# Patient Record
Sex: FEMALE | Race: ASIAN | NOT HISPANIC OR LATINO | Employment: FULL TIME | URBAN - METROPOLITAN AREA
[De-identification: names, ages, dates, MRNs, and addresses within clinical notes are randomized per-mention and may not be internally consistent; named-entity substitution may affect disease eponyms.]

---

## 2017-06-21 ENCOUNTER — TELEPHONE (OUTPATIENT)
Dept: FAMILY MEDICINE | Facility: CLINIC | Age: 52
End: 2017-06-21

## 2017-06-21 NOTE — TELEPHONE ENCOUNTER
----- Message from Bridger Roque sent at 6/20/2017  4:52 PM CDT -----  Contact: pt  Pt is requesting orders for blood work for appt 7-11-17   Call Back#[phone]  Thanks

## 2017-07-07 ENCOUNTER — TELEPHONE (OUTPATIENT)
Dept: FAMILY MEDICINE | Facility: CLINIC | Age: 52
End: 2017-07-07

## 2017-07-07 NOTE — TELEPHONE ENCOUNTER
----- Message from Joselyn Tobar sent at 7/7/2017 11:57 AM CDT -----  Contact: pt  Pt states that she was told to call 1 week before her appointment to setup labs.Would like orders and scheduled then give her a call back at ....[phone]

## 2017-07-11 ENCOUNTER — OFFICE VISIT (OUTPATIENT)
Dept: FAMILY MEDICINE | Facility: CLINIC | Age: 52
End: 2017-07-11
Payer: COMMERCIAL

## 2017-07-11 VITALS
BODY MASS INDEX: 39.59 KG/M2 | HEART RATE: 75 BPM | WEIGHT: 237.63 LBS | OXYGEN SATURATION: 95 % | SYSTOLIC BLOOD PRESSURE: 110 MMHG | HEIGHT: 65 IN | DIASTOLIC BLOOD PRESSURE: 82 MMHG

## 2017-07-11 DIAGNOSIS — E11.59 HYPERTENSION ASSOCIATED WITH DIABETES: ICD-10-CM

## 2017-07-11 DIAGNOSIS — F32.A ANXIETY AND DEPRESSION: ICD-10-CM

## 2017-07-11 DIAGNOSIS — E11.9 CONTROLLED TYPE 2 DIABETES MELLITUS WITHOUT COMPLICATION, UNSPECIFIED LONG TERM INSULIN USE STATUS: ICD-10-CM

## 2017-07-11 DIAGNOSIS — Z76.89 ESTABLISHING CARE WITH NEW DOCTOR, ENCOUNTER FOR: Primary | ICD-10-CM

## 2017-07-11 DIAGNOSIS — F41.9 ANXIETY AND DEPRESSION: ICD-10-CM

## 2017-07-11 DIAGNOSIS — I15.2 HYPERTENSION ASSOCIATED WITH DIABETES: ICD-10-CM

## 2017-07-11 DIAGNOSIS — Z12.31 ENCOUNTER FOR SCREENING MAMMOGRAM FOR MALIGNANT NEOPLASM OF BREAST: ICD-10-CM

## 2017-07-11 PROCEDURE — 99214 OFFICE O/P EST MOD 30 MIN: CPT | Mod: S$GLB,,, | Performed by: FAMILY MEDICINE

## 2017-07-11 PROCEDURE — 99999 PR PBB SHADOW E&M-EST. PATIENT-LVL III: CPT | Mod: PBBFAC,,, | Performed by: FAMILY MEDICINE

## 2017-07-11 PROCEDURE — 4010F ACE/ARB THERAPY RXD/TAKEN: CPT | Mod: S$GLB,,, | Performed by: FAMILY MEDICINE

## 2017-07-11 RX ORDER — ALPRAZOLAM 0.25 MG/1
0.25 TABLET ORAL 3 TIMES DAILY
Qty: 30 TABLET | Refills: 0 | Status: SHIPPED | OUTPATIENT
Start: 2017-07-11 | End: 2017-09-14 | Stop reason: SDUPTHER

## 2017-07-11 RX ORDER — SERTRALINE HYDROCHLORIDE 100 MG/1
100 TABLET, FILM COATED ORAL DAILY
Qty: 90 TABLET | Refills: 3 | Status: SHIPPED | OUTPATIENT
Start: 2017-07-11 | End: 2018-07-11 | Stop reason: SDUPTHER

## 2017-07-11 RX ORDER — OMEPRAZOLE 20 MG/1
20 CAPSULE, DELAYED RELEASE ORAL DAILY
Qty: 90 CAPSULE | Refills: 3 | Status: SHIPPED | OUTPATIENT
Start: 2017-07-11 | End: 2018-08-08 | Stop reason: ALTCHOICE

## 2017-07-11 RX ORDER — NITROFURANTOIN (MACROCRYSTALS) 100 MG/1
100 CAPSULE ORAL EVERY 12 HOURS
Qty: 14 CAPSULE | Refills: 0 | Status: SHIPPED | OUTPATIENT
Start: 2017-07-11 | End: 2017-07-18

## 2017-07-11 RX ORDER — LAMOTRIGINE 100 MG/1
100 TABLET ORAL DAILY
COMMUNITY
End: 2017-07-11 | Stop reason: SDUPTHER

## 2017-07-11 RX ORDER — ATORVASTATIN CALCIUM 20 MG/1
20 TABLET, FILM COATED ORAL DAILY
Qty: 90 TABLET | Refills: 3 | Status: SHIPPED | OUTPATIENT
Start: 2017-07-11 | End: 2018-08-08 | Stop reason: SDUPTHER

## 2017-07-11 RX ORDER — VALACYCLOVIR HYDROCHLORIDE 500 MG/1
500 TABLET, FILM COATED ORAL 2 TIMES DAILY
Qty: 180 TABLET | Refills: 3 | Status: SHIPPED | OUTPATIENT
Start: 2017-07-11 | End: 2019-05-07 | Stop reason: SDUPTHER

## 2017-07-11 RX ORDER — LISINOPRIL AND HYDROCHLOROTHIAZIDE 20; 25 MG/1; MG/1
TABLET ORAL
COMMUNITY
Start: 2017-05-24 | End: 2017-07-11 | Stop reason: SDUPTHER

## 2017-07-11 RX ORDER — VALACYCLOVIR HYDROCHLORIDE 500 MG/1
500 TABLET, FILM COATED ORAL 2 TIMES DAILY
COMMUNITY
End: 2017-07-11 | Stop reason: SDUPTHER

## 2017-07-11 RX ORDER — OMEPRAZOLE 20 MG/1
20 CAPSULE, DELAYED RELEASE ORAL DAILY
COMMUNITY
End: 2017-07-11 | Stop reason: SDUPTHER

## 2017-07-11 RX ORDER — GLYBURIDE 2.5 MG/1
TABLET ORAL
COMMUNITY
Start: 2017-05-24 | End: 2017-07-11 | Stop reason: SDUPTHER

## 2017-07-11 RX ORDER — LISINOPRIL AND HYDROCHLOROTHIAZIDE 20; 25 MG/1; MG/1
1 TABLET ORAL DAILY
Qty: 90 TABLET | Refills: 3 | Status: SHIPPED | OUTPATIENT
Start: 2017-07-11 | End: 2018-08-08 | Stop reason: SDUPTHER

## 2017-07-11 RX ORDER — LAMOTRIGINE 100 MG/1
100 TABLET ORAL DAILY
Qty: 90 TABLET | Refills: 3 | Status: SHIPPED | OUTPATIENT
Start: 2017-07-11 | End: 2018-07-08 | Stop reason: SDUPTHER

## 2017-07-11 RX ORDER — SERTRALINE HYDROCHLORIDE 100 MG/1
TABLET, FILM COATED ORAL
COMMUNITY
Start: 2017-04-24 | End: 2017-07-11 | Stop reason: SDUPTHER

## 2017-07-11 RX ORDER — ESTRADIOL 0.05 MG/D
1 FILM, EXTENDED RELEASE TRANSDERMAL WEEKLY
Qty: 8 PATCH | Refills: 5 | Status: SHIPPED | OUTPATIENT
Start: 2017-07-11 | End: 2017-08-31 | Stop reason: SDUPTHER

## 2017-07-11 RX ORDER — GLYBURIDE 2.5 MG/1
2.5 TABLET ORAL
Qty: 180 TABLET | Refills: 3 | Status: SHIPPED | OUTPATIENT
Start: 2017-07-11 | End: 2017-07-20 | Stop reason: SDUPTHER

## 2017-07-11 RX ORDER — ALPRAZOLAM 0.25 MG/1
0.25 TABLET ORAL 3 TIMES DAILY
COMMUNITY
End: 2017-07-11 | Stop reason: SDUPTHER

## 2017-07-11 RX ORDER — ESTRADIOL 0.05 MG/D
1 FILM, EXTENDED RELEASE TRANSDERMAL WEEKLY
COMMUNITY
End: 2017-07-11 | Stop reason: SDUPTHER

## 2017-07-11 NOTE — PATIENT INSTRUCTIONS
Weight Management: Getting Started  Healthy bodies come in all shapes and sizes. Not all bodies are made to be thin. For some people, a healthy weight is higher than the average weight listed on weight charts. Your healthcare provider can help you decide on a healthy weight for you.    Reasons to lose weight  Losing weight can help with some health problems, such as high blood pressure, heart disease, diabetes, sleep apnea, and arthritis. You may also feel more energy.  Set your long-term goal  Your goal doesn't even have to be a specific weight. You may decide on a fitness goal (such as being able to walk 10 miles a week), or a health goal (such as lowering your blood pressure). Choose a goal that is measurable and reasonable, so you know when you've reached it. A goal of reaching a BMI of less than 25 is not always reasonable (or possible).   Make an action plan  Habits dont change overnight. Setting your goals too high can leave you feeling discouraged if you cant reach them. Be realistic. Choose one or two small changes you can make now. Set an action plan for how you are going to make these changes. When you can stick to this plan, keep making a few more small changes. Taking small steps will help you stay on the path to success.  Track your progress  Write down your goals. Then, keep a daily record of your progress. Write down what you eat and how active you are. This record lets you look back on how much youve done. It may also help when youre feeling frustrated. Reward yourself for success. Even if you dont reach every goal, give yourself credit for what you do get done.  Get support  Encouragement from others can help make losing weight easier. Ask your family members and friends for support. They may even want to join you. Also look to your healthcare provider, registered dietitian, and  for help. Your local hospital can give you more information about nutrition, exercise, and  weight loss.  Date Last Reviewed: 1/31/2016 © 2000-2016 [employer]. [address] [place] [address], [place] [address]. All rights reserved. This information is not intended as a substitute for professional medical care. Always follow your healthcare professional's instructions.        Diabetes (General Information)  Diabetes is a long-term health problem. It means your body does not make enough insulin. Or it may mean that your body cannot use the insulin it makes. Insulin is a hormone in your body. It lets blood sugar (glucose) reach the cells in your body. All of your cells need glucose for fuel.  When you have diabetes, the glucose in your blood builds up because it cannot get into the cells. This buildup is called high blood sugar (hyperglycemia).  Your blood sugar level depends on several things. It depends on what kind of food you eat and how much of it you eat. It also depends on how much exercise you get, and how much insulin you have in your body. Eating too much of the wrong kinds of food or not taking diabetes medicine on time can cause high blood sugar. Infections can cause high blood sugar even if you are taking medicines correctly.  These things can also cause low blood sugar:  · Missing meals  · Not eating enough food  · Taking too much diabetes medicine  Diabetes can cause serious problems over time if you do not get treated. These problems include heart disease, stroke, kidney failure, and blindness. They also include nerve pain or loss of feeling in your legs and feet, and gangrene of the feet. By keeping your blood sugar under control you can prevent or delay these problems.  Normal blood sugar levels are 80 to 100 before a meal and less than 180 in the 1 to 2 hours after a meal.  Home care  Follow these guidelines when caring for yourself at home:  · Follow the diet your healthcare provider gives you. Take insulin or other diabetes medicine exactly as told to.  · Watch your blood sugar as  you are told to. Keep a log of your results. This will help your provider change your medicines to keep your blood sugar under control.  · Try to reach your ideal weight. You may be able to cut back on or not have to take diabetes medicine if you eat the right foods and get exercise.  · Do not smoke. Smoking worsens the effects of diabetes on your circulation. You are much more likely to have a heart attack if you have diabetes and you smoke.  · Take good care of your feet. If you have lost feeling in your feet, you may not see an injury or infection. Check your feet and between your toes at least once a week.  · Wear a medical alert bracelet or necklace, or carry a card in your wallet that says you have diabetes. This will help healthcare providers give you the right care if you get very ill and cannot tell them that you have diabetes.  Sick day plan  If you get a cold, the flu, or a bacterial or viral infection, take these steps:  · Look at your diabetes sick plan and call your healthcare provider as you were told to. You may need to call your provider right away if:  ¨ Your blood sugar is above 240 while taking your diabetes medicine  ¨ Your urine ketone levels are above normal or high  ¨ You have been vomiting more than 6 hours  ¨ You have trouble breathing or your breath ha s a fruity smell  ¨ You have a high fever  ¨ You have a fever for several days and you are not getting better  ¨ You get light-headed and are sleepier than usual  · Keep taking your diabetes pills (oral medicine) even if you have been vomiting and are feeling sick. Call your provider right away because you may need insulin to lower your blood sugar until you recover from your illness.  · Keep taking your insulin even if you have been vomiting and are feeling sick. Call your provider right away to ask if you need to change your insulin dose. This will depend on your blood sugar results.  · Check your blood sugar every 2 to 4 hours, or at  least 4 times a day.  · Check your ketones often. If you are vomiting and having diarrhea, watch them more often.  · Do not skip meals. Try to eat small meals on a regular schedule. Do this even if you do not feel like eating.  · Drink water or other liquids that do not have caffeine or calories. This will keep you from getting dehydrated. If you are nauseated or vomiting, takes small sips every 5 minutes. To prevent dehydration try to drink a cup (8 ounces) of fluids every hour while you are awake.  General care  Always bring a source of fast-acting sugar with you in case you have symptoms of low blood sugar (below 70). At the first sign of low blood sugar, eat or drink 15 to 20 grams of fast-acting sugar to raise your blood sugar. Examples are:  · 3 to 4 glucose tablets. You can buy these at most [employer].  · 4 ounces (1/2 cup) of regular (not diet) soft drinks  · 4 ounces (1/2 cup) of any fruit juice  · 8 ounces (1 cup) of milk  · 5 to 6 pieces of hard candy  · 1 tablespoon of honey  Check your blood sugar 15 minutes after treating yourself. If it is still below 70, take 15 to 20 more grams of fast-acting sugar. Test again in 15 minutes. If it returns to normal (70 or above), eat a snack or meal to keep your blood sugar in a safe range. If it stays low, call your doctor or go to an emergency room.  Follow-up care  Follow-up with your healthcare provider, or as advised. For more information about diabetes, visit the American Diabetes Association website at www.diabetes.org or call 401-633-0766.  When to seek medical advice  Call your healthcare provider right away if you have any of these symptoms of high blood sugar:  · Frequent urination  · Dizziness  · Drowsiness  · Thirst  · Headache  · Nausea or vomiting  · Abdominal pain  · Eyesight changes  · Fast breathing  · Confusion or loss of consciousness  Also call your provider right away if you have any of these signs of low blood  sugar:  · Fatigue  · Headache  · Shakes  · Excess sweating  · Hunger  · Feeling anxious or restless  · Eyesight changes  · Drowsiness  · Weakness  · Confusion or loss of consciousness  Call 911  Call for emergency help right away if any of these occur:  · Chest pain or shortness of breath  · Dizziness or fainting  · Weakness of an arm or leg or one side of the face  · Trouble speaking or seeing   Date Last Reviewed: 6/1/2016  © 2252-7754 [employer]. [address] [place] [address]. All rights reserved. This information is not intended as a substitute for professional medical care. Always follow your healthcare professional's instructions.

## 2017-07-11 NOTE — PROGRESS NOTES
Subjective:       Patient ID: Nik Lowery is a 51 y.o. female.    Chief Complaint: Establish Care    HPI     Establish Care  Pt reports to the clinic to establish care.   The pt has a medical history which includes HTN and DM2.  As far as smoking is concerned, the pt denies.  The pt attempts to maintain a healthy diet and engages in regular exercise.   Consistent seatbelt usage reported.   Pt has no symptoms of depression.   Health maintenance addressed and updated in chart.    Review of Systems   Constitutional: Negative for chills and fever.   HENT: Negative for sneezing and sore throat.    Eyes: Negative for visual disturbance.   Respiratory: Negative for cough and shortness of breath.    Cardiovascular: Negative for chest pain and leg swelling.   Gastrointestinal: Negative for abdominal pain, blood in stool, constipation, diarrhea and vomiting.   Genitourinary: Negative for difficulty urinating, dysuria and hematuria.   Musculoskeletal: Negative for arthralgias.   Neurological: Negative for dizziness and weakness.       Objective:      Physical Exam   Constitutional: She appears well-developed and well-nourished. No distress.   Obese female in NAD.   HENT:   Head: Normocephalic and atraumatic.   Mouth/Throat: Oropharynx is clear and moist. No oropharyngeal exudate.   Eyes: EOM are normal. Pupils are equal, round, and reactive to light.   Neck: Normal range of motion. Neck supple. No thyromegaly present.   Cardiovascular: Normal rate, regular rhythm, normal heart sounds and intact distal pulses.    Pulmonary/Chest: Effort normal and breath sounds normal. No respiratory distress. She has no wheezes.   Abdominal: Soft. Bowel sounds are normal. She exhibits no distension and no mass. There is no tenderness.   Musculoskeletal: She exhibits no edema.   Lymphadenopathy:     She has no cervical adenopathy.   Neurological: She is alert.   Skin: Skin is warm. No rash noted. No erythema.   Psychiatric: She has a normal  mood and affect. Her behavior is normal.   Vitals reviewed.      Assessment:       1. Establishing care with new doctor, encounter for    2. Controlled type 2 diabetes mellitus without complication, unspecified long term insulin use status    3. Hypertension associated with diabetes    4. Anxiety and depression    5. Encounter for screening mammogram for malignant neoplasm of breast    6. BMI 39.0-39.9,adult        Plan:       1. Establishing care with new doctor, encounter for    2. Controlled type 2 diabetes mellitus without complication, unspecified long term insulin use status  - Basic metabolic panel; Future  - Hemoglobin A1c; Future  - Microalbumin/creatinine urine ratio; Future    3. Hypertension associated with diabetes  Condition currently stable. No changes to medication regimen on today.   - Basic metabolic panel; Future  - Microalbumin/creatinine urine ratio; Future    4. Anxiety and depression  - Ambulatory referral to Psychiatry    5. Encounter for screening mammogram for malignant neoplasm of breast  - Mammo Digital Screening Bilateral With CAD; Future    6. BMI 39.0-39.9,adult  The ASCVD Risk score (Milton ORLANDO Jr., et al., 2013) failed to calculate for the following reasons:    Cannot find a previous HDL lab    Cannot find a previous total cholesterol lab  - Lipid panel; Future  Start atorvastin.    Patient readiness: acceptance and barriers:none    During the course of the visit the patient was educated and counseled about the following:     Diabetes:  Educational material distributed.  Addressed ADA diet.  Suggested low cholesterol diet.  Encouraged aerobic exercise.  Discussed foot care.  Reminded to get yearly retinal exam.  Hypertension:   Dietary sodium restriction.  Regular aerobic exercise.  Obesity:   Informal exercise measures discussed, e.g. taking stairs instead of elevator.  Regular aerobic exercise program discussed.    Goals: Diabetes: Maintain Hemoglobin A1C below 7, Hypertension: Reduce  Blood Pressure and Obesity: Reduce calorie intake and BMI    Did patient meet goals/outcomes: No    The following self management tools provided: blood pressure log  blood glucose log  excercise log    Patient Instructions (the written plan) was given to the patient/family.     Time spent with patient: 30 minutes    Portions of this note were created using Dragon voice recognition software. There may be voice recognition errors found in the text, and attempts were made to correct these errors prior to signature    Marin Hager MD    Family Medicine  7/11/2017

## 2017-07-13 DIAGNOSIS — Z11.59 NEED FOR HEPATITIS C SCREENING TEST: ICD-10-CM

## 2017-07-20 ENCOUNTER — TELEPHONE (OUTPATIENT)
Dept: FAMILY MEDICINE | Facility: CLINIC | Age: 52
End: 2017-07-20

## 2017-07-20 RX ORDER — GLYBURIDE 2.5 MG/1
2.5 TABLET ORAL 2 TIMES DAILY WITH MEALS
Qty: 180 TABLET | Refills: 3 | Status: SHIPPED | OUTPATIENT
Start: 2017-07-20 | End: 2018-07-25 | Stop reason: SDUPTHER

## 2017-07-20 NOTE — TELEPHONE ENCOUNTER
----- Message from Chrissy Curry sent at 7/20/2017  1:07 PM CDT -----  Contact: [facility] [phone] called regarding [place] for above patient. They are requesting one of the following: Rx clarification of directions and quantity. Thanks!

## 2017-08-12 ENCOUNTER — HOSPITAL ENCOUNTER (OUTPATIENT)
Dept: RADIOLOGY | Facility: CLINIC | Age: 52
Discharge: HOME | End: 2017-08-12
Attending: FAMILY MEDICINE
Payer: COMMERCIAL

## 2017-08-12 DIAGNOSIS — Z12.31 ENCOUNTER FOR SCREENING MAMMOGRAM FOR MALIGNANT NEOPLASM OF BREAST: ICD-10-CM

## 2017-08-12 PROCEDURE — 77063 BREAST TOMOSYNTHESIS BI: CPT | Mod: 26,,, | Performed by: RADIOLOGY

## 2017-08-12 PROCEDURE — 77067 SCR MAMMO BI INCL CAD: CPT | Mod: 26,,, | Performed by: RADIOLOGY

## 2017-08-12 PROCEDURE — 77067 SCR MAMMO BI INCL CAD: CPT | Mod: TC

## 2017-08-18 ENCOUNTER — TELEPHONE (OUTPATIENT)
Dept: FAMILY MEDICINE | Facility: CLINIC | Age: 52
End: 2017-08-18

## 2017-08-18 RX ORDER — METFORMIN HYDROCHLORIDE 500 MG/1
1000 TABLET, EXTENDED RELEASE ORAL 2 TIMES DAILY WITH MEALS
Qty: 360 TABLET | Refills: 3 | Status: SHIPPED | OUTPATIENT
Start: 2017-08-18 | End: 2018-08-08 | Stop reason: SDUPTHER

## 2017-08-18 NOTE — TELEPHONE ENCOUNTER
I spoke to patient regarding her recent lab results.  Patient has been informed that her diabetes is not controlled and her A1c is significantly elevated.  For this.  Patient has been started on metformin XR 1000 g by mouth twice a day.  She has been advised to improve her diet as well as an order to assist with lowering her glucose levels.

## 2017-09-01 RX ORDER — ESTRADIOL 0.05 MG/D
FILM, EXTENDED RELEASE TRANSDERMAL
Qty: 24 PATCH | Refills: 1 | Status: SHIPPED | OUTPATIENT
Start: 2017-09-01 | End: 2018-06-04 | Stop reason: SDUPTHER

## 2017-09-14 RX ORDER — ALPRAZOLAM 0.25 MG/1
TABLET ORAL
Qty: 30 TABLET | Refills: 0 | Status: SHIPPED | OUTPATIENT
Start: 2017-09-14 | End: 2018-08-08 | Stop reason: SDUPTHER

## 2017-10-05 ENCOUNTER — TELEPHONE (OUTPATIENT)
Dept: FAMILY MEDICINE | Facility: CLINIC | Age: 52
End: 2017-10-05

## 2017-10-05 NOTE — TELEPHONE ENCOUNTER
Pharmacy called regarding pt. Medication. Pharmacy filled Metformin 500mg regular, instead of Metformin 500mg XL . Pharmacy reports that pt. Has no side effects from the regular tabs. Would you want pt. To continue taking regular or switch to XL like prescribed originally. Please Advise

## 2017-11-16 DIAGNOSIS — Z12.11 COLON CANCER SCREENING: ICD-10-CM

## 2017-12-14 DIAGNOSIS — Z13.5 DIABETIC RETINOPATHY SCREENING: ICD-10-CM

## 2018-01-10 DIAGNOSIS — E11.9 TYPE 2 DIABETES, HBA1C GOAL < 7%: Primary | ICD-10-CM

## 2018-01-18 DIAGNOSIS — E11.9 TYPE 2 DIABETES MELLITUS WITHOUT COMPLICATION: ICD-10-CM

## 2018-03-22 ENCOUNTER — OFFICE VISIT (OUTPATIENT)
Dept: OPTOMETRY | Facility: CLINIC | Age: 53
End: 2018-03-22
Payer: COMMERCIAL

## 2018-03-22 DIAGNOSIS — H52.7 REFRACTIVE ERROR: ICD-10-CM

## 2018-03-22 DIAGNOSIS — E11.9 DIABETES MELLITUS WITHOUT OPHTHALMIC MANIFESTATIONS: Primary | ICD-10-CM

## 2018-03-22 PROCEDURE — 92004 COMPRE OPH EXAM NEW PT 1/>: CPT | Mod: S$GLB,,, | Performed by: OPTOMETRIST

## 2018-03-22 PROCEDURE — 99999 PR PBB SHADOW E&M-EST. PATIENT-LVL II: CPT | Mod: PBBFAC,,, | Performed by: OPTOMETRIST

## 2018-03-22 NOTE — PROGRESS NOTES
HPI     Presenting Complaint: Pt here today for yearly diabetic eye exam and   refraction. Pt states blood sugar level are not controlled. DLE: 2015    Hemoglobin A1C       Date                     Value               Ref Range             Status                08/12/2017               10.5 (H)            4.0 - 5.6 %           Final               Pt states vision has been blurry  When she wears glasses but not all the   time    Ophthalmic medication / drops: None    (+) 2001 Lasik both eyes    (-) headaches  (-) diplopia   (-) flashes / (-) floaters      Last edited by Rm Mendez, OD on 3/22/2018  4:04 PM. (History)            Assessment /Plan     For exam results, see Encounter Report.    Diabetes mellitus without ophthalmic manifestations    Refractive error      DM type 2 w/o ocular retinopathy OU. Discussed possible ocular affects of uncontrolled blood sugar with patient. Recommended continued strong blood sugar control and continued care with PCP. Monitor yearly.     Discussed refractive error, pt overdue for A1C and f/u with PCP. Set up appt today. Pt defers refraction at this time, will schedule refraction when BS stable.       RTC when ready for refraction, or sooner prn.

## 2018-04-26 RX ORDER — ALPRAZOLAM 0.25 MG/1
TABLET ORAL
Qty: 30 TABLET | OUTPATIENT
Start: 2018-04-26

## 2018-06-04 RX ORDER — ESTRADIOL 0.05 MG/D
FILM, EXTENDED RELEASE TRANSDERMAL
Qty: 24 PATCH | Refills: 1 | Status: SHIPPED | OUTPATIENT
Start: 2018-06-04 | End: 2019-01-10 | Stop reason: SDUPTHER

## 2018-07-09 RX ORDER — LAMOTRIGINE 100 MG/1
TABLET ORAL
Qty: 90 TABLET | Refills: 1 | Status: SHIPPED | OUTPATIENT
Start: 2018-07-09 | End: 2019-01-05 | Stop reason: SDUPTHER

## 2018-07-11 RX ORDER — SERTRALINE HYDROCHLORIDE 100 MG/1
TABLET, FILM COATED ORAL
Qty: 90 TABLET | Refills: 0 | Status: SHIPPED | OUTPATIENT
Start: 2018-07-11 | End: 2018-08-08 | Stop reason: SDUPTHER

## 2018-07-25 RX ORDER — LISINOPRIL AND HYDROCHLOROTHIAZIDE 20; 25 MG/1; MG/1
TABLET ORAL
Qty: 90 TABLET | Refills: 3 | Status: CANCELLED | OUTPATIENT
Start: 2018-07-25

## 2018-07-25 RX ORDER — GLYBURIDE 2.5 MG/1
TABLET ORAL
Qty: 180 TABLET | Refills: 0 | Status: SHIPPED | OUTPATIENT
Start: 2018-07-25 | End: 2018-10-24 | Stop reason: SDUPTHER

## 2018-08-06 DIAGNOSIS — Z11.59 NEED FOR HEPATITIS C SCREENING TEST: ICD-10-CM

## 2018-08-06 DIAGNOSIS — Z12.11 COLON CANCER SCREENING: ICD-10-CM

## 2018-08-08 ENCOUNTER — TELEPHONE (OUTPATIENT)
Dept: PSYCHIATRY | Facility: CLINIC | Age: 53
End: 2018-08-08

## 2018-08-08 ENCOUNTER — OFFICE VISIT (OUTPATIENT)
Dept: FAMILY MEDICINE | Facility: CLINIC | Age: 53
End: 2018-08-08
Payer: COMMERCIAL

## 2018-08-08 VITALS
OXYGEN SATURATION: 98 % | RESPIRATION RATE: 18 BRPM | BODY MASS INDEX: 39.49 KG/M2 | HEIGHT: 65 IN | SYSTOLIC BLOOD PRESSURE: 122 MMHG | WEIGHT: 237 LBS | TEMPERATURE: 98 F | DIASTOLIC BLOOD PRESSURE: 74 MMHG | HEART RATE: 81 BPM

## 2018-08-08 DIAGNOSIS — E11.65 UNCONTROLLED TYPE 2 DIABETES MELLITUS WITH HYPERGLYCEMIA, WITHOUT LONG-TERM CURRENT USE OF INSULIN: Primary | ICD-10-CM

## 2018-08-08 DIAGNOSIS — Z11.59 NEED FOR HEPATITIS C SCREENING TEST: ICD-10-CM

## 2018-08-08 DIAGNOSIS — E11.59 HYPERTENSION ASSOCIATED WITH DIABETES: ICD-10-CM

## 2018-08-08 DIAGNOSIS — F41.9 ANXIETY AND DEPRESSION: ICD-10-CM

## 2018-08-08 DIAGNOSIS — F41.1 GAD (GENERALIZED ANXIETY DISORDER): Primary | ICD-10-CM

## 2018-08-08 DIAGNOSIS — K21.9 GASTROESOPHAGEAL REFLUX DISEASE WITHOUT ESOPHAGITIS: ICD-10-CM

## 2018-08-08 DIAGNOSIS — I15.2 HYPERTENSION ASSOCIATED WITH DIABETES: ICD-10-CM

## 2018-08-08 DIAGNOSIS — Z12.11 COLON CANCER SCREENING: ICD-10-CM

## 2018-08-08 DIAGNOSIS — Z28.39 IMMUNIZATION DEFICIENCY: ICD-10-CM

## 2018-08-08 DIAGNOSIS — F32.A ANXIETY AND DEPRESSION: ICD-10-CM

## 2018-08-08 DIAGNOSIS — E66.01 SEVERE OBESITY (BMI 35.0-39.9) WITH COMORBIDITY: ICD-10-CM

## 2018-08-08 PROCEDURE — 3008F BODY MASS INDEX DOCD: CPT | Mod: CPTII,S$GLB,, | Performed by: PHYSICIAN ASSISTANT

## 2018-08-08 PROCEDURE — 3078F DIAST BP <80 MM HG: CPT | Mod: CPTII,S$GLB,, | Performed by: PHYSICIAN ASSISTANT

## 2018-08-08 PROCEDURE — 90714 TD VACC NO PRESV 7 YRS+ IM: CPT | Mod: S$GLB,,, | Performed by: PHYSICIAN ASSISTANT

## 2018-08-08 PROCEDURE — 90471 IMMUNIZATION ADMIN: CPT | Mod: 59,S$GLB,, | Performed by: PHYSICIAN ASSISTANT

## 2018-08-08 PROCEDURE — 99999 PR PBB SHADOW E&M-EST. PATIENT-LVL V: CPT | Mod: PBBFAC,,, | Performed by: PHYSICIAN ASSISTANT

## 2018-08-08 PROCEDURE — 3046F HEMOGLOBIN A1C LEVEL >9.0%: CPT | Mod: CPTII,S$GLB,, | Performed by: PHYSICIAN ASSISTANT

## 2018-08-08 PROCEDURE — 99214 OFFICE O/P EST MOD 30 MIN: CPT | Mod: 25,S$GLB,, | Performed by: PHYSICIAN ASSISTANT

## 2018-08-08 PROCEDURE — 90472 IMMUNIZATION ADMIN EACH ADD: CPT | Mod: S$GLB,,, | Performed by: PHYSICIAN ASSISTANT

## 2018-08-08 PROCEDURE — 90732 PPSV23 VACC 2 YRS+ SUBQ/IM: CPT | Mod: S$GLB,,, | Performed by: PHYSICIAN ASSISTANT

## 2018-08-08 PROCEDURE — 3074F SYST BP LT 130 MM HG: CPT | Mod: CPTII,S$GLB,, | Performed by: PHYSICIAN ASSISTANT

## 2018-08-08 RX ORDER — SERTRALINE HYDROCHLORIDE 100 MG/1
100 TABLET, FILM COATED ORAL DAILY
Qty: 90 TABLET | Refills: 1 | Status: SHIPPED | OUTPATIENT
Start: 2018-08-08 | End: 2019-03-14 | Stop reason: SDUPTHER

## 2018-08-08 RX ORDER — INSULIN PUMP SYRINGE, 3 ML
EACH MISCELLANEOUS
Qty: 1 EACH | Refills: 0 | Status: SHIPPED | OUTPATIENT
Start: 2018-08-08 | End: 2021-07-16

## 2018-08-08 RX ORDER — ALPRAZOLAM 0.25 MG/1
0.25 TABLET ORAL DAILY PRN
Qty: 30 TABLET | Refills: 2 | Status: CANCELLED | OUTPATIENT
Start: 2018-08-08

## 2018-08-08 RX ORDER — LANCETS
EACH MISCELLANEOUS
Qty: 200 EACH | Refills: 1 | Status: SHIPPED | OUTPATIENT
Start: 2018-08-08

## 2018-08-08 RX ORDER — METFORMIN HYDROCHLORIDE 500 MG/1
1000 TABLET, EXTENDED RELEASE ORAL 2 TIMES DAILY WITH MEALS
Qty: 360 TABLET | Refills: 3 | Status: SHIPPED | OUTPATIENT
Start: 2018-08-08 | End: 2018-08-08 | Stop reason: SDUPTHER

## 2018-08-08 RX ORDER — LISINOPRIL AND HYDROCHLOROTHIAZIDE 20; 25 MG/1; MG/1
1 TABLET ORAL DAILY
Qty: 90 TABLET | Refills: 3 | Status: SHIPPED | OUTPATIENT
Start: 2018-08-08 | End: 2019-08-04 | Stop reason: SDUPTHER

## 2018-08-08 RX ORDER — PANTOPRAZOLE SODIUM 20 MG/1
20 TABLET, DELAYED RELEASE ORAL DAILY
Qty: 90 TABLET | Refills: 1 | Status: SHIPPED | OUTPATIENT
Start: 2018-08-08 | End: 2019-08-13 | Stop reason: SDUPTHER

## 2018-08-08 RX ORDER — METFORMIN HYDROCHLORIDE 500 MG/1
1000 TABLET, EXTENDED RELEASE ORAL 2 TIMES DAILY WITH MEALS
Qty: 360 TABLET | Refills: 3 | Status: SHIPPED | OUTPATIENT
Start: 2018-08-08 | End: 2019-08-13 | Stop reason: SDUPTHER

## 2018-08-08 RX ORDER — ATORVASTATIN CALCIUM 20 MG/1
20 TABLET, FILM COATED ORAL DAILY
Qty: 90 TABLET | Refills: 3 | Status: SHIPPED | OUTPATIENT
Start: 2018-08-08 | End: 2019-08-13 | Stop reason: SDUPTHER

## 2018-08-08 NOTE — PROGRESS NOTES
Two Patient identifiers used, Name and . VIS information given to patient and procedure was explained. Patient verbalized understanding. Pneumovax 23 was administered IM in the right deltoid and Td was administered IM in the left deltoid both using sterile technique.  No residual bleeding noted. Patient tolerated procedure well.

## 2018-08-08 NOTE — PROGRESS NOTES
Subjective:       Patient ID: Nik Lowery is a 52 y.o. female.    Chief Complaint: Follow-up (diabetic/ RX refills)    Ms. Lowery comes to clinic today for follow up. The patient has not been seen in over a year. The patient has type 2 diabetes and HTN. The patient's last HgA1c was 10.5 . The patient was prescribed metformin. She stopped the metformin due to GI side effects. The patient also stopped taking her lipitor. The patient has been compliant with her blood pressure medication. The patient does have anxiety and depression for which she takes zoloft and lamictal. The patient is also prescribed xanax but she takes this only once weekly. The patient reports she was seeing a psychiatrist in [place] but she has not reestablished with a mental health professional or psychiatrist here. She is due to update lab at this time. She also request medication refills.       Review of Systems   Constitutional: Negative for activity change, appetite change and fever.   HENT: Negative for postnasal drip, rhinorrhea and sinus pressure.    Eyes: Negative for visual disturbance.   Respiratory: Negative for cough and shortness of breath.    Cardiovascular: Negative for chest pain.   Gastrointestinal: Negative for abdominal distention and abdominal pain.   Genitourinary: Negative for difficulty urinating and dysuria.   Musculoskeletal: Negative for arthralgias and myalgias.   Neurological: Negative for headaches.   Hematological: Negative for adenopathy.   Psychiatric/Behavioral: The patient is nervous/anxious.        Objective:      Physical Exam   Constitutional: She is oriented to person, place, and time.   HENT:   Mouth/Throat: Oropharynx is clear and moist. No oropharyngeal exudate.   Eyes: Conjunctivae are normal. Pupils are equal, round, and reactive to light.   Cardiovascular: Normal rate and regular rhythm.    Pulses:       Dorsalis pedis pulses are 2+ on the right side, and 2+ on the left side.        Posterior tibial  pulses are 2+ on the right side, and 2+ on the left side.   Pulmonary/Chest: Effort normal and breath sounds normal. She has no wheezes.   Abdominal: Soft. Bowel sounds are normal. There is no tenderness.   Musculoskeletal: She exhibits no edema.   Feet:   Right Foot:   Protective Sensation: 10 sites tested. 10 sites sensed.   Skin Integrity: Negative for ulcer, blister, callus or dry skin.   Left Foot:   Protective Sensation: 10 sites tested. 10 sites sensed.   Skin Integrity: Negative for ulcer, blister, callus or dry skin.   Lymphadenopathy:     She has no cervical adenopathy.   Neurological: She is alert and oriented to person, place, and time.   Skin: No erythema.   Psychiatric: Her behavior is normal.       Assessment:       1. Uncontrolled type 2 diabetes mellitus with hyperglycemia, without long-term current use of insulin    2. Hypertension associated with diabetes    3. Anxiety and depression    4. Colon cancer screening    5. Need for hepatitis C screening test    6. Immunization deficiency    7. Gastroesophageal reflux disease without esophagitis    8. Severe obesity (BMI 35.0-39.9) with comorbidity        Plan:   Nik was seen today for follow-up.    Diagnoses and all orders for this visit:    Uncontrolled type 2 diabetes mellitus with hyperglycemia, without long-term current use of insulin  -     Hemoglobin A1c; Future  -     Comprehensive metabolic panel; Future  -     CBC auto differential; Future  -     Lipid panel; Future  -     atorvastatin (LIPITOR) 20 MG tablet; Take 1 tablet (20 mg total) by mouth once daily.  -     Discontinue: metFORMIN (GLUCOPHAGE-XR) 500 MG 24 hr tablet; Take 2 tablets (1,000 mg total) by mouth 2 (two) times daily with meals.  -     metFORMIN (GLUCOPHAGE-XR) 500 MG 24 hr tablet; Take 2 tablets (1,000 mg total) by mouth 2 (two) times daily with meals.  -     blood-glucose meter kit; To check BG 3 times daily, to use with insurance preferred meter  -     lancets Misc; To  check BG 3 times daily, to use with insurance preferred meter  -     blood sugar diagnostic Strp; To check BG 3 times daily, to use with insurance preferred meter  -     Ambulatory Referral to Diabetes Education  Uncontrolled on last labs and patient has not been compliant with medication.  Hypertension associated with diabetes  -     Comprehensive metabolic panel; Future  -     CBC auto differential; Future  -     Lipid panel; Future  -     lisinopril-hydrochlorothiazide (PRINZIDE,ZESTORETIC) 20-25 mg Tab; Take 1 tablet by mouth once daily.  Controlled  Low salt diet  Continue current medicaiton  Anxiety and depression  -     sertraline (ZOLOFT) 100 MG tablet; Take 1 tablet (100 mg total) by mouth once daily.  -     Ambulatory referral to Psychiatry  Stable at this time  Continue zoloft and lamictal  Patient advised that the ultimate goal would be for her to be able to discontinue the alprazolam and have anxiety controlled with daily maintenance medication. Patient verbalized understanding  Colon cancer screening  -     Fecal Immunochemical Test (iFOBT); Future    Need for hepatitis C screening test  -     Hepatitis C antibody; Future    Immunization deficiency  -     (In Office Administered) Pneumococcal Polysaccharide Vaccine (23 Valent) (SQ/IM)  -     (In Office Administered) Td Vaccine - Preservative Free    Gastroesophageal reflux disease without esophagitis  -     pantoprazole (PROTONIX) 20 MG tablet; Take 1 tablet (20 mg total) by mouth once daily.  Avoid trigger foods  Stop eating 2-3 hours before bed  Severe obesity (BMI 35.0-39.9) with comorbidity  Low carbohydrate, high fiber diet  Increase exercise as able      Patient readiness: acceptance and barriers:none    During the course of the visit the patient was educated and counseled about the following:     Diabetes:  Discussed general issues about diabetes pathophysiology and management.  Educational material distributed.  Addressed ADA diet.  Suggested  low cholesterol diet.  Encouraged aerobic exercise.  Discussed foot care.  Reminded to get yearly retinal exam.  Hypertension:   Medication: continue current medications  Dietary sodium restriction.  Regular aerobic exercise.  Obesity:   General weight loss/lifestyle modification strategies discussed (elicit support from others; identify saboteurs; non-food rewards, etc).    Goals: Diabetes: Maintain Hemoglobin A1C below 7, Hypertension: Reduce Blood Pressure and Obesity: Reduce calorie intake and BMI    Did patient meet goals/outcomes: No    The following self management tools provided: declined    Patient Instructions (the written plan) was given to the patient/family.     Time spent with patient: 30 minutes    Barriers to medications present (no )    Adverse reactions to current medications (no)    Over the counter medications reviewed (Yes)

## 2018-08-08 NOTE — TELEPHONE ENCOUNTER
Please refill aprazolam for this patient. Patient on daily maintenance medication for anxiety. Patient also referred to Ms. Emerson.

## 2018-08-08 NOTE — TELEPHONE ENCOUNTER
Johana Santillan PA-C routed conversation to You 9 minutes ago (4:48 PM)      Johana Santillan PA-C 10 minutes ago (4:47 PM)         Patient referred for counseling/ therapy with Ms. Emerson. Please schedule. Thanks!         Documentation       Johana Santillan PA-C routed conversation to Cj Rocha MD 36 minutes ago (4:21 PM)

## 2018-08-11 ENCOUNTER — LAB VISIT (OUTPATIENT)
Dept: LAB | Facility: HOSPITAL | Age: 53
End: 2018-08-11
Attending: PHYSICIAN ASSISTANT
Payer: COMMERCIAL

## 2018-08-11 DIAGNOSIS — E11.65 UNCONTROLLED TYPE 2 DIABETES MELLITUS WITH HYPERGLYCEMIA, WITHOUT LONG-TERM CURRENT USE OF INSULIN: ICD-10-CM

## 2018-08-11 DIAGNOSIS — Z11.59 NEED FOR HEPATITIS C SCREENING TEST: ICD-10-CM

## 2018-08-11 DIAGNOSIS — E11.59 HYPERTENSION ASSOCIATED WITH DIABETES: ICD-10-CM

## 2018-08-11 DIAGNOSIS — I15.2 HYPERTENSION ASSOCIATED WITH DIABETES: ICD-10-CM

## 2018-08-11 LAB
ALBUMIN SERPL BCP-MCNC: 3.7 G/DL
ALP SERPL-CCNC: 73 U/L
ALT SERPL W/O P-5'-P-CCNC: 75 U/L
ANION GAP SERPL CALC-SCNC: 12 MMOL/L
AST SERPL-CCNC: 60 U/L
BASOPHILS # BLD AUTO: 0.05 K/UL
BASOPHILS NFR BLD: 0.7 %
BILIRUB SERPL-MCNC: 0.7 MG/DL
BUN SERPL-MCNC: 13 MG/DL
CALCIUM SERPL-MCNC: 9.8 MG/DL
CHLORIDE SERPL-SCNC: 103 MMOL/L
CHOLEST SERPL-MCNC: 176 MG/DL
CHOLEST/HDLC SERPL: 3.2 {RATIO}
CO2 SERPL-SCNC: 24 MMOL/L
CREAT SERPL-MCNC: 0.8 MG/DL
DIFFERENTIAL METHOD: NORMAL
EOSINOPHIL # BLD AUTO: 0.2 K/UL
EOSINOPHIL NFR BLD: 2.1 %
ERYTHROCYTE [DISTWIDTH] IN BLOOD BY AUTOMATED COUNT: 12 %
EST. GFR  (AFRICAN AMERICAN): >60 ML/MIN/1.73 M^2
EST. GFR  (NON AFRICAN AMERICAN): >60 ML/MIN/1.73 M^2
ESTIMATED AVG GLUCOSE: 200 MG/DL
ESTIMATED AVG GLUCOSE: 200 MG/DL
GLUCOSE SERPL-MCNC: 166 MG/DL
HBA1C MFR BLD HPLC: 8.6 %
HBA1C MFR BLD HPLC: 8.6 %
HCT VFR BLD AUTO: 42.5 %
HDLC SERPL-MCNC: 55 MG/DL
HDLC SERPL: 31.3 %
HGB BLD-MCNC: 13.7 G/DL
IMM GRANULOCYTES # BLD AUTO: 0.01 K/UL
IMM GRANULOCYTES NFR BLD AUTO: 0.1 %
LDLC SERPL CALC-MCNC: 105 MG/DL
LYMPHOCYTES # BLD AUTO: 2.1 K/UL
LYMPHOCYTES NFR BLD: 27.7 %
MCH RBC QN AUTO: 28.7 PG
MCHC RBC AUTO-ENTMCNC: 32.2 G/DL
MCV RBC AUTO: 89 FL
MONOCYTES # BLD AUTO: 0.4 K/UL
MONOCYTES NFR BLD: 5.1 %
NEUTROPHILS # BLD AUTO: 4.8 K/UL
NEUTROPHILS NFR BLD: 64.3 %
NONHDLC SERPL-MCNC: 121 MG/DL
NRBC BLD-RTO: 0 /100 WBC
PLATELET # BLD AUTO: 276 K/UL
PMV BLD AUTO: 10.1 FL
POTASSIUM SERPL-SCNC: 4.1 MMOL/L
PROT SERPL-MCNC: 7.9 G/DL
RBC # BLD AUTO: 4.78 M/UL
SODIUM SERPL-SCNC: 139 MMOL/L
TRIGL SERPL-MCNC: 80 MG/DL
WBC # BLD AUTO: 7.48 K/UL

## 2018-08-11 PROCEDURE — 80061 LIPID PANEL: CPT

## 2018-08-11 PROCEDURE — 36415 COLL VENOUS BLD VENIPUNCTURE: CPT | Mod: PO

## 2018-08-11 PROCEDURE — 83036 HEMOGLOBIN GLYCOSYLATED A1C: CPT

## 2018-08-11 PROCEDURE — 86803 HEPATITIS C AB TEST: CPT

## 2018-08-11 PROCEDURE — 85025 COMPLETE CBC W/AUTO DIFF WBC: CPT

## 2018-08-11 PROCEDURE — 80053 COMPREHEN METABOLIC PANEL: CPT

## 2018-08-11 RX ORDER — ALPRAZOLAM 0.25 MG/1
0.25 TABLET ORAL DAILY PRN
Qty: 30 TABLET | Refills: 2 | Status: SHIPPED | OUTPATIENT
Start: 2018-08-11 | End: 2020-02-13 | Stop reason: SDUPTHER

## 2018-08-13 LAB
HCV AB SERPL QL IA: NEGATIVE
HCV AB SERPL QL IA: NEGATIVE

## 2018-08-18 ENCOUNTER — HOSPITAL ENCOUNTER (EMERGENCY)
Facility: HOSPITAL | Age: 53
Discharge: HOME | End: 2018-08-18
Attending: EMERGENCY MEDICINE
Payer: COMMERCIAL

## 2018-08-18 VITALS
WEIGHT: 230 LBS | BODY MASS INDEX: 38.32 KG/M2 | TEMPERATURE: 98 F | SYSTOLIC BLOOD PRESSURE: 119 MMHG | DIASTOLIC BLOOD PRESSURE: 70 MMHG | HEIGHT: 65 IN | HEART RATE: 85 BPM | RESPIRATION RATE: 16 BRPM | OXYGEN SATURATION: 98 %

## 2018-08-18 DIAGNOSIS — S39.012A STRAIN OF LUMBAR REGION, INITIAL ENCOUNTER: Primary | ICD-10-CM

## 2018-08-18 PROCEDURE — 96372 THER/PROPH/DIAG INJ SC/IM: CPT

## 2018-08-18 PROCEDURE — 63600175 PHARM REV CODE 636 W HCPCS: Performed by: EMERGENCY MEDICINE

## 2018-08-18 PROCEDURE — 99283 EMERGENCY DEPT VISIT LOW MDM: CPT | Mod: 25

## 2018-08-18 RX ORDER — KETOROLAC TROMETHAMINE 30 MG/ML
15 INJECTION, SOLUTION INTRAMUSCULAR; INTRAVENOUS
Status: COMPLETED | OUTPATIENT
Start: 2018-08-18 | End: 2018-08-18

## 2018-08-18 RX ORDER — DICLOFENAC SODIUM 50 MG/1
50 TABLET, DELAYED RELEASE ORAL 3 TIMES DAILY
Qty: 15 TABLET | Refills: 0 | Status: SHIPPED | OUTPATIENT
Start: 2018-08-18 | End: 2018-08-29

## 2018-08-18 RX ADMIN — KETOROLAC TROMETHAMINE 15 MG: 30 INJECTION, SOLUTION INTRAMUSCULAR at 04:08

## 2018-08-18 NOTE — ED PROVIDER NOTES
"Encounter Date: 8/18/2018    SCRIBE #1 NOTE: I, Dang Shin , am scribing for, and in the presence of,  Dr. Wagner. I have scribed the entire note.       History     Chief Complaint   Patient presents with    Back Pain     twisted back this am      08/18/2018  4:37 PM       The patient is a 52 y.o. Female with a PMHx of DM type II and HTN who is presenting with the acute onset of bilateral lower back pain that began earlier this morning after bending over to feed her dogs. She notes that the pain feels like muscle cramping and feels "extremely tight". She notes that the pain is great on the R versus the L and is exacerbated with bending forward or twisting. No other comments or complaints. Pt denies numbness, weakness, abdominal pain, urinary sxs. No pertinent past surgical or social hx.           The history is provided by the patient and medical records.     Review of patient's allergies indicates:  No Known Allergies  Past Medical History:   Diagnosis Date    Diabetes mellitus     Diabetes mellitus, type 2     Hypertension      History reviewed. No pertinent surgical history.  History reviewed. No pertinent family history.  Social History     Tobacco Use    Smoking status: Never Smoker   Substance Use Topics    Alcohol use: Not on file    Drug use: Not on file     Review of Systems   Constitutional: Negative for activity change, appetite change, chills, fatigue and fever.   Eyes: Negative for visual disturbance.   Respiratory: Negative for apnea and shortness of breath.    Cardiovascular: Negative for chest pain and palpitations.   Gastrointestinal: Negative for abdominal distention and abdominal pain.   Genitourinary: Negative for difficulty urinating.   Musculoskeletal: Positive for back pain. Negative for neck pain.   Skin: Negative for pallor and rash.   Neurological: Negative for headaches.   Hematological: Does not bruise/bleed easily.   Psychiatric/Behavioral: Negative for agitation. "       Physical Exam     Initial Vitals [08/18/18 1616]   BP Pulse Resp Temp SpO2   119/70 85 16 98.2 °F (36.8 °C) 98 %      MAP       --         Physical Exam    Nursing note and vitals reviewed.  Constitutional: She appears well-developed and well-nourished.   HENT:   Head: Normocephalic and atraumatic.   Eyes: Conjunctivae are normal.   Neck: Normal range of motion. Neck supple.   Cardiovascular: Normal rate, regular rhythm and normal heart sounds. Exam reveals no gallop and no friction rub.    No murmur heard.  Pulmonary/Chest: Effort normal and breath sounds normal. No respiratory distress. She has no wheezes. She has no rhonchi. She has no rales.   Abdominal: Soft. She exhibits no distension. There is no tenderness.   Musculoskeletal: Normal range of motion. She exhibits no tenderness.   No midline T, C, L spine TTP.    Neurological: She is alert and oriented to person, place, and time. She has normal strength. No sensory deficit.   5/5 strength and sensation to BLE.    Skin: Skin is warm and dry. No erythema.   Psychiatric: She has a normal mood and affect.         ED Course   Procedures  Labs Reviewed - No data to display       Imaging Results    None          Medical Decision Making:   History:   Old Medical Records: I decided to obtain old medical records.  ED Management:  52-year-old female presents with positional back pain with no focal neurologic deficits fever night sweats or chills.  The symptoms are consistent with a myofascial strain.  There is no evidence of infectious process.  She has no evidence of cord involvement; therefore, imaging is not performed at this time.  She is treated with steroids and NSAIDs.       APC / Resident Notes:   I, Dr. Walter Wagner III, personally performed the services described in this documentation. All medical record entries made by the scribe were at my direction and in my presence.  I have reviewed the chart and agree that the record reflects my personal  performance and is accurate and complete. Walter Wagner III, MD.         Scribe Attestation:   Scribe #1: I performed the above scribed service and the documentation accurately describes the services I performed. I attest to the accuracy of the note.               Clinical Impression:   The encounter diagnosis was Strain of lumbar region, initial encounter.                             Walter Wagner III, MD  08/19/18 0746

## 2018-08-20 ENCOUNTER — OFFICE VISIT (OUTPATIENT)
Dept: PRIMARY CARE CLINIC | Facility: CLINIC | Age: 53
End: 2018-08-20
Payer: COMMERCIAL

## 2018-08-20 VITALS
WEIGHT: 243.06 LBS | OXYGEN SATURATION: 98 % | RESPIRATION RATE: 18 BRPM | SYSTOLIC BLOOD PRESSURE: 137 MMHG | DIASTOLIC BLOOD PRESSURE: 81 MMHG | TEMPERATURE: 99 F | HEIGHT: 65 IN | HEART RATE: 67 BPM | BODY MASS INDEX: 40.5 KG/M2

## 2018-08-20 DIAGNOSIS — E66.01 SEVERE OBESITY (BMI 35.0-39.9) WITH COMORBIDITY: ICD-10-CM

## 2018-08-20 DIAGNOSIS — I15.2 HYPERTENSION ASSOCIATED WITH DIABETES: ICD-10-CM

## 2018-08-20 DIAGNOSIS — M54.41 ACUTE RIGHT-SIDED LOW BACK PAIN WITH RIGHT-SIDED SCIATICA: Primary | ICD-10-CM

## 2018-08-20 DIAGNOSIS — E11.59 HYPERTENSION ASSOCIATED WITH DIABETES: ICD-10-CM

## 2018-08-20 DIAGNOSIS — M62.830 MUSCLE SPASM OF BACK: ICD-10-CM

## 2018-08-20 LAB
BILIRUB SERPL-MCNC: NORMAL MG/DL
BLOOD, POC UA: NORMAL
GLUCOSE UR QL STRIP: 100
KETONES UR QL STRIP: NORMAL
LEUKOCYTE ESTERASE URINE, POC: NORMAL
NITRITE, POC UA: NORMAL
PH, POC UA: 5
PROTEIN, POC: NORMAL
SPECIFIC GRAVITY, POC UA: 1.02
UROBILINOGEN, POC UA: NORMAL

## 2018-08-20 PROCEDURE — 3075F SYST BP GE 130 - 139MM HG: CPT | Mod: CPTII,S$GLB,, | Performed by: NURSE PRACTITIONER

## 2018-08-20 PROCEDURE — 96372 THER/PROPH/DIAG INJ SC/IM: CPT | Mod: S$GLB,,, | Performed by: NURSE PRACTITIONER

## 2018-08-20 PROCEDURE — 3045F PR MOST RECENT HEMOGLOBIN A1C LEVEL 7.0-9.0%: CPT | Mod: CPTII,S$GLB,, | Performed by: NURSE PRACTITIONER

## 2018-08-20 PROCEDURE — 3008F BODY MASS INDEX DOCD: CPT | Mod: CPTII,S$GLB,, | Performed by: NURSE PRACTITIONER

## 2018-08-20 PROCEDURE — 81000 URINALYSIS NONAUTO W/SCOPE: CPT | Mod: S$GLB,,, | Performed by: NURSE PRACTITIONER

## 2018-08-20 PROCEDURE — 99214 OFFICE O/P EST MOD 30 MIN: CPT | Mod: 25,S$GLB,, | Performed by: NURSE PRACTITIONER

## 2018-08-20 PROCEDURE — 3079F DIAST BP 80-89 MM HG: CPT | Mod: CPTII,S$GLB,, | Performed by: NURSE PRACTITIONER

## 2018-08-20 PROCEDURE — 99999 PR PBB SHADOW E&M-EST. PATIENT-LVL IV: CPT | Mod: PBBFAC,,, | Performed by: NURSE PRACTITIONER

## 2018-08-20 RX ORDER — METHOCARBAMOL 500 MG/1
500 TABLET, FILM COATED ORAL
Qty: 30 TABLET | Refills: 0 | Status: SHIPPED | OUTPATIENT
Start: 2018-08-20 | End: 2018-08-29 | Stop reason: SINTOL

## 2018-08-20 RX ORDER — KETOROLAC TROMETHAMINE 30 MG/ML
15 INJECTION, SOLUTION INTRAMUSCULAR; INTRAVENOUS ONCE
Status: COMPLETED | OUTPATIENT
Start: 2018-08-20 | End: 2018-08-20

## 2018-08-20 RX ADMIN — KETOROLAC TROMETHAMINE 15 MG: 30 INJECTION, SOLUTION INTRAMUSCULAR; INTRAVENOUS at 01:08

## 2018-08-20 NOTE — PATIENT INSTRUCTIONS
DO NOT TAKE YOUR VOLTAREN FOR AT LEAST 8 HOURS FROM YOUR INJECTION TODAY.  YOU MUST TAKE YOUR VOLTAREN WITH FOOD.            Sciatica    Sciatica is a condition that causes pain in the lower back that spreads down into the buttock, hip, and leg. Sometimes the leg pain can happen without any back pain. Sciatica happens when a spinal nerve is irritated or has pressure put on it as comes out of the spinal canal in the lower back. This most often happens when a bulge or rupture of a nearby spinal disk presses on the nerve. Sciatica can also be caused by a narrowing of the spinal canal (spinal stenosis) or spasm of the muscle in the buttocks that the sciatic nerve passes through (pyriform muscle). Sciatica is also called lumbar radiculopathy.  Sciatica may begin after a sudden twisting or bending force, such as in a car accident. Or it can happen after a simple awkward movement. In either case, muscle spasm often also happens. Muscle spasm makes the pain worse.  A healthcare provider makes a diagnosis of sciatica from your symptoms and a physical exam. Unless you had an injury from a car accident or fall, you usually wont have X-rays taken at this time. This is because the nerves and disks in your back cant be seen on an X-ray. If the provider sees signs of a compressed nerve, you will need to schedule an MRI scan as an outpatient. Signs of a compressed nerve include loss of strength in a leg.  Most sciatica gets better with medicine, exercise, and physical therapy. If your symptoms continue after at least 3 months of medical treatment, you may need surgery or injections to your lower back.  Home care  Follow these tips when caring for yourself at home:  · You may need to stay in bed the first few days. But as soon as possible, begin sitting up or walking. This will help you avoid problems that come from staying in bed for long periods.  · When in bed, try to find a position that is comfortable. A firm mattress is  best. Try lying flat on your back with pillows under your knees. You can also try lying on your side with your knees bent up toward your chest and a pillow between your knees.  · Avoid sitting for long periods. This puts more stress on your lower back than standing or walking.  · Use heat from a hot shower, hot bath, or heating pad to help ease pain. Massage can also help. You can also try using an ice pack. You can make your own ice pack by putting ice cubes in a plastic bag. Wrap the bag in a thin towel. Try both heat and cold to see which works best. Use the method that feels best for 20 minutes several times a day.  · You may use acetaminophen or ibuprofen to ease pain, unless another pain medicine was prescribed. Note: If you have chronic liver or kidney disease, talk with your healthcare provider before taking these medicines. Also talk with your provider if youve had a stomach ulcer or gastrointestinal bleeding.  · Use safe lifting methods. Dont lift anything heavier than 15 pounds until all of the pain is gone.  Follow-up care  Follow up with your healthcare provider, or as advised. You may need physical therapy or additional tests.  If X-rays were taken, a radiologist will look at them. You will be told of any new findings that may affect your care.  When to seek medical advice  Call your healthcare provider right away if any of these occur:  · Pain gets worse even after taking prescribed medicine  · Weakness or numbness in 1 or both legs or hips  · Numbness in your groin or genital area  · You cant control your bowel or bladder  · Fever  · Redness or swelling over your back or spine   Date Last Reviewed: 8/1/2016 © 2000-2017 [employer]. [address] [place] [address], [place] [address]. All rights reserved. This information is not intended as a substitute for professional medical care. Always follow your healthcare professional's instructions.

## 2018-08-20 NOTE — PROGRESS NOTES
Subjective:       Patient ID: Nik Lowery is a 52 y.o. female.    Chief Complaint: No chief complaint on file.    The patient is a 52 y.o female who presented with complaints of  acute onset of bilateral lower back pain that began on August 18th, 2018 after reportedly bending over to feed her dogs. She was seen at the emergency department on the 18th and noted that the pain felt like muscle cramping and tightness. At that time she reported the pain was exacerbated with bending forward or twisting and was worse on the right low back than the left. She denied any limb weakness, change in her bowel or bladder function and no paraesthesias.  She was discharged home with RX for Diclofenac.  ER record was reviewed during this visit and notes from ED provider read.   Today, patient reports pain has now started to radiate down into her right buttocks.  She denies any saddle anesthesia or limb weakness.  NO numbness or tingling.  No other acute complaints.  She reports she has not taken her Diclofenac since 5 a.m. This morning.        Review of Systems   Constitutional: Negative.    Respiratory: Negative.    Cardiovascular: Negative.    Gastrointestinal: Positive for constipation (intermittent nausea and constipation.  Reports last BM was today.  ) and nausea. Negative for abdominal distention, abdominal pain, anal bleeding, blood in stool, diarrhea and vomiting.   Genitourinary: Negative.  Negative for decreased urine volume, difficulty urinating and dysuria.   Musculoskeletal: Positive for back pain and myalgias. Negative for arthralgias and gait problem.   Skin: Negative.    Allergic/Immunologic: Negative.    Neurological: Negative.    Psychiatric/Behavioral: Negative.    All other systems reviewed and are negative.      Objective:      Physical Exam   Constitutional: She is oriented to person, place, and time. She appears well-developed and well-nourished. No distress.   obese   HENT:   Head: Normocephalic and  atraumatic.   Nose: Nose normal.   Mouth/Throat: Oropharynx is clear and moist.   Eyes: Conjunctivae and EOM are normal. Pupils are equal, round, and reactive to light. Right eye exhibits no discharge. Left eye exhibits no discharge. No scleral icterus.   Neck: Normal range of motion. Neck supple. No thyromegaly present.   Cardiovascular: Normal rate, regular rhythm, normal heart sounds and intact distal pulses. Exam reveals no gallop and no friction rub.   No murmur heard.  Pulmonary/Chest: Effort normal and breath sounds normal. No respiratory distress.   Abdominal: Soft. Bowel sounds are normal. She exhibits no distension.   Musculoskeletal: Normal range of motion. She exhibits tenderness (c/o pain with palpation of lower right sided paraspinal muscles.  No mid spine tenderness, swelling, or warmth.  There is muscle tightness and spasm with forward flexion of lumbar spine. ). She exhibits no edema or deformity.   Neurological: She is alert and oriented to person, place, and time. She displays normal reflexes. No cranial nerve deficit. Coordination normal.   Skin: Skin is warm and dry. Capillary refill takes less than 2 seconds. No rash noted. She is not diaphoretic. No erythema.   Psychiatric: She has a normal mood and affect. Her behavior is normal. Thought content normal.   Nursing note and vitals reviewed.        UA reveals glucose and protein.  No evidence of UTI.   Assessment:       1. Acute right-sided low back pain with right-sided sciatica    2. Hypertension associated with diabetes    3. Severe obesity (BMI 35.0-39.9) with comorbidity    4. Muscle spasm of back        Plan:       Acute right-sided low back pain with right-sided sciatica  -     Ambulatory Referral to Physical/Occupational Therapy  -     ketorolac injection 15 mg; Inject 0.5 mLs (15 mg total) into the muscle once.  Pt. Instructed not to take her Voltaren for a minimum of 8 hours today.  She is to take both her NSAID and muscle relaxer with  a meal.  Referral to PT was placed. Conservative management at this time with f/u in one week.   Hypertension associated with diabetes  Stable.  Continue to follow up with PCP as scheduled.  Pt. Was instructed on the need to avoid steroid therapy at this time due to her diagnosis of DM and a recent hemoglobin A1c of 8.6. Instructed patient blood glucose may increase during acute illness.  Strict adherence to prescribed diet, activity and medication regimen are necessary.  Monitor blood glucose closely.    Severe obesity (BMI 35.0-39.9) with comorbidity  Encouraged to consider diabetic Keto diet to assist with weight loss efforts.    Muscle spasm of back  -     methocarbamol (ROBAXIN) 500 MG Tab; Take 1 tablet (500 mg total) by mouth after meals as needed (muscle spasms and tightness).  Dispense: 30 tablet; Refill: 0  -     Ambulatory Referral to Physical/Occupational Therapy  Plan as noted.     Medication List with Changes/Refills   New Medications    METHOCARBAMOL (ROBAXIN) 500 MG TAB    Take 1 tablet (500 mg total) by mouth after meals as needed (muscle spasms and tightness).   Current Medications    ALPRAZOLAM (XANAX) 0.25 MG TABLET    Take 1 tablet (0.25 mg total) by mouth daily as needed for Anxiety.    ATORVASTATIN (LIPITOR) 20 MG TABLET    Take 1 tablet (20 mg total) by mouth once daily.    BLOOD SUGAR DIAGNOSTIC STRP    To check BG 3 times daily, to use with insurance preferred meter    BLOOD-GLUCOSE METER KIT    To check BG 3 times daily, to use with insurance preferred meter    DICLOFENAC (VOLTAREN) 50 MG EC TABLET    Take 1 tablet (50 mg total) by mouth 3 (three) times daily.    ESTRADIOL 0.05 MG/24 HR TD PTSW (VIVELLE-DOT) 0.05 MG/24 HR    APPLY 1 PATCH TO SKIN 2 TIMES A WEEK ON THURSDAY AND SUNDAY.    GLYBURIDE (DIABETA) 2.5 MG TABLET    TAKE ONE TABLET TWICE A DAY    LAMOTRIGINE (LAMICTAL) 100 MG TABLET    TAKE 1 TABLET DAILY    LANCETS MISC    To check BG 3 times daily, to use with insurance preferred  meter    LISINOPRIL-HYDROCHLOROTHIAZIDE (PRINZIDE,ZESTORETIC) 20-25 MG TAB    Take 1 tablet by mouth once daily.    METFORMIN (GLUCOPHAGE-XR) 500 MG 24 HR TABLET    Take 2 tablets (1,000 mg total) by mouth 2 (two) times daily with meals.    PANTOPRAZOLE (PROTONIX) 20 MG TABLET    Take 1 tablet (20 mg total) by mouth once daily.    SERTRALINE (ZOLOFT) 100 MG TABLET    Take 1 tablet (100 mg total) by mouth once daily.    VALACYCLOVIR (VALTREX) 500 MG TABLET    Take 1 tablet (500 mg total) by mouth 2 (two) times daily.         I have reviewed the patient's past medical/surgical and social histories and updated as appropriate. Medications were reviewed and discussed as appropriate including side effects and risks versus benefit. Plan of care was reviewed and agreed upon with the patient.  An opportunity to ask questions was provided and explanation given. Patient verbalized understanding on all information reviewed and discussed.  The patient will follow up 1 week or sooner if needed. If symptoms worsen patient may call for ASAP appointment or report to the emergency department for further evaluation.

## 2018-08-20 NOTE — PROGRESS NOTES
Allergies and two pt identifiers verified.  Toradol 15 mg administered IM per order and MAR.  Tolerated injection well.  No distress noted.

## 2018-08-23 ENCOUNTER — TELEPHONE (OUTPATIENT)
Dept: FAMILY MEDICINE | Facility: CLINIC | Age: 53
End: 2018-08-23

## 2018-08-23 NOTE — TELEPHONE ENCOUNTER
----- Message from Annabelleisaac Carpio sent at 8/23/2018 11:59 AM CDT -----  Patient states that she need to see the doctor/NP today for back pain.  Please call patient at [phone].

## 2018-08-27 ENCOUNTER — DOCUMENTATION ONLY (OUTPATIENT)
Dept: FAMILY MEDICINE | Facility: CLINIC | Age: 53
End: 2018-08-27

## 2018-08-27 NOTE — PROGRESS NOTES
Pre-Visit Chart Review  For Appointment Scheduled on 8-29-18    Health Maintenance Due   Topic Date Due    Colonoscopy  12/30/2015    Influenza Vaccine  08/01/2018

## 2018-08-29 ENCOUNTER — OFFICE VISIT (OUTPATIENT)
Dept: FAMILY MEDICINE | Facility: CLINIC | Age: 53
End: 2018-08-29
Attending: FAMILY MEDICINE
Payer: COMMERCIAL

## 2018-08-29 VITALS
HEIGHT: 65 IN | BODY MASS INDEX: 40.26 KG/M2 | WEIGHT: 241.63 LBS | SYSTOLIC BLOOD PRESSURE: 128 MMHG | OXYGEN SATURATION: 96 % | DIASTOLIC BLOOD PRESSURE: 76 MMHG | HEART RATE: 78 BPM | RESPIRATION RATE: 20 BRPM | TEMPERATURE: 98 F

## 2018-08-29 DIAGNOSIS — S33.130D: Primary | ICD-10-CM

## 2018-08-29 DIAGNOSIS — M54.31 SCIATICA, RIGHT SIDE: ICD-10-CM

## 2018-08-29 DIAGNOSIS — E11.65 UNCONTROLLED TYPE 2 DIABETES MELLITUS WITH HYPERGLYCEMIA, WITHOUT LONG-TERM CURRENT USE OF INSULIN: ICD-10-CM

## 2018-08-29 PROCEDURE — 3078F DIAST BP <80 MM HG: CPT | Mod: CPTII,S$GLB,, | Performed by: FAMILY MEDICINE

## 2018-08-29 PROCEDURE — 99999 PR PBB SHADOW E&M-EST. PATIENT-LVL III: CPT | Mod: PBBFAC,,, | Performed by: FAMILY MEDICINE

## 2018-08-29 PROCEDURE — 99214 OFFICE O/P EST MOD 30 MIN: CPT | Mod: S$GLB,,, | Performed by: FAMILY MEDICINE

## 2018-08-29 PROCEDURE — 3074F SYST BP LT 130 MM HG: CPT | Mod: CPTII,S$GLB,, | Performed by: FAMILY MEDICINE

## 2018-08-29 PROCEDURE — 3008F BODY MASS INDEX DOCD: CPT | Mod: CPTII,S$GLB,, | Performed by: FAMILY MEDICINE

## 2018-08-29 PROCEDURE — 3045F PR MOST RECENT HEMOGLOBIN A1C LEVEL 7.0-9.0%: CPT | Mod: CPTII,S$GLB,, | Performed by: FAMILY MEDICINE

## 2018-08-29 RX ORDER — METHYLPREDNISOLONE 4 MG/1
TABLET ORAL
Qty: 1 PACKAGE | Refills: 0 | Status: SHIPPED | OUTPATIENT
Start: 2018-08-29 | End: 2018-09-19

## 2018-08-29 RX ORDER — TRAMADOL HYDROCHLORIDE 50 MG/1
50 TABLET ORAL EVERY 6 HOURS PRN
Qty: 28 TABLET | Refills: 0 | Status: SHIPPED | OUTPATIENT
Start: 2018-08-29 | End: 2018-09-08

## 2018-08-29 RX ORDER — MULTIVITAMIN
1 TABLET ORAL DAILY
COMMUNITY

## 2018-08-29 RX ORDER — IBUPROFEN 200 MG
400 TABLET ORAL 2 TIMES DAILY
COMMUNITY
End: 2018-09-24 | Stop reason: DRUGHIGH

## 2018-08-29 NOTE — PATIENT INSTRUCTIONS
Walking for Fitness  Fitness walking has something for everyone, even people who are already fit. Walking is one of the safest ways to condition your body aerobically. It can boost energy, help you lose weight, and reduce stress.    Physical benefits  · Walking strengthens your heart and lungs, and tones your muscles.  · When walking, your feet land with less impact than in other sports. This reduces chances of muscle, bone, and joint injury.  · Regular walking improves your cholesterol levels and lowers your risk of heart disease. And it helps you control your blood sugar if you have diabetes.  · Walking is a weight-bearing activity, which helps maintain bone density. This can help prevent osteoporosis.  Personal rewards  · Taking walks can help you relax and manage stress. And fitness walking may make you feel better about yourself.  · Walking can help you sleep better at night and make you less likely to be depressed.  · Regular walking may help maintain your memory as you get older.  · Walking is a great way to spend extra time with friends and family members. Be sure to invite your dog along!  Q&A about fitness walking  Q: Will walking keep me fit?  A: Yes. Regular walking at the right pace gives you all the benefits of other aerobic activities, such as jogging and swimming.  Q: Will walking help me lose weight and keep it off?  A: Yes. Per mile, walking can burn as many calories as jogging. Your health care provider can help work walking into your weight-loss plan.  Q: Is walking safe for my health?  A: Yes. Walking is safe if you have high blood pressure, diabetes, heart disease, or other conditions. Talk to your healthcare provider before you start.  Date Last Reviewed: 4/1/2017 © 2000-2017 [employer]. [address] [place] [address], [place] [address]. All rights reserved. This information is not intended as a substitute for professional medical care. Always follow your healthcare professional's  instructions.        Weight Management: Getting Started  Healthy bodies come in all shapes and sizes. Not all bodies are made to be thin. For some people, a healthy weight is higher than the average weight listed on weight charts. Your healthcare provider can help you decide on a healthy weight for you.    Reasons to lose weight  Losing weight can help with some health problems, such as high blood pressure, heart disease, diabetes, sleep apnea, and arthritis. You may also feel more energy.  Set your long-term goal  Your goal doesn't even have to be a specific weight. You may decide on a fitness goal (such as being able to walk 10 miles a week), or a health goal (such as lowering your blood pressure). Choose a goal that is measurable and reasonable, so you know when you've reached it. A goal of reaching a BMI of less than 25 is not always reasonable (or possible).   Make an action plan  Habits dont change overnight. Setting your goals too high can leave you feeling discouraged if you cant reach them. Be realistic. Choose one or two small changes you can make now. Set an action plan for how you are going to make these changes. When you can stick to this plan, keep making a few more small changes. Taking small steps will help you stay on the path to success.  Track your progress  Write down your goals. Then, keep a daily record of your progress. Write down what you eat and how active you are. This record lets you look back on how much youve done. It may also help when youre feeling frustrated. Reward yourself for success. Even if you dont reach every goal, give yourself credit for what you do get done.  Get support  Encouragement from others can help make losing weight easier. Ask your family members and friends for support. They may even want to join you. Also look to your healthcare provider, registered dietitian, and  for help. Your local hospital can give you more information about  nutrition, exercise, and weight loss.  Date Last Reviewed: 1/31/2016 © 2000-2017 [employer]. [address] [place] [address], [place] [address]. All rights reserved. This information is not intended as a substitute for professional medical care. Always follow your healthcare professional's instructions.        Diabetes (General Information)  Diabetes is a long-term health problem. It means your body does not make enough insulin. Or it may mean that your body cannot use the insulin it makes. Insulin is a hormone in your body. It lets blood sugar (glucose) reach the cells in your body. All of your cells need glucose for fuel.  When you have diabetes, the glucose in your blood builds up because it cannot get into the cells. This buildup is called high blood sugar (hyperglycemia).  Your blood sugar level depends on several things. It depends on what kind of food you eat and how much of it you eat. It also depends on how much exercise you get, and how much insulin you have in your body. Eating too much of the wrong kinds of food or not taking diabetes medicine on time can cause high blood sugar. Infections can cause high blood sugar even if you are taking medicines correctly.  These things can also cause low blood sugar:  · Missing meals  · Not eating enough food  · Taking too much diabetes medicine  Diabetes can cause serious problems over time if you do not get treated. These problems include heart disease, stroke, kidney failure, and blindness. They also include nerve pain or loss of feeling in your legs and feet, and gangrene of the feet. By keeping your blood sugar under control you can prevent or delay these problems.  Normal blood sugar levels are 80 to 100 before a meal and less than 180 in the 1 to 2 hours after a meal.  Home care  Follow these guidelines when caring for yourself at home:  · Follow the diet your healthcare provider gives you. Take insulin or other diabetes medicine exactly as told  to.  · Watch your blood sugar as you are told to. Keep a log of your results. This will help your provider change your medicines to keep your blood sugar under control.  · Try to reach your ideal weight. You may be able to cut back on or not have to take diabetes medicine if you eat the right foods and get exercise.  · Do not smoke. Smoking worsens the effects of diabetes on your circulation. You are much more likely to have a heart attack if you have diabetes and you smoke.  · Take good care of your feet. If you have lost feeling in your feet, you may not see an injury or infection. Check your feet and between your toes at least once a week.  · Wear a medical alert bracelet or necklace, or carry a card in your wallet that says you have diabetes. This will help healthcare providers give you the right care if you get very ill and cannot tell them that you have diabetes.  Sick day plan  If you get a cold, the flu, or a bacterial or viral infection, take these steps:  · Look at your diabetes sick plan and call your healthcare provider as you were told to. You may need to call your provider right away if:  ¨ Your blood sugar is above 240 while taking your diabetes medicine  ¨ Your urine ketone levels are above normal or high  ¨ You have been vomiting more than 6 hours  ¨ You have trouble breathing or your breath ha s a fruity smell  ¨ You have a high fever  ¨ You have a fever for several days and you are not getting better  ¨ You get light-headed and are sleepier than usual  · Keep taking your diabetes pills (oral medicine) even if you have been vomiting and are feeling sick. Call your provider right away because you may need insulin to lower your blood sugar until you recover from your illness.  · Keep taking your insulin even if you have been vomiting and are feeling sick. Call your provider right away to ask if you need to change your insulin dose. This will depend on your blood sugar results.  · Check your blood  sugar every 2 to 4 hours, or at least 4 times a day.  · Check your ketones often. If you are vomiting and having diarrhea, watch them more often.  · Do not skip meals. Try to eat small meals on a regular schedule. Do this even if you do not feel like eating.  · Drink water or other liquids that do not have caffeine or calories. This will keep you from getting dehydrated. If you are nauseated or vomiting, takes small sips every 5 minutes. To prevent dehydration try to drink a cup (8 ounces) of fluids every hour while you are awake.  General care  Always bring a source of fast-acting sugar with you in case you have symptoms of low blood sugar (below 70). At the first sign of low blood sugar, eat or drink 15 to 20 grams of fast-acting sugar to raise your blood sugar. Examples are:  · 3 to 4 glucose tablets. You can buy these at most [employer].  · 4 ounces (1/2 cup) of regular (not diet) soft drinks  · 4 ounces (1/2 cup) of any fruit juice  · 8 ounces (1 cup) of milk  · 5 to 6 pieces of hard candy  · 1 tablespoon of honey  Check your blood sugar 15 minutes after treating yourself. If it is still below 70, take 15 to 20 more grams of fast-acting sugar. Test again in 15 minutes. If it returns to normal (70 or above), eat a snack or meal to keep your blood sugar in a safe range. If it stays low, call your doctor or go to an emergency room.  Follow-up care  Follow-up with your healthcare provider, or as advised. For more information about diabetes, visit the American Diabetes Association website at www.diabetes.org or call 740-179-9508.  When to seek medical advice  Call your healthcare provider right away if you have any of these symptoms of high blood sugar:  · Frequent urination  · Dizziness  · Drowsiness  · Thirst  · Headache  · Nausea or vomiting  · Abdominal pain  · Eyesight changes  · Fast breathing  · Confusion or loss of consciousness  Also call your provider right away if you have any of these signs of low blood  sugar:  · Fatigue  · Headache  · Shakes  · Excess sweating  · Hunger  · Feeling anxious or restless  · Eyesight changes  · Drowsiness  · Weakness  · Confusion or loss of consciousness  Call 911  Call for emergency help right away if any of these occur:  · Chest pain or shortness of breath  · Dizziness or fainting  · Weakness of an arm or leg or one side of the face  · Trouble speaking or seeing   Date Last Reviewed: 6/1/2016  © 7926-1500 [employer]. [address] [place] [address]. All rights reserved. This information is not intended as a substitute for professional medical care. Always follow your healthcare professional's instructions.

## 2018-08-29 NOTE — PROGRESS NOTES
Subjective:       Patient ID: Nik Lowery is a 52 y.o. female.    Chief Complaint: Back Pain    52-year-old female, former patient of Dr. Hager, was taking care of a friend's dogs when she bent down to on latch cage and felt a pop and her lumbar area.  She had pain in the local area a medially and went to the emergency room on August 18th where she was given an injection and some fall tear in.  Pain began radiating to the right leg and she went to the [facility] clinic on August 20th where she was given some Robaxin and another injection, she follow this up by going to a chiropractor who is been doing some attempts at manipulation without much success.  She has also had at least 1 visit with a massage therapist.  Pain in the back is limiting her ability to walk and she is having frequent episodes of tingling going down the right leg.  She has not had any steroids probably because she is an uncontrolled diabetic with an A1c over 8.  She reports she has been trying to be very good with her blood sugar in is following her diet and taking her medication and her morning blood sugars are now in the 160s instead of the 200s.      Past Medical History:  No date: Diabetes mellitus  No date: Diabetes mellitus, type 2  No date: Hypertension    Past Surgical History:  2002: EYE SURGERY      Comment:  lasix Bilateral  2012: HYSTERECTOMY  No date: WISDOM TOOTH EXTRACTION          Review of Systems   Musculoskeletal: Positive for back pain.   Neurological: Positive for numbness.       Objective:      Physical Exam   Constitutional: She appears well-developed. No distress.   Good blood pressure control  Morbidly obese with a BMI of 40.2.  The patient is barely able to walk with a stooped posture.  She is unable to get up onto the exam table and examination was extremely limited.   Skin: She is not diaphoretic.   Nursing note and vitals reviewed.      Assessment:       1. Subluxation of L3-L4 lumbar vertebra, subsequent encounter     2. Sciatica, right side    3. Uncontrolled type 2 diabetes mellitus with hyperglycemia, without long-term current use of insulin        Plan:       1. Subluxation of L3-L4 lumbar vertebra, subsequent encounter  The patient has x-rays from her chiropractor showing a posterior dislocation of L3 on L4 of several mm with exostosis of L3 projecting into the lateral recess and probably impinging on the L3-4 nerve root.  We discussed options and she is going to take a Medrol pack with a limited supply of tramadol.  She is instructed to drink fluids liberally to flush out the expected blood sugar increase and to be very good on her diet for the next 10-14 days.  She can continue following up with chiropractic and massage the  - methylPREDNISolone (MEDROL DOSEPACK) 4 mg tablet; use as directed  Dispense: 1 Package; Refill: 0  - traMADol (ULTRAM) 50 mg tablet; Take 1 tablet (50 mg total) by mouth every 6 (six) hours as needed for Pain.  Dispense: 28 tablet; Refill: 0    2. Sciatica, right side  - methylPREDNISolone (MEDROL DOSEPACK) 4 mg tablet; use as directed  Dispense: 1 Package; Refill: 0  - traMADol (ULTRAM) 50 mg tablet; Take 1 tablet (50 mg total) by mouth every 6 (six) hours as needed for Pain.  Dispense: 28 tablet; Refill: 0    3. Uncontrolled type 2 diabetes mellitus with hyperglycemia, without long-term current use of insulin

## 2018-09-06 ENCOUNTER — CLINICAL SUPPORT (OUTPATIENT)
Dept: REHABILITATION | Facility: HOSPITAL | Age: 53
End: 2018-09-06
Payer: COMMERCIAL

## 2018-09-06 DIAGNOSIS — M53.86 DECREASED ROM OF LUMBAR SPINE: ICD-10-CM

## 2018-09-06 PROCEDURE — 97161 PT EVAL LOW COMPLEX 20 MIN: CPT | Mod: PN

## 2018-09-06 NOTE — PLAN OF CARE
TIME RECORD    09/06/2018    Start Time:  1610   Stop Time:  1700    PROCEDURES:    TIMED  Procedure Time Min.    Start:  Stop:     Start:  Stop:     Start:  Stop:     Start:  Stop:          UNTIMED  Procedure Time Min.   Evaluation Start:  Stop:     Start:  Stop:      Total Timed Minutes:  0  Total Timed Units:  0  Total Untimed Units:  1  Charges Billed/# of units:  1    OUTPATIENT PHYSICAL THERAPY   PATIENT EVALUATION  Onset Date: 2 weeks ago  Problem List Items Addressed This Visit     Decreased ROM of lumbar spine          Medical Diagnosis:   Acute right-sided low back pain with right-sided sciatica  - Primary     M54.41 724.2  724.3   Muscle spasm of back           Treatment Diagnosis: Decreased mobility with low back pain    Past Medical History:   Diagnosis Date    Diabetes mellitus     Diabetes mellitus, type 2     Hypertension        Past Surgical History:   Procedure Laterality Date    EYE SURGERY  2002    lasix Bilateral    HYSTERECTOMY  2012    WISDOM TOOTH EXTRACTION         has a current medication list which includes the following prescription(s): acetaminophen, alprazolam, atorvastatin, blood sugar diagnostic, blood-glucose meter, estradiol 0.05 mg/24 hr td ptsw, glyburide, ibuprofen, lamotrigine, lancets, lisinopril-hydrochlorothiazide, metformin, methylprednisolone, multivitamin, pantoprazole, sertraline, tramadol, and valacyclovir.    Precautions: HTN, DM,   Surgical history: see chart  Prior Therapy: yes  History of Present Illness: Acute onset low back pain 2 weeks ago while bending down to open a dog's cage. Initially pain more located in back, but over the next days the pain shifted more towards R buttock and started extending down the leg. Is currently getting treatment with both chiropractor and massage therapist (has had both treatments already today). Imaging done in chiropractor's office is noted in Dr. Moncada's note from 08/29/18.    Prior Level of Function: Independent  Social  "History: works at [employer] in [place]. 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 Also walks daily with coworksers about a mile (which she is not able to do at this time). Has requested ergonomic modifications for her workstation.     Current level of function: Independent    Functional Deficits Leading to Referral/Nature of Injury: impaired endurance, impaired functional mobility, decreased lower extremity function, pain and decreased ROM  Patient Therapy Goals: "Get back to normal."      Subjective     Nik Lowery states she would like to get her physical therapy with [facility] located in her work building, but has not heard from the  yet. [facility]'s central scheduling department is not able to schedule her with the clinic at this time .    Pain:  Location:  Right LB/buttock, R lateral thigh and lower leg, R foot (whole foot)  Description: sharp, seizing  Activities Which Increase Pain: standing, walking, bending  Activities Which Decrease Pain: massage, heat/ice, getting up or getting out of chair, tramadol  Pain Scale: 5/10 at best 7/10 now  8/10 at worst      Objective     Posture/Appearance: Fwd head position, rounded shoulders, right shoulder elevated. Pt unable to stand with upright posture for more than ~ 1 min during standing testing.  Palpation:TTP lumbar paraspinals, R glutes/piriformis  Sensation: Reports intermittent numbness/tinlging in R sided low back, buttock, lateral RLE. Reports constant numbness/paraesthesias in R foot. No saddle anaesthesia.  DTRs:  2+ bilateral patellars, 1+ bilateral achilles  Range of Motion/Strength:    Lumbar AROM: Pain/Dysfunction with Movement:   Flexion 22* Increased pain   Extension 10* Increased pain   Right side bending 75% Increased pain   Left side bending WFL      *Increased pain extension>flexion  -  Lower Extremity Range of Motion:  Right Lower Extremity: WFL  Left Lower Extremity: " WFL    Lower Extremity Strength:  Right Lower Extremity: 4/5 hip flex, 4+/5 knee ext/flex, ankle DF  Left Lower Extremity: 4/5 hip flex, 4+/5 knee ext/flex, ankle DF    Flexibility: ROM as per above,   Gait:  antalgic  Bed Mobility: Supine<>sit independent  Transfers: Sit<>stand independent  Special Tests:   SLR (-)  Shiv's (-)  Functional Outcome Measure: The Revised Oswestry Low Back Pain Questionnaire: 26/50=52% impairment  Treatment: Educated on role/goal PT, POC. Issued workstation ergonomics handout. Pt verbalizing understanding and agreement.     Assessment       Initial Assessment (Pertinent finding, problem list and factors affecting outcome): Patient presents to PT with c/o low back and RLE pain. Notable impairments include impaired endurance, impaired functional mobility, decreased lower extremity function, pain, decreased ROM and decreased tolerance to activity, and impaired functional mobility. Patient would benefit from skilled PT to address notable impairments and improve functional mobility to PLOF.      Rehab Potiential: good      Short Term Goals (3 Weeks): 1) Pt will initiate HEP 2) Patient will report decreased pain/paraesthesias in RLE   Long Term Goals (6 Weeks): 1) Pt will be I with HEP 2) Pt will return to I/ADL's with pain <4/10 3) Pt will improve Oswestry to <30% impairment    Plan     Certification Period: 09/06/2018 to 10/26/18  Recommended Treatment Plan: 2 times per week for 6 weeks: Cervical/Lumbar Traction, Electrical Stimulation PRn, Gait Training, Manual Therapy, Moist Heat/ Ice, Neuromuscular Re-ed, Patient Education, Therapeutic Activites, Therapeutic Exercise, Ultrasound and DRY NEEDLING PRN      Thank you for referral.    Therapist: Leeanne Portillo, PT    I CERTIFY THE NEED FOR THESE SERVICES FURNISHED UNDER THIS PLAN OF TREATMENT AND WHILE UNDER MY CARE    Physician's comments:  ________________________________________________________________________________________________________________________________________________      Physician's Name: ___________________________________

## 2018-09-09 PROBLEM — M53.86 DECREASED ROM OF LUMBAR SPINE: Status: ACTIVE | Noted: 2018-09-09

## 2018-09-14 ENCOUNTER — CLINICAL SUPPORT (OUTPATIENT)
Dept: REHABILITATION | Facility: HOSPITAL | Age: 53
End: 2018-09-14
Attending: NURSE PRACTITIONER
Payer: COMMERCIAL

## 2018-09-14 DIAGNOSIS — M53.86 DECREASED ROM OF LUMBAR SPINE: ICD-10-CM

## 2018-09-14 PROCEDURE — 97110 THERAPEUTIC EXERCISES: CPT | Mod: PN

## 2018-09-14 NOTE — PROGRESS NOTES
Name: Nik Lowery  Clinic Number: [id number]  Date of Treatment: 09/14/2018   Diagnosis:   Encounter Diagnosis   Name Primary?    Decreased ROM of lumbar spine        Time in: 1513  Time Out: 1600  Total Treatment Time: 47      Subjective:    Nik reports increased pain with ambulation.  Patient reports their pain to be 7/10 on a 0-10 scale with 0 being no pain and 10 being the worst pain imaginable.  Pt arrived ambulating with trunk flexed and shifted to R.  Objective    Patient received individual therapy to increase strength, flexibility, posture and core stabilization with activities as follows:     Nik received therapeutic exercises to develop strength, flexibility, posture and core stabilization for 47 minutes including:     Nustep level 2 x 10 minutes  Hamstring stretch 3 x 30 sec B  Piriformis stretch 3 x 30 sec B  Trunk flexion over ball x 10  Seated TrA x 30  LTR x 20 B  PPT x 20  Clamshell RTB x 30  SLR 2 x 10 B  Supine hip abd 2 x 10 B  Bridges x 20  SIJ mobilization push/pull 2 x 3 sets    Written Home Exercises Provided: cont HEP  Pt demo good understanding of the education provided. Nik demonstrated good return demonstration of activities.     Assessment:     Pt reported decreased pain 4/10 upon completion of therapy.  Good tolerance for activity.  Trunk with full extension and in neutral when amb out of therapy today.    Pt will continue to benefit from skilled PT intervention. Medical Necessity is demonstrated by:  Fall Risk, Pain limits function of effected part for some activities, Unable to participate fully in daily activities, Requires skilled supervision to complete and progress HEP and Weakness.    Patient is making good progress towards established goals.      Plan:  Continue with established Plan of Care towards PT goals.

## 2018-09-18 ENCOUNTER — TELEPHONE (OUTPATIENT)
Dept: FAMILY MEDICINE | Facility: CLINIC | Age: 53
End: 2018-09-18

## 2018-09-18 DIAGNOSIS — S33.130D: ICD-10-CM

## 2018-09-18 DIAGNOSIS — M54.31 SCIATICA, RIGHT SIDE: ICD-10-CM

## 2018-09-18 NOTE — TELEPHONE ENCOUNTER
Cannot refill the tramadol due to use of xanax and I don't see any muscle relaxers in her history.

## 2018-09-18 NOTE — TELEPHONE ENCOUNTER
Patient has seen Dr Moncada 8-29-18 was former patient of Dr Hager. Will be seeing Dr Jaeger in Nov.   She is currently going to physical therapy.   Needs refill of tramadol and muscle relaxer sent to [facility].

## 2018-09-19 ENCOUNTER — CLINICAL SUPPORT (OUTPATIENT)
Dept: REHABILITATION | Facility: HOSPITAL | Age: 53
End: 2018-09-19
Attending: NURSE PRACTITIONER
Payer: COMMERCIAL

## 2018-09-19 DIAGNOSIS — M53.86 DECREASED ROM OF LUMBAR SPINE: ICD-10-CM

## 2018-09-19 PROCEDURE — 97110 THERAPEUTIC EXERCISES: CPT | Mod: PN

## 2018-09-19 NOTE — PROGRESS NOTES
Name: Nik Lowery  Clinic Number: [id number]  Date of Treatment: 09/19/2018   Diagnosis:   Encounter Diagnosis   Name Primary?    Decreased ROM of lumbar spine        Time in: 1505  Time Out: 1558  Total Treatment Time: 53      Subjective:    Nik reports she is scheduled for and SUPA Friday.  Patient reports their pain to be 7/10 on a 0-10 scale with 0 being no pain and 10 being the worst pain imaginable.    Objective    Patient received individual therapy to increase strength, flexibility, posture and core stabilization with activities as follows:     Nik received therapeutic exercises to develop strength, flexibility, posture and core stabilization for 53 minutes including:     Nustep level 3 x 10 minutes  Hamstring stretch 3 x 30 sec B  Piriformis stretch 3 x 30 sec B  Trunk flexion over ball x 10  Seated TrA x 30  LTR x 30 B  PPT x 30  Clamshell RTB x 30  SLR 2 x 10 B  Supine hip abd 2 x 10 B  Bridges x 30  Ball squeeze x 30  Dying bug x 20 B  SIJ mobilization push/pull     Written Home Exercises Provided: HEP given and reviewed.  Pt verbalized understanding.  Pt demo good understanding of the education provided. Nik demonstrated good return demonstration of activities.     Assessment:     Flexed hips during ambulation.  Decreased lateral shift today-more in midline- compared to last visit.  Good tolerance for additional activity.  Log roll technique reviewed.  Pt returned demonstrated correctly.  Pt will continue to benefit from skilled PT intervention. Medical Necessity is demonstrated by:  Fall Risk, Pain limits function of effected part for some activities, Unable to participate fully in daily activities, Requires skilled supervision to complete and progress HEP and Weakness.    Patient is making good progress towards established goals.      Plan:  Continue with established Plan of Care towards PT goals.

## 2018-09-24 ENCOUNTER — CLINICAL SUPPORT (OUTPATIENT)
Dept: REHABILITATION | Facility: HOSPITAL | Age: 53
End: 2018-09-24
Payer: COMMERCIAL

## 2018-09-24 ENCOUNTER — OFFICE VISIT (OUTPATIENT)
Dept: FAMILY MEDICINE | Facility: CLINIC | Age: 53
End: 2018-09-24
Payer: COMMERCIAL

## 2018-09-24 VITALS
HEIGHT: 65 IN | BODY MASS INDEX: 39.65 KG/M2 | SYSTOLIC BLOOD PRESSURE: 139 MMHG | TEMPERATURE: 98 F | WEIGHT: 238 LBS | DIASTOLIC BLOOD PRESSURE: 82 MMHG | HEART RATE: 72 BPM

## 2018-09-24 DIAGNOSIS — M53.86 DECREASED ROM OF LUMBAR SPINE: ICD-10-CM

## 2018-09-24 DIAGNOSIS — E66.01 SEVERE OBESITY (BMI 35.0-39.9) WITH COMORBIDITY: ICD-10-CM

## 2018-09-24 DIAGNOSIS — E11.59 HYPERTENSION ASSOCIATED WITH DIABETES: ICD-10-CM

## 2018-09-24 DIAGNOSIS — E11.65 UNCONTROLLED TYPE 2 DIABETES MELLITUS WITH HYPERGLYCEMIA, WITHOUT LONG-TERM CURRENT USE OF INSULIN: ICD-10-CM

## 2018-09-24 DIAGNOSIS — E78.5 HYPERLIPIDEMIA ASSOCIATED WITH TYPE 2 DIABETES MELLITUS: ICD-10-CM

## 2018-09-24 DIAGNOSIS — E11.69 HYPERLIPIDEMIA ASSOCIATED WITH TYPE 2 DIABETES MELLITUS: ICD-10-CM

## 2018-09-24 DIAGNOSIS — M54.17 LUMBOSACRAL RADICULOPATHY AT L4: ICD-10-CM

## 2018-09-24 DIAGNOSIS — M51.26 HERNIATION OF RIGHT SIDE OF L4-L5 INTERVERTEBRAL DISC: Primary | ICD-10-CM

## 2018-09-24 DIAGNOSIS — I15.2 HYPERTENSION ASSOCIATED WITH DIABETES: ICD-10-CM

## 2018-09-24 PROCEDURE — 3045F PR MOST RECENT HEMOGLOBIN A1C LEVEL 7.0-9.0%: CPT | Mod: CPTII,S$GLB,, | Performed by: NURSE PRACTITIONER

## 2018-09-24 PROCEDURE — 3079F DIAST BP 80-89 MM HG: CPT | Mod: CPTII,S$GLB,, | Performed by: NURSE PRACTITIONER

## 2018-09-24 PROCEDURE — 97110 THERAPEUTIC EXERCISES: CPT | Mod: PN | Performed by: PHYSICAL THERAPIST

## 2018-09-24 PROCEDURE — 3075F SYST BP GE 130 - 139MM HG: CPT | Mod: CPTII,S$GLB,, | Performed by: NURSE PRACTITIONER

## 2018-09-24 PROCEDURE — 99999 PR PBB SHADOW E&M-EST. PATIENT-LVL V: CPT | Mod: PBBFAC,,, | Performed by: NURSE PRACTITIONER

## 2018-09-24 PROCEDURE — 3008F BODY MASS INDEX DOCD: CPT | Mod: CPTII,S$GLB,, | Performed by: NURSE PRACTITIONER

## 2018-09-24 PROCEDURE — 99214 OFFICE O/P EST MOD 30 MIN: CPT | Mod: S$GLB,,, | Performed by: NURSE PRACTITIONER

## 2018-09-24 RX ORDER — IBUPROFEN 600 MG/1
600 TABLET ORAL 3 TIMES DAILY
Qty: 60 TABLET | Refills: 0 | Status: SHIPPED | OUTPATIENT
Start: 2018-09-24 | End: 2018-10-10 | Stop reason: SDUPTHER

## 2018-09-24 RX ORDER — TRAMADOL HYDROCHLORIDE 50 MG/1
50 TABLET ORAL EVERY 6 HOURS PRN
COMMUNITY
Start: 2018-09-19 | End: 2019-05-07

## 2018-09-24 RX ORDER — ORPHENADRINE CITRATE 100 MG/1
100 TABLET, EXTENDED RELEASE ORAL 2 TIMES DAILY
Qty: 30 TABLET | Refills: 0 | Status: SHIPPED | OUTPATIENT
Start: 2018-09-24 | End: 2018-10-31

## 2018-09-24 NOTE — PROGRESS NOTES
Name: Nik Lowery  Clinic Number: [id number]  Date of Treatment: 09/24/2018   Diagnosis:   Encounter Diagnosis   Name Primary?    Decreased ROM of lumbar spine        Time in: 1516  Time Out: 1600  Total Treatment Time: 44  Group Time: 0      Subjective:    Nik reports improvement of symptoms.  Patient reports their pain to be 2/10 on a 0-10 scale with 0 being no pain and 10 being the worst pain imaginable.    Objective    Patient received individual therapy to increase strength, endurance, ROM and flexibility with 0 patients with activities as follows:     Nik received therapeutic exercises to develop strength, endurance, ROM, flexibility and core stabilization for 44 minutes including:     Nustep level 3 x 10 minutes  Hamstring stretch 3 x 30 sec B  Piriformis stretch 3 x 30 sec B  Trunk flexion over ball x 10  PB crunch 3 x 10  LTR x 30 B  PPT x 30  Hook lying abduction 3 x 10 B  SLR 2 x 10 B  Supine hip abd 3 x 10   Bridges x 30  Ball squeeze x 30    Assessment:       Pt will continue to benefit from skilled PT intervention. Medical Necessity is demonstrated by:  Requires skilled supervision to complete and progress HEP and Weakness.    Patient is making fair progress towards established goals.    New/Revised goals: na      Plan:  Continue with established Plan of Care towards PT goals.

## 2018-09-24 NOTE — PROGRESS NOTES
Subjective:       Patient ID: Nik Lowery is a 52 y.o. female.    Chief Complaint: Back Pain    Chief Complaint  Chief Complaint   Patient presents with    Back Pain       HPI  Nik Lowery is a 52 y.o. female with medical diagnoses as listed in the medical history and problem list that presents for follow up of back pain and for medication refill.  Patient c/o low back pain for one month. Patient has been to chiropractor, had an MRI, and is currently going to physical therapy. She was also seen by Dr. Wells, pain management and had an SUPA on 9/21/18. Patient has been taking tramadol and ibuprofen and a muscle relaxant that she is unable to recall the name of that have helped some. Last refill of tramadol was provided by Dr. Wells on 9/19/18.  Patient is also treated for hypertension and type 2 diabetes. Blood pressure today is 139/82.  Patient's diabetes is poorly controlled with A1c of 8.6% on 8/11/18 and metformin was restarted at that time. Patient is also taking glyburide.  States blood sugar has been running around 150 at home.  Plans to start optivia diet on Thursday. BMI today is 39.61 and patient has lost 3 pounds since last visit.  Established patient with last clinic appointment 8/29/2018.        PAST MEDICAL HISTORY:  Past Medical History:   Diagnosis Date    Diabetes mellitus     Diabetes mellitus, type 2     Herpes     Hypertension        PAST SURGICAL HISTORY:  Past Surgical History:   Procedure Laterality Date    EYE SURGERY  2002    lasix Bilateral    HYSTERECTOMY  2012    WISDOM TOOTH EXTRACTION         SOCIAL HISTORY:  Social History     Socioeconomic History    Marital status: Single     Spouse name: Not on file    Number of children: Not on file    Years of education: Not on file    Highest education level: Not on file   Social Needs    Financial resource strain: Not on file    Food insecurity - worry: Not on file    Food insecurity - inability: Not on file    Transportation  needs - medical: Not on file    Transportation needs - non-medical: Not on file   Occupational History    Not on file   Tobacco Use    Smoking status: Never Smoker    Smokeless tobacco: Never Used   Substance and Sexual Activity    Alcohol use: No     Frequency: Never    Drug use: No    Sexual activity: Not Currently   Other Topics Concern    Not on file   Social History Narrative    Not on file       FAMILY HISTORY:  Family History   Problem Relation Age of Onset    Heart disease Mother     Pancreatic cancer Father        ALLERGIES AND MEDICATIONS: updated and reviewed.  Review of patient's allergies indicates:   Allergen Reactions    Robaxin [methocarbamol] Nausea Only     Current Outpatient Medications   Medication Sig Dispense Refill    acetaminophen (TYLENOL EXTRA STRENGTH ORAL) Take 650 mg by mouth 2 (two) times daily.      ALPRAZolam (XANAX) 0.25 MG tablet Take 1 tablet (0.25 mg total) by mouth daily as needed for Anxiety. 30 tablet 2    atorvastatin (LIPITOR) 20 MG tablet Take 1 tablet (20 mg total) by mouth once daily. 90 tablet 3    blood sugar diagnostic Strp To check BG 3 times daily, to use with insurance preferred meter 200 each 0    blood-glucose meter kit To check BG 3 times daily, to use with insurance preferred meter 1 each 0    estradiol 0.05 mg/24 hr td ptsw (VIVELLE-DOT) 0.05 mg/24 hr APPLY 1 PATCH TO SKIN 2 TIMES A WEEK ON THURSDAY AND SUNDAY. 24 patch 1    glyBURIDE (DIABETA) 2.5 MG tablet TAKE ONE TABLET TWICE A  tablet 0    lamoTRIgine (LAMICTAL) 100 MG tablet TAKE 1 TABLET DAILY 90 tablet 1    lancets Misc To check BG 3 times daily, to use with insurance preferred meter 200 each 1    lisinopril-hydrochlorothiazide (PRINZIDE,ZESTORETIC) 20-25 mg Tab Take 1 tablet by mouth once daily. 90 tablet 3    metFORMIN (GLUCOPHAGE-XR) 500 MG 24 hr tablet Take 2 tablets (1,000 mg total) by mouth 2 (two) times daily with meals. 360 tablet 3    multivitamin (ONE DAILY  MULTIVITAMIN) per tablet Take 1 tablet by mouth once daily.      pantoprazole (PROTONIX) 20 MG tablet Take 1 tablet (20 mg total) by mouth once daily. 90 tablet 1    sertraline (ZOLOFT) 100 MG tablet Take 1 tablet (100 mg total) by mouth once daily. 90 tablet 1    traMADol (ULTRAM) 50 mg tablet Take 50 mg by mouth every 6 (six) hours as needed.      valacyclovir (VALTREX) 500 MG tablet Take 1 tablet (500 mg total) by mouth 2 (two) times daily. 180 tablet 3    ibuprofen (ADVIL,MOTRIN) 600 MG tablet Take 1 tablet (600 mg total) by mouth 3 (three) times daily. Anti-inflammatory, with food 60 tablet 0    orphenadrine (NORFLEX) 100 mg tablet Take 1 tablet (100 mg total) by mouth 2 (two) times daily. Muscle relaxant 30 tablet 0     No current facility-administered medications for this visit.        I have reviewed the patient's medical history in detail and updated the computerized patient record.    Review of Systems   Constitutional: Negative for activity change, appetite change, chills and fever.        Decreased activity due to back pain   Respiratory: Negative for cough and shortness of breath.    Cardiovascular: Negative for chest pain, palpitations and leg swelling.   Gastrointestinal: Negative for abdominal pain, constipation, diarrhea, nausea and vomiting.   Genitourinary: Negative for difficulty urinating, dyspareunia and urgency.   Musculoskeletal: Positive for back pain.        Low back pain that radiates down right leg to foot   Skin: Negative for rash and wound.   Neurological: Positive for numbness. Negative for dizziness and headaches.        Numbness to the right leg and foot   Hematological: Does not bruise/bleed easily.   Psychiatric/Behavioral: Negative for dysphoric mood and sleep disturbance. The patient is not nervous/anxious.         Pain does not keep her awake as long as she takes the tramadol.          Objective:      Vitals:    09/24/18 0827   BP: 139/82   BP Location: Right arm   Patient  "Position: Sitting   BP Method: Large (Automatic)   Pulse: 72   Temp: 98.1 °F (36.7 °C)   TempSrc: Oral   Weight: 108 kg (238 lb)   Height: 5' 5" (1.651 m)     Physical Exam   Constitutional: She is oriented to person, place, and time. Vital signs are normal. She appears well-developed. No distress.   Blood pressure 139/82   HENT:   Head: Normocephalic and atraumatic.   Eyes: Conjunctivae and lids are normal. Pupils are equal, round, and reactive to light. Right eye exhibits no discharge. Left eye exhibits no discharge. Right conjunctiva is not injected. Left conjunctiva is not injected.   Neck: Normal range of motion. Neck supple. Normal carotid pulses present. Carotid bruit is not present.   Cardiovascular: Normal rate, regular rhythm, S1 normal, S2 normal, normal heart sounds, intact distal pulses and normal pulses.   No murmur heard.  Pulses:       Carotid pulses are 2+ on the right side, and 2+ on the left side.       Radial pulses are 2+ on the right side, and 2+ on the left side.        Posterior tibial pulses are 2+ on the right side, and 2+ on the left side.   No edema   Pulmonary/Chest: Effort normal and breath sounds normal. No respiratory distress. She has no decreased breath sounds. She has no wheezes. She has no rhonchi. She has no rales.   Musculoskeletal:        Lumbar back: She exhibits decreased range of motion, tenderness, pain and spasm. She exhibits no bony tenderness.   Active ROM of the lumbar spine with full flexion to 90 degrees without increased pain, extension to 20 degrees increases pain. Mild increase in pain with rotation to 30 degrees bilaterally. Patient with c/o pain with palpation to bilateral lumbar paravertebral areas with muscle spasm palpated bilateral. No pain with palpation over mid lumbar spine.    Lymphadenopathy:     She has no cervical adenopathy.   Neurological: She is alert and oriented to person, place, and time. She has normal strength.   Skin: Skin is warm, dry and " intact. She is not diaphoretic. No pallor.   Psychiatric: She has a normal mood and affect. Her speech is normal and behavior is normal. Judgment and thought content normal. Cognition and memory are normal.   Nursing note and vitals reviewed.        Assessment:       1. Herniation of right side of L4-L5 intervertebral disc    2. Lumbosacral radiculopathy at L4    3. Hypertension associated with diabetes    4. Uncontrolled type 2 diabetes mellitus with hyperglycemia, without long-term current use of insulin    5. Hyperlipidemia associated with type 2 diabetes mellitus    6. Severe obesity (BMI 35.0-39.9) with comorbidity          Plan:       Nik was seen today for back pain.    Diagnoses and all orders for this visit:    Herniation of right side of L4-L5 intervertebral disc  -     ibuprofen (ADVIL,MOTRIN) 600 MG tablet; Take 1 tablet (600 mg total) by mouth 3 (three) times daily. Anti-inflammatory, with food  -     orphenadrine (NORFLEX) 100 mg tablet; Take 1 tablet (100 mg total) by mouth 2 (two) times daily. Muscle relaxant        - Continue physical therapy, home exercise, ice and heat.    Follow up with Dr. Wells as instructed, chiropractor as instructed.   Take ibuprofen 600 mg 3 times a day with food, norflex, muscle relaxant, 2 times daily.   - MRI was done at [facility] and showed extruded soft disc herniation within the rightward aspect of the spinal canal L4-5 with free fragment contacting right L-5 nerve root    Lumbosacral radiculopathy at L4  -     ibuprofen (ADVIL,MOTRIN) 600 MG tablet; Take 1 tablet (600 mg total) by mouth 3 (three) times daily. Anti-inflammatory, with food  -     orphenadrine (NORFLEX) 100 mg tablet; Take 1 tablet (100 mg total) by mouth 2 (two) times daily. Muscle relaxant        - Continue physical therapy, home exercise, ice and heat.    Follow up with Dr. Wells as instructed, chiropractor as instructed.   Take ibuprofen 600 mg 3 times a day with food, norflex, muscle relaxant, 2 times  daily.       Hypertension associated with diabetes   Blood pressure controlled 139/82, continue current medication without change    Uncontrolled type 2 diabetes mellitus with hyperglycemia, without long-term current use of insulin     Lab Results   Component Value Date    HGBA1C 8.6 (H) 08/11/2018    HGBA1C 8.6 (H) 08/11/2018    Patient reports home blood sugar readings are in the 150's   Planning to start optivia diet on Thursday   To continue current medications and diabetes management plan until appointment with Dr. Newsome scheduled on 11/7/18 which is about the same time next A1c measurement will be due    Hyperlipidemia associated with type 2 diabetes mellitus     Lab Results   Component Value Date    CHOL 176 08/11/2018    CHOL 204 (H) 08/12/2017     Lab Results   Component Value Date    HDL 55 08/11/2018    HDL 50 08/12/2017     Lab Results   Component Value Date    LDLCALC 105.0 08/11/2018    LDLCALC 128.6 08/12/2017     Lab Results   Component Value Date    TRIG 80 08/11/2018    TRIG 127 08/12/2017     Lab Results   Component Value Date    CHOLHDL 31.3 08/11/2018    CHOLHDL 24.5 08/12/2017      Continue lipitor 20 mg daily    Severe obesity (BMI 35.0-39.9) with comorbidity   BMI today is 39.61 with a 3 pound weight loss since visit 8/29/18   Weight loss recommended with well balanced diet and portion controlled meals and increased activity.    Patient will begin Optivia diet on Thursday    Follow-up if symptoms worsen or fail to improve, for Keep appt Dr. Heart on 11/7/18 at 3 PM.      Patient readiness: acceptance and barriers:none    During the course of the visit the patient was educated and counseled about the following:     Diabetes:  Discussed general issues about diabetes pathophysiology and management.  Addressed ADA diet.  Reminded to bring in blood sugar diary at next visit.  Follow up in 2 months or as needed.  Hypertension:   Medication: no change.  Dietary sodium restriction.  Regular  aerobic exercise.  Follow up: 2 months and as needed.  Obesity:   General weight loss/lifestyle modification strategies discussed (elicit support from others; identify saboteurs; non-food rewards, etc).  Informal exercise measures discussed, e.g. taking stairs instead of elevator.  Regular aerobic exercise program discussed.    Goals: Diabetes: Maintain Hemoglobin A1C below 7, Hypertension: Reduce Blood Pressure and Obesity: Reduce calorie intake and BMI    Did patient meet goals/outcomes: Yes for hypertension and obesity, no for diabetes    The following self management tools provided: declined    Patient Instructions (the written plan) was given to the patient/family.     Time spent with patient: 30 minutes    Barriers to medications present (no )    Adverse reactions to current medications (no)    Over the counter medications reviewed (Yes)

## 2018-09-24 NOTE — PATIENT INSTRUCTIONS
Continue physical therapy, home exercise, ice and heat.   Follow up with Dr. Wells as instructed, chiropractor as instructed.  Take ibuprofen 600 mg 3 times a day with food, norflex, muscle relaxant, 2 times daily.

## 2018-09-26 ENCOUNTER — CLINICAL SUPPORT (OUTPATIENT)
Dept: REHABILITATION | Facility: HOSPITAL | Age: 53
End: 2018-09-26
Payer: COMMERCIAL

## 2018-09-26 DIAGNOSIS — M53.86 DECREASED ROM OF LUMBAR SPINE: ICD-10-CM

## 2018-09-26 PROCEDURE — 97110 THERAPEUTIC EXERCISES: CPT | Mod: PN

## 2018-09-26 NOTE — PROGRESS NOTES
"Name: Nik Lowery  Clinic Number: [id number]  Date of Treatment: 09/26/2018   Diagnosis:   Encounter Diagnosis   Name Primary?    Decreased ROM of lumbar spine        Time in: 1520  Time Out: 1600  Total Treatment Time: 40      Subjective:    Nik reports "I'm just going to go back to the way I walk when I leave here".  "I think I'm on the edge of my pain medication.  I took it at 8 this morning."  Patient reports their pain to be 7/10 on a 0-10 scale with 0 being no pain and 10 being the worst pain imaginable.  Pt reported she did not perform HEP for the last two days due to increased pain.  Objective    Patient received individual therapy to increase strength, flexibility, posture and core stabilization with activities as follows:     Nik received therapeutic exercises to develop strength, flexibility, posture and core stabilization for 40 minutes including:     Gait training with VC to increase stance phase on R, swing arms with ambulation  Nustep level 3 x 10 minutes  Seated TrA x 30  Pulleys: rows, extension 20# x 30 each  SLR 2 x 10 B  PPT x 30  LTR x 30  Clamshell supine RTB x 30  Clamshell R SL, L SL x 20 e ach  Bridge x 30  SKC with towel 3 x 30 sec R      Written Home Exercises Provided: cont HEP, reviewed importance of HEP and stretches daily  Pt demo fair understanding of the education provided. Nik demonstrated fair return demonstration of activities.     Assessment:     Guarding with ambulation and movements.  Pt reported 6/10 pain upon completion of therapy.  Pt unable to perform hamstring stretch due to R buttock pain.     Pt will continue to benefit from skilled PT intervention. Medical Necessity is demonstrated by:  Fall Risk, Pain limits function of effected part for some activities, Unable to participate fully in daily activities, Requires skilled supervision to complete and progress HEP and Weakness.    Patient is making fair progress towards established goals.      Plan:  Continue with " established Plan of Care towards PT goals.

## 2018-10-03 ENCOUNTER — CLINICAL SUPPORT (OUTPATIENT)
Dept: REHABILITATION | Facility: HOSPITAL | Age: 53
End: 2018-10-03
Payer: COMMERCIAL

## 2018-10-03 DIAGNOSIS — M53.86 DECREASED ROM OF LUMBAR SPINE: ICD-10-CM

## 2018-10-03 PROCEDURE — 97110 THERAPEUTIC EXERCISES: CPT | Mod: PN

## 2018-10-03 NOTE — PROGRESS NOTES
"Name: Nik Lowery  Clinic Number: [id number]  Date of Treatment: 10/03/2018   Diagnosis:   Encounter Diagnosis   Name Primary?    Decreased ROM of lumbar spine        Time in: 1515  Time Out: 1602  Total Treatment Time: 47      Subjective:    Nik reports "I'm not getting any better".  Pt reports she has MD appt Tuesday.  Patient reports their pain to be 7/10 on a 0-10 scale with 0 being no pain and 10 being the worst pain imaginable.  Pt arrived late for appt.    Objective    Patient received individual therapy to increase strength, flexibility, posture and core stabilization with activities as follows:     Nik received therapeutic exercises to develop strength, flexibility, posture and core stabilization for 47 minutes including:     Nustep level 3 x 10 minutes  Prone press ups x 10  Prone alt UE/LE 2 x 10 B  Seated TrA x 30  Seated trunk flexion with ball forward x 10, R, L x 5 each  SLR 3 x 10 B  SL hip abd 3 x 10 B  PPT x 30  LTR x 30  Clamshell supine RTB x 30  Clamshell R SL, L SL x 20 e ach  Bridge x 30  Neural glide, hip @ 90 deg, knee ext, df/pf x 30    Written Home Exercises Provided: cont HEP  Pt demo good understanding of the education provided. Nik demonstrated fair return demonstration of activities.     Assessment:     Pain 6/10 upon completion of therapy.  Improved walking pattern today, decreased guarding, increased arm swing minimally.  Pt will continue to benefit from skilled PT intervention. Medical Necessity is demonstrated by:  Fall Risk, Pain limits function of effected part for some activities, Unable to participate fully in daily activities, Requires skilled supervision to complete and progress HEP and Weakness.    Patient is making fair progress towards established goals.      Plan:  Continue with established Plan of Care towards PT goals.   "

## 2018-10-08 ENCOUNTER — CLINICAL SUPPORT (OUTPATIENT)
Dept: REHABILITATION | Facility: HOSPITAL | Age: 53
End: 2018-10-08
Attending: NURSE PRACTITIONER
Payer: COMMERCIAL

## 2018-10-08 DIAGNOSIS — M53.86 DECREASED ROM OF LUMBAR SPINE: ICD-10-CM

## 2018-10-08 PROCEDURE — 97110 THERAPEUTIC EXERCISES: CPT | Mod: PN

## 2018-10-08 PROCEDURE — 97140 MANUAL THERAPY 1/> REGIONS: CPT | Mod: PN

## 2018-10-08 NOTE — PROGRESS NOTES
"Name: Nik Lowery  Clinic Number: [id number]  Date of Treatment: 10/08/2018   Diagnosis:   Encounter Diagnosis   Name Primary?    Decreased ROM of lumbar spine        Time in: 1501  Time Out: 1556  Total Treatment Time: 55      Subjective:    Nik reports "It's going on the other side.  It's getting worse".  Patient reports their pain to be 8/10 on a 0-10 scale with 0 being no pain and 10 being the worst pain imaginable.  Pt reports MD wants to schedule another SUPA.    Objective    Patient received individual therapy to increase strength, flexibility and core stabilization with activities as follows:     Nik received therapeutic exercises to develop strength, flexibility and core stabilization for 45 minutes including:        Nustep level 4 x 10 minutes  Prone press ups x 10  Prone alt UE/LE x 10 B  Seated TrA x 30  Seated trunk flexion with ball forward x 10  SLR 3 x 10 B  Supine hip abd x 20 B  PPT x 30  LTR x 30  Clamshell supine GTB x 30  Clamshell GTB R SL, L SL x 20 each  Bridge x 30    MFR with therapy bar to R ITB and glute regions x 10 minutes.    Written Home Exercises Provided: cont HEP  Pt demo good understanding of the education provided. Nik demonstrated good return demonstration of activities.     Assessment:     Pt reported decreased pain upon completion of MT.  Pain remains in LB region.  Pt will continue to benefit from skilled PT intervention. Medical Necessity is demonstrated by:  Fall Risk, Pain limits function of effected part for some activities, Unable to participate fully in daily activities, Requires skilled supervision to complete and progress HEP and Weakness.    Patient is making good progress towards established goals.      Plan:  Continue with established Plan of Care towards PT goals.   "

## 2018-10-10 ENCOUNTER — CLINICAL SUPPORT (OUTPATIENT)
Dept: REHABILITATION | Facility: HOSPITAL | Age: 53
End: 2018-10-10
Attending: NURSE PRACTITIONER
Payer: COMMERCIAL

## 2018-10-10 DIAGNOSIS — M54.17 LUMBOSACRAL RADICULOPATHY AT L4: ICD-10-CM

## 2018-10-10 DIAGNOSIS — M51.26 HERNIATION OF RIGHT SIDE OF L4-L5 INTERVERTEBRAL DISC: ICD-10-CM

## 2018-10-10 DIAGNOSIS — M53.86 DECREASED ROM OF LUMBAR SPINE: ICD-10-CM

## 2018-10-10 PROCEDURE — 97110 THERAPEUTIC EXERCISES: CPT | Mod: PN

## 2018-10-10 RX ORDER — IBUPROFEN 600 MG/1
TABLET ORAL
Qty: 60 TABLET | Refills: 0 | Status: SHIPPED | OUTPATIENT
Start: 2018-10-10 | End: 2020-02-13

## 2018-10-10 NOTE — PROGRESS NOTES
"Name: Nik Lowery  [facility] Number: [id number]  Date of Treatment: 10/10/2018   Diagnosis:   Encounter Diagnosis   Name Primary?    Decreased ROM of lumbar spine        Time in: 1502  Time Out: 1556  Total Treatment Time: 54      Subjective:    Nik reports "It's getting worse.  It feels like it's pinching".  Patient reports their pain to be 9/10 on a 0-10 scale with 0 being no pain and 10 being the worst pain imaginable.  Pt reports she has and SUPA on Friday.    Objective    Patient received individual therapy to increase strength, flexibility and core stabilization with activities as follows:     Nik received therapeutic exercises to develop strength, flexibility and core stabilization for 54 minutes including:     Nustep level 4 x 10 minutes  Prone press ups x 10  Prone alt UE/LE x 10 B  Seated TrA x 30  Seated trunk flexion with ball forward x 10  SLR 3 x 10 B  Supine hip abd x 20 B  PPT x 30  LTR x 30  Clamshell supine GTB x 30  Clamshell GTB R SL, L SL x 20 each  Bridge x 30    Written Home Exercises Provided: cont HEP  Pt demo good understanding of the education provided. Nik demonstrated fair return demonstration of activities.     Assessment:     Decreased pain after prone exercises.    Pt will continue to benefit from skilled PT intervention. Medical Necessity is demonstrated by:  Fall Risk, Pain limits function of effected part for some activities, Unable to participate fully in daily activities, Requires skilled supervision to complete and progress HEP and Weakness.    Patient is making fair progress towards established goals.      Plan:  Continue with established Plan of Care towards PT goals.   "

## 2018-10-14 DIAGNOSIS — E11.65 UNCONTROLLED TYPE 2 DIABETES MELLITUS WITH HYPERGLYCEMIA, WITHOUT LONG-TERM CURRENT USE OF INSULIN: ICD-10-CM

## 2018-10-16 ENCOUNTER — CLINICAL SUPPORT (OUTPATIENT)
Dept: REHABILITATION | Facility: HOSPITAL | Age: 53
End: 2018-10-16
Payer: COMMERCIAL

## 2018-10-16 DIAGNOSIS — M53.86 DECREASED ROM OF LUMBAR SPINE: ICD-10-CM

## 2018-10-16 PROCEDURE — 97110 THERAPEUTIC EXERCISES: CPT | Mod: PN | Performed by: PHYSICAL THERAPIST

## 2018-10-16 NOTE — PROGRESS NOTES
Name: Nik Lowery  Clinic Number: [id number]  Date of Treatment: 10/16/2018   Diagnosis:   Encounter Diagnosis   Name Primary?    Decreased ROM of lumbar spine        Time in: 1500  Time Out: 1600  Total Treatment Time: 60  Group Time: 0      Subjective:    Nik reports decreased pain.  Patient reports their pain to be 7/10 on a 0-10 scale with 0 being no pain and 10 being the worst pain imaginable.    Objective    Patient received individual therapy to increase endurance, ROM, flexibility and core stabilization with 0 patients with activities as follows:     Nik received therapeutic exercises to develop endurance, flexibility and core stabilization for 60 minutes including:     Nustep level 3 x 10 minutes  Hamstring stretch 3 x 30 sec B  Piriformis stretch 3 x 30 sec B  Trunk flexion over ball x 10  PB crunch 3 x 10  LTR x 30 B  PPT x 30  Hook lying abduction 3 x 10 B  SLR 2 x 10 B  Supine hip abd 3 x 10   Bridges x 30  Ball squeeze x 30    Mulligan MWM Distracted SLR x 10 to the right LE    Assessment:       Pt will continue to benefit from skilled PT intervention. Medical Necessity is demonstrated by:  Unable to participate fully in daily activities and Requires skilled supervision to complete and progress HEP.    Patient is making fair progress towards established goals.    New/Revised goals: na      Plan:  Continue with established Plan of Care towards PT goals.

## 2018-10-20 DIAGNOSIS — M54.17 LUMBOSACRAL RADICULOPATHY AT L4: ICD-10-CM

## 2018-10-20 DIAGNOSIS — M51.26 HERNIATION OF RIGHT SIDE OF L4-L5 INTERVERTEBRAL DISC: ICD-10-CM

## 2018-10-21 NOTE — TELEPHONE ENCOUNTER
This medication was prescribed for short term use for acute pain. Is this a pharmacy request or a patient request?  Thanks.  Tegan

## 2018-10-22 RX ORDER — ORPHENADRINE CITRATE 100 MG/1
TABLET, EXTENDED RELEASE ORAL
Qty: 30 TABLET | Refills: 0 | OUTPATIENT
Start: 2018-10-22

## 2018-10-24 ENCOUNTER — PATIENT OUTREACH (OUTPATIENT)
Dept: ADMINISTRATIVE | Facility: HOSPITAL | Age: 53
End: 2018-10-24

## 2018-10-24 ENCOUNTER — CLINICAL SUPPORT (OUTPATIENT)
Dept: REHABILITATION | Facility: HOSPITAL | Age: 53
End: 2018-10-24
Attending: NURSE PRACTITIONER
Payer: COMMERCIAL

## 2018-10-24 DIAGNOSIS — M54.5 LOW BACK PAIN, UNSPECIFIED BACK PAIN LATERALITY, UNSPECIFIED CHRONICITY, WITH SCIATICA PRESENCE UNSPECIFIED: Primary | ICD-10-CM

## 2018-10-24 DIAGNOSIS — E11.9 TYPE 2 DIABETES MELLITUS WITHOUT COMPLICATION, WITHOUT LONG-TERM CURRENT USE OF INSULIN: Primary | ICD-10-CM

## 2018-10-24 PROBLEM — M54.50 LOW BACK PAIN: Status: ACTIVE | Noted: 2018-10-24

## 2018-10-24 PROCEDURE — 97110 THERAPEUTIC EXERCISES: CPT | Mod: PN

## 2018-10-24 RX ORDER — GLYBURIDE 2.5 MG/1
TABLET ORAL
Qty: 180 TABLET | Refills: 0 | Status: SHIPPED | OUTPATIENT
Start: 2018-10-24 | End: 2019-01-23 | Stop reason: SDUPTHER

## 2018-10-24 NOTE — PROGRESS NOTES
10/24/18 1500   Ankle Exercises   Double Leg Calf Raises Reps/Sets/Weight 30   Standing Dorsiflexion Stretch(Gastroc) Reps/Sets/Hold Time 3X30   Knee Exercises   Frontal Squats Reps/Sets/Weight 12   Lumbar Exercises   Posterior Pelvic Tilts  Reps/Sets/Hold Time 30   Single Knee To Chest Stretch  Reps/Sets/Hold Time 3X30   Double Knee To Chest Stretch Reps/Sets/Hold Time 30   Hamsting Stretch Reps/Sets/Hold Time 3X30   Piriformis Stretch Reps/Sets/Hold Time 3X30   Bridging Reps/Sets/Hold Time 3X10   Standing Back Bends Reps/Sets 5   Crunches  Reps/Sets 3X10   Lower Trunk Rotation Stretch Reps/Sets/Hold Time 50/50   Additional Exercises   Additional Exercise NUSTEP 10' L4

## 2018-10-24 NOTE — PROGRESS NOTES
TIME RECORD    Date:  10/24/2018    Start Time:  1501  Stop Time:  1547    PROCEDURES:    TIMED  Procedure Time Min.   TE Start:1501  Stop:1547 46    Start:  Stop:     Start:  Stop:     Start:  Stop:          UNTIMED  Procedure Time Min.    Start:  Stop:     Start:  Stop:      Total Timed Minutes:  46  Total Timed Units:  3  Total Untimed Units:    Charges Billed/# of units:  3      Progress/Current Status    Subjective:     Patient ID: Nik Lowery is a 52 y.o. female.  Diagnosis:   1. Low back pain, unspecified back pain laterality, unspecified chronicity, with sciatica presence unspecified       Pain: 6-7 /10      Objective:     TE for ROM,strength,stabilization.    Assessment:     Performed well.    Patient Education/Response:     Posture ed.    Plans and Goals:     Cont per POC.

## 2018-10-24 NOTE — LETTER
"November 1, 2018    Nik EL Sebastián  [address] [facility] [place] [address]             [facility]  [address]  [facility] [address]  Phone: [phone] DearRimy M Guerra [facility] is committed to your overall health.  To help you get the most out of each of your visits, we will review your information to make sure you are up to date on all of your recommended tests and/or procedures.       As a new patient to Dr. ANTONY CALLAWAY, we may not have your complete medical records.  He has found that your chart shows you may be due for a:     COLORECTAL SCREENING     If you have already had your screening, or you have made an appointment for your screening, congratulations!  You're on the road to good health. If you haven't signed up for a colorectal screening please accept this invitation to be screened.       According to the American Cancer Society, colon cancer is the third most common cancer for people in the [place].  A simple screening test "Fit Kit" - done in your own home - can help find colon cancer at an e lolis stage when it can be treated, even before any signs or symptoms develop.     Testing for blood in your stool (feces or bowel movement) is the first step. If you have an upcoming visit with your Primary Care Physician you can  a Fit Kit during your visit or you can  a Fit Kit at your Primary Care Clinic prior to your visit.     The Fit Kit includes:     Instructions on how to perform the test   (1) Sheet of tissue paper   (1) Small Absorption pad   (1) Bottle to hold the sample and a small probe to help you take the sample   (1) Small plastic bio-hazard bag   (1) Postage-paid return envelope     Please do your test (the instructions will tell you how) and then return your sample in the postage-paid return envelope within 24 hours of collection.     If your test results are negative, you won't need testing again for another year.  If results show you need more " "testing, we will call you with next steps.     Regular colorectal cancer screening is one of the most powerful weapons for preventing colon cancer.  The website https://www.cancer.org/cancer/colon-rectal-cancer.html can answer many of your questions about this cancer and its prevention.  Just search for "colorectal cancer".     If you have any questions please call [facility] TOUCH [phone] for assistance.       If you have had any of the above done at another facility, please bring the records or information with you so that your record at [facility] will be complete.       If you are currently taking medication, please bring it with you to your appointment for review.     Also, if you have any type of Advanced Directives, please bring them with you to your office visit so we may scan them into your chart.         Trish Rudolph LPN Clinical Care Coordinator   [facility] [address] [place] [address]   Phone [phone]   Fax [phone]        "

## 2018-10-25 ENCOUNTER — TELEPHONE (OUTPATIENT)
Dept: FAMILY MEDICINE | Facility: CLINIC | Age: 53
End: 2018-10-25

## 2018-10-25 NOTE — TELEPHONE ENCOUNTER
----- Message from Gurvinder Hardwick sent at 10/25/2018 11:30 AM CDT -----  Contact: same  Patient called in and stated she missed a call back from office about what test she needs done before her appt on 10/31 for her pre op?    Patient call back number is [phone]

## 2018-10-26 ENCOUNTER — DOCUMENTATION ONLY (OUTPATIENT)
Dept: FAMILY MEDICINE | Facility: CLINIC | Age: 53
End: 2018-10-26

## 2018-10-26 ENCOUNTER — CLINICAL SUPPORT (OUTPATIENT)
Dept: REHABILITATION | Facility: HOSPITAL | Age: 53
End: 2018-10-26
Attending: NURSE PRACTITIONER
Payer: COMMERCIAL

## 2018-10-26 DIAGNOSIS — M53.86 DECREASED ROM OF LUMBAR SPINE: ICD-10-CM

## 2018-10-26 PROCEDURE — 97110 THERAPEUTIC EXERCISES: CPT | Mod: PN

## 2018-10-26 NOTE — PROGRESS NOTES
Pre-Visit Chart Review  For Appointment Scheduled on 10-31-18    Health Maintenance Due   Topic Date Due    Colonoscopy  12/30/2015    Influenza Vaccine  08/01/2018

## 2018-10-26 NOTE — PROGRESS NOTES
Name: Nik Lowery  Clinic Number: [id number]  Date of Treatment: 10/26/2018   Diagnosis:   Encounter Diagnosis   Name Primary?    Decreased ROM of lumbar spine        Time in: 1504  Time Out: 1600  Total Treatment Time: 56      Subjective:    Nik reports pain in LB remains.  Patient reports their pain to be 7/10 on a 0-10 scale with 0 being no pain and 10 being the worst pain imaginable.    Objective    Patient received individual therapy to increase strength, posture and core stabilization with activities as follows:     Nik received therapeutic exercises to develop strength, flexibility, posture and core stabilization for 56 minutes including:     Nustep level 4 x 10 minutes  Prone press ups x 15  Prone alt UE/LE x 30 B  Prone on elbows x 2 minutes  Seated TrA x 30  SLR 3 x 10 B  Supine hip abd x 30 B  PPT x 30  LTR x 30 with ball  Clamshell supine GTB x 30  Clamshell GTB R SL, L SL x 20 each  Bridge x 30    Written Home Exercises Provided: cont HEP  Pt demo good understanding of the education provided. Nik demonstrated good return demonstration of activities.     Assessment:     Pain 4/10 upon completion of therapy.  No increase of s/s reported.  Pt will continue to benefit from skilled PT intervention. Medical Necessity is demonstrated by:  Fall Risk, Pain limits function of effected part for some activities, Unable to participate fully in daily activities, Requires skilled supervision to complete and progress HEP and Weakness.    Patient is making fair progress towards established goals.      Plan:  Continue with established Plan of Care towards PT goals.

## 2018-10-31 ENCOUNTER — HOSPITAL ENCOUNTER (OUTPATIENT)
Dept: RADIOLOGY | Facility: CLINIC | Age: 53
Discharge: HOME | End: 2018-10-31
Attending: FAMILY MEDICINE
Payer: COMMERCIAL

## 2018-10-31 ENCOUNTER — OFFICE VISIT (OUTPATIENT)
Dept: FAMILY MEDICINE | Facility: CLINIC | Age: 53
End: 2018-10-31
Attending: FAMILY MEDICINE
Payer: COMMERCIAL

## 2018-10-31 ENCOUNTER — LAB VISIT (OUTPATIENT)
Dept: LAB | Facility: HOSPITAL | Age: 53
End: 2018-10-31
Attending: FAMILY MEDICINE
Payer: COMMERCIAL

## 2018-10-31 VITALS
WEIGHT: 233.25 LBS | BODY MASS INDEX: 38.86 KG/M2 | RESPIRATION RATE: 20 BRPM | TEMPERATURE: 98 F | HEART RATE: 73 BPM | SYSTOLIC BLOOD PRESSURE: 120 MMHG | DIASTOLIC BLOOD PRESSURE: 78 MMHG | HEIGHT: 65 IN | OXYGEN SATURATION: 96 %

## 2018-10-31 DIAGNOSIS — Z01.818 PREOP EXAMINATION: ICD-10-CM

## 2018-10-31 DIAGNOSIS — K21.9 GASTROESOPHAGEAL REFLUX DISEASE WITHOUT ESOPHAGITIS: ICD-10-CM

## 2018-10-31 DIAGNOSIS — Z01.818 PREOPERATIVE CLEARANCE: Primary | ICD-10-CM

## 2018-10-31 DIAGNOSIS — E11.59 HYPERTENSION ASSOCIATED WITH DIABETES: ICD-10-CM

## 2018-10-31 DIAGNOSIS — E11.65 UNCONTROLLED TYPE 2 DIABETES MELLITUS WITH HYPERGLYCEMIA, WITHOUT LONG-TERM CURRENT USE OF INSULIN: ICD-10-CM

## 2018-10-31 DIAGNOSIS — E78.5 HYPERLIPIDEMIA ASSOCIATED WITH TYPE 2 DIABETES MELLITUS: ICD-10-CM

## 2018-10-31 DIAGNOSIS — E11.69 HYPERLIPIDEMIA ASSOCIATED WITH TYPE 2 DIABETES MELLITUS: ICD-10-CM

## 2018-10-31 DIAGNOSIS — Z01.818 PREOPERATIVE CLEARANCE: ICD-10-CM

## 2018-10-31 DIAGNOSIS — I15.2 HYPERTENSION ASSOCIATED WITH DIABETES: ICD-10-CM

## 2018-10-31 DIAGNOSIS — M51.26 HERNIATED INTERVERTEBRAL DISC OF LUMBAR SPINE: Primary | ICD-10-CM

## 2018-10-31 DIAGNOSIS — E66.01 SEVERE OBESITY (BMI 35.0-39.9) WITH COMORBIDITY: ICD-10-CM

## 2018-10-31 LAB
ANION GAP SERPL CALC-SCNC: 10 MMOL/L
BASOPHILS # BLD AUTO: 0.04 K/UL
BASOPHILS NFR BLD: 0.5 %
BUN SERPL-MCNC: 14 MG/DL
CALCIUM SERPL-MCNC: 9.6 MG/DL
CHLORIDE SERPL-SCNC: 101 MMOL/L
CO2 SERPL-SCNC: 27 MMOL/L
CREAT SERPL-MCNC: 0.8 MG/DL
DIFFERENTIAL METHOD: NORMAL
EOSINOPHIL # BLD AUTO: 0.2 K/UL
EOSINOPHIL NFR BLD: 2.2 %
ERYTHROCYTE [DISTWIDTH] IN BLOOD BY AUTOMATED COUNT: 13.2 %
EST. GFR  (AFRICAN AMERICAN): >60 ML/MIN/1.73 M^2
EST. GFR  (NON AFRICAN AMERICAN): >60 ML/MIN/1.73 M^2
ESTIMATED AVG GLUCOSE: 148 MG/DL
GLUCOSE SERPL-MCNC: 69 MG/DL
HBA1C MFR BLD HPLC: 6.8 %
HCT VFR BLD AUTO: 37.2 %
HGB BLD-MCNC: 12.4 G/DL
IMM GRANULOCYTES # BLD AUTO: 0.02 K/UL
IMM GRANULOCYTES NFR BLD AUTO: 0.2 %
LYMPHOCYTES # BLD AUTO: 2.5 K/UL
LYMPHOCYTES NFR BLD: 28.7 %
MCH RBC QN AUTO: 29.5 PG
MCHC RBC AUTO-ENTMCNC: 33.3 G/DL
MCV RBC AUTO: 88 FL
MONOCYTES # BLD AUTO: 0.6 K/UL
MONOCYTES NFR BLD: 6.3 %
NEUTROPHILS # BLD AUTO: 5.5 K/UL
NEUTROPHILS NFR BLD: 62.1 %
NRBC BLD-RTO: 0 /100 WBC
PLATELET # BLD AUTO: 330 K/UL
PMV BLD AUTO: 9.8 FL
POTASSIUM SERPL-SCNC: 4 MMOL/L
RBC # BLD AUTO: 4.21 M/UL
SODIUM SERPL-SCNC: 138 MMOL/L
WBC # BLD AUTO: 8.78 K/UL

## 2018-10-31 PROCEDURE — 83036 HEMOGLOBIN GLYCOSYLATED A1C: CPT

## 2018-10-31 PROCEDURE — 36415 COLL VENOUS BLD VENIPUNCTURE: CPT | Mod: PO

## 2018-10-31 PROCEDURE — 85730 THROMBOPLASTIN TIME PARTIAL: CPT

## 2018-10-31 PROCEDURE — 85610 PROTHROMBIN TIME: CPT

## 2018-10-31 PROCEDURE — 71046 X-RAY EXAM CHEST 2 VIEWS: CPT | Mod: TC,FY,PO

## 2018-10-31 PROCEDURE — 93005 ELECTROCARDIOGRAM TRACING: CPT | Mod: S$GLB,,, | Performed by: FAMILY MEDICINE

## 2018-10-31 PROCEDURE — 3078F DIAST BP <80 MM HG: CPT | Mod: CPTII,S$GLB,, | Performed by: FAMILY MEDICINE

## 2018-10-31 PROCEDURE — 71046 X-RAY EXAM CHEST 2 VIEWS: CPT | Mod: 26,,, | Performed by: RADIOLOGY

## 2018-10-31 PROCEDURE — 3074F SYST BP LT 130 MM HG: CPT | Mod: CPTII,S$GLB,, | Performed by: FAMILY MEDICINE

## 2018-10-31 PROCEDURE — 85025 COMPLETE CBC W/AUTO DIFF WBC: CPT

## 2018-10-31 PROCEDURE — 99999 PR PBB SHADOW E&M-EST. PATIENT-LVL III: CPT | Mod: PBBFAC,,, | Performed by: FAMILY MEDICINE

## 2018-10-31 PROCEDURE — 99215 OFFICE O/P EST HI 40 MIN: CPT | Mod: S$GLB,,, | Performed by: FAMILY MEDICINE

## 2018-10-31 PROCEDURE — 93010 ELECTROCARDIOGRAM REPORT: CPT | Mod: S$GLB,,, | Performed by: INTERNAL MEDICINE

## 2018-10-31 PROCEDURE — 80048 BASIC METABOLIC PNL TOTAL CA: CPT

## 2018-10-31 PROCEDURE — 3008F BODY MASS INDEX DOCD: CPT | Mod: CPTII,S$GLB,, | Performed by: FAMILY MEDICINE

## 2018-10-31 PROCEDURE — 3045F PR MOST RECENT HEMOGLOBIN A1C LEVEL 7.0-9.0%: CPT | Mod: CPTII,S$GLB,, | Performed by: FAMILY MEDICINE

## 2018-10-31 RX ORDER — LAMOTRIGINE 100 MG/1
TABLET ORAL
COMMUNITY
End: 2018-10-31 | Stop reason: SDUPTHER

## 2018-10-31 RX ORDER — PREGABALIN 100 MG/1
CAPSULE ORAL
COMMUNITY
End: 2019-05-07

## 2018-10-31 NOTE — PROGRESS NOTES
Subjective:       Patient ID: Nik Lowery is a 52 y.o. female.    Chief Complaint: Pre-op Exam (back )    52-year-old female with a history of a disc fragment protruding from the L4-5 disc and compressing the right L5 nerve root is to undergo magda laminectomy with [place]. John[place].  Date yet to be determined but expected to be November 8, 2018.  The patient is here for preoperative clearance.  She has a history of hypertension, hyperlipidemia, and poorly controlled diabetes.  Her main complaint involves low back pain radiating to the right side.  She has been undergoing physical therapy with no lasting relief.  She has no complaints of chest pain, shortness of breath with exertion, orthopnea, or peripheral neurologic dysfunction that is unrelated to the herniated disc.  She has no history of recent stroke or ischemic heart disease. Her most recent creatinine was 0.8 on August 11, 2018.  She is not currently on insulin.    Past Medical History:  No date: Diabetes mellitus  No date: Diabetes mellitus, type 2  No date: Herpes  No date: Hypertension    Past Surgical History:  2002: EYE SURGERY      Comment:  lasix Bilateral  2012: HYSTERECTOMY  No date: WISDOM TOOTH EXTRACTION    Review of patient's family history indicates:  Problem: Heart disease      Relation: Mother          Age of Onset: (Not Specified)  Problem: Hypertension      Relation: Mother          Age of Onset: (Not Specified)  Problem: Pancreatic cancer      Relation: Father          Age of Onset: (Not Specified)  Problem: Cancer      Relation: Father          Age of Onset: (Not Specified)  Problem: Arthritis      Relation: Sister          Age of Onset: (Not Specified)  Problem: Diabetes      Relation: Brother          Age of Onset: (Not Specified)  Problem: No Known Problems      Relation: Paternal Aunt          Age of Onset: (Not Specified)  Problem: Heart disease      Relation: Paternal Uncle          Age of Onset: (Not  Specified)  Problem: Heart disease      Relation: Maternal Grandfather          Age of Onset: (Not Specified)  Problem: Alzheimer's disease      Relation: Paternal Grandmother          Age of Onset: (Not Specified)    Social History    Tobacco Use      Smoking status: Never Smoker      Smokeless tobacco: Never Used    Alcohol use: No      Frequency: Never    Drug use: No    Current Outpatient Medications on File Prior to Visit:  acetaminophen (TYLENOL EXTRA STRENGTH ORAL), Take 650 mg by mouth 2 (two) times daily., Disp: , Rfl:   ALPRAZolam (XANAX) 0.25 MG tablet, Take 1 tablet (0.25 mg total) by mouth daily as needed for Anxiety., Disp: 30 tablet, Rfl: 2  atorvastatin (LIPITOR) 20 MG tablet, Take 1 tablet (20 mg total) by mouth once daily., Disp: 90 tablet, Rfl: 3  blood-glucose meter kit, To check BG 3 times daily, to use with insurance preferred meter, Disp: 1 each, Rfl: 0  estradiol 0.05 mg/24 hr td ptsw (VIVELLE-DOT) 0.05 mg/24 hr, APPLY 1 PATCH TO SKIN 2 TIMES A WEEK ON THURSDAY AND SUNDAY., Disp: 24 patch, Rfl: 1  glyBURIDE (DIABETA) 2.5 MG tablet, TAKE 1 TABLET TWICE A DAY (NEEDS LABS AND APPOINTMENT, NO FURTHER REFILLS), Disp: 180 tablet, Rfl: 0   mg tablet, TAKE 1 TABLET BY MOUTH 3 TIMES A DAY, Disp: 60 tablet, Rfl: 0  lamoTRIgine (LAMICTAL) 100 MG tablet, TAKE 1 TABLET DAILY, Disp: 90 tablet, Rfl: 1  lancets Misc, To check BG 3 times daily, to use with insurance preferred meter, Disp: 200 each, Rfl: 1  lisinopril-hydrochlorothiazide (PRINZIDE,ZESTORETIC) 20-25 mg Tab, Take 1 tablet by mouth once daily., Disp: 90 tablet, Rfl: 3  metFORMIN (GLUCOPHAGE-XR) 500 MG 24 hr tablet, Take 2 tablets (1,000 mg total) by mouth 2 (two) times daily with meals., Disp: 360 tablet, Rfl: 3  multivitamin (ONE DAILY MULTIVITAMIN) per tablet, Take 1 tablet by mouth once daily., Disp: , Rfl:   ONETOUCH ULTRA BLUE TEST STRIP Strp, USE TO CHECK BLOOD SUGAR 3 TIMES A DAY, Disp: 200 strip, Rfl: 0  pantoprazole (PROTONIX) 20  MG tablet, Take 1 tablet (20 mg total) by mouth once daily., Disp: 90 tablet, Rfl: 1  pregabalin (LYRICA) 100 MG capsule, Lyrica 200 mg capsule   Take 1 capsule every 8 hours by oral route for 30 days., Disp: , Rfl:   sertraline (ZOLOFT) 100 MG tablet, Take 1 tablet (100 mg total) by mouth once daily., Disp: 90 tablet, Rfl: 1  traMADol (ULTRAM) 50 mg tablet, Take 50 mg by mouth every 6 (six) hours as needed., Disp: , Rfl:   valacyclovir (VALTREX) 500 MG tablet, Take 1 tablet (500 mg total) by mouth 2 (two) times daily. (Patient taking differently: Take 500 mg by mouth 2 (two) times daily as needed. ), Disp: 180 tablet, Rfl: 3  (DISCONTINUED) lamoTRIgine (LAMICTAL) 100 MG tablet, Lamictal, Disp: , Rfl:   (DISCONTINUED) orphenadrine (NORFLEX) 100 mg tablet, Take 1 tablet (100 mg total) by mouth 2 (two) times daily. Muscle relaxant, Disp: 30 tablet, Rfl: 0    No current facility-administered medications on file prior to visit.     Colonoscopy due on 12/30/2015  Influenza Vaccine due on 08/01/2018        Review of Systems   Constitutional: Negative for chills, diaphoresis, fatigue, fever and unexpected weight change.   HENT: Negative for congestion, ear pain, hearing loss, postnasal drip and sinus pressure.    Eyes: Negative for itching and visual disturbance.   Respiratory: Negative for cough, chest tightness, shortness of breath and wheezing.         No orthopnea   Cardiovascular: Negative for chest pain, palpitations and leg swelling.   Gastrointestinal: Negative for abdominal pain, blood in stool, constipation, diarrhea, nausea and vomiting.   Genitourinary: Negative for dysuria, frequency, hematuria, menstrual problem, pelvic pain, vaginal bleeding and vaginal discharge.   Musculoskeletal: Positive for back pain (Radiating to right side) and myalgias. Negative for arthralgias and joint swelling.   Neurological: Negative for dizziness and headaches.   Hematological: Negative for adenopathy.   Psychiatric/Behavioral:  Negative for sleep disturbance. The patient is not nervous/anxious.        Objective:      Physical Exam   Constitutional: She is oriented to person, place, and time. She appears well-developed. No distress.   ---------------------------               10/31/18                      1403         ---------------------------   BP:          120/78         Pulse:         73           Resp:          20           Temp:   98.3 °F (36.8 °C)  ---------------------------      Good blood pressure control  Severe obesity with a BMI of 38.8 she is down 8.5 lb from her last visit with me August 29, 2018   HENT:   Head: Normocephalic and atraumatic.   Right Ear: External ear normal.   Left Ear: External ear normal.   Nose: Nose normal.   Mouth/Throat: Oropharynx is clear and moist. No oropharyngeal exudate.   Minimal nasal turbinate swelling without erythema   Eyes: Conjunctivae and EOM are normal. Pupils are equal, round, and reactive to light. Right eye exhibits no discharge. Left eye exhibits no discharge. No scleral icterus.   Neck: Normal range of motion. Neck supple. No JVD present. No tracheal deviation present. No thyromegaly present.   Cardiovascular: Normal rate, regular rhythm and normal heart sounds. Exam reveals no gallop and no friction rub.   No murmur heard.  Carotid pulses 2+ no bruit   Pulmonary/Chest: Effort normal and breath sounds normal. No stridor. No respiratory distress. She has no wheezes. She has no rales. She exhibits no tenderness.   Abdominal: Soft. Bowel sounds are normal. She exhibits no distension and no mass. There is no tenderness. There is no rebound and no guarding.   Musculoskeletal: Normal range of motion. She exhibits no edema or tenderness.   Lymphadenopathy:     She has no cervical adenopathy.   Neurological: She is alert and oriented to person, place, and time. She has normal reflexes. She displays normal reflexes. No cranial nerve deficit or sensory deficit. She  exhibits normal muscle tone.   Skin: Skin is warm and dry. No rash noted. She is not diaphoretic. No erythema.   Psychiatric: She has a normal mood and affect. Her behavior is normal. Judgment and thought content normal.   Nursing note and vitals reviewed.      Assessment:       1. Herniated intervertebral disc of lumbar spine    2. Preop examination    3. Uncontrolled type 2 diabetes mellitus with hyperglycemia, without long-term current use of insulin    4. Hypertension associated with diabetes    5. Hyperlipidemia associated with type 2 diabetes mellitus    6. Gastroesophageal reflux disease without esophagitis    7. Severe obesity (BMI 35.0-39.9) with comorbidity        Plan:       1. Herniated intervertebral disc of lumbar spine  With displaced disc fragment compressing right L5 nerve root    2. Preop examination  Patient clinically appears to be clear for surgery at low risk pending results of EKG, chest x-ray, and labs  - IN OFFICE EKG 12-LEAD (to Muse)  - Basic metabolic panel; Future  - CBC auto differential; Future  - Protime-INR; Future  - APTT; Future    3. Uncontrolled type 2 diabetes mellitus with hyperglycemia, without long-term current use of insulin  Await lab results previously A1c was 8.3.  She has lost significant amount of weight since that time and hopefully her follow-up A1c will be likewise improved  - Basic metabolic panel; Future  - Hemoglobin A1c; Future    4. Hypertension associated with diabetes  Good control, no changes needed    5. Hyperlipidemia associated with type 2 diabetes mellitus  On atorvastatin 20 mg.  Last lipid panel was not yet to goal    6. Gastroesophageal reflux disease without esophagitis  Asymptomatic at present    7. Severe obesity (BMI 35.0-39.9) with comorbidity  Improving       ADDENDUM, 11/1/18    EKG DATED OCTOBER 31, 2018 SHOWS NORMAL SINUS RHYTHM,  MINIMAL VOLTAGE CRITERIA FOR LVH, MAYBE NORMAL VARIANT  BORDERLINE ABNORMAL EKG  CONFIRMED BY DR. MCCONNELL NO  SIGNIFICANT CHANGE    CHEST X-RAY DATED OCTOBER 31, 2018 SHOWS NO RADIOGRAPHIC EVIDENCE OF ACTIVE CHEST DISEASE    CBC DATED OCTOBER 31, 2018 HEMOGLOBIN 12.4, WBC 8780, PLATELETS [id number] ALL WITHIN NORMAL LIMITS    BASIC METABOLIC PROFILE DATED OCTOBER 31, 2018 SHOWS SODIUM 138, POTASSIUM 4.0, GLUCOSE OF 69 SLIGHTLY LOW, BUN OF 14, CREATININE OF 0.8, CALCIUM OF 9.6.  CALCULATED GFR GREATER THAN 60    A1C DATED OCTOBER 31, 2018 IS 6.8 SHOWING GOOD CONTROL FOR DIABETIC    PROTIME AND PTT BOTH DATED OCTOBER 31, 2018 SHOWS A PT OF 10.9 WITH INR OF 1.1 AND PTT 24.9 BOTH WITHIN NORMAL LIMITS    SHE IS CLEARED FOR SURGERY AT LOW RISK.

## 2018-10-31 NOTE — PATIENT INSTRUCTIONS
Weight Management: Getting Started  Healthy bodies come in all shapes and sizes. Not all bodies are made to be thin. For some people, a healthy weight is higher than the average weight listed on weight charts. Your healthcare provider can help you decide on a healthy weight for you.    Reasons to lose weight  Losing weight can help with some health problems, such as high blood pressure, heart disease, diabetes, sleep apnea, and arthritis. You may also feel more energy.  Set your long-term goal  Your goal doesn't even have to be a specific weight. You may decide on a fitness goal (such as being able to walk 10 miles a week), or a health goal (such as lowering your blood pressure). Choose a goal that is measurable and reasonable, so you know when you've reached it. A goal of reaching a BMI of less than 25 is not always reasonable (or possible).   Make an action plan  Habits dont change overnight. Setting your goals too high can leave you feeling discouraged if you cant reach them. Be realistic. Choose one or two small changes you can make now. Set an action plan for how you are going to make these changes. When you can stick to this plan, keep making a few more small changes. Taking small steps will help you stay on the path to success.  Track your progress  Write down your goals. Then, keep a daily record of your progress. Write down what you eat and how active you are. This record lets you look back on how much youve done. It may also help when youre feeling frustrated. Reward yourself for success. Even if you dont reach every goal, give yourself credit for what you do get done.  Get support  Encouragement from others can help make losing weight easier. Ask your family members and friends for support. They may even want to join you. Also look to your healthcare provider, registered dietitian, and  for help. Your local hospital can give you more information about nutrition, exercise, and  weight loss.  Date Last Reviewed: 1/31/2016 © 2000-2017 [employer]. [address] [place] [address], [place] [address]. All rights reserved. This information is not intended as a substitute for professional medical care. Always follow your healthcare professional's instructions.        Diabetes (General Information)  Diabetes is a long-term health problem. It means your body does not make enough insulin. Or it may mean that your body cannot use the insulin it makes. Insulin is a hormone in your body. It lets blood sugar (glucose) reach the cells in your body. All of your cells need glucose for fuel.  When you have diabetes, the glucose in your blood builds up because it cannot get into the cells. This buildup is called high blood sugar (hyperglycemia).  Your blood sugar level depends on several things. It depends on what kind of food you eat and how much of it you eat. It also depends on how much exercise you get, and how much insulin you have in your body. Eating too much of the wrong kinds of food or not taking diabetes medicine on time can cause high blood sugar. Infections can cause high blood sugar even if you are taking medicines correctly.  These things can also cause low blood sugar:  · Missing meals  · Not eating enough food  · Taking too much diabetes medicine  Diabetes can cause serious problems over time if you do not get treated. These problems include heart disease, stroke, kidney failure, and blindness. They also include nerve pain or loss of feeling in your legs and feet, and gangrene of the feet. By keeping your blood sugar under control you can prevent or delay these problems.  Normal blood sugar levels are 80 to 100 before a meal and less than 180 in the 1 to 2 hours after a meal.  Home care  Follow these guidelines when caring for yourself at home:  · Follow the diet your healthcare provider gives you. Take insulin or other diabetes medicine exactly as told to.  · Watch your blood sugar as  you are told to. Keep a log of your results. This will help your provider change your medicines to keep your blood sugar under control.  · Try to reach your ideal weight. You may be able to cut back on or not have to take diabetes medicine if you eat the right foods and get exercise.  · Do not smoke. Smoking worsens the effects of diabetes on your circulation. You are much more likely to have a heart attack if you have diabetes and you smoke.  · Take good care of your feet. If you have lost feeling in your feet, you may not see an injury or infection. Check your feet and between your toes at least once a week.  · Wear a medical alert bracelet or necklace, or carry a card in your wallet that says you have diabetes. This will help healthcare providers give you the right care if you get very ill and cannot tell them that you have diabetes.  Sick day plan  If you get a cold, the flu, or a bacterial or viral infection, take these steps:  · Look at your diabetes sick plan and call your healthcare provider as you were told to. You may need to call your provider right away if:  ¨ Your blood sugar is above 240 while taking your diabetes medicine  ¨ Your urine ketone levels are above normal or high  ¨ You have been vomiting more than 6 hours  ¨ You have trouble breathing or your breath ha s a fruity smell  ¨ You have a high fever  ¨ You have a fever for several days and you are not getting better  ¨ You get light-headed and are sleepier than usual  · Keep taking your diabetes pills (oral medicine) even if you have been vomiting and are feeling sick. Call your provider right away because you may need insulin to lower your blood sugar until you recover from your illness.  · Keep taking your insulin even if you have been vomiting and are feeling sick. Call your provider right away to ask if you need to change your insulin dose. This will depend on your blood sugar results.  · Check your blood sugar every 2 to 4 hours, or at  least 4 times a day.  · Check your ketones often. If you are vomiting and having diarrhea, watch them more often.  · Do not skip meals. Try to eat small meals on a regular schedule. Do this even if you do not feel like eating.  · Drink water or other liquids that do not have caffeine or calories. This will keep you from getting dehydrated. If you are nauseated or vomiting, takes small sips every 5 minutes. To prevent dehydration try to drink a cup (8 ounces) of fluids every hour while you are awake.  General care  Always bring a source of fast-acting sugar with you in case you have symptoms of low blood sugar (below 70). At the first sign of low blood sugar, eat or drink 15 to 20 grams of fast-acting sugar to raise your blood sugar. Examples are:  · 3 to 4 glucose tablets. You can buy these at most [employer].  · 4 ounces (1/2 cup) of regular (not diet) soft drinks  · 4 ounces (1/2 cup) of any fruit juice  · 8 ounces (1 cup) of milk  · 5 to 6 pieces of hard candy  · 1 tablespoon of honey  Check your blood sugar 15 minutes after treating yourself. If it is still below 70, take 15 to 20 more grams of fast-acting sugar. Test again in 15 minutes. If it returns to normal (70 or above), eat a snack or meal to keep your blood sugar in a safe range. If it stays low, call your doctor or go to an emergency room.  Follow-up care  Follow-up with your healthcare provider, or as advised. For more information about diabetes, visit the American Diabetes Association website at www.diabetes.org or call 514-637-6774.  When to seek medical advice  Call your healthcare provider right away if you have any of these symptoms of high blood sugar:  · Frequent urination  · Dizziness  · Drowsiness  · Thirst  · Headache  · Nausea or vomiting  · Abdominal pain  · Eyesight changes  · Fast breathing  · Confusion or loss of consciousness  Also call your provider right away if you have any of these signs of low blood  sugar:  · Fatigue  · Headache  · Shakes  · Excess sweating  · Hunger  · Feeling anxious or restless  · Eyesight changes  · Drowsiness  · Weakness  · Confusion or loss of consciousness  Call 911  Call for emergency help right away if any of these occur:  · Chest pain or shortness of breath  · Dizziness or fainting  · Weakness of an arm or leg or one side of the face  · Trouble speaking or seeing   Date Last Reviewed: 6/1/2016  © 3269-5027 [employer]. [address] [place] [address], [place] [address]. All rights reserved. This information is not intended as a substitute for professional medical care. Always follow your healthcare professional's instructions.        Weight Management: Getting Started  Healthy bodies come in all shapes and sizes. Not all bodies are made to be thin. For some people, a healthy weight is higher than the average weight listed on weight charts. Your healthcare provider can help you decide on a healthy weight for you.    Reasons to lose weight  Losing weight can help with some health problems, such as high blood pressure, heart disease, diabetes, sleep apnea, and arthritis. You may also feel more energy.  Set your long-term goal  Your goal doesn't even have to be a specific weight. You may decide on a fitness goal (such as being able to walk 10 miles a week), or a health goal (such as lowering your blood pressure). Choose a goal that is measurable and reasonable, so you know when you've reached it. A goal of reaching a BMI of less than 25 is not always reasonable (or possible).   Make an action plan  Habits dont change overnight. Setting your goals too high can leave you feeling discouraged if you cant reach them. Be realistic. Choose one or two small changes you can make now. Set an action plan for how you are going to make these changes. When you can stick to this plan, keep making a few more small changes. Taking small steps will help you stay on the path to success.  Track your  progress  Write down your goals. Then, keep a daily record of your progress. Write down what you eat and how active you are. This record lets you look back on how much youve done. It may also help when youre feeling frustrated. Reward yourself for success. Even if you dont reach every goal, give yourself credit for what you do get done.  Get support  Encouragement from others can help make losing weight easier. Ask your family members and friends for support. They may even want to join you. Also look to your healthcare provider, registered dietitian, and  for help. Your local hospital can give you more information about nutrition, exercise, and weight loss.  Date Last Reviewed: 1/31/2016  © 3133-3567 [employer]. [address] [place] [address], [place] [address]. All rights reserved. This information is not intended as a substitute for professional medical care. Always follow your healthcare professional's instructions.

## 2018-11-01 ENCOUNTER — TELEPHONE (OUTPATIENT)
Dept: FAMILY MEDICINE | Facility: CLINIC | Age: 53
End: 2018-11-01

## 2018-11-01 DIAGNOSIS — M51.26 HERNIATION OF RIGHT SIDE OF L4-L5 INTERVERTEBRAL DISC: ICD-10-CM

## 2018-11-01 DIAGNOSIS — M54.17 LUMBOSACRAL RADICULOPATHY AT L4: ICD-10-CM

## 2018-11-01 LAB
APTT BLDCRRT: 24.9 SEC
INR PPP: 1.1
PROTHROMBIN TIME: 10.9 SEC

## 2018-11-01 RX ORDER — IBUPROFEN 600 MG/1
TABLET ORAL
Qty: 60 TABLET | Refills: 0 | OUTPATIENT
Start: 2018-11-01

## 2018-11-01 NOTE — TELEPHONE ENCOUNTER
Called pt regarding below message. Discussed on results encounter.     ----- Message from Jamila Peralta sent at 11/1/2018  8:55 AM CDT -----  Contact: self [phone]  Call placed to pod. Patient returned your call, please call back.  Thank you!

## 2018-11-06 ENCOUNTER — DOCUMENTATION ONLY (OUTPATIENT)
Dept: FAMILY MEDICINE | Facility: CLINIC | Age: 53
End: 2018-11-06

## 2018-11-06 NOTE — PROGRESS NOTES
Pre-Visit Chart Review  For Appointment Scheduled on 11/7/18    Health Maintenance Due   Topic Date Due    Colonoscopy  12/30/2015    Influenza Vaccine  08/01/2018

## 2018-11-07 ENCOUNTER — OFFICE VISIT (OUTPATIENT)
Dept: FAMILY MEDICINE | Facility: CLINIC | Age: 53
End: 2018-11-07
Payer: COMMERCIAL

## 2018-11-07 VITALS
BODY MASS INDEX: 39.2 KG/M2 | HEIGHT: 65 IN | WEIGHT: 235.25 LBS | SYSTOLIC BLOOD PRESSURE: 123 MMHG | HEART RATE: 94 BPM | TEMPERATURE: 98 F | DIASTOLIC BLOOD PRESSURE: 87 MMHG

## 2018-11-07 DIAGNOSIS — E78.5 HYPERLIPIDEMIA ASSOCIATED WITH TYPE 2 DIABETES MELLITUS: ICD-10-CM

## 2018-11-07 DIAGNOSIS — E11.69 HYPERLIPIDEMIA ASSOCIATED WITH TYPE 2 DIABETES MELLITUS: ICD-10-CM

## 2018-11-07 DIAGNOSIS — E11.65 UNCONTROLLED TYPE 2 DIABETES MELLITUS WITH HYPERGLYCEMIA, WITHOUT LONG-TERM CURRENT USE OF INSULIN: ICD-10-CM

## 2018-11-07 DIAGNOSIS — E11.59 HYPERTENSION ASSOCIATED WITH DIABETES: Primary | ICD-10-CM

## 2018-11-07 DIAGNOSIS — E66.01 CLASS 2 SEVERE OBESITY DUE TO EXCESS CALORIES WITH SERIOUS COMORBIDITY AND BODY MASS INDEX (BMI) OF 39.0 TO 39.9 IN ADULT: ICD-10-CM

## 2018-11-07 DIAGNOSIS — I15.2 HYPERTENSION ASSOCIATED WITH DIABETES: Primary | ICD-10-CM

## 2018-11-07 PROCEDURE — 3079F DIAST BP 80-89 MM HG: CPT | Mod: CPTII,S$GLB,, | Performed by: FAMILY MEDICINE

## 2018-11-07 PROCEDURE — 99214 OFFICE O/P EST MOD 30 MIN: CPT | Mod: S$GLB,,, | Performed by: FAMILY MEDICINE

## 2018-11-07 PROCEDURE — 3044F HG A1C LEVEL LT 7.0%: CPT | Mod: CPTII,S$GLB,, | Performed by: FAMILY MEDICINE

## 2018-11-07 PROCEDURE — 3008F BODY MASS INDEX DOCD: CPT | Mod: CPTII,S$GLB,, | Performed by: FAMILY MEDICINE

## 2018-11-07 PROCEDURE — 3074F SYST BP LT 130 MM HG: CPT | Mod: CPTII,S$GLB,, | Performed by: FAMILY MEDICINE

## 2018-11-07 PROCEDURE — 99999 PR PBB SHADOW E&M-EST. PATIENT-LVL IV: CPT | Mod: PBBFAC,,, | Performed by: FAMILY MEDICINE

## 2018-11-07 RX ORDER — HYDROCODONE BITARTRATE AND ACETAMINOPHEN 10; 325 MG/1; MG/1
TABLET ORAL
COMMUNITY
Start: 2018-11-04 | End: 2019-05-07

## 2018-11-07 RX ORDER — HYDROCODONE BITARTRATE AND ACETAMINOPHEN 10; 325 MG/1; MG/1
TABLET ORAL
COMMUNITY
End: 2019-05-07

## 2018-11-08 PROBLEM — E66.812 CLASS 2 SEVERE OBESITY DUE TO EXCESS CALORIES WITH SERIOUS COMORBIDITY AND BODY MASS INDEX (BMI) OF 39.0 TO 39.9 IN ADULT: Status: ACTIVE | Noted: 2018-08-08

## 2018-11-08 NOTE — PROGRESS NOTES
Subjective:   Patient ID: Nik Lowery is a 52 y.o. female     Chief Complaint:Establish Care      Pt with hypertension well controlled today. Pt with type 2 DM controlled on oral medicaitons. Pt with HLD stable on statin.      Review of Systems   Constitutional: Negative for chills and fever.   HENT: Negative for sore throat.    Eyes: Negative for visual disturbance.   Respiratory: Negative for shortness of breath.    Cardiovascular: Negative for chest pain.   Gastrointestinal: Negative for abdominal pain.   Endocrine: Negative for polyuria.   Genitourinary: Negative for dysuria.   Musculoskeletal: Negative for back pain.   Skin: Negative for color change.   Neurological: Negative for headaches.   Psychiatric/Behavioral: Negative for agitation and confusion.     Past Medical History:   Diagnosis Date    Diabetes mellitus     Diabetes mellitus, type 2     Herpes     Hypertension      Objective:     Vitals:    11/07/18 1408   BP: 123/87   Pulse: 94   Temp: 98.2 °F (36.8 °C)     Body mass index is 39.14 kg/m².  Physical Exam   Constitutional: She is oriented to person, place, and time. She appears well-developed and well-nourished.   HENT:   Head: Normocephalic and atraumatic.   Eyes: EOM are normal. Pupils are equal, round, and reactive to light.   Neck: Normal range of motion. Neck supple.   Cardiovascular: Normal rate, regular rhythm, normal heart sounds and intact distal pulses.   Pulmonary/Chest: Effort normal and breath sounds normal.   Abdominal: Soft. She exhibits no distension.   Musculoskeletal: Normal range of motion.   Neurological: She is alert and oriented to person, place, and time.   Skin: Skin is warm and dry.   Psychiatric: She has a normal mood and affect. Her behavior is normal. Judgment and thought content normal.   Nursing note and vitals reviewed.    Assessment:     1. Hypertension associated with diabetes    2. Hyperlipidemia associated with type 2 diabetes mellitus    3. Class 2 severe  obesity due to excess calories with serious comorbidity and body mass index (BMI) of 39.0 to 39.9 in adult    4. Uncontrolled type 2 diabetes mellitus with hyperglycemia, without long-term current use of insulin      Plan:   Hypertension associated with diabetes  - pt with well controlled BP today  - review of BMP from 10/2018 shows no signs end organ damage such as kidney disease indicating controlled BP has reduced patients risk of hypertensive comorbidity    Hyperlipidemia associated with type 2 diabetes mellitus  - pt with HLD on statin  - review of lipid panel from 8/2018 shows LDL is near goal of <100 at 105, will continue monitor and consider second line agent in future if necessary    Class 2 severe obesity due to excess calories with serious comorbidity and body mass index (BMI) of 39.0 to 39.9 in adult  The patient's BMI has been recorded in the chart. The patient has been provided educational materials regarding the benefits of attaining and maintaining a normal weight. We will continue to address and follow this issue during follow up visits.    Uncontrolled type 2 diabetes mellitus with hyperglycemia, without long-term current use of insulin  - review of A1C form 10/2018 shows patient now controlled type 2 DM  - pt on metformin      Discharge:   Patient readiness: acceptance and barriers:none    During the course of the visit the patient was educated and counseled about the following:     Diabetes:  Discussed general issues about diabetes pathophysiology and management.  Addressed ADA diet.  Hypertension:   Medication: no change.  Dietary sodium restriction.  Obesity:   General weight loss/lifestyle modification strategies discussed (elicit support from others; identify saboteurs; non-food rewards, etc).    Goals: Diabetes: Maintain Hemoglobin A1C below 7, Hypertension: Reduce Blood Pressure and Obesity: Reduce calorie intake and BMI    Did patient meet goals/outcomes: No    The following self  management tools provided: excercise log    Patient Instructions (the written plan) was given to the patient/family.     Time spent with patient: 30 minutes and over half of that time was spent on counseling an coordination of care.    Barriers to medications present (no )    Adverse reactions to current medications (no)    Over the counter medications reviewed (Yes)      Osmin Newsome MD  11/08/2018

## 2018-12-09 DIAGNOSIS — E11.65 UNCONTROLLED TYPE 2 DIABETES MELLITUS WITH HYPERGLYCEMIA, WITHOUT LONG-TERM CURRENT USE OF INSULIN: ICD-10-CM

## 2018-12-27 ENCOUNTER — CLINICAL SUPPORT (OUTPATIENT)
Dept: REHABILITATION | Facility: HOSPITAL | Age: 53
End: 2018-12-27
Attending: NURSE PRACTITIONER
Payer: COMMERCIAL

## 2018-12-27 DIAGNOSIS — G89.29 CHRONIC LOW BACK PAIN, UNSPECIFIED BACK PAIN LATERALITY, WITH SCIATICA PRESENCE UNSPECIFIED: Primary | ICD-10-CM

## 2018-12-27 DIAGNOSIS — M54.5 CHRONIC LOW BACK PAIN, UNSPECIFIED BACK PAIN LATERALITY, WITH SCIATICA PRESENCE UNSPECIFIED: Primary | ICD-10-CM

## 2018-12-27 PROCEDURE — 97161 PT EVAL LOW COMPLEX 20 MIN: CPT | Mod: PN

## 2018-12-31 ENCOUNTER — CLINICAL SUPPORT (OUTPATIENT)
Dept: REHABILITATION | Facility: HOSPITAL | Age: 53
End: 2018-12-31
Attending: NURSE PRACTITIONER
Payer: COMMERCIAL

## 2018-12-31 DIAGNOSIS — M53.86 DECREASED ROM OF LUMBAR SPINE: ICD-10-CM

## 2018-12-31 PROCEDURE — 97110 THERAPEUTIC EXERCISES: CPT | Mod: PN

## 2018-12-31 NOTE — PROGRESS NOTES
Name: Nik Lowery  Date:   12/31/2018  Primary Diagnosis: .  Problem List Items Addressed This Visit     Decreased ROM of lumbar spine          Time in: 1209  Time Out: 1300  Total Treatment Time: 51  Group Time: 0      Subjective:    Patient reports low back pain and pain/numbnes/tingling down R lateral leg to the toes. Was in a lot of pain yesterday, possibly due to weather. Reports she is feeling better than pre-surgery, but was told by her surgeon that she might have herniated again post-surgery due to her current symptoms. Will try PT for 6 weeks and then see the surgeon again. Patient reports their pain to be 4-5/10 on a 0-10 scale with 0 being no pain and 10 being the worst pain imaginable.    Objective    Patient received individual therapy to address strength, endurance, ROM, flexibility and core stabilization with 0 other patients with activities as follows:     Patient received therapeutic exercises to develop strength, endurance, ROM, flexibility and core stabilization for 41 minutes including:     Nustep L1 x 10 min LE's only  Seated TA set c PB x 30  Seated hamstring stretch 3/30 sec L/R (c/ stool)    Standing gastroc stretch using 1/2 foam roll 3/30 sec B    Mat:   PPT x 10   TA set c/ PB x 20    Hip adduction ball squeezes x 20   Hip abduction clamshells x 20 RTB    CP x 10 min for muscle soreness purposes    Assessment:     Patient with discomfort/pain with PPT's, therefore repetitions limited to 10. No increased LBP or RLE pain following session. Patient observed twisting trunk to place purse on hook in clinic, therefore reviewed body mechanics and precautions, to which she verbalized understanding and agreement. Demonstrated good logrolling technique with supine<>sit.    Pt will continue to benefit from skilled PT intervention. Medical Necessity is demonstrated by:  Pain limits function of effected part for some activities, Unable to participate fully in daily activities and Weakness.    Patient  is making good progress towards established goals.    Plan:  Continue with established Plan of Care towards PT goals.

## 2019-01-07 RX ORDER — LAMOTRIGINE 100 MG/1
TABLET ORAL
Qty: 90 TABLET | Refills: 1 | Status: SHIPPED | OUTPATIENT
Start: 2019-01-07 | End: 2019-07-05 | Stop reason: SDUPTHER

## 2019-01-10 ENCOUNTER — CLINICAL SUPPORT (OUTPATIENT)
Dept: REHABILITATION | Facility: HOSPITAL | Age: 54
End: 2019-01-10
Attending: NURSE PRACTITIONER
Payer: COMMERCIAL

## 2019-01-10 DIAGNOSIS — M53.86 DECREASED ROM OF LUMBAR SPINE: ICD-10-CM

## 2019-01-10 PROCEDURE — 97110 THERAPEUTIC EXERCISES: CPT | Mod: PN

## 2019-01-10 RX ORDER — ESTRADIOL 0.05 MG/D
FILM, EXTENDED RELEASE TRANSDERMAL
Qty: 24 PATCH | Refills: 1 | Status: SHIPPED | OUTPATIENT
Start: 2019-01-10 | End: 2019-05-07

## 2019-01-10 NOTE — PROGRESS NOTES
"Name: Nik Lowery  [facility] Number: [id number]  Date of Treatment: 01/10/2019   Diagnosis:   Encounter Diagnosis   Name Primary?    Decreased ROM of lumbar spine        Time in: 1400  Time Out: 1450  Total Treatment Time: 50  Group Time: 0      Subjective:    Nik reports "nerve pain" R LE and doing HEP as per previous HEP.  Patient reports their pain to be 4/10 on a 0-10 scale with 0 being no pain and 10 being the worst pain imaginable.    Objective    Nik received therapeutic exercises to develop strength, endurance, flexibility and core stabilization for 50 minutes including:     NuStep Lv2 10'  PPT x20 supine  TrA sets x20 supine with Tball  Ball squeeze x20  Clams x20 supine Green Tband  SLR 3x10 R/L  Hip flexor stretch 3 x 10" R/L  LTR x30 with Tball R/L  DKTC x30 with Tball   Shuttle 36#: 5' B LP, B Heel dips x30  Sciatic nerve glide x10 supine R    Pt demo good understanding of the education provided. Nik demonstrated good return demonstration of activities.     Assessment:     Pt will continue to benefit from skilled PT intervention. Medical Necessity is demonstrated by:  Pain limits function of effected part for some activities, Unable to participate fully in daily activities, Requires skilled supervision to complete and progress HEP and Weakness.    Plan:    Continue with established Plan of Care towards PT goals.   "

## 2019-01-17 ENCOUNTER — CLINICAL SUPPORT (OUTPATIENT)
Dept: REHABILITATION | Facility: HOSPITAL | Age: 54
End: 2019-01-17
Attending: NURSE PRACTITIONER
Payer: COMMERCIAL

## 2019-01-17 DIAGNOSIS — M54.50 CHRONIC LOW BACK PAIN WITHOUT SCIATICA, UNSPECIFIED BACK PAIN LATERALITY: Primary | ICD-10-CM

## 2019-01-17 DIAGNOSIS — G89.29 CHRONIC LOW BACK PAIN WITHOUT SCIATICA, UNSPECIFIED BACK PAIN LATERALITY: Primary | ICD-10-CM

## 2019-01-17 PROCEDURE — 97110 THERAPEUTIC EXERCISES: CPT | Mod: PN

## 2019-01-17 NOTE — PROGRESS NOTES
01/17/19 1500   Ankle Exercises   Double Leg Calf Raises Reps/Sets/Weight 30   Standing Dorsiflexion Stretch(Gastroc) Reps/Sets/Hold Time 3X30   Hip Exercises   Clamshells Reps/Sets/Resistance 30   Knee Exercises   Tg/Shuttle/Reformer Squats Reps/Sets/Weight/Level 6'   Lumbar Exercises   Posterior Pelvic Tilts  Reps/Sets/Hold Time 30   Double Knee To Chest Stretch Reps/Sets/Hold Time 30   Piriformis Stretch Reps/Sets/Hold Time 3X30   Standing Back Bends Reps/Sets 3   Lower Trunk Rotation Stretch Reps/Sets/Hold Time 30/30   Additional Exercises   Additional Exercise BIKE 12'

## 2019-01-17 NOTE — PROGRESS NOTES
TIME RECORD    Date:  01/17/2019    Start Time:  1504  Stop Time:  1557    PROCEDURES:    TIMED  Procedure Time Min.   TE Start:1504  Stop:1557 53    Start:  Stop:     Start:  Stop:     Start:  Stop:          UNTIMED  Procedure Time Min.    Start:  Stop:     Start:  Stop:      Total Timed Minutes:  53  Total Timed Units:  4  Total Untimed Units:    Charges Billed/# of units:  4      Progress/Current Status    Subjective:     Patient ID: Nik Lowery is a 53 y.o. female.  Diagnosis:   1. Chronic low back pain without sciatica, unspecified back pain laterality       Pain: 4 /10      Objective:     TE for ROM,strength,stabilization.    Assessment:     Performed well.     Patient Education/Response:     Posture ed.    Plans and Goals:     Cont per POC.

## 2019-01-23 RX ORDER — GLYBURIDE 2.5 MG/1
TABLET ORAL
Qty: 180 TABLET | Refills: 1 | Status: SHIPPED | OUTPATIENT
Start: 2019-01-23 | End: 2019-07-21 | Stop reason: SDUPTHER

## 2019-01-24 ENCOUNTER — CLINICAL SUPPORT (OUTPATIENT)
Dept: REHABILITATION | Facility: HOSPITAL | Age: 54
End: 2019-01-24
Attending: NURSE PRACTITIONER
Payer: COMMERCIAL

## 2019-01-24 DIAGNOSIS — M54.5 LOW BACK PAIN, UNSPECIFIED BACK PAIN LATERALITY, UNSPECIFIED CHRONICITY, WITH SCIATICA PRESENCE UNSPECIFIED: Primary | ICD-10-CM

## 2019-01-24 PROCEDURE — 97110 THERAPEUTIC EXERCISES: CPT | Mod: PN

## 2019-01-24 NOTE — PROGRESS NOTES
TIME RECORD    Date:  01/24/2019    Start Time:  1510  Stop Time:  1545    PROCEDURES:    TIMED  Procedure Time Min.   TE Start:1510  Stop:1555 45    Start:  Stop:     Start:  Stop:     Start:  Stop:          UNTIMED  Procedure Time Min.    Start:  Stop:     Start:  Stop:      Total Timed Minutes:  45  Total Timed Units:  3  Total Untimed Units:    Charges Billed/# of units:  3      Progress/Current Status    Subjective:     Patient ID: Nik Lowery is a 53 y.o. female.  Diagnosis:   1. Low back pain, unspecified back pain laterality, unspecified chronicity, with sciatica presence unspecified       Pain: 2 /10  Better.    Objective:     TE for ROM,strength,stabilization.    Assessment:     Improving.    Patient Education/Response:     Posture ed.    Plans and Goals:     Cont per POC.

## 2019-01-24 NOTE — PROGRESS NOTES
01/24/19 1500   Ankle Exercises   Double Leg Calf Raises Reps/Sets/Weight 30   Standing Dorsiflexion Stretch(Gastroc) Reps/Sets/Hold Time 3X30   Hip Exercises   Clamshells Reps/Sets/Resistance 30,BALL SQUEEZ 30   Knee Exercises   Frontal Squats Reps/Sets/Weight 30   Tg/Shuttle/Reformer Squats Reps/Sets/Weight/Level 6' 50#   Lumbar Exercises   Posterior Pelvic Tilts  Reps/Sets/Hold Time 30   Double Knee To Chest Stretch Reps/Sets/Hold Time 30   Hamsting Stretch Reps/Sets/Hold Time 3X30   Piriformis Stretch Reps/Sets/Hold Time 3X30   Bridging Reps/Sets/Hold Time 3X10   Standing Back Bends Reps/Sets 3   Lower Trunk Rotation Stretch Reps/Sets/Hold Time 30/30   Additional Exercises   Additional Exercise NUSTEP 12' L4

## 2019-02-05 ENCOUNTER — CLINICAL SUPPORT (OUTPATIENT)
Dept: REHABILITATION | Facility: HOSPITAL | Age: 54
End: 2019-02-05
Attending: NURSE PRACTITIONER
Payer: COMMERCIAL

## 2019-02-05 DIAGNOSIS — M53.86 DECREASED ROM OF LUMBAR SPINE: ICD-10-CM

## 2019-02-05 PROCEDURE — 97010 HOT OR COLD PACKS THERAPY: CPT | Mod: PN

## 2019-02-05 PROCEDURE — 97110 THERAPEUTIC EXERCISES: CPT | Mod: PN

## 2019-02-05 NOTE — PROGRESS NOTES
"Name: Nik Lowery  Clinic Number: [id number]  Date of Treatment: 02/05/2019   Diagnosis:   Encounter Diagnosis   Name Primary?    Decreased ROM of lumbar spine        Time in: 1505  Time Out: 1610  Total Treatment Time: 65  Group Time: 0      Subjective:    Nik reports continued low back pain.  Patient reports their pain to be 3/10 on a 0-10 scale with 0 being no pain and 10 being the worst pain imaginable.    Objective    Nik received therapeutic exercises to develop strength, endurance, flexibility and core stabilization for 55 minutes including:     NuStep Lv4 10'  Hamstring stretch 3x30" supine with strap R/L  Piriformis stretch 3x30" supine  R/L  IT band stretch 3x30" supine with strap  R/L  Bridge 3x10  Hip 4-way 3x10 on mat R/L  LTR x30 with Tball  DKTC  x30 with Tball    Cold pack to low back in supine 10' to decrease soreness    Written Home Exercises Provided: yes    Pt demo good understanding of the education provided. Nik demonstrated good return demonstration of activities.     Assessment:     Pt will continue to benefit from skilled PT intervention. Medical Necessity is demonstrated by:  Pain limits function of effected part for some activities, Unable to participate fully in daily activities, Requires skilled supervision to complete and progress HEP and Weakness.    Plan:    Continue with established Plan of Care towards PT goals.   "

## 2019-02-07 ENCOUNTER — CLINICAL SUPPORT (OUTPATIENT)
Dept: REHABILITATION | Facility: HOSPITAL | Age: 54
End: 2019-02-07
Attending: NURSE PRACTITIONER
Payer: COMMERCIAL

## 2019-02-07 DIAGNOSIS — E11.9 TYPE 2 DIABETES MELLITUS WITHOUT COMPLICATION: ICD-10-CM

## 2019-02-07 DIAGNOSIS — M53.86 DECREASED ROM OF LUMBAR SPINE: ICD-10-CM

## 2019-02-07 PROCEDURE — 97010 HOT OR COLD PACKS THERAPY: CPT | Mod: PN

## 2019-02-07 PROCEDURE — 97110 THERAPEUTIC EXERCISES: CPT | Mod: PN

## 2019-02-07 NOTE — PROGRESS NOTES
Name: Nik Lowery  Date:   02/07/2019  Primary Diagnosis: .  Problem List Items Addressed This Visit     Decreased ROM of lumbar spine          Time in: 1515  Time Out: 1602  Total Treatment Time: 47  Group Time: 0      Subjective:    Patient reports improvement of symptoms.  Patient reports their pain to be 4/10 on a 0-10 scale with 0 being no pain and 10 being the worst pain imaginable.    Objective    Patient received individual therapy to address strength, endurance, ROM, flexibility and core stabilization with 0 other patients with activities as follows:     Patient received therapeutic exercises to develop strength, endurance, ROM, flexibility and core stabilization for 37 minutes including:     Nustep L4 x 12 min LE's only  Seated hamstring stretch 3/30 sec L/R  Seated piriformis stretches 3/30 sec L/R  Standing gastroc stretch using 1/2 foam roll 3/30 sec B  PPT x 30  LTR's c/ PB x 30  DKTC c/ PB x 30  Bridging x 30    CP x 10 min    Assessment:     Pt will continue to benefit from skilled PT intervention. Medical Necessity is demonstrated by:  Pain limits function of effected part for some activities, Unable to participate fully in daily activities and Weakness.    Patient is making good progress towards established goals.    Plan:  Continue with established Plan of Care towards PT goals.

## 2019-02-11 ENCOUNTER — CLINICAL SUPPORT (OUTPATIENT)
Dept: REHABILITATION | Facility: HOSPITAL | Age: 54
End: 2019-02-11
Attending: NURSE PRACTITIONER
Payer: COMMERCIAL

## 2019-02-11 DIAGNOSIS — M54.5 CHRONIC LOW BACK PAIN, UNSPECIFIED BACK PAIN LATERALITY, WITH SCIATICA PRESENCE UNSPECIFIED: Primary | ICD-10-CM

## 2019-02-11 DIAGNOSIS — G89.29 CHRONIC LOW BACK PAIN, UNSPECIFIED BACK PAIN LATERALITY, WITH SCIATICA PRESENCE UNSPECIFIED: Primary | ICD-10-CM

## 2019-02-11 NOTE — PROGRESS NOTES
02/11/19 1600   Additional Exercises   Additional Exercise 12' L4   Additional Exercise HEP 15'

## 2019-03-14 DIAGNOSIS — F32.A ANXIETY AND DEPRESSION: ICD-10-CM

## 2019-03-14 DIAGNOSIS — F41.9 ANXIETY AND DEPRESSION: ICD-10-CM

## 2019-03-15 RX ORDER — SERTRALINE HYDROCHLORIDE 100 MG/1
TABLET, FILM COATED ORAL
Qty: 90 TABLET | Refills: 1 | Status: SHIPPED | OUTPATIENT
Start: 2019-03-15 | End: 2019-08-13 | Stop reason: SDUPTHER

## 2019-03-25 ENCOUNTER — OFFICE VISIT (OUTPATIENT)
Dept: OPTOMETRY | Facility: CLINIC | Age: 54
End: 2019-03-25
Payer: COMMERCIAL

## 2019-03-25 DIAGNOSIS — H52.7 REFRACTIVE ERROR: ICD-10-CM

## 2019-03-25 DIAGNOSIS — E11.9 DIABETES MELLITUS WITHOUT OPHTHALMIC MANIFESTATIONS: Primary | ICD-10-CM

## 2019-03-25 DIAGNOSIS — G51.4 EYELID MYOKYMIA: ICD-10-CM

## 2019-03-25 PROCEDURE — 92015 PR REFRACTION: ICD-10-PCS | Mod: S$GLB,,, | Performed by: OPTOMETRIST

## 2019-03-25 PROCEDURE — 99999 PR PBB SHADOW E&M-EST. PATIENT-LVL II: CPT | Mod: PBBFAC,,, | Performed by: OPTOMETRIST

## 2019-03-25 PROCEDURE — 92015 DETERMINE REFRACTIVE STATE: CPT | Mod: S$GLB,,, | Performed by: OPTOMETRIST

## 2019-03-25 PROCEDURE — 92014 PR EYE EXAM, EST PATIENT,COMPREHESV: ICD-10-PCS | Mod: S$GLB,,, | Performed by: OPTOMETRIST

## 2019-03-25 PROCEDURE — 99999 PR PBB SHADOW E&M-EST. PATIENT-LVL II: ICD-10-PCS | Mod: PBBFAC,,, | Performed by: OPTOMETRIST

## 2019-03-25 PROCEDURE — 92014 COMPRE OPH EXAM EST PT 1/>: CPT | Mod: S$GLB,,, | Performed by: OPTOMETRIST

## 2019-03-25 RX ORDER — GABAPENTIN 300 MG/1
300 CAPSULE ORAL EVERY 8 HOURS
COMMUNITY
End: 2019-07-26

## 2019-03-25 NOTE — PROGRESS NOTES
HPI     Presenting Complaint: Pt here today for yearly diabetic eye exam. Pt   states blood sugar levels have been stable    Lab Results       Component                Value               Date                       HGBA1C                   6.8 (H)             10/31/2018                 HGBA1C                   8.6 (H)             08/11/2018                 HGBA1C                   8.6 (H)             08/11/2018              Pt states near and distance vision has been blurry with current glasses.     Ophthalmic medication / drops: Art tears as needed for comfort    (+) Lids have been twitching over the last 3 weeks    (-) headaches  (-) diplopia   (-) flashes / (-) floaters      Last edited by Rm Mendez, OD on 3/25/2019  3:33 PM. (History)            Assessment /Plan     For exam results, see Encounter Report.    Diabetes mellitus without ophthalmic manifestations    Eyelid myokymia    Refractive error      DM type 2 w/o ocular retinopathy OU. Discussed possible ocular affects of uncontrolled blood sugar with patient. Recommended continued strong blood sugar control and continued care with PCP. Monitor yearly.     Eyelid myokymia. Discussed possible etiologies. Return if worsening or no resolution.     Dispensed updated spectacle Rx. Discussed various spectacle lens options. Discussed adaptation period to new specs.  Demonstrated new spec Rx vs current specs in phoropter with patient satisfaction.        RTC in 1 year for comprehensive eye exam, or sooner prn.

## 2019-04-23 ENCOUNTER — PATIENT OUTREACH (OUTPATIENT)
Dept: ADMINISTRATIVE | Facility: HOSPITAL | Age: 54
End: 2019-04-23

## 2019-04-23 NOTE — LETTER
"May 1, 2019    Nik Lowery  [address] [facility] [place] [address]             [facility]  [address]  [facility] [address]  Phone: [phone] Dear [facility] is committed to your overall health and would like to ensure that you are up to date on your recommended test and/or procedures.   Osmin Newsome MD  has found that your chart shows you may be due for the following:     HEMOGLOBIN  A1C   COLORECTAL CANCER SCREENING     If you have already had your screening, or you have made an appointment for your screening, congratulations!  You're on the road to good health. If you haven't signed up for a colorectal screening please accept this invitation to be screened.       According to the American Cancer Society, colon cancer is the third most common cancer for people in the [place].  A simple screening test "Fit Kit" - done in your own home - can help find colon cancer at an e lolis stage when it can be treated, even before any signs or symptoms develop. THIS IS A YEARLY TEST.     Testing for blood in your stool (feces or bowel movement) is the first step. If you have an upcoming visit with your Primary Care Physician you can  a Fit Kit during your visit or you can  a Fit Kit at your Primary Care Clinic prior to your visit.     The Fit Kit includes:     Instructions on how to perform the test   (1) Sheet of tissue paper   (1) Small Absorption pad   (1) Bottle to hold the sample and a small probe to help you take the sample   (1) Small plastic bio-hazard bag   (1) Postage-paid return envelope     Please do your test (the instructions will tell you how) and then return your sample in the postage-paid return envelope within 24 hours of collection.     If your test results are negative, you won't need testing again for another year.  If results show you need more testing, we will call you with next steps.     Regular colorectal cancer screening is one of the most " "powerful weapons for preventing colon cancer.  The website https://www.cancer.org/cancer/colon-rectal-cancer.html can answer many of your questions about this cancer and its prevention.  Just search for "colorectal cancer".     If you have any questions please call [facility] TOUCH [phone] for assistance.       If you have had any of the above done at another facility, please let us know so that we may obtain copies from that facility.  If you have a copy of these records, please provide a copy for us to scan into your chart.  You are welcome to request that the report be faxed to us at  [phone]).     Otherwise, please schedule these appointments at your earliest convenience by calling [phone] or going to [email or web address].     If you have an upcoming scheduled appointment for the item above, please disregard this letter.     Sincerely,   [facility]   MD Trish Galindo LPN Clinical Care Coordinator   [facility] [address] [place] [address]   Phone [phone]   Fax [phone]        "

## 2019-04-23 NOTE — LETTER
"May 1, 2019    Nik Lowery  [address] [facility] [place] [address]             [facility]  [address]  [facility] [address]  Phone: [phone] Dear [facility] is committed to your overall health and would like to ensure that you are up to date on your recommended test and/or procedures.   Osmin Newsome MD  has found that your chart shows you may be due for the following:     HEMOGLOBIN  A1C   COLORECTAL CANCER SCREENING     If you have already had your screening, or you have made an appointment for your screening, congratulations!  You're on the road to good health. If you haven't signed up for a colorectal screening please accept this invitation to be screened.       According to the American Cancer Society, colon cancer is the third most common cancer for people in the [place].  A simple screening test "Fit Kit" - done in your own home - can help find colon cancer at an e lolis stage when it can be treated, even before any signs or symptoms develop. THIS IS A YEARLY TEST.     Testing for blood in your stool (feces or bowel movement) is the first step. If you have an upcoming visit with your Primary Care Physician you can  a Fit Kit during your visit or you can  a Fit Kit at your Primary Care Clinic prior to your visit.     The Fit Kit includes:     Instructions on how to perform the test   (1) Sheet of tissue paper   (1) Small Absorption pad   (1) Bottle to hold the sample and a small probe to help you take the sample   (1) Small plastic bio-hazard bag   (1) Postage-paid return envelope     Please do your test (the instructions will tell you how) and then return your sample in the postage-paid return envelope within 24 hours of collection.     If your test results are negative, you won't need testing again for another year.  If results show you need more testing, we will call you with next steps.     Regular colorectal cancer screening is one of the most " "powerful weapons for preventing colon cancer.  The website https://www.cancer.org/cancer/colon-rectal-cancer.html can answer many of your questions about this cancer and its prevention.  Just search for "colorectal cancer".     If you have any questions please call [facility] TOUCH [phone] for assistance.       If you have had any of the above done at another facility, please let us know so that we may obtain copies from that facility.  If you have a copy of these records, please provide a copy for us to scan into your chart.  You are welcome to request that the report be faxed to us at  ([phone]).     Otherwise, please schedule these appointments at your earliest convenience by calling [phone] or going to [email or web address].     If you have an upcoming scheduled appointment for the item above, please disregard this letter.     Sincerely,   [facility]   MD Trish Galindo LPN Clinical Care Coordinator   [facility] [address] [place] [address]   Phone [phone]   Fax [phone]        "

## 2019-05-06 ENCOUNTER — DOCUMENTATION ONLY (OUTPATIENT)
Dept: FAMILY MEDICINE | Facility: CLINIC | Age: 54
End: 2019-05-06

## 2019-05-06 NOTE — PROGRESS NOTES
Pre-Visit Chart Review  For Appointment Scheduled on 5/7/19    Health Maintenance Due   Topic Date Due    Colonoscopy  12/30/2015    Hemoglobin A1c  04/30/2019                    none

## 2019-05-07 ENCOUNTER — OFFICE VISIT (OUTPATIENT)
Dept: FAMILY MEDICINE | Facility: CLINIC | Age: 54
End: 2019-05-07
Payer: COMMERCIAL

## 2019-05-07 VITALS
SYSTOLIC BLOOD PRESSURE: 131 MMHG | HEART RATE: 82 BPM | BODY MASS INDEX: 40.33 KG/M2 | TEMPERATURE: 98 F | DIASTOLIC BLOOD PRESSURE: 86 MMHG | WEIGHT: 242.06 LBS | HEIGHT: 65 IN

## 2019-05-07 DIAGNOSIS — E11.65 UNCONTROLLED TYPE 2 DIABETES MELLITUS WITH HYPERGLYCEMIA, WITHOUT LONG-TERM CURRENT USE OF INSULIN: Primary | ICD-10-CM

## 2019-05-07 DIAGNOSIS — I15.2 HYPERTENSION ASSOCIATED WITH DIABETES: ICD-10-CM

## 2019-05-07 DIAGNOSIS — B00.9 HERPES SIMPLEX: ICD-10-CM

## 2019-05-07 DIAGNOSIS — E11.59 HYPERTENSION ASSOCIATED WITH DIABETES: ICD-10-CM

## 2019-05-07 DIAGNOSIS — E66.01 CLASS 2 SEVERE OBESITY DUE TO EXCESS CALORIES WITH SERIOUS COMORBIDITY AND BODY MASS INDEX (BMI) OF 39.0 TO 39.9 IN ADULT: ICD-10-CM

## 2019-05-07 DIAGNOSIS — E11.69 HYPERLIPIDEMIA ASSOCIATED WITH TYPE 2 DIABETES MELLITUS: ICD-10-CM

## 2019-05-07 DIAGNOSIS — E78.5 HYPERLIPIDEMIA ASSOCIATED WITH TYPE 2 DIABETES MELLITUS: ICD-10-CM

## 2019-05-07 PROCEDURE — 99396 PR PREVENTIVE VISIT,EST,40-64: ICD-10-PCS | Mod: S$GLB,,, | Performed by: FAMILY MEDICINE

## 2019-05-07 PROCEDURE — 3075F PR MOST RECENT SYSTOLIC BLOOD PRESS GE 130-139MM HG: ICD-10-PCS | Mod: CPTII,S$GLB,, | Performed by: FAMILY MEDICINE

## 2019-05-07 PROCEDURE — 3079F PR MOST RECENT DIASTOLIC BLOOD PRESSURE 80-89 MM HG: ICD-10-PCS | Mod: CPTII,S$GLB,, | Performed by: FAMILY MEDICINE

## 2019-05-07 PROCEDURE — 99999 PR PBB SHADOW E&M-EST. PATIENT-LVL IV: CPT | Mod: PBBFAC,,, | Performed by: FAMILY MEDICINE

## 2019-05-07 PROCEDURE — 99396 PREV VISIT EST AGE 40-64: CPT | Mod: S$GLB,,, | Performed by: FAMILY MEDICINE

## 2019-05-07 PROCEDURE — 3075F SYST BP GE 130 - 139MM HG: CPT | Mod: CPTII,S$GLB,, | Performed by: FAMILY MEDICINE

## 2019-05-07 PROCEDURE — 99999 PR PBB SHADOW E&M-EST. PATIENT-LVL IV: ICD-10-PCS | Mod: PBBFAC,,, | Performed by: FAMILY MEDICINE

## 2019-05-07 PROCEDURE — 3044F PR MOST RECENT HEMOGLOBIN A1C LEVEL <7.0%: ICD-10-PCS | Mod: CPTII,S$GLB,, | Performed by: FAMILY MEDICINE

## 2019-05-07 PROCEDURE — 3044F HG A1C LEVEL LT 7.0%: CPT | Mod: CPTII,S$GLB,, | Performed by: FAMILY MEDICINE

## 2019-05-07 PROCEDURE — 3079F DIAST BP 80-89 MM HG: CPT | Mod: CPTII,S$GLB,, | Performed by: FAMILY MEDICINE

## 2019-05-07 RX ORDER — PHENTERMINE HYDROCHLORIDE 37.5 MG/1
37.5 TABLET ORAL
Qty: 30 TABLET | Refills: 2 | Status: SHIPPED | OUTPATIENT
Start: 2019-05-07 | End: 2019-08-05

## 2019-05-07 RX ORDER — VALACYCLOVIR HYDROCHLORIDE 500 MG/1
500 TABLET, FILM COATED ORAL 2 TIMES DAILY
Qty: 180 TABLET | Refills: 3 | Status: SHIPPED | OUTPATIENT
Start: 2019-05-07 | End: 2020-02-13

## 2019-05-07 NOTE — PROGRESS NOTES
Subjective:   Patient ID: Nik Lowery is a 53 y.o. female     Chief Complaint:Follow-up (6 months)      Patient here for annual checkup.  Patient endorses difficulty with losing weight.  Patient recently had injection and as well as surgery due to lower back issues.  Patient has been on gabapentin which is staying with her pain control.  Patient has gained around 7 lbs in the past year.  Otherwise overall patient is doing well today.  Patient requesting refills of her Valtrex.    Review of Systems   Constitutional: Negative for chills and fever.   HENT: Negative for sore throat.    Eyes: Negative for visual disturbance.   Respiratory: Negative for shortness of breath.    Cardiovascular: Negative for chest pain.   Gastrointestinal: Negative for abdominal pain.   Endocrine: Negative for polyuria.   Genitourinary: Negative for dysuria.   Musculoskeletal: Negative for back pain.   Skin: Negative for color change.   Neurological: Negative for headaches.   Psychiatric/Behavioral: Negative for agitation and confusion.     Past Medical History:   Diagnosis Date    Diabetes mellitus     Diabetes mellitus, type 2     Herpes     Hypertension      Objective:     Vitals:    05/07/19 1456   BP: 131/86   Pulse: 82   Temp: 98.2 °F (36.8 °C)     Body mass index is 40.28 kg/m².  Physical Exam   Constitutional: No distress.   HENT:   Head: Normocephalic and atraumatic.   Eyes: EOM are normal.   Neck: Neck supple. No tracheal deviation present.   Cardiovascular: Normal rate, regular rhythm and normal heart sounds.   Pulmonary/Chest: Effort normal. No respiratory distress.   Abdominal: Bowel sounds are normal.   Musculoskeletal: Normal range of motion. She exhibits no edema.   Neurological: She is alert.   Psychiatric: She has a normal mood and affect.     Assessment:     1. Uncontrolled type 2 diabetes mellitus with hyperglycemia, without long-term current use of insulin    2. Herpes simplex    3. Hypertension associated with  diabetes    4. Hyperlipidemia associated with type 2 diabetes mellitus      Plan:   Uncontrolled type 2 diabetes mellitus with hyperglycemia, without long-term current use of insulin  -     Hemoglobin A1c; Future; Expected date: 05/07/2019    Herpes simplex  -     valACYclovir (VALTREX) 500 MG tablet; Take 1 tablet (500 mg total) by mouth 2 (two) times daily.  Dispense: 180 tablet; Refill: 3    Hypertension associated with diabetes  -     Comprehensive metabolic panel; Future; Expected date: 05/07/2019    Hyperlipidemia associated with type 2 diabetes mellitus  -     Lipid panel; Future; Expected date: 05/07/2019    Class 2 severe obesity due to excess calories with serious comorbidity and body mass index (BMI) of 39.0 to 39.9 in adult  -     phentermine (ADIPEX-P) 37.5 mg tablet; Take 1 tablet (37.5 mg total) by mouth before breakfast.  Dispense: 30 tablet; Refill: 2  One over length the risk versus benefit of this medication with patient.  Patient understands the risks and accepts them.  Patient understands increased risk for cardiovascular disease in the medication should not be taken for more than 3 months.  Medications only to assist with starting a diet and exercise regimen which discontinue afterwards.    Time spent with patient: 30 minutes and over half of that time was spent on counseling an coordination of care.    Osmin Newsome MD  05/07/2019    Portions of this note have been dictated with EL Hendricks

## 2019-07-08 RX ORDER — LAMOTRIGINE 100 MG/1
TABLET ORAL
Qty: 90 TABLET | Refills: 1 | Status: SHIPPED | OUTPATIENT
Start: 2019-07-08 | End: 2020-01-06

## 2019-07-22 RX ORDER — GLYBURIDE 2.5 MG/1
TABLET ORAL
Qty: 180 TABLET | Refills: 1 | Status: SHIPPED | OUTPATIENT
Start: 2019-07-22 | End: 2020-01-20

## 2019-07-24 ENCOUNTER — PATIENT OUTREACH (OUTPATIENT)
Dept: ADMINISTRATIVE | Facility: HOSPITAL | Age: 54
End: 2019-07-24

## 2019-07-24 NOTE — LETTER
July 24, 2019    Nik GALLEGOS Sebastián  [address] [facility] [place] [address]             [facility]  [address]  [facility] [address]  Phone: [phone] Nik Lowery  [address] [facility] [place] [address]        Dear, [facility] is committed to your overall health.  To help you get the most out of each of your visits, we will review your information to make sure you are up to date on all of your recommended tests and/or procedures.      Dr. Osmin Newsome MD  has found that your chart shows you may be due for     COLONOSCOPY   MAMMOGRAM   DIABETIC FOOT EXAM   HEMOGLOBIN A1C LAB    If you have had any of the above done at another facility, please bring the records or information with you so that your record at [facility] will be complete.  If you would like to schedule any of these, please contact the clinic at [phone].    If you are currently taking medication, please bring it with you to your appointment for review.    Also, if you have any type of Advanced Directives, please bring them with you to your office visit so we may scan them into your chart.    Thank You,    [facility],  MD Julia Burnett,  Panel Care Coordinator  [place] [facility] [address] [place] [address]  Phone [phone]  Fax [phone]

## 2019-07-26 ENCOUNTER — OFFICE VISIT (OUTPATIENT)
Dept: PRIMARY CARE CLINIC | Facility: CLINIC | Age: 54
End: 2019-07-26
Attending: NURSE PRACTITIONER
Payer: COMMERCIAL

## 2019-07-26 VITALS
RESPIRATION RATE: 18 BRPM | SYSTOLIC BLOOD PRESSURE: 117 MMHG | BODY MASS INDEX: 38.75 KG/M2 | DIASTOLIC BLOOD PRESSURE: 67 MMHG | WEIGHT: 232.56 LBS | OXYGEN SATURATION: 96 % | HEIGHT: 65 IN | HEART RATE: 108 BPM | TEMPERATURE: 99 F

## 2019-07-26 DIAGNOSIS — R53.83 MALAISE AND FATIGUE: ICD-10-CM

## 2019-07-26 DIAGNOSIS — R53.81 MALAISE AND FATIGUE: ICD-10-CM

## 2019-07-26 DIAGNOSIS — R31.9 URINARY TRACT INFECTION WITH HEMATURIA, SITE UNSPECIFIED: Primary | ICD-10-CM

## 2019-07-26 DIAGNOSIS — E11.65 UNCONTROLLED TYPE 2 DIABETES MELLITUS WITH HYPERGLYCEMIA, WITHOUT LONG-TERM CURRENT USE OF INSULIN: ICD-10-CM

## 2019-07-26 DIAGNOSIS — N39.0 URINARY TRACT INFECTION WITH HEMATURIA, SITE UNSPECIFIED: Primary | ICD-10-CM

## 2019-07-26 DIAGNOSIS — M54.5 LOW BACK PAIN, UNSPECIFIED BACK PAIN LATERALITY, UNSPECIFIED CHRONICITY, WITH SCIATICA PRESENCE UNSPECIFIED: ICD-10-CM

## 2019-07-26 PROBLEM — M54.41 ACUTE RIGHT-SIDED LOW BACK PAIN WITH RIGHT-SIDED SCIATICA: Status: RESOLVED | Noted: 2018-08-20 | Resolved: 2019-07-26

## 2019-07-26 LAB
BILIRUB SERPL-MCNC: ABNORMAL MG/DL
BLOOD, POC UA: ABNORMAL
GLUCOSE SERPL-MCNC: 233 MG/DL (ref 70–110)
GLUCOSE UR QL STRIP: NORMAL
KETONES UR QL STRIP: 0
LEUKOCYTE ESTERASE URINE, POC: ABNORMAL
NITRITE, POC UA: 0
PH, POC UA: 5
PROTEIN, POC: 30
SPECIFIC GRAVITY, POC UA: 1.02
UROBILINOGEN, POC UA: NORMAL

## 2019-07-26 PROCEDURE — 3078F PR MOST RECENT DIASTOLIC BLOOD PRESSURE < 80 MM HG: ICD-10-PCS | Mod: CPTII,S$GLB,, | Performed by: NURSE PRACTITIONER

## 2019-07-26 PROCEDURE — 81000 URINALYSIS NONAUTO W/SCOPE: CPT | Mod: S$GLB,,, | Performed by: NURSE PRACTITIONER

## 2019-07-26 PROCEDURE — 99214 PR OFFICE/OUTPT VISIT, EST, LEVL IV, 30-39 MIN: ICD-10-PCS | Mod: 25,S$GLB,, | Performed by: NURSE PRACTITIONER

## 2019-07-26 PROCEDURE — 3044F PR MOST RECENT HEMOGLOBIN A1C LEVEL <7.0%: ICD-10-PCS | Mod: CPTII,S$GLB,, | Performed by: NURSE PRACTITIONER

## 2019-07-26 PROCEDURE — 81000 POCT URINALYSIS: ICD-10-PCS | Mod: S$GLB,,, | Performed by: NURSE PRACTITIONER

## 2019-07-26 PROCEDURE — 99999 PR PBB SHADOW E&M-EST. PATIENT-LVL III: ICD-10-PCS | Mod: PBBFAC,,, | Performed by: NURSE PRACTITIONER

## 2019-07-26 PROCEDURE — 99214 OFFICE O/P EST MOD 30 MIN: CPT | Mod: 25,S$GLB,, | Performed by: NURSE PRACTITIONER

## 2019-07-26 PROCEDURE — 3044F HG A1C LEVEL LT 7.0%: CPT | Mod: CPTII,S$GLB,, | Performed by: NURSE PRACTITIONER

## 2019-07-26 PROCEDURE — 3074F PR MOST RECENT SYSTOLIC BLOOD PRESSURE < 130 MM HG: ICD-10-PCS | Mod: CPTII,S$GLB,, | Performed by: NURSE PRACTITIONER

## 2019-07-26 PROCEDURE — 3074F SYST BP LT 130 MM HG: CPT | Mod: CPTII,S$GLB,, | Performed by: NURSE PRACTITIONER

## 2019-07-26 PROCEDURE — 3078F DIAST BP <80 MM HG: CPT | Mod: CPTII,S$GLB,, | Performed by: NURSE PRACTITIONER

## 2019-07-26 PROCEDURE — 87086 URINE CULTURE/COLONY COUNT: CPT

## 2019-07-26 PROCEDURE — 82962 POCT GLUCOSE, HAND-HELD DEVICE: ICD-10-PCS | Mod: 59,S$GLB,, | Performed by: NURSE PRACTITIONER

## 2019-07-26 PROCEDURE — 82962 GLUCOSE BLOOD TEST: CPT | Mod: 59,S$GLB,, | Performed by: NURSE PRACTITIONER

## 2019-07-26 PROCEDURE — 3008F PR BODY MASS INDEX (BMI) DOCUMENTED: ICD-10-PCS | Mod: CPTII,S$GLB,, | Performed by: NURSE PRACTITIONER

## 2019-07-26 PROCEDURE — 99999 PR PBB SHADOW E&M-EST. PATIENT-LVL III: CPT | Mod: PBBFAC,,, | Performed by: NURSE PRACTITIONER

## 2019-07-26 PROCEDURE — 3008F BODY MASS INDEX DOCD: CPT | Mod: CPTII,S$GLB,, | Performed by: NURSE PRACTITIONER

## 2019-07-26 RX ORDER — SULFAMETHOXAZOLE AND TRIMETHOPRIM 800; 160 MG/1; MG/1
1 TABLET ORAL 2 TIMES DAILY
Qty: 14 TABLET | Refills: 0 | Status: SHIPPED | OUTPATIENT
Start: 2019-07-26 | End: 2019-08-02

## 2019-07-26 RX ORDER — GABAPENTIN 400 MG/1
CAPSULE ORAL
COMMUNITY
Start: 2019-07-24 | End: 2019-07-26

## 2019-07-26 NOTE — PROGRESS NOTES
"Subjective:       Patient ID: Nik Lowery is a 53 y.o. female.    Chief Complaint: Chills (chills,nausea, feels weak since Wednesday)    Patient is a 53 year old female who presents to clinic today with complaints of generalized weakness, nausea, chills and low back pain.  She reports she has been experiencing symptoms since Wednesday (2 says).  She denies any fever, dysuria, abdominal pain, vomiting, HA, cough, nasal congestion, speech difficulty,vison changes, changes in bowel or bladder habits, or limb weakness.  "just does not feel good". Has not tried any home remedy or OTC remedy for relief of symptoms.  No history of renal stone. Recently underwent change in medication and stopped taking her Neurontin and started extended release Horizant (gabapentin).  Is followed by pain management for chronic low back pain.  Is scheduled for f/u with PCP August 7th. Is taking phentermine for weight loss.  CBG in clinic today is 233.  Pt. Reports she is not adhering to any particular diet regimen but, is not eating due to decrease appetite.  Has history of uncontrolled DMII.      Review of Systems   Constitutional: Positive for appetite change, chills and fatigue. Negative for diaphoresis and fever.   HENT: Negative.    Eyes: Negative.    Respiratory: Negative.  Negative for cough, shortness of breath and wheezing.    Cardiovascular: Negative.  Negative for chest pain and palpitations.   Gastrointestinal: Positive for nausea. Negative for constipation, diarrhea and vomiting.   Genitourinary: Negative for difficulty urinating, dysuria, flank pain, frequency, pelvic pain, urgency and vaginal discharge.   Musculoskeletal: Positive for back pain.   Skin: Negative for rash.   Neurological: Positive for weakness. Negative for dizziness, syncope, facial asymmetry, speech difficulty, light-headedness, numbness and headaches.   Psychiatric/Behavioral: Positive for dysphoric mood. Negative for decreased concentration, self-injury " and suicidal ideas. The patient is nervous/anxious.    All other systems reviewed and are negative.      Objective:      Physical Exam   Constitutional: She is oriented to person, place, and time. She appears well-developed and well-nourished. No distress.   HENT:   Head: Normocephalic and atraumatic.   Eyes: Conjunctivae are normal. Right eye exhibits no discharge. Left eye exhibits no discharge. No scleral icterus.   Cardiovascular: Normal rate, regular rhythm, normal heart sounds and intact distal pulses.   Pulmonary/Chest: Effort normal and breath sounds normal. No respiratory distress.   Abdominal: Soft. Bowel sounds are normal. She exhibits no distension. There is no tenderness.   No CVA tenderness   Musculoskeletal: She exhibits no edema.   Neurological: She is alert and oriented to person, place, and time.   Skin: Skin is warm and dry. She is not diaphoretic.   Psychiatric: She has a normal mood and affect. Her behavior is normal.   Nursing note and vitals reviewed.      Medication List with Changes/Refills   New Medications    SULFAMETHOXAZOLE-TRIMETHOPRIM 800-160MG (BACTRIM DS) 800-160 MG TAB    Take 1 tablet by mouth 2 (two) times daily. for 7 days   Current Medications    ACETAMINOPHEN (TYLENOL EXTRA STRENGTH ORAL)    Take 650 mg by mouth 2 (two) times daily.    ALPRAZOLAM (XANAX) 0.25 MG TABLET    Take 1 tablet (0.25 mg total) by mouth daily as needed for Anxiety.    ATORVASTATIN (LIPITOR) 20 MG TABLET    Take 1 tablet (20 mg total) by mouth once daily.    BLOOD-GLUCOSE METER KIT    To check BG 3 times daily, to use with insurance preferred meter    GABAPENTIN ENACARBIL (HORIZANT) 600 MG TBSR    Horizant  mg tablet,extended release   Take 2 tablets every day by oral route for 30 days.    GLYBURIDE (DIABETA) 2.5 MG TABLET    TAKE 1 TABLET TWICE A DAY (NEED LABS AND APPOINTMENT, NO FURTHER REFILLS)     MG TABLET    TAKE 1 TABLET BY MOUTH 3 TIMES A DAY    LAMOTRIGINE (LAMICTAL) 100 MG TABLET     TAKE 1 TABLET DAILY    LANCETS MISC    To check BG 3 times daily, to use with insurance preferred meter    LISINOPRIL-HYDROCHLOROTHIAZIDE (PRINZIDE,ZESTORETIC) 20-25 MG TAB    Take 1 tablet by mouth once daily.    METFORMIN (GLUCOPHAGE-XR) 500 MG 24 HR TABLET    Take 2 tablets (1,000 mg total) by mouth 2 (two) times daily with meals.    MULTIVITAMIN (ONE DAILY MULTIVITAMIN) PER TABLET    Take 1 tablet by mouth once daily.    ONETOUCH ULTRA BLUE TEST STRIP STRP    USE TO CHECK BLOOD SUGAR 3 TIMES A DAY    PANTOPRAZOLE (PROTONIX) 20 MG TABLET    Take 1 tablet (20 mg total) by mouth once daily.    PHENTERMINE (ADIPEX-P) 37.5 MG TABLET    Take 1 tablet (37.5 mg total) by mouth before breakfast.    SERTRALINE (ZOLOFT) 100 MG TABLET    TAKE 1 TABLET DAILY    VALACYCLOVIR (VALTREX) 500 MG TABLET    Take 1 tablet (500 mg total) by mouth 2 (two) times daily.   Discontinued Medications    GABAPENTIN (NEURONTIN) 300 MG CAPSULE    Take 300 mg by mouth every 8 (eight) hours.    GABAPENTIN (NEURONTIN) 400 MG CAPSULE           Assessment:       1. Urinary tract infection with hematuria, site unspecified    2. Uncontrolled type 2 diabetes mellitus with hyperglycemia, without long-term current use of insulin    3. Low back pain, unspecified back pain laterality, unspecified chronicity, with sciatica presence unspecified    4. Malaise and fatigue        Plan:      UA is positive for leukocytes, blood nitrates.  Will send for culture.     Uncontrolled type 2 diabetes mellitus with hyperglycemia, without long-term current use of insulin  -     POCT Urinalysis  -     POCT Glucose, Hand-Held Device  Instructed patient blood glucose may increase during acute illness.  Strict adherence to prescribed diet, activity and medication regimen are necessary.  Monitor blood glucose closely.  F/u with PCP as scheduled.      Low back pain, unspecified back pain laterality, unspecified chronicity, with sciatica presence unspecified  -     POCT  Urinalysis  -     POCT Glucose, Hand-Held Device  Pain management following.  Continue current plan of care.   Malaise and fatigue  -     POCT Urinalysis  -     POCT Glucose, Hand-Held Device    Urinary tract infection with hematuria, site unspecified  -     sulfamethoxazole-trimethoprim 800-160mg (BACTRIM DS) 800-160 mg Tab; Take 1 tablet by mouth 2 (two) times daily. for 7 days  Dispense: 14 tablet; Refill: 0  -     Urine culture  Rx as prescribed.  Increase oral fluids to minimum 8 glasses of water based fluid a day.  Rest today.  Will call if need to change RX based on culture results.         Follow up for f/u with PCP  as scheduled .If symptoms worsen patient may call for ASAP appointment or report to the emergency department for further evaluation.       I have reviewed the patient's past medical/surgical and social histories and updated as appropriate. Medications were reviewed and discussed as appropriate including side effects and risks versus benefit. Plan of care was reviewed and agreed upon with the patient.  An opportunity to ask questions was provided and explanation given. Patient verbalized understanding on all information reviewed and discussed.

## 2019-07-28 LAB — BACTERIA UR CULT: NORMAL

## 2019-08-01 NOTE — PROGRESS NOTES
Addended by: MARY BETH RODRIGUEZ on: 8/1/2019 01:46 PM     Modules accepted: Orders     TIME RECORD    Date: 2/11/19    Start Time:  1500  Stop Time:  1545    PROCEDURES:    TIMED  Procedure Time Min.   TE Start:  Stop:     Start:  Stop:     Start:  Stop:     Start:  Stop:          UNTIMED  Procedure Time Min.   RE-EVAL. Start:  Stop:     Start:  Stop:      Total Timed Minutes:  30  Total Timed Units:  2  Total Untimed Units:  1  Charges Billed/# of units:  REHAB SERVICES OUTPATIENT DISCHARGE SUMMARY  Physical Therapy      Name:  Nik Lowery  Date:  2/11/19  Date of Evaluation:  12/27/18.  Physician:    Total # Of Visits:  8  Cancelled:   No Shows:    Diagnosis:    1. Chronic low back pain, unspecified back pain laterality, with sciatica presence unspecified         Physical/Functional Status:  At time of discharge, patient was able to Pain 0-4/10  ROM WNL/WFL.  Strengt 5/5.  The patient is to be discharged from our Therapy service for the following reason(s):  Patient has met all of his/her goals    Degree of Goal Achievement:  Patient has met all goals    Patient Education:  HEP issued.    Discharge Plan:  Home Program:  QD.

## 2019-08-04 DIAGNOSIS — E11.59 HYPERTENSION ASSOCIATED WITH DIABETES: ICD-10-CM

## 2019-08-04 DIAGNOSIS — I15.2 HYPERTENSION ASSOCIATED WITH DIABETES: ICD-10-CM

## 2019-08-05 RX ORDER — LISINOPRIL AND HYDROCHLOROTHIAZIDE 20; 25 MG/1; MG/1
TABLET ORAL
Qty: 90 TABLET | Refills: 3 | Status: SHIPPED | OUTPATIENT
Start: 2019-08-05 | End: 2020-07-30

## 2019-08-08 ENCOUNTER — DOCUMENTATION ONLY (OUTPATIENT)
Dept: FAMILY MEDICINE | Facility: CLINIC | Age: 54
End: 2019-08-08

## 2019-08-08 NOTE — PROGRESS NOTES
Pre-Visit Chart Review  For Appointment Scheduled on 8/13/19    Health Maintenance Due   Topic Date Due    Colonoscopy  12/30/2015    Hemoglobin A1c  04/30/2019    Foot Exam  08/08/2019    Mammogram  08/12/2019

## 2019-08-10 ENCOUNTER — LAB VISIT (OUTPATIENT)
Dept: LAB | Facility: HOSPITAL | Age: 54
End: 2019-08-10
Attending: FAMILY MEDICINE
Payer: COMMERCIAL

## 2019-08-10 DIAGNOSIS — E78.5 HYPERLIPIDEMIA ASSOCIATED WITH TYPE 2 DIABETES MELLITUS: ICD-10-CM

## 2019-08-10 DIAGNOSIS — E11.69 HYPERLIPIDEMIA ASSOCIATED WITH TYPE 2 DIABETES MELLITUS: ICD-10-CM

## 2019-08-10 DIAGNOSIS — E11.65 UNCONTROLLED TYPE 2 DIABETES MELLITUS WITH HYPERGLYCEMIA, WITHOUT LONG-TERM CURRENT USE OF INSULIN: ICD-10-CM

## 2019-08-10 DIAGNOSIS — E11.9 TYPE 2 DIABETES MELLITUS WITHOUT COMPLICATION: ICD-10-CM

## 2019-08-10 DIAGNOSIS — I15.2 HYPERTENSION ASSOCIATED WITH DIABETES: ICD-10-CM

## 2019-08-10 DIAGNOSIS — E11.59 HYPERTENSION ASSOCIATED WITH DIABETES: ICD-10-CM

## 2019-08-10 LAB
ALBUMIN SERPL BCP-MCNC: 3.9 G/DL (ref 3.5–5.2)
ALP SERPL-CCNC: 102 U/L (ref 55–135)
ALT SERPL W/O P-5'-P-CCNC: 52 U/L (ref 10–44)
ANION GAP SERPL CALC-SCNC: 12 MMOL/L (ref 8–16)
AST SERPL-CCNC: 44 U/L (ref 10–40)
BILIRUB SERPL-MCNC: 0.4 MG/DL (ref 0.1–1)
BUN SERPL-MCNC: 23 MG/DL (ref 6–20)
CALCIUM SERPL-MCNC: 9.7 MG/DL (ref 8.7–10.5)
CHLORIDE SERPL-SCNC: 100 MMOL/L (ref 95–110)
CHOLEST SERPL-MCNC: 168 MG/DL (ref 120–199)
CHOLEST/HDLC SERPL: 3.5 {RATIO} (ref 2–5)
CO2 SERPL-SCNC: 20 MMOL/L (ref 23–29)
CREAT SERPL-MCNC: 0.9 MG/DL (ref 0.5–1.4)
EST. GFR  (AFRICAN AMERICAN): >60 ML/MIN/1.73 M^2
EST. GFR  (NON AFRICAN AMERICAN): >60 ML/MIN/1.73 M^2
ESTIMATED AVG GLUCOSE: 151 MG/DL (ref 68–131)
ESTIMATED AVG GLUCOSE: 151 MG/DL (ref 68–131)
GLUCOSE SERPL-MCNC: 147 MG/DL (ref 70–110)
HBA1C MFR BLD HPLC: 6.9 % (ref 4–5.6)
HBA1C MFR BLD HPLC: 6.9 % (ref 4–5.6)
HDLC SERPL-MCNC: 48 MG/DL (ref 40–75)
HDLC SERPL: 28.6 % (ref 20–50)
LDLC SERPL CALC-MCNC: 73.6 MG/DL (ref 63–159)
NONHDLC SERPL-MCNC: 120 MG/DL
POTASSIUM SERPL-SCNC: 4.3 MMOL/L (ref 3.5–5.1)
PROT SERPL-MCNC: 7.8 G/DL (ref 6–8.4)
SODIUM SERPL-SCNC: 132 MMOL/L (ref 136–145)
TRIGL SERPL-MCNC: 232 MG/DL (ref 30–150)

## 2019-08-10 PROCEDURE — 80053 COMPREHEN METABOLIC PANEL: CPT

## 2019-08-10 PROCEDURE — 36415 COLL VENOUS BLD VENIPUNCTURE: CPT | Mod: PO

## 2019-08-10 PROCEDURE — 80061 LIPID PANEL: CPT

## 2019-08-10 PROCEDURE — 83036 HEMOGLOBIN GLYCOSYLATED A1C: CPT

## 2019-08-13 ENCOUNTER — OFFICE VISIT (OUTPATIENT)
Dept: FAMILY MEDICINE | Facility: CLINIC | Age: 54
End: 2019-08-13
Payer: COMMERCIAL

## 2019-08-13 ENCOUNTER — DOCUMENTATION ONLY (OUTPATIENT)
Dept: FAMILY MEDICINE | Facility: CLINIC | Age: 54
End: 2019-08-13

## 2019-08-13 VITALS
HEART RATE: 92 BPM | HEIGHT: 65 IN | TEMPERATURE: 98 F | SYSTOLIC BLOOD PRESSURE: 112 MMHG | OXYGEN SATURATION: 95 % | WEIGHT: 230.81 LBS | BODY MASS INDEX: 38.45 KG/M2 | DIASTOLIC BLOOD PRESSURE: 64 MMHG

## 2019-08-13 DIAGNOSIS — E11.65 UNCONTROLLED TYPE 2 DIABETES MELLITUS WITH HYPERGLYCEMIA, WITHOUT LONG-TERM CURRENT USE OF INSULIN: ICD-10-CM

## 2019-08-13 DIAGNOSIS — F41.9 ANXIETY AND DEPRESSION: ICD-10-CM

## 2019-08-13 DIAGNOSIS — F32.A ANXIETY AND DEPRESSION: ICD-10-CM

## 2019-08-13 DIAGNOSIS — Z12.11 SCREENING FOR COLON CANCER: Primary | ICD-10-CM

## 2019-08-13 DIAGNOSIS — K21.9 GASTROESOPHAGEAL REFLUX DISEASE WITHOUT ESOPHAGITIS: ICD-10-CM

## 2019-08-13 PROCEDURE — 3078F PR MOST RECENT DIASTOLIC BLOOD PRESSURE < 80 MM HG: ICD-10-PCS | Mod: CPTII,S$GLB,, | Performed by: FAMILY MEDICINE

## 2019-08-13 PROCEDURE — 3044F HG A1C LEVEL LT 7.0%: CPT | Mod: CPTII,S$GLB,, | Performed by: FAMILY MEDICINE

## 2019-08-13 PROCEDURE — 3078F DIAST BP <80 MM HG: CPT | Mod: CPTII,S$GLB,, | Performed by: FAMILY MEDICINE

## 2019-08-13 PROCEDURE — 99999 PR PBB SHADOW E&M-EST. PATIENT-LVL IV: ICD-10-PCS | Mod: PBBFAC,,, | Performed by: FAMILY MEDICINE

## 2019-08-13 PROCEDURE — 99396 PREV VISIT EST AGE 40-64: CPT | Mod: S$GLB,,, | Performed by: FAMILY MEDICINE

## 2019-08-13 PROCEDURE — 3074F PR MOST RECENT SYSTOLIC BLOOD PRESSURE < 130 MM HG: ICD-10-PCS | Mod: CPTII,S$GLB,, | Performed by: FAMILY MEDICINE

## 2019-08-13 PROCEDURE — 99396 PR PREVENTIVE VISIT,EST,40-64: ICD-10-PCS | Mod: S$GLB,,, | Performed by: FAMILY MEDICINE

## 2019-08-13 PROCEDURE — 3074F SYST BP LT 130 MM HG: CPT | Mod: CPTII,S$GLB,, | Performed by: FAMILY MEDICINE

## 2019-08-13 PROCEDURE — 3044F PR MOST RECENT HEMOGLOBIN A1C LEVEL <7.0%: ICD-10-PCS | Mod: CPTII,S$GLB,, | Performed by: FAMILY MEDICINE

## 2019-08-13 PROCEDURE — 99999 PR PBB SHADOW E&M-EST. PATIENT-LVL IV: CPT | Mod: PBBFAC,,, | Performed by: FAMILY MEDICINE

## 2019-08-13 RX ORDER — PANTOPRAZOLE SODIUM 20 MG/1
20 TABLET, DELAYED RELEASE ORAL DAILY
Qty: 90 TABLET | Refills: 3 | Status: SHIPPED | OUTPATIENT
Start: 2019-08-13 | End: 2020-08-27 | Stop reason: SDUPTHER

## 2019-08-13 RX ORDER — SERTRALINE HYDROCHLORIDE 100 MG/1
100 TABLET, FILM COATED ORAL DAILY
Qty: 90 TABLET | Refills: 3 | Status: SHIPPED | OUTPATIENT
Start: 2019-08-13 | End: 2020-08-13

## 2019-08-13 RX ORDER — METFORMIN HYDROCHLORIDE 500 MG/1
1000 TABLET, EXTENDED RELEASE ORAL 2 TIMES DAILY WITH MEALS
Qty: 360 TABLET | Refills: 3 | Status: SHIPPED | OUTPATIENT
Start: 2019-08-13 | End: 2020-08-27 | Stop reason: SDUPTHER

## 2019-08-13 RX ORDER — ATORVASTATIN CALCIUM 20 MG/1
20 TABLET, FILM COATED ORAL DAILY
Qty: 90 TABLET | Refills: 3 | Status: SHIPPED | OUTPATIENT
Start: 2019-08-13 | End: 2020-08-27 | Stop reason: SDUPTHER

## 2019-08-13 NOTE — PROGRESS NOTES
Subjective:   Patient ID: Nik Lowery is a 53 y.o. female     Chief Complaint:Follow-up (3 months )      Patient for checkup.    Review of Systems   Constitutional: Negative for chills and fever.   HENT: Negative for sore throat.    Eyes: Negative for visual disturbance.   Respiratory: Negative for shortness of breath.    Cardiovascular: Negative for chest pain.   Gastrointestinal: Negative for abdominal pain.   Endocrine: Negative for polyuria.   Genitourinary: Negative for dysuria.   Musculoskeletal: Negative for back pain.   Skin: Negative for color change.   Neurological: Negative for headaches.   Psychiatric/Behavioral: Negative for agitation and confusion.     Past Medical History:   Diagnosis Date    Diabetes mellitus     Diabetes mellitus, type 2     Herpes     Hypertension      Objective:     Vitals:    08/13/19 1443   BP: 112/64   Pulse: 92   Temp: 97.8 °F (36.6 °C)     Body mass index is 38.41 kg/m².  Physical Exam   Constitutional: No distress.   HENT:   Head: Normocephalic and atraumatic.   Eyes: EOM are normal.   Cardiovascular: Normal rate and normal heart sounds.   Pulses:       Dorsalis pedis pulses are 2+ on the right side, and 2+ on the left side.        Posterior tibial pulses are 2+ on the right side, and 2+ on the left side.   Pulmonary/Chest: Effort normal.   Abdominal: Bowel sounds are normal.   Musculoskeletal: Normal range of motion.        Right foot: There is normal range of motion and no deformity.        Left foot: There is normal range of motion and no deformity.   Feet:   Right Foot:   Protective Sensation: 4 sites tested. 4 sites sensed.   Skin Integrity: Negative for ulcer or blister.   Left Foot:   Protective Sensation: 4 sites tested. 4 sites sensed.   Skin Integrity: Negative for ulcer or blister.   Neurological: She is alert.   Psychiatric: She has a normal mood and affect.     Assessment:     1. Screening for colon cancer    2. Uncontrolled type 2 diabetes mellitus with  hyperglycemia, without long-term current use of insulin    3. Gastroesophageal reflux disease without esophagitis    4. Anxiety and depression      Plan:   Screening for colon cancer  -     Fecal Immunochemical Test (iFOBT); Future; Expected date: 08/13/2019    Uncontrolled type 2 diabetes mellitus with hyperglycemia, without long-term current use of insulin  -     atorvastatin (LIPITOR) 20 MG tablet; Take 1 tablet (20 mg total) by mouth once daily.  Dispense: 90 tablet; Refill: 3  -     metFORMIN (GLUCOPHAGE-XR) 500 MG 24 hr tablet; Take 2 tablets (1,000 mg total) by mouth 2 (two) times daily with meals.  Dispense: 360 tablet; Refill: 3  -     Hemoglobin A1c; Future; Expected date: 02/13/2020  -     Comprehensive metabolic panel; Future; Expected date: 02/13/2020    Gastroesophageal reflux disease without esophagitis  -     pantoprazole (PROTONIX) 20 MG tablet; Take 1 tablet (20 mg total) by mouth once daily.  Dispense: 90 tablet; Refill: 3    Anxiety and depression  -     sertraline (ZOLOFT) 100 MG tablet; Take 1 tablet (100 mg total) by mouth once daily.  Dispense: 90 tablet; Refill: 3          Osmin Newsome MD  08/13/2019    Portions of this note have been dictated with EL Guerra.

## 2019-08-19 DIAGNOSIS — Z12.39 BREAST CANCER SCREENING: ICD-10-CM

## 2020-01-06 RX ORDER — LAMOTRIGINE 100 MG/1
TABLET ORAL
Qty: 90 TABLET | Refills: 4 | Status: SHIPPED | OUTPATIENT
Start: 2020-01-06 | End: 2021-07-06 | Stop reason: SDUPTHER

## 2020-01-20 RX ORDER — GLYBURIDE 2.5 MG/1
TABLET ORAL
Qty: 180 TABLET | Refills: 4 | Status: SHIPPED | OUTPATIENT
Start: 2020-01-20 | End: 2021-07-30 | Stop reason: SDUPTHER

## 2020-01-30 ENCOUNTER — PATIENT OUTREACH (OUTPATIENT)
Dept: ADMINISTRATIVE | Facility: HOSPITAL | Age: 55
End: 2020-01-30

## 2020-01-30 NOTE — LETTER
January 30, 2020    Nik Lowery  [address] [facility] [address]             [facility]  [address]  [facility] [address]  Phone: [phone] [facility] is committed to your overall health.  To help you get the most out of each of your visits, we will review your information to make sure you are up to date on all of your recommended tests and/or procedures.      Dr. Osmin Newsome MD has found that your chart shows you may be due for a:    COLORECTAL CANCER SCREENING  MAMMOGRAM (BREAST CANCER SCREENING)     Our records indicate that you are overdue for your colorectal cancer screening.  After reviewing your chart, it has been documented that a FIT KIT (colorectal cancer screening kit) was given at a clinic visit or  mailed to you,  but the lab has yet to receive the kit so that we may update your chart.   This is a friendly reminder to complete this test (the instructions will tell you how) and then return your sample in the postage-paid return envelope within 24 hours of collection.  Please remember to put the collection date on your sample!    If your test results are negative, you won't need testing again for another year.  If results show you need more testing, we will call you with next steps.    Also, if you have misplaced the FITKIT let us know and we will mail you another one.       If you have had any of the above done at another facility, please bring the records or information with you so that your record at [facility] will be complete.  If you would like to schedule any of these, please contact the clinic at [phone].    If you are currently taking medication, please bring it with you to your appointment for review.    Also, if you have any type of Advanced Directives, please bring them with you to your office visit so we may scan them into your chart.    Thank You,    Your [facility],  MD Seble Burnett LPN Clinical Care Coordinator  [place]  [facility]  [address] [facility] [place] [address]  Phone [phone]  Fax [phone]

## 2020-01-30 NOTE — PROGRESS NOTES
Health Maintenance Due   Topic Date Due    HIV Screening  12/30/1980    Shingles Vaccine (1 of 2) 12/30/2015    Colonoscopy  12/30/2015    Mammogram  08/12/2019    Influenza Vaccine (1) 09/01/2019

## 2020-02-08 ENCOUNTER — LAB VISIT (OUTPATIENT)
Dept: LAB | Facility: HOSPITAL | Age: 55
End: 2020-02-08
Attending: FAMILY MEDICINE
Payer: COMMERCIAL

## 2020-02-08 DIAGNOSIS — E11.65 UNCONTROLLED TYPE 2 DIABETES MELLITUS WITH HYPERGLYCEMIA, WITHOUT LONG-TERM CURRENT USE OF INSULIN: ICD-10-CM

## 2020-02-08 LAB
ALBUMIN SERPL BCP-MCNC: 4 G/DL (ref 3.5–5.2)
ALP SERPL-CCNC: 74 U/L (ref 55–135)
ALT SERPL W/O P-5'-P-CCNC: 43 U/L (ref 10–44)
ANION GAP SERPL CALC-SCNC: 9 MMOL/L (ref 8–16)
AST SERPL-CCNC: 45 U/L (ref 10–40)
BILIRUB SERPL-MCNC: 0.3 MG/DL (ref 0.1–1)
BUN SERPL-MCNC: 15 MG/DL (ref 6–20)
CALCIUM SERPL-MCNC: 9.7 MG/DL (ref 8.7–10.5)
CHLORIDE SERPL-SCNC: 104 MMOL/L (ref 95–110)
CO2 SERPL-SCNC: 28 MMOL/L (ref 23–29)
CREAT SERPL-MCNC: 0.8 MG/DL (ref 0.5–1.4)
EST. GFR  (AFRICAN AMERICAN): >60 ML/MIN/1.73 M^2
EST. GFR  (NON AFRICAN AMERICAN): >60 ML/MIN/1.73 M^2
ESTIMATED AVG GLUCOSE: 174 MG/DL (ref 68–131)
GLUCOSE SERPL-MCNC: 133 MG/DL (ref 70–110)
HBA1C MFR BLD HPLC: 7.7 % (ref 4–5.6)
POTASSIUM SERPL-SCNC: 4.4 MMOL/L (ref 3.5–5.1)
PROT SERPL-MCNC: 7.6 G/DL (ref 6–8.4)
SODIUM SERPL-SCNC: 141 MMOL/L (ref 136–145)

## 2020-02-08 PROCEDURE — 36415 COLL VENOUS BLD VENIPUNCTURE: CPT | Mod: PO

## 2020-02-08 PROCEDURE — 80053 COMPREHEN METABOLIC PANEL: CPT

## 2020-02-08 PROCEDURE — 83036 HEMOGLOBIN GLYCOSYLATED A1C: CPT

## 2020-02-12 ENCOUNTER — DOCUMENTATION ONLY (OUTPATIENT)
Dept: FAMILY MEDICINE | Facility: CLINIC | Age: 55
End: 2020-02-12

## 2020-02-12 NOTE — PROGRESS NOTES
Pre-Visit Chart Review  For Appointment Scheduled on 2/13/2020    Health Maintenance Due   Topic Date Due    Colonoscopy  12/30/2015    Mammogram  08/12/2019

## 2020-02-13 ENCOUNTER — OFFICE VISIT (OUTPATIENT)
Dept: FAMILY MEDICINE | Facility: CLINIC | Age: 55
End: 2020-02-13
Payer: COMMERCIAL

## 2020-02-13 VITALS
DIASTOLIC BLOOD PRESSURE: 84 MMHG | OXYGEN SATURATION: 95 % | BODY MASS INDEX: 40.37 KG/M2 | SYSTOLIC BLOOD PRESSURE: 118 MMHG | TEMPERATURE: 98 F | WEIGHT: 242.31 LBS | HEART RATE: 86 BPM | HEIGHT: 65 IN

## 2020-02-13 DIAGNOSIS — Z12.39 SCREENING FOR BREAST CANCER: ICD-10-CM

## 2020-02-13 DIAGNOSIS — F41.9 ANXIETY AND DEPRESSION: Primary | ICD-10-CM

## 2020-02-13 DIAGNOSIS — E11.65 UNCONTROLLED TYPE 2 DIABETES MELLITUS WITH HYPERGLYCEMIA, WITHOUT LONG-TERM CURRENT USE OF INSULIN: ICD-10-CM

## 2020-02-13 DIAGNOSIS — F32.A ANXIETY AND DEPRESSION: Primary | ICD-10-CM

## 2020-02-13 PROCEDURE — 99999 PR PBB SHADOW E&M-EST. PATIENT-LVL IV: ICD-10-PCS | Mod: PBBFAC,,, | Performed by: FAMILY MEDICINE

## 2020-02-13 PROCEDURE — 99999 PR PBB SHADOW E&M-EST. PATIENT-LVL IV: CPT | Mod: PBBFAC,,, | Performed by: FAMILY MEDICINE

## 2020-02-13 PROCEDURE — 3079F DIAST BP 80-89 MM HG: CPT | Mod: CPTII,S$GLB,, | Performed by: FAMILY MEDICINE

## 2020-02-13 PROCEDURE — 99214 OFFICE O/P EST MOD 30 MIN: CPT | Mod: S$GLB,,, | Performed by: FAMILY MEDICINE

## 2020-02-13 PROCEDURE — 3074F PR MOST RECENT SYSTOLIC BLOOD PRESSURE < 130 MM HG: ICD-10-PCS | Mod: CPTII,S$GLB,, | Performed by: FAMILY MEDICINE

## 2020-02-13 PROCEDURE — 3079F PR MOST RECENT DIASTOLIC BLOOD PRESSURE 80-89 MM HG: ICD-10-PCS | Mod: CPTII,S$GLB,, | Performed by: FAMILY MEDICINE

## 2020-02-13 PROCEDURE — 3051F HG A1C>EQUAL 7.0%<8.0%: CPT | Mod: CPTII,S$GLB,, | Performed by: FAMILY MEDICINE

## 2020-02-13 PROCEDURE — 3074F SYST BP LT 130 MM HG: CPT | Mod: CPTII,S$GLB,, | Performed by: FAMILY MEDICINE

## 2020-02-13 PROCEDURE — 3051F PR MOST RECENT HEMOGLOBIN A1C LEVEL 7.0 - < 8.0%: ICD-10-PCS | Mod: CPTII,S$GLB,, | Performed by: FAMILY MEDICINE

## 2020-02-13 PROCEDURE — 99214 PR OFFICE/OUTPT VISIT, EST, LEVL IV, 30-39 MIN: ICD-10-PCS | Mod: S$GLB,,, | Performed by: FAMILY MEDICINE

## 2020-02-13 RX ORDER — ALPRAZOLAM 0.25 MG/1
0.25 TABLET ORAL DAILY PRN
Qty: 30 TABLET | Refills: 0 | Status: SHIPPED | OUTPATIENT
Start: 2020-02-13 | End: 2022-05-25 | Stop reason: SDUPTHER

## 2020-02-13 NOTE — PROGRESS NOTES
Subjective:   Patient ID: Nik Lowery is a 54 y.o. female     Chief Complaint:Follow-up (6 months)      Patient for checkup.  Patient with persistent lower back pain. Patient follows with pain management.  Patient also recent fall with measured pain worse.  Gabapentin assist with pain but patient is concerned about side effects.  Patient also with questions about her medications for depression anxiety and would like to be seen by specialist for this.    Review of Systems   Constitutional: Negative for chills and fever.   HENT: Negative for sore throat.    Eyes: Negative for visual disturbance.   Respiratory: Negative for shortness of breath.    Cardiovascular: Negative for chest pain.   Gastrointestinal: Negative for abdominal pain.   Endocrine: Negative for polyuria.   Genitourinary: Negative for dysuria.   Musculoskeletal: Negative for back pain.   Skin: Negative for color change.   Neurological: Negative for headaches.   Psychiatric/Behavioral: Negative for agitation and confusion.     Past Medical History:   Diagnosis Date    Diabetes mellitus     Diabetes mellitus, type 2     Herpes     Hypertension      Objective:     Vitals:    02/13/20 1525   BP: 118/84   Pulse: 86   Temp: 97.6 °F (36.4 °C)     Body mass index is 40.32 kg/m².  Physical Exam   Constitutional: No distress.   HENT:   Head: Normocephalic and atraumatic.   Eyes: EOM are normal.   Cardiovascular: Normal rate and normal heart sounds.   Pulmonary/Chest: Effort normal.   Abdominal: Bowel sounds are normal.   Musculoskeletal: Normal range of motion.   Neurological: She is alert.   Psychiatric: She has a normal mood and affect.     Assessment:     1. Anxiety and depression    2. Uncontrolled type 2 diabetes mellitus with hyperglycemia, without long-term current use of insulin    3. Screening for breast cancer      Plan:   Anxiety and depression  -     Ambulatory referral/consult to Psychology; Future; Expected date: 02/20/2020    Uncontrolled  type 2 diabetes mellitus with hyperglycemia, without long-term current use of insulin  -     Hemoglobin A1c; Future; Expected date: 08/13/2020  -     Comprehensive metabolic panel; Future; Expected date: 08/13/2020  -     Lipid panel; Future; Expected date: 08/13/2020    Screening for breast cancer  -     Mammo Digital Screening Bilateral With CAD; Future; Expected date: 02/13/2020    Other orders  -     ALPRAZolam (XANAX) 0.25 MG tablet; Take 1 tablet (0.25 mg total) by mouth daily as needed for Anxiety.  Dispense: 30 tablet; Refill: 0          Osmin Newsome MD  02/13/2020    Portions of this note have been dictated with EL Guerra.

## 2020-05-05 ENCOUNTER — PATIENT MESSAGE (OUTPATIENT)
Dept: ADMINISTRATIVE | Facility: HOSPITAL | Age: 55
End: 2020-05-05

## 2020-05-25 ENCOUNTER — PATIENT MESSAGE (OUTPATIENT)
Dept: PSYCHIATRY | Facility: CLINIC | Age: 55
End: 2020-05-25

## 2020-07-31 DIAGNOSIS — E11.9 TYPE 2 DIABETES MELLITUS WITHOUT COMPLICATION, UNSPECIFIED WHETHER LONG TERM INSULIN USE: ICD-10-CM

## 2020-08-13 ENCOUNTER — PATIENT OUTREACH (OUTPATIENT)
Dept: ADMINISTRATIVE | Facility: HOSPITAL | Age: 55
End: 2020-08-13

## 2020-08-13 NOTE — LETTER
"August 13, 2020    Javierkameron Lowery  [address] [facility] [address]             [facility]  [address]  [facility] [address]  Phone: [phone] [facility] is committed to your overall health.  To help you get the most out of each of your visits, we will review your information to make sure you are up to date on all of your recommended tests and/or procedures.      Dr. Osmin Newsome MD has found that your chart shows you may be due for a:    MAMMOGRAM (BREAST CANCER SCREENING)   COLORECTAL CANCER SCREENING  DIABETIC EYE EXAM  DIABETIC FOOT EXAM     If you have had any of the above done at another facility, please bring the records or information with you so that your record at [facility] will be complete.  If you would like to schedule any of these, please contact the clinic at [phone].    Our records show you are due for colon cancer screening.  If you have already had your screening, or you have made an appointment for your screening, congratulations!  You're on the road to good health. If you haven't signed up for a colorectal screening please accept this invitation to be screened.      According to the American Cancer Society, colon cancer is the third most common cancer for people in the [place].  A simple screening test "Fit Kit" - done in your own home - can help find colon cancer at an early stage when it can be treated, even before any signs or symptoms develop. THIS IS A YEARLY TEST.    Testing for blood in your stool (feces or bowel movement) is the first step. If you have an upcoming visit with your Primary Care Physician you can  a Fit Kit during your visit or you can  a Fit Kit at your Primary Care Clinic prior to your visit.    The Fit Kit includes:    · Instructions on how to perform the test  · (1) Sheet of tissue paper  · (1) Small Absorption pad  · (1) Bottle to hold the sample and a small probe to help you take the sample  · (1) Small plastic " "bio-hazard bag  · (1) Postage-paid return envelope    Please do your test (the instructions will tell you how) and then return your sample in the postage-paid return envelope within 24 hours of collection.     If your test results are negative, you won't need testing again for another year.  If results show you need more testing, we will call you with next steps.    Regular colorectal cancer screening is one of the most powerful weapons for preventing colon cancer.  The website https://www.cancer.org/cancer/colon-rectal-cancer.html can answer many of your questions about this cancer and its prevention.  Just search for "colorectal cancer".        If you are currently taking medication, please bring it with you to your appointment for review.    Also, if you have any type of Advanced Directives, please bring them with you to your office visit so we may scan them into your chart.    Thank You,    [facility],  MD Seble Burnett LPN Clinical Care Coordinator  [facility] [address] [place] [address]  Phone [phone]  Fax [phone]       "

## 2020-08-13 NOTE — PROGRESS NOTES
Chart review completed 2020.  Care Everywhere updates requested and reviewed.  Immunizations reconciled. Media reports reviewed.  Duplicate HM overrides and  orders removed.  Overdue HM topic chart audit and/or requested.  Overdue lab testing linked to upcoming lab appointments if applies.    DIS reviewed      Mammogram and DEXA  YES    LABS SCHEDULED, LETTER MAILED    Health Maintenance Due   Topic Date Due    HIV Screening  1980    Low Dose Statin  1986    Shingles Vaccine (1 of 2) 2015    Colorectal Cancer Screening  2015    Mammogram  2019    Eye Exam  2020    Lipid Panel  08/10/2020    Foot Exam  2020    Hemoglobin A1c  2020

## 2020-08-22 ENCOUNTER — LAB VISIT (OUTPATIENT)
Dept: LAB | Facility: HOSPITAL | Age: 55
End: 2020-08-22
Attending: FAMILY MEDICINE
Payer: COMMERCIAL

## 2020-08-22 DIAGNOSIS — E11.65 UNCONTROLLED TYPE 2 DIABETES MELLITUS WITH HYPERGLYCEMIA, WITHOUT LONG-TERM CURRENT USE OF INSULIN: ICD-10-CM

## 2020-08-22 LAB
ALBUMIN SERPL BCP-MCNC: 4.4 G/DL (ref 3.5–5.2)
ALP SERPL-CCNC: 90 U/L (ref 55–135)
ALT SERPL W/O P-5'-P-CCNC: 53 U/L (ref 10–44)
ANION GAP SERPL CALC-SCNC: 11 MMOL/L (ref 8–16)
AST SERPL-CCNC: 55 U/L (ref 10–40)
BILIRUB SERPL-MCNC: 0.5 MG/DL (ref 0.1–1)
BUN SERPL-MCNC: 17 MG/DL (ref 6–20)
CALCIUM SERPL-MCNC: 9.9 MG/DL (ref 8.7–10.5)
CHLORIDE SERPL-SCNC: 101 MMOL/L (ref 95–110)
CHOLEST SERPL-MCNC: 156 MG/DL (ref 120–199)
CHOLEST/HDLC SERPL: 3.4 {RATIO} (ref 2–5)
CO2 SERPL-SCNC: 26 MMOL/L (ref 23–29)
CREAT SERPL-MCNC: 0.9 MG/DL (ref 0.5–1.4)
EST. GFR  (AFRICAN AMERICAN): >60 ML/MIN/1.73 M^2
EST. GFR  (NON AFRICAN AMERICAN): >60 ML/MIN/1.73 M^2
ESTIMATED AVG GLUCOSE: 166 MG/DL (ref 68–131)
GLUCOSE SERPL-MCNC: 134 MG/DL (ref 70–110)
HBA1C MFR BLD HPLC: 7.4 % (ref 4–5.6)
HDLC SERPL-MCNC: 46 MG/DL (ref 40–75)
HDLC SERPL: 29.5 % (ref 20–50)
LDLC SERPL CALC-MCNC: 65.6 MG/DL (ref 63–159)
NONHDLC SERPL-MCNC: 110 MG/DL
POTASSIUM SERPL-SCNC: 4.5 MMOL/L (ref 3.5–5.1)
PROT SERPL-MCNC: 8 G/DL (ref 6–8.4)
SODIUM SERPL-SCNC: 138 MMOL/L (ref 136–145)
TRIGL SERPL-MCNC: 222 MG/DL (ref 30–150)

## 2020-08-22 PROCEDURE — 80053 COMPREHEN METABOLIC PANEL: CPT

## 2020-08-22 PROCEDURE — 80061 LIPID PANEL: CPT

## 2020-08-22 PROCEDURE — 83036 HEMOGLOBIN GLYCOSYLATED A1C: CPT

## 2020-08-22 PROCEDURE — 36415 COLL VENOUS BLD VENIPUNCTURE: CPT | Mod: PO

## 2020-08-25 ENCOUNTER — TELEPHONE (OUTPATIENT)
Dept: FAMILY MEDICINE | Facility: CLINIC | Age: 55
End: 2020-08-25

## 2020-08-25 NOTE — TELEPHONE ENCOUNTER
----- Message from WOJCIECH Reilly sent at 8/24/2020 12:22 PM CDT -----  a1c elevated above goal   Cholesterol looks good   cmp stable   Continue current meds and follow up as scheduled

## 2020-08-25 NOTE — TELEPHONE ENCOUNTER
LM pt call clinic for labs and reschedule appt Thursday 8/27/2020. Provider only do VV on Thursday afternoon.

## 2020-08-26 ENCOUNTER — TELEPHONE (OUTPATIENT)
Dept: FAMILY MEDICINE | Facility: CLINIC | Age: 55
End: 2020-08-26

## 2020-08-27 ENCOUNTER — DOCUMENTATION ONLY (OUTPATIENT)
Dept: FAMILY MEDICINE | Facility: CLINIC | Age: 55
End: 2020-08-27

## 2020-08-27 ENCOUNTER — OFFICE VISIT (OUTPATIENT)
Dept: FAMILY MEDICINE | Facility: CLINIC | Age: 55
End: 2020-08-27
Payer: COMMERCIAL

## 2020-08-27 VITALS
HEART RATE: 104 BPM | BODY MASS INDEX: 38.72 KG/M2 | DIASTOLIC BLOOD PRESSURE: 76 MMHG | TEMPERATURE: 98 F | HEIGHT: 65 IN | OXYGEN SATURATION: 97 % | WEIGHT: 232.38 LBS | SYSTOLIC BLOOD PRESSURE: 128 MMHG

## 2020-08-27 DIAGNOSIS — R51.9 SINUS HEADACHE: ICD-10-CM

## 2020-08-27 DIAGNOSIS — Z12.11 SCREEN FOR COLON CANCER: ICD-10-CM

## 2020-08-27 DIAGNOSIS — Z12.39 SCREENING FOR BREAST CANCER: ICD-10-CM

## 2020-08-27 DIAGNOSIS — E11.65 UNCONTROLLED TYPE 2 DIABETES MELLITUS WITH HYPERGLYCEMIA, WITHOUT LONG-TERM CURRENT USE OF INSULIN: Primary | ICD-10-CM

## 2020-08-27 DIAGNOSIS — I10 ESSENTIAL HYPERTENSION: ICD-10-CM

## 2020-08-27 DIAGNOSIS — K21.9 GASTROESOPHAGEAL REFLUX DISEASE WITHOUT ESOPHAGITIS: ICD-10-CM

## 2020-08-27 PROCEDURE — 99214 OFFICE O/P EST MOD 30 MIN: CPT | Mod: S$GLB,,, | Performed by: PHYSICIAN ASSISTANT

## 2020-08-27 PROCEDURE — 3008F PR BODY MASS INDEX (BMI) DOCUMENTED: ICD-10-PCS | Mod: CPTII,S$GLB,, | Performed by: PHYSICIAN ASSISTANT

## 2020-08-27 PROCEDURE — 99214 PR OFFICE/OUTPT VISIT, EST, LEVL IV, 30-39 MIN: ICD-10-PCS | Mod: S$GLB,,, | Performed by: PHYSICIAN ASSISTANT

## 2020-08-27 PROCEDURE — 3078F DIAST BP <80 MM HG: CPT | Mod: CPTII,S$GLB,, | Performed by: PHYSICIAN ASSISTANT

## 2020-08-27 PROCEDURE — 3051F HG A1C>EQUAL 7.0%<8.0%: CPT | Mod: CPTII,S$GLB,, | Performed by: PHYSICIAN ASSISTANT

## 2020-08-27 PROCEDURE — 99999 PR PBB SHADOW E&M-EST. PATIENT-LVL IV: ICD-10-PCS | Mod: PBBFAC,,, | Performed by: PHYSICIAN ASSISTANT

## 2020-08-27 PROCEDURE — 99999 PR PBB SHADOW E&M-EST. PATIENT-LVL IV: CPT | Mod: PBBFAC,,, | Performed by: PHYSICIAN ASSISTANT

## 2020-08-27 PROCEDURE — 3008F BODY MASS INDEX DOCD: CPT | Mod: CPTII,S$GLB,, | Performed by: PHYSICIAN ASSISTANT

## 2020-08-27 PROCEDURE — 3074F SYST BP LT 130 MM HG: CPT | Mod: CPTII,S$GLB,, | Performed by: PHYSICIAN ASSISTANT

## 2020-08-27 PROCEDURE — 3078F PR MOST RECENT DIASTOLIC BLOOD PRESSURE < 80 MM HG: ICD-10-PCS | Mod: CPTII,S$GLB,, | Performed by: PHYSICIAN ASSISTANT

## 2020-08-27 PROCEDURE — 3051F PR MOST RECENT HEMOGLOBIN A1C LEVEL 7.0 - < 8.0%: ICD-10-PCS | Mod: CPTII,S$GLB,, | Performed by: PHYSICIAN ASSISTANT

## 2020-08-27 PROCEDURE — 3074F PR MOST RECENT SYSTOLIC BLOOD PRESSURE < 130 MM HG: ICD-10-PCS | Mod: CPTII,S$GLB,, | Performed by: PHYSICIAN ASSISTANT

## 2020-08-27 RX ORDER — METFORMIN HYDROCHLORIDE 500 MG/1
1000 TABLET, EXTENDED RELEASE ORAL 2 TIMES DAILY WITH MEALS
Qty: 360 TABLET | Refills: 3 | Status: SHIPPED | OUTPATIENT
Start: 2020-08-27 | End: 2020-08-31

## 2020-08-27 RX ORDER — PANTOPRAZOLE SODIUM 20 MG/1
20 TABLET, DELAYED RELEASE ORAL DAILY
Qty: 90 TABLET | Refills: 3 | Status: SHIPPED | OUTPATIENT
Start: 2020-08-27 | End: 2022-05-25 | Stop reason: SDUPTHER

## 2020-08-27 RX ORDER — ATORVASTATIN CALCIUM 20 MG/1
20 TABLET, FILM COATED ORAL DAILY
Qty: 90 TABLET | Refills: 3 | Status: SHIPPED | OUTPATIENT
Start: 2020-08-27 | End: 2020-08-31

## 2020-08-27 NOTE — PROGRESS NOTES
Subjective:       Patient ID: Nik Lowery is a 54 y.o. female.    Chief Complaint: Follow-up (6 month )    Patient is new to me  She has controlled HTN.  Diabetes is uncontrolled with A1C of 7.4 but improved from previous.  Cholesterol at goal.  Gerd controlled with PPI.  Anxiety/ depression controled with SSRI and lamictal.  Complains of new frontal Headache which she feels is related to allergy/sinus.  Tried allegra and mucinex. No relief.   Patients patient medical/surgical, social and family histories have been reviewed        Review of Systems   Constitutional: Negative for activity change, appetite change, fatigue and unexpected weight change.   Eyes: Negative for visual disturbance.   Respiratory: Negative for cough and shortness of breath.    Cardiovascular: Negative for chest pain, palpitations and leg swelling.   Gastrointestinal: Negative for abdominal pain, constipation, diarrhea and nausea.   Endocrine: Negative for polydipsia and polyuria.   Genitourinary: Negative for difficulty urinating.   Musculoskeletal: Negative for arthralgias.   Skin: Negative for rash.   Neurological: Positive for headaches. Negative for dizziness and light-headedness.   Psychiatric/Behavioral: Negative for dysphoric mood and sleep disturbance. The patient is not nervous/anxious.        Objective:      Physical Exam  Constitutional:       General: She is not in acute distress.     Appearance: She is well-developed.   HENT:      Head: Normocephalic and atraumatic.   Cardiovascular:      Rate and Rhythm: Normal rate and regular rhythm.      Pulses:           Dorsalis pedis pulses are 2+ on the right side and 2+ on the left side.        Posterior tibial pulses are 2+ on the right side and 2+ on the left side.      Heart sounds: Normal heart sounds.   Pulmonary:      Effort: Pulmonary effort is normal.      Breath sounds: Normal breath sounds.   Musculoskeletal:      Right lower leg: No edema.      Left lower leg: No edema.       Right foot: No deformity.      Left foot: No deformity.   Feet:      Right foot:      Protective Sensation: 5 sites tested. 5 sites sensed.      Skin integrity: Skin integrity normal.      Toenail Condition: Right toenails are normal.      Left foot:      Protective Sensation: 5 sites tested.      Skin integrity: Skin integrity normal.      Toenail Condition: Left toenails are normal.   Skin:     General: Skin is warm and dry.   Neurological:      Mental Status: She is alert.   Psychiatric:         Behavior: Behavior is cooperative.         Assessment:       1. Uncontrolled type 2 diabetes mellitus with hyperglycemia, without long-term current use of insulin    2. Gastroesophageal reflux disease without esophagitis    3. Screening for breast cancer    4. Screen for colon cancer    5. Essential hypertension    6. Sinus headache        Plan:       Nik was seen today for follow-up.    Diagnoses and all orders for this visit:    Uncontrolled type 2 diabetes mellitus with hyperglycemia, without long-term current use of insulin  -     atorvastatin (LIPITOR) 20 MG tablet; Take 1 tablet (20 mg total) by mouth once daily.  -     metFORMIN (GLUCOPHAGE-XR) 500 MG ER 24hr tablet; Take 2 tablets (1,000 mg total) by mouth 2 (two) times daily with meals.  -     Hemoglobin A1C; Future  -     Comprehensive metabolic panel; Future    Gastroesophageal reflux disease without esophagitis  -     pantoprazole (PROTONIX) 20 MG tablet; Take 1 tablet (20 mg total) by mouth once daily.    Screening for breast cancer  -     Mammo Digital Screening Bilateral With CAD; Future    Screen for colon cancer  -     Fecal Immunochemical Test (iFOBT); Future    Essential hypertension  At goal continue current meds   Sinus headache  ? Allergy related   Try otc xyzal          Follow up in about 3 months (around 11/27/2020) for Follow-Up with PCP, labs one week prior.

## 2020-10-01 ENCOUNTER — HOSPITAL ENCOUNTER (OUTPATIENT)
Dept: RADIOLOGY | Facility: HOSPITAL | Age: 55
Discharge: HOME | End: 2020-10-01
Attending: PHYSICIAN ASSISTANT
Payer: COMMERCIAL

## 2020-10-01 DIAGNOSIS — Z12.39 SCREENING FOR BREAST CANCER: ICD-10-CM

## 2020-10-01 PROCEDURE — 77067 MAMMO DIGITAL SCREENING BILAT WITH TOMOSYNTHESIS_CAD: ICD-10-PCS | Mod: 26,,, | Performed by: RADIOLOGY

## 2020-10-01 PROCEDURE — 77063 MAMMO DIGITAL SCREENING BILAT WITH TOMOSYNTHESIS_CAD: ICD-10-PCS | Mod: 26,,, | Performed by: RADIOLOGY

## 2020-10-01 PROCEDURE — 77067 SCR MAMMO BI INCL CAD: CPT | Mod: TC

## 2020-10-01 PROCEDURE — 77063 BREAST TOMOSYNTHESIS BI: CPT | Mod: 26,,, | Performed by: RADIOLOGY

## 2020-10-01 PROCEDURE — 77067 SCR MAMMO BI INCL CAD: CPT | Mod: 26,,, | Performed by: RADIOLOGY

## 2020-10-05 ENCOUNTER — PATIENT MESSAGE (OUTPATIENT)
Dept: ADMINISTRATIVE | Facility: HOSPITAL | Age: 55
End: 2020-10-05

## 2020-11-21 ENCOUNTER — LAB VISIT (OUTPATIENT)
Dept: LAB | Facility: HOSPITAL | Age: 55
End: 2020-11-21
Attending: PHYSICIAN ASSISTANT
Payer: COMMERCIAL

## 2020-11-21 DIAGNOSIS — E11.65 UNCONTROLLED TYPE 2 DIABETES MELLITUS WITH HYPERGLYCEMIA, WITHOUT LONG-TERM CURRENT USE OF INSULIN: ICD-10-CM

## 2020-11-21 LAB
ALBUMIN SERPL BCP-MCNC: 4.3 G/DL (ref 3.5–5.2)
ALP SERPL-CCNC: 78 U/L (ref 55–135)
ALT SERPL W/O P-5'-P-CCNC: 55 U/L (ref 10–44)
ANION GAP SERPL CALC-SCNC: 10 MMOL/L (ref 8–16)
AST SERPL-CCNC: 54 U/L (ref 10–40)
BILIRUB SERPL-MCNC: 0.3 MG/DL (ref 0.1–1)
BUN SERPL-MCNC: 19 MG/DL (ref 6–20)
CALCIUM SERPL-MCNC: 9.1 MG/DL (ref 8.7–10.5)
CHLORIDE SERPL-SCNC: 100 MMOL/L (ref 95–110)
CO2 SERPL-SCNC: 26 MMOL/L (ref 23–29)
CREAT SERPL-MCNC: 1 MG/DL (ref 0.5–1.4)
EST. GFR  (AFRICAN AMERICAN): >60 ML/MIN/1.73 M^2
EST. GFR  (NON AFRICAN AMERICAN): >60 ML/MIN/1.73 M^2
ESTIMATED AVG GLUCOSE: 180 MG/DL (ref 68–131)
GLUCOSE SERPL-MCNC: 160 MG/DL (ref 70–110)
HBA1C MFR BLD HPLC: 7.9 % (ref 4–5.6)
POTASSIUM SERPL-SCNC: 4.2 MMOL/L (ref 3.5–5.1)
PROT SERPL-MCNC: 7.8 G/DL (ref 6–8.4)
SODIUM SERPL-SCNC: 136 MMOL/L (ref 136–145)

## 2020-11-21 PROCEDURE — 36415 COLL VENOUS BLD VENIPUNCTURE: CPT | Mod: PO

## 2020-11-21 PROCEDURE — 80053 COMPREHEN METABOLIC PANEL: CPT

## 2020-11-21 PROCEDURE — 83036 HEMOGLOBIN GLYCOSYLATED A1C: CPT

## 2020-11-24 ENCOUNTER — OFFICE VISIT (OUTPATIENT)
Dept: FAMILY MEDICINE | Facility: CLINIC | Age: 55
End: 2020-11-24
Payer: COMMERCIAL

## 2020-11-24 VITALS
WEIGHT: 233 LBS | TEMPERATURE: 98 F | HEART RATE: 108 BPM | SYSTOLIC BLOOD PRESSURE: 130 MMHG | HEIGHT: 65 IN | OXYGEN SATURATION: 95 % | DIASTOLIC BLOOD PRESSURE: 78 MMHG | BODY MASS INDEX: 38.82 KG/M2

## 2020-11-24 DIAGNOSIS — E11.65 UNCONTROLLED TYPE 2 DIABETES MELLITUS WITH HYPERGLYCEMIA, WITHOUT LONG-TERM CURRENT USE OF INSULIN: Primary | ICD-10-CM

## 2020-11-24 PROCEDURE — 3051F HG A1C>EQUAL 7.0%<8.0%: CPT | Mod: CPTII,S$GLB,, | Performed by: FAMILY MEDICINE

## 2020-11-24 PROCEDURE — 99999 PR PBB SHADOW E&M-EST. PATIENT-LVL III: ICD-10-PCS | Mod: PBBFAC,,, | Performed by: FAMILY MEDICINE

## 2020-11-24 PROCEDURE — 90686 FLU VACCINE (QUAD) GREATER THAN OR EQUAL TO 3YO PRESERVATIVE FREE IM: ICD-10-PCS | Mod: S$GLB,,, | Performed by: FAMILY MEDICINE

## 2020-11-24 PROCEDURE — 3072F LOW RISK FOR RETINOPATHY: CPT | Mod: S$GLB,,, | Performed by: FAMILY MEDICINE

## 2020-11-24 PROCEDURE — 90471 FLU VACCINE (QUAD) GREATER THAN OR EQUAL TO 3YO PRESERVATIVE FREE IM: ICD-10-PCS | Mod: S$GLB,,, | Performed by: FAMILY MEDICINE

## 2020-11-24 PROCEDURE — 90471 IMMUNIZATION ADMIN: CPT | Mod: S$GLB,,, | Performed by: FAMILY MEDICINE

## 2020-11-24 PROCEDURE — 99213 OFFICE O/P EST LOW 20 MIN: CPT | Mod: 25,S$GLB,, | Performed by: FAMILY MEDICINE

## 2020-11-24 PROCEDURE — 99213 PR OFFICE/OUTPT VISIT, EST, LEVL III, 20-29 MIN: ICD-10-PCS | Mod: 25,S$GLB,, | Performed by: FAMILY MEDICINE

## 2020-11-24 PROCEDURE — 3008F PR BODY MASS INDEX (BMI) DOCUMENTED: ICD-10-PCS | Mod: CPTII,S$GLB,, | Performed by: FAMILY MEDICINE

## 2020-11-24 PROCEDURE — 99999 PR PBB SHADOW E&M-EST. PATIENT-LVL III: CPT | Mod: PBBFAC,,, | Performed by: FAMILY MEDICINE

## 2020-11-24 PROCEDURE — 3072F PR LOW RISK FOR RETINOPATHY: ICD-10-PCS | Mod: S$GLB,,, | Performed by: FAMILY MEDICINE

## 2020-11-24 PROCEDURE — 3078F PR MOST RECENT DIASTOLIC BLOOD PRESSURE < 80 MM HG: ICD-10-PCS | Mod: CPTII,S$GLB,, | Performed by: FAMILY MEDICINE

## 2020-11-24 PROCEDURE — 3051F PR MOST RECENT HEMOGLOBIN A1C LEVEL 7.0 - < 8.0%: ICD-10-PCS | Mod: CPTII,S$GLB,, | Performed by: FAMILY MEDICINE

## 2020-11-24 PROCEDURE — 3008F BODY MASS INDEX DOCD: CPT | Mod: CPTII,S$GLB,, | Performed by: FAMILY MEDICINE

## 2020-11-24 PROCEDURE — 3075F PR MOST RECENT SYSTOLIC BLOOD PRESS GE 130-139MM HG: ICD-10-PCS | Mod: CPTII,S$GLB,, | Performed by: FAMILY MEDICINE

## 2020-11-24 PROCEDURE — 3075F SYST BP GE 130 - 139MM HG: CPT | Mod: CPTII,S$GLB,, | Performed by: FAMILY MEDICINE

## 2020-11-24 PROCEDURE — 90686 IIV4 VACC NO PRSV 0.5 ML IM: CPT | Mod: S$GLB,,, | Performed by: FAMILY MEDICINE

## 2020-11-24 PROCEDURE — 3078F DIAST BP <80 MM HG: CPT | Mod: CPTII,S$GLB,, | Performed by: FAMILY MEDICINE

## 2020-11-24 PROCEDURE — 1126F PR PAIN SEVERITY QUANTIFIED, NO PAIN PRESENT: ICD-10-PCS | Mod: S$GLB,,, | Performed by: FAMILY MEDICINE

## 2020-11-24 PROCEDURE — 1126F AMNT PAIN NOTED NONE PRSNT: CPT | Mod: S$GLB,,, | Performed by: FAMILY MEDICINE

## 2020-12-16 NOTE — PROGRESS NOTES
Subjective:   Patient ID: Nik Lowery is a 54 y.o. female     Chief Complaint:Follow-up      Pt here for [facility]. Doing well.     Review of Systems   Respiratory: Negative for shortness of breath.    Cardiovascular: Negative for chest pain.   Gastrointestinal: Negative for abdominal pain.   Genitourinary: Negative for dysuria.     Past Medical History:   Diagnosis Date    Diabetes mellitus     Diabetes mellitus, type 2     Herpes     Hypertension      Past Surgical History:   Procedure Laterality Date    EYE SURGERY  2002    lasix Bilateral    HYSTERECTOMY  2012    LUMBAR DISCECTOMY      WISDOM TOOTH EXTRACTION       Objective:     Vitals:    11/24/20 1444   BP: 130/78   Pulse: 108   Temp: 97.5 °F (36.4 °C)     Body mass index is 38.78 kg/m².  Physical Exam  Vitals signs and nursing note reviewed.   Constitutional:       Appearance: She is well-developed.   HENT:      Head: Normocephalic and atraumatic.   Eyes:      General: No scleral icterus.     Conjunctiva/sclera: Conjunctivae normal.   Neck:      Musculoskeletal: Normal range of motion and neck supple.   Cardiovascular:      Heart sounds: No murmur.   Pulmonary:      Effort: Pulmonary effort is normal. No respiratory distress.   Musculoskeletal: Normal range of motion.         General: No deformity.   Skin:     Coloration: Skin is not pale.      Findings: No rash.   Neurological:      Mental Status: She is alert and oriented to person, place, and time.   Psychiatric:         Behavior: Behavior normal.         Thought Content: Thought content normal.         Judgment: Judgment normal.       Assessment:     1. Uncontrolled type 2 diabetes mellitus with hyperglycemia, without long-term current use of insulin      Plan:   Uncontrolled type 2 diabetes mellitus with hyperglycemia, without long-term current use of insulin  -     dulaglutide (TRULICITY) 0.75 mg/0.5 mL pen injector; Inject 0.75 mg into the skin once a week.  Dispense: 2 mL; Refill: 11 -      Hemoglobin A1C; Future; Expected date: 02/24/2021  -     Comprehensive Metabolic Panel; Future; Expected date: 02/24/2021    Other orders  -     Influenza - Quadrivalent *Preferred* (6 months+) (PF)            Time spent with patient: 15 minutes and over half of that time was spent on counseling an coordination of care.    Osmin Newsome MD  12/16/2020    Portions of this note have been dictated with EL Guerra.

## 2021-01-04 ENCOUNTER — PATIENT MESSAGE (OUTPATIENT)
Dept: ADMINISTRATIVE | Facility: HOSPITAL | Age: 56
End: 2021-01-04

## 2021-02-22 ENCOUNTER — PATIENT MESSAGE (OUTPATIENT)
Dept: FAMILY MEDICINE | Facility: CLINIC | Age: 56
End: 2021-02-22

## 2021-03-08 ENCOUNTER — PATIENT MESSAGE (OUTPATIENT)
Dept: ADMINISTRATIVE | Facility: HOSPITAL | Age: 56
End: 2021-03-08

## 2021-03-09 ENCOUNTER — IMMUNIZATION (OUTPATIENT)
Dept: PRIMARY CARE CLINIC | Facility: CLINIC | Age: 56
End: 2021-03-09
Payer: COMMERCIAL

## 2021-03-09 DIAGNOSIS — Z23 NEED FOR VACCINATION: Primary | ICD-10-CM

## 2021-03-09 PROCEDURE — 91300 COVID-19, MRNA, LNP-S, PF, 30 MCG/0.3 ML DOSE VACCINE: ICD-10-PCS | Mod: S$GLB,,, | Performed by: FAMILY MEDICINE

## 2021-03-09 PROCEDURE — 0001A COVID-19, MRNA, LNP-S, PF, 30 MCG/0.3 ML DOSE VACCINE: ICD-10-PCS | Mod: CV19,S$GLB,, | Performed by: FAMILY MEDICINE

## 2021-03-09 PROCEDURE — 91300 COVID-19, MRNA, LNP-S, PF, 30 MCG/0.3 ML DOSE VACCINE: CPT | Mod: S$GLB,,, | Performed by: FAMILY MEDICINE

## 2021-03-09 PROCEDURE — 0001A COVID-19, MRNA, LNP-S, PF, 30 MCG/0.3 ML DOSE VACCINE: CPT | Mod: CV19,S$GLB,, | Performed by: FAMILY MEDICINE

## 2021-03-30 ENCOUNTER — IMMUNIZATION (OUTPATIENT)
Dept: PRIMARY CARE CLINIC | Facility: CLINIC | Age: 56
End: 2021-03-30
Payer: COMMERCIAL

## 2021-03-30 DIAGNOSIS — Z23 NEED FOR VACCINATION: Primary | ICD-10-CM

## 2021-03-30 PROCEDURE — 0002A COVID-19, MRNA, LNP-S, PF, 30 MCG/0.3 ML DOSE VACCINE: ICD-10-PCS | Mod: CV19,S$GLB,, | Performed by: FAMILY MEDICINE

## 2021-03-30 PROCEDURE — 91300 COVID-19, MRNA, LNP-S, PF, 30 MCG/0.3 ML DOSE VACCINE: ICD-10-PCS | Mod: S$GLB,,, | Performed by: FAMILY MEDICINE

## 2021-03-30 PROCEDURE — 91300 COVID-19, MRNA, LNP-S, PF, 30 MCG/0.3 ML DOSE VACCINE: CPT | Mod: S$GLB,,, | Performed by: FAMILY MEDICINE

## 2021-03-30 PROCEDURE — 0002A COVID-19, MRNA, LNP-S, PF, 30 MCG/0.3 ML DOSE VACCINE: CPT | Mod: CV19,S$GLB,, | Performed by: FAMILY MEDICINE

## 2021-04-05 ENCOUNTER — PATIENT MESSAGE (OUTPATIENT)
Dept: ADMINISTRATIVE | Facility: HOSPITAL | Age: 56
End: 2021-04-05

## 2021-05-12 DIAGNOSIS — E11.9 TYPE 2 DIABETES MELLITUS WITHOUT COMPLICATION: ICD-10-CM

## 2021-07-06 RX ORDER — LAMOTRIGINE 100 MG/1
100 TABLET ORAL DAILY
Qty: 90 TABLET | Refills: 4 | Status: SHIPPED | OUTPATIENT
Start: 2021-07-06 | End: 2022-02-18

## 2021-07-07 ENCOUNTER — PATIENT MESSAGE (OUTPATIENT)
Dept: ADMINISTRATIVE | Facility: HOSPITAL | Age: 56
End: 2021-07-07

## 2021-07-08 ENCOUNTER — LAB VISIT (OUTPATIENT)
Dept: LAB | Facility: HOSPITAL | Age: 56
End: 2021-07-08
Attending: FAMILY MEDICINE
Payer: COMMERCIAL

## 2021-07-08 ENCOUNTER — DOCUMENTATION ONLY (OUTPATIENT)
Dept: TRANSPLANT | Facility: CLINIC | Age: 56
End: 2021-07-08

## 2021-07-08 DIAGNOSIS — R74.8 ELEVATED LIVER ENZYMES: Primary | ICD-10-CM

## 2021-07-08 DIAGNOSIS — E11.65 UNCONTROLLED TYPE 2 DIABETES MELLITUS WITH HYPERGLYCEMIA, WITHOUT LONG-TERM CURRENT USE OF INSULIN: ICD-10-CM

## 2021-07-08 LAB
ALBUMIN SERPL BCP-MCNC: 4.3 G/DL (ref 3.5–5.2)
ALP SERPL-CCNC: 75 U/L (ref 55–135)
ALT SERPL W/O P-5'-P-CCNC: 58 U/L (ref 10–44)
ANION GAP SERPL CALC-SCNC: 14 MMOL/L (ref 8–16)
AST SERPL-CCNC: 50 U/L (ref 10–40)
BILIRUB SERPL-MCNC: 0.3 MG/DL (ref 0.1–1)
BUN SERPL-MCNC: 16 MG/DL (ref 6–20)
CALCIUM SERPL-MCNC: 9.7 MG/DL (ref 8.7–10.5)
CHLORIDE SERPL-SCNC: 91 MMOL/L (ref 95–110)
CO2 SERPL-SCNC: 27 MMOL/L (ref 23–29)
CREAT SERPL-MCNC: 0.9 MG/DL (ref 0.5–1.4)
EST. GFR  (AFRICAN AMERICAN): >60 ML/MIN/1.73 M^2
EST. GFR  (NON AFRICAN AMERICAN): >60 ML/MIN/1.73 M^2
ESTIMATED AVG GLUCOSE: 197 MG/DL (ref 68–131)
GLUCOSE SERPL-MCNC: 241 MG/DL (ref 70–110)
HBA1C MFR BLD: 8.5 % (ref 4–5.6)
POTASSIUM SERPL-SCNC: 4.7 MMOL/L (ref 3.5–5.1)
PROT SERPL-MCNC: 7.8 G/DL (ref 6–8.4)
SODIUM SERPL-SCNC: 132 MMOL/L (ref 136–145)

## 2021-07-08 PROCEDURE — 36415 COLL VENOUS BLD VENIPUNCTURE: CPT | Performed by: FAMILY MEDICINE

## 2021-07-08 PROCEDURE — 80053 COMPREHEN METABOLIC PANEL: CPT | Performed by: FAMILY MEDICINE

## 2021-07-08 PROCEDURE — 83036 HEMOGLOBIN GLYCOSYLATED A1C: CPT | Performed by: FAMILY MEDICINE

## 2021-07-12 ENCOUNTER — TELEPHONE (OUTPATIENT)
Dept: FAMILY MEDICINE | Facility: CLINIC | Age: 56
End: 2021-07-12
Payer: COMMERCIAL

## 2021-07-14 ENCOUNTER — HOSPITAL ENCOUNTER (OUTPATIENT)
Dept: RADIOLOGY | Facility: HOSPITAL | Age: 56
Discharge: HOME | End: 2021-07-14
Attending: STUDENT IN AN ORGANIZED HEALTH CARE EDUCATION/TRAINING PROGRAM
Payer: COMMERCIAL

## 2021-07-14 DIAGNOSIS — R74.8 ELEVATED LIVER ENZYMES: ICD-10-CM

## 2021-07-14 PROCEDURE — 76705 ECHO EXAM OF ABDOMEN: CPT | Mod: 26,,, | Performed by: RADIOLOGY

## 2021-07-14 PROCEDURE — 76705 ECHO EXAM OF ABDOMEN: CPT | Mod: TC

## 2021-07-14 PROCEDURE — 76705 US ABDOMEN LIMITED: ICD-10-PCS | Mod: 26,,, | Performed by: RADIOLOGY

## 2021-07-16 ENCOUNTER — OFFICE VISIT (OUTPATIENT)
Dept: FAMILY MEDICINE | Facility: CLINIC | Age: 56
End: 2021-07-16
Payer: COMMERCIAL

## 2021-07-16 VITALS
HEIGHT: 65 IN | DIASTOLIC BLOOD PRESSURE: 72 MMHG | SYSTOLIC BLOOD PRESSURE: 122 MMHG | BODY MASS INDEX: 36.18 KG/M2 | OXYGEN SATURATION: 97 % | TEMPERATURE: 99 F | WEIGHT: 217.13 LBS | HEART RATE: 88 BPM

## 2021-07-16 DIAGNOSIS — I15.2 HYPERTENSION ASSOCIATED WITH DIABETES: Primary | ICD-10-CM

## 2021-07-16 DIAGNOSIS — F32.A ANXIETY AND DEPRESSION: ICD-10-CM

## 2021-07-16 DIAGNOSIS — F41.9 ANXIETY AND DEPRESSION: ICD-10-CM

## 2021-07-16 DIAGNOSIS — E11.65 UNCONTROLLED TYPE 2 DIABETES MELLITUS WITH HYPERGLYCEMIA, WITHOUT LONG-TERM CURRENT USE OF INSULIN: ICD-10-CM

## 2021-07-16 DIAGNOSIS — E66.01 CLASS 2 SEVERE OBESITY WITH SERIOUS COMORBIDITY AND BODY MASS INDEX (BMI) OF 36.0 TO 36.9 IN ADULT, UNSPECIFIED OBESITY TYPE: ICD-10-CM

## 2021-07-16 DIAGNOSIS — K76.0 FATTY LIVER: ICD-10-CM

## 2021-07-16 DIAGNOSIS — K21.9 GASTROESOPHAGEAL REFLUX DISEASE WITHOUT ESOPHAGITIS: ICD-10-CM

## 2021-07-16 DIAGNOSIS — E11.59 HYPERTENSION ASSOCIATED WITH DIABETES: Primary | ICD-10-CM

## 2021-07-16 PROBLEM — E66.812 CLASS 2 SEVERE OBESITY DUE TO EXCESS CALORIES WITH SERIOUS COMORBIDITY AND BODY MASS INDEX (BMI) OF 39.0 TO 39.9 IN ADULT: Status: RESOLVED | Noted: 2018-08-08 | Resolved: 2021-07-16

## 2021-07-16 PROCEDURE — 99214 PR OFFICE/OUTPT VISIT, EST, LEVL IV, 30-39 MIN: ICD-10-PCS | Mod: S$GLB,,, | Performed by: PHYSICIAN ASSISTANT

## 2021-07-16 PROCEDURE — 3052F HG A1C>EQUAL 8.0%<EQUAL 9.0%: CPT | Mod: CPTII,S$GLB,, | Performed by: PHYSICIAN ASSISTANT

## 2021-07-16 PROCEDURE — 3052F PR MOST RECENT HEMOGLOBIN A1C LEVEL 8.0 - < 9.0%: ICD-10-PCS | Mod: CPTII,S$GLB,, | Performed by: PHYSICIAN ASSISTANT

## 2021-07-16 PROCEDURE — 3008F PR BODY MASS INDEX (BMI) DOCUMENTED: ICD-10-PCS | Mod: CPTII,S$GLB,, | Performed by: PHYSICIAN ASSISTANT

## 2021-07-16 PROCEDURE — 99999 PR PBB SHADOW E&M-EST. PATIENT-LVL IV: CPT | Mod: PBBFAC,,, | Performed by: PHYSICIAN ASSISTANT

## 2021-07-16 PROCEDURE — 1125F AMNT PAIN NOTED PAIN PRSNT: CPT | Mod: S$GLB,,, | Performed by: PHYSICIAN ASSISTANT

## 2021-07-16 PROCEDURE — 3078F DIAST BP <80 MM HG: CPT | Mod: CPTII,S$GLB,, | Performed by: PHYSICIAN ASSISTANT

## 2021-07-16 PROCEDURE — 3078F PR MOST RECENT DIASTOLIC BLOOD PRESSURE < 80 MM HG: ICD-10-PCS | Mod: CPTII,S$GLB,, | Performed by: PHYSICIAN ASSISTANT

## 2021-07-16 PROCEDURE — 99214 OFFICE O/P EST MOD 30 MIN: CPT | Mod: S$GLB,,, | Performed by: PHYSICIAN ASSISTANT

## 2021-07-16 PROCEDURE — 1125F PR PAIN SEVERITY QUANTIFIED, PAIN PRESENT: ICD-10-PCS | Mod: S$GLB,,, | Performed by: PHYSICIAN ASSISTANT

## 2021-07-16 PROCEDURE — 99999 PR PBB SHADOW E&M-EST. PATIENT-LVL IV: ICD-10-PCS | Mod: PBBFAC,,, | Performed by: PHYSICIAN ASSISTANT

## 2021-07-16 PROCEDURE — 3008F BODY MASS INDEX DOCD: CPT | Mod: CPTII,S$GLB,, | Performed by: PHYSICIAN ASSISTANT

## 2021-07-16 PROCEDURE — 3074F PR MOST RECENT SYSTOLIC BLOOD PRESSURE < 130 MM HG: ICD-10-PCS | Mod: CPTII,S$GLB,, | Performed by: PHYSICIAN ASSISTANT

## 2021-07-16 PROCEDURE — 3074F SYST BP LT 130 MM HG: CPT | Mod: CPTII,S$GLB,, | Performed by: PHYSICIAN ASSISTANT

## 2021-07-16 RX ORDER — SERTRALINE HYDROCHLORIDE 100 MG/1
100 TABLET, FILM COATED ORAL DAILY
Qty: 90 TABLET | Refills: 3 | Status: SHIPPED | OUTPATIENT
Start: 2021-07-16 | End: 2022-08-26

## 2021-07-16 RX ORDER — ATORVASTATIN CALCIUM 20 MG/1
20 TABLET, FILM COATED ORAL DAILY
Qty: 90 TABLET | Refills: 3 | Status: SHIPPED | OUTPATIENT
Start: 2021-07-16 | End: 2022-08-26

## 2021-07-16 RX ORDER — GABAPENTIN 800 MG/1
TABLET ORAL
COMMUNITY

## 2021-07-16 RX ORDER — METFORMIN HYDROCHLORIDE 500 MG/1
1000 TABLET, EXTENDED RELEASE ORAL 2 TIMES DAILY WITH MEALS
Qty: 360 TABLET | Refills: 3 | Status: SHIPPED | OUTPATIENT
Start: 2021-07-16 | End: 2022-08-26

## 2021-07-16 RX ORDER — LISINOPRIL AND HYDROCHLOROTHIAZIDE 20; 25 MG/1; MG/1
1 TABLET ORAL DAILY
Qty: 90 TABLET | Refills: 3 | Status: SHIPPED | OUTPATIENT
Start: 2021-07-16 | End: 2022-08-04

## 2021-07-30 ENCOUNTER — PATIENT MESSAGE (OUTPATIENT)
Dept: FAMILY MEDICINE | Facility: CLINIC | Age: 56
End: 2021-07-30

## 2021-08-02 ENCOUNTER — TELEPHONE (OUTPATIENT)
Dept: HEPATOLOGY | Facility: CLINIC | Age: 56
End: 2021-08-02

## 2021-08-06 ENCOUNTER — PATIENT MESSAGE (OUTPATIENT)
Dept: HEPATOLOGY | Facility: CLINIC | Age: 56
End: 2021-08-06

## 2021-08-23 ENCOUNTER — PATIENT OUTREACH (OUTPATIENT)
Dept: ADMINISTRATIVE | Facility: HOSPITAL | Age: 56
End: 2021-08-23

## 2021-08-23 ENCOUNTER — PATIENT MESSAGE (OUTPATIENT)
Dept: ADMINISTRATIVE | Facility: HOSPITAL | Age: 56
End: 2021-08-23

## 2021-09-01 ENCOUNTER — TELEPHONE (OUTPATIENT)
Dept: ADMINISTRATIVE | Facility: HOSPITAL | Age: 56
End: 2021-09-01

## 2021-09-20 ENCOUNTER — PATIENT MESSAGE (OUTPATIENT)
Dept: HEPATOLOGY | Facility: CLINIC | Age: 56
End: 2021-09-20

## 2021-10-07 ENCOUNTER — PATIENT MESSAGE (OUTPATIENT)
Dept: ADMINISTRATIVE | Facility: HOSPITAL | Age: 56
End: 2021-10-07

## 2021-10-13 DIAGNOSIS — Z12.31 OTHER SCREENING MAMMOGRAM: ICD-10-CM

## 2021-10-27 DIAGNOSIS — Z12.11 COLON CANCER SCREENING: ICD-10-CM

## 2021-12-20 ENCOUNTER — PATIENT OUTREACH (OUTPATIENT)
Dept: ADMINISTRATIVE | Facility: HOSPITAL | Age: 56
End: 2021-12-20
Payer: COMMERCIAL

## 2022-01-24 ENCOUNTER — PATIENT OUTREACH (OUTPATIENT)
Dept: ADMINISTRATIVE | Facility: OTHER | Age: 57
End: 2022-01-24
Payer: COMMERCIAL

## 2022-01-24 NOTE — PROGRESS NOTES
Chart was reviewed for overdue [facility] Encounters ([facility])  topics  Updates were requested from care everywhere  Health Maintenance has been updated  LINKS immunization registry triggered  Eye exam appt. 1/27/22

## 2022-02-08 ENCOUNTER — PATIENT OUTREACH (OUTPATIENT)
Dept: ADMINISTRATIVE | Facility: HOSPITAL | Age: 57
End: 2022-02-08
Payer: COMMERCIAL

## 2022-02-08 NOTE — PROGRESS NOTES
Chart review completed 2022.  Care Everywhere updates requested and reviewed.  Immunizations reconciled. Media reports reviewed.  Duplicate HM overrides and  orders removed if applies .  Overdue HM topic chart audit and/or requested if applies .  Overdue lab testing linked to upcoming lab appointments if applies.    Lab jose m and quest reviewed-yes,no new results   Pap Smear and Lab testing-no    DIS reviewed-yes,no new results      Mammogram and DEXA-no    WOG orders placed-no  Letter mailed-no  HM updated with external  Report-no   Requested  records-no     I spoke to pt and scheduled her labs for 22 at 0850. Mammogram is scheduled for 22 and eye exam is scheduled for 22. Pt will discuss Colon Cancer screening with Dr Newsome. Over due HM updated in appt window.    Health Maintenance Due   Topic Date Due    HIV Screening  Never done    Colorectal Cancer Screening  Never done    Shingles Vaccine (1 of 2) Never done    Diabetes Urine Screening  2018    Eye Exam  2020    Lipid Panel  2021    Influenza Vaccine (1) 2021    COVID-19 Vaccine (3 - Booster for Pfizer series) 2021    Mammogram  10/01/2021    Hemoglobin A1c  10/08/2021

## 2022-02-12 ENCOUNTER — LAB VISIT (OUTPATIENT)
Dept: LAB | Facility: HOSPITAL | Age: 57
End: 2022-02-12
Attending: FAMILY MEDICINE
Payer: COMMERCIAL

## 2022-02-12 DIAGNOSIS — E11.59 HYPERTENSION ASSOCIATED WITH DIABETES: ICD-10-CM

## 2022-02-12 DIAGNOSIS — I15.2 HYPERTENSION ASSOCIATED WITH DIABETES: ICD-10-CM

## 2022-02-12 LAB
ALBUMIN SERPL BCP-MCNC: 4 G/DL (ref 3.5–5.2)
ALP SERPL-CCNC: 77 U/L (ref 55–135)
ALT SERPL W/O P-5'-P-CCNC: 57 U/L (ref 10–44)
ANION GAP SERPL CALC-SCNC: 13 MMOL/L (ref 8–16)
AST SERPL-CCNC: 46 U/L (ref 10–40)
BILIRUB SERPL-MCNC: 0.4 MG/DL (ref 0.1–1)
BUN SERPL-MCNC: 19 MG/DL (ref 6–20)
CALCIUM SERPL-MCNC: 9.7 MG/DL (ref 8.7–10.5)
CHLORIDE SERPL-SCNC: 99 MMOL/L (ref 95–110)
CHOLEST SERPL-MCNC: 178 MG/DL (ref 120–199)
CHOLEST/HDLC SERPL: 3.6 {RATIO} (ref 2–5)
CO2 SERPL-SCNC: 24 MMOL/L (ref 23–29)
CREAT SERPL-MCNC: 0.9 MG/DL (ref 0.5–1.4)
EST. GFR  (AFRICAN AMERICAN): >60 ML/MIN/1.73 M^2
EST. GFR  (NON AFRICAN AMERICAN): >60 ML/MIN/1.73 M^2
ESTIMATED AVG GLUCOSE: 203 MG/DL (ref 68–131)
GLUCOSE SERPL-MCNC: 198 MG/DL (ref 70–110)
HBA1C MFR BLD: 8.7 % (ref 4–5.6)
HDLC SERPL-MCNC: 49 MG/DL (ref 40–75)
HDLC SERPL: 27.5 % (ref 20–50)
LDLC SERPL CALC-MCNC: 85.6 MG/DL (ref 63–159)
NONHDLC SERPL-MCNC: 129 MG/DL
POTASSIUM SERPL-SCNC: 4.2 MMOL/L (ref 3.5–5.1)
PROT SERPL-MCNC: 7.6 G/DL (ref 6–8.4)
SODIUM SERPL-SCNC: 136 MMOL/L (ref 136–145)
TRIGL SERPL-MCNC: 217 MG/DL (ref 30–150)

## 2022-02-12 PROCEDURE — 80061 LIPID PANEL: CPT | Performed by: PHYSICIAN ASSISTANT

## 2022-02-12 PROCEDURE — 83036 HEMOGLOBIN GLYCOSYLATED A1C: CPT | Performed by: PHYSICIAN ASSISTANT

## 2022-02-12 PROCEDURE — 80053 COMPREHEN METABOLIC PANEL: CPT | Performed by: PHYSICIAN ASSISTANT

## 2022-02-12 PROCEDURE — 36415 COLL VENOUS BLD VENIPUNCTURE: CPT | Mod: PO | Performed by: PHYSICIAN ASSISTANT

## 2022-02-17 ENCOUNTER — HOSPITAL ENCOUNTER (OUTPATIENT)
Dept: RADIOLOGY | Facility: CLINIC | Age: 57
Discharge: HOME | End: 2022-02-17
Attending: FAMILY MEDICINE
Payer: COMMERCIAL

## 2022-02-17 DIAGNOSIS — Z12.31 OTHER SCREENING MAMMOGRAM: ICD-10-CM

## 2022-02-17 PROCEDURE — 77067 MAMMO DIGITAL SCREENING BILAT WITH TOMO: ICD-10-PCS | Mod: 26,,, | Performed by: RADIOLOGY

## 2022-02-17 PROCEDURE — 77063 BREAST TOMOSYNTHESIS BI: CPT | Mod: TC,PO

## 2022-02-17 PROCEDURE — 77063 MAMMO DIGITAL SCREENING BILAT WITH TOMO: ICD-10-PCS | Mod: 26,,, | Performed by: RADIOLOGY

## 2022-02-17 PROCEDURE — 77067 SCR MAMMO BI INCL CAD: CPT | Mod: 26,,, | Performed by: RADIOLOGY

## 2022-02-17 PROCEDURE — 77063 BREAST TOMOSYNTHESIS BI: CPT | Mod: 26,,, | Performed by: RADIOLOGY

## 2022-02-18 ENCOUNTER — TELEPHONE (OUTPATIENT)
Dept: FAMILY MEDICINE | Facility: CLINIC | Age: 57
End: 2022-02-18

## 2022-02-18 ENCOUNTER — OFFICE VISIT (OUTPATIENT)
Dept: FAMILY MEDICINE | Facility: CLINIC | Age: 57
End: 2022-02-18
Payer: COMMERCIAL

## 2022-02-18 VITALS
DIASTOLIC BLOOD PRESSURE: 84 MMHG | HEIGHT: 65 IN | WEIGHT: 225.31 LBS | BODY MASS INDEX: 37.54 KG/M2 | OXYGEN SATURATION: 95 % | HEART RATE: 86 BPM | TEMPERATURE: 98 F | SYSTOLIC BLOOD PRESSURE: 124 MMHG

## 2022-02-18 DIAGNOSIS — R42 VERTIGO: ICD-10-CM

## 2022-02-18 DIAGNOSIS — R06.81 APNEA: ICD-10-CM

## 2022-02-18 DIAGNOSIS — I15.2 HYPERTENSION ASSOCIATED WITH DIABETES: Primary | ICD-10-CM

## 2022-02-18 DIAGNOSIS — F41.9 ANXIETY AND DEPRESSION: ICD-10-CM

## 2022-02-18 DIAGNOSIS — F32.A ANXIETY AND DEPRESSION: ICD-10-CM

## 2022-02-18 DIAGNOSIS — Z12.11 SCREEN FOR COLON CANCER: ICD-10-CM

## 2022-02-18 DIAGNOSIS — M75.82 TENDINITIS OF LEFT ROTATOR CUFF: ICD-10-CM

## 2022-02-18 DIAGNOSIS — E11.59 HYPERTENSION ASSOCIATED WITH DIABETES: Primary | ICD-10-CM

## 2022-02-18 DIAGNOSIS — E11.65 UNCONTROLLED TYPE 2 DIABETES MELLITUS WITH HYPERGLYCEMIA, WITHOUT LONG-TERM CURRENT USE OF INSULIN: ICD-10-CM

## 2022-02-18 PROCEDURE — 99214 PR OFFICE/OUTPT VISIT, EST, LEVL IV, 30-39 MIN: ICD-10-PCS | Mod: S$GLB,,, | Performed by: PHYSICIAN ASSISTANT

## 2022-02-18 PROCEDURE — 3008F BODY MASS INDEX DOCD: CPT | Mod: CPTII,S$GLB,, | Performed by: PHYSICIAN ASSISTANT

## 2022-02-18 PROCEDURE — 3079F PR MOST RECENT DIASTOLIC BLOOD PRESSURE 80-89 MM HG: ICD-10-PCS | Mod: CPTII,S$GLB,, | Performed by: PHYSICIAN ASSISTANT

## 2022-02-18 PROCEDURE — 99999 PR PBB SHADOW E&M-EST. PATIENT-LVL V: ICD-10-PCS | Mod: PBBFAC,,, | Performed by: PHYSICIAN ASSISTANT

## 2022-02-18 PROCEDURE — 4010F ACE/ARB THERAPY RXD/TAKEN: CPT | Mod: CPTII,S$GLB,, | Performed by: PHYSICIAN ASSISTANT

## 2022-02-18 PROCEDURE — 4010F PR ACE/ARB THEARPY RXD/TAKEN: ICD-10-PCS | Mod: CPTII,S$GLB,, | Performed by: PHYSICIAN ASSISTANT

## 2022-02-18 PROCEDURE — 3066F PR DOCUMENTATION OF TREATMENT FOR NEPHROPATHY: ICD-10-PCS | Mod: CPTII,S$GLB,, | Performed by: PHYSICIAN ASSISTANT

## 2022-02-18 PROCEDURE — 99214 OFFICE O/P EST MOD 30 MIN: CPT | Mod: S$GLB,,, | Performed by: PHYSICIAN ASSISTANT

## 2022-02-18 PROCEDURE — 3066F NEPHROPATHY DOC TX: CPT | Mod: CPTII,S$GLB,, | Performed by: PHYSICIAN ASSISTANT

## 2022-02-18 PROCEDURE — 3052F HG A1C>EQUAL 8.0%<EQUAL 9.0%: CPT | Mod: CPTII,S$GLB,, | Performed by: PHYSICIAN ASSISTANT

## 2022-02-18 PROCEDURE — 1159F PR MEDICATION LIST DOCUMENTED IN MEDICAL RECORD: ICD-10-PCS | Mod: CPTII,S$GLB,, | Performed by: PHYSICIAN ASSISTANT

## 2022-02-18 PROCEDURE — 3061F NEG MICROALBUMINURIA REV: CPT | Mod: CPTII,S$GLB,, | Performed by: PHYSICIAN ASSISTANT

## 2022-02-18 PROCEDURE — 99999 PR PBB SHADOW E&M-EST. PATIENT-LVL V: CPT | Mod: PBBFAC,,, | Performed by: PHYSICIAN ASSISTANT

## 2022-02-18 PROCEDURE — 3074F PR MOST RECENT SYSTOLIC BLOOD PRESSURE < 130 MM HG: ICD-10-PCS | Mod: CPTII,S$GLB,, | Performed by: PHYSICIAN ASSISTANT

## 2022-02-18 PROCEDURE — 3008F PR BODY MASS INDEX (BMI) DOCUMENTED: ICD-10-PCS | Mod: CPTII,S$GLB,, | Performed by: PHYSICIAN ASSISTANT

## 2022-02-18 PROCEDURE — 1159F MED LIST DOCD IN RCRD: CPT | Mod: CPTII,S$GLB,, | Performed by: PHYSICIAN ASSISTANT

## 2022-02-18 PROCEDURE — 3079F DIAST BP 80-89 MM HG: CPT | Mod: CPTII,S$GLB,, | Performed by: PHYSICIAN ASSISTANT

## 2022-02-18 PROCEDURE — 3052F PR MOST RECENT HEMOGLOBIN A1C LEVEL 8.0 - < 9.0%: ICD-10-PCS | Mod: CPTII,S$GLB,, | Performed by: PHYSICIAN ASSISTANT

## 2022-02-18 PROCEDURE — 3074F SYST BP LT 130 MM HG: CPT | Mod: CPTII,S$GLB,, | Performed by: PHYSICIAN ASSISTANT

## 2022-02-18 PROCEDURE — 3061F PR NEG MICROALBUMINURIA RESULT DOCUMENTED/REVIEW: ICD-10-PCS | Mod: CPTII,S$GLB,, | Performed by: PHYSICIAN ASSISTANT

## 2022-02-18 RX ORDER — MECLIZINE HYDROCHLORIDE 25 MG/1
25 TABLET ORAL 3 TIMES DAILY PRN
Qty: 30 TABLET | Refills: 0 | Status: SHIPPED | OUTPATIENT
Start: 2022-02-18 | End: 2023-06-13

## 2022-02-18 RX ORDER — ORAL SEMAGLUTIDE 3 MG/1
3 TABLET ORAL DAILY
Qty: 30 TABLET | Refills: 0 | Status: SHIPPED | OUTPATIENT
Start: 2022-02-18 | End: 2022-03-03

## 2022-02-18 RX ORDER — LAMOTRIGINE 150 MG/1
150 TABLET ORAL DAILY
Qty: 30 TABLET | Refills: 5 | Status: SHIPPED | OUTPATIENT
Start: 2022-02-18 | End: 2022-08-30

## 2022-02-18 RX ORDER — ORAL SEMAGLUTIDE 7 MG/1
7 TABLET ORAL DAILY
Qty: 30 TABLET | Refills: 5 | Status: SHIPPED | OUTPATIENT
Start: 2022-02-18 | End: 2022-03-03

## 2022-02-18 NOTE — PROGRESS NOTES
Subjective:       Patient ID: Nik Lowery is a 56 y.o. female.    Chief Complaint: Follow-up    Patient with controlled hypertension, uncontrolled type 2 diabetes, anxiety, and depression presents for routine follow-up.  Regarding her chronic conditions.    She feels that her depression is currently not well controlled.  She is having significant lack of motivation and feeling down.  She is isolated at home with her 2 dogs.  She works from home.  She reports keeping up with her job and house without any issues.  She denies any suicidal or homicidal ideations.  She feels that her anxiety is well controlled with Zoloft 100 mg daily.  She does not use Xanax very often.    Regarding her diabetes she states that her glucose has been elevated.  She is compliant with her medications.  She is currently on metformin and glyburide.  She tried Trulicity in the past but was unable to remember taking the injection on a regular basis.  She prefers oral medication.  She is due for diabetic eye exam  Some new issues are discussed today.  She states that she has been told that she snores and frequently stops breathing    during sleep.  She has not noticed this herself.     She has also had episodes of dizziness for the past 2-3 weeks.  The episodes come and go.  Episodes occur pretty much on a daily basis.  She states that she has nausea associated with the dizziness.  She feels as though she is off balance and her eyes are tracking.  She thought that her symptoms may be related to allergies.  She takes Allegra on a daily basis.  She started taking Mucinex.  Her dizziness has not improved.  She denies any falls numbness tingling or weakness.  She is not having any headaches.  She has chronic neck pain and sees a massage therapist regularly.  Another new issue is left-sided shoulder pain.  Pain has been going on for several weeks.  Pain increases with abduction of the left upper extremity.  She feels the pain radiates to the left  lateral chest when she reaches overhead.  She states that while working at her desk she is hunched over with her shoulders rolled forward.  She tries to alter her seated position in order to help with the symptoms.  Patients patient medical/surgical, social and family histories have been reviewed       Review of Systems   Constitutional: Positive for fatigue. Negative for activity change.   Eyes: Negative for visual disturbance.   Respiratory: Negative for shortness of breath.    Cardiovascular: Positive for chest pain. Negative for palpitations and leg swelling.   Gastrointestinal: Positive for nausea.   Musculoskeletal: Positive for arthralgias, myalgias and neck pain. Negative for neck stiffness.   Neurological: Positive for dizziness. Negative for seizures, syncope, facial asymmetry, weakness, light-headedness, numbness and headaches.   Psychiatric/Behavioral: Positive for dysphoric mood and sleep disturbance. Negative for self-injury and suicidal ideas. The patient is not nervous/anxious.        Objective:      Physical Exam  Constitutional:       General: She is not in acute distress.     Appearance: Normal appearance. She is well-developed and well-nourished. She is obese. She is not ill-appearing.   HENT:      Head: Normocephalic and atraumatic.      Right Ear: Tympanic membrane, ear canal and external ear normal.      Left Ear: Tympanic membrane, ear canal and external ear normal.   Eyes:      Extraocular Movements:      Right eye: Nystagmus present.      Left eye: Nystagmus present.      Conjunctiva/sclera: Conjunctivae normal.   Neck:      Vascular: No carotid bruit.   Cardiovascular:      Rate and Rhythm: Normal rate and regular rhythm.      Heart sounds: Normal heart sounds.   Pulmonary:      Effort: Pulmonary effort is normal.      Breath sounds: Normal breath sounds.   Musculoskeletal:      Right lower leg: No edema.      Left lower leg: No edema.   Skin:     General: Skin is warm and dry.       Capillary Refill: Capillary refill takes less than 2 seconds.   Neurological:      General: No focal deficit present.      Mental Status: She is alert and oriented to person, place, and time.      Cranial Nerves: Cranial nerves are intact.      Motor: Motor function is intact.      Coordination: Coordination is intact.      Gait: Gait is intact.   Psychiatric:         Attention and Perception: Attention normal.         Mood and Affect: Mood normal. Affect is flat.         Speech: Speech normal.         Behavior: Behavior normal. Behavior is cooperative.         Thought Content: Thought content normal.         Cognition and Memory: Cognition and memory normal.         Judgment: Judgment normal.         Assessment:       1. Hypertension associated with diabetes    2. Uncontrolled type 2 diabetes mellitus with hyperglycemia, without long-term current use of insulin    3. Anxiety and depression    4. Apnea    5. Vertigo    6. Screen for colon cancer    7. Tendinitis of left rotator cuff        Plan:       Nik was seen today for follow-up.    Diagnoses and all orders for this visit:    Hypertension associated with diabetes  BP at goal   DM not   Uncontrolled type 2 diabetes mellitus with hyperglycemia, without long-term current use of insulin  -     Ambulatory referral/consult to Optometry; Future  -     semaglutide (RYBELSUS) 3 mg tablet; Take 1 tablet (3 mg total) by mouth once daily.  -     semaglutide (RYBELSUS) 7 mg tablet; Take 1 tablet (7 mg total) by mouth once daily.  -     Hemoglobin A1C; Future  -     Comprehensive Metabolic Panel; Future  D/c glyburide   Anxiety and depression  -   increase  lamoTRIgine (LAMICTAL) 150 MG Tab; Take 1 tablet (150 mg total) by mouth once daily.  -     Ambulatory referral/consult to Psychiatry; Future  continue zoloft   Apnea  -     Home Sleep Studies; Future    Vertigo  -     meclizine (ANTIVERT) 25 mg tablet; Take 1 tablet (25 mg total) by mouth 3 (three) times daily as needed  "for Dizziness.  -     Ambulatory referral/consult to ENT; Future    Screen for colon cancer  -     Ambulatory referral/consult to Gastroenterology; Future    Tendinitis of left rotator cuff  -     Ambulatory referral/consult to Physical/Occupational Therapy; Future           Follow up in about 3 months (around 5/18/2022) for Follow-Up with PCP, labs one week prior.           Documentation entered by me for this encounter may have been done in part using speech-recognition technology. Although I have made an effort to ensure accuracy, "sound like" errors may exist and should be interpreted in context.   I spent a total of 36 minutes on the day of the visit.This includes face to face time and non-face to face time preparing to see the patient (eg, review of tests), obtaining and/or reviewing separately obtained history, documenting clinical information in the electronic or other health record, independently interpreting results and communicating results to the patient/family/caregiver, or care coordinator.    "

## 2022-03-01 ENCOUNTER — PATIENT MESSAGE (OUTPATIENT)
Dept: FAMILY MEDICINE | Facility: CLINIC | Age: 57
End: 2022-03-01
Payer: COMMERCIAL

## 2022-03-03 ENCOUNTER — TELEPHONE (OUTPATIENT)
Dept: FAMILY MEDICINE | Facility: CLINIC | Age: 57
End: 2022-03-03
Payer: COMMERCIAL

## 2022-03-03 DIAGNOSIS — E11.65 UNCONTROLLED TYPE 2 DIABETES MELLITUS WITH HYPERGLYCEMIA, WITHOUT LONG-TERM CURRENT USE OF INSULIN: Primary | ICD-10-CM

## 2022-03-03 NOTE — TELEPHONE ENCOUNTER
Spoke to patient to make aware that Jardiance called to pharmacy to replace Rybelsus. Verbalized understanding

## 2022-03-08 ENCOUNTER — PROCEDURE VISIT (OUTPATIENT)
Dept: SLEEP MEDICINE | Facility: HOSPITAL | Age: 57
End: 2022-03-08
Attending: PHYSICIAN ASSISTANT
Payer: COMMERCIAL

## 2022-03-08 DIAGNOSIS — R06.81 APNEA: ICD-10-CM

## 2022-03-08 PROCEDURE — 95806 SLEEP STUDY UNATT&RESP EFFT: CPT

## 2022-03-15 ENCOUNTER — PATIENT OUTREACH (OUTPATIENT)
Dept: ADMINISTRATIVE | Facility: HOSPITAL | Age: 57
End: 2022-03-15
Payer: COMMERCIAL

## 2022-03-24 ENCOUNTER — TELEPHONE (OUTPATIENT)
Dept: FAMILY MEDICINE | Facility: CLINIC | Age: 57
End: 2022-03-24
Payer: COMMERCIAL

## 2022-03-24 DIAGNOSIS — G47.33 OSA (OBSTRUCTIVE SLEEP APNEA): Primary | ICD-10-CM

## 2022-03-24 NOTE — TELEPHONE ENCOUNTER
----- Message from Dawit Carl sent at 3/24/2022  2:10 PM CDT -----  Regarding: APAP needs ordered  Insurance has approved an APAP sleep study instead of a CPAP sleep study.  An order for APAP sleep study needs to be placed and sent to [facility].  I have the approval but the approval will need to be changed to [facility].  [facility] should be able to change that with the insurance.  Insurance will not approve a CPAP sleep study without a peer to peer review.      Please have Paulina place order for APAP sleep study through [facility] for this patient.    Thanks  Dawit.

## 2022-03-25 ENCOUNTER — TELEPHONE (OUTPATIENT)
Dept: PULMONOLOGY | Facility: CLINIC | Age: 57
End: 2022-03-25
Payer: COMMERCIAL

## 2022-03-25 NOTE — TELEPHONE ENCOUNTER
Pulmonology referral scheduled with patient:  4/21/2022 Trish Stanley NP [facility].

## 2022-03-28 ENCOUNTER — PATIENT OUTREACH (OUTPATIENT)
Dept: ADMINISTRATIVE | Facility: OTHER | Age: 57
End: 2022-03-28
Payer: COMMERCIAL

## 2022-03-28 ENCOUNTER — TELEPHONE (OUTPATIENT)
Dept: FAMILY MEDICINE | Facility: CLINIC | Age: 57
End: 2022-03-28
Payer: COMMERCIAL

## 2022-03-28 NOTE — TELEPHONE ENCOUNTER
I am not really sure how to order an APAP as we do not have that in our system to order since we don't do them.  I did however forward the CPAP order on to [facility] and put a note on there that this is for an APAP sleep study and if it needed to be corrected, then they can get a hold of Paulina to let her know how to order.  If I get a kickback from this, I will let the office know.  This is new to me as well, so we will figure it out together.   The number to [facility] is [phone] but I didn't really get anywhere with them as I was put on hold forever and then hung up on, so I did approve the referral out and sent it to [facility].       Thanks   Dawit.

## 2022-03-28 NOTE — PROGRESS NOTES
Chart was reviewed for overdue [facility]ters ([facility])  topics  Updates were requested from care everywhere  Health Maintenance has been updated  LINKS immunization registry triggered  Eye exam appt 4/4/22

## 2022-04-04 ENCOUNTER — OFFICE VISIT (OUTPATIENT)
Dept: OPHTHALMOLOGY | Facility: CLINIC | Age: 57
End: 2022-04-04
Payer: COMMERCIAL

## 2022-04-04 DIAGNOSIS — Z98.890 HX OF LASIK: ICD-10-CM

## 2022-04-04 DIAGNOSIS — Z12.11 SCREENING FOR COLON CANCER: Primary | ICD-10-CM

## 2022-04-04 DIAGNOSIS — H04.123 DRY EYE SYNDROME, BILATERAL: ICD-10-CM

## 2022-04-04 DIAGNOSIS — E11.9 TYPE 2 DIABETES MELLITUS WITHOUT RETINOPATHY: Primary | ICD-10-CM

## 2022-04-04 PROCEDURE — 2023F DILAT RTA XM W/O RTNOPTHY: CPT | Mod: CPTII,S$GLB,, | Performed by: OPHTHALMOLOGY

## 2022-04-04 PROCEDURE — 3052F HG A1C>EQUAL 8.0%<EQUAL 9.0%: CPT | Mod: CPTII,S$GLB,, | Performed by: OPHTHALMOLOGY

## 2022-04-04 PROCEDURE — 3052F PR MOST RECENT HEMOGLOBIN A1C LEVEL 8.0 - < 9.0%: ICD-10-PCS | Mod: CPTII,S$GLB,, | Performed by: OPHTHALMOLOGY

## 2022-04-04 PROCEDURE — 92015 DETERMINE REFRACTIVE STATE: CPT | Mod: S$GLB,,, | Performed by: OPHTHALMOLOGY

## 2022-04-04 PROCEDURE — 92015 PR REFRACTION: ICD-10-PCS | Mod: S$GLB,,, | Performed by: OPHTHALMOLOGY

## 2022-04-04 PROCEDURE — 92004 PR EYE EXAM, NEW PATIENT,COMPREHESV: ICD-10-PCS | Mod: S$GLB,,, | Performed by: OPHTHALMOLOGY

## 2022-04-04 PROCEDURE — 3061F NEG MICROALBUMINURIA REV: CPT | Mod: CPTII,S$GLB,, | Performed by: OPHTHALMOLOGY

## 2022-04-04 PROCEDURE — 4010F PR ACE/ARB THEARPY RXD/TAKEN: ICD-10-PCS | Mod: CPTII,S$GLB,, | Performed by: OPHTHALMOLOGY

## 2022-04-04 PROCEDURE — 1159F MED LIST DOCD IN RCRD: CPT | Mod: CPTII,S$GLB,, | Performed by: OPHTHALMOLOGY

## 2022-04-04 PROCEDURE — 4010F ACE/ARB THERAPY RXD/TAKEN: CPT | Mod: CPTII,S$GLB,, | Performed by: OPHTHALMOLOGY

## 2022-04-04 PROCEDURE — 99999 PR PBB SHADOW E&M-EST. PATIENT-LVL IV: ICD-10-PCS | Mod: PBBFAC,,, | Performed by: OPHTHALMOLOGY

## 2022-04-04 PROCEDURE — 1160F RVW MEDS BY RX/DR IN RCRD: CPT | Mod: CPTII,S$GLB,, | Performed by: OPHTHALMOLOGY

## 2022-04-04 PROCEDURE — 3066F PR DOCUMENTATION OF TREATMENT FOR NEPHROPATHY: ICD-10-PCS | Mod: CPTII,S$GLB,, | Performed by: OPHTHALMOLOGY

## 2022-04-04 PROCEDURE — 92004 COMPRE OPH EXAM NEW PT 1/>: CPT | Mod: S$GLB,,, | Performed by: OPHTHALMOLOGY

## 2022-04-04 PROCEDURE — 1160F PR REVIEW ALL MEDS BY PRESCRIBER/CLIN PHARMACIST DOCUMENTED: ICD-10-PCS | Mod: CPTII,S$GLB,, | Performed by: OPHTHALMOLOGY

## 2022-04-04 PROCEDURE — 99999 PR PBB SHADOW E&M-EST. PATIENT-LVL IV: CPT | Mod: PBBFAC,,, | Performed by: OPHTHALMOLOGY

## 2022-04-04 PROCEDURE — 3066F NEPHROPATHY DOC TX: CPT | Mod: CPTII,S$GLB,, | Performed by: OPHTHALMOLOGY

## 2022-04-04 PROCEDURE — 1159F PR MEDICATION LIST DOCUMENTED IN MEDICAL RECORD: ICD-10-PCS | Mod: CPTII,S$GLB,, | Performed by: OPHTHALMOLOGY

## 2022-04-04 PROCEDURE — 3061F PR NEG MICROALBUMINURIA RESULT DOCUMENTED/REVIEW: ICD-10-PCS | Mod: CPTII,S$GLB,, | Performed by: OPHTHALMOLOGY

## 2022-04-04 PROCEDURE — 2023F PR DILATED RETINAL EXAM W/O EVID OF RETINOPATHY: ICD-10-PCS | Mod: CPTII,S$GLB,, | Performed by: OPHTHALMOLOGY

## 2022-04-04 NOTE — PROGRESS NOTES
HPI     Diabetic Eye Exam      Additional comments: LDE: 03/2019 Dr. Mendez.               Comments     57 YO female presents today for an annual diabetic eye exam. Patient   states that when watching TV things seem blurred. Also has trouble with   halos when driving. Occasional trouble with allergies.     H/O lasik OU     Hemoglobin A1C       Date                     Value               Ref Range             Status                02/12/2022               8.7 (H)             4.0 - 5.6 %           Final                  07/08/2021               8.5 (H)             4.0 - 5.6 %           Final                  11/21/2020               7.9 (H)             4.0 - 5.6 %           Final                      Last edited by Flori Moncada, PCT on 4/4/2022  2:12 PM. (History)            Assessment /Plan     For exam results, see Encounter Report.    Type 2 diabetes mellitus without retinopathy  -     Ambulatory referral/consult to Optometry    Dry eye syndrome, bilateral    Hx of LASIK      1. Type 2 diabetes mellitus without retinopathy  Diabetes without retinopathy, discussed with patient importance of glucose control and follow up.  Patient voices understanding.    2. Dry eye syndrome, bilateral  Artificial tears up to QID PRN    3. Hx of LASIK  Some regression to myopia  Haloes are likely a result of her lasik as well  Update glasses rx today    F/u 1 year, routine exam

## 2022-04-13 ENCOUNTER — OCCUPATIONAL HEALTH (OUTPATIENT)
Dept: URGENT CARE | Facility: CLINIC | Age: 57
End: 2022-04-13

## 2022-04-13 PROCEDURE — 80305 DRUG TEST PRSMV DIR OPT OBS: CPT | Mod: S$GLB,,, | Performed by: EMERGENCY MEDICINE

## 2022-04-13 PROCEDURE — 80305 PR COLLECTION ONLY DRUG SCREEN: ICD-10-PCS | Mod: S$GLB,,, | Performed by: EMERGENCY MEDICINE

## 2022-04-21 ENCOUNTER — OFFICE VISIT (OUTPATIENT)
Dept: PULMONOLOGY | Facility: CLINIC | Age: 57
End: 2022-04-21
Payer: COMMERCIAL

## 2022-04-21 VITALS
DIASTOLIC BLOOD PRESSURE: 81 MMHG | BODY MASS INDEX: 37.34 KG/M2 | WEIGHT: 224.13 LBS | HEIGHT: 65 IN | HEART RATE: 83 BPM | OXYGEN SATURATION: 95 % | SYSTOLIC BLOOD PRESSURE: 112 MMHG

## 2022-04-21 DIAGNOSIS — R06.02 SHORTNESS OF BREATH: Primary | ICD-10-CM

## 2022-04-21 DIAGNOSIS — G47.33 OSA (OBSTRUCTIVE SLEEP APNEA): ICD-10-CM

## 2022-04-21 PROCEDURE — 3052F PR MOST RECENT HEMOGLOBIN A1C LEVEL 8.0 - < 9.0%: ICD-10-PCS | Mod: CPTII,S$GLB,, | Performed by: NURSE PRACTITIONER

## 2022-04-21 PROCEDURE — 4010F PR ACE/ARB THEARPY RXD/TAKEN: ICD-10-PCS | Mod: CPTII,S$GLB,, | Performed by: NURSE PRACTITIONER

## 2022-04-21 PROCEDURE — 3061F PR NEG MICROALBUMINURIA RESULT DOCUMENTED/REVIEW: ICD-10-PCS | Mod: CPTII,S$GLB,, | Performed by: NURSE PRACTITIONER

## 2022-04-21 PROCEDURE — 3074F PR MOST RECENT SYSTOLIC BLOOD PRESSURE < 130 MM HG: ICD-10-PCS | Mod: CPTII,S$GLB,, | Performed by: NURSE PRACTITIONER

## 2022-04-21 PROCEDURE — 99999 PR PBB SHADOW E&M-EST. PATIENT-LVL IV: CPT | Mod: PBBFAC,,, | Performed by: NURSE PRACTITIONER

## 2022-04-21 PROCEDURE — 1159F MED LIST DOCD IN RCRD: CPT | Mod: CPTII,S$GLB,, | Performed by: NURSE PRACTITIONER

## 2022-04-21 PROCEDURE — 3066F PR DOCUMENTATION OF TREATMENT FOR NEPHROPATHY: ICD-10-PCS | Mod: CPTII,S$GLB,, | Performed by: NURSE PRACTITIONER

## 2022-04-21 PROCEDURE — 99999 PR PBB SHADOW E&M-EST. PATIENT-LVL IV: ICD-10-PCS | Mod: PBBFAC,,, | Performed by: NURSE PRACTITIONER

## 2022-04-21 PROCEDURE — 3008F BODY MASS INDEX DOCD: CPT | Mod: CPTII,S$GLB,, | Performed by: NURSE PRACTITIONER

## 2022-04-21 PROCEDURE — 1159F PR MEDICATION LIST DOCUMENTED IN MEDICAL RECORD: ICD-10-PCS | Mod: CPTII,S$GLB,, | Performed by: NURSE PRACTITIONER

## 2022-04-21 PROCEDURE — 3074F SYST BP LT 130 MM HG: CPT | Mod: CPTII,S$GLB,, | Performed by: NURSE PRACTITIONER

## 2022-04-21 PROCEDURE — 3052F HG A1C>EQUAL 8.0%<EQUAL 9.0%: CPT | Mod: CPTII,S$GLB,, | Performed by: NURSE PRACTITIONER

## 2022-04-21 PROCEDURE — 99203 PR OFFICE/OUTPT VISIT, NEW, LEVL III, 30-44 MIN: ICD-10-PCS | Mod: S$GLB,,, | Performed by: NURSE PRACTITIONER

## 2022-04-21 PROCEDURE — 3079F DIAST BP 80-89 MM HG: CPT | Mod: CPTII,S$GLB,, | Performed by: NURSE PRACTITIONER

## 2022-04-21 PROCEDURE — 4010F ACE/ARB THERAPY RXD/TAKEN: CPT | Mod: CPTII,S$GLB,, | Performed by: NURSE PRACTITIONER

## 2022-04-21 PROCEDURE — 3066F NEPHROPATHY DOC TX: CPT | Mod: CPTII,S$GLB,, | Performed by: NURSE PRACTITIONER

## 2022-04-21 PROCEDURE — 3008F PR BODY MASS INDEX (BMI) DOCUMENTED: ICD-10-PCS | Mod: CPTII,S$GLB,, | Performed by: NURSE PRACTITIONER

## 2022-04-21 PROCEDURE — 99203 OFFICE O/P NEW LOW 30 MIN: CPT | Mod: S$GLB,,, | Performed by: NURSE PRACTITIONER

## 2022-04-21 PROCEDURE — 3061F NEG MICROALBUMINURIA REV: CPT | Mod: CPTII,S$GLB,, | Performed by: NURSE PRACTITIONER

## 2022-04-21 PROCEDURE — 3079F PR MOST RECENT DIASTOLIC BLOOD PRESSURE 80-89 MM HG: ICD-10-PCS | Mod: CPTII,S$GLB,, | Performed by: NURSE PRACTITIONER

## 2022-04-21 RX ORDER — ALBUTEROL SULFATE 90 UG/1
2 AEROSOL, METERED RESPIRATORY (INHALATION) EVERY 4 HOURS PRN
Qty: 18 G | Refills: 11 | Status: SHIPPED | OUTPATIENT
Start: 2022-04-21 | End: 2022-12-14

## 2022-04-21 NOTE — PROGRESS NOTES
4/21/2022    Nik Lowery  New Patient Consult    Chief Complaint   Patient presents with    Sleep Apnea       HPI: 4/21/2022- referred by PCP  For sleep apnea, friends taped her sleeping with loud snoring, snorting, and holding breath while sleeping,  Associated with short term memory loss.   Wakes up choking at night in early morning hours 1 time every 2 months.  Complains of daytime drowsiness onset years, no change in past 2 years, has to takes day time naps. Wakes up feeling drowsy.   Complaint of shortness of breath- onset years, occurs when bending over and walking up stairs. No complaint of cough, chest tightness, or wheeze.      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) no known Asbestosis exposure, no Smoking Hx  Family Hx: no Lung Cancer, no COPD, sisters Asthma, no BIJAN  Medical Hx: no previous pneumonia ; no previous shoulder/chest surgery          The chief compliant  problem is new to me  PFSH:  Past Medical History:   Diagnosis Date    Diabetes mellitus     Diabetes mellitus, type 2     Herpes     Hypertension          Past Surgical History:   Procedure Laterality Date    EYE SURGERY  2002    lasix Bilateral    HYSTERECTOMY  2012    LUMBAR DISCECTOMY      WISDOM TOOTH EXTRACTION       Social History     Tobacco Use    Smoking status: Never Smoker    Smokeless tobacco: Never Used   Substance Use Topics    Alcohol use: No    Drug use: No     Family History   Problem Relation Age of Onset    Heart disease Mother     Hypertension Mother     Cataracts Mother     Pancreatic cancer Father     Cancer Father     Arthritis Sister     Diabetes Brother     No Known Problems Paternal Aunt     Heart disease Paternal Uncle     Heart disease Maternal Grandfather     Alzheimer's disease Paternal Grandmother     Glaucoma Neg Hx      Review of patient's allergies indicates:   Allergen Reactions    Robaxin [methocarbamol] Nausea Only  "    I have reviewed past medical, family, and social history. I have reviewed previous nurse notes.    Performance Status:The patient's activity level is functions out of house.          Review of Systems   Constitutional: Negative for activity change, appetite change, chills, diaphoresis, fever and unexpected weight change. Positive for fatigue  HENT: Negative for dental problem, postnasal drip, rhinorrhea, sneezing, sore throat, trouble swallowing.  Positive for sinus drainage and pressure, hoarse voice change  Respiratory: Negative for apnea, cough, chest tightness, wheezing and stridor.  Positive for shortness of breath when bending over.   Cardiovascular: Negative for chest pain, palpitations and leg swelling.   Gastrointestinal: Negative for abdominal distention, abdominal pain, constipation and nausea.   Musculoskeletal: Negative for gait problem, myalgias.   Skin: Negative for color change and pallor.   Allergic/Immunologic: Negative for environmental allergies and food allergies.   Neurological: Negative for dizziness, speech difficulty, weakness, light-headedness, numbness and headaches.   Hematological: Negative for adenopathy. Does not bruise/bleed easily.   Psychiatric/Behavioral: Negative for dysphoric mood and sleep disturbance. Positive for anxiety tx by PCP         Exam:Comprehensive exam done. /81 (BP Location: Right arm, Patient Position: Sitting, BP Method: Medium (Automatic))   Pulse 83   Ht 5' 5" (1.651 m)   Wt 101.6 kg (224 lb 1.6 oz)   SpO2 95% Comment: on room air at rest  BMI 37.29 kg/m²   Exam included Vitals as listed  Constitutional:  oriented to person, place, and time. appears well-developed. No distress.   Nose: Nose normal.   Mouth/Throat: Uvula is midline, oropharynx is clear and moist and mucous membranes are normal. No dental caries. No oropharyngeal exudate, posterior oropharyngeal edema, posterior oropharyngeal erythema or tonsillar abscesses.  Mallapatti (M) score " 3  Eyes: Pupils are equal, round, and reactive to light.   Neck: No JVD present. No thyromegaly present.   Cardiovascular: Normal rate, regular rhythm and normal heart sounds. Exam reveals no gallop and no friction rub.   No murmur heard.  Pulmonary/Chest: Effort normal and breath sounds normal. No accessory muscle usage or stridor. No apnea and no tachypnea. No respiratory distress, decreased breath sounds, wheezes, rhonchi, rales, or tenderness.   Abdominal: Soft. exhibits no mass. There is no tenderness. No hepatosplenomegaly, hernias and normoactive bowel sounds  Musculoskeletal: Normal range of motion. exhibits no edema.   Lymphadenopathy:   no cervical adenopathy.   Neurological:  alert and oriented to person, place, and time. not disoriented.   Skin: Skin is warm and dry. Capillary refill takes less 2 sec. No cyanosis or erythema. No pallor. Nails show no clubbing.   Psychiatric: normal mood and affect. behavior is normal. Judgment and thought content normal.       Radiographs (ct chest and cxr) reviewed: view by direct vision   X-Ray Chest PA And Lateral  10/31/2018   There is mild elevation of the right diaphragm.     Labs reviewed    Lab Results   Component Value Date    WBC 8.78 10/31/2018    RBC 4.21 10/31/2018    HGB 12.4 10/31/2018    HCT 37.2 10/31/2018    MCV 88 10/31/2018    MCH 29.5 10/31/2018    MCHC 33.3 10/31/2018    RDW 13.2 10/31/2018     10/31/2018    MPV 9.8 10/31/2018    GRAN 5.5 10/31/2018    GRAN 62.1 10/31/2018    LYMPH 2.5 10/31/2018    LYMPH 28.7 10/31/2018    MONO 0.6 10/31/2018    MONO 6.3 10/31/2018    EOS 0.2 10/31/2018    BASO 0.04 10/31/2018    EOSINOPHIL 2.2 10/31/2018    BASOPHIL 0.5 10/31/2018      Latest Reference Range & Units 11/21/20 10:25 07/08/21 09:27 02/12/22 08:52   CO2 23 - 29 mmol/L 26 27 24     PFT was not done  Pulmonary Functions Testing Results:    EPWORTH SLEEPINESS SCALE score 9 on 4/21/2022    Plan:  Clinical impression is apparently straight forward  and impression with management as below.    Nik was seen today for sleep apnea.    Diagnoses and all orders for this visit:    Shortness of breath  -     albuterol (VENTOLIN HFA) 90 mcg/actuation inhaler; Inhale 2 puffs into the lungs every 4 (four) hours as needed for Wheezing or Shortness of Breath. Rescue    BIJAN (obstructive sleep apnea)  -     Ambulatory referral/consult to Pulmonology  -     CPAP FOR HOME USE        Follow up in about 3 months (around 7/21/2022), or if symptoms worsen or fail to improve.    Discussed with patient above for education the following:      Patient Instructions   notify clinic when machine is delivered to home to set up 31 days compliance appointment. You must use the machine for a least 4 hours every night.       Albuterol Inhaler 1-2 puffs every 4 hours, for cough or shortness of breath

## 2022-04-21 NOTE — PATIENT INSTRUCTIONS
notify clinic when machine is delivered to home to set up 31 days compliance appointment. You must use the machine for a least 4 hours every night.       Albuterol Inhaler 1-2 puffs every 4 hours, for cough or shortness of breath

## 2022-04-22 ENCOUNTER — TELEPHONE (OUTPATIENT)
Dept: PULMONOLOGY | Facility: CLINIC | Age: 57
End: 2022-04-22
Payer: COMMERCIAL

## 2022-05-21 ENCOUNTER — LAB VISIT (OUTPATIENT)
Dept: LAB | Facility: HOSPITAL | Age: 57
End: 2022-05-21
Attending: PHYSICIAN ASSISTANT
Payer: COMMERCIAL

## 2022-05-21 DIAGNOSIS — E11.65 UNCONTROLLED TYPE 2 DIABETES MELLITUS WITH HYPERGLYCEMIA, WITHOUT LONG-TERM CURRENT USE OF INSULIN: ICD-10-CM

## 2022-05-21 LAB
ALBUMIN SERPL BCP-MCNC: 4 G/DL (ref 3.5–5.2)
ALP SERPL-CCNC: 80 U/L (ref 55–135)
ALT SERPL W/O P-5'-P-CCNC: 60 U/L (ref 10–44)
ANION GAP SERPL CALC-SCNC: 11 MMOL/L (ref 8–16)
AST SERPL-CCNC: 52 U/L (ref 10–40)
BILIRUB SERPL-MCNC: 0.3 MG/DL (ref 0.1–1)
BUN SERPL-MCNC: 16 MG/DL (ref 6–20)
CALCIUM SERPL-MCNC: 9.6 MG/DL (ref 8.7–10.5)
CHLORIDE SERPL-SCNC: 102 MMOL/L (ref 95–110)
CO2 SERPL-SCNC: 24 MMOL/L (ref 23–29)
CREAT SERPL-MCNC: 0.9 MG/DL (ref 0.5–1.4)
EST. GFR  (AFRICAN AMERICAN): >60 ML/MIN/1.73 M^2
EST. GFR  (NON AFRICAN AMERICAN): >60 ML/MIN/1.73 M^2
ESTIMATED AVG GLUCOSE: 220 MG/DL (ref 68–131)
GLUCOSE SERPL-MCNC: 187 MG/DL (ref 70–110)
HBA1C MFR BLD: 9.3 % (ref 4–5.6)
POTASSIUM SERPL-SCNC: 4.5 MMOL/L (ref 3.5–5.1)
PROT SERPL-MCNC: 7.5 G/DL (ref 6–8.4)
SODIUM SERPL-SCNC: 137 MMOL/L (ref 136–145)

## 2022-05-21 PROCEDURE — 36415 COLL VENOUS BLD VENIPUNCTURE: CPT | Mod: PO | Performed by: PHYSICIAN ASSISTANT

## 2022-05-21 PROCEDURE — 80053 COMPREHEN METABOLIC PANEL: CPT | Performed by: PHYSICIAN ASSISTANT

## 2022-05-21 PROCEDURE — 83036 HEMOGLOBIN GLYCOSYLATED A1C: CPT | Performed by: PHYSICIAN ASSISTANT

## 2022-05-23 ENCOUNTER — TELEPHONE (OUTPATIENT)
Dept: GASTROENTEROLOGY | Facility: CLINIC | Age: 57
End: 2022-05-23
Payer: COMMERCIAL

## 2022-05-24 ENCOUNTER — TELEPHONE (OUTPATIENT)
Dept: FAMILY MEDICINE | Facility: CLINIC | Age: 57
End: 2022-05-24
Payer: COMMERCIAL

## 2022-05-25 ENCOUNTER — OFFICE VISIT (OUTPATIENT)
Dept: FAMILY MEDICINE | Facility: CLINIC | Age: 57
End: 2022-05-25
Payer: COMMERCIAL

## 2022-05-25 ENCOUNTER — DOCUMENTATION ONLY (OUTPATIENT)
Dept: FAMILY MEDICINE | Facility: CLINIC | Age: 57
End: 2022-05-25
Payer: COMMERCIAL

## 2022-05-25 VITALS
OXYGEN SATURATION: 97 % | SYSTOLIC BLOOD PRESSURE: 118 MMHG | WEIGHT: 226.63 LBS | HEIGHT: 65 IN | DIASTOLIC BLOOD PRESSURE: 76 MMHG | HEART RATE: 84 BPM | BODY MASS INDEX: 37.76 KG/M2

## 2022-05-25 DIAGNOSIS — F41.9 ANXIETY AND DEPRESSION: ICD-10-CM

## 2022-05-25 DIAGNOSIS — R21 SKIN RASH: ICD-10-CM

## 2022-05-25 DIAGNOSIS — E11.65 UNCONTROLLED TYPE 2 DIABETES MELLITUS WITH HYPERGLYCEMIA, WITHOUT LONG-TERM CURRENT USE OF INSULIN: ICD-10-CM

## 2022-05-25 DIAGNOSIS — K21.9 GASTROESOPHAGEAL REFLUX DISEASE WITHOUT ESOPHAGITIS: ICD-10-CM

## 2022-05-25 DIAGNOSIS — F32.A ANXIETY AND DEPRESSION: ICD-10-CM

## 2022-05-25 DIAGNOSIS — Z12.11 SCREENING FOR COLON CANCER: ICD-10-CM

## 2022-05-25 DIAGNOSIS — J31.0 CHRONIC RHINITIS: ICD-10-CM

## 2022-05-25 DIAGNOSIS — F33.0 MILD EPISODE OF RECURRENT MAJOR DEPRESSIVE DISORDER: Primary | ICD-10-CM

## 2022-05-25 PROCEDURE — 3046F PR MOST RECENT HEMOGLOBIN A1C LEVEL > 9.0%: ICD-10-PCS | Mod: CPTII,S$GLB,, | Performed by: FAMILY MEDICINE

## 2022-05-25 PROCEDURE — 3078F DIAST BP <80 MM HG: CPT | Mod: CPTII,S$GLB,, | Performed by: FAMILY MEDICINE

## 2022-05-25 PROCEDURE — 3046F HEMOGLOBIN A1C LEVEL >9.0%: CPT | Mod: CPTII,S$GLB,, | Performed by: FAMILY MEDICINE

## 2022-05-25 PROCEDURE — 3061F NEG MICROALBUMINURIA REV: CPT | Mod: CPTII,S$GLB,, | Performed by: FAMILY MEDICINE

## 2022-05-25 PROCEDURE — 99396 PR PREVENTIVE VISIT,EST,40-64: ICD-10-PCS | Mod: S$GLB,,, | Performed by: FAMILY MEDICINE

## 2022-05-25 PROCEDURE — 3074F SYST BP LT 130 MM HG: CPT | Mod: CPTII,S$GLB,, | Performed by: FAMILY MEDICINE

## 2022-05-25 PROCEDURE — 3066F NEPHROPATHY DOC TX: CPT | Mod: CPTII,S$GLB,, | Performed by: FAMILY MEDICINE

## 2022-05-25 PROCEDURE — 1159F PR MEDICATION LIST DOCUMENTED IN MEDICAL RECORD: ICD-10-PCS | Mod: CPTII,S$GLB,, | Performed by: FAMILY MEDICINE

## 2022-05-25 PROCEDURE — 1159F MED LIST DOCD IN RCRD: CPT | Mod: CPTII,S$GLB,, | Performed by: FAMILY MEDICINE

## 2022-05-25 PROCEDURE — 3008F BODY MASS INDEX DOCD: CPT | Mod: CPTII,S$GLB,, | Performed by: FAMILY MEDICINE

## 2022-05-25 PROCEDURE — 3008F PR BODY MASS INDEX (BMI) DOCUMENTED: ICD-10-PCS | Mod: CPTII,S$GLB,, | Performed by: FAMILY MEDICINE

## 2022-05-25 PROCEDURE — 3061F PR NEG MICROALBUMINURIA RESULT DOCUMENTED/REVIEW: ICD-10-PCS | Mod: CPTII,S$GLB,, | Performed by: FAMILY MEDICINE

## 2022-05-25 PROCEDURE — 3078F PR MOST RECENT DIASTOLIC BLOOD PRESSURE < 80 MM HG: ICD-10-PCS | Mod: CPTII,S$GLB,, | Performed by: FAMILY MEDICINE

## 2022-05-25 PROCEDURE — 4010F PR ACE/ARB THEARPY RXD/TAKEN: ICD-10-PCS | Mod: CPTII,S$GLB,, | Performed by: FAMILY MEDICINE

## 2022-05-25 PROCEDURE — 99999 PR PBB SHADOW E&M-EST. PATIENT-LVL III: ICD-10-PCS | Mod: PBBFAC,,, | Performed by: FAMILY MEDICINE

## 2022-05-25 PROCEDURE — 99999 PR PBB SHADOW E&M-EST. PATIENT-LVL III: CPT | Mod: PBBFAC,,, | Performed by: FAMILY MEDICINE

## 2022-05-25 PROCEDURE — 99396 PREV VISIT EST AGE 40-64: CPT | Mod: S$GLB,,, | Performed by: FAMILY MEDICINE

## 2022-05-25 PROCEDURE — 3074F PR MOST RECENT SYSTOLIC BLOOD PRESSURE < 130 MM HG: ICD-10-PCS | Mod: CPTII,S$GLB,, | Performed by: FAMILY MEDICINE

## 2022-05-25 PROCEDURE — 3066F PR DOCUMENTATION OF TREATMENT FOR NEPHROPATHY: ICD-10-PCS | Mod: CPTII,S$GLB,, | Performed by: FAMILY MEDICINE

## 2022-05-25 PROCEDURE — 4010F ACE/ARB THERAPY RXD/TAKEN: CPT | Mod: CPTII,S$GLB,, | Performed by: FAMILY MEDICINE

## 2022-05-25 RX ORDER — ALPRAZOLAM 0.25 MG/1
0.25 TABLET ORAL DAILY PRN
Qty: 30 TABLET | Refills: 1 | Status: SHIPPED | OUTPATIENT
Start: 2022-05-25

## 2022-05-25 RX ORDER — PANTOPRAZOLE SODIUM 20 MG/1
20 TABLET, DELAYED RELEASE ORAL DAILY
Qty: 90 TABLET | Refills: 3 | Status: SHIPPED | OUTPATIENT
Start: 2022-05-25 | End: 2022-05-25

## 2022-05-25 NOTE — PROGRESS NOTES
Subjective:   Patient ID: Nik Lowery is a 56 y.o. female     Chief Complaint:Diabetes      Here for checkup    Review of Systems   Constitutional: Negative for chills and fever.   HENT: Negative for sore throat and trouble swallowing.    Respiratory: Negative for cough and shortness of breath.    Cardiovascular: Negative for chest pain and leg swelling.   Gastrointestinal: Negative for abdominal distention and abdominal pain.   Genitourinary: Negative for dysuria and flank pain.   Musculoskeletal: Negative for arthralgias and back pain.   Skin: Negative for color change and pallor.   Neurological: Negative for weakness and headaches.   Psychiatric/Behavioral: Negative for agitation and confusion.     Past Medical History:   Diagnosis Date    Diabetes mellitus     Diabetes mellitus, type 2     Herpes     Hypertension      Past Surgical History:   Procedure Laterality Date    EYE SURGERY  2002    lasix Bilateral    HYSTERECTOMY  2012    LUMBAR DISCECTOMY      WISDOM TOOTH EXTRACTION       Objective:     Vitals:    05/25/22 1119   BP: 118/76   Pulse: 84     Body mass index is 37.71 kg/m².  Physical Exam  Vitals and nursing note reviewed.   Constitutional:       Appearance: She is well-developed.   HENT:      Head: Normocephalic and atraumatic.   Eyes:      General: No scleral icterus.     Conjunctiva/sclera: Conjunctivae normal.   Cardiovascular:      Heart sounds: No murmur heard.  Pulmonary:      Effort: Pulmonary effort is normal. No respiratory distress.   Musculoskeletal:         General: No deformity. Normal range of motion.      Cervical back: Normal range of motion and neck supple.   Skin:     Coloration: Skin is not pale.      Findings: No rash.   Neurological:      Mental Status: She is alert and oriented to person, place, and time.   Psychiatric:         Behavior: Behavior normal.         Thought Content: Thought content normal.         Judgment: Judgment normal.       Assessment:     1. Mild  episode of recurrent major depressive disorder    2. Gastroesophageal reflux disease without esophagitis    3. Anxiety and depression    4. Uncontrolled type 2 diabetes mellitus with hyperglycemia, without long-term current use of insulin    5. Screening for colon cancer      Plan:   Mild episode of recurrent major depressive disorder  -     ALPRAZolam (XANAX) 0.25 MG tablet; Take 1 tablet (0.25 mg total) by mouth daily as needed for Anxiety.  Dispense: 30 tablet; Refill: 1    Gastroesophageal reflux disease without esophagitis  -     pantoprazole (PROTONIX) 20 MG tablet; Take 1 tablet (20 mg total) by mouth once daily.  Dispense: 90 tablet; Refill: 3    Anxiety and depression  -     ALPRAZolam (XANAX) 0.25 MG tablet; Take 1 tablet (0.25 mg total) by mouth daily as needed for Anxiety.  Dispense: 30 tablet; Refill: 1    Uncontrolled type 2 diabetes mellitus with hyperglycemia, without long-term current use of insulin  -     Hemoglobin A1C; Future; Expected date: 08/25/2022  -     Comprehensive Metabolic Panel; Future; Expected date: 08/25/2022  Discussed at length importance of diet to improve blood sugar. Pt also notes she had not been compliant with jardiance. States she recently restarted taking jardiance and is still taking 1000mg metformin BID. Cautioned may need to introduce insulin in future if a1c continues to worsen.  Screening for colon cancer  -     Fecal Immunochemical Test (iFOBT); Future; Expected date: 05/25/2022    Chronic rhinitis  Discussed switching 2nd gen antihistamines. Offered referral to see specialist but declined at this time  Skin rash  Notes spots of dry skin. Discussed using moisturizer and suggested f/u with derm whom she has already established        Osmin Newsome MD  05/25/2022    Portions of this note have been dictated with EL Guerra.

## 2022-05-25 NOTE — PATIENT INSTRUCTIONS
Xavier Zaragoza,     If you are due for any health screening(s) below please notify me so we can arrange them to be ordered and scheduled to maintain your health. Most healthy patients complete it. Don't lose out on improving your health.     Health Maintenance   Topic Date Due    Foot Exam  07/16/2022    Hemoglobin A1c  08/21/2022    Lipid Panel  02/12/2023    Mammogram  02/17/2023    Low Dose Statin  04/04/2023    Eye Exam  04/04/2023    TETANUS VACCINE  08/30/2029    Hepatitis C Screening  Completed          Colon Cancer Screening    Of cancers affecting both men and women, colorectal cancer is the third leading cancer killer in the [place]. But it doesnt have to be. Screening can prevent colorectal cancer or find it at an early stage when treatment often leads to a cure.    A colonoscopy is the preferred test for detecting colon cancer. It is needed only once every 10 years if results are negative. While sedated, a flexible, lighted tube with a tiny camera is inserted into the rectum and advanced through the colon to look for cancers. An alternative screening test that is used at home and returned to the lab may also be used. It detects hidden blood in bowel movements which could indicate cancer in the colon. If results are positive, you will need a colonoscopy to determine if the blood is a sign of cancer. This type of follow up (diagnostic) colonoscopy usually requires additional copays as required by your insurance provider. Please contact your PCP if you have any questions.    Although you are still overdue for this important screening, due to the COVID-19 pandemic, we recommend scheduling it in the near future.            A1c every 3-6 months, lipid panel every year, microalbumin (urine test) once per year, comprehensive foot exam once per year, dilated eye exam at least once per year, dental exam every 6 months, flu vaccination every year, and pneumonia vaccination(s) as recommended

## 2022-05-31 ENCOUNTER — PATIENT MESSAGE (OUTPATIENT)
Dept: ADMINISTRATIVE | Facility: HOSPITAL | Age: 57
End: 2022-05-31
Payer: COMMERCIAL

## 2022-06-02 ENCOUNTER — PATIENT MESSAGE (OUTPATIENT)
Dept: FAMILY MEDICINE | Facility: CLINIC | Age: 57
End: 2022-06-02
Payer: COMMERCIAL

## 2022-06-02 DIAGNOSIS — E11.65 UNCONTROLLED TYPE 2 DIABETES MELLITUS WITH HYPERGLYCEMIA, WITHOUT LONG-TERM CURRENT USE OF INSULIN: Primary | ICD-10-CM

## 2022-06-06 ENCOUNTER — PATIENT MESSAGE (OUTPATIENT)
Dept: FAMILY MEDICINE | Facility: CLINIC | Age: 57
End: 2022-06-06
Payer: COMMERCIAL

## 2022-06-06 DIAGNOSIS — B00.9 HERPES SIMPLEX: ICD-10-CM

## 2022-06-06 RX ORDER — VALACYCLOVIR HYDROCHLORIDE 500 MG/1
500 TABLET, FILM COATED ORAL 2 TIMES DAILY
Qty: 180 TABLET | Refills: 3 | Status: SHIPPED | OUTPATIENT
Start: 2022-06-06 | End: 2023-06-16

## 2022-06-06 NOTE — TELEPHONE ENCOUNTER
No new care gaps identified.  [facility] Embedded Care Gaps. Reference number: [id number]. 6/06/2022   8:58:52 AM JOSEPHINET

## 2022-06-06 NOTE — TELEPHONE ENCOUNTER
Pt contacted and notified of medication refill sent to preferred pharmacy. Pt verbalized understanding.

## 2022-07-05 DIAGNOSIS — E11.65 UNCONTROLLED TYPE 2 DIABETES MELLITUS WITH HYPERGLYCEMIA, WITHOUT LONG-TERM CURRENT USE OF INSULIN: ICD-10-CM

## 2022-07-06 RX ORDER — SEMAGLUTIDE 1.34 MG/ML
0.5 INJECTION, SOLUTION SUBCUTANEOUS
Qty: 3 PEN | Refills: 3 | Status: SHIPPED | OUTPATIENT
Start: 2022-07-06 | End: 2023-07-06

## 2022-07-06 NOTE — TELEPHONE ENCOUNTER
Refill Routing Note   Medication(s) are not appropriate for processing by [facility] for the following reason(s):      - Medication is a new start (<3 months)    [facility] action(s):  Defer          Medication reconciliation completed: No     Appointments  past 12m or future 3m with PCP    Date Provider   Last Visit   5/25/2022 Osmin Newsome MD   Next Visit   8/24/2022 Osmin Newsome MD   ED visits in past 90 days: 0        Note composed:2:58 PM 07/06/2022

## 2022-07-07 ENCOUNTER — PATIENT MESSAGE (OUTPATIENT)
Dept: FAMILY MEDICINE | Facility: CLINIC | Age: 57
End: 2022-07-07
Payer: COMMERCIAL

## 2022-07-07 DIAGNOSIS — R73.9 HYPERGLYCEMIA: Primary | ICD-10-CM

## 2022-07-08 NOTE — TELEPHONE ENCOUNTER
Glyburide was discontinued. She was instructed to take metformin and ozempic at last visit. We need to check her a1c before any further changes made from the regimen of metformin and ozempic. Please assist with A1C

## 2022-07-08 NOTE — TELEPHONE ENCOUNTER
Please see my chart message  It appears that at 2/18/22 appt glyburide was d/c and rybelsus was ordered, pt could not afford so this was changed to jardiance but then changed to ozempic  Please review and advise is pt is to stop or continue glyburide?

## 2022-08-24 ENCOUNTER — OFFICE VISIT (OUTPATIENT)
Dept: FAMILY MEDICINE | Facility: CLINIC | Age: 57
End: 2022-08-24
Payer: COMMERCIAL

## 2022-08-24 VITALS
HEART RATE: 87 BPM | TEMPERATURE: 99 F | WEIGHT: 224.44 LBS | OXYGEN SATURATION: 97 % | HEIGHT: 65 IN | DIASTOLIC BLOOD PRESSURE: 78 MMHG | SYSTOLIC BLOOD PRESSURE: 122 MMHG | BODY MASS INDEX: 37.39 KG/M2

## 2022-08-24 DIAGNOSIS — I15.2 HYPERTENSION ASSOCIATED WITH DIABETES: ICD-10-CM

## 2022-08-24 DIAGNOSIS — E78.5 HYPERLIPIDEMIA ASSOCIATED WITH TYPE 2 DIABETES MELLITUS: ICD-10-CM

## 2022-08-24 DIAGNOSIS — E11.69 HYPERLIPIDEMIA ASSOCIATED WITH TYPE 2 DIABETES MELLITUS: ICD-10-CM

## 2022-08-24 DIAGNOSIS — E11.65 UNCONTROLLED TYPE 2 DIABETES MELLITUS WITH HYPERGLYCEMIA, WITHOUT LONG-TERM CURRENT USE OF INSULIN: Primary | ICD-10-CM

## 2022-08-24 DIAGNOSIS — E11.59 HYPERTENSION ASSOCIATED WITH DIABETES: ICD-10-CM

## 2022-08-24 PROCEDURE — 3061F PR NEG MICROALBUMINURIA RESULT DOCUMENTED/REVIEW: ICD-10-PCS | Mod: CPTII,S$GLB,, | Performed by: FAMILY MEDICINE

## 2022-08-24 PROCEDURE — 3074F SYST BP LT 130 MM HG: CPT | Mod: CPTII,S$GLB,, | Performed by: FAMILY MEDICINE

## 2022-08-24 PROCEDURE — 3078F DIAST BP <80 MM HG: CPT | Mod: CPTII,S$GLB,, | Performed by: FAMILY MEDICINE

## 2022-08-24 PROCEDURE — 3046F HEMOGLOBIN A1C LEVEL >9.0%: CPT | Mod: CPTII,S$GLB,, | Performed by: FAMILY MEDICINE

## 2022-08-24 PROCEDURE — 3046F PR MOST RECENT HEMOGLOBIN A1C LEVEL > 9.0%: ICD-10-PCS | Mod: CPTII,S$GLB,, | Performed by: FAMILY MEDICINE

## 2022-08-24 PROCEDURE — 3008F BODY MASS INDEX DOCD: CPT | Mod: CPTII,S$GLB,, | Performed by: FAMILY MEDICINE

## 2022-08-24 PROCEDURE — 3066F PR DOCUMENTATION OF TREATMENT FOR NEPHROPATHY: ICD-10-PCS | Mod: CPTII,S$GLB,, | Performed by: FAMILY MEDICINE

## 2022-08-24 PROCEDURE — 3074F PR MOST RECENT SYSTOLIC BLOOD PRESSURE < 130 MM HG: ICD-10-PCS | Mod: CPTII,S$GLB,, | Performed by: FAMILY MEDICINE

## 2022-08-24 PROCEDURE — 4010F ACE/ARB THERAPY RXD/TAKEN: CPT | Mod: CPTII,S$GLB,, | Performed by: FAMILY MEDICINE

## 2022-08-24 PROCEDURE — 99214 OFFICE O/P EST MOD 30 MIN: CPT | Mod: S$GLB,,, | Performed by: FAMILY MEDICINE

## 2022-08-24 PROCEDURE — 3008F PR BODY MASS INDEX (BMI) DOCUMENTED: ICD-10-PCS | Mod: CPTII,S$GLB,, | Performed by: FAMILY MEDICINE

## 2022-08-24 PROCEDURE — 3078F PR MOST RECENT DIASTOLIC BLOOD PRESSURE < 80 MM HG: ICD-10-PCS | Mod: CPTII,S$GLB,, | Performed by: FAMILY MEDICINE

## 2022-08-24 PROCEDURE — 99999 PR PBB SHADOW E&M-EST. PATIENT-LVL IV: ICD-10-PCS | Mod: PBBFAC,,, | Performed by: FAMILY MEDICINE

## 2022-08-24 PROCEDURE — 99214 PR OFFICE/OUTPT VISIT, EST, LEVL IV, 30-39 MIN: ICD-10-PCS | Mod: S$GLB,,, | Performed by: FAMILY MEDICINE

## 2022-08-24 PROCEDURE — 4010F PR ACE/ARB THEARPY RXD/TAKEN: ICD-10-PCS | Mod: CPTII,S$GLB,, | Performed by: FAMILY MEDICINE

## 2022-08-24 PROCEDURE — 99999 PR PBB SHADOW E&M-EST. PATIENT-LVL IV: CPT | Mod: PBBFAC,,, | Performed by: FAMILY MEDICINE

## 2022-08-24 PROCEDURE — 1159F MED LIST DOCD IN RCRD: CPT | Mod: CPTII,S$GLB,, | Performed by: FAMILY MEDICINE

## 2022-08-24 PROCEDURE — 3061F NEG MICROALBUMINURIA REV: CPT | Mod: CPTII,S$GLB,, | Performed by: FAMILY MEDICINE

## 2022-08-24 PROCEDURE — 3066F NEPHROPATHY DOC TX: CPT | Mod: CPTII,S$GLB,, | Performed by: FAMILY MEDICINE

## 2022-08-24 PROCEDURE — 1159F PR MEDICATION LIST DOCUMENTED IN MEDICAL RECORD: ICD-10-PCS | Mod: CPTII,S$GLB,, | Performed by: FAMILY MEDICINE

## 2022-08-24 NOTE — PATIENT INSTRUCTIONS
Xavier Zaragoza,     If you are due for any health screening(s) below please notify me so we can arrange them to be ordered and scheduled to maintain your health. Most healthy patients complete it. Don't lose out on improving your health.       Mammogram: Met  Eye Exam: Met  Colon Cancer Screening: ORDERED  Blood Pressure <= 139/89: Yes  HbA1c < 8: NO        Colon Cancer Screening    Of cancers affecting both men and women, colorectal cancer is the third leading cancer killer in the [place]. But it doesnt have to be. Screening can prevent colorectal cancer or find it at an early stage when treatment often leads to a cure.    A colonoscopy is the preferred test for detecting colon cancer. It is needed only once every 10 years if results are negative. While sedated, a flexible, lighted tube with a tiny camera is inserted into the rectum and advanced through the colon to look for cancers. An alternative screening test that is used at home and returned to the lab may also be used. It detects hidden blood in bowel movements which could indicate cancer in the colon. If results are positive, you will need a colonoscopy to determine if the blood is a sign of cancer. This type of follow up (diagnostic) colonoscopy usually requires additional copays as required by your insurance provider. Please contact your PCP if you have any questions.    Although you are still overdue for this important screening, due to the COVID-19 pandemic, we recommend scheduling it in the near future.            A1c every 3-6 months, lipid panel every year, microalbumin (urine test) once per year, comprehensive foot exam once per year, dilated eye exam at least once per year, dental exam every 6 months, flu vaccination every year, and pneumonia vaccination(s) as recommended

## 2022-08-24 NOTE — PROGRESS NOTES
Subjective:   Patient ID: Nik Lowery is a 56 y.o. female     Chief Complaint:Follow-up (3 month f/u/)      Here for checkup    Review of Systems   Constitutional: Negative for chills and fever.   HENT: Negative for sore throat and trouble swallowing.    Respiratory: Negative for cough and shortness of breath.    Cardiovascular: Negative for chest pain and leg swelling.   Gastrointestinal: Negative for abdominal distention and abdominal pain.   Genitourinary: Negative for dysuria and flank pain.   Musculoskeletal: Negative for arthralgias and back pain.   Skin: Negative for color change and pallor.   Neurological: Negative for weakness and headaches.   Psychiatric/Behavioral: Negative for agitation and confusion.     Past Medical History:   Diagnosis Date    Diabetes mellitus     Diabetes mellitus, type 2     Herpes     Hypertension      Past Surgical History:   Procedure Laterality Date    EYE SURGERY  2002    lasix Bilateral    HYSTERECTOMY  2012    LUMBAR DISCECTOMY      WISDOM TOOTH EXTRACTION       Objective:     Vitals:    08/24/22 1056   BP: 122/78   Pulse: 87   Temp: 98.5 °F (36.9 °C)     Body mass index is 37.35 kg/m².  Physical Exam  Vitals and nursing note reviewed.   Constitutional:       Appearance: She is well-developed.   HENT:      Head: Normocephalic and atraumatic.   Eyes:      General: No scleral icterus.     Conjunctiva/sclera: Conjunctivae normal.   Cardiovascular:      Heart sounds: No murmur heard.  Pulmonary:      Effort: Pulmonary effort is normal. No respiratory distress.   Musculoskeletal:         General: No deformity. Normal range of motion.      Cervical back: Normal range of motion and neck supple.   Skin:     Coloration: Skin is not pale.      Findings: No rash.   Neurological:      Mental Status: She is alert and oriented to person, place, and time.   Psychiatric:         Behavior: Behavior normal.         Thought Content: Thought content normal.         Judgment:  Judgment normal.       Assessment:     1. Uncontrolled type 2 diabetes mellitus with hyperglycemia, without long-term current use of insulin    2. Hyperlipidemia associated with type 2 diabetes mellitus    3. Hypertension associated with diabetes      Plan:   Uncontrolled type 2 diabetes mellitus with hyperglycemia, without long-term current use of insulin    Hyperlipidemia associated with type 2 diabetes mellitus    Hypertension associated with diabetes      Discussed at length her current health conditions and severity of her blood sugar. Pt very noncompliant with meds and diet. Explained the risks. Missed appointments to get labwork done. Will likey need to refer to endocrine on follow up labwork after this visit which she was instructed to have done ASAP.      Total time spent of Greater than 30 minutes minutes on the day of the visit.This includes face to face time and preparing to see the patient, obtaining and reviewing separately obtained history, documenting clinical information in the electronic or other health record, independently interpreting results and communicating results to the patient/family/caregiver, or care coordinator.    Osmin Newsome MD  08/25/2022    Portions of this note have been dictated with EL Hendricks

## 2022-08-28 DIAGNOSIS — F32.A ANXIETY AND DEPRESSION: ICD-10-CM

## 2022-08-28 DIAGNOSIS — F41.9 ANXIETY AND DEPRESSION: ICD-10-CM

## 2022-08-30 RX ORDER — LAMOTRIGINE 150 MG/1
TABLET ORAL
Qty: 90 TABLET | Refills: 0 | Status: SHIPPED | OUTPATIENT
Start: 2022-08-30 | End: 2022-12-08

## 2022-09-30 ENCOUNTER — PATIENT MESSAGE (OUTPATIENT)
Dept: FAMILY MEDICINE | Facility: CLINIC | Age: 57
End: 2022-09-30

## 2022-09-30 ENCOUNTER — E-VISIT (OUTPATIENT)
Dept: FAMILY MEDICINE | Facility: CLINIC | Age: 57
End: 2022-09-30
Payer: COMMERCIAL

## 2022-09-30 ENCOUNTER — LAB VISIT (OUTPATIENT)
Dept: LAB | Facility: HOSPITAL | Age: 57
End: 2022-09-30
Attending: FAMILY MEDICINE
Payer: COMMERCIAL

## 2022-09-30 DIAGNOSIS — R30.0 DYSURIA: Primary | ICD-10-CM

## 2022-09-30 DIAGNOSIS — R30.0 DYSURIA: ICD-10-CM

## 2022-09-30 LAB
BACTERIA #/AREA URNS HPF: ABNORMAL /HPF
BILIRUB UR QL STRIP: ABNORMAL
CLARITY UR: ABNORMAL
COLOR UR: ABNORMAL
GLUCOSE UR QL STRIP: ABNORMAL
HGB UR QL STRIP: ABNORMAL
HYALINE CASTS #/AREA URNS LPF: 0 /LPF
KETONES UR QL STRIP: ABNORMAL
LEUKOCYTE ESTERASE UR QL STRIP: ABNORMAL
MICROSCOPIC COMMENT: ABNORMAL
NITRITE UR QL STRIP: POSITIVE
PH UR STRIP: 5 [PH] (ref 5–8)
PROT UR QL STRIP: ABNORMAL
RBC #/AREA URNS HPF: >100 /HPF (ref 0–4)
SP GR UR STRIP: 1.02 (ref 1–1.03)
URN SPEC COLLECT METH UR: ABNORMAL
UROBILINOGEN UR STRIP-ACNC: >=8 EU/DL
WBC #/AREA URNS HPF: >100 /HPF (ref 0–5)

## 2022-09-30 PROCEDURE — 87186 SC STD MICRODIL/AGAR DIL: CPT | Performed by: FAMILY MEDICINE

## 2022-09-30 PROCEDURE — 87077 CULTURE AEROBIC IDENTIFY: CPT | Mod: 59 | Performed by: FAMILY MEDICINE

## 2022-09-30 PROCEDURE — 99421 PR E&M, ONLINE DIGIT, EST, < 7 DAYS, 5-10 MINS: ICD-10-PCS | Mod: S$GLB,,, | Performed by: FAMILY MEDICINE

## 2022-09-30 PROCEDURE — 81000 URINALYSIS NONAUTO W/SCOPE: CPT | Performed by: FAMILY MEDICINE

## 2022-09-30 PROCEDURE — 87088 URINE BACTERIA CULTURE: CPT | Performed by: FAMILY MEDICINE

## 2022-09-30 PROCEDURE — 87086 URINE CULTURE/COLONY COUNT: CPT | Performed by: FAMILY MEDICINE

## 2022-09-30 PROCEDURE — 99421 OL DIG E/M SVC 5-10 MIN: CPT | Mod: S$GLB,,, | Performed by: FAMILY MEDICINE

## 2022-09-30 RX ORDER — NITROFURANTOIN 25; 75 MG/1; MG/1
100 CAPSULE ORAL 2 TIMES DAILY
Qty: 10 CAPSULE | Refills: 0 | Status: SHIPPED | OUTPATIENT
Start: 2022-09-30 | End: 2022-10-05

## 2022-09-30 NOTE — PROGRESS NOTES
Patient ID: Nik Lowery is a 56 y.o. female.    Chief Complaint: dysuria  The patient initiated a request through [employer] on 9/30/2022 for evaluation and management with a chief complaint of No chief complaint on file.     I evaluated the questionnaire submission on 9/30/22.    [facility] Peq Evisit Uti Questionnaire    9/30/2022 11:21 AM CDT - Filed by Patient   Do you agree to participate in an E-Visit? Yes   If you have any of the following problems, please present to your local ER or call 911:  I acknowledge   What is the main issue that you would like for your doctor to address today? UTI   Are you able to take your vital signs? No   What symptoms do you currently have? Pain while passing urine;  Change in urine appearance or smell   When did your symptoms first appear? 9/29/2022   List what you have done or taken to help your symptoms. Cranberry juice, lots of water, AZO   Please indicate whether you have had the following symptoms during the past 24 hours     Urgent urination (a sudden and uncontrollable urge to urinate) Moderate   Frequent urination of small amounts of urine (going to the toilet very often) Moderate   Burning pain when urinating Severe   Incomplete bladder emptying (still feel like you need to urinate again after urination) Moderate   Pain not associated with urination in the lower abdomen below the belly button) Moderate   What does your urine look like? Cloudy   Blood seen in the urine (without menstrual cycle) Moderate   Flank pain (pain in one or both sides of the lower back) None   Abnormal Vaginal Discharge (abnormal amount, color and/or odor) None   Discharge from the urethra (urinary opening) without urination None   High body temperature/fever? None-<99.5   Please rate how much discomfort you have experience because of the symptoms in the past 24 hours: Severe   Please indicate how the symptoms have interfered with your every day activities/work in the past 24 hours: Moderate   Please  indicate how these symptoms have interfered with your social activities (visiting people, meeting with friends, etc.) in the past 24 hours? Moderate   Are you a diabetic? Yes   If you are female, please indicate whether you have the following at the time of completion of this questionnaire: Signs of menopause syndrome (hot flashes)   Is there a chance you could be pregnant? No   Provide any information you feel is important to your history not asked above    Please attach any relevant images or files (if you have performed a home test for UTI, please submit a photo of results)           Active Problem List with Overview Notes    Diagnosis Date Noted    Herpes simplex 05/07/2019    Low back pain 10/24/2018    Hyperlipidemia associated with type 2 diabetes mellitus 09/24/2018    Decreased ROM of lumbar spine 09/09/2018    Gastroesophageal reflux disease without esophagitis 08/08/2018    Uncontrolled type 2 diabetes mellitus with hyperglycemia, without long-term current use of insulin 07/11/2017    Hypertension associated with diabetes 07/11/2017    Anxiety and depression 07/11/2017      Recent Labs Obtained:  No visits with results within 7 Day(s) from this visit.   Latest known visit with results is:   Lab Visit on 05/21/2022   Component Date Value Ref Range Status    Hemoglobin A1C 05/21/2022 9.3 (H)  4.0 - 5.6 % Final    Comment: ADA Screening Guidelines:  5.7-6.4%  Consistent with prediabetes  >or=6.5%  Consistent with diabetes    High levels of fetal hemoglobin interfere with the HbA1C  assay. Heterozygous hemoglobin variants (HbS, HgC, etc)do  not significantly interfere with this assay.   However, presence of multiple variants may affect accuracy.      Estimated Avg Glucose 05/21/2022 220 (H)  68 - 131 mg/dL Final    Sodium 05/21/2022 137  136 - 145 mmol/L Final    Potassium 05/21/2022 4.5  3.5 - 5.1 mmol/L Final    Chloride 05/21/2022 102  95 - 110 mmol/L Final    CO2 05/21/2022 24  23 - 29 mmol/L Final     Glucose 2022 187 (H)  70 - 110 mg/dL Final    BUN 2022 16  6 - 20 mg/dL Final    Creatinine 2022 0.9  0.5 - 1.4 mg/dL Final    Calcium 2022 9.6  8.7 - 10.5 mg/dL Final    Total Protein 2022 7.5  6.0 - 8.4 g/dL Final    Albumin 2022 4.0  3.5 - 5.2 g/dL Final    Total Bilirubin 2022 0.3  0.1 - 1.0 mg/dL Final    Comment: For infants and newborns, interpretation of results should be based  on gestational age, weight and in agreement with clinical  observations.    Premature Infant recommended reference ranges:  Up to 24 hours.............<8.0 mg/dL  Up to 48 hours............<12.0 mg/dL  3-5 days..................<15.0 mg/dL  6-29 days.................<15.0 mg/dL      Alkaline Phosphatase 2022 80  55 - 135 U/L Final    AST 2022 52 (H)  10 - 40 U/L Final    ALT 2022 60 (H)  10 - 44 U/L Final    Anion Gap 2022 11  8 - 16 mmol/L Final    eGFR if African American 2022 >60.0  >60 mL/min/1.73 m^2 Final    eGFR if non African American 2022 >60.0  >60 mL/min/1.73 m^2 Final    Comment: Calculation used to obtain the estimated glomerular filtration  rate (eGFR) is the CKD-EPI equation.          Encounter Diagnosis   Name Primary?    Dysuria Yes        Orders Placed This Encounter   Procedures    Urinalysis, Reflex to Urine Culture Urine, Clean Catch     Standing Status:   Future     Standing Expiration Date:   2023     Order Specific Question:   Preferred Collection Type     Answer:   Urine, Clean Catch     Order Specific Question:   Specimen Source     Answer:   Urine            E-Visit Time Trackin-10 mins

## 2022-10-03 LAB — BACTERIA UR CULT: ABNORMAL

## 2022-10-11 ENCOUNTER — PATIENT MESSAGE (OUTPATIENT)
Dept: ADMINISTRATIVE | Facility: HOSPITAL | Age: 57
End: 2022-10-11
Payer: COMMERCIAL

## 2022-10-14 ENCOUNTER — PATIENT OUTREACH (OUTPATIENT)
Dept: ADMINISTRATIVE | Facility: HOSPITAL | Age: 57
End: 2022-10-14
Payer: COMMERCIAL

## 2022-11-18 ENCOUNTER — PATIENT OUTREACH (OUTPATIENT)
Dept: ADMINISTRATIVE | Facility: HOSPITAL | Age: 57
End: 2022-11-18
Payer: COMMERCIAL

## 2022-11-25 ENCOUNTER — HOSPITAL ENCOUNTER (EMERGENCY)
Facility: HOSPITAL | Age: 57
Discharge: HOME | End: 2022-11-25
Attending: EMERGENCY MEDICINE
Payer: COMMERCIAL

## 2022-11-25 VITALS
TEMPERATURE: 98 F | WEIGHT: 215 LBS | RESPIRATION RATE: 18 BRPM | SYSTOLIC BLOOD PRESSURE: 134 MMHG | HEIGHT: 65 IN | BODY MASS INDEX: 35.82 KG/M2 | HEART RATE: 86 BPM | OXYGEN SATURATION: 97 % | DIASTOLIC BLOOD PRESSURE: 80 MMHG

## 2022-11-25 DIAGNOSIS — R05.9 COUGH: ICD-10-CM

## 2022-11-25 DIAGNOSIS — J06.9 VIRAL URI: ICD-10-CM

## 2022-11-25 DIAGNOSIS — J02.9 PHARYNGITIS, UNSPECIFIED ETIOLOGY: Primary | ICD-10-CM

## 2022-11-25 DIAGNOSIS — R09.81 NASAL CONGESTION: ICD-10-CM

## 2022-11-25 LAB
HCV AB SERPL QL IA: NORMAL
HIV 1+2 AB+HIV1 P24 AG SERPL QL IA: NORMAL
INFLUENZA A, MOLECULAR: NEGATIVE
INFLUENZA B, MOLECULAR: NEGATIVE
SARS-COV-2 RDRP RESP QL NAA+PROBE: NEGATIVE
SPECIMEN SOURCE: NORMAL

## 2022-11-25 PROCEDURE — 36415 COLL VENOUS BLD VENIPUNCTURE: CPT | Performed by: EMERGENCY MEDICINE

## 2022-11-25 PROCEDURE — 87389 HIV-1 AG W/HIV-1&-2 AB AG IA: CPT | Performed by: EMERGENCY MEDICINE

## 2022-11-25 PROCEDURE — U0002 COVID-19 LAB TEST NON-CDC: HCPCS | Performed by: PHYSICIAN ASSISTANT

## 2022-11-25 PROCEDURE — 87502 INFLUENZA DNA AMP PROBE: CPT | Performed by: PHYSICIAN ASSISTANT

## 2022-11-25 PROCEDURE — 86803 HEPATITIS C AB TEST: CPT | Performed by: EMERGENCY MEDICINE

## 2022-11-25 PROCEDURE — 25000003 PHARM REV CODE 250: Performed by: PHYSICIAN ASSISTANT

## 2022-11-25 PROCEDURE — 99284 EMERGENCY DEPT VISIT MOD MDM: CPT | Mod: 25

## 2022-11-25 RX ORDER — IBUPROFEN 600 MG/1
600 TABLET ORAL EVERY 8 HOURS PRN
Qty: 20 TABLET | Refills: 0 | Status: SHIPPED | OUTPATIENT
Start: 2022-11-25

## 2022-11-25 RX ORDER — CETIRIZINE HYDROCHLORIDE 10 MG/1
10 TABLET ORAL
Status: COMPLETED | OUTPATIENT
Start: 2022-11-25 | End: 2022-11-25

## 2022-11-25 RX ORDER — BROMPHENIRAMINE MALEATE, PSEUDOEPHEDRINE HYDROCHLORIDE, AND DEXTROMETHORPHAN HYDROBROMIDE 2; 30; 10 MG/5ML; MG/5ML; MG/5ML
5 SYRUP ORAL EVERY 8 HOURS PRN
Qty: 100 ML | Refills: 0 | Status: SHIPPED | OUTPATIENT
Start: 2022-11-25 | End: 2022-12-05

## 2022-11-25 RX ORDER — BENZONATATE 200 MG/1
200 CAPSULE ORAL 3 TIMES DAILY PRN
Qty: 30 CAPSULE | Refills: 0 | Status: SHIPPED | OUTPATIENT
Start: 2022-11-25 | End: 2022-12-05

## 2022-11-25 RX ORDER — BENZONATATE 100 MG/1
200 CAPSULE ORAL ONCE
Status: COMPLETED | OUTPATIENT
Start: 2022-11-25 | End: 2022-11-25

## 2022-11-25 RX ADMIN — CETIRIZINE HYDROCHLORIDE 10 MG: 10 TABLET, FILM COATED ORAL at 11:11

## 2022-11-25 RX ADMIN — BENZONATATE 200 MG: 100 CAPSULE ORAL at 11:11

## 2022-11-25 NOTE — ED PROVIDER NOTES
Encounter Date: 11/25/2022    SCRIBE #1 NOTE: I, Mary Ann Romo, am scribing for, and in the presence of,  Anu Newton PA-C.     History     Chief Complaint   Patient presents with    Nasal Congestion    Cough     Chest congestion with cough x 1 day    Sore Throat     Started 2 days ago     Time seen by provider: 11:07 AM on 11/25/2022    Nik Lowery is a 56 y.o. female who presents to the ED with cough, congestion, sore throat and post nasal drip that began x3 days prior and has progressively worsened. The pt states she has had no known sick contact but her symptoms presented after she drove into [place] for her Thanksgiving family dinner. She states she has been using Oscillococcinum and Guaifenesin to manage her symptoms with some relief. The patient denies history of chronic lung disease, CHF or any other symptoms at this time. PMHx of DM II, HTN and herpes. PSHx of Hysterectomy.    The history is provided by the patient.   Review of patient's allergies indicates:   Allergen Reactions    Robaxin [methocarbamol] Nausea Only     Past Medical History:   Diagnosis Date    Diabetes mellitus     Diabetes mellitus, type 2     Herpes     Hypertension      Past Surgical History:   Procedure Laterality Date    EYE SURGERY  2002    lasix Bilateral    HYSTERECTOMY  2012    LUMBAR DISCECTOMY      WISDOM TOOTH EXTRACTION       Family History   Problem Relation Age of Onset    Heart disease Mother     Hypertension Mother     Cataracts Mother     Pancreatic cancer Father     Cancer Father     Arthritis Sister     Diabetes Brother     No Known Problems Paternal Aunt     Heart disease Paternal Uncle     Heart disease Maternal Grandfather     Alzheimer's disease Paternal Grandmother     Glaucoma Neg Hx      Social History     Tobacco Use    Smoking status: Never    Smokeless tobacco: Never   Substance Use Topics    Alcohol use: No    Drug use: No     Review of Systems   Constitutional:  Negative for activity change,  appetite change, fatigue and fever.   HENT:  Positive for congestion, postnasal drip and sore throat. Negative for rhinorrhea.    Eyes:  Negative for visual disturbance.   Respiratory:  Positive for cough. Negative for chest tightness, shortness of breath and wheezing.    Cardiovascular:  Negative for chest pain and palpitations.   Gastrointestinal:  Negative for diarrhea, nausea and vomiting.   Musculoskeletal:  Negative for arthralgias and myalgias.   Skin:  Negative for rash.   Neurological:  Negative for weakness, light-headedness, numbness and headaches.     Physical Exam     Initial Vitals [11/25/22 1055]   BP Pulse Resp Temp SpO2   134/80 86 17 98.3 °F (36.8 °C) 97 %      MAP       --         Physical Exam    Nursing note and vitals reviewed.  Constitutional: Vital signs are normal. She appears well-developed and well-nourished. She is not diaphoretic. She is cooperative.  Non-toxic appearance. She does not have a sickly appearance. No distress.   HENT:   Head: Normocephalic and atraumatic.   Right Ear: External ear normal.   Left Ear: External ear normal.   Mouth/Throat: Uvula is midline.   Nasal congestion and rhinorrhea noted.  Mild erythema noted to posterior oropharynx without edema or exudate.     Eyes: Conjunctivae and lids are normal.   Neck: Neck supple.   Normal range of motion.   Full passive range of motion without pain.     Cardiovascular:  Normal rate, regular rhythm and normal heart sounds.     Exam reveals no gallop and no friction rub.       No murmur heard.  Pulmonary/Chest: Breath sounds normal. She has no wheezes. She has no rhonchi. She has no rales.   Equal, bilateral breath sounds noted without wheezing.     Musculoskeletal:         General: Normal range of motion.      Cervical back: Full passive range of motion without pain, normal range of motion and neck supple.     Neurological: She is alert and oriented to person, place, and time.   Skin: Skin is warm, dry and intact. No rash  noted.       ED Course   Procedures  Labs Reviewed   INFLUENZA A & B BY MOLECULAR   SARS-COV-2 RNA AMPLIFICATION, QUAL   HIV 1 / 2 ANTIBODY   HEPATITIS C ANTIBODY          Imaging Results              X-Ray Chest 1 View (Final result)  Result time 11/25/22 11:44:09      Final result by Nesha Peterson MD (11/25/22 11:44:09)                   Impression:      No acute cardiopulmonary disease      Electronically signed by: Nesha Peterson MD  Date:    11/25/2022  Time:    11:44               Narrative:    EXAMINATION:  XR CHEST 1 VIEW    CLINICAL HISTORY:  Cough, unspecified    TECHNIQUE:  Single frontal view of the chest was performed.    COMPARISON:  10/31/2018    FINDINGS:  Right hemidiaphragm is mildly elevated.  Cardiomediastinal silhouette is stable.  Lungs are clear of infiltrate.  No pleural effusion.                                       Medications   benzonatate capsule 200 mg (200 mg Oral Given 11/25/22 1122)   cetirizine tablet 10 mg (10 mg Oral Given 11/25/22 1122)     Medical Decision Making:   History:   Old Medical Records: I decided to obtain old medical records.  Differential Diagnosis:   Influenza  Pneumonia  Strep pharyngitis  Meningitis  Viral syndrome      Clinical Tests:   Lab Tests: Ordered and Reviewed  ED Management:  COVID-19, Influenza negative.  CXR negative for pneumonia.  Symptoms consistent with viral URI.  She is well appearing without hypoxia or respiratory distress.  She will be discharged home to follow-up with her PCP for re-evaluation in 2-3 days.  She voices understanding and is agreeable to the plan.  She is given specific return precautions.            Scribe Attestation:   Scribe #1: I performed the above scribed service and the documentation accurately describes the services I performed. I attest to the accuracy of the note.                 IAnu PA-C, personally performed the services described in this documentation. All medical record entries made by the  antony were at my direction and in my presence.  I have reviewed the chart and agree that the record reflects my personal performance and is accurate and complete. Anu Newton PA-C.  6:20 PM 11/25/2022    Clinical Impression:   Final diagnoses:  [R05.9] Cough  [J02.9] Pharyngitis, unspecified etiology (Primary)  [R09.81] Nasal congestion  [J06.9] Viral URI      ED Disposition Condition    Discharge Stable          ED Prescriptions       Medication Sig Dispense Start Date End Date Auth. Provider    benzonatate (TESSALON) 200 MG capsule Take 1 capsule (200 mg total) by mouth 3 (three) times daily as needed for Cough. 30 capsule 11/25/2022 12/5/2022 Anu Newton PA-C    brompheniramine-pseudoeph-DM (BROMFED DM) 2-30-10 mg/5 mL Syrp Take 5 mLs by mouth every 8 (eight) hours as needed (cough and chest congestion). 100 mL 11/25/2022 12/5/2022 Anu Newton PA-C    ibuprofen (ADVIL,MOTRIN) 600 MG tablet Take 1 tablet (600 mg total) by mouth every 8 (eight) hours as needed for Pain. 20 tablet 11/25/2022 -- Anu Newton PA-C          Follow-up Information       Follow up With Specialties Details Why Contact Info    [facility] Emergency Medicine  As needed, If symptoms worsen [address] [place] [id number] [phone]    Osmin Newsome MD Family Medicine  As needed [address] [facility] [address]  [phone]               Anu Newton PA-C  11/25/22 4090

## 2022-12-06 ENCOUNTER — PATIENT MESSAGE (OUTPATIENT)
Dept: ADMINISTRATIVE | Facility: HOSPITAL | Age: 57
End: 2022-12-06
Payer: COMMERCIAL

## 2022-12-14 ENCOUNTER — OFFICE VISIT (OUTPATIENT)
Dept: FAMILY MEDICINE | Facility: CLINIC | Age: 57
End: 2022-12-14
Payer: COMMERCIAL

## 2022-12-14 ENCOUNTER — PATIENT MESSAGE (OUTPATIENT)
Dept: ADMINISTRATIVE | Facility: HOSPITAL | Age: 57
End: 2022-12-14
Payer: COMMERCIAL

## 2022-12-14 ENCOUNTER — LAB VISIT (OUTPATIENT)
Dept: LAB | Facility: HOSPITAL | Age: 57
End: 2022-12-14
Attending: FAMILY MEDICINE
Payer: COMMERCIAL

## 2022-12-14 VITALS
OXYGEN SATURATION: 95 % | SYSTOLIC BLOOD PRESSURE: 126 MMHG | TEMPERATURE: 98 F | DIASTOLIC BLOOD PRESSURE: 68 MMHG | BODY MASS INDEX: 36.1 KG/M2 | HEIGHT: 65 IN | WEIGHT: 216.69 LBS | HEART RATE: 79 BPM

## 2022-12-14 DIAGNOSIS — F33.0 MILD EPISODE OF RECURRENT MAJOR DEPRESSIVE DISORDER: ICD-10-CM

## 2022-12-14 DIAGNOSIS — K21.9 GASTROESOPHAGEAL REFLUX DISEASE WITHOUT ESOPHAGITIS: ICD-10-CM

## 2022-12-14 DIAGNOSIS — E11.65 UNCONTROLLED TYPE 2 DIABETES MELLITUS WITH HYPERGLYCEMIA, WITHOUT LONG-TERM CURRENT USE OF INSULIN: Primary | ICD-10-CM

## 2022-12-14 DIAGNOSIS — F32.A ANXIETY AND DEPRESSION: ICD-10-CM

## 2022-12-14 DIAGNOSIS — E11.59 HYPERTENSION ASSOCIATED WITH DIABETES: ICD-10-CM

## 2022-12-14 DIAGNOSIS — I15.2 HYPERTENSION ASSOCIATED WITH DIABETES: ICD-10-CM

## 2022-12-14 DIAGNOSIS — F41.9 ANXIETY AND DEPRESSION: ICD-10-CM

## 2022-12-14 DIAGNOSIS — Z23 NEED FOR IMMUNIZATION AGAINST INFLUENZA: ICD-10-CM

## 2022-12-14 DIAGNOSIS — E11.65 UNCONTROLLED TYPE 2 DIABETES MELLITUS WITH HYPERGLYCEMIA, WITHOUT LONG-TERM CURRENT USE OF INSULIN: ICD-10-CM

## 2022-12-14 LAB
ALBUMIN SERPL BCP-MCNC: 4.1 G/DL (ref 3.5–5.2)
ALP SERPL-CCNC: 73 U/L (ref 55–135)
ALT SERPL W/O P-5'-P-CCNC: 44 U/L (ref 10–44)
ANION GAP SERPL CALC-SCNC: 11 MMOL/L (ref 8–16)
AST SERPL-CCNC: 48 U/L (ref 10–40)
BILIRUB SERPL-MCNC: 0.6 MG/DL (ref 0.1–1)
BUN SERPL-MCNC: 25 MG/DL (ref 6–20)
CALCIUM SERPL-MCNC: 9.3 MG/DL (ref 8.7–10.5)
CHLORIDE SERPL-SCNC: 96 MMOL/L (ref 95–110)
CO2 SERPL-SCNC: 24 MMOL/L (ref 23–29)
CREAT SERPL-MCNC: 1 MG/DL (ref 0.5–1.4)
EST. GFR  (NO RACE VARIABLE): >60 ML/MIN/1.73 M^2
ESTIMATED AVG GLUCOSE: 183 MG/DL (ref 68–131)
GLUCOSE SERPL-MCNC: 139 MG/DL (ref 70–110)
HBA1C MFR BLD: 8 % (ref 4–5.6)
POTASSIUM SERPL-SCNC: 3.9 MMOL/L (ref 3.5–5.1)
PROT SERPL-MCNC: 7.2 G/DL (ref 6–8.4)
SODIUM SERPL-SCNC: 131 MMOL/L (ref 136–145)

## 2022-12-14 PROCEDURE — 99213 OFFICE O/P EST LOW 20 MIN: CPT | Mod: 25,S$GLB,, | Performed by: PHYSICIAN ASSISTANT

## 2022-12-14 PROCEDURE — 99213 PR OFFICE/OUTPT VISIT, EST, LEVL III, 20-29 MIN: ICD-10-PCS | Mod: 25,S$GLB,, | Performed by: PHYSICIAN ASSISTANT

## 2022-12-14 PROCEDURE — 90471 FLU VACCINE (QUAD) GREATER THAN OR EQUAL TO 3YO PRESERVATIVE FREE IM: ICD-10-PCS | Mod: S$GLB,,, | Performed by: PHYSICIAN ASSISTANT

## 2022-12-14 PROCEDURE — 3074F SYST BP LT 130 MM HG: CPT | Mod: CPTII,S$GLB,, | Performed by: PHYSICIAN ASSISTANT

## 2022-12-14 PROCEDURE — 3008F BODY MASS INDEX DOCD: CPT | Mod: CPTII,S$GLB,, | Performed by: PHYSICIAN ASSISTANT

## 2022-12-14 PROCEDURE — 3008F PR BODY MASS INDEX (BMI) DOCUMENTED: ICD-10-PCS | Mod: CPTII,S$GLB,, | Performed by: PHYSICIAN ASSISTANT

## 2022-12-14 PROCEDURE — 90471 IMMUNIZATION ADMIN: CPT | Mod: S$GLB,,, | Performed by: PHYSICIAN ASSISTANT

## 2022-12-14 PROCEDURE — 3046F PR MOST RECENT HEMOGLOBIN A1C LEVEL > 9.0%: ICD-10-PCS | Mod: CPTII,S$GLB,, | Performed by: PHYSICIAN ASSISTANT

## 2022-12-14 PROCEDURE — 36415 COLL VENOUS BLD VENIPUNCTURE: CPT | Mod: PO | Performed by: FAMILY MEDICINE

## 2022-12-14 PROCEDURE — 1159F PR MEDICATION LIST DOCUMENTED IN MEDICAL RECORD: ICD-10-PCS | Mod: CPTII,S$GLB,, | Performed by: PHYSICIAN ASSISTANT

## 2022-12-14 PROCEDURE — 3061F NEG MICROALBUMINURIA REV: CPT | Mod: CPTII,S$GLB,, | Performed by: PHYSICIAN ASSISTANT

## 2022-12-14 PROCEDURE — 99999 PR PBB SHADOW E&M-EST. PATIENT-LVL IV: ICD-10-PCS | Mod: PBBFAC,,, | Performed by: PHYSICIAN ASSISTANT

## 2022-12-14 PROCEDURE — 3061F PR NEG MICROALBUMINURIA RESULT DOCUMENTED/REVIEW: ICD-10-PCS | Mod: CPTII,S$GLB,, | Performed by: PHYSICIAN ASSISTANT

## 2022-12-14 PROCEDURE — 3078F DIAST BP <80 MM HG: CPT | Mod: CPTII,S$GLB,, | Performed by: PHYSICIAN ASSISTANT

## 2022-12-14 PROCEDURE — 3078F PR MOST RECENT DIASTOLIC BLOOD PRESSURE < 80 MM HG: ICD-10-PCS | Mod: CPTII,S$GLB,, | Performed by: PHYSICIAN ASSISTANT

## 2022-12-14 PROCEDURE — 3074F PR MOST RECENT SYSTOLIC BLOOD PRESSURE < 130 MM HG: ICD-10-PCS | Mod: CPTII,S$GLB,, | Performed by: PHYSICIAN ASSISTANT

## 2022-12-14 PROCEDURE — 3066F PR DOCUMENTATION OF TREATMENT FOR NEPHROPATHY: ICD-10-PCS | Mod: CPTII,S$GLB,, | Performed by: PHYSICIAN ASSISTANT

## 2022-12-14 PROCEDURE — 4010F PR ACE/ARB THEARPY RXD/TAKEN: ICD-10-PCS | Mod: CPTII,S$GLB,, | Performed by: PHYSICIAN ASSISTANT

## 2022-12-14 PROCEDURE — 4010F ACE/ARB THERAPY RXD/TAKEN: CPT | Mod: CPTII,S$GLB,, | Performed by: PHYSICIAN ASSISTANT

## 2022-12-14 PROCEDURE — 99999 PR PBB SHADOW E&M-EST. PATIENT-LVL IV: CPT | Mod: PBBFAC,,, | Performed by: PHYSICIAN ASSISTANT

## 2022-12-14 PROCEDURE — 3066F NEPHROPATHY DOC TX: CPT | Mod: CPTII,S$GLB,, | Performed by: PHYSICIAN ASSISTANT

## 2022-12-14 PROCEDURE — 80053 COMPREHEN METABOLIC PANEL: CPT | Performed by: FAMILY MEDICINE

## 2022-12-14 PROCEDURE — 90686 FLU VACCINE (QUAD) GREATER THAN OR EQUAL TO 3YO PRESERVATIVE FREE IM: ICD-10-PCS | Mod: S$GLB,,, | Performed by: PHYSICIAN ASSISTANT

## 2022-12-14 PROCEDURE — 90686 IIV4 VACC NO PRSV 0.5 ML IM: CPT | Mod: S$GLB,,, | Performed by: PHYSICIAN ASSISTANT

## 2022-12-14 PROCEDURE — 83036 HEMOGLOBIN GLYCOSYLATED A1C: CPT | Performed by: FAMILY MEDICINE

## 2022-12-14 PROCEDURE — 3046F HEMOGLOBIN A1C LEVEL >9.0%: CPT | Mod: CPTII,S$GLB,, | Performed by: PHYSICIAN ASSISTANT

## 2022-12-14 PROCEDURE — 1159F MED LIST DOCD IN RCRD: CPT | Mod: CPTII,S$GLB,, | Performed by: PHYSICIAN ASSISTANT

## 2022-12-14 NOTE — PROGRESS NOTES
Subjective:       Patient ID: Nik Lowery is a 56 y.o. female.    Chief Complaint: Follow-up    Patient with controlled hypertension, uncontrolled type 2 diabetes, anxiety, and depression presents for routine follow-up.  Regarding her chronic conditions.    Regarding her diabetes she states that her glucose has been 140-150 both fasting and non fasting.  She is compliant with her medications.  She is currently on metformin and ozempic.    Patients patient medical/surgical, social and family histories have been reviewed                Follow-up  Associated symptoms include numbness. Pertinent negatives include no chest pain, coughing, fatigue, headaches or rash.   Review of Systems   Constitutional:  Negative for activity change, appetite change, fatigue and unexpected weight change.   Eyes:  Negative for visual disturbance.   Respiratory:  Negative for cough and shortness of breath.    Cardiovascular:  Negative for chest pain, palpitations and leg swelling.   Endocrine: Negative for polydipsia and polyuria.   Genitourinary:  Negative for difficulty urinating.   Musculoskeletal:  Positive for back pain.   Skin:  Negative for rash.   Neurological:  Positive for numbness. Negative for dizziness, light-headedness and headaches.   Psychiatric/Behavioral:  Negative for dysphoric mood and sleep disturbance. The patient is not nervous/anxious.      Objective:      Physical Exam  Constitutional:       General: She is not in acute distress.     Appearance: She is well-developed.   HENT:      Head: Normocephalic and atraumatic.   Cardiovascular:      Rate and Rhythm: Normal rate and regular rhythm.      Pulses:           Dorsalis pedis pulses are 2+ on the right side and 2+ on the left side.      Heart sounds: Normal heart sounds.   Pulmonary:      Effort: Pulmonary effort is normal.      Breath sounds: Normal breath sounds.   Musculoskeletal:      Right lower leg: No edema.      Left lower leg: No edema.      Right foot: No  "deformity.      Left foot: No deformity.   Feet:      Right foot:      Protective Sensation: 5 sites tested.  5 sites sensed.      Skin integrity: Skin integrity normal.      Toenail Condition: Right toenails are normal.      Left foot:      Protective Sensation: 5 sites tested.  5 sites sensed.      Skin integrity: Skin integrity normal.      Toenail Condition: Left toenails are normal.   Skin:     General: Skin is warm and dry.      Capillary Refill: Capillary refill takes less than 2 seconds.   Neurological:      Mental Status: She is alert.   Psychiatric:         Behavior: Behavior is cooperative.       Assessment:       1. Uncontrolled type 2 diabetes mellitus with hyperglycemia, without long-term current use of insulin    2. Hypertension associated with diabetes    3. Anxiety and depression    4. Mild episode of recurrent major depressive disorder    5. Gastroesophageal reflux disease without esophagitis          Plan:       Nik was seen today for follow-up.    Diagnoses and all orders for this visit:    Uncontrolled type 2 diabetes mellitus with hyperglycemia, without long-term current use of insulin  -    A1C&  COMPREHENSIVE METABOLIC PANEL; Future  Further recommendations will be made based on results     Hypertension associated with diabetes  Further recommendations will be made based on results     Anxiety and depression    Mild episode of recurrent major depressive disorder  Stable continue current medications   Gastroesophageal reflux disease without esophagitis  Continue PPI          Follow up for a1c & cmp today (scheduled for 12/17) , 3 mo pcp .           Documentation entered by me for this encounter may have been done in part using speech-recognition technology. Although I have made an effort to ensure accuracy, "sound like" errors may exist and should be interpreted in context.   I spent a total of 20 minutes on the day of the visit.This includes face to face time and non-face to face time " preparing to see the patient (eg, review of tests), obtaining and/or reviewing separately obtained history, documenting clinical information in the electronic or other health record, independently interpreting results and communicating results to the patient/family/caregiver, or care coordinator.

## 2022-12-15 DIAGNOSIS — E11.65 UNCONTROLLED TYPE 2 DIABETES MELLITUS WITH HYPERGLYCEMIA, WITHOUT LONG-TERM CURRENT USE OF INSULIN: ICD-10-CM

## 2022-12-15 DIAGNOSIS — I15.2 HYPERTENSION ASSOCIATED WITH DIABETES: Primary | ICD-10-CM

## 2022-12-15 DIAGNOSIS — E11.59 HYPERTENSION ASSOCIATED WITH DIABETES: Primary | ICD-10-CM

## 2022-12-15 RX ORDER — LISINOPRIL 20 MG/1
20 TABLET ORAL DAILY
Qty: 90 TABLET | Refills: 3 | Status: SHIPPED | OUTPATIENT
Start: 2022-12-15 | End: 2023-10-27 | Stop reason: SDUPTHER

## 2023-02-04 ENCOUNTER — PATIENT MESSAGE (OUTPATIENT)
Dept: FAMILY MEDICINE | Facility: CLINIC | Age: 58
End: 2023-02-04
Payer: COMMERCIAL

## 2023-02-05 ENCOUNTER — PATIENT MESSAGE (OUTPATIENT)
Dept: FAMILY MEDICINE | Facility: CLINIC | Age: 58
End: 2023-02-05
Payer: COMMERCIAL

## 2023-02-05 DIAGNOSIS — E11.65 UNCONTROLLED TYPE 2 DIABETES MELLITUS WITH HYPERGLYCEMIA, WITHOUT LONG-TERM CURRENT USE OF INSULIN: ICD-10-CM

## 2023-02-06 RX ORDER — SEMAGLUTIDE 1.34 MG/ML
1 INJECTION, SOLUTION SUBCUTANEOUS
Qty: 1 PEN | Refills: 11 | Status: SHIPPED | OUTPATIENT
Start: 2023-02-06 | End: 2024-03-20

## 2023-02-23 DIAGNOSIS — E11.9 TYPE 2 DIABETES MELLITUS WITHOUT COMPLICATION: ICD-10-CM

## 2023-02-23 DIAGNOSIS — Z12.31 OTHER SCREENING MAMMOGRAM: ICD-10-CM

## 2023-02-27 ENCOUNTER — PATIENT MESSAGE (OUTPATIENT)
Dept: ADMINISTRATIVE | Facility: HOSPITAL | Age: 58
End: 2023-02-27
Payer: COMMERCIAL

## 2023-03-06 ENCOUNTER — PATIENT OUTREACH (OUTPATIENT)
Dept: ADMINISTRATIVE | Facility: HOSPITAL | Age: 58
End: 2023-03-06
Payer: COMMERCIAL

## 2023-03-06 DIAGNOSIS — E11.9 DIABETES MELLITUS WITHOUT COMPLICATION: Primary | ICD-10-CM

## 2023-03-06 NOTE — PROGRESS NOTES
UNCONTROLLED A1C REPORT.  PATIENT HAS AN UPCOMING LAB APPOINTMENT.  CHART REVIEWED;  LABS ORDERED AND LINKED WHAT IS NEEDED    Hemoglobin A1C   Date Value Ref Range Status   12/14/2022 8.0 (H) 4.0 - 5.6 % Final     Comment:     ADA Screening Guidelines:  5.7-6.4%  Consistent with prediabetes  >or=6.5%  Consistent with diabetes    High levels of fetal hemoglobin interfere with the HbA1C  assay. Heterozygous hemoglobin variants (HbS, HgC, etc)do  not significantly interfere with this assay.   However, presence of multiple variants may affect accuracy.     05/21/2022 9.3 (H) 4.0 - 5.6 % Final     Comment:     ADA Screening Guidelines:  5.7-6.4%  Consistent with prediabetes  >or=6.5%  Consistent with diabetes    High levels of fetal hemoglobin interfere with the HbA1C  assay. Heterozygous hemoglobin variants (HbS, HgC, etc)do  not significantly interfere with this assay.   However, presence of multiple variants may affect accuracy.     02/12/2022 8.7 (H) 4.0 - 5.6 % Final     Comment:     ADA Screening Guidelines:  5.7-6.4%  Consistent with prediabetes  >or=6.5%  Consistent with diabetes    High levels of fetal hemoglobin interfere with the HbA1C  assay. Heterozygous hemoglobin variants (HbS, HgC, etc)do  not significantly interfere with this assay.   However, presence of multiple variants may affect accuracy.

## 2023-04-11 ENCOUNTER — TELEPHONE (OUTPATIENT)
Dept: FAMILY MEDICINE | Facility: CLINIC | Age: 58
End: 2023-04-11
Payer: COMMERCIAL

## 2023-04-11 ENCOUNTER — PATIENT MESSAGE (OUTPATIENT)
Dept: ADMINISTRATIVE | Facility: HOSPITAL | Age: 58
End: 2023-04-11
Payer: COMMERCIAL

## 2023-04-11 NOTE — TELEPHONE ENCOUNTER
LM for patient letting her know we need to R/S her appointment tomorrow  is out sick . Please call [phone] to R/S

## 2023-04-12 ENCOUNTER — HOSPITAL ENCOUNTER (OUTPATIENT)
Dept: RADIOLOGY | Facility: CLINIC | Age: 58
Discharge: HOME | End: 2023-04-12
Attending: FAMILY MEDICINE
Payer: COMMERCIAL

## 2023-04-12 DIAGNOSIS — Z12.31 OTHER SCREENING MAMMOGRAM: ICD-10-CM

## 2023-04-12 PROCEDURE — 77067 MAMMO DIGITAL SCREENING BILAT WITH TOMO: ICD-10-PCS | Mod: 26,,, | Performed by: RADIOLOGY

## 2023-04-12 PROCEDURE — 77067 SCR MAMMO BI INCL CAD: CPT | Mod: 26,,, | Performed by: RADIOLOGY

## 2023-04-12 PROCEDURE — 77067 SCR MAMMO BI INCL CAD: CPT | Mod: TC,PO

## 2023-04-12 PROCEDURE — 77063 BREAST TOMOSYNTHESIS BI: CPT | Mod: 26,,, | Performed by: RADIOLOGY

## 2023-04-12 PROCEDURE — 77063 MAMMO DIGITAL SCREENING BILAT WITH TOMO: ICD-10-PCS | Mod: 26,,, | Performed by: RADIOLOGY

## 2023-06-13 ENCOUNTER — OFFICE VISIT (OUTPATIENT)
Dept: FAMILY MEDICINE | Facility: CLINIC | Age: 58
End: 2023-06-13
Payer: COMMERCIAL

## 2023-06-13 VITALS
WEIGHT: 215.63 LBS | OXYGEN SATURATION: 96 % | BODY MASS INDEX: 35.93 KG/M2 | TEMPERATURE: 98 F | SYSTOLIC BLOOD PRESSURE: 132 MMHG | HEIGHT: 65 IN | DIASTOLIC BLOOD PRESSURE: 76 MMHG | HEART RATE: 86 BPM

## 2023-06-13 DIAGNOSIS — Z12.11 SCREEN FOR COLON CANCER: ICD-10-CM

## 2023-06-13 DIAGNOSIS — I15.2 HYPERTENSION ASSOCIATED WITH DIABETES: ICD-10-CM

## 2023-06-13 DIAGNOSIS — K64.5 HEMORRHOIDS, THROMBOSED: Primary | ICD-10-CM

## 2023-06-13 DIAGNOSIS — E11.59 HYPERTENSION ASSOCIATED WITH DIABETES: ICD-10-CM

## 2023-06-13 PROCEDURE — 3051F PR MOST RECENT HEMOGLOBIN A1C LEVEL 7.0 - < 8.0%: ICD-10-PCS | Mod: CPTII,S$GLB,, | Performed by: PHYSICIAN ASSISTANT

## 2023-06-13 PROCEDURE — 1159F PR MEDICATION LIST DOCUMENTED IN MEDICAL RECORD: ICD-10-PCS | Mod: CPTII,S$GLB,, | Performed by: PHYSICIAN ASSISTANT

## 2023-06-13 PROCEDURE — 99213 OFFICE O/P EST LOW 20 MIN: CPT | Mod: S$GLB,,, | Performed by: PHYSICIAN ASSISTANT

## 2023-06-13 PROCEDURE — 3075F SYST BP GE 130 - 139MM HG: CPT | Mod: CPTII,S$GLB,, | Performed by: PHYSICIAN ASSISTANT

## 2023-06-13 PROCEDURE — 3078F PR MOST RECENT DIASTOLIC BLOOD PRESSURE < 80 MM HG: ICD-10-PCS | Mod: CPTII,S$GLB,, | Performed by: PHYSICIAN ASSISTANT

## 2023-06-13 PROCEDURE — 3066F PR DOCUMENTATION OF TREATMENT FOR NEPHROPATHY: ICD-10-PCS | Mod: CPTII,S$GLB,, | Performed by: PHYSICIAN ASSISTANT

## 2023-06-13 PROCEDURE — 3008F PR BODY MASS INDEX (BMI) DOCUMENTED: ICD-10-PCS | Mod: CPTII,S$GLB,, | Performed by: PHYSICIAN ASSISTANT

## 2023-06-13 PROCEDURE — 99999 PR PBB SHADOW E&M-EST. PATIENT-LVL V: ICD-10-PCS | Mod: PBBFAC,,, | Performed by: PHYSICIAN ASSISTANT

## 2023-06-13 PROCEDURE — 99999 PR PBB SHADOW E&M-EST. PATIENT-LVL V: CPT | Mod: PBBFAC,,, | Performed by: PHYSICIAN ASSISTANT

## 2023-06-13 PROCEDURE — 4010F ACE/ARB THERAPY RXD/TAKEN: CPT | Mod: CPTII,S$GLB,, | Performed by: PHYSICIAN ASSISTANT

## 2023-06-13 PROCEDURE — 3078F DIAST BP <80 MM HG: CPT | Mod: CPTII,S$GLB,, | Performed by: PHYSICIAN ASSISTANT

## 2023-06-13 PROCEDURE — 3072F LOW RISK FOR RETINOPATHY: CPT | Mod: CPTII,S$GLB,, | Performed by: PHYSICIAN ASSISTANT

## 2023-06-13 PROCEDURE — 4010F PR ACE/ARB THEARPY RXD/TAKEN: ICD-10-PCS | Mod: CPTII,S$GLB,, | Performed by: PHYSICIAN ASSISTANT

## 2023-06-13 PROCEDURE — 99213 PR OFFICE/OUTPT VISIT, EST, LEVL III, 20-29 MIN: ICD-10-PCS | Mod: S$GLB,,, | Performed by: PHYSICIAN ASSISTANT

## 2023-06-13 PROCEDURE — 3061F NEG MICROALBUMINURIA REV: CPT | Mod: CPTII,S$GLB,, | Performed by: PHYSICIAN ASSISTANT

## 2023-06-13 PROCEDURE — 3051F HG A1C>EQUAL 7.0%<8.0%: CPT | Mod: CPTII,S$GLB,, | Performed by: PHYSICIAN ASSISTANT

## 2023-06-13 PROCEDURE — 1159F MED LIST DOCD IN RCRD: CPT | Mod: CPTII,S$GLB,, | Performed by: PHYSICIAN ASSISTANT

## 2023-06-13 PROCEDURE — 3061F PR NEG MICROALBUMINURIA RESULT DOCUMENTED/REVIEW: ICD-10-PCS | Mod: CPTII,S$GLB,, | Performed by: PHYSICIAN ASSISTANT

## 2023-06-13 PROCEDURE — 3072F PR LOW RISK FOR RETINOPATHY: ICD-10-PCS | Mod: CPTII,S$GLB,, | Performed by: PHYSICIAN ASSISTANT

## 2023-06-13 PROCEDURE — 3008F BODY MASS INDEX DOCD: CPT | Mod: CPTII,S$GLB,, | Performed by: PHYSICIAN ASSISTANT

## 2023-06-13 PROCEDURE — 3075F PR MOST RECENT SYSTOLIC BLOOD PRESS GE 130-139MM HG: ICD-10-PCS | Mod: CPTII,S$GLB,, | Performed by: PHYSICIAN ASSISTANT

## 2023-06-13 PROCEDURE — 3066F NEPHROPATHY DOC TX: CPT | Mod: CPTII,S$GLB,, | Performed by: PHYSICIAN ASSISTANT

## 2023-06-13 RX ORDER — HYDROCORTISONE 25 MG/G
CREAM TOPICAL 2 TIMES DAILY
Qty: 28 G | Refills: 0 | Status: SHIPPED | OUTPATIENT
Start: 2023-06-13

## 2023-06-13 NOTE — PROGRESS NOTES
Subjective:       Patient ID: Nik Lowery is a 57 y.o. female.    Chief Complaint: Hemorrhoids    Patient With controlled type 2 diabetes and hypertension presents for evaluation of possible hemorrhoid.  She is complaining of a painful bulge in the inguinal region for approximately 4 days.  She states that the bulge has increased in size.  She has tried over-the-counter preparation H without relief.  She has not noticed any rectal bleeding.  She denies constipation but states that she spends lot of time sitting on the toilet. Patients patient medical/surgical, social and family histories have been reviewed       Review of Systems   Constitutional:  Negative for fatigue.   Respiratory:  Negative for chest tightness and shortness of breath.    Cardiovascular:  Negative for chest pain, palpitations and leg swelling.   Gastrointestinal:  Positive for rectal pain. Negative for abdominal distention, abdominal pain, anal bleeding, blood in stool, constipation, diarrhea, nausea and vomiting.   Neurological:  Negative for dizziness, light-headedness and headaches.     Objective:      Physical Exam  Constitutional:       General: She is not in acute distress.     Appearance: Normal appearance. She is not ill-appearing.   HENT:      Head: Normocephalic and atraumatic.   Eyes:      Conjunctiva/sclera: Conjunctivae normal.   Pulmonary:      Effort: Pulmonary effort is normal.   Genitourinary:     Rectum: Tenderness and external hemorrhoid present.   Neurological:      Mental Status: She is alert.   Psychiatric:         Mood and Affect: Mood normal.       Assessment:       1. Hemorrhoids, thrombosed    2. Hypertension associated with diabetes    3. Screen for colon cancer        Plan:       Nik was seen today for hemorrhoids.    Diagnoses and all orders for this visit:    Hemorrhoids, thrombosed  -     hydrocortisone 2.5 % cream; Apply topically 2 (two) times daily.  -     Ambulatory referral/consult to General Surgery;  "Future  Nik Lowery was given a handout which discussed their disease process, precautions, and instructions for follow-up and therapy.    Hypertension associated with diabetes  A1c is improved to 7.0.  Blood pressure is at goal.  Continue current medications and follow-up 3 months  Screen for colon cancer  -     Ambulatory referral/consult to General Surgery; Future           Follow up in about 3 months (around 9/13/2023) for Follow-Up with PCP, emiliano Resendez .           Documentation entered by me for this encounter may have been done in part using speech-recognition technology. Although I have made an effort to ensure accuracy, "sound like" errors may exist and should be interpreted in context.  "

## 2023-06-16 DIAGNOSIS — B00.9 HERPES SIMPLEX: ICD-10-CM

## 2023-06-16 RX ORDER — VALACYCLOVIR HYDROCHLORIDE 500 MG/1
TABLET, FILM COATED ORAL
Qty: 180 TABLET | Refills: 3 | Status: SHIPPED | OUTPATIENT
Start: 2023-06-16

## 2023-06-16 NOTE — TELEPHONE ENCOUNTER
Care Due:                  Date            Visit Type   Department     Provider  --------------------------------------------------------------------------------                                EP -                              PRIMARY      [facility]  Last Visit: 08-      CARE ([facility])   DYAN Newsome                              EP -                              PRIMARY      [facility]  Next Visit: 11-      CARE ([facility])   DYAN Newsome                                                            Last  Test          Frequency    Reason                     Performed    Due Date  --------------------------------------------------------------------------------    Office Visit  12 months..  lisinopriL, semaglutide,   08- 08-                             valACYclovir.............    CBC.........  12 months..  valACYclovir.............  Not Found    Overdue    [employer] Embedded Care Due Messages. Reference number: [id number].   6/16/2023 12:16:58 AM CDT

## 2023-06-20 PROCEDURE — 99284 EMERGENCY DEPT VISIT MOD MDM: CPT

## 2023-06-21 ENCOUNTER — HOSPITAL ENCOUNTER (EMERGENCY)
Facility: HOSPITAL | Age: 58
Discharge: HOME | End: 2023-06-21
Attending: EMERGENCY MEDICINE
Payer: COMMERCIAL

## 2023-06-21 VITALS
SYSTOLIC BLOOD PRESSURE: 131 MMHG | WEIGHT: 210 LBS | OXYGEN SATURATION: 95 % | TEMPERATURE: 98 F | BODY MASS INDEX: 34.99 KG/M2 | HEART RATE: 80 BPM | DIASTOLIC BLOOD PRESSURE: 76 MMHG | RESPIRATION RATE: 18 BRPM | HEIGHT: 65 IN
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
## 2023-06-21 DIAGNOSIS — S01.85XA DOG BITE OF FACE, INITIAL ENCOUNTER: Primary | ICD-10-CM

## 2023-06-21 PROCEDURE — 25000003 PHARM REV CODE 250: Performed by: EMERGENCY MEDICINE

## 2023-06-21 PROCEDURE — 90715 TDAP VACCINE 7 YRS/> IM: CPT | Performed by: EMERGENCY MEDICINE

## 2023-06-21 PROCEDURE — 63600175 PHARM REV CODE 636 W HCPCS: Performed by: EMERGENCY MEDICINE

## 2023-06-21 PROCEDURE — 90471 IMMUNIZATION ADMIN: CPT | Performed by: EMERGENCY MEDICINE

## 2023-06-21 RX ORDER — AMOXICILLIN AND CLAVULANATE POTASSIUM 875; 125 MG/1; MG/1
1 TABLET, FILM COATED ORAL 2 TIMES DAILY
Qty: 14 TABLET | Refills: 0 | Status: SHIPPED | OUTPATIENT
Start: 2023-06-21 | End: 2023-06-21 | Stop reason: SDUPTHER

## 2023-06-21 RX ORDER — AMOXICILLIN AND CLAVULANATE POTASSIUM 875; 125 MG/1; MG/1
1 TABLET, FILM COATED ORAL 2 TIMES DAILY
Qty: 14 TABLET | Refills: 0 | Status: SHIPPED | OUTPATIENT
Start: 2023-06-21 | End: 2023-12-01 | Stop reason: ALTCHOICE

## 2023-06-21 RX ORDER — AMOXICILLIN AND CLAVULANATE POTASSIUM 875; 125 MG/1; MG/1
1 TABLET, FILM COATED ORAL
Status: COMPLETED | OUTPATIENT
Start: 2023-06-21 | End: 2023-06-21

## 2023-06-21 RX ADMIN — AMOXICILLIN AND CLAVULANATE POTASSIUM 1 TABLET: 875; 125 TABLET, FILM COATED ORAL at 12:06

## 2023-06-21 RX ADMIN — TETANUS TOXOID, REDUCED DIPHTHERIA TOXOID AND ACELLULAR PERTUSSIS VACCINE, ADSORBED 0.5 ML: 5; 2.5; 8; 8; 2.5 SUSPENSION INTRAMUSCULAR at 12:06

## 2023-06-21 NOTE — ED PROVIDER NOTES
Encounter Date: 6/20/2023       History     Chief Complaint   Patient presents with    Animal Bite     Dog bite to face     Chief complaint:  Dog bite    HPI:  57-year-old female presents with a dog bite to the face sustained by her own pet.  She reports that she was playing with pet who is fully vaccinated.  Past medical history is significant for diabetes and hypertension.  Last tetanus is unknown.    Review of patient's allergies indicates:   Allergen Reactions    Robaxin [methocarbamol] Nausea Only     Past Medical History:   Diagnosis Date    Diabetes mellitus     Diabetes mellitus, type 2     Herpes     Hypertension      Past Surgical History:   Procedure Laterality Date    EYE SURGERY  2002    lasix Bilateral    HYSTERECTOMY  2012    LUMBAR DISCECTOMY      WISDOM TOOTH EXTRACTION       Family History   Problem Relation Age of Onset    Heart disease Mother     Hypertension Mother     Cataracts Mother     Pancreatic cancer Father     Cancer Father     Arthritis Sister     Diabetes Brother     No Known Problems Paternal Aunt     Heart disease Paternal Uncle     Heart disease Maternal Grandfather     Alzheimer's disease Paternal Grandmother     Glaucoma Neg Hx      Social History     Tobacco Use    Smoking status: Never    Smokeless tobacco: Never   Substance Use Topics    Alcohol use: No    Drug use: No     Review of Systems   Constitutional:  Negative for fever.   Respiratory:  Negative for shortness of breath.    Genitourinary:  Negative for flank pain.   Musculoskeletal:  Negative for gait problem.   Skin:  Positive for wound.   Neurological:  Negative for weakness.   Psychiatric/Behavioral:  Negative for confusion.      Physical Exam     Initial Vitals [06/21/23 0000]   BP Pulse Resp Temp SpO2   131/76 80 18 98.2 °F (36.8 °C) 95 %      MAP       --         Physical Exam    Nursing note and vitals reviewed.  Constitutional: Vital signs are normal. She is not diaphoretic.  Non-toxic appearance. She does not  have a sickly appearance. No distress.   HENT:   Head: Normocephalic and atraumatic.   Eyes: Conjunctivae are normal.   Neck: Phonation normal. Neck supple. No thyromegaly present. No tracheal deviation present.   Cardiovascular:  Normal rate.           Musculoskeletal:      Cervical back: Neck supple.     Neurological: She is alert and oriented to person, place, and time.   Skin: Skin is warm, dry and intact. No pallor.   Two parallel puncture wounds of the right lateral nares and small puncture wounds of the right cheek.  Mucosal surface of the right nares is unaffected   Psychiatric: She has a normal mood and affect. Her speech is normal and behavior is normal. Thought content normal.         ED Course   Procedures  Labs Reviewed - No data to display       Imaging Results    None          Medications   amoxicillin-clavulanate 875-125mg per tablet 1 tablet (has no administration in time range)   Tdap (BOOSTRIX) vaccine injection 0.5 mL (has no administration in time range)     Medical Decision Making:   ED Management:  57-year-old female presents after a dog bite to the face.  The margins were closely approximated and do not mandate sutures at this time.  She is started on Augmentin.                        Clinical Impression:   Final diagnoses:  [S01.85XA, W54.0XXA] Dog bite of face, initial encounter (Primary)        ED Disposition Condition    Discharge Stable          ED Prescriptions       Medication Sig Dispense Start Date End Date Auth. Provider    amoxicillin-clavulanate 875-125mg (AUGMENTIN) 875-125 mg per tablet Take 1 tablet by mouth 2 (two) times daily. 14 tablet 6/21/2023 -- Walter Wagner III, MD          Follow-up Information       Follow up With Specialties Details Why Contact Info    Osmin Newsome MD Family Medicine In 1 week  [address] [facility] [address]  [phone]               Walter Wagner III, MD  06/21/23 0021

## 2023-06-22 ENCOUNTER — TELEPHONE (OUTPATIENT)
Dept: FAMILY MEDICINE | Facility: CLINIC | Age: 58
End: 2023-06-22
Payer: COMMERCIAL

## 2023-06-23 ENCOUNTER — TELEPHONE (OUTPATIENT)
Dept: SURGERY | Facility: CLINIC | Age: 58
End: 2023-06-23
Payer: COMMERCIAL

## 2023-07-21 ENCOUNTER — OFFICE VISIT (OUTPATIENT)
Dept: OPTOMETRY | Facility: CLINIC | Age: 58
End: 2023-07-21
Payer: COMMERCIAL

## 2023-07-21 DIAGNOSIS — H52.7 REFRACTIVE ERROR: ICD-10-CM

## 2023-07-21 DIAGNOSIS — H04.123 DRY EYE SYNDROME, BILATERAL: ICD-10-CM

## 2023-07-21 DIAGNOSIS — E11.9 DIABETES MELLITUS TYPE 2 WITHOUT RETINOPATHY: ICD-10-CM

## 2023-07-21 DIAGNOSIS — Z01.00 EXAMINATION OF EYES AND VISION: Primary | ICD-10-CM

## 2023-07-21 DIAGNOSIS — H25.13 NUCLEAR SCLEROSIS, BILATERAL: ICD-10-CM

## 2023-07-21 PROCEDURE — 99999 PR PBB SHADOW E&M-EST. PATIENT-LVL III: ICD-10-PCS | Mod: PBBFAC,,, | Performed by: OPTOMETRIST

## 2023-07-21 PROCEDURE — 92015 PR REFRACTION: ICD-10-PCS | Mod: S$GLB,,, | Performed by: OPTOMETRIST

## 2023-07-21 PROCEDURE — 99999 PR PBB SHADOW E&M-EST. PATIENT-LVL III: CPT | Mod: PBBFAC,,, | Performed by: OPTOMETRIST

## 2023-07-21 PROCEDURE — 92004 COMPRE OPH EXAM NEW PT 1/>: CPT | Mod: S$GLB,,, | Performed by: OPTOMETRIST

## 2023-07-21 PROCEDURE — 92015 DETERMINE REFRACTIVE STATE: CPT | Mod: S$GLB,,, | Performed by: OPTOMETRIST

## 2023-07-21 PROCEDURE — 92004 PR EYE EXAM, NEW PATIENT,COMPREHESV: ICD-10-PCS | Mod: S$GLB,,, | Performed by: OPTOMETRIST

## 2023-07-21 NOTE — PROGRESS NOTES
HPI    Pt here today for annual DM exam.     States blurred vision at distance and near OU with glasses, would like   updated RX. States DM is stable     No FOL/Floaters/Pain    Hemoglobin A1C       Date                     Value               Ref Range             Status                04/12/2023               7.0 (H)             4.0 - 5.6 %           Final                 12/14/2022               8.0 (H)             4.0 - 5.6 %           Final                 05/21/2022               9.3 (H)             4.0 - 5.6 %           Final                Last edited by Johana Lombardo on 7/21/2023  7:06 AM.            Assessment /Plan     For exam results, see Encounter Report.    Examination of eyes and vision    Nuclear sclerosis, bilateral    Refractive error    Dry eye syndrome, bilateral      1. Examination of eyes and vision    2. Nuclear sclerosis, bilateral  Mild, not visually significant. Discussed possible ocular affects of cataracts. Acceptable BCVA OU. Discussed treatment options. Surgery not recommended at this time. Monitor yearly.     3. Refractive error  Dispensed updated spectacle Rx. Discussed various spectacle lens options. Discussed adaptation period to new specs.   Demonstrated new spec Rx vs current specs in phoropter with patient satisfaction    4. Dry eye syndrome, bilateral  Discussed ocular affects of dry eyes. Recommend OTC Refresh artificial tears 2-4 times a day in both eyes. Discussed chronicity of MARYAM. RTC if symptoms not alleviated by continued use of artificial tears.     5. DM type 2 w/o retinopathy  Discussed possible ocular affects of uncontrolled blood sugar with patient. Recommended continued strong blood sugar control and continued care with PCP. Monitor yearly.

## 2023-07-31 DIAGNOSIS — F32.A ANXIETY AND DEPRESSION: ICD-10-CM

## 2023-07-31 DIAGNOSIS — E11.65 UNCONTROLLED TYPE 2 DIABETES MELLITUS WITH HYPERGLYCEMIA, WITHOUT LONG-TERM CURRENT USE OF INSULIN: ICD-10-CM

## 2023-07-31 DIAGNOSIS — F41.9 ANXIETY AND DEPRESSION: ICD-10-CM

## 2023-07-31 RX ORDER — METFORMIN HYDROCHLORIDE 500 MG/1
TABLET, EXTENDED RELEASE ORAL
Qty: 360 TABLET | Refills: 0 | Status: SHIPPED | OUTPATIENT
Start: 2023-07-31 | End: 2023-11-06

## 2023-07-31 RX ORDER — ATORVASTATIN CALCIUM 20 MG/1
TABLET, FILM COATED ORAL
Qty: 90 TABLET | Refills: 0 | Status: SHIPPED | OUTPATIENT
Start: 2023-07-31 | End: 2023-11-06

## 2023-07-31 NOTE — TELEPHONE ENCOUNTER
Care Due:                  Date            Visit Type   Department     Provider  --------------------------------------------------------------------------------                                EP -                              PRIMARY      [facility]  Last Visit: 08-      CARE ([facility])   DYAN Newsome                              EP -                              PRIMARY      [facility]  Next Visit: 11-      CARE ([facility])   DYAN Newsome                                                            Last  Test          Frequency    Reason                     Performed    Due Date  --------------------------------------------------------------------------------    HBA1C.......  6 months...  semaglutide..............  04-   10-    [facility] Embedded Care Due Messages. Reference number: [id number].   7/31/2023 12:19:36 AM CDT

## 2023-07-31 NOTE — TELEPHONE ENCOUNTER
Provider Staff:  Action required for this patient     Please see care gap opportunities below in Care Due Message.    Thanks!  [facility]     Appointments      Date Provider   Last Visit   8/24/2022 Osmin Newsome MD   Next Visit   11/8/2023 Osmin Newsome MD     Refill Decision Note   Nik Lowery  is requesting a refill authorization.  Brief Assessment and Rationale for Refill:  Approve     Medication Therapy Plan:         Pharmacist review requested: Yes   Comments:     Note composed:12:52 PM 07/31/2023

## 2023-08-02 ENCOUNTER — TELEPHONE (OUTPATIENT)
Dept: SURGERY | Facility: CLINIC | Age: 58
End: 2023-08-02
Payer: COMMERCIAL

## 2023-08-02 RX ORDER — SERTRALINE HYDROCHLORIDE 100 MG/1
TABLET, FILM COATED ORAL
Qty: 90 TABLET | Refills: 0 | Status: SHIPPED | OUTPATIENT
Start: 2023-08-02 | End: 2023-11-06

## 2023-08-02 NOTE — TELEPHONE ENCOUNTER
[facility] referral rescheduled with patient: 8/29/2023 1548  [facility].

## 2023-08-29 ENCOUNTER — PATIENT MESSAGE (OUTPATIENT)
Dept: FAMILY MEDICINE | Facility: CLINIC | Age: 58
End: 2023-08-29
Payer: COMMERCIAL

## 2023-08-29 ENCOUNTER — TELEPHONE (OUTPATIENT)
Dept: FAMILY MEDICINE | Facility: CLINIC | Age: 58
End: 2023-08-29
Payer: COMMERCIAL

## 2023-08-29 NOTE — TELEPHONE ENCOUNTER
Tried calling pt about rescheduling missed General Syrgery appt p/Referral  No answer, Mailbox full, Info sent to pt portal

## 2023-08-31 ENCOUNTER — TELEPHONE (OUTPATIENT)
Dept: FAMILY MEDICINE | Facility: CLINIC | Age: 58
End: 2023-08-31
Payer: COMMERCIAL

## 2023-08-31 NOTE — TELEPHONE ENCOUNTER
GenSurg referral: x2 appointments cancelled; voicemail full; portal messge sent with scheduling information.

## 2023-10-26 ENCOUNTER — PATIENT MESSAGE (OUTPATIENT)
Dept: FAMILY MEDICINE | Facility: CLINIC | Age: 58
End: 2023-10-26
Payer: COMMERCIAL

## 2023-10-26 DIAGNOSIS — I15.2 HYPERTENSION ASSOCIATED WITH DIABETES: ICD-10-CM

## 2023-10-26 DIAGNOSIS — E11.59 HYPERTENSION ASSOCIATED WITH DIABETES: ICD-10-CM

## 2023-10-27 RX ORDER — LISINOPRIL 20 MG/1
20 TABLET ORAL DAILY
Qty: 90 TABLET | Refills: 3 | Status: SHIPPED | OUTPATIENT
Start: 2023-10-27 | End: 2023-12-01 | Stop reason: SDUPTHER

## 2023-10-27 NOTE — TELEPHONE ENCOUNTER
Spoke to patient she is only supposed to be taking the Lisinopril the Lisinopril-HCTZ was discontinued in 22 patient verbalized understanding

## 2023-11-01 DIAGNOSIS — E11.9 TYPE 2 DIABETES MELLITUS WITHOUT COMPLICATION: ICD-10-CM

## 2023-11-06 DIAGNOSIS — F32.A ANXIETY AND DEPRESSION: ICD-10-CM

## 2023-11-06 DIAGNOSIS — E11.65 UNCONTROLLED TYPE 2 DIABETES MELLITUS WITH HYPERGLYCEMIA, WITHOUT LONG-TERM CURRENT USE OF INSULIN: ICD-10-CM

## 2023-11-06 DIAGNOSIS — F41.9 ANXIETY AND DEPRESSION: ICD-10-CM

## 2023-11-06 RX ORDER — SERTRALINE HYDROCHLORIDE 100 MG/1
100 TABLET, FILM COATED ORAL DAILY
Qty: 30 TABLET | Refills: 0 | Status: SHIPPED | OUTPATIENT
Start: 2023-11-06 | End: 2023-12-01 | Stop reason: SDUPTHER

## 2023-11-06 RX ORDER — ATORVASTATIN CALCIUM 20 MG/1
20 TABLET, FILM COATED ORAL DAILY
Qty: 30 TABLET | Refills: 0 | Status: SHIPPED | OUTPATIENT
Start: 2023-11-06 | End: 2023-12-01 | Stop reason: SDUPTHER

## 2023-11-06 RX ORDER — METFORMIN HYDROCHLORIDE 500 MG/1
1000 TABLET, EXTENDED RELEASE ORAL 2 TIMES DAILY WITH MEALS
Qty: 120 TABLET | Refills: 0 | Status: SHIPPED | OUTPATIENT
Start: 2023-11-06 | End: 2023-12-01 | Stop reason: SDUPTHER

## 2023-11-06 NOTE — TELEPHONE ENCOUNTER
Care Due:                  Date            Visit Type   Department     Provider  --------------------------------------------------------------------------------                                EP -                              PRIMARY      [facility]  Last Visit: 08-      CARE ([facility])   DYAN Newsome                              EP -                              PRIMARY      [facility]  Next Visit: 11-      CARE ([facility])   DYAN Newsome                                                            Last  Test          Frequency    Reason                     Performed    Due Date  --------------------------------------------------------------------------------    CBC.........  12 months..  valACYclovir.............  Not Found    Overdue    HBA1C.......  6 months...  metFORMIN, semaglutide...  04-   10-    [facility] Embedded Care Due Messages. Reference number: [id number].   11/06/2023 12:19:41 AM CST

## 2023-11-08 ENCOUNTER — TELEPHONE (OUTPATIENT)
Dept: FAMILY MEDICINE | Facility: CLINIC | Age: 58
End: 2023-11-08
Payer: COMMERCIAL

## 2023-12-01 ENCOUNTER — OFFICE VISIT (OUTPATIENT)
Dept: FAMILY MEDICINE | Facility: CLINIC | Age: 58
End: 2023-12-01
Payer: COMMERCIAL

## 2023-12-01 ENCOUNTER — LAB VISIT (OUTPATIENT)
Dept: LAB | Facility: HOSPITAL | Age: 58
End: 2023-12-01
Payer: COMMERCIAL

## 2023-12-01 VITALS
HEIGHT: 63 IN | TEMPERATURE: 98 F | OXYGEN SATURATION: 96 % | HEART RATE: 87 BPM | WEIGHT: 214.06 LBS | BODY MASS INDEX: 37.93 KG/M2

## 2023-12-01 DIAGNOSIS — F32.A ANXIETY AND DEPRESSION: ICD-10-CM

## 2023-12-01 DIAGNOSIS — Z82.61 FAMILY HISTORY OF RHEUMATOID ARTHRITIS: ICD-10-CM

## 2023-12-01 DIAGNOSIS — E11.69 HYPERLIPIDEMIA ASSOCIATED WITH TYPE 2 DIABETES MELLITUS: ICD-10-CM

## 2023-12-01 DIAGNOSIS — I15.2 HYPERTENSION ASSOCIATED WITH DIABETES: ICD-10-CM

## 2023-12-01 DIAGNOSIS — F41.9 ANXIETY AND DEPRESSION: ICD-10-CM

## 2023-12-01 DIAGNOSIS — Z23 NEED FOR INFLUENZA VACCINATION: ICD-10-CM

## 2023-12-01 DIAGNOSIS — M79.642 PAIN IN BOTH HANDS: ICD-10-CM

## 2023-12-01 DIAGNOSIS — E78.5 HYPERLIPIDEMIA ASSOCIATED WITH TYPE 2 DIABETES MELLITUS: ICD-10-CM

## 2023-12-01 DIAGNOSIS — M79.641 PAIN IN BOTH HANDS: ICD-10-CM

## 2023-12-01 DIAGNOSIS — K21.9 GASTROESOPHAGEAL REFLUX DISEASE WITHOUT ESOPHAGITIS: ICD-10-CM

## 2023-12-01 DIAGNOSIS — Z12.11 SCREENING FOR COLON CANCER: ICD-10-CM

## 2023-12-01 DIAGNOSIS — E11.65 UNCONTROLLED TYPE 2 DIABETES MELLITUS WITH HYPERGLYCEMIA, WITHOUT LONG-TERM CURRENT USE OF INSULIN: ICD-10-CM

## 2023-12-01 DIAGNOSIS — E11.59 HYPERTENSION ASSOCIATED WITH DIABETES: Primary | ICD-10-CM

## 2023-12-01 DIAGNOSIS — E11.9 TYPE 2 DIABETES MELLITUS WITHOUT COMPLICATION, WITHOUT LONG-TERM CURRENT USE OF INSULIN: ICD-10-CM

## 2023-12-01 DIAGNOSIS — J30.9 ALLERGIC RHINITIS, UNSPECIFIED SEASONALITY, UNSPECIFIED TRIGGER: ICD-10-CM

## 2023-12-01 DIAGNOSIS — E11.59 HYPERTENSION ASSOCIATED WITH DIABETES: ICD-10-CM

## 2023-12-01 DIAGNOSIS — I15.2 HYPERTENSION ASSOCIATED WITH DIABETES: Primary | ICD-10-CM

## 2023-12-01 LAB
ALBUMIN SERPL BCP-MCNC: 4.1 G/DL (ref 3.5–5.2)
ALP SERPL-CCNC: 76 U/L (ref 55–135)
ALT SERPL W/O P-5'-P-CCNC: 28 U/L (ref 10–44)
ANION GAP SERPL CALC-SCNC: 9 MMOL/L (ref 8–16)
AST SERPL-CCNC: 32 U/L (ref 10–40)
BASOPHILS # BLD AUTO: 0.05 K/UL (ref 0–0.2)
BASOPHILS NFR BLD: 0.7 % (ref 0–1.9)
BILIRUB SERPL-MCNC: 0.3 MG/DL (ref 0.1–1)
BUN SERPL-MCNC: 15 MG/DL (ref 6–20)
CALCIUM SERPL-MCNC: 9.2 MG/DL (ref 8.7–10.5)
CHLORIDE SERPL-SCNC: 106 MMOL/L (ref 95–110)
CHOLEST SERPL-MCNC: 155 MG/DL (ref 120–199)
CHOLEST/HDLC SERPL: 3.8 {RATIO} (ref 2–5)
CO2 SERPL-SCNC: 24 MMOL/L (ref 23–29)
CREAT SERPL-MCNC: 0.8 MG/DL (ref 0.5–1.4)
CRP SERPL-MCNC: 1.5 MG/L (ref 0–8.2)
DIFFERENTIAL METHOD: ABNORMAL
EOSINOPHIL # BLD AUTO: 0.3 K/UL (ref 0–0.5)
EOSINOPHIL NFR BLD: 3.7 % (ref 0–8)
ERYTHROCYTE [DISTWIDTH] IN BLOOD BY AUTOMATED COUNT: 12.6 % (ref 11.5–14.5)
ERYTHROCYTE [SEDIMENTATION RATE] IN BLOOD BY WESTERGREN METHOD: 16 MM/HR (ref 0–20)
EST. GFR  (NO RACE VARIABLE): >60 ML/MIN/1.73 M^2
GLUCOSE SERPL-MCNC: 111 MG/DL (ref 70–110)
HCT VFR BLD AUTO: 36.3 % (ref 37–48.5)
HDLC SERPL-MCNC: 41 MG/DL (ref 40–75)
HDLC SERPL: 26.5 % (ref 20–50)
HGB BLD-MCNC: 11.6 G/DL (ref 12–16)
IMM GRANULOCYTES # BLD AUTO: 0.01 K/UL (ref 0–0.04)
IMM GRANULOCYTES NFR BLD AUTO: 0.1 % (ref 0–0.5)
LDLC SERPL CALC-MCNC: 67 MG/DL (ref 63–159)
LYMPHOCYTES # BLD AUTO: 2.7 K/UL (ref 1–4.8)
LYMPHOCYTES NFR BLD: 36.7 % (ref 18–48)
MCH RBC QN AUTO: 29.7 PG (ref 27–31)
MCHC RBC AUTO-ENTMCNC: 32 G/DL (ref 32–36)
MCV RBC AUTO: 93 FL (ref 82–98)
MONOCYTES # BLD AUTO: 0.4 K/UL (ref 0.3–1)
MONOCYTES NFR BLD: 5.8 % (ref 4–15)
NEUTROPHILS # BLD AUTO: 3.8 K/UL (ref 1.8–7.7)
NEUTROPHILS NFR BLD: 53 % (ref 38–73)
NONHDLC SERPL-MCNC: 114 MG/DL
NRBC BLD-RTO: 0 /100 WBC
PLATELET # BLD AUTO: 304 K/UL (ref 150–450)
PMV BLD AUTO: 10.4 FL (ref 9.2–12.9)
POTASSIUM SERPL-SCNC: 4.4 MMOL/L (ref 3.5–5.1)
PROT SERPL-MCNC: 7.5 G/DL (ref 6–8.4)
RBC # BLD AUTO: 3.91 M/UL (ref 4–5.4)
SODIUM SERPL-SCNC: 139 MMOL/L (ref 136–145)
TRIGL SERPL-MCNC: 235 MG/DL (ref 30–150)
WBC # BLD AUTO: 7.22 K/UL (ref 3.9–12.7)

## 2023-12-01 PROCEDURE — 85025 COMPLETE CBC W/AUTO DIFF WBC: CPT

## 2023-12-01 PROCEDURE — 90471 IMMUNIZATION ADMIN: CPT | Mod: S$GLB,,,

## 2023-12-01 PROCEDURE — 80061 LIPID PANEL: CPT

## 2023-12-01 PROCEDURE — 3061F PR NEG MICROALBUMINURIA RESULT DOCUMENTED/REVIEW: ICD-10-PCS | Mod: CPTII,S$GLB,,

## 2023-12-01 PROCEDURE — 90471 FLU VACCINE (QUAD) GREATER THAN OR EQUAL TO 3YO PRESERVATIVE FREE IM: ICD-10-PCS | Mod: S$GLB,,,

## 2023-12-01 PROCEDURE — 3051F HG A1C>EQUAL 7.0%<8.0%: CPT | Mod: CPTII,S$GLB,,

## 2023-12-01 PROCEDURE — 99999 PR PBB SHADOW E&M-EST. PATIENT-LVL V: CPT | Mod: PBBFAC,,,

## 2023-12-01 PROCEDURE — 99214 PR OFFICE/OUTPT VISIT, EST, LEVL IV, 30-39 MIN: ICD-10-PCS | Mod: 25,S$GLB,,

## 2023-12-01 PROCEDURE — 36415 COLL VENOUS BLD VENIPUNCTURE: CPT | Mod: PO

## 2023-12-01 PROCEDURE — 3061F NEG MICROALBUMINURIA REV: CPT | Mod: CPTII,S$GLB,,

## 2023-12-01 PROCEDURE — 4010F ACE/ARB THERAPY RXD/TAKEN: CPT | Mod: CPTII,S$GLB,,

## 2023-12-01 PROCEDURE — 1159F MED LIST DOCD IN RCRD: CPT | Mod: CPTII,S$GLB,,

## 2023-12-01 PROCEDURE — 1160F PR REVIEW ALL MEDS BY PRESCRIBER/CLIN PHARMACIST DOCUMENTED: ICD-10-PCS | Mod: CPTII,S$GLB,,

## 2023-12-01 PROCEDURE — 90686 IIV4 VACC NO PRSV 0.5 ML IM: CPT | Mod: S$GLB,,,

## 2023-12-01 PROCEDURE — 3066F PR DOCUMENTATION OF TREATMENT FOR NEPHROPATHY: ICD-10-PCS | Mod: CPTII,S$GLB,,

## 2023-12-01 PROCEDURE — 99214 OFFICE O/P EST MOD 30 MIN: CPT | Mod: 25,S$GLB,,

## 2023-12-01 PROCEDURE — 86140 C-REACTIVE PROTEIN: CPT

## 2023-12-01 PROCEDURE — 3008F PR BODY MASS INDEX (BMI) DOCUMENTED: ICD-10-PCS | Mod: CPTII,S$GLB,,

## 2023-12-01 PROCEDURE — 1160F RVW MEDS BY RX/DR IN RCRD: CPT | Mod: CPTII,S$GLB,,

## 2023-12-01 PROCEDURE — 80053 COMPREHEN METABOLIC PANEL: CPT

## 2023-12-01 PROCEDURE — 3008F BODY MASS INDEX DOCD: CPT | Mod: CPTII,S$GLB,,

## 2023-12-01 PROCEDURE — 4010F PR ACE/ARB THEARPY RXD/TAKEN: ICD-10-PCS | Mod: CPTII,S$GLB,,

## 2023-12-01 PROCEDURE — 1159F PR MEDICATION LIST DOCUMENTED IN MEDICAL RECORD: ICD-10-PCS | Mod: CPTII,S$GLB,,

## 2023-12-01 PROCEDURE — 85651 RBC SED RATE NONAUTOMATED: CPT | Mod: PO

## 2023-12-01 PROCEDURE — 3066F NEPHROPATHY DOC TX: CPT | Mod: CPTII,S$GLB,,

## 2023-12-01 PROCEDURE — 86431 RHEUMATOID FACTOR QUANT: CPT

## 2023-12-01 PROCEDURE — 83036 HEMOGLOBIN GLYCOSYLATED A1C: CPT

## 2023-12-01 PROCEDURE — 90686 FLU VACCINE (QUAD) GREATER THAN OR EQUAL TO 3YO PRESERVATIVE FREE IM: ICD-10-PCS | Mod: S$GLB,,,

## 2023-12-01 PROCEDURE — 99999 PR PBB SHADOW E&M-EST. PATIENT-LVL V: ICD-10-PCS | Mod: PBBFAC,,,

## 2023-12-01 PROCEDURE — 3051F PR MOST RECENT HEMOGLOBIN A1C LEVEL 7.0 - < 8.0%: ICD-10-PCS | Mod: CPTII,S$GLB,,

## 2023-12-01 RX ORDER — PANTOPRAZOLE SODIUM 20 MG/1
20 TABLET, DELAYED RELEASE ORAL DAILY
Qty: 90 TABLET | Refills: 3 | Status: SHIPPED | OUTPATIENT
Start: 2023-12-01

## 2023-12-01 RX ORDER — METFORMIN HYDROCHLORIDE 500 MG/1
1000 TABLET, EXTENDED RELEASE ORAL 2 TIMES DAILY WITH MEALS
Qty: 120 TABLET | Refills: 3 | Status: SHIPPED | OUTPATIENT
Start: 2023-12-01 | End: 2024-03-11

## 2023-12-01 RX ORDER — LISINOPRIL 20 MG/1
20 TABLET ORAL DAILY
Qty: 90 TABLET | Refills: 3 | Status: SHIPPED | OUTPATIENT
Start: 2023-12-01 | End: 2024-11-30

## 2023-12-01 RX ORDER — SERTRALINE HYDROCHLORIDE 100 MG/1
100 TABLET, FILM COATED ORAL DAILY
Qty: 90 TABLET | Refills: 3 | Status: SHIPPED | OUTPATIENT
Start: 2023-12-01

## 2023-12-01 RX ORDER — FLUTICASONE PROPIONATE 50 MCG
2 SPRAY, SUSPENSION (ML) NASAL DAILY
Qty: 16 G | Refills: 11 | Status: SHIPPED | OUTPATIENT
Start: 2023-12-01

## 2023-12-01 RX ORDER — LAMOTRIGINE 150 MG/1
150 TABLET ORAL DAILY
Qty: 90 TABLET | Refills: 3 | Status: SHIPPED | OUTPATIENT
Start: 2023-12-01

## 2023-12-01 RX ORDER — MELOXICAM 15 MG/1
15 TABLET ORAL DAILY
Qty: 30 TABLET | Refills: 2 | Status: SHIPPED | OUTPATIENT
Start: 2023-12-01

## 2023-12-01 RX ORDER — ATORVASTATIN CALCIUM 20 MG/1
20 TABLET, FILM COATED ORAL DAILY
Qty: 90 TABLET | Refills: 3 | Status: SHIPPED | OUTPATIENT
Start: 2023-12-01

## 2023-12-01 RX ORDER — PANTOPRAZOLE SODIUM 20 MG/1
20 TABLET, DELAYED RELEASE ORAL
Qty: 90 TABLET | Refills: 3 | OUTPATIENT
Start: 2023-12-01

## 2023-12-01 NOTE — PROGRESS NOTES
Administered flu vaccine to pt. Aseptic technique maintained. Pt tolerated well wit 0 out of 10 pain scale.  Pt agreed to wait in clinic 15 post injection time.

## 2023-12-01 NOTE — PROGRESS NOTES
Subjective:       Patient ID: Nik Lowery is a 57 y.o. female.    Chief Complaint: 6 mo f/u      Nik Lowery is a 57 y.o. female with history of DM2, HTN, HLP, anxiety/ depression who presents to clinic for routine 6 month follow up. Doing well overall, would like medications refilled. Labs due. Having some pain in bilateral palms/ wrists, concerns this may be RA. Family history of RA. No numbness/ tingling of fingers. Colon cancer screening due, patient is agreeable to cologuard. Would like her flu shot today. Seasonal allergies, would like to try Flonase.         Review of Systems   Constitutional:  Negative for activity change, appetite change, fatigue and unexpected weight change.   HENT:  Positive for congestion.    Eyes:  Negative for visual disturbance.   Respiratory:  Negative for cough and shortness of breath.    Cardiovascular:  Negative for chest pain, palpitations and leg swelling.   Gastrointestinal:  Negative for abdominal pain, constipation, diarrhea and nausea.   Musculoskeletal:  Positive for arthralgias.   Skin:  Negative for rash.   Neurological:  Negative for dizziness, light-headedness and headaches.   Psychiatric/Behavioral:  Negative for dysphoric mood. The patient is not nervous/anxious.          Past Medical History:   Diagnosis Date    Diabetes mellitus     Diabetes mellitus, type 2     Herpes     Hypertension        Review of patient's allergies indicates:   Allergen Reactions    Robaxin [methocarbamol] Nausea Only         Current Outpatient Medications:     acetaminophen (TYLENOL EXTRA STRENGTH ORAL), Take 1,300 mg by mouth 3 (three) times daily. , Disp: , Rfl:     ALPRAZolam (XANAX) 0.25 MG tablet, Take 1 tablet (0.25 mg total) by mouth daily as needed for Anxiety., Disp: 30 tablet, Rfl: 1    gabapentin (NEURONTIN) 800 MG tablet, gabapentin 800 mg tablet  TAKE 1 TABLET BY MOUTH EVERY 8 HOURS FOR 3 DAYS, THEN 1 TABLET EVERY 6 HOURS THEREAFTER, Disp: , Rfl:     hydrocortisone 2.5 %  cream, Apply topically 2 (two) times daily., Disp: 28 g, Rfl: 0    ibuprofen (ADVIL,MOTRIN) 600 MG tablet, Take 1 tablet (600 mg total) by mouth every 8 (eight) hours as needed for Pain., Disp: 20 tablet, Rfl: 0    multivitamin (THERAGRAN) per tablet, Take 1 tablet by mouth once daily., Disp: , Rfl:     semaglutide (OZEMPIC) 1 mg/dose (4 mg/3 mL), Inject 1 mg into the skin every 7 days., Disp: 1 pen, Rfl: 11    valACYclovir (VALTREX) 500 MG tablet, TAKE 1 TABLET BY MOUTH TWICE A DAY, Disp: 180 tablet, Rfl: 3    atorvastatin (LIPITOR) 20 MG tablet, Take 1 tablet (20 mg total) by mouth once daily., Disp: 90 tablet, Rfl: 3    blood-glucose meter kit, To check BG 3 times daily, to use with insurance preferred meter (Patient not taking: Reported on 8/24/2022), Disp: 1 each, Rfl: 0    fluticasone propionate (FLONASE) 50 mcg/actuation nasal spray, 2 sprays (100 mcg total) by Each Nostril route once daily., Disp: 16 g, Rfl: 11    lamoTRIgine (LAMICTAL) 150 MG Tab, Take 1 tablet (150 mg total) by mouth once daily., Disp: 90 tablet, Rfl: 3    lancets Misc, To check BG 3 times daily, to use with insurance preferred meter (Patient not taking: Reported on 12/1/2023), Disp: 200 each, Rfl: 1    lisinopriL (PRINIVIL,ZESTRIL) 20 MG tablet, Take 1 tablet (20 mg total) by mouth once daily., Disp: 90 tablet, Rfl: 3    meloxicam (MOBIC) 15 MG tablet, Take 1 tablet (15 mg total) by mouth once daily., Disp: 30 tablet, Rfl: 2    metFORMIN (GLUCOPHAGE-XR) 500 MG ER 24hr tablet, Take 2 tablets (1,000 mg total) by mouth 2 (two) times daily with meals., Disp: 120 tablet, Rfl: 3    ONETOUCH ULTRA BLUE TEST STRIP Strp, USE TO CHECK BLOOD SUGAR 3 TIMES A DAY (Patient not taking: Reported on 12/1/2023), Disp: 200 strip, Rfl: 0    pantoprazole (PROTONIX) 20 MG tablet, Take 1 tablet (20 mg total) by mouth once daily., Disp: 90 tablet, Rfl: 3    sertraline (ZOLOFT) 100 MG tablet, Take 1 tablet (100 mg total) by mouth once daily., Disp: 90 tablet,  "Rfl: 3    Objective:        Physical Exam  Vitals reviewed.   Constitutional:       Appearance: Normal appearance.   HENT:      Head: Normocephalic and atraumatic.   Eyes:      Conjunctiva/sclera: Conjunctivae normal.   Cardiovascular:      Rate and Rhythm: Normal rate and regular rhythm.      Heart sounds: Normal heart sounds.   Pulmonary:      Effort: Pulmonary effort is normal.      Breath sounds: Normal breath sounds.   Abdominal:      General: Bowel sounds are normal.      Palpations: Abdomen is soft.   Musculoskeletal:         General: Normal range of motion.      Cervical back: Normal range of motion and neck supple.      Right lower leg: No edema.      Left lower leg: No edema.      Comments: Pain localized to bilateral thenar eminences and lateral wrists, TTP   Feet:      Right foot:      Protective Sensation: 8 sites tested.  8 sites sensed.      Skin integrity: Skin integrity normal.      Left foot:      Protective Sensation: 8 sites tested.  8 sites sensed.      Skin integrity: Skin integrity normal.   Skin:     General: Skin is warm and dry.   Neurological:      Mental Status: She is alert and oriented to person, place, and time.   Psychiatric:         Mood and Affect: Mood normal.         Behavior: Behavior normal.         Thought Content: Thought content normal.         Judgment: Judgment normal.           Visit Vitals  BP (P) 130/68   Pulse 87   Temp 97.7 °F (36.5 °C)   Ht 5' 3" (1.6 m)   Wt 97.1 kg (214 lb 1.1 oz)   SpO2 96%   BMI 37.92 kg/m²      Assessment:         1. Hypertension associated with diabetes    2. Hyperlipidemia associated with type 2 diabetes mellitus    3. Anxiety and depression    4. Type 2 diabetes mellitus without complication, without long-term current use of insulin    5. Pain in both hands    6. Family history of rheumatoid arthritis    7. Uncontrolled type 2 diabetes mellitus with hyperglycemia, without long-term current use of insulin    8. Gastroesophageal reflux disease " without esophagitis    9. Screening for colon cancer    10. Need for influenza vaccination    11. Allergic rhinitis, unspecified seasonality, unspecified trigger        Plan:         Nik was seen today for follow-up.    Diagnoses and all orders for this visit:    Hypertension associated with diabetes  -     CBC Auto Differential; Future  -     COMPREHENSIVE METABOLIC PANEL; Future  -     lisinopriL (PRINIVIL,ZESTRIL) 20 MG tablet; Take 1 tablet (20 mg total) by mouth once daily.  -     Stable, continue medications     Hyperlipidemia associated with type 2 diabetes mellitus  -     Lipid Panel; Future    Anxiety and depression  -     lamoTRIgine (LAMICTAL) 150 MG Tab; Take 1 tablet (150 mg total) by mouth once daily.  -     sertraline (ZOLOFT) 100 MG tablet; Take 1 tablet (100 mg total) by mouth once daily.    Type 2 diabetes mellitus without complication, without long-term current use of insulin  -     HEMOGLOBIN A1C; Future    Pain in both hands  -     C-REACTIVE PROTEIN; Future  -     Sedimentation rate; Future  -     RHEUMATOID FACTOR; Future  -     meloxicam (MOBIC) 15 MG tablet; Take 1 tablet (15 mg total) by mouth once daily.  -     Ambulatory referral/consult to Rheumatology; Future    Family history of rheumatoid arthritis  -     C-REACTIVE PROTEIN; Future  -     Sedimentation rate; Future  -     RHEUMATOID FACTOR; Future  -     Ambulatory referral/consult to Rheumatology; Future    Uncontrolled type 2 diabetes mellitus with hyperglycemia, without long-term current use of insulin  -     atorvastatin (LIPITOR) 20 MG tablet; Take 1 tablet (20 mg total) by mouth once daily.  -     metFORMIN (GLUCOPHAGE-XR) 500 MG ER 24hr tablet; Take 2 tablets (1,000 mg total) by mouth 2 (two) times daily with meals.    Gastroesophageal reflux disease without esophagitis  -     pantoprazole (PROTONIX) 20 MG tablet; Take 1 tablet (20 mg total) by mouth once daily.    Screening for colon cancer  -     Cologuard Screening  (Multitarget Stool DNA); Future  -     Cologuard Screening (Multitarget Stool DNA)    Need for influenza vaccination  -     Influenza - Quadrivalent *Preferred* (6 months+) (PF)    Allergic rhinitis, unspecified seasonality, unspecified trigger  -     fluticasone propionate (FLONASE) 50 mcg/actuation nasal spray; 2 sprays (100 mcg total) by Each Nostril route once daily.     Follow up in 6 months with Dr. Newsome. Labs will be reviewed, and further recommendations will be made based on results.         Family Medicine Physician Assistant     Future Appointments       Date Provider Specialty Appt Notes    6/24/2024 Osimn Newsome MD Family Medicine 6 month f/u             Tests to Keep You Healthy    Mammogram: Met on 4/12/2023  Eye Exam: Met on 7/21/2023  Colon Cancer Screening: ORDERED  Last Blood Pressure <= 139/89 (12/1/2023): Yes  Last HbA1c < 8 (04/12/2023): Yes       I spent a total of 30 minutes on the day of the visit.This includes face to face time and non-face to face time preparing to see the patient (eg, review of tests), obtaining and/or reviewing separately obtained history, documenting clinical information in the electronic or other health record, independently interpreting results and communicating results to the patient/family/caregiver, or care coordinator.    We have addressed [4] Moderate: 1 or more chronic illnesses with exacerbation, progression, or side effects of treatment / 2 or more stable chronic illnesses / 1 undiagnosed new problem with uncertain prognosis / 1 acute illness with systemic symptoms / 1 acute complicated injury  The complexity of the data reviewed and analyzed for this visit was [4] Moderate (Reviewed prior external note, ordered unique testing and reviewed the results of each unique test / Independently interpreted a test / Discussed management or interpretation of a test with another provider)  The risk of complications and/or morbidity or mortality are [4]  Moderate risk (I.e. prescription drug management / decision regarding minor surgery with identified pt or procedure risk factors / decision regarding elective major surgery without identified pt or procedure risk factors / diagnosis or treatment significantly limited by social determinants of health)   The level of Medical Decision Making for this visit is [4] Moderate

## 2023-12-01 NOTE — TELEPHONE ENCOUNTER
Refused. Patient can get OTC omeprazole to try. She was aware this may not be covered by insurance.

## 2023-12-02 LAB
ESTIMATED AVG GLUCOSE: 128 MG/DL (ref 68–131)
HBA1C MFR BLD: 6.1 % (ref 4–5.6)

## 2023-12-04 ENCOUNTER — TELEPHONE (OUTPATIENT)
Dept: FAMILY MEDICINE | Facility: CLINIC | Age: 58
End: 2023-12-04
Payer: COMMERCIAL

## 2023-12-04 LAB — RHEUMATOID FACT SERPL-ACNC: <13 IU/ML (ref 0–15)

## 2023-12-04 NOTE — TELEPHONE ENCOUNTER
----- Message from Luana Mejia PA-C sent at 12/4/2023  8:13 AM CST -----  Labs look good overall. Blood sugar has greatly improved since last HgbA1c. Keep up the good work! Very mild anemia present. Will continue to monitor this on routine labs.     Triglycerides are elevated but stable. Recommend the patient try to improve diet by eliminating fried foods, red or processed meat, whole milk. Increase foods such as fruits, vegetables, poultry, fish, nuts. Also try to increase physical activity/ exercise to 150 minutes of aerobic exercise weekly.     Inflammatory markers within normal limits.

## 2023-12-04 NOTE — TELEPHONE ENCOUNTER
----- Message from Luana Mejia PA-C sent at 12/4/2023  2:09 PM CST -----  Rheumatoid factor within normal limits.

## 2023-12-04 NOTE — TELEPHONE ENCOUNTER
Call placed to patient. No answer received. Left message requesting return call to office. Also sent [facility] portal message to patient for notification at this time.

## 2024-01-05 ENCOUNTER — PATIENT MESSAGE (OUTPATIENT)
Dept: ADMINISTRATIVE | Facility: HOSPITAL | Age: 59
End: 2024-01-05
Payer: COMMERCIAL

## 2024-03-11 DIAGNOSIS — E11.65 UNCONTROLLED TYPE 2 DIABETES MELLITUS WITH HYPERGLYCEMIA, WITHOUT LONG-TERM CURRENT USE OF INSULIN: ICD-10-CM

## 2024-03-11 RX ORDER — METFORMIN HYDROCHLORIDE 500 MG/1
1000 TABLET, EXTENDED RELEASE ORAL 2 TIMES DAILY WITH MEALS
Qty: 360 TABLET | Refills: 1 | Status: SHIPPED | OUTPATIENT
Start: 2024-03-11

## 2024-03-11 NOTE — TELEPHONE ENCOUNTER
Care Due:                  Date            Visit Type   Department     Provider  --------------------------------------------------------------------------------                                EP -                              PRIMARY      [facility]  Last Visit: 08-      CARE ([facility])   DYAN Newsome                              EP -                              PRIMARY      [facility]  Next Visit: 06-      CARE ([facility])   DYAN Newsome                                                            Last  Test          Frequency    Reason                     Performed    Due Date  --------------------------------------------------------------------------------    Office Visit  15 months..  valACYclovir.............  08- 11-    [facility] Embedded Care Due Messages. Reference number: [id number].   3/11/2024 12:16:23 AM CDT

## 2024-06-05 ENCOUNTER — PATIENT OUTREACH (OUTPATIENT)
Dept: ADMINISTRATIVE | Facility: HOSPITAL | Age: 59
End: 2024-06-05
Payer: COMMERCIAL

## 2024-06-05 DIAGNOSIS — Z12.31 OTHER SCREENING MAMMOGRAM: ICD-10-CM

## 2024-06-05 NOTE — LETTER
June 5, 2024    Nik Lowery  [address] [place] [address]             [facility]  [address]  [place] [address]  Phone: [phone] Dear Rimy, [facility] is committed to your overall health.  To help you get the most out of each of your visits, we will review your information to make sure you are up to date on all of your recommended tests and/or procedures.      Osmin Newsome MD  has found that your chart shows you may be due for the following:    Health Maintenance Due  Topic   Colorectal Cancer Screening    Shingles Vaccine (1 of 2)   Pneumococcal Vaccines (Age 0-64) (2 of 2 - PCV)   COVID-19 Vaccine (3 - 2023-24 season)   Mammogram    Diabetes Urine Screening    Hemoglobin A1c    Eye Exam        If you have had any of the above done at another facility, please bring the records with you or Fax them to [phone] so that your record at [facility] will be complete. If you have not had any of these tests or procedures done recently and would like to complete this testing ,  please call [phone] or send a message through your [facility] portal to your provider's office.     If you have an upcoming scheduled appointment for the above test and/or procedures, please disregard this letter.    Thank you for letting us care for you,    Your [facility] Care Team      Phone#  [phone]  FAX#  [phone]

## 2024-06-05 NOTE — PROGRESS NOTES
Population Health Chart Review & Patient Outreach Details      Additional [facility] Health Notes:               Updates Requested / Reviewed:      Updated Care Coordination Note, Care Everywhere, and          Health Maintenance Topics Overdue:      VBHM Score: 4     Colon Cancer Screening  Urine Screening  Hemoglobin A1c  Mammogram                       Health Maintenance Topic(s) Outreach Outcomes & Actions Taken:    Breast Cancer Screening - Outreach Outcomes & Actions Taken  : Mammogram Order Placed    Colorectal Cancer Screening - Outreach Outcomes & Actions Taken  : upcoming ov    Lab(s) - Outreach Outcomes & Actions Taken  : upcomingov    Eye Exam - Outreach Outcomes & Actions Taken  : upcoming ov to discuss

## 2024-09-06 DIAGNOSIS — E11.65 UNCONTROLLED TYPE 2 DIABETES MELLITUS WITH HYPERGLYCEMIA, WITHOUT LONG-TERM CURRENT USE OF INSULIN: ICD-10-CM

## 2024-09-06 RX ORDER — METFORMIN HYDROCHLORIDE 500 MG/1
1000 TABLET, EXTENDED RELEASE ORAL 2 TIMES DAILY WITH MEALS
Qty: 360 TABLET | Refills: 1 | Status: SHIPPED | OUTPATIENT
Start: 2024-09-06

## 2024-09-06 NOTE — TELEPHONE ENCOUNTER
Care Due:                  Date            Visit Type   Department     Provider  --------------------------------------------------------------------------------    Last Visit: None Found      None         None Found  Next Visit: None Scheduled  None         None Found                                                            Last  Test          Frequency    Reason                     Performed    Due Date  --------------------------------------------------------------------------------    Office Visit  15 months..  metFORMIN, valACYclovir..  Not Found    Overdue    CBC.........  12 months..  valACYclovir.............  12- 11-    Cr..........  12 months..  metFORMIN, valACYclovir..  12- 11-    HBA1C.......  6 months...  metFORMIN................  12- 05-    [facility] Embedded Care Due Messages. Reference number: [id number].   9/06/2024 12:32:28 AM CDT

## 2024-09-06 NOTE — LETTER
September 6, 2024            Nik Lowery  [address] [facility] [place] [address]       Dear Ms. Lowery,      Our records indicate that you are overdue for an annual checkup. Currently, Osmin Newsome MD is listed as your primary care provider. If this is incorrect, please notify your primary care clinic so we can update your health record. Your Metformin refill has been sent to your pharmacy but we will be unable to send further refills without seeing Dr. Newsome.    Your [facility] care team is committed to supporting your overall health. We look forward to seeing you soon and appreciate the opportunity to serve you.    If you would like to schedule your appointment, please contact your primary care clinic at [phone].     Sincerely,           Osmin Newsome MD and your [facility] Team

## 2024-11-07 ENCOUNTER — HOSPITAL ENCOUNTER (OUTPATIENT)
Facility: HOSPITAL | Age: 59
Discharge: HOME | End: 2024-11-09
Attending: EMERGENCY MEDICINE | Admitting: STUDENT IN AN ORGANIZED HEALTH CARE EDUCATION/TRAINING PROGRAM
Payer: COMMERCIAL

## 2024-11-07 DIAGNOSIS — F32.A ANXIETY AND DEPRESSION: ICD-10-CM

## 2024-11-07 DIAGNOSIS — K31.7 GASTRIC POLYPS: ICD-10-CM

## 2024-11-07 DIAGNOSIS — K63.89 COLONIC MASS: ICD-10-CM

## 2024-11-07 DIAGNOSIS — F41.9 ANXIETY AND DEPRESSION: ICD-10-CM

## 2024-11-07 DIAGNOSIS — F33.0 MILD EPISODE OF RECURRENT MAJOR DEPRESSIVE DISORDER: ICD-10-CM

## 2024-11-07 DIAGNOSIS — R07.9 CHEST PAIN: ICD-10-CM

## 2024-11-07 DIAGNOSIS — D64.9 SYMPTOMATIC ANEMIA: Primary | ICD-10-CM

## 2024-11-07 DIAGNOSIS — D50.9 IRON DEFICIENCY ANEMIA, UNSPECIFIED IRON DEFICIENCY ANEMIA TYPE: ICD-10-CM

## 2024-11-07 DIAGNOSIS — D50.9 MICROCYTIC ANEMIA: ICD-10-CM

## 2024-11-07 PROBLEM — E66.9 OBESITY: Status: ACTIVE | Noted: 2018-08-08

## 2024-11-07 PROBLEM — B00.9 HERPES SIMPLEX: Status: RESOLVED | Noted: 2019-05-07 | Resolved: 2024-11-07

## 2024-11-07 LAB
ABO + RH BLD: NORMAL
ALBUMIN SERPL BCP-MCNC: 3.8 G/DL (ref 3.5–5.2)
ALP SERPL-CCNC: 64 U/L (ref 40–150)
ALT SERPL W/O P-5'-P-CCNC: 20 U/L (ref 10–44)
ANION GAP SERPL CALC-SCNC: 13 MMOL/L (ref 8–16)
AST SERPL-CCNC: 26 U/L (ref 10–40)
BASOPHILS # BLD AUTO: 0.06 K/UL (ref 0–0.2)
BASOPHILS NFR BLD: 0.9 % (ref 0–1.9)
BILIRUB SERPL-MCNC: 0.3 MG/DL (ref 0.1–1)
BLD GP AB SCN CELLS X3 SERPL QL: NORMAL
BLD PROD TYP BPU: NORMAL
BLD PROD TYP BPU: NORMAL
BLOOD UNIT EXPIRATION DATE: NORMAL
BLOOD UNIT EXPIRATION DATE: NORMAL
BLOOD UNIT TYPE CODE: 6200
BLOOD UNIT TYPE CODE: 6200
BLOOD UNIT TYPE: NORMAL
BLOOD UNIT TYPE: NORMAL
BNP SERPL-MCNC: 13 PG/ML (ref 0–99)
BUN SERPL-MCNC: 16 MG/DL (ref 6–20)
CALCIUM SERPL-MCNC: 9.1 MG/DL (ref 8.7–10.5)
CHLORIDE SERPL-SCNC: 106 MMOL/L (ref 95–110)
CO2 SERPL-SCNC: 18 MMOL/L (ref 23–29)
CODING SYSTEM: NORMAL
CODING SYSTEM: NORMAL
CREAT SERPL-MCNC: 1.1 MG/DL (ref 0.5–1.4)
CROSSMATCH INTERPRETATION: NORMAL
CROSSMATCH INTERPRETATION: NORMAL
DACRYOCYTES BLD QL SMEAR: ABNORMAL
DIFFERENTIAL METHOD BLD: ABNORMAL
DISPENSE STATUS: NORMAL
DISPENSE STATUS: NORMAL
EOSINOPHIL # BLD AUTO: 0.2 K/UL (ref 0–0.5)
EOSINOPHIL NFR BLD: 2.6 % (ref 0–8)
ERYTHROCYTE [DISTWIDTH] IN BLOOD BY AUTOMATED COUNT: 18.3 % (ref 11.5–14.5)
EST. GFR  (NO RACE VARIABLE): 58 ML/MIN/1.73 M^2
ESTIMATED AVG GLUCOSE: 140 MG/DL (ref 68–131)
FERRITIN SERPL-MCNC: 3 NG/ML (ref 20–300)
GLUCOSE SERPL-MCNC: 149 MG/DL (ref 70–110)
HBA1C MFR BLD: 6.5 % (ref 4.5–6.2)
HCT VFR BLD AUTO: 18.5 % (ref 37–48.5)
HGB BLD-MCNC: 4.9 G/DL (ref 12–16)
HYPOCHROMIA BLD QL SMEAR: ABNORMAL
IMM GRANULOCYTES # BLD AUTO: 0.03 K/UL (ref 0–0.04)
IMM GRANULOCYTES NFR BLD AUTO: 0.4 % (ref 0–0.5)
IRON SERPL-MCNC: 12 UG/DL (ref 30–160)
LYMPHOCYTES # BLD AUTO: 2.3 K/UL (ref 1–4.8)
LYMPHOCYTES NFR BLD: 33.2 % (ref 18–48)
MCH RBC QN AUTO: 18 PG (ref 27–31)
MCHC RBC AUTO-ENTMCNC: 26.5 G/DL (ref 32–36)
MCV RBC AUTO: 68 FL (ref 82–98)
MONOCYTES # BLD AUTO: 0.5 K/UL (ref 0.3–1)
MONOCYTES NFR BLD: 6.9 % (ref 4–15)
NEUTROPHILS # BLD AUTO: 3.9 K/UL (ref 1.8–7.7)
NEUTROPHILS NFR BLD: 56 % (ref 38–73)
NRBC BLD-RTO: 0 /100 WBC
NUM UNITS TRANS PACKED RBC: NORMAL
NUM UNITS TRANS PACKED RBC: NORMAL
OVALOCYTES BLD QL SMEAR: ABNORMAL
PLATELET # BLD AUTO: 322 K/UL (ref 150–450)
PLATELET BLD QL SMEAR: ABNORMAL
PMV BLD AUTO: 9.7 FL (ref 9.2–12.9)
POCT GLUCOSE: 109 MG/DL (ref 70–110)
POIKILOCYTOSIS BLD QL SMEAR: SLIGHT
POLYCHROMASIA BLD QL SMEAR: ABNORMAL
POTASSIUM SERPL-SCNC: 4 MMOL/L (ref 3.5–5.1)
PROT SERPL-MCNC: 7 G/DL (ref 6–8.4)
RBC # BLD AUTO: 2.72 M/UL (ref 4–5.4)
SATURATED IRON: 2 % (ref 20–50)
SODIUM SERPL-SCNC: 137 MMOL/L (ref 136–145)
SPECIMEN OUTDATE: NORMAL
TOTAL IRON BINDING CAPACITY: 553 UG/DL (ref 250–450)
TRANSFERRIN SERPL-MCNC: 395 MG/DL (ref 200–375)
TROPONIN I SERPL DL<=0.01 NG/ML-MCNC: <0.006 NG/ML (ref 0–0.03)
TROPONIN I SERPL DL<=0.01 NG/ML-MCNC: <0.006 NG/ML (ref 0–0.03)
WBC # BLD AUTO: 6.93 K/UL (ref 3.9–12.7)

## 2024-11-07 PROCEDURE — 99900035 HC TECH TIME PER 15 MIN (STAT)

## 2024-11-07 PROCEDURE — 80053 COMPREHEN METABOLIC PANEL: CPT | Performed by: EMERGENCY MEDICINE

## 2024-11-07 PROCEDURE — 36430 TRANSFUSION BLD/BLD COMPNT: CPT

## 2024-11-07 PROCEDURE — G0378 HOSPITAL OBSERVATION PER HR: HCPCS

## 2024-11-07 PROCEDURE — 93010 ELECTROCARDIOGRAM REPORT: CPT | Mod: ,,, | Performed by: GENERAL PRACTICE

## 2024-11-07 PROCEDURE — 25000003 PHARM REV CODE 250: Performed by: INTERNAL MEDICINE

## 2024-11-07 PROCEDURE — 94799 UNLISTED PULMONARY SVC/PX: CPT

## 2024-11-07 PROCEDURE — 63600175 PHARM REV CODE 636 W HCPCS: Performed by: STUDENT IN AN ORGANIZED HEALTH CARE EDUCATION/TRAINING PROGRAM

## 2024-11-07 PROCEDURE — 84484 ASSAY OF TROPONIN QUANT: CPT | Performed by: EMERGENCY MEDICINE

## 2024-11-07 PROCEDURE — 96376 TX/PRO/DX INJ SAME DRUG ADON: CPT

## 2024-11-07 PROCEDURE — 86900 BLOOD TYPING SEROLOGIC ABO: CPT | Performed by: EMERGENCY MEDICINE

## 2024-11-07 PROCEDURE — 83880 ASSAY OF NATRIURETIC PEPTIDE: CPT | Performed by: EMERGENCY MEDICINE

## 2024-11-07 PROCEDURE — P9016 RBC LEUKOCYTES REDUCED: HCPCS | Performed by: EMERGENCY MEDICINE

## 2024-11-07 PROCEDURE — 85025 COMPLETE CBC W/AUTO DIFF WBC: CPT | Performed by: EMERGENCY MEDICINE

## 2024-11-07 PROCEDURE — 25000003 PHARM REV CODE 250: Performed by: STUDENT IN AN ORGANIZED HEALTH CARE EDUCATION/TRAINING PROGRAM

## 2024-11-07 PROCEDURE — 86920 COMPATIBILITY TEST SPIN: CPT | Performed by: EMERGENCY MEDICINE

## 2024-11-07 PROCEDURE — 93005 ELECTROCARDIOGRAM TRACING: CPT

## 2024-11-07 PROCEDURE — 36415 COLL VENOUS BLD VENIPUNCTURE: CPT | Performed by: EMERGENCY MEDICINE

## 2024-11-07 PROCEDURE — 94761 N-INVAS EAR/PLS OXIMETRY MLT: CPT

## 2024-11-07 PROCEDURE — 84466 ASSAY OF TRANSFERRIN: CPT | Performed by: STUDENT IN AN ORGANIZED HEALTH CARE EDUCATION/TRAINING PROGRAM

## 2024-11-07 PROCEDURE — 99284 EMERGENCY DEPT VISIT MOD MDM: CPT | Mod: ,,, | Performed by: INTERNAL MEDICINE

## 2024-11-07 PROCEDURE — 82728 ASSAY OF FERRITIN: CPT | Performed by: STUDENT IN AN ORGANIZED HEALTH CARE EDUCATION/TRAINING PROGRAM

## 2024-11-07 PROCEDURE — 96374 THER/PROPH/DIAG INJ IV PUSH: CPT

## 2024-11-07 PROCEDURE — 83036 HEMOGLOBIN GLYCOSYLATED A1C: CPT | Performed by: STUDENT IN AN ORGANIZED HEALTH CARE EDUCATION/TRAINING PROGRAM

## 2024-11-07 PROCEDURE — 84484 ASSAY OF TROPONIN QUANT: CPT | Mod: 91 | Performed by: EMERGENCY MEDICINE

## 2024-11-07 PROCEDURE — 94760 N-INVAS EAR/PLS OXIMETRY 1: CPT

## 2024-11-07 PROCEDURE — 99285 EMERGENCY DEPT VISIT HI MDM: CPT | Mod: 25

## 2024-11-07 PROCEDURE — 36415 COLL VENOUS BLD VENIPUNCTURE: CPT | Performed by: STUDENT IN AN ORGANIZED HEALTH CARE EDUCATION/TRAINING PROGRAM

## 2024-11-07 RX ORDER — SERTRALINE HYDROCHLORIDE 50 MG/1
100 TABLET, FILM COATED ORAL DAILY
Status: DISCONTINUED | OUTPATIENT
Start: 2024-11-07 | End: 2024-11-09 | Stop reason: HOSPADM

## 2024-11-07 RX ORDER — HYDROCODONE BITARTRATE AND ACETAMINOPHEN 500; 5 MG/1; MG/1
TABLET ORAL
Status: DISCONTINUED | OUTPATIENT
Start: 2024-11-07 | End: 2024-11-09 | Stop reason: HOSPADM

## 2024-11-07 RX ORDER — PANTOPRAZOLE SODIUM 40 MG/10ML
40 INJECTION, POWDER, LYOPHILIZED, FOR SOLUTION INTRAVENOUS 2 TIMES DAILY
Status: DISCONTINUED | OUTPATIENT
Start: 2024-11-07 | End: 2024-11-08

## 2024-11-07 RX ORDER — ATORVASTATIN CALCIUM 20 MG/1
20 TABLET, FILM COATED ORAL DAILY
Status: DISCONTINUED | OUTPATIENT
Start: 2024-11-07 | End: 2024-11-09 | Stop reason: HOSPADM

## 2024-11-07 RX ORDER — ALPRAZOLAM 0.25 MG/1
0.25 TABLET ORAL DAILY PRN
Status: DISCONTINUED | OUTPATIENT
Start: 2024-11-07 | End: 2024-11-09 | Stop reason: HOSPADM

## 2024-11-07 RX ORDER — SODIUM CHLORIDE, SODIUM LACTATE, POTASSIUM CHLORIDE, CALCIUM CHLORIDE 600; 310; 30; 20 MG/100ML; MG/100ML; MG/100ML; MG/100ML
INJECTION, SOLUTION INTRAVENOUS CONTINUOUS
Status: DISCONTINUED | OUTPATIENT
Start: 2024-11-07 | End: 2024-11-08

## 2024-11-07 RX ORDER — GLUCAGON 1 MG
1 KIT INJECTION
Status: DISCONTINUED | OUTPATIENT
Start: 2024-11-07 | End: 2024-11-07

## 2024-11-07 RX ORDER — POLYETHYLENE GLYCOL 3350, SODIUM SULFATE ANHYDROUS, SODIUM BICARBONATE, SODIUM CHLORIDE, POTASSIUM CHLORIDE 236; 22.74; 6.74; 5.86; 2.97 G/4L; G/4L; G/4L; G/4L; G/4L
4000 POWDER, FOR SOLUTION ORAL ONCE
Status: COMPLETED | OUTPATIENT
Start: 2024-11-07 | End: 2024-11-07

## 2024-11-07 RX ORDER — INSULIN ASPART 100 [IU]/ML
0-10 INJECTION, SOLUTION INTRAVENOUS; SUBCUTANEOUS EVERY 6 HOURS PRN
Status: DISCONTINUED | OUTPATIENT
Start: 2024-11-07 | End: 2024-11-08

## 2024-11-07 RX ORDER — GLUCAGON 1 MG
1 KIT INJECTION
Status: DISCONTINUED | OUTPATIENT
Start: 2024-11-07 | End: 2024-11-08

## 2024-11-07 RX ADMIN — PANTOPRAZOLE SODIUM 40 MG: 40 INJECTION, POWDER, FOR SOLUTION INTRAVENOUS at 02:11

## 2024-11-07 RX ADMIN — ATORVASTATIN CALCIUM 20 MG: 20 TABLET, FILM COATED ORAL at 08:11

## 2024-11-07 RX ADMIN — LAMOTRIGINE 150 MG: 100 TABLET ORAL at 09:11

## 2024-11-07 RX ADMIN — POLYETHYLENE GLYCOL 3350, SODIUM SULFATE ANHYDROUS, SODIUM BICARBONATE, SODIUM CHLORIDE, POTASSIUM CHLORIDE 4000 ML: 236; 22.74; 6.74; 5.86; 2.97 POWDER, FOR SOLUTION ORAL at 08:11

## 2024-11-07 RX ADMIN — PANTOPRAZOLE SODIUM 40 MG: 40 INJECTION, POWDER, FOR SOLUTION INTRAVENOUS at 08:11

## 2024-11-07 NOTE — CONSULTS
[facility]     CC: Anemia    HPI 58 y.o. female presents complaining of dizziness and weakness. Patient states for the last month she has been feeling weak and dizzy like she may pass out. Today she felt very faint. She has had no previous history of black stool or hematemesis. Patient states in the past she was told she should take iron pills but has not taken iron in a long time. At the worst symptoms are moderate. Nothing makes it better or worse.     FURTHER HISTORY:  Above obtained from independent review of records from admitting provider as well as from direct discussion with ED physician who states no signs of GIB.  In addition, on my interview, I note the following:  Patient presents with anemia, with no signs of GIB, associated with SOB, with no alleviating/exacerbating factors.  No BRBPR, black stools.  She has been on GLP-1 recently with minimal weight loss.  Admits to occasional nausea.  No change in bowel habits.  No history of EGD/colonoscopy.  Iron studies not yet drawn.      Past Medical History:   Diagnosis Date    Diabetes mellitus     Diabetes mellitus, type 2     Herpes     Hypertension        Past Surgical History:   Procedure Laterality Date    EYE SURGERY  2002    lasix Bilateral    HYSTERECTOMY  2012    LUMBAR DISCECTOMY      WISDOM TOOTH EXTRACTION         Social History     Tobacco Use    Smoking status: Never    Smokeless tobacco: Never   Substance Use Topics    Alcohol use: No    Drug use: No       Family History   Problem Relation Name Age of Onset    Heart disease Mother      Hypertension Mother      Cataracts Mother      Pancreatic cancer Father      Cancer Father      Arthritis Sister 2     Diabetes Brother 1     No Known Problems Paternal Aunt 1     Heart disease Paternal Uncle 2     Heart disease Maternal Grandfather      Alzheimer's disease Paternal Grandmother      Glaucoma Neg Hx         Review of Systems  General ROS: negative for - chills, fever or weight  "loss  Psychological ROS: negative for - hallucination, depression or suicidal ideation  Ophthalmic ROS: negative for - blurry vision, photophobia or eye pain  ENT ROS: negative for - epistaxis, sore throat or rhinorrhea  Respiratory ROS: no cough, shortness of breath, or wheezing  Cardiovascular ROS: no chest pain or dyspnea on exertion  Gastrointestinal ROS: as above  Genito-Urinary ROS: no dysuria, trouble voiding, or hematuria  Musculoskeletal ROS: negative for - arthralgia, myalgia, weakness  Neurological ROS: no syncope or seizures; no ataxia  Dermatological ROS: negative for pruritis, rash and jaundice    Physical Examination  /71   Pulse 87   Temp 97.9 °F (36.6 °C)   Resp 16   Ht 5' 5" (1.651 m)   Wt 95.3 kg (210 lb)   SpO2 98%   BMI 34.95 kg/m²   General appearance: alert, cooperative, no distress  HENT: Normocephalic, atraumatic, neck symmetrical, no nasal discharge   Eyes: conjunctivae/corneas clear, PERRL, EOM's intact, sclera anicteric  Lungs: clear to auscultation bilaterally, no dullness to percussion bilaterally, symmetric expansion, breathing unlabored  Heart: regular rate and rhythm without rub; no displacement of the PMI   Abdomen: obese, NT  Extremities: extremities symmetric; no clubbing, cyanosis, or edema  Integument: Skin color, texture, turgor normal; no rashes; hair distrubution normal, no jaundice  Neurologic: Alert and oriented X 3, no focal sensory or motor neurologic deficits  Psychiatric: no pressured speech; normal affect; no evidence of impaired cognition, no anxiety/depression     Labs:  Lab Results   Component Value Date    WBC 6.93 11/07/2024    HGB 4.9 (LL) 11/07/2024    HCT 18.5 (LL) 11/07/2024    MCV 68 (L) 11/07/2024     11/07/2024         CMP  Sodium   Date Value Ref Range Status   11/07/2024 137 136 - 145 mmol/L Final     Potassium   Date Value Ref Range Status   11/07/2024 4.0 3.5 - 5.1 mmol/L Final     Chloride   Date Value Ref Range Status   11/07/2024 " 106 95 - 110 mmol/L Final     CO2   Date Value Ref Range Status   11/07/2024 18 (L) 23 - 29 mmol/L Final     Glucose   Date Value Ref Range Status   11/07/2024 149 (H) 70 - 110 mg/dL Final     BUN   Date Value Ref Range Status   11/07/2024 16 6 - 20 mg/dL Final     Creatinine   Date Value Ref Range Status   11/07/2024 1.1 0.5 - 1.4 mg/dL Final     Calcium   Date Value Ref Range Status   11/07/2024 9.1 8.7 - 10.5 mg/dL Final     Total Protein   Date Value Ref Range Status   11/07/2024 7.0 6.0 - 8.4 g/dL Final     Albumin   Date Value Ref Range Status   11/07/2024 3.8 3.5 - 5.2 g/dL Final     Total Bilirubin   Date Value Ref Range Status   11/07/2024 0.3 0.1 - 1.0 mg/dL Final     Comment:     For infants and newborns, interpretation of results should be based  on gestational age, weight and in agreement with clinical  observations.    Premature Infant recommended reference ranges:  Up to 24 hours.............<8.0 mg/dL  Up to 48 hours............<12.0 mg/dL  3-5 days..................<15.0 mg/dL  6-29 days.................<15.0 mg/dL       Alkaline Phosphatase   Date Value Ref Range Status   11/07/2024 64 40 - 150 U/L Final     AST   Date Value Ref Range Status   11/07/2024 26 10 - 40 U/L Final     ALT   Date Value Ref Range Status   11/07/2024 20 10 - 44 U/L Final     Anion Gap   Date Value Ref Range Status   11/07/2024 13 8 - 16 mmol/L Final     eGFR   Date Value Ref Range Status   11/07/2024 58 (A) >60 mL/min/1.73 m^2 Final         Imaging:  CXR was independently visualized and reviewed by me and showed no acute process.    I have personally reviewed these images    Case discussed as above    Assessment:   Anemia   DM2  Obesity    Plan:  Check iron studies  Clear liquid diet for now  If iron deficient, then can consider inpatient scopes, otherwise, she would simply need age appropriate outpatient colonoscopy  Further recommendations to follow after above.  Communication will be sent to the referring provider  regarding my assessment and plan on this patient via EPIC.      Saw Rowe MD  [facility]  [address], [address]  [place] [address]  Office: [phone]  Fax: [phone]

## 2024-11-07 NOTE — ED PROVIDER NOTES
Encounter Date: 11/7/2024       History     Chief Complaint   Patient presents with    Dizziness    Weakness     Dizzy, weak, and feeling like she is going to pass out for the last month.     Patient presents complaining of dizziness and weakness.  Patient states for the last month she has been feeling weak and dizzy like she may pass out.  Today she felt very faint.  She has had no previous history of black stool or hematemesis.  Patient states in the past she was told she should take iron pills but has not taken iron in a long time.  At the worst symptoms are moderate.  Nothing makes it better or worse.      Review of patient's allergies indicates:   Allergen Reactions    Robaxin [methocarbamol] Nausea Only     Past Medical History:   Diagnosis Date    Diabetes mellitus     Diabetes mellitus, type 2     Herpes     Hypertension      Past Surgical History:   Procedure Laterality Date    EYE SURGERY  2002    lasix Bilateral    HYSTERECTOMY  2012    LUMBAR DISCECTOMY      WISDOM TOOTH EXTRACTION       Family History   Problem Relation Name Age of Onset    Heart disease Mother      Hypertension Mother      Cataracts Mother      Pancreatic cancer Father      Cancer Father      Arthritis Sister 2     Diabetes Brother 1     No Known Problems Paternal Aunt 1     Heart disease Paternal Uncle 2     Heart disease Maternal Grandfather      Alzheimer's disease Paternal Grandmother      Glaucoma Neg Hx       Social History     Tobacco Use    Smoking status: Never    Smokeless tobacco: Never   Substance Use Topics    Alcohol use: No    Drug use: No     Review of Systems   All other systems reviewed and are negative.      Physical Exam     Initial Vitals [11/07/24 1122]   BP Pulse Resp Temp SpO2   (!) 117/59 99 18 97.9 °F (36.6 °C) 99 %      MAP       --         Physical Exam    Nursing note and vitals reviewed.  Constitutional: She appears well-developed and well-nourished.   Pleasant, polite   HENT:   Head: Normocephalic and  atraumatic.   Eyes: EOM are normal.   Neck: Neck supple.   Normal range of motion.  Cardiovascular:  Normal rate, regular rhythm, normal heart sounds and intact distal pulses.           Pulmonary/Chest: Breath sounds normal. No respiratory distress.   Abdominal: Abdomen is soft.   Musculoskeletal:      Cervical back: Normal range of motion and neck supple.     Neurological: She is alert and oriented to person, place, and time.   Skin: Skin is warm and dry. Capillary refill takes less than 2 seconds. There is pallor.   Psychiatric: She has a normal mood and affect. Her behavior is normal. Judgment and thought content normal.         ED Course   Procedures  Labs Reviewed   CBC W/ AUTO DIFFERENTIAL - Abnormal       Result Value    WBC 6.93      RBC 2.72 (*)     Hemoglobin 4.9 (*)     Hematocrit 18.5 (*)     MCV 68 (*)     MCH 18.0 (*)     MCHC 26.5 (*)     RDW 18.3 (*)     Platelets 322      MPV 9.7      Immature Granulocytes 0.4      Gran # (ANC) 3.9      Immature Grans (Abs) 0.03      Lymph # 2.3      Mono # 0.5      Eos # 0.2      Baso # 0.06      nRBC 0      Gran % 56.0      Lymph % 33.2      Mono % 6.9      Eosinophil % 2.6      Basophil % 0.9      Platelet Estimate Appears normal      Poik Slight      Poly Occasional      Hypo Moderate      Ovalocytes Occasional      Tear Drop Cells Occasional      Differential Method Automated      Narrative:     HCT, HGB critical result(s) called and verbal readback obtained from   Stan Real by BTI1 11/07/2024 13:11   COMPREHENSIVE METABOLIC PANEL - Abnormal    Sodium 137      Potassium 4.0      Chloride 106      CO2 18 (*)     Glucose 149 (*)     BUN 16      Creatinine 1.1      Calcium 9.1      Total Protein 7.0      Albumin 3.8      Total Bilirubin 0.3      Alkaline Phosphatase 64      AST 26      ALT 20      eGFR 58 (*)     Anion Gap 13     TROPONIN I    Troponin I <0.006     B-TYPE NATRIURETIC PEPTIDE    BNP 13     TROPONIN I   FERRITIN   IRON AND TIBC   HEMOGLOBIN A1C    TYPE & SCREEN    Group & Rh A POS      Indirect Cher NEG      Specimen Outdate 11/10/2024 23:59     POCT GLUCOSE MONITORING CONTINUOUS   POCT GLUCOSE MONITORING CONTINUOUS   PREPARE RBC SOFT    UNIT NUMBER [id number]      Product Code [id number]      DISPENSE STATUS CROSSMATCHED      CODING SYSTEM WVCH213      Unit Blood Type Code 6200      Unit Blood Type A POS      Unit Expiration [id number]      CROSSMATCH INTERPRETATION Compatible      UNIT NUMBER [id number]      Product Code [id number]      DISPENSE STATUS CROSSMATCHED      CODING SYSTEM RPGT596      Unit Blood Type Code 6200      Unit Blood Type A POS      Unit Expiration [id number]      CROSSMATCH INTERPRETATION Compatible            Imaging Results              X-Ray Chest AP Portable (Final result)  Result time 11/07/24 12:02:13      Final result by Stan Barraza Jr., MD (11/07/24 12:02:13)                   Impression:      No acute abnormality.      Electronically signed by: Stan Barraza MD  Date:    11/07/2024  Time:    12:02               Narrative:    EXAMINATION:  XR CHEST AP PORTABLE    CLINICAL HISTORY:  Chest Pain;    TECHNIQUE:  Single frontal view of the chest was performed.    COMPARISON:  Chest of November 25, 2022    FINDINGS:  The lungs are clear, with normal appearance of pulmonary vasculature and no pleural effusion or pneumothorax.    The cardiac silhouette is normal in size. The hilar and mediastinal contours are unremarkable.    Bones are intact.                                       Medications   0.9%  NaCl infusion (for blood administration) (has no administration in time range)   ALPRAZolam tablet 0.25 mg (has no administration in time range)   atorvastatin tablet 20 mg (has no administration in time range)   lamoTRIgine tablet 150 mg (has no administration in time range)   sertraline tablet 100 mg (has no administration in time range)   lactated ringers infusion (has no administration in time range)   pantoprazole  injection 40 mg (has no administration in time range)   glucagon (human recombinant) injection 1 mg (has no administration in time range)   dextrose 10% bolus 250 mL 250 mL (has no administration in time range)   glucagon (human recombinant) injection 1 mg (has no administration in time range)   insulin aspart U-100 pen 0-10 Units (has no administration in time range)   dextrose 10% bolus 125 mL 125 mL (has no administration in time range)   dextrose 10% bolus 250 mL 250 mL (has no administration in time range)     Medical Decision Making  Patient is in no distress    Considerations include symptomatic anemia, GI bleed, electrolyte abnormalities, dysrhythmia, less likely cardiac etiology    Patient indeed is markedly anemic with hemoglobin of 4 and a hematocrit of 18.  Patient was not had any recent black stool or hematemesis.  Patient's other labs including troponin within normal limits.  EKGs regular rate and rhythm without ST elevation.  Patient has been consulted hospital Medicine in stable condition and ordered blood transfusion from the emergency department.  I notified GI physician Dr. Howe that I was admitting patient to the hospital.    Attending Critical Care:   Critical Care Times:   Direct Patient Care (initial evaluation, reassessments, and time considering the case)................................................................10 minutes.   Additional History from reviewing old medical records or taking additional history from the family, EMS, PCP, etc.......................10 minutes.   Ordering, Reviewing, and Interpreting Diagnostic Studies...............................................................................................................10 minutes.   Documentation..................................................................................................................................................................................5 minutes.    ==============================================================  · Total Critical Care Time - exclusive of procedural time: 35 minutes.  ==============================================================  Critical care was necessary to treat or prevent imminent or life-threatening deterioration of the following conditions:  Symptomatic anemia.   Critical care was time spent personally by me on the following activities: obtaining history from patient or relative, examination of patient, review of x-rays / CT sent with the patient, ordering lab, x-rays, and/or EKG, development of treatment plan with patient or relative, ordering and performing treatments and interventions, interpretation of cardiac measurements and re-evaluation of patient's condition.   Critical Care Condition: potentially life-threatening               Amount and/or Complexity of Data Reviewed  Labs: ordered. Decision-making details documented in ED Course.  Radiology: ordered.    Risk  Prescription drug management.  Decision regarding hospitalization.               ED Course as of 11/07/24 1442   Thu Nov 07, 2024   1316 WBC: 6.93 [AP]   1316 Hemoglobin(!!): 4.9 [AP]   1316 Hematocrit(!!): 18.5 [AP]   1316 Platelet Count: 322 [AP]   1316 Sodium: 137 [AP]   1316 Potassium: 4.0 [AP]   1316 Chloride: 106 [AP]   1316 CO2(!): 18 [AP]   1316 Glucose(!): 149 [AP]   1316 BUN: 16 [AP]   1316 Creatinine: 1.1 [AP]   1316 Calcium: 9.1 [AP]   1316 PROTEIN TOTAL: 7.0 [AP]   1316 Albumin: 3.8 [AP]   1316 ALP: 64 [AP]   1316 AST: 26 [AP]   1316 ALT: 20 [AP]   1316 Anion Gap: 13 [AP]      ED Course User Index  [AP] Mike Briones MD                           Clinical Impression:  Final diagnoses:  [R07.9] Chest pain  [D64.9] Symptomatic anemia (Primary)          ED Disposition Condition    Observation                 Mike Briones MD  11/07/24 1329       Mike Briones MD  11/07/24 1440

## 2024-11-07 NOTE — H&P
[facility] Medicine  History & Physical    Patient Name: Nik Lowery  MRN: [id number]  Patient Class: OP- Observation  Admission Date: 11/7/2024  Attending Physician: John Quezada MD  Primary Care Provider: Osmin Newsome MD         Patient information was obtained from patient, past medical records, and ER records.     Subjective:     Principal Problem:Microcytic anemia    Chief Complaint:   Chief Complaint   Patient presents with    Dizziness    Weakness     Dizzy, weak, and feeling like she is going to pass out for the last month.        HPI: Nik Lowery is a 58 year old female with a past medical history obesity, DM, HTN, HLD, GERD, and MDD/MALI who presented with multiple days of fatigue, shortness of breath and chest pain. She states she has not yet had a screening colonoscopy. She states she take multiple pills of ibuprofen daily. She denies any hematemesis, coffee ground emesis or blood in stool. While in the ED, the patient was given two units of PRBCs and GI was consulted. Hospital Medicine was consulted for admission.    Past Medical History:   Diagnosis Date    Diabetes mellitus     Diabetes mellitus, type 2     Herpes     Hypertension        Past Surgical History:   Procedure Laterality Date    EYE SURGERY  2002    lasix Bilateral    HYSTERECTOMY  2012    LUMBAR DISCECTOMY      WISDOM TOOTH EXTRACTION         Review of patient's allergies indicates:   Allergen Reactions    Robaxin [methocarbamol] Nausea Only       No current facility-administered medications on file prior to encounter.     Current Outpatient Medications on File Prior to Encounter   Medication Sig    acetaminophen (TYLENOL EXTRA STRENGTH ORAL) Take 1,300 mg by mouth 3 (three) times daily.     ALPRAZolam (XANAX) 0.25 MG tablet Take 1 tablet (0.25 mg total) by mouth daily as needed for Anxiety.    atorvastatin (LIPITOR) 20 MG tablet Take 1 tablet (20 mg total) by mouth once daily.    blood-glucose meter  kit To check BG 3 times daily, to use with insurance preferred meter    fluticasone propionate (FLONASE) 50 mcg/actuation nasal spray 2 sprays (100 mcg total) by Each Nostril route once daily.    gabapentin (NEURONTIN) 800 MG tablet gabapentin 800 mg tablet   TAKE 1 TABLET BY MOUTH EVERY 8 HOURS FOR 3 DAYS, THEN 1 TABLET EVERY 6 HOURS THEREAFTER    hydrocortisone 2.5 % cream Apply topically 2 (two) times daily.    ibuprofen (ADVIL,MOTRIN) 600 MG tablet Take 1 tablet (600 mg total) by mouth every 8 (eight) hours as needed for Pain.    lamoTRIgine (LAMICTAL) 150 MG Tab Take 1 tablet (150 mg total) by mouth once daily.    lancets Misc To check BG 3 times daily, to use with insurance preferred meter    lisinopriL (PRINIVIL,ZESTRIL) 20 MG tablet Take 1 tablet (20 mg total) by mouth once daily.    meloxicam (MOBIC) 15 MG tablet Take 1 tablet (15 mg total) by mouth once daily.    metFORMIN (GLUCOPHAGE-XR) 500 MG ER 24hr tablet TAKE 2 TABLETS BY MOUTH TWICE A DAY WITH MEALS    multivitamin (THERAGRAN) per tablet Take 1 tablet by mouth once daily.    ONETOUCH ULTRA BLUE TEST STRIP Strp USE TO CHECK BLOOD SUGAR 3 TIMES A DAY    pantoprazole (PROTONIX) 20 MG tablet Take 1 tablet (20 mg total) by mouth once daily.    semaglutide (OZEMPIC) 1 mg/dose (4 mg/3 mL) INJECT 1 MG INTO THE SKIN EVERY 7 DAYS.    sertraline (ZOLOFT) 100 MG tablet Take 1 tablet (100 mg total) by mouth once daily.    valACYclovir (VALTREX) 500 MG tablet TAKE 1 TABLET BY MOUTH TWICE A DAY     Family History       Problem Relation (Age of Onset)    Alzheimer's disease Paternal Grandmother    Arthritis Sister    Cancer Father    Cataracts Mother    Diabetes Brother    Heart disease Mother, Paternal Uncle, Maternal Grandfather    Hypertension Mother    No Known Problems Paternal Aunt    Pancreatic cancer Father          Tobacco Use    Smoking status: Never    Smokeless tobacco: Never   Substance and Sexual Activity    Alcohol use: No    Drug use: No    Sexual  activity: Not Currently     Review of Systems   Constitutional:  Positive for activity change and fatigue.   Respiratory:  Positive for shortness of breath.    Cardiovascular:  Positive for chest pain.   Skin:  Positive for pallor.     Objective:     Vital Signs (Most Recent):  Temp: 97.9 °F (36.6 °C) (11/07/24 1122)  Pulse: 87 (11/07/24 1402)  Resp: 16 (11/07/24 1202)  BP: 139/71 (11/07/24 1402)  SpO2: 98 % (11/07/24 1402) Vital Signs (24h Range):  Temp:  [97.9 °F (36.6 °C)] 97.9 °F (36.6 °C)  Pulse:  [87-99] 87  Resp:  [16-18] 16  SpO2:  [96 %-99 %] 98 %  BP: (100-139)/(55-71) 139/71     Weight: 95.3 kg (210 lb)  Body mass index is 34.95 kg/m².     Physical Exam  Vitals and nursing note reviewed.   Constitutional:       General: She is not in acute distress.     Appearance: She is obese.   HENT:      Head: Normocephalic and atraumatic.      Right Ear: External ear normal.      Left Ear: External ear normal.      Mouth/Throat:      Mouth: Mucous membranes are moist.      Pharynx: Oropharynx is clear.   Eyes:      Extraocular Movements: Extraocular movements intact.      Conjunctiva/sclera: Conjunctivae normal.      Comments: Pale conjunctiva.   Cardiovascular:      Rate and Rhythm: Normal rate and regular rhythm.      Pulses: Normal pulses.      Heart sounds: Normal heart sounds.   Pulmonary:      Effort: Pulmonary effort is normal.      Breath sounds: Normal breath sounds.   Abdominal:      General: Bowel sounds are normal. There is no distension.      Palpations: Abdomen is soft.      Tenderness: There is no abdominal tenderness.   Musculoskeletal:         General: Normal range of motion.      Cervical back: Normal range of motion and neck supple.   Skin:     General: Skin is warm and dry.      Coloration: Skin is pale.   Neurological:      Mental Status: She is alert. Mental status is at baseline.   Psychiatric:         Mood and Affect: Mood normal.         Behavior: Behavior normal.                Significant  "Labs: All pertinent labs within the past 24 hours have been reviewed.    Significant Imaging: I have reviewed all pertinent imaging results/findings within the past 24 hours.  Assessment/Plan:     * Microcytic anemia  History of ibuprofen use. Patient does not endorse gross bleeding. Pale. Hgb 4.9.   -Iron studies  -IV PPI BID  -GI consulted  -Two units PRBCs  -Trend Hgb with CBC  -NPO  -IV fluids  -Hold any anticoagulation    Hyperlipidemia associated with type 2 diabetes mellitus  -Statin      Obesity  Body mass index is 34.95 kg/m². Morbid obesity complicates all aspects of disease management from diagnostic modalities to treatment.         Gastroesophageal reflux disease without esophagitis  -PPI IV BID      Anxiety and depression  -Continue home lamotrigine and sertraline    Hypertension associated with diabetes  -Hold home antihypertensive medications  -Continue to monitor      Uncontrolled type 2 diabetes mellitus with hyperglycemia, without long-term current use of insulin  Patient's FSGs are controlled on current medication regimen.  Last A1c reviewed-   Lab Results   Component Value Date    HGBA1C 6.1 (H) 12/01/2023     Most recent fingerstick glucose reviewed- No results for input(s): "POCTGLUCOSE" in the last 24 hours.  Current correctional scale  Medium  Maintain anti-hyperglycemic dose as follows-   Antihyperglycemics (From admission, onward)      Start     Stop Route Frequency Ordered    11/07/24 1538  insulin aspart U-100 pen 0-10 Units         -- SubQ Every 6 hours PRN 11/07/24 1438          Hold Oral hypoglycemics while patient is in the hospital.      VTE Risk Mitigation (From admission, onward)           Ordered     IP VTE HIGH RISK PATIENT  Once         11/07/24 1351     Place sequential compression device  Until discontinued         11/07/24 1351                       On 11/07/2024, patient should be placed in hospital observation services under my care.             John Quezada, " MD  Department of Hospital Medicine  [facility]

## 2024-11-07 NOTE — ASSESSMENT & PLAN NOTE
"Patient's FSGs are controlled on current medication regimen.  Last A1c reviewed-   Lab Results   Component Value Date    HGBA1C 6.1 (H) 12/01/2023     Most recent fingerstick glucose reviewed- No results for input(s): "POCTGLUCOSE" in the last 24 hours.  Current correctional scale  Medium  Maintain anti-hyperglycemic dose as follows-   Antihyperglycemics (From admission, onward)      Start     Stop Route Frequency Ordered    11/07/24 1538  insulin aspart U-100 pen 0-10 Units         -- SubQ Every 6 hours PRN 11/07/24 1438          Hold Oral hypoglycemics while patient is in the hospital.  "

## 2024-11-07 NOTE — ASSESSMENT & PLAN NOTE
History of ibuprofen use. Patient does not endorse gross bleeding. Pale. Hgb 4.9.   -Iron studies  -IV PPI BID  -GI consulted  -Two units PRBCs  -Trend Hgb with CBC  -NPO  -IV fluids  -Hold any anticoagulation

## 2024-11-07 NOTE — ASSESSMENT & PLAN NOTE
Body mass index is 34.95 kg/m². Morbid obesity complicates all aspects of disease management from diagnostic modalities to treatment.

## 2024-11-07 NOTE — SUBJECTIVE & OBJECTIVE
Past Medical History:   Diagnosis Date    Diabetes mellitus     Diabetes mellitus, type 2     Herpes     Hypertension        Past Surgical History:   Procedure Laterality Date    EYE SURGERY  2002    lasix Bilateral    HYSTERECTOMY  2012    LUMBAR DISCECTOMY      WISDOM TOOTH EXTRACTION         Review of patient's allergies indicates:   Allergen Reactions    Robaxin [methocarbamol] Nausea Only       No current facility-administered medications on file prior to encounter.     Current Outpatient Medications on File Prior to Encounter   Medication Sig    acetaminophen (TYLENOL EXTRA STRENGTH ORAL) Take 1,300 mg by mouth 3 (three) times daily.     ALPRAZolam (XANAX) 0.25 MG tablet Take 1 tablet (0.25 mg total) by mouth daily as needed for Anxiety.    atorvastatin (LIPITOR) 20 MG tablet Take 1 tablet (20 mg total) by mouth once daily.    blood-glucose meter kit To check BG 3 times daily, to use with insurance preferred meter    fluticasone propionate (FLONASE) 50 mcg/actuation nasal spray 2 sprays (100 mcg total) by Each Nostril route once daily.    gabapentin (NEURONTIN) 800 MG tablet gabapentin 800 mg tablet   TAKE 1 TABLET BY MOUTH EVERY 8 HOURS FOR 3 DAYS, THEN 1 TABLET EVERY 6 HOURS THEREAFTER    hydrocortisone 2.5 % cream Apply topically 2 (two) times daily.    ibuprofen (ADVIL,MOTRIN) 600 MG tablet Take 1 tablet (600 mg total) by mouth every 8 (eight) hours as needed for Pain.    lamoTRIgine (LAMICTAL) 150 MG Tab Take 1 tablet (150 mg total) by mouth once daily.    lancets Misc To check BG 3 times daily, to use with insurance preferred meter    lisinopriL (PRINIVIL,ZESTRIL) 20 MG tablet Take 1 tablet (20 mg total) by mouth once daily.    meloxicam (MOBIC) 15 MG tablet Take 1 tablet (15 mg total) by mouth once daily.    metFORMIN (GLUCOPHAGE-XR) 500 MG ER 24hr tablet TAKE 2 TABLETS BY MOUTH TWICE A DAY WITH MEALS    multivitamin (THERAGRAN) per tablet Take 1 tablet by mouth once daily.    ONETOUCH ULTRA BLUE TEST  STRIP Strp USE TO CHECK BLOOD SUGAR 3 TIMES A DAY    pantoprazole (PROTONIX) 20 MG tablet Take 1 tablet (20 mg total) by mouth once daily.    semaglutide (OZEMPIC) 1 mg/dose (4 mg/3 mL) INJECT 1 MG INTO THE SKIN EVERY 7 DAYS.    sertraline (ZOLOFT) 100 MG tablet Take 1 tablet (100 mg total) by mouth once daily.    valACYclovir (VALTREX) 500 MG tablet TAKE 1 TABLET BY MOUTH TWICE A DAY     Family History       Problem Relation (Age of Onset)    Alzheimer's disease Paternal Grandmother    Arthritis Sister    Cancer Father    Cataracts Mother    Diabetes Brother    Heart disease Mother, Paternal Uncle, Maternal Grandfather    Hypertension Mother    No Known Problems Paternal Aunt    Pancreatic cancer Father          Tobacco Use    Smoking status: Never    Smokeless tobacco: Never   Substance and Sexual Activity    Alcohol use: No    Drug use: No    Sexual activity: Not Currently     Review of Systems   Constitutional:  Positive for activity change and fatigue.   Respiratory:  Positive for shortness of breath.    Cardiovascular:  Positive for chest pain.   Skin:  Positive for pallor.     Objective:     Vital Signs (Most Recent):  Temp: 97.9 °F (36.6 °C) (11/07/24 1122)  Pulse: 87 (11/07/24 1402)  Resp: 16 (11/07/24 1202)  BP: 139/71 (11/07/24 1402)  SpO2: 98 % (11/07/24 1402) Vital Signs (24h Range):  Temp:  [97.9 °F (36.6 °C)] 97.9 °F (36.6 °C)  Pulse:  [87-99] 87  Resp:  [16-18] 16  SpO2:  [96 %-99 %] 98 %  BP: (100-139)/(55-71) 139/71     Weight: 95.3 kg (210 lb)  Body mass index is 34.95 kg/m².     Physical Exam  Vitals and nursing note reviewed.   Constitutional:       General: She is not in acute distress.     Appearance: She is obese.   HENT:      Head: Normocephalic and atraumatic.      Right Ear: External ear normal.      Left Ear: External ear normal.      Mouth/Throat:      Mouth: Mucous membranes are moist.      Pharynx: Oropharynx is clear.   Eyes:      Extraocular Movements: Extraocular movements intact.       Conjunctiva/sclera: Conjunctivae normal.      Comments: Pale conjunctiva.   Cardiovascular:      Rate and Rhythm: Normal rate and regular rhythm.      Pulses: Normal pulses.      Heart sounds: Normal heart sounds.   Pulmonary:      Effort: Pulmonary effort is normal.      Breath sounds: Normal breath sounds.   Abdominal:      General: Bowel sounds are normal. There is no distension.      Palpations: Abdomen is soft.      Tenderness: There is no abdominal tenderness.   Musculoskeletal:         General: Normal range of motion.      Cervical back: Normal range of motion and neck supple.   Skin:     General: Skin is warm and dry.      Coloration: Skin is pale.   Neurological:      Mental Status: She is alert. Mental status is at baseline.   Psychiatric:         Mood and Affect: Mood normal.         Behavior: Behavior normal.                Significant Labs: All pertinent labs within the past 24 hours have been reviewed.    Significant Imaging: I have reviewed all pertinent imaging results/findings within the past 24 hours.

## 2024-11-08 ENCOUNTER — ANESTHESIA (OUTPATIENT)
Dept: ENDOSCOPY | Facility: HOSPITAL | Age: 59
End: 2024-11-08
Payer: COMMERCIAL

## 2024-11-08 ENCOUNTER — PATIENT MESSAGE (OUTPATIENT)
Dept: SURGERY | Facility: CLINIC | Age: 59
End: 2024-11-08
Payer: COMMERCIAL

## 2024-11-08 ENCOUNTER — ANESTHESIA EVENT (OUTPATIENT)
Dept: ENDOSCOPY | Facility: HOSPITAL | Age: 59
End: 2024-11-08
Payer: COMMERCIAL

## 2024-11-08 LAB
ANION GAP SERPL CALC-SCNC: 13 MMOL/L (ref 8–16)
BASOPHILS # BLD AUTO: 0.06 K/UL (ref 0–0.2)
BASOPHILS # BLD AUTO: 0.07 K/UL (ref 0–0.2)
BASOPHILS NFR BLD: 0.8 % (ref 0–1.9)
BASOPHILS NFR BLD: 1 % (ref 0–1.9)
BUN SERPL-MCNC: 11 MG/DL (ref 6–20)
CALCIUM SERPL-MCNC: 9.5 MG/DL (ref 8.7–10.5)
CEA SERPL-MCNC: 2.6 NG/ML (ref 0–5)
CHLORIDE SERPL-SCNC: 107 MMOL/L (ref 95–110)
CO2 SERPL-SCNC: 20 MMOL/L (ref 23–29)
CREAT SERPL-MCNC: 0.9 MG/DL (ref 0.5–1.4)
DIFFERENTIAL METHOD BLD: ABNORMAL
DIFFERENTIAL METHOD BLD: ABNORMAL
EOSINOPHIL # BLD AUTO: 0.3 K/UL (ref 0–0.5)
EOSINOPHIL # BLD AUTO: 0.3 K/UL (ref 0–0.5)
EOSINOPHIL NFR BLD: 3.4 % (ref 0–8)
EOSINOPHIL NFR BLD: 4.2 % (ref 0–8)
ERYTHROCYTE [DISTWIDTH] IN BLOOD BY AUTOMATED COUNT: 20.5 % (ref 11.5–14.5)
ERYTHROCYTE [DISTWIDTH] IN BLOOD BY AUTOMATED COUNT: 20.5 % (ref 11.5–14.5)
EST. GFR  (NO RACE VARIABLE): >60 ML/MIN/1.73 M^2
GLUCOSE SERPL-MCNC: 111 MG/DL (ref 70–110)
HCT VFR BLD AUTO: 26.3 % (ref 37–48.5)
HCT VFR BLD AUTO: 26.8 % (ref 37–48.5)
HGB BLD-MCNC: 7.8 G/DL (ref 12–16)
HGB BLD-MCNC: 7.8 G/DL (ref 12–16)
IMM GRANULOCYTES # BLD AUTO: 0.02 K/UL (ref 0–0.04)
IMM GRANULOCYTES # BLD AUTO: 0.03 K/UL (ref 0–0.04)
IMM GRANULOCYTES NFR BLD AUTO: 0.3 % (ref 0–0.5)
IMM GRANULOCYTES NFR BLD AUTO: 0.4 % (ref 0–0.5)
LYMPHOCYTES # BLD AUTO: 1.9 K/UL (ref 1–4.8)
LYMPHOCYTES # BLD AUTO: 2.3 K/UL (ref 1–4.8)
LYMPHOCYTES NFR BLD: 26.4 % (ref 18–48)
LYMPHOCYTES NFR BLD: 30.1 % (ref 18–48)
MCH RBC QN AUTO: 21 PG (ref 27–31)
MCH RBC QN AUTO: 21.4 PG (ref 27–31)
MCHC RBC AUTO-ENTMCNC: 29.1 G/DL (ref 32–36)
MCHC RBC AUTO-ENTMCNC: 29.7 G/DL (ref 32–36)
MCV RBC AUTO: 72 FL (ref 82–98)
MCV RBC AUTO: 72 FL (ref 82–98)
MONOCYTES # BLD AUTO: 0.5 K/UL (ref 0.3–1)
MONOCYTES # BLD AUTO: 0.6 K/UL (ref 0.3–1)
MONOCYTES NFR BLD: 7.2 % (ref 4–15)
MONOCYTES NFR BLD: 7.5 % (ref 4–15)
NEUTROPHILS # BLD AUTO: 4.3 K/UL (ref 1.8–7.7)
NEUTROPHILS # BLD AUTO: 4.5 K/UL (ref 1.8–7.7)
NEUTROPHILS NFR BLD: 57.9 % (ref 38–73)
NEUTROPHILS NFR BLD: 60.8 % (ref 38–73)
NRBC BLD-RTO: 0 /100 WBC
NRBC BLD-RTO: 0 /100 WBC
PLATELET # BLD AUTO: 305 K/UL (ref 150–450)
PLATELET # BLD AUTO: 315 K/UL (ref 150–450)
PMV BLD AUTO: 9.2 FL (ref 9.2–12.9)
PMV BLD AUTO: 9.3 FL (ref 9.2–12.9)
POCT GLUCOSE: 115 MG/DL (ref 70–110)
POCT GLUCOSE: 138 MG/DL (ref 70–110)
POCT GLUCOSE: 165 MG/DL (ref 70–110)
POCT GLUCOSE: 93 MG/DL (ref 70–110)
POTASSIUM SERPL-SCNC: 4 MMOL/L (ref 3.5–5.1)
RBC # BLD AUTO: 3.64 M/UL (ref 4–5.4)
RBC # BLD AUTO: 3.72 M/UL (ref 4–5.4)
SODIUM SERPL-SCNC: 140 MMOL/L (ref 136–145)
WBC # BLD AUTO: 7.08 K/UL (ref 3.9–12.7)
WBC # BLD AUTO: 7.74 K/UL (ref 3.9–12.7)

## 2024-11-08 PROCEDURE — 45381 COLONOSCOPY SUBMUCOUS NJX: CPT | Performed by: INTERNAL MEDICINE

## 2024-11-08 PROCEDURE — D9220A PRA ANESTHESIA: Mod: CRNA,,, | Performed by: NURSE ANESTHETIST, CERTIFIED REGISTERED

## 2024-11-08 PROCEDURE — G0378 HOSPITAL OBSERVATION PER HR: HCPCS

## 2024-11-08 PROCEDURE — 27201012 HC FORCEPS, HOT/COLD, DISP: Performed by: INTERNAL MEDICINE

## 2024-11-08 PROCEDURE — 80048 BASIC METABOLIC PNL TOTAL CA: CPT | Performed by: STUDENT IN AN ORGANIZED HEALTH CARE EDUCATION/TRAINING PROGRAM

## 2024-11-08 PROCEDURE — 45381 COLONOSCOPY SUBMUCOUS NJX: CPT | Mod: 22,51,, | Performed by: INTERNAL MEDICINE

## 2024-11-08 PROCEDURE — 82378 CARCINOEMBRYONIC ANTIGEN: CPT | Performed by: INTERNAL MEDICINE

## 2024-11-08 PROCEDURE — 96376 TX/PRO/DX INJ SAME DRUG ADON: CPT

## 2024-11-08 PROCEDURE — 36415 COLL VENOUS BLD VENIPUNCTURE: CPT | Performed by: STUDENT IN AN ORGANIZED HEALTH CARE EDUCATION/TRAINING PROGRAM

## 2024-11-08 PROCEDURE — 45380 COLONOSCOPY AND BIOPSY: CPT | Mod: 22,,, | Performed by: INTERNAL MEDICINE

## 2024-11-08 PROCEDURE — 25000003 PHARM REV CODE 250: Performed by: STUDENT IN AN ORGANIZED HEALTH CARE EDUCATION/TRAINING PROGRAM

## 2024-11-08 PROCEDURE — 27201028 HC NEEDLE, SCLERO: Performed by: INTERNAL MEDICINE

## 2024-11-08 PROCEDURE — 36415 COLL VENOUS BLD VENIPUNCTURE: CPT | Performed by: INTERNAL MEDICINE

## 2024-11-08 PROCEDURE — 43251 EGD REMOVE LESION SNARE: CPT | Performed by: INTERNAL MEDICINE

## 2024-11-08 PROCEDURE — 43239 EGD BIOPSY SINGLE/MULTIPLE: CPT | Mod: 22,59,, | Performed by: INTERNAL MEDICINE

## 2024-11-08 PROCEDURE — D9220A PRA ANESTHESIA: Mod: ANES,,, | Performed by: ANESTHESIOLOGY

## 2024-11-08 PROCEDURE — 43239 EGD BIOPSY SINGLE/MULTIPLE: CPT | Mod: 59 | Performed by: INTERNAL MEDICINE

## 2024-11-08 PROCEDURE — 25500020 PHARM REV CODE 255

## 2024-11-08 PROCEDURE — 43251 EGD REMOVE LESION SNARE: CPT | Mod: 22,51,, | Performed by: INTERNAL MEDICINE

## 2024-11-08 PROCEDURE — 63600175 PHARM REV CODE 636 W HCPCS: Performed by: NURSE ANESTHETIST, CERTIFIED REGISTERED

## 2024-11-08 PROCEDURE — 27201089 HC SNARE, DISP (ANY): Performed by: INTERNAL MEDICINE

## 2024-11-08 PROCEDURE — 63600175 PHARM REV CODE 636 W HCPCS: Performed by: STUDENT IN AN ORGANIZED HEALTH CARE EDUCATION/TRAINING PROGRAM

## 2024-11-08 PROCEDURE — 85025 COMPLETE CBC W/AUTO DIFF WBC: CPT | Performed by: STUDENT IN AN ORGANIZED HEALTH CARE EDUCATION/TRAINING PROGRAM

## 2024-11-08 PROCEDURE — 45380 COLONOSCOPY AND BIOPSY: CPT | Performed by: INTERNAL MEDICINE

## 2024-11-08 PROCEDURE — 37000009 HC ANESTHESIA EA ADD 15 MINS: Performed by: INTERNAL MEDICINE

## 2024-11-08 PROCEDURE — 27201042 HC RETRIEVAL NET: Performed by: INTERNAL MEDICINE

## 2024-11-08 PROCEDURE — 25000003 PHARM REV CODE 250: Performed by: NURSE ANESTHETIST, CERTIFIED REGISTERED

## 2024-11-08 PROCEDURE — 37000008 HC ANESTHESIA 1ST 15 MINUTES: Performed by: INTERNAL MEDICINE

## 2024-11-08 PROCEDURE — 99204 OFFICE O/P NEW MOD 45 MIN: CPT | Mod: ,,, | Performed by: STUDENT IN AN ORGANIZED HEALTH CARE EDUCATION/TRAINING PROGRAM

## 2024-11-08 RX ORDER — INSULIN ASPART 100 [IU]/ML
0-10 INJECTION, SOLUTION INTRAVENOUS; SUBCUTANEOUS
Status: DISCONTINUED | OUTPATIENT
Start: 2024-11-08 | End: 2024-11-09 | Stop reason: HOSPADM

## 2024-11-08 RX ORDER — PROPOFOL 10 MG/ML
VIAL (ML) INTRAVENOUS
Status: DISCONTINUED | OUTPATIENT
Start: 2024-11-08 | End: 2024-11-08

## 2024-11-08 RX ORDER — GLUCAGON 1 MG
1 KIT INJECTION
Status: DISCONTINUED | OUTPATIENT
Start: 2024-11-08 | End: 2024-11-09 | Stop reason: HOSPADM

## 2024-11-08 RX ORDER — GABAPENTIN 400 MG/1
800 CAPSULE ORAL 3 TIMES DAILY
Status: DISCONTINUED | OUTPATIENT
Start: 2024-11-08 | End: 2024-11-09 | Stop reason: HOSPADM

## 2024-11-08 RX ORDER — IBUPROFEN 200 MG
16 TABLET ORAL
Status: DISCONTINUED | OUTPATIENT
Start: 2024-11-08 | End: 2024-11-09 | Stop reason: HOSPADM

## 2024-11-08 RX ORDER — PANTOPRAZOLE SODIUM 40 MG/1
40 TABLET, DELAYED RELEASE ORAL DAILY
Status: DISCONTINUED | OUTPATIENT
Start: 2024-11-09 | End: 2024-11-09 | Stop reason: HOSPADM

## 2024-11-08 RX ORDER — ACETAMINOPHEN 325 MG/1
650 TABLET ORAL EVERY 6 HOURS PRN
Status: DISCONTINUED | OUTPATIENT
Start: 2024-11-08 | End: 2024-11-09 | Stop reason: HOSPADM

## 2024-11-08 RX ORDER — IBUPROFEN 200 MG
24 TABLET ORAL
Status: DISCONTINUED | OUTPATIENT
Start: 2024-11-08 | End: 2024-11-09 | Stop reason: HOSPADM

## 2024-11-08 RX ORDER — LIDOCAINE HYDROCHLORIDE 20 MG/ML
INJECTION INTRAVENOUS
Status: DISCONTINUED | OUTPATIENT
Start: 2024-11-08 | End: 2024-11-08

## 2024-11-08 RX ADMIN — GABAPENTIN 800 MG: 400 CAPSULE ORAL at 08:11

## 2024-11-08 RX ADMIN — SODIUM CHLORIDE: 0.9 INJECTION, SOLUTION INTRAVENOUS at 01:11

## 2024-11-08 RX ADMIN — PROPOFOL 50 MG: 10 INJECTION, EMULSION INTRAVENOUS at 02:11

## 2024-11-08 RX ADMIN — ATORVASTATIN CALCIUM 20 MG: 20 TABLET, FILM COATED ORAL at 08:11

## 2024-11-08 RX ADMIN — PANTOPRAZOLE SODIUM 40 MG: 40 INJECTION, POWDER, FOR SOLUTION INTRAVENOUS at 08:11

## 2024-11-08 RX ADMIN — LAMOTRIGINE 150 MG: 100 TABLET ORAL at 08:11

## 2024-11-08 RX ADMIN — IOHEXOL 100 ML: 350 INJECTION, SOLUTION INTRAVENOUS at 06:11

## 2024-11-08 RX ADMIN — SERTRALINE HYDROCHLORIDE 100 MG: 50 TABLET ORAL at 08:11

## 2024-11-08 RX ADMIN — IOHEXOL 30 ML: 300 INJECTION, SOLUTION INTRAVENOUS at 06:11

## 2024-11-08 RX ADMIN — PROPOFOL 100 MG: 10 INJECTION, EMULSION INTRAVENOUS at 02:11

## 2024-11-08 RX ADMIN — GABAPENTIN 800 MG: 400 CAPSULE ORAL at 03:11

## 2024-11-08 RX ADMIN — LIDOCAINE HYDROCHLORIDE 100 MG: 20 INJECTION, SOLUTION INTRAVENOUS at 02:11

## 2024-11-08 RX ADMIN — ACETAMINOPHEN 650 MG: 325 TABLET ORAL at 08:11

## 2024-11-08 NOTE — [employer] PATIENT INSTRUCTIONS
Discharge Summary/Instructions after an Endoscopic Procedure  Patient Name: Nik Lowery  Patient MRN: [id number]  Patient YOB: 1965 Friday, November 8, 2024  Saw Rowe MD  Dear patient,  As a result of recent federal legislation (The Federal Cures Act), you may   receive lab or pathology results from your procedure in your [facility]   account before your physician is able to contact you. Your physician or   their representative will relay the results to you with their   recommendations at their soonest availability.  Thank you,  RESTRICTIONS:  During your procedure today, you received medications for sedation.  These   medications may affect your judgment, balance and coordination.  Therefore,   for 24 hours, you have the following restrictions:   - DO NOT drive a car, operate machinery, make legal/financial decisions,   sign important papers or drink alcohol.    ACTIVITY:  Today: no heavy lifting, straining or running due to procedural   sedation/anesthesia.  The following day: return to full activity including work.  DIET:  Eat and drink normally unless instructed otherwise.     TREATMENT FOR COMMON SIDE EFFECTS:  - Mild abdominal pain, nausea, belching, bloating or excessive gas:  rest,   eat lightly and use a heating pad.  - Sore Throat: treat with throat lozenges and/or gargle with warm salt   water.  - Because air was used during the procedure, expelling large amounts of air   from your rectum or belching is normal.  - If a bowel prep was taken, you may not have a bowel movement for 1-3 days.    This is normal.  SYMPTOMS TO WATCH FOR AND REPORT TO YOUR PHYSICIAN:  1. Abdominal pain or bloating, other than gas cramps.  2. Chest pain.  3. Back pain.  4. Signs of infection such as: chills or fever occurring within 24 hours   after the procedure.  5. Rectal bleeding, which would show as bright red, maroon, or black stools.   (A tablespoon of blood from the rectum is not serious, especially  if   hemorrhoids are present.)  6. Vomiting.  7. Weakness or dizziness.  GO DIRECTLY TO THE NEAREST EMERGENCY ROOM IF YOU HAVE ANY OF THE FOLLOWING:      Difficulty breathing              Chills and/or fever over 101 F   Persistent vomiting and/or vomiting blood   Severe abdominal pain   Severe chest pain   Black, tarry stools   Bleeding- more than one tablespoon   Any other symptom or condition that you feel may need urgent attention  Your doctor recommends these additional instructions:  If any biopsies were taken, your doctors clinic will contact you in 1 to 2   weeks with any results.  - Return patient to hospital [facility] for ongoing care.   - Resume previous diet.   - Continue present medications.   - Await pathology results.   - Repeat colonoscopy (date not yet determined) for surveillance.   - Refer to a colo-rectal surgeon at appointment to be scheduled.  For questions, problems or results please call your physician - Saw Rowe MD at Work:  [phone].  [facility], EMERGENCY ROOM PHONE NUMBER: [phone]  IF A COMPLICATION OR EMERGENCY SITUATION ARISES AND YOU ARE UNABLE TO REACH   YOUR PHYSICIAN - GO DIRECTLY TO THE EMERGENCY ROOM.  Saw Rowe MD  11/8/2024 3:04:12 PM  This report has been verified and signed electronically.  Dear patient,  As a result of recent federal legislation (The Federal Cures Act), you may   receive lab or pathology results from your procedure in your [facility]   account before your physician is able to contact you. Your physician or   their representative will relay the results to you with their   recommendations at their soonest availability.  Thank you,  [employer]ATION

## 2024-11-08 NOTE — PROGRESS NOTES
"[facility] Medicine  Progress Note    Patient Name: Nik Lowery  MRN: [id number]  Patient Class: OP- Observation   Admission Date: 11/7/2024  Length of Stay: 0 days  Attending Physician: John Quezada MD  Primary Care Provider: Osmin Newsome MD        Subjective:     Principal Problem:Iron deficiency anemia        HPI:  Nik Lowery is a 58 year old female with a past medical history obesity, DM, HTN, HLD, GERD, and MDD/MALI who presented with multiple days of fatigue, shortness of breath and chest pain. She states she has not yet had a screening colonoscopy. She states she take multiple pills of ibuprofen daily. She denies any hematemesis, coffee ground emesis or blood in stool. While in the ED, the patient was given two units of PRBCs and GI was consulted. Hospital Medicine was consulted for admission.    Overview/Hospital Course:  Nik Lowery is a 58 year old female with a past medical history obesity, DM, HTN, HLD, GERD, and MDD/MALI who presented with symptomatic anemia with a Hgb of 4.9. Two units of PRBCs were ordered with appropriate increased in the Hgb. Iron studies are consistent with SANDOR. The patient does not endorse gross GI bleeding. She does endorse daily ibuprofen use. She is on an IV PPI BID. GI will perform EGD and colonoscopy 11/8.    Interval History: see "Hospital Course"    Review of Systems   Constitutional:  Positive for activity change and fatigue.   Respiratory:  Positive for shortness of breath.    Cardiovascular:  Positive for chest pain.   Skin:  Positive for pallor.     Objective:     Vital Signs (Most Recent):  Temp: 98.1 °F (36.7 °C) (11/08/24 0325)  Pulse: 71 (11/08/24 0325)  Resp: 19 (11/08/24 0325)  BP: (!) 176/83 (11/08/24 0325)  SpO2: 95 % (11/08/24 0325) Vital Signs (24h Range):  Temp:  [97.9 °F (36.6 °C)-98.7 °F (37.1 °C)] 98.1 °F (36.7 °C)  Pulse:  [71-99] 71  Resp:  [16-19] 19  SpO2:  [94 %-99 %] 95 %  BP: (100-176)/(55-89) 176/83 "     Weight: 98.6 kg (217 lb 6 oz)  Body mass index is 36.17 kg/m².    Intake/Output Summary (Last 24 hours) at 11/8/2024 0805  Last data filed at 11/8/2024 0025  Gross per 24 hour   Intake 992.5 ml   Output --   Net 992.5 ml         Physical Exam  Vitals and nursing note reviewed.   Constitutional:       General: She is not in acute distress.     Appearance: She is obese.   HENT:      Head: Normocephalic and atraumatic.      Right Ear: External ear normal.      Left Ear: External ear normal.      Mouth/Throat:      Mouth: Mucous membranes are moist.      Pharynx: Oropharynx is clear.   Eyes:      Extraocular Movements: Extraocular movements intact.      Conjunctiva/sclera: Conjunctivae normal.      Comments: Pale conjunctiva.   Cardiovascular:      Rate and Rhythm: Normal rate and regular rhythm.      Pulses: Normal pulses.      Heart sounds: Normal heart sounds.   Pulmonary:      Effort: Pulmonary effort is normal.      Breath sounds: Normal breath sounds.   Abdominal:      General: Bowel sounds are normal. There is no distension.      Palpations: Abdomen is soft.      Tenderness: There is no abdominal tenderness.   Musculoskeletal:         General: Normal range of motion.      Cervical back: Normal range of motion and neck supple.   Skin:     General: Skin is warm and dry.      Coloration: Skin is pale.   Neurological:      Mental Status: She is alert. Mental status is at baseline.   Psychiatric:         Mood and Affect: Mood normal.         Behavior: Behavior normal.             Significant Labs: All pertinent labs within the past 24 hours have been reviewed.    Significant Imaging: I have reviewed all pertinent imaging results/findings within the past 24 hours.    Assessment/Plan:      * Iron deficiency anemia  History of ibuprofen use. Patient does not endorse gross bleeding. Pale. Hgb 4.9 on admission.  -IV PPI BID  -GI consulted; EGD and colonoscopy 11/8  -Two units PRBCs  -Trend Hgb with CBC  -NPO  -Hold any  anticoagulation    Hyperlipidemia associated with type 2 diabetes mellitus  -Statin      Obesity  Body mass index is 36.17 kg/m². Morbid obesity complicates all aspects of disease management from diagnostic modalities to treatment.         Gastroesophageal reflux disease without esophagitis  -PPI IV BID      Anxiety and depression  -Continue home lamotrigine and sertraline    Hypertension associated with diabetes  -Hold home antihypertensive medications  -Continue to monitor      Uncontrolled type 2 diabetes mellitus with hyperglycemia, without long-term current use of insulin  Patient's FSGs are controlled on current medication regimen.  Last A1c reviewed-   Lab Results   Component Value Date    HGBA1C 6.5 (H) 11/07/2024     Most recent fingerstick glucose reviewed-   Recent Labs   Lab 11/07/24 2009 11/08/24  0717   POCTGLUCOSE 109 115*     Current correctional scale  Medium  Maintain anti-hyperglycemic dose as follows-   Antihyperglycemics (From admission, onward)      Start     Stop Route Frequency Ordered    11/08/24 0902  insulin aspart U-100 pen 0-10 Units         -- SubQ Before meals & nightly PRN 11/08/24 0802          Hold Oral hypoglycemics while patient is in the hospital.      VTE Risk Mitigation (From admission, onward)           Ordered     IP VTE HIGH RISK PATIENT  Once         11/07/24 1351     Place sequential compression device  Until discontinued         11/07/24 1351                    Discharge Planning   KARON:      Code Status: Full Code   Is the patient medically ready for discharge?:     Reason for patient still in hospital (select all that apply): Patient trending condition, Treatment, Imaging, and Consult recommendations                     John Quezada MD  Department of Hospital Medicine   [facility]/Surg

## 2024-11-08 NOTE — PROGRESS NOTES
EGD/colonoscopy done.  Multiple gastric polyps noted.  Patient also has proximal ascending colon mass which is likely malignant.  Will check CEA and order CT scan.  Resume diet.  Consult surgery.

## 2024-11-08 NOTE — PLAN OF CARE
Pt awake, alert.  Vital signs stable, denies nausea or pain, abd soft. No adverse effects of anesthesia noted. Transferred to room per wheelchair,  Report given to Sohan CURTIS

## 2024-11-08 NOTE — TRANSFER OF CARE
"Anesthesia Transfer of Care Note    Patient: Nik Lowery    Procedure(s) Performed: Procedure(s) (LRB):  EGD (ESOPHAGOGASTRODUODENOSCOPY) (N/A)  COLONOSCOPY (N/A)    Patient location: GI    Anesthesia Type: general    Transport from OR: Transported from OR on room air with adequate spontaneous ventilation    Post pain: adequate analgesia    Post assessment: no apparent anesthetic complications and tolerated procedure well    Post vital signs: stable    Level of consciousness: awake, alert and oriented    Nausea/Vomiting: no nausea/vomiting    Complications: none    Transfer of care protocol was followed    Last vitals: Visit Vitals  BP (!) 158/84 (BP Location: Right arm, Patient Position: Lying)   Pulse 75   Temp 36.7 °C (98.1 °F) (Skin)   Resp 16   Ht 5' 5" (1.651 m)   Wt 98.6 kg (217 lb 6 oz)   SpO2 96%   BMI 36.17 kg/m²     "

## 2024-11-08 NOTE — SUBJECTIVE & OBJECTIVE
"Interval History: see "Hospital Course"    Review of Systems   Constitutional:  Positive for activity change and fatigue.   Respiratory:  Positive for shortness of breath.    Cardiovascular:  Positive for chest pain.   Skin:  Positive for pallor.     Objective:     Vital Signs (Most Recent):  Temp: 98.1 °F (36.7 °C) (11/08/24 0325)  Pulse: 71 (11/08/24 0325)  Resp: 19 (11/08/24 0325)  BP: (!) 176/83 (11/08/24 0325)  SpO2: 95 % (11/08/24 0325) Vital Signs (24h Range):  Temp:  [97.9 °F (36.6 °C)-98.7 °F (37.1 °C)] 98.1 °F (36.7 °C)  Pulse:  [71-99] 71  Resp:  [16-19] 19  SpO2:  [94 %-99 %] 95 %  BP: (100-176)/(55-89) 176/83     Weight: 98.6 kg (217 lb 6 oz)  Body mass index is 36.17 kg/m².    Intake/Output Summary (Last 24 hours) at 11/8/2024 0805  Last data filed at 11/8/2024 0025  Gross per 24 hour   Intake 992.5 ml   Output --   Net 992.5 ml         Physical Exam  Vitals and nursing note reviewed.   Constitutional:       General: She is not in acute distress.     Appearance: She is obese.   HENT:      Head: Normocephalic and atraumatic.      Right Ear: External ear normal.      Left Ear: External ear normal.      Mouth/Throat:      Mouth: Mucous membranes are moist.      Pharynx: Oropharynx is clear.   Eyes:      Extraocular Movements: Extraocular movements intact.      Conjunctiva/sclera: Conjunctivae normal.      Comments: Pale conjunctiva.   Cardiovascular:      Rate and Rhythm: Normal rate and regular rhythm.      Pulses: Normal pulses.      Heart sounds: Normal heart sounds.   Pulmonary:      Effort: Pulmonary effort is normal.      Breath sounds: Normal breath sounds.   Abdominal:      General: Bowel sounds are normal. There is no distension.      Palpations: Abdomen is soft.      Tenderness: There is no abdominal tenderness.   Musculoskeletal:         General: Normal range of motion.      Cervical back: Normal range of motion and neck supple.   Skin:     General: Skin is warm and dry.      Coloration: Skin " is pale.   Neurological:      Mental Status: She is alert. Mental status is at baseline.   Psychiatric:         Mood and Affect: Mood normal.         Behavior: Behavior normal.             Significant Labs: All pertinent labs within the past 24 hours have been reviewed.    Significant Imaging: I have reviewed all pertinent imaging results/findings within the past 24 hours.

## 2024-11-08 NOTE — ASSESSMENT & PLAN NOTE
Patient's FSGs are controlled on current medication regimen.  Last A1c reviewed-   Lab Results   Component Value Date    HGBA1C 6.5 (H) 11/07/2024     Most recent fingerstick glucose reviewed-   Recent Labs   Lab 11/07/24 2009 11/08/24  0717   POCTGLUCOSE 109 115*     Current correctional scale  Medium  Maintain anti-hyperglycemic dose as follows-   Antihyperglycemics (From admission, onward)    Start     Stop Route Frequency Ordered    11/08/24 0902  insulin aspart U-100 pen 0-10 Units         -- SubQ Before meals & nightly PRN 11/08/24 0802        Hold Oral hypoglycemics while patient is in the hospital.

## 2024-11-08 NOTE — PROVATION PATIENT INSTRUCTIONS
Discharge Summary/Instructions after an Endoscopic Procedure  Patient Name: Nik Lowery  Patient MRN: [id number]  Patient YOB: 1965 Friday, November 8, 2024  Saw Rowe MD  Dear patient,  As a result of recent federal legislation (The Federal Cures Act), you may   receive lab or pathology results from your procedure in your [facility]   account before your physician is able to contact you. Your physician or   their representative will relay the results to you with their   recommendations at their soonest availability.  Thank you,  RESTRICTIONS:  During your procedure today, you received medications for sedation.  These   medications may affect your judgment, balance and coordination.  Therefore,   for 24 hours, you have the following restrictions:   - DO NOT drive a car, operate machinery, make legal/financial decisions,   sign important papers or drink alcohol.    ACTIVITY:  Today: no heavy lifting, straining or running due to procedural   sedation/anesthesia.  The following day: return to full activity including work.  DIET:  Eat and drink normally unless instructed otherwise.     TREATMENT FOR COMMON SIDE EFFECTS:  - Mild abdominal pain, nausea, belching, bloating or excessive gas:  rest,   eat lightly and use a heating pad.  - Sore Throat: treat with throat lozenges and/or gargle with warm salt   water.  - Because air was used during the procedure, expelling large amounts of air   from your rectum or belching is normal.  - If a bowel prep was taken, you may not have a bowel movement for 1-3 days.    This is normal.  SYMPTOMS TO WATCH FOR AND REPORT TO YOUR PHYSICIAN:  1. Abdominal pain or bloating, other than gas cramps.  2. Chest pain.  3. Back pain.  4. Signs of infection such as: chills or fever occurring within 24 hours   after the procedure.  5. Rectal bleeding, which would show as bright red, maroon, or black stools.   (A tablespoon of blood from the rectum is not serious, especially  if   hemorrhoids are present.)  6. Vomiting.  7. Weakness or dizziness.  GO DIRECTLY TO THE NEAREST EMERGENCY ROOM IF YOU HAVE ANY OF THE FOLLOWING:      Difficulty breathing              Chills and/or fever over 101 F   Persistent vomiting and/or vomiting blood   Severe abdominal pain   Severe chest pain   Black, tarry stools   Bleeding- more than one tablespoon   Any other symptom or condition that you feel may need urgent attention  Your doctor recommends these additional instructions:  If any biopsies were taken, your doctors clinic will contact you in 1 to 2   weeks with any results.  - Return patient to hospital [facility] for ongoing care.   - Resume previous diet.   - Continue present medications.   - No aspirin, ibuprofen, naproxen, or other non-steroidal anti-inflammatory   drugs.   - Await pathology results.   - Perform a colonoscopy today.   - Follow an antireflux regimen.   - Return to GI office after studies are complete.  For questions, problems or results please call your physician - Saw Rowe MD at Work:  [phone].  [facility], EMERGENCY ROOM PHONE NUMBER: [phone]  IF A COMPLICATION OR EMERGENCY SITUATION ARISES AND YOU ARE UNABLE TO REACH   YOUR PHYSICIAN - GO DIRECTLY TO THE EMERGENCY ROOM.  Saw Rowe MD  11/8/2024 2:41:42 PM  This report has been verified and signed electronically.  Dear patient,  As a result of recent federal legislation (The Federal Cures Act), you may   receive lab or pathology results from your procedure in your [facility]   account before your physician is able to contact you. Your physician or   their representative will relay the results to you with their   recommendations at their soonest availability.  Thank you,  PROVATION

## 2024-11-08 NOTE — PLAN OF CARE
[facility] - Med/Surg  Initial Discharge Assessment       Primary Care Provider: Osmin Newsome MD    Admission Diagnosis: Symptomatic anemia [D64.9]    Admission Date: 11/7/2024  Expected Discharge Date: 11/8/2024    Met with pt at bedside to complete discharge assessment, verified PCP, pharmacy and information on facesheet.  No HH, dialysis or Coumadin.  See DME below.  No needs identified at this time.    Transition of Care Barriers: None    Payor: BLUE CROSS BLUE SHIELD / Plan: BCBS ALL OUT OF STATE / Product Type: PPO /     Extended Emergency Contact Information  Primary Emergency Contact: Harleen Lowery  Address: [address] [place] [address] [address], [facility] [place]  Mobile Phone: [phone]  Relation: Sister  Preferred language: English   needed? No    Discharge Plan A: Home  Discharge Plan B: Home      [id number] [place] - [address]  [address]  [place] [address]  Phone: [phone] Fax: [phone]    [facility] - [place], [place] - [address] AT [address]  [address]  [place] [address]  Phone: [phone] Fax: [phone]      Initial Assessment (most recent)       Adult Discharge Assessment - 11/08/24 1055          Discharge Assessment    Assessment Type Discharge Planning Assessment     Confirmed/corrected address, phone number and insurance Yes     Confirmed Demographics Correct on Facesheet     Source of Information patient     Communicated KARON with patient/caregiver No     People in Home alone     Do you expect to return to your current living situation? Yes     Prior to hospitilization cognitive status: Alert/Oriented     Current cognitive status: Alert/Oriented     Walking or Climbing Stairs Difficulty no     Dressing/Bathing Difficulty no     Home Accessibility wheelchair accessible     Home Layout Able to live on 1st floor     Equipment Currently Used at Home glucometer;shower chair      Readmission within 30 days? No     Patient currently being followed by outpatient case management? No     Do you currently have service(s) that help you manage your care at home? No     Do you take prescription medications? Yes     Do you have prescription coverage? Yes     Coverage BCBS     Do you have any problems affording any of your prescribed medications? No     Is the patient taking medications as prescribed? yes     Who is going to help you get home at discharge? self     How do you get to doctors appointments? car, drives self     Are you on dialysis? No     Do you take coumadin? No     Discharge Plan A Home     Discharge Plan B Home     DME Needed Upon Discharge  none     Discharge Plan discussed with: Patient     Transition of Care Barriers None

## 2024-11-08 NOTE — ASSESSMENT & PLAN NOTE
Body mass index is 36.17 kg/m². Morbid obesity complicates all aspects of disease management from diagnostic modalities to treatment.

## 2024-11-08 NOTE — ASSESSMENT & PLAN NOTE
History of ibuprofen use. Patient does not endorse gross bleeding. Pale. Hgb 4.9 on admission.  -IV PPI BID  -GI consulted; EGD and colonoscopy 11/8  -Two units PRBCs  -Trend Hgb with CBC  -NPO  -Hold any anticoagulation

## 2024-11-09 VITALS
WEIGHT: 217.38 LBS | TEMPERATURE: 98 F | BODY MASS INDEX: 36.22 KG/M2 | HEART RATE: 81 BPM | OXYGEN SATURATION: 95 % | SYSTOLIC BLOOD PRESSURE: 168 MMHG | RESPIRATION RATE: 18 BRPM | HEIGHT: 65 IN | DIASTOLIC BLOOD PRESSURE: 79 MMHG

## 2024-11-09 LAB
ANION GAP SERPL CALC-SCNC: 12 MMOL/L (ref 8–16)
BASOPHILS # BLD AUTO: 0.04 K/UL (ref 0–0.2)
BASOPHILS NFR BLD: 0.4 % (ref 0–1.9)
BUN SERPL-MCNC: 9 MG/DL (ref 6–20)
CALCIUM SERPL-MCNC: 9.1 MG/DL (ref 8.7–10.5)
CHLORIDE SERPL-SCNC: 105 MMOL/L (ref 95–110)
CO2 SERPL-SCNC: 21 MMOL/L (ref 23–29)
CREAT SERPL-MCNC: 0.8 MG/DL (ref 0.5–1.4)
DIFFERENTIAL METHOD BLD: ABNORMAL
EOSINOPHIL # BLD AUTO: 0.2 K/UL (ref 0–0.5)
EOSINOPHIL NFR BLD: 2.4 % (ref 0–8)
ERYTHROCYTE [DISTWIDTH] IN BLOOD BY AUTOMATED COUNT: 20.8 % (ref 11.5–14.5)
EST. GFR  (NO RACE VARIABLE): >60 ML/MIN/1.73 M^2
GLUCOSE SERPL-MCNC: 140 MG/DL (ref 70–110)
HCT VFR BLD AUTO: 25.9 % (ref 37–48.5)
HGB BLD-MCNC: 7.7 G/DL (ref 12–16)
IMM GRANULOCYTES # BLD AUTO: 0.03 K/UL (ref 0–0.04)
IMM GRANULOCYTES NFR BLD AUTO: 0.3 % (ref 0–0.5)
LYMPHOCYTES # BLD AUTO: 1.5 K/UL (ref 1–4.8)
LYMPHOCYTES NFR BLD: 16.3 % (ref 18–48)
MCH RBC QN AUTO: 21.8 PG (ref 27–31)
MCHC RBC AUTO-ENTMCNC: 29.7 G/DL (ref 32–36)
MCV RBC AUTO: 73 FL (ref 82–98)
MONOCYTES # BLD AUTO: 0.6 K/UL (ref 0.3–1)
MONOCYTES NFR BLD: 6.1 % (ref 4–15)
NEUTROPHILS # BLD AUTO: 6.7 K/UL (ref 1.8–7.7)
NEUTROPHILS NFR BLD: 74.5 % (ref 38–73)
NRBC BLD-RTO: 0 /100 WBC
PLATELET # BLD AUTO: 311 K/UL (ref 150–450)
PMV BLD AUTO: 9.4 FL (ref 9.2–12.9)
POCT GLUCOSE: 139 MG/DL (ref 70–110)
POTASSIUM SERPL-SCNC: 4.1 MMOL/L (ref 3.5–5.1)
RBC # BLD AUTO: 3.54 M/UL (ref 4–5.4)
SODIUM SERPL-SCNC: 138 MMOL/L (ref 136–145)
WBC # BLD AUTO: 9.03 K/UL (ref 3.9–12.7)

## 2024-11-09 PROCEDURE — 94761 N-INVAS EAR/PLS OXIMETRY MLT: CPT

## 2024-11-09 PROCEDURE — 85025 COMPLETE CBC W/AUTO DIFF WBC: CPT | Performed by: STUDENT IN AN ORGANIZED HEALTH CARE EDUCATION/TRAINING PROGRAM

## 2024-11-09 PROCEDURE — 25000003 PHARM REV CODE 250: Performed by: STUDENT IN AN ORGANIZED HEALTH CARE EDUCATION/TRAINING PROGRAM

## 2024-11-09 PROCEDURE — 94760 N-INVAS EAR/PLS OXIMETRY 1: CPT

## 2024-11-09 PROCEDURE — G0378 HOSPITAL OBSERVATION PER HR: HCPCS

## 2024-11-09 PROCEDURE — 80048 BASIC METABOLIC PNL TOTAL CA: CPT | Performed by: STUDENT IN AN ORGANIZED HEALTH CARE EDUCATION/TRAINING PROGRAM

## 2024-11-09 RX ADMIN — SERTRALINE HYDROCHLORIDE 100 MG: 50 TABLET ORAL at 08:11

## 2024-11-09 RX ADMIN — PANTOPRAZOLE SODIUM 40 MG: 40 TABLET, DELAYED RELEASE ORAL at 08:11

## 2024-11-09 RX ADMIN — GABAPENTIN 800 MG: 400 CAPSULE ORAL at 03:11

## 2024-11-09 RX ADMIN — GABAPENTIN 800 MG: 400 CAPSULE ORAL at 08:11

## 2024-11-09 NOTE — DISCHARGE SUMMARY
[facility] Medicine  Discharge Summary      Patient Name: Nik Lowery  MRN: [id number]  KRISTY: [id number]  Patient Class: OP- Observation  Admission Date: 11/7/2024  Hospital Length of Stay: 0 days  Discharge Date and Time: 11/9/2024 10:50 AM  Attending Physician: No att. providers found   Discharging Provider: Mary Ann Jade MD  Primary Care Provider: Osmin Newsome MD    Primary Care Team: Networked reference to record PCT     HPI:   Nik Loewry is a 58 year old female with a past medical history obesity, DM, HTN, HLD, GERD, and MDD/MALI who presented with multiple days of fatigue, shortness of breath and chest pain. She states she has not yet had a screening colonoscopy. She states she take multiple pills of ibuprofen daily. She denies any hematemesis, coffee ground emesis or blood in stool. While in the ED, the patient was given two units of PRBCs and GI was consulted. Hospital Medicine was consulted for admission.    Procedure(s) (LRB):  EGD (ESOPHAGOGASTRODUODENOSCOPY) (N/A)  COLONOSCOPY (N/A)      Hospital Course:   Nik Lowery is a 58 year old female with a past medical history obesity, DM, HTN, HLD, GERD, and MDD/MALI who presented with symptomatic anemia with a Hgb of 4.9. Two units of PRBCs were ordered with appropriate increased in the Hgb. Iron studies are consistent with SANDOR. The patient does not endorse gross GI bleeding. She does endorse daily ibuprofen use.  GI performed EGD and colonoscopy.  Ascending colon mass noted.  CT abdomen and pelvis was done for staging purposes.  Surgery consultation was requested.  Dr. Juarez plans for robotic hemicolectomy next week.  He cleared patient for discharge home.  He will discuss the patient with Oncology on Monday.  Patient expressed understanding of discharge plan.  She will follow up with Dr. Juarez as well as with PCP.  Oncology referral placed.     Goals of Care Treatment Preferences:  Code Status: Full Code      SDOH  Screening:  The patient was screened for utility difficulties, food insecurity, transport difficulties, housing insecurity, and interpersonal safety and there were no concerns identified this admission.     Consults:   Consults (From admission, onward)          Status Ordering Provider     Inpatient consult to General Surgery  Once        Provider:  Logan Juarez MD    Completed YONI WICK     Inpatient consult to Gastroenterology  Once        Provider:  Yoni Wick MD    Completed LIGIA HEART            No new Assessment & Plan notes have been filed under this hospital service since the last note was generated.  Service: Hospital Medicine    Final Active Diagnoses:    Diagnosis Date Noted POA    PRINCIPAL PROBLEM:  Iron deficiency anemia [D50.9] 11/07/2024 Yes    Hyperlipidemia associated with type 2 diabetes mellitus [E11.69, E78.5] 09/24/2018 Yes    Gastroesophageal reflux disease without esophagitis [K21.9] 08/08/2018 Yes    Obesity [E66.9] 08/08/2018 Yes    Uncontrolled type 2 diabetes mellitus with hyperglycemia, without long-term current use of insulin [E11.65] 07/11/2017 Yes    Hypertension associated with diabetes [E11.59, I15.2] 07/11/2017 Yes    Anxiety and depression [F41.9, F32.A] 07/11/2017 Yes      Problems Resolved During this Admission:       Discharged Condition: good    Disposition: Home or Self Care    Follow Up:   Follow-up Information       Logan Juarez MD Follow up in 1 week(s).    Specialty: General Surgery  Why: office to contact patient to schedule follow up  Contact information:  [address]  [address]  [place] [address]  [phone]               Osmin Newsome MD. Go on 11/15/2024.    Specialty: Family Medicine  Why: at 10:30am with Luana Mejia PA-C for hospital follow up  Contact information:  [address]  [place] [address]  [phone]                           Patient Instructions:      Ambulatory referral/consult to Oncology   Standing  Status: Future   Referral Priority: Routine Referral Type: Consultation   Referral Reason: Specialty Services Required   Requested Specialty: Oncology   Number of Visits Requested: 1     Notify your health care provider if you experience any of the following:  temperature >100.4     Notify your health care provider if you experience any of the following:  persistent nausea and vomiting or diarrhea     Notify your health care provider if you experience any of the following:  severe uncontrolled pain     Activity as tolerated       Significant Diagnostic Studies: Labs: BMP:   Recent Labs   Lab 11/08/24 0345 11/09/24  0358   * 140*    138   K 4.0 4.1    105   CO2 20* 21*   BUN 11 9   CREATININE 0.9 0.8   CALCIUM 9.5 9.1    and CBC   Recent Labs   Lab 11/08/24 0345 11/08/24  0606 11/09/24 0358   WBC 7.74 7.08 9.03   HGB 7.8* 7.8* 7.7*   HCT 26.8* 26.3* 25.9*    305 311       Pending Diagnostic Studies:       Procedure Component Value Units Date/Time    Specimen to Pathology - Surgery [[id number]] Collected: 11/08/24 1501    Order Status: Sent Lab Status: No result     Specimen: Tissue            Medications:  Reconciled Home Medications:      Medication List        CHANGE how you take these medications      lamoTRIgine 150 MG Tab  Commonly known as: LAMICTAL  Take 1 tablet (150 mg total) by mouth once daily.  What changed: when to take this            CONTINUE taking these medications      atorvastatin 20 MG tablet  Commonly known as: LIPITOR  Take 1 tablet (20 mg total) by mouth once daily.     blood-glucose meter kit  To check BG 3 times daily, to use with insurance preferred meter     fluticasone propionate 50 mcg/actuation nasal spray  Commonly known as: FLONASE  2 sprays (100 mcg total) by Each Nostril route once daily.     gabapentin 800 MG tablet  Commonly known as: NEURONTIN  Take 800 mg by mouth 3 (three) times daily.     ibuprofen 600 MG tablet  Commonly known as:  ADVIL,MOTRIN  Take 1 tablet (600 mg total) by mouth every 8 (eight) hours as needed for Pain.     lancets Misc  To check BG 3 times daily, to use with insurance preferred meter     lisinopriL 20 MG tablet  Commonly known as: PRINIVIL,ZESTRIL  Take 1 tablet (20 mg total) by mouth once daily.     metFORMIN 500 MG ER 24hr tablet  Commonly known as: GLUCOPHAGE-XR  TAKE 2 TABLETS BY MOUTH TWICE A DAY WITH MEALS     multivitamin per tablet  Commonly known as: THERAGRAN  Take 1 tablet by mouth once daily.     ONETOUCH ULTRA BLUE TEST STRIP Strp  Generic drug: blood sugar diagnostic  USE TO CHECK BLOOD SUGAR 3 TIMES A DAY     OZEMPIC 1 mg/dose (4 mg/3 mL)  Generic drug: semaglutide  INJECT 1 MG INTO THE SKIN EVERY 7 DAYS.     pantoprazole 20 MG tablet  Commonly known as: PROTONIX  Take 1 tablet (20 mg total) by mouth once daily.     sertraline 100 MG tablet  Commonly known as: ZOLOFT  Take 1 tablet (100 mg total) by mouth once daily.     TYLENOL EXTRA STRENGTH ORAL  Take 1,300 mg by mouth 3 (three) times daily.     valACYclovir 500 MG tablet  Commonly known as: VALTREX  TAKE 1 TABLET BY MOUTH TWICE A DAY            Radiology Results (last 7 days)    Procedure Component Value Units Date/Time   CT Chest Abdomen Pelvis With IV Contrast (XPD) Routine Oral Contrast [[id number]] Resulted: 11/09/24 1033   Order Status: Completed Updated: 11/09/24 1036   Narrative:     EXAMINATION:  CT CHEST ABDOMEN PELVIS WITH IV CONTRAST (XPD)    CLINICAL HISTORY:  Metastatic disease evaluation; Other specified diseases of intestine    TECHNIQUE:  Low dose axial images, sagittal and coronal reformations were obtained from the thoracic inlet to the pubic symphysis following the IV administration of 100 mL of Omnipaque 350 and the oral administration of 30 ml of Omnipaque 350.    COMPARISON:  Abdominal ultrasound 07/14/2021, chest radiograph 11/07/2024    FINDINGS:  Heart size is normal.  No pericardial effusion.  Thoracic aorta and main pulmonary  artery are normal in caliber.  No enlarged axillary, mediastinal or hilar lymph nodes.    Central airways are clear.  No pleural effusion, focal consolidation or pneumothorax.    Liver is normal in morphology and with smooth contour.  Too small to characterize hypoattenuating right hepatic lobe 10 mm lesion (series 3, image 36).  The portal, splenic and superior mesenteric veins are patent.    Gallbladder, spleen, adrenal glands and pancreas are normal.  No intra or extrahepatic biliary ductal dilatation.    Kidneys enhance symmetrically without hydronephrosis.  Left renal 9 mm angiomyolipoma.    Abdominal aorta is normal in caliber.  No enlarged retroperitoneal lymph nodes.    Focal wall thickening of the cecum.  Mild surrounding mesenteric fat stranding.  Two enlarged right lower quadrant lymph nodes versus lymph node conglomerate.  The largest measures 2.9 x 1.6 x 1.5 cm (series 601, image 77).    Normal appendix.  No free fluid or free air. No bowel obstruction.    Bladder is smooth walled.  Uterus is absent.  No adnexal mass.  No enlarged pelvic lymph nodes.    Fat containing infraumbilical hernia neck measuring 8 mm.  Small fat containing umbilical hernia neck measuring 11 mm.    No acute or aggressive osseous abnormality.   Impression:       Findings concerning for cecal malignancy.  There are least 2 right lower quadrant abnormal lymph nodes concerning for metastatic disease.    Indeterminate right hepatic lobe 10 mm lesion.  Recommend further characterization with liver mass protocol MRI.    Small fat containing umbilical and infraumbilical hernias.      Electronically signed by: Melly Gamboa  Date: 11/09/2024  Time: 10:33   X-Ray Chest AP Portable [[id number]] Resulted: 11/07/24 1202   Order Status: Completed Updated: 11/07/24 1204   Narrative:     EXAMINATION:  XR CHEST AP PORTABLE    CLINICAL HISTORY:  Chest Pain;    TECHNIQUE:  Single frontal view of the chest was performed.    COMPARISON:  Chest of  November 25, 2022    FINDINGS:  The lungs are clear, with normal appearance of pulmonary vasculature and no pleural effusion or pneumothorax.    The cardiac silhouette is normal in size. The hilar and mediastinal contours are unremarkable.    Bones are intact.   Impression:       No acute abnormality.      Electronically signed by: Stan Barraza MD  Date: 11/07/2024  Time: 12:02     Indwelling Lines/Drains at time of discharge:   Lines/Drains/Airways       None                   Time spent on the discharge of patient: 35 minutes         Mary Ann Jade MD  Department of Hospital Medicine  [facility]/Surg

## 2024-11-09 NOTE — PLAN OF CARE
Patient cleared for discharge from case management standpoint.    LIZ scheduled hospital follow up and placed on AVS.       11/09/24 0950   Final Note   Assessment Type Final Discharge Note   Hospital Resources/Appts/Education Provided Provided patient/caregiver with written discharge plan information;Provided education on problems/symptoms using teachback;Appointments scheduled and added to AVS   Post-Acute Status   Post-Acute Authorization Other   Other Status No Post-Acute Service Needs   Discharge Delays None known at this time

## 2024-11-09 NOTE — CONSULTS
[facility]/Surg  General Surgery  Consult Note    Inpatient consult to General Surgery  Consult performed by: Logan Juarez MD  Consult ordered by: Saw Wick MD        Subjective:     Chief Complaint/Reason for Admission:  Fatigue    History of Present Illness:  This is a 58-year-old female who presented with fatigue.  She was found to be severely anemic on presentation.  She underwent colonoscopy prep followed by colonoscopy noting a fungating mass in the cecum concerning for malignancy slow bleeding.    No current facility-administered medications on file prior to encounter.     Current Outpatient Medications on File Prior to Encounter   Medication Sig    acetaminophen (TYLENOL EXTRA STRENGTH ORAL) Take 1,300 mg by mouth 3 (three) times daily.     atorvastatin (LIPITOR) 20 MG tablet Take 1 tablet (20 mg total) by mouth once daily.    fluticasone propionate (FLONASE) 50 mcg/actuation nasal spray 2 sprays (100 mcg total) by Each Nostril route once daily.    gabapentin (NEURONTIN) 800 MG tablet Take 800 mg by mouth 3 (three) times daily.    ibuprofen (ADVIL,MOTRIN) 600 MG tablet Take 1 tablet (600 mg total) by mouth every 8 (eight) hours as needed for Pain.    lamoTRIgine (LAMICTAL) 150 MG Tab Take 1 tablet (150 mg total) by mouth once daily. (Patient taking differently: Take 150 mg by mouth every evening.)    lisinopriL (PRINIVIL,ZESTRIL) 20 MG tablet Take 1 tablet (20 mg total) by mouth once daily.    metFORMIN (GLUCOPHAGE-XR) 500 MG ER 24hr tablet TAKE 2 TABLETS BY MOUTH TWICE A DAY WITH MEALS    multivitamin (THERAGRAN) per tablet Take 1 tablet by mouth once daily.    pantoprazole (PROTONIX) 20 MG tablet Take 1 tablet (20 mg total) by mouth once daily.    semaglutide (OZEMPIC) 1 mg/dose (4 mg/3 mL) INJECT 1 MG INTO THE SKIN EVERY 7 DAYS.    sertraline (ZOLOFT) 100 MG tablet Take 1 tablet (100 mg total) by mouth once daily.    valACYclovir (VALTREX) 500 MG tablet TAKE 1 TABLET BY MOUTH  TWICE A DAY (Patient taking differently: Take 500 mg by mouth 2 (two) times daily.)    blood-glucose meter kit To check BG 3 times daily, to use with insurance preferred meter    lancets Misc To check BG 3 times daily, to use with insurance preferred meter    ONETOUCH ULTRA BLUE TEST STRIP Strp USE TO CHECK BLOOD SUGAR 3 TIMES A DAY       Review of patient's allergies indicates:   Allergen Reactions    Robaxin [methocarbamol] Nausea Only       Past Medical History:   Diagnosis Date    Diabetes mellitus     Diabetes mellitus, type 2     Herpes     Hypertension      Past Surgical History:   Procedure Laterality Date    EYE SURGERY  2002    lasix Bilateral    HYSTERECTOMY  2012    LUMBAR DISCECTOMY      WISDOM TOOTH EXTRACTION       Family History       Problem Relation (Age of Onset)    Alzheimer's disease Paternal Grandmother    Arthritis Sister    Cancer Father    Cataracts Mother    Diabetes Brother    Heart disease Mother, Paternal Uncle, Maternal Grandfather    Hypertension Mother    No Known Problems Paternal Aunt    Pancreatic cancer Father          Tobacco Use    Smoking status: Never    Smokeless tobacco: Never   Substance and Sexual Activity    Alcohol use: No    Drug use: No    Sexual activity: Not Currently     Review of Systems   Constitutional:  Positive for fatigue. Negative for fever.   HENT: Negative.     Eyes: Negative.    Respiratory: Negative.     Cardiovascular: Negative.    Gastrointestinal: Negative.    Endocrine: Negative.    Genitourinary: Negative.    Musculoskeletal: Negative.    Skin: Negative.    Allergic/Immunologic: Negative.    Neurological: Negative.    Hematological: Negative.    Psychiatric/Behavioral: Negative.       Objective:     Vital Signs (Most Recent):  Temp: 98.1 °F (36.7 °C) (11/08/24 1520)  Pulse: 72 (11/08/24 1520)  Resp: 14 (11/08/24 1520)  BP: (!) 130/58 (11/08/24 1520)  SpO2: 98 % (11/08/24 1520) Vital Signs (24h Range):  Temp:  [97.7 °F (36.5 °C)-98.3 °F (36.8 °C)]  "98.1 °F (36.7 °C)  Pulse:  [71-88] 72  Resp:  [12-19] 14  SpO2:  [94 %-100 %] 98 %  BP: ()/(51-89) 130/58     Weight: 98.6 kg (217 lb 6 oz)  Body mass index is 36.17 kg/m².      Intake/Output Summary (Last 24 hours) at 11/8/2024 1802  Last data filed at 11/8/2024 1554  Gross per 24 hour   Intake 2232.5 ml   Output --   Net 2232.5 ml       Physical Exam  Constitutional:       General: She is not in acute distress.     Appearance: Normal appearance. She is not ill-appearing, toxic-appearing or diaphoretic.   HENT:      Head: Normocephalic.      Nose: Nose normal.   Eyes:      Conjunctiva/sclera: Conjunctivae normal.   Cardiovascular:      Rate and Rhythm: Normal rate and regular rhythm.   Pulmonary:      Effort: Pulmonary effort is normal.   Abdominal:      Palpations: Abdomen is soft.   Musculoskeletal:         General: Normal range of motion.      Cervical back: Normal range of motion.   Skin:     General: Skin is warm.   Neurological:      General: No focal deficit present.      Mental Status: She is alert.   Psychiatric:         Mood and Affect: Mood normal.         Significant Labs:  CBC:   Recent Labs   Lab 11/08/24  0606   WBC 7.08   RBC 3.64*   HGB 7.8*   HCT 26.3*      MCV 72*   MCH 21.4*   MCHC 29.7*     CMP:   Recent Labs   Lab 11/07/24  1159 11/08/24  0345   * 111*   CALCIUM 9.1 9.5   ALBUMIN 3.8  --    PROT 7.0  --     140   K 4.0 4.0   CO2 18* 20*    107   BUN 16 11   CREATININE 1.1 0.9   ALKPHOS 64  --    ALT 20  --    AST 26  --    BILITOT 0.3  --      Coagulation: No results for input(s): "PT", "INR", "APTT" in the last 48 hours.  Lactic Acid: No results for input(s): "LACTATE" in the last 48 hours.    Significant Diagnostics:  Pending CT scan    Assessment/Plan:     Active Diagnoses:    Diagnosis Date Noted POA    PRINCIPAL PROBLEM:  Iron deficiency anemia [D50.9] 11/07/2024 Yes    Hyperlipidemia associated with type 2 diabetes mellitus [E11.69, E78.5] 09/24/2018 Yes "    Gastroesophageal reflux disease without esophagitis [K21.9] 08/08/2018 Yes    Obesity [E66.9] 08/08/2018 Yes    Uncontrolled type 2 diabetes mellitus with hyperglycemia, without long-term current use of insulin [E11.65] 07/11/2017 Yes    Hypertension associated with diabetes [E11.59, I15.2] 07/11/2017 Yes    Anxiety and depression [F41.9, F32.A] 07/11/2017 Yes      Problems Resolved During this Admission:     58-year-old female admitted for fatigue and found to have a slowly oozing cecal mass concerning for colon cancer.    Pathology is pending  CEA is pending  Staging CT scan is pending    Trend hemoglobin for now.  Discuss that this is a concerning finding for colon cancer.  Patient will likely benefit from right hemicolectomy given anemia despite CT findings.  If hemoglobin remains stable, will try to push surgery till Wednesday or Friday and perform robotically.    Will follow up with the patient tomorrow.      Thank you for your consult. I will follow-up with patient. Please contact us if you have any additional questions.    Logan Juarez MD  General Surgery  [facility]/Surg

## 2024-11-09 NOTE — PLAN OF CARE
Discussed with Dr. Juarez.  He is Okay with diet as tolerated and discharge home.  He will talk with Oncology on Monday about plan.  CT is still pending at this time.  He will add her on for robotic colectomy this week.

## 2024-11-09 NOTE — PLAN OF CARE
Problem: Adult Inpatient Plan of Care  Goal: Plan of Care Review  Outcome: Progressing     Problem: Adult Inpatient Plan of Care  Goal: Absence of Hospital-Acquired Illness or Injury  Outcome: Progressing     Problem: Adult Inpatient Plan of Care  Goal: Optimal Comfort and Wellbeing  Outcome: Progressing     Problem: Diabetes Comorbidity  Goal: Blood Glucose Level Within Targeted Range  Outcome: Progressing     Problem: Gastrointestinal Bleeding  Goal: Optimal Coping with Acute Illness  Outcome: Progressing

## 2024-11-10 LAB
OHS QRS DURATION: 84 MS
OHS QTC CALCULATION: 467 MS

## 2024-11-10 NOTE — ANESTHESIA PREPROCEDURE EVALUATION
11/10/2024  Nik Lowery is a 58 y.o., female.      Pre-op Assessment    I have reviewed the NPO Status.   I have reviewed the Medications.     Review of Systems  Anesthesia Hx:  No problems with previous Anesthesia                Cardiovascular:  Exercise tolerance: good   Hypertension                                    Hypertension         Hepatic/GI:     GERD         Gerd          Endocrine:  Diabetes, type 2    Diabetes                    Obesity / BMI > 30  Psych:  Psychiatric History                  Physical Exam  General: Well nourished    Airway:  Mallampati: II   Mouth Opening: Normal  TM Distance: Normal  Tongue: Normal  Neck ROM: Normal ROM    Dental:  Intact    Chest/Lungs:  Clear to auscultation, Normal Respiratory Rate        Anesthesia Plan  Type of Anesthesia, risks & benefits discussed:    Anesthesia Type: Gen Natural Airway  Intra-op Monitoring Plan: Standard ASA Monitors  Induction:  IV  Informed Consent: Informed consent signed with the Patient and all parties understand the risks and agree with anesthesia plan.  All questions answered.   ASA Score: 3    Ready For Surgery From Anesthesia Perspective.     .

## 2024-11-10 NOTE — ANESTHESIA POSTPROCEDURE EVALUATION
Anesthesia Post Evaluation    Patient: Nik Lowery    Procedure(s) Performed: Procedure(s) (LRB):  EGD (ESOPHAGOGASTRODUODENOSCOPY) (N/A)  COLONOSCOPY (N/A)    Final Anesthesia Type: general      Patient location during evaluation: PACU  Patient participation: Yes- Able to Participate  Level of consciousness: awake and alert and oriented  Post-procedure vital signs: reviewed and stable  Pain management: adequate  Airway patency: patent    PONV status at discharge: No PONV  Anesthetic complications: no      Cardiovascular status: blood pressure returned to baseline  Respiratory status: unassisted, spontaneous ventilation and room air  Hydration status: euvolemic  Follow-up not needed.              Vitals Value Taken Time   /79 11/09/24 0710   Temp 36.8 °C (98.2 °F) 11/09/24 0710   Pulse 81 11/09/24 0711   Resp 18 11/09/24 0710   SpO2 95 % 11/09/24 0711         Event Time   Out of Recovery 11/08/2024 15:22:18         Pain/Pablo Score: No data recorded

## 2024-11-11 ENCOUNTER — PATIENT MESSAGE (OUTPATIENT)
Dept: FAMILY MEDICINE | Facility: CLINIC | Age: 59
End: 2024-11-11
Payer: COMMERCIAL

## 2024-11-12 ENCOUNTER — PATIENT OUTREACH (OUTPATIENT)
Dept: ADMINISTRATIVE | Facility: CLINIC | Age: 59
End: 2024-11-12
Payer: COMMERCIAL

## 2024-11-12 ENCOUNTER — OFFICE VISIT (OUTPATIENT)
Dept: HEMATOLOGY/ONCOLOGY | Facility: CLINIC | Age: 59
End: 2024-11-12
Payer: COMMERCIAL

## 2024-11-12 ENCOUNTER — LAB VISIT (OUTPATIENT)
Dept: LAB | Facility: HOSPITAL | Age: 59
End: 2024-11-12
Attending: INTERNAL MEDICINE
Payer: COMMERCIAL

## 2024-11-12 ENCOUNTER — TELEPHONE (OUTPATIENT)
Dept: HEMATOLOGY/ONCOLOGY | Facility: CLINIC | Age: 59
End: 2024-11-12
Payer: COMMERCIAL

## 2024-11-12 VITALS
WEIGHT: 209.69 LBS | RESPIRATION RATE: 16 BRPM | SYSTOLIC BLOOD PRESSURE: 125 MMHG | OXYGEN SATURATION: 96 % | HEIGHT: 65 IN | HEART RATE: 81 BPM | TEMPERATURE: 98 F | BODY MASS INDEX: 34.93 KG/M2 | DIASTOLIC BLOOD PRESSURE: 66 MMHG

## 2024-11-12 DIAGNOSIS — D64.9 SYMPTOMATIC ANEMIA: ICD-10-CM

## 2024-11-12 DIAGNOSIS — K63.89 COLONIC MASS: Primary | ICD-10-CM

## 2024-11-12 LAB
ABO + RH BLD: NORMAL
ALBUMIN SERPL BCP-MCNC: 4 G/DL (ref 3.5–5.2)
ALP SERPL-CCNC: 77 U/L (ref 40–150)
ALT SERPL W/O P-5'-P-CCNC: 26 U/L (ref 10–44)
ANION GAP SERPL CALC-SCNC: 12 MMOL/L (ref 8–16)
AST SERPL-CCNC: 32 U/L (ref 10–40)
BASOPHILS # BLD AUTO: 0.07 K/UL (ref 0–0.2)
BASOPHILS NFR BLD: 0.9 % (ref 0–1.9)
BILIRUB SERPL-MCNC: 0.3 MG/DL (ref 0.1–1)
BLD GP AB SCN CELLS X3 SERPL QL: NORMAL
BUN SERPL-MCNC: 11 MG/DL (ref 6–20)
CALCIUM SERPL-MCNC: 9.3 MG/DL (ref 8.7–10.5)
CEA SERPL-MCNC: 2.8 NG/ML (ref 0–5)
CHLORIDE SERPL-SCNC: 106 MMOL/L (ref 95–110)
CO2 SERPL-SCNC: 21 MMOL/L (ref 23–29)
CREAT SERPL-MCNC: 0.8 MG/DL (ref 0.5–1.4)
DIFFERENTIAL METHOD BLD: ABNORMAL
EOSINOPHIL # BLD AUTO: 0.4 K/UL (ref 0–0.5)
EOSINOPHIL NFR BLD: 4.6 % (ref 0–8)
ERYTHROCYTE [DISTWIDTH] IN BLOOD BY AUTOMATED COUNT: 21 % (ref 11.5–14.5)
EST. GFR  (NO RACE VARIABLE): >60 ML/MIN/1.73 M^2
GLUCOSE SERPL-MCNC: 96 MG/DL (ref 70–110)
HCT VFR BLD AUTO: 28.4 % (ref 37–48.5)
HGB BLD-MCNC: 7.8 G/DL (ref 12–16)
IMM GRANULOCYTES # BLD AUTO: 0.03 K/UL (ref 0–0.04)
IMM GRANULOCYTES NFR BLD AUTO: 0.4 % (ref 0–0.5)
LYMPHOCYTES # BLD AUTO: 2.6 K/UL (ref 1–4.8)
LYMPHOCYTES NFR BLD: 31.5 % (ref 18–48)
MCH RBC QN AUTO: 20.8 PG (ref 27–31)
MCHC RBC AUTO-ENTMCNC: 27.5 G/DL (ref 32–36)
MCV RBC AUTO: 76 FL (ref 82–98)
MONOCYTES # BLD AUTO: 0.4 K/UL (ref 0.3–1)
MONOCYTES NFR BLD: 5.1 % (ref 4–15)
NEUTROPHILS # BLD AUTO: 4.7 K/UL (ref 1.8–7.7)
NEUTROPHILS NFR BLD: 57.5 % (ref 38–73)
NRBC BLD-RTO: 0 /100 WBC
PLATELET # BLD AUTO: 386 K/UL (ref 150–450)
PMV BLD AUTO: 9.1 FL (ref 9.2–12.9)
POTASSIUM SERPL-SCNC: 4.3 MMOL/L (ref 3.5–5.1)
PROT SERPL-MCNC: 7.4 G/DL (ref 6–8.4)
RBC # BLD AUTO: 3.75 M/UL (ref 4–5.4)
SODIUM SERPL-SCNC: 139 MMOL/L (ref 136–145)
SPECIMEN OUTDATE: NORMAL
WBC # BLD AUTO: 8.23 K/UL (ref 3.9–12.7)

## 2024-11-12 PROCEDURE — 82378 CARCINOEMBRYONIC ANTIGEN: CPT | Performed by: INTERNAL MEDICINE

## 2024-11-12 PROCEDURE — 3074F SYST BP LT 130 MM HG: CPT | Mod: CPTII,S$GLB,, | Performed by: INTERNAL MEDICINE

## 2024-11-12 PROCEDURE — 36415 COLL VENOUS BLD VENIPUNCTURE: CPT | Performed by: INTERNAL MEDICINE

## 2024-11-12 PROCEDURE — 86901 BLOOD TYPING SEROLOGIC RH(D): CPT | Performed by: INTERNAL MEDICINE

## 2024-11-12 PROCEDURE — 3044F HG A1C LEVEL LT 7.0%: CPT | Mod: CPTII,S$GLB,, | Performed by: INTERNAL MEDICINE

## 2024-11-12 PROCEDURE — 3072F LOW RISK FOR RETINOPATHY: CPT | Mod: CPTII,S$GLB,, | Performed by: INTERNAL MEDICINE

## 2024-11-12 PROCEDURE — 86920 COMPATIBILITY TEST SPIN: CPT | Performed by: INTERNAL MEDICINE

## 2024-11-12 PROCEDURE — 3008F BODY MASS INDEX DOCD: CPT | Mod: CPTII,S$GLB,, | Performed by: INTERNAL MEDICINE

## 2024-11-12 PROCEDURE — 99999 PR PBB SHADOW E&M-EST. PATIENT-LVL V: CPT | Mod: PBBFAC,,, | Performed by: INTERNAL MEDICINE

## 2024-11-12 PROCEDURE — 1159F MED LIST DOCD IN RCRD: CPT | Mod: CPTII,S$GLB,, | Performed by: INTERNAL MEDICINE

## 2024-11-12 PROCEDURE — 80053 COMPREHEN METABOLIC PANEL: CPT | Performed by: INTERNAL MEDICINE

## 2024-11-12 PROCEDURE — 85025 COMPLETE CBC W/AUTO DIFF WBC: CPT | Performed by: INTERNAL MEDICINE

## 2024-11-12 PROCEDURE — 3078F DIAST BP <80 MM HG: CPT | Mod: CPTII,S$GLB,, | Performed by: INTERNAL MEDICINE

## 2024-11-12 PROCEDURE — 4010F ACE/ARB THERAPY RXD/TAKEN: CPT | Mod: CPTII,S$GLB,, | Performed by: INTERNAL MEDICINE

## 2024-11-12 PROCEDURE — 99205 OFFICE O/P NEW HI 60 MIN: CPT | Mod: S$GLB,,, | Performed by: INTERNAL MEDICINE

## 2024-11-12 RX ORDER — ACETAMINOPHEN 325 MG/1
650 TABLET ORAL
Status: CANCELLED | OUTPATIENT
Start: 2024-11-12

## 2024-11-12 RX ORDER — HYDROCODONE BITARTRATE AND ACETAMINOPHEN 500; 5 MG/1; MG/1
TABLET ORAL ONCE
Status: CANCELLED | OUTPATIENT
Start: 2024-11-12 | End: 2024-11-12

## 2024-11-12 RX ORDER — DIPHENHYDRAMINE HCL 25 MG
25 CAPSULE ORAL
Status: CANCELLED | OUTPATIENT
Start: 2024-11-12

## 2024-11-12 NOTE — PROGRESS NOTES
HPI    58 years old female with history of diabetes type II, hypertension and herpes    She was recently diagnosed with cecal mass causing significant bleeding require hospitalization and urgent transfusion.  She received 2 units packed red blood cell during the course of hospitalization.  Colonoscopy showed likely malignant tumor ascending colon nearby cecum.  CT of the abdomen pelvis contrast demonstrate concerning cecal malignancy with to right lower quadrant abnormal lymph nodes concerning for metastases with indeterminate right hepatic lobe 10 mm lesion      Past Medical History:   Diagnosis Date    Diabetes mellitus     Diabetes mellitus, type 2     Herpes     Hypertension      Social History     Socioeconomic History    Marital status: Single   Occupational History    Occupation: supply chain   Tobacco Use    Smoking status: Never    Smokeless tobacco: Never   Substance and Sexual Activity    Alcohol use: No    Drug use: No    Sexual activity: Not Currently     Social Drivers of Health     Financial Resource Strain: Low Risk  (11/7/2024)    Overall Financial Resource Strain (CARDIA)     Difficulty of Paying Living Expenses: Not very hard   Food Insecurity: No Food Insecurity (11/7/2024)    Hunger Vital Sign     Worried About Running Out of Food in the Last Year: Never true     Ran Out of Food in the Last Year: Never true   Transportation Needs: No Transportation Needs (11/7/2024)    TRANSPORTATION NEEDS     Transportation : No   Stress: Stress Concern Present (11/7/2024)    [employer] - Occupational Stress Questionnaire     Feeling of Stress : To some extent   Housing Stability: Low Risk  (11/7/2024)    Housing Stability Vital Sign     Unable to Pay for Housing in the Last Year: No     Homeless in the Last Year: No         Subjective      Review of Systems   Constitutional: Negative for appetite change, fatigue and unexpected weight change.   HENT: Negative for mouth sores.    Eyes: Negative for visual disturbance.   Respiratory: Negative for cough and shortness of breath.   Cardiovascular: Negative for chest pain.   Gastrointestinal: Negative for diarrhea.   Genitourinary: Negative for frequency.   Musculoskeletal: Negative for back pain.   Skin: Negative for rash.   Neurological: Negative for headaches.   Hematological: Negative for adenopathy.   Psychiatric/Behavioral: The patient is not nervous/anxious.   All other systems reviewed and are negative.     Objective    Physical Exam   Vitals:    11/12/24 1119   BP: 125/66   Pulse: 81   Resp: 16   Temp: 97.7 °F (36.5 °C)       Constitutional: patient is oriented to person, place, and time. patient appears well-developed and well-nourished. No distress.   HENT:   Right Ear: External ear normal.   Left Ear: External ear normal.   Nose: Nose normal.   Mouth/Throat: Oropharynx is clear and moist. No oropharyngeal exudate.   Teeth, gums and lips are normal   No sinus tenderness   Palate, tongue, posterior pharynx are normal   Eyes: Conjunctivae and lids are normal.   Neck: Trachea normal and normal range of motion. No thyromegaly   Cardiovascular: Normal rate, regular rhythm, normal heart sounds, intact distal pulses and normal pulses.   No murmur heard.   No edema, no tenderness in the extremities.   Pulmonary/Chest: Effort normal and breath sounds normal. No accessory muscle usage. patient has no wheezes..   Abdominal: Soft. Normal appearance and bowel sounds are normal. patient exhibits no distension and no mass. There is no hepatosplenomegaly. There is no tenderness.   Musculoskeletal: Normal range of motion.   Gait is normal   No clubbing, cyanosis     Lymphadenopathy:   Head (right side): No submental and no submandibular adenopathy present.   Head (left side): No submental and no submandibular adenopathy present.   patient has no cervical adenopathy.   Right: No supraclavicular adenopathy present.   Left: No supraclavicular adenopathy  present.   Neurological: patient is alert and oriented to person, place, and time. patient has normal strength and normal reflexes. No sensory deficit. Gait normal.   Skin: Skin is warm, dry and intact. No bruising, no lesion and no rash noted. No cyanosis. Nails show no clubbing.   No lesions   Psychiatric: patient has a normal mood and affect. patient speech is normal and behavior is normal. Judgment normal. Cognition and memory are normal.   Vitals reviewed.     Lab Results   Component Value Date    WBC 9.03 11/09/2024    HGB 7.7 (L) 11/09/2024    HCT 25.9 (L) 11/09/2024    MCV 73 (L) 11/09/2024     11/09/2024       CMP  Sodium   Date Value Ref Range Status   11/09/2024 138 136 - 145 mmol/L Final     Potassium   Date Value Ref Range Status   11/09/2024 4.1 3.5 - 5.1 mmol/L Final     Chloride   Date Value Ref Range Status   11/09/2024 105 95 - 110 mmol/L Final     CO2   Date Value Ref Range Status   11/09/2024 21 (L) 23 - 29 mmol/L Final     Glucose   Date Value Ref Range Status   11/09/2024 140 (H) 70 - 110 mg/dL Final     BUN   Date Value Ref Range Status   11/09/2024 9 6 - 20 mg/dL Final     Creatinine   Date Value Ref Range Status   11/09/2024 0.8 0.5 - 1.4 mg/dL Final     Calcium   Date Value Ref Range Status   11/09/2024 9.1 8.7 - 10.5 mg/dL Final     Total Protein   Date Value Ref Range Status   11/07/2024 7.0 6.0 - 8.4 g/dL Final     Albumin   Date Value Ref Range Status   11/07/2024 3.8 3.5 - 5.2 g/dL Final     Total Bilirubin   Date Value Ref Range Status   11/07/2024 0.3 0.1 - 1.0 mg/dL Final     Comment:     For infants and newborns, interpretation of results should be based  on gestational age, weight and in agreement with clinical  observations.    Premature Infant recommended reference ranges:  Up to 24 hours.............<8.0 mg/dL  Up to 48 hours............<12.0 mg/dL  3-5 days..................<15.0 mg/dL  6-29 days.................<15.0 mg/dL       Alkaline Phosphatase   Date Value Ref  Range Status   11/07/2024 64 40 - 150 U/L Final     AST   Date Value Ref Range Status   11/07/2024 26 10 - 40 U/L Final     ALT   Date Value Ref Range Status   11/07/2024 20 10 - 44 U/L Final     Anion Gap   Date Value Ref Range Status   11/09/2024 12 8 - 16 mmol/L Final     eGFR   Date Value Ref Range Status   11/09/2024 >60 >60 mL/min/1.73 m^2 Final     CT chest abdomen pelvis with contrast   Impression:     Findings concerning for cecal malignancy.  There are least 2 right lower quadrant abnormal lymph nodes concerning for metastatic disease.     Indeterminate right hepatic lobe 10 mm lesion.  Recommend further characterization with liver mass protocol MRI.     Small fat containing umbilical and infraumbilical hernias.     Assessment    Clinically cecum malignancy with bleeding.  Status post transfusion 2 unit packed red blood cell.  Her hemoglobin was 4.9 at the time of admission after the 2 needs transfusion her hemoglobin was 7.8.  She is still feeling weak and tired fatigue.    CT demonstrated possible liver involvement with 10 mm right hepatic lobe lesions.    With ongoing bleeding due to malignancy surgical control will be necessary.  Patient is scheduled to have robotic right hemicolectomy this Friday 11/15/2024.  Patient will receive 1 unit packed red blood cell to optimize surgical events.    Recommend PET scan and MRI of the abdomen for further evaluation of liver lesion and to finalize staging.      Ideally would like to take out liver lesion and primary colon simultaneously after staging.  However with ongoing anemia and bleeding concern whether not we should go ahead and take out the primary lesion to control the bleeding then after the liver afterwards.  I will discuss with surgeon.  So far she is tentatively scheduled for right hemicolectomy for primary bleeding site control this coming Friday.  At meantime as stated above I will order 1 unit packed red blood cell to optimize pending surgical  events.    Addendum  Today's CBC came back shows relatively stable hemoglobin.  I will talk to Dr. Henry and Dr Juarez regarding perform liver + colon resection simultaneously.  I can certainly give her 1 unit packed red blood cell as planned to optimize her system.  At meantime I will request PET scan stat for staging purposes.  I will call the patient gave her update us more information loading in    Plan    Symptomatic anemia  -     Ambulatory referral/consult to Oncology

## 2024-11-12 NOTE — NURSING
Received message from Dr. Juarez to schedule pt ASAP for Oncology spoke to patient appt scheduled time and location confirmed she verbalized understanding   Oncology Navigation   Intake  Cancer Type: GI (Symptomatic anemia-Cecal CA)  Type of Referral: Internal (Dr Juarez)  Date of Referral: 11/12/24  Initial Nurse Navigator Contact: 11/12/24  Referral to Initial Contact Timeline (days): 0  First Appointment Available: 11/12/24  Appointment Date: 11/12/24 (Dr Crawford)  First Available Date vs. Scheduled Date (days): 0     Treatment  Current Status: Staging work-up                         Concerns: Patients has severe Anemia     Acuity      Follow Up  No follow-ups on file.

## 2024-11-12 NOTE — PROGRESS NOTES
C3 nurse spoke with Nik Lowery  for a TCC post hospital discharge follow up call. The patient has a scheduled HOSFU appointment with Festus Mejia PA-C on 11/15/24 @ 1030.

## 2024-11-13 ENCOUNTER — INFUSION (OUTPATIENT)
Dept: INFUSION THERAPY | Facility: HOSPITAL | Age: 59
End: 2024-11-13
Attending: INTERNAL MEDICINE
Payer: COMMERCIAL

## 2024-11-13 ENCOUNTER — DOCUMENTATION ONLY (OUTPATIENT)
Dept: HEMATOLOGY/ONCOLOGY | Facility: CLINIC | Age: 59
End: 2024-11-13
Payer: COMMERCIAL

## 2024-11-13 VITALS
OXYGEN SATURATION: 96 % | SYSTOLIC BLOOD PRESSURE: 157 MMHG | TEMPERATURE: 98 F | RESPIRATION RATE: 16 BRPM | HEART RATE: 71 BPM | DIASTOLIC BLOOD PRESSURE: 76 MMHG

## 2024-11-13 DIAGNOSIS — K63.89 COLONIC MASS: ICD-10-CM

## 2024-11-13 DIAGNOSIS — D64.9 SYMPTOMATIC ANEMIA: ICD-10-CM

## 2024-11-13 LAB
BLD PROD TYP BPU: NORMAL
BLOOD UNIT EXPIRATION DATE: NORMAL
BLOOD UNIT TYPE CODE: 6200
BLOOD UNIT TYPE: NORMAL
CODING SYSTEM: NORMAL
CROSSMATCH INTERPRETATION: NORMAL
DISPENSE STATUS: NORMAL
NUM UNITS TRANS PACKED RBC: NORMAL

## 2024-11-13 PROCEDURE — 25000003 PHARM REV CODE 250: Performed by: INTERNAL MEDICINE

## 2024-11-13 PROCEDURE — P9016 RBC LEUKOCYTES REDUCED: HCPCS | Performed by: INTERNAL MEDICINE

## 2024-11-13 PROCEDURE — 36430 TRANSFUSION BLD/BLD COMPNT: CPT

## 2024-11-13 RX ORDER — ACETAMINOPHEN 325 MG/1
650 TABLET ORAL
Status: ACTIVE | OUTPATIENT
Start: 2024-11-13

## 2024-11-13 RX ORDER — SODIUM CHLORIDE 0.9 % (FLUSH) 0.9 %
10 SYRINGE (ML) INJECTION
Status: ACTIVE | OUTPATIENT
Start: 2024-11-13

## 2024-11-13 RX ORDER — DIPHENHYDRAMINE HCL 25 MG
25 CAPSULE ORAL
Status: ACTIVE | OUTPATIENT
Start: 2024-11-13

## 2024-11-13 RX ORDER — HYDROCODONE BITARTRATE AND ACETAMINOPHEN 500; 5 MG/1; MG/1
TABLET ORAL ONCE
Status: COMPLETED | OUTPATIENT
Start: 2024-11-13 | End: 2024-11-13

## 2024-11-13 RX ADMIN — SODIUM CHLORIDE: 9 INJECTION, SOLUTION INTRAVENOUS at 09:11

## 2024-11-13 NOTE — NURSING
Met with Ms Lowery and her family during the consult visit with Dr Crawford advised patient of my role as the Nurse Navigator to provide education and care coordination patient was informed that I will follow up during the course of her treatment to assess for any needs patient was given my card with my direct contact information and verbalized understanding to all   Oncology Navigation   Intake  Cancer Type: GI (Symptomatic anemia-Cecal CA)  Type of Referral: Internal (Dr Juarez)  Date of Referral: 11/12/24  Initial Nurse Navigator Contact: 11/12/24  Referral to Initial Contact Timeline (days): 0  First Appointment Available: 11/12/24  Appointment Date: 11/12/24 (Dr Crawford)  First Available Date vs. Scheduled Date (days): 0     Treatment  Current Status: Staging work-up    Surgical Oncologist: Dr Logan Juarez;Dr Mike Henry    Medical Oncologist: Dr Amarjit Crawford  Consult Date: 11/12/24       Procedures: MRI; PET scan  MRI Schedule Date: 11/26/24  PET Scan Schedule Date: 11/15/24             Support Systems: Friends / neighbors  Concerns: Patients has severe Anemia     Acuity      Follow Up  No follow-ups on file.

## 2024-11-13 NOTE — PLAN OF CARE
Problem: Adult Inpatient Plan of Care  Goal: Optimal Comfort and Wellbeing  Outcome: Progressing  Intervention: Provide Person-Centered Care  Flowsheets (Taken 11/13/2024 9491)  Trust Relationship/Rapport:   care explained   choices provided   emotional support provided   empathic listening provided   questions answered   questions encouraged   reassurance provided   thoughts/feelings acknowledged

## 2024-11-13 NOTE — PROGRESS NOTES
Please advise patient that biopsies from colon mass did indeed show colon cancer as we discussed.  I am in agreement with her management plan per Colleen Crawford and Ann.  Please call for questions.

## 2024-11-14 ENCOUNTER — HOSPITAL ENCOUNTER (OUTPATIENT)
Dept: RADIOLOGY | Facility: HOSPITAL | Age: 59
Discharge: HOME | End: 2024-11-14
Attending: INTERNAL MEDICINE
Payer: COMMERCIAL

## 2024-11-14 DIAGNOSIS — D64.9 SYMPTOMATIC ANEMIA: ICD-10-CM

## 2024-11-14 PROCEDURE — 74183 MRI ABD W/O CNTR FLWD CNTR: CPT | Mod: TC

## 2024-11-14 PROCEDURE — 25500020 PHARM REV CODE 255

## 2024-11-14 PROCEDURE — A9585 GADOBUTROL INJECTION: HCPCS

## 2024-11-14 PROCEDURE — 74183 MRI ABD W/O CNTR FLWD CNTR: CPT | Mod: 26,,, | Performed by: RADIOLOGY

## 2024-11-14 RX ORDER — GADOBUTROL 604.72 MG/ML
INJECTION INTRAVENOUS
Status: COMPLETED
Start: 2024-11-14 | End: 2024-11-14

## 2024-11-14 RX ADMIN — GADOBUTROL 9 ML: 604.72 INJECTION INTRAVENOUS at 03:11

## 2024-11-14 NOTE — PROGRESS NOTES
11/14 Left message. Called to see how infusion went. Instructed to call if any concerns to Infusion department.

## 2024-11-14 NOTE — PATIENT INSTRUCTIONS
Xavier Zaragoza,     If you are due for any health screening(s) below please notify me so we can arrange them to be ordered and scheduled. Most healthy patients at your age complete them, but you are free to accept or refuse.     If you can't do it, I'll definitely understand. If you can, I'd certainly appreciate it!    Tests to Keep You Healthy    Mammogram: Met on 4/12/2023  Eye Exam: Met on 7/21/2023  Colon Cancer Screening: ORDERED  Last Blood Pressure <= 139/89 (12/1/2023): Yes  Last HbA1c < 8 (04/12/2023): Yes      Its time for your colon cancer screening     Colorectal cancer is one of the leading causes of cancer death for men and women but it doesnt have to be. Screenings can prevent colorectal cancer or find it early enough to treat and cure the disease.     Our records indicate that you may be overdue for colon cancer screening. A colonoscopy or stool screening test can help identify patients at risk for developing colon cancer. Cancer screenings save lives, so schedule yours today to stay healthy.     A colonoscopy is the preferred test for detecting colon cancer. It is needed only once every 10 years if results are negative. While you are sedated, a flexible, lighted tube with a tiny camera is inserted into the rectum and advanced through the colon to look for cancers.     An alternative screening test that is used at home and returned to the lab may also be used. It detects hidden blood in bowel movements which could indicate cancer in the colon. If results are positive, you will need a colonoscopy to determine if the blood is a sign of cancer. This type of follow up (diagnostic) colonoscopy usually requires additional copays as required by your insurance provider.     If you recently had your colon cancer screening performed outside of [facility], please let your Health care team know so that they can update your health record. Please contact your PCP if you have any questions.              
Mood and Affect: Mood normal.         Behavior: Behavior normal.         Judgment: Judgment normal.        Vitals:    11/14/24 0953   BP: 116/80   Site: Right Upper Arm   Position: Sitting   Cuff Size: Medium Adult   Pulse: 82   Resp: 18   Temp: 98.5 °F (36.9 °C)   TempSrc: Oral   SpO2: 95%   Weight: 78.4 kg (172 lb 12.8 oz)   Height: 1.778 m (5' 10\")        Allergies   Allergen Reactions    Cefprozil Hives     Childhood reaction unknown severity       Current Outpatient Medications   Medication Sig Dispense Refill    fluticasone (FLONASE) 50 MCG/ACT nasal spray 1 spray by Nasal route daily      cetirizine (ZYRTEC) 5 MG tablet Take 1 tablet by mouth daily      azithromycin (ZITHROMAX) 250 MG tablet Take 2 tablets by mouth daily for 1 day, THEN 1 tablet daily for 4 days. 6 tablet 0    escitalopram (LEXAPRO) 10 MG tablet Take 1 tablet by mouth daily      escitalopram (LEXAPRO) 10 MG tablet Take 1 tablet by mouth daily 30 tablet 0     No current facility-administered medications for this visit.        History reviewed. No pertinent past medical history.     History reviewed. No pertinent surgical history.     Social History:   Social Connections: Not on file        Patient Care Team:  None, None as PCP - General    There is no problem list on file for this patient.           I have discussed the results, diagnosis and treatment plan with the patient. The patient also understands that early in the process of an illness, an urgent care workup can be falsely reassuring. Routine discharge counseling and specific return precautions discussed with patient and the patient understands that worsening, changing or persistent symptoms should prompt an immediate return to the urgent care or emergency department. Patient/Guardian expressed understanding and agrees with the discharge plan. No further questions at time of discharge.       An electronic signature was used to authenticate this note.  -- Kel Edward, SUSHIL - CNP

## 2024-11-15 ENCOUNTER — HOSPITAL ENCOUNTER (OUTPATIENT)
Dept: RADIOLOGY | Facility: HOSPITAL | Age: 59
Discharge: HOME | End: 2024-11-15
Attending: INTERNAL MEDICINE
Payer: COMMERCIAL

## 2024-11-15 ENCOUNTER — OFFICE VISIT (OUTPATIENT)
Dept: FAMILY MEDICINE | Facility: CLINIC | Age: 59
End: 2024-11-15
Payer: COMMERCIAL

## 2024-11-15 VITALS
DIASTOLIC BLOOD PRESSURE: 84 MMHG | OXYGEN SATURATION: 99 % | WEIGHT: 208.13 LBS | HEART RATE: 77 BPM | HEIGHT: 65 IN | SYSTOLIC BLOOD PRESSURE: 154 MMHG | BODY MASS INDEX: 34.68 KG/M2 | TEMPERATURE: 98 F

## 2024-11-15 DIAGNOSIS — K76.9 LESION OF LIVER: ICD-10-CM

## 2024-11-15 DIAGNOSIS — D64.9 SYMPTOMATIC ANEMIA: ICD-10-CM

## 2024-11-15 DIAGNOSIS — K63.89 COLONIC MASS: ICD-10-CM

## 2024-11-15 DIAGNOSIS — Z09 HOSPITAL DISCHARGE FOLLOW-UP: Primary | ICD-10-CM

## 2024-11-15 LAB — GLUCOSE SERPL-MCNC: 114 MG/DL (ref 70–110)

## 2024-11-15 PROCEDURE — 99999 PR PBB SHADOW E&M-EST. PATIENT-LVL V: CPT | Mod: PBBFAC,,,

## 2024-11-15 PROCEDURE — 78816 PET IMAGE W/CT FULL BODY: CPT | Mod: 26,PI,, | Performed by: STUDENT IN AN ORGANIZED HEALTH CARE EDUCATION/TRAINING PROGRAM

## 2024-11-15 PROCEDURE — 78816 PET IMAGE W/CT FULL BODY: CPT | Mod: TC,PN

## 2024-11-15 PROCEDURE — A9552 F18 FDG: HCPCS | Mod: PN | Performed by: INTERNAL MEDICINE

## 2024-11-15 RX ORDER — FLUDEOXYGLUCOSE F18 500 MCI/ML
12 INJECTION INTRAVENOUS
Status: COMPLETED | OUTPATIENT
Start: 2024-11-15 | End: 2024-11-15

## 2024-11-15 RX ADMIN — FLUDEOXYGLUCOSE F-18 10.8 MILLICURIE: 500 INJECTION INTRAVENOUS at 07:11

## 2024-11-15 NOTE — PROGRESS NOTES
Subjective:       Patient ID: Nik Lowery is a 58 y.o. female.    Chief Complaint: hospital follow up     Transitional Care Note    Family and/or Caretaker present at visit?  No.  Diagnostic tests reviewed/disposition: I have reviewed all completed as well as pending diagnostic tests at the time of discharge.  Disease/illness education: see below   Home health/community services discussion/referrals: Patient does not have home health established from hospital visit.  They do not need home health.  If needed, we will set up home health for the patient.   Establishment or re-establishment of referral orders for community resources: No other necessary community resources.   Discussion with other health care providers: No discussion with other health care providers necessary.        Nik Lowery is a 58 y.o. female with DM2, HTN, HLD, GERD, MDD and MALI who presents to clinic for hospital follow up. She was recently admitted to the hospital for symptomatic anemia (Hgb 4.9). An ascending colon mass was seen and CT abd pelvis was completed for staging. CT demonstrated possible liver involvement with 10 mm right hepatic lobe lesions. See hospital discharge summary below. Since discharge, she had follow up with heme/ onc. She underwent MRI abdomen and PET scan to finalize staging. She was tentatively scheduled for robotic right hemicolectomy today due to ongoing bleeding secondary to the malignancy, but it was decided it would be scheduled after all imaging resulted. She has follow up with Dr. Henry (heme/ onc) on Monday to determine if they are able to perform liver + colon resection simultaneously. She last received blood on Wednesday.     MRI abdomen revealed approximately 6 subcentimeter liver lesions with similar characteristics.  One of these corresponds to that seen at CT.  MRI is not able to further characterize these lesions due to small size, and these remain indeterminate.  Recommend continued attention at  follow-up imaging with repeat surveillance in the 3-6 month range. PET scan with hypermetabolic right lower quadrant mesenteric lymph nodes most consistent with metastatic disease.  The cecal lesion seen on prior CT is not appreciated on this exam, however there is physiologic uptake throughout the colon.  The hepatic lesions seen on prior MRI are not hypermetabolic, however are below the threshold of PET sensitivity      Discharge note on 11/9/24  HPI:   Nik Lowery is a 58 year old female with a past medical history obesity, DM, HTN, HLD, GERD, and MDD/MALI who presented with multiple days of fatigue, shortness of breath and chest pain. She states she has not yet had a screening colonoscopy. She states she take multiple pills of ibuprofen daily. She denies any hematemesis, coffee ground emesis or blood in stool. While in the ED, the patient was given two units of PRBCs and GI was consulted. Hospital Medicine was consulted for admission.     Procedure(s) (LRB):  EGD (ESOPHAGOGASTRODUODENOSCOPY) (N/A)  COLONOSCOPY (N/A)       Hospital Course:   Nik Lowery is a 58 year old female with a past medical history obesity, DM, HTN, HLD, GERD, and MDD/MALI who presented with symptomatic anemia with a Hgb of 4.9. Two units of PRBCs were ordered with appropriate increased in the Hgb. Iron studies are consistent with SANDOR. The patient does not endorse gross GI bleeding. She does endorse daily ibuprofen use.  GI performed EGD and colonoscopy.  Ascending colon mass noted.  CT abdomen and pelvis was done for staging purposes.  Surgery consultation was requested.  Dr. Juarez plans for robotic hemicolectomy next week.  He cleared patient for discharge home.  He will discuss the patient with Oncology on Monday.  Patient expressed understanding of discharge plan.  She will follow up with Dr. Juarez as well as with PCP.  Oncology referral placed.     Review of Systems   Constitutional:  Negative for fatigue.   Respiratory:  Negative for  shortness of breath.    Cardiovascular:  Negative for chest pain.   Neurological:  Negative for dizziness, weakness and light-headedness.       Patient Active Problem List   Diagnosis    Uncontrolled type 2 diabetes mellitus with hyperglycemia, without long-term current use of insulin    Hypertension associated with diabetes    Anxiety and depression    Gastroesophageal reflux disease without esophagitis    Obesity    Decreased ROM of lumbar spine    Hyperlipidemia associated with type 2 diabetes mellitus    Low back pain    Iron deficiency anemia       Objective:      Physical Exam  Vitals reviewed.   Constitutional:       Appearance: Normal appearance.   HENT:      Head: Normocephalic and atraumatic.   Eyes:      Conjunctiva/sclera: Conjunctivae normal.   Cardiovascular:      Rate and Rhythm: Normal rate and regular rhythm.      Heart sounds: Normal heart sounds.   Pulmonary:      Effort: Pulmonary effort is normal.      Breath sounds: Normal breath sounds.   Musculoskeletal:         General: Normal range of motion.      Cervical back: Normal range of motion.   Skin:     General: Skin is warm and dry.      Coloration: Skin is not pale.   Neurological:      Mental Status: She is alert and oriented to person, place, and time.   Psychiatric:         Behavior: Behavior normal.         Lab Results   Component Value Date    WBC 8.23 11/12/2024    HGB 7.8 (L) 11/12/2024    HCT 28.4 (L) 11/12/2024     11/12/2024    CHOL 155 12/01/2023    TRIG 235 (H) 12/01/2023    HDL 41 12/01/2023    ALT 26 11/12/2024    AST 32 11/12/2024     11/12/2024    K 4.3 11/12/2024     11/12/2024    CREATININE 0.8 11/12/2024    BUN 11 11/12/2024    CO2 21 (L) 11/12/2024    INR 1.1 10/31/2018    HGBA1C 6.5 (H) 11/07/2024     The 10-year ASCVD risk score (Adeel BIRCH, et al., 2019) is: 9.9%    Values used to calculate the score:      Age: 58 years      Sex: Female      Is Non- : No      Diabetic: Yes       "Tobacco smoker: No      Systolic Blood Pressure: 154 mmHg      Is BP treated: Yes      HDL Cholesterol: 41 mg/dL      Total Cholesterol: 155 mg/dL  Visit Vitals  BP (!) 154/84 (BP Location: Right arm, Patient Position: Sitting)   Pulse 77   Temp 97.7 °F (36.5 °C) (Oral)   Ht 5' 5" (1.651 m)   Wt 94.4 kg (208 lb 1.8 oz)   SpO2 99%   BMI 34.63 kg/m²      Assessment:       1. Hospital discharge follow-up    2. Symptomatic anemia    3. Colonic mass    4. Lesion of liver        Plan:       1. Hospital discharge follow-up       -    Discussed hospital stay and her follow ups in detail.        -    Completed her intermittent FMLA paperwork. She reports she has requested her short term disability paperwork to be completed by specialist.     2. Symptomatic anemia  -     CBC Auto Differential; Future; Expected date: 11/15/2024]  -     Last received blood on 11/12. Check CBC to ensure stability.    3. Colonic mass  -     CBC Auto Differential; Future; Expected date: 11/15/2024  -     Discussed her PET and MRI findings with her. Follow up as scheduled with heme/ onc.    4. Lesion of liver         -   Follow up as scheduled with heme/ onc.       Future Appointments       Date Provider Specialty Appt Notes    11/15/2024  Lab labs    11/18/2024 Mike Henry MD Hematology and Oncology Liver referral from Dr. Crawford    11/27/2024 Amarjit Crawford MD Hematology and Oncology [facility]/MRI/post surgery               Tests to Keep You Healthy    Mammogram: ORDERED BUT NOT SCHEDULED  Eye Exam: DUE  Colon Cancer Screening: Met on 11/8/2024  Last Blood Pressure <= 139/89 (11/15/2024): NO  Last HbA1c < 8 (11/07/2024): Yes       "

## 2024-11-15 NOTE — PROGRESS NOTES
PET Imaging Questionnaire    Are you a Diabetic? Recent Blood Sugar level? Yes    Are you anemic? Bone Marrow Stimulation Meds? Yes    Have you had a CT Scan, if so when & where was your last one? Yes -     Have you had a PET Scan, if so when & where was your last one? No    Chemotherapy or currently on Chemotherapy? No    Radiation therapy? No    Surgical History:   Past Surgical History:   Procedure Laterality Date    COLONOSCOPY N/A 11/8/2024    Procedure: COLONOSCOPY;  Surgeon: Saw Wick MD;  Location: [facility];  Service: Endoscopy;  Laterality: N/A;    ESOPHAGOGASTRODUODENOSCOPY N/A 11/8/2024    Procedure: EGD (ESOPHAGOGASTRODUODENOSCOPY);  Surgeon: Saw Wick MD;  Location: [facility];  Service: Endoscopy;  Laterality: N/A;    EYE SURGERY  2002    lasix Bilateral    HYSTERECTOMY  2012    LUMBAR DISCECTOMY      WISDOM TOOTH EXTRACTION          Have you been fasting for at least 6 hours? Yes    Is there any chance you may be pregnant or breastfeeding? No    Assay: 11.8 MCi@:740   Injection Site:lt ac     Residual: 1.05 mCi@: 742   Technologist: Keila Wells Injected:10.8mCi

## 2024-11-18 ENCOUNTER — PATIENT MESSAGE (OUTPATIENT)
Dept: SURGERY | Facility: CLINIC | Age: 59
End: 2024-11-18
Payer: COMMERCIAL

## 2024-11-18 ENCOUNTER — OFFICE VISIT (OUTPATIENT)
Facility: CLINIC | Age: 59
End: 2024-11-18
Payer: COMMERCIAL

## 2024-11-18 VITALS
WEIGHT: 209 LBS | HEART RATE: 83 BPM | DIASTOLIC BLOOD PRESSURE: 79 MMHG | TEMPERATURE: 98 F | BODY MASS INDEX: 34.78 KG/M2 | SYSTOLIC BLOOD PRESSURE: 154 MMHG | RESPIRATION RATE: 18 BRPM

## 2024-11-18 DIAGNOSIS — C18.9 COLON ADENOCARCINOMA: Primary | ICD-10-CM

## 2024-11-18 PROCEDURE — 3078F DIAST BP <80 MM HG: CPT | Mod: CPTII,S$GLB,, | Performed by: SURGERY

## 2024-11-18 PROCEDURE — 3044F HG A1C LEVEL LT 7.0%: CPT | Mod: CPTII,S$GLB,, | Performed by: SURGERY

## 2024-11-18 PROCEDURE — 3008F BODY MASS INDEX DOCD: CPT | Mod: CPTII,S$GLB,, | Performed by: SURGERY

## 2024-11-18 PROCEDURE — 99215 OFFICE O/P EST HI 40 MIN: CPT | Mod: S$GLB,,, | Performed by: SURGERY

## 2024-11-18 PROCEDURE — 3077F SYST BP >= 140 MM HG: CPT | Mod: CPTII,S$GLB,, | Performed by: SURGERY

## 2024-11-18 PROCEDURE — 3072F LOW RISK FOR RETINOPATHY: CPT | Mod: CPTII,S$GLB,, | Performed by: SURGERY

## 2024-11-18 PROCEDURE — 99999 PR PBB SHADOW E&M-EST. PATIENT-LVL IV: CPT | Mod: PBBFAC,,, | Performed by: SURGERY

## 2024-11-18 PROCEDURE — 4010F ACE/ARB THERAPY RXD/TAKEN: CPT | Mod: CPTII,S$GLB,, | Performed by: SURGERY

## 2024-11-18 PROCEDURE — 1159F MED LIST DOCD IN RCRD: CPT | Mod: CPTII,S$GLB,, | Performed by: SURGERY

## 2024-11-19 ENCOUNTER — TELEPHONE (OUTPATIENT)
Dept: INTERVENTIONAL RADIOLOGY/VASCULAR | Facility: CLINIC | Age: 59
End: 2024-11-19
Payer: COMMERCIAL

## 2024-11-19 ENCOUNTER — TELEPHONE (OUTPATIENT)
Dept: SURGERY | Facility: CLINIC | Age: 59
End: 2024-11-19
Payer: COMMERCIAL

## 2024-11-19 NOTE — TELEPHONE ENCOUNTER
"Pt called with concerns regarding her IR biopsy date of 12/5/24. Pt is worried that this may be too long of a wait and was told by  that biopsy was not marked as "urgent". Pt would like for Dr. Henry to be aware and looking for reassurance that 12/5/24 is an acceptable date. She is also questioning Dr. Juarez's clinic scheduling a consult for 12/2/24 without biopsy results. Informed the pt that concerns will be relayed to Dr. Henry and will follow upas soon as possible.   "

## 2024-11-19 NOTE — PROGRESS NOTES
Surgical Oncology History and Physical    Encounter Date:  2024    Patient ID: Nik Lowery  Age:  58 y.o. :  1965    Chief Complaint:  Colon Adenocarcinoma      History:    Ms. Lowery is a 58 y.o. female who presents for evaluation of newly diagnosed adenocarcinoma of the right colon with possible liver metastases. She was diagnosed after presenting with anemia with a hemoglobin of 4.9. She received 2 units PRBCs. Her most recent hgb is 9.2. She has not noticed any blood in her bowel movements. On a staging CT, there was a 10 mm segment 8 liver lesion. Follow up MRI showed multiple bilobar indeterminate subcentimeter liver lesions. There is no evidence of distant metastatic disease on PET CT.     Past Medical History:   Diagnosis Date    Diabetes mellitus     Diabetes mellitus, type 2     Herpes     Hypertension      Past Surgical History:   Procedure Laterality Date    COLONOSCOPY N/A 2024    Procedure: COLONOSCOPY;  Surgeon: Saw Wick MD;  Location: [facility];  Service: Endoscopy;  Laterality: N/A;    ESOPHAGOGASTRODUODENOSCOPY N/A 2024    Procedure: EGD (ESOPHAGOGASTRODUODENOSCOPY);  Surgeon: Saw Wick MD;  Location: [facility];  Service: Endoscopy;  Laterality: N/A;    EYE SURGERY      lasix Bilateral    HYSTERECTOMY      LUMBAR DISCECTOMY      WISDOM TOOTH EXTRACTION       Current Outpatient Medications on File Prior to Visit   Medication Sig Dispense Refill    acetaminophen (TYLENOL EXTRA STRENGTH ORAL) Take 1,300 mg by mouth as needed.      atorvastatin (LIPITOR) 20 MG tablet Take 1 tablet (20 mg total) by mouth once daily. 90 tablet 3    blood-glucose meter kit To check BG 3 times daily, to use with insurance preferred meter 1 each 0    fluticasone propionate (FLONASE) 50 mcg/actuation nasal spray 2 sprays (100 mcg total) by Each Nostril route once daily. 16 g 11    gabapentin (NEURONTIN) 800 MG tablet Take 800 mg by mouth 3 (three) times daily.       ibuprofen (ADVIL,MOTRIN) 600 MG tablet Take 1 tablet (600 mg total) by mouth every 8 (eight) hours as needed for Pain. 20 tablet 0    lamoTRIgine (LAMICTAL) 150 MG Tab Take 1 tablet (150 mg total) by mouth once daily. 90 tablet 3    lancets Misc To check BG 3 times daily, to use with insurance preferred meter 200 each 1    lisinopriL (PRINIVIL,ZESTRIL) 20 MG tablet Take 1 tablet (20 mg total) by mouth once daily. 90 tablet 3    metFORMIN (GLUCOPHAGE-XR) 500 MG ER 24hr tablet TAKE 2 TABLETS BY MOUTH TWICE A DAY WITH MEALS 360 tablet 1    multivitamin (THERAGRAN) per tablet Take 1 tablet by mouth once daily.      ONETOUCH ULTRA BLUE TEST STRIP Strp USE TO CHECK BLOOD SUGAR 3 TIMES A  strip 0    pantoprazole (PROTONIX) 20 MG tablet Take 1 tablet (20 mg total) by mouth once daily. 90 tablet 3    sertraline (ZOLOFT) 100 MG tablet Take 1 tablet (100 mg total) by mouth once daily. 90 tablet 3    valACYclovir (VALTREX) 500 MG tablet TAKE 1 TABLET BY MOUTH TWICE A  tablet 3     Current Facility-Administered Medications on File Prior to Visit   Medication Dose Route Frequency Provider Last Rate Last Admin    acetaminophen tablet 650 mg  650 mg Oral PRAmarjit Light MD        diphenhydrAMINE capsule 25 mg  25 mg Oral PRAmarjit Light MD        sodium chloride 0.9% flush 10 mL  10 mL Intravenous PRAmarjit Light MD         Review of patient's allergies indicates:   Allergen Reactions    Robaxin [methocarbamol] Nausea Only       Family History:  Her family history includes Alzheimer's disease in her paternal grandmother; Arthritis in her sister; Cancer in her father; Cataracts in her mother; Diabetes in her brother; Heart disease in her maternal grandfather, mother, and paternal uncle; Hypertension in her mother; No Known Problems in her paternal aunt; Pancreatic cancer in her father.     Social History:  She reports that she has never smoked. She has never used smokeless tobacco. She reports that she does not  drink alcohol and does not use drugs.     ROS:     Review of Systems  Pertinent positive/negatives detailed in HPI, all other systems negative.     Physical Exam:  BP (!) 154/79   Pulse 83   Temp 97.8 °F (36.6 °C)   Resp 18   Wt 94.8 kg (209 lb)   BMI 34.78 kg/m²     Physical Exam    Constitutional:  Non-toxic, no acute distress.  Performance status: ECOG 0  Eyes:  Sclerae anicteric, gaze symmetrical  Neck:  Trachea midline, thyroid, non enlarged without palpable nodules,  FROM  Resp:  Easy work of breathing, no wheezes  CV:  Regular pulse, no JVD  Abd:  Soft, non-tender, no masses, no hepatosplenomegaly, no ascites, no superficial varices  Lymphatics:  No cervical, supraclavicular, axillary, or inguinal lymphadenopathy  Musculoskeletal:  Ambulatory, normal gait, no muscle wasting  Neuro:  No gross deficits  Psych:  Awake, alert, oriented.  Answers and asks questions appropriately    Data:     Radiology:  I personally reviewed these images: I reviewed the CT, MRI, and PET CT. There are obvious regional lymph node metastases. There is a single 10 mm hypodensity in segment 8 on CT. On MRI, I see at least 5 subcentimeter liver lesions with mild diffusion restriction. These are in segment 3, 2, 8 x 2, and 6. I reviewed the images with IR, and they agree they are indeterminate but think a biopsy might be possible. PET CT shows no evidence of distant metastatic disease.     Endoscopy:  Impression:            - Non-bleeding internal hemorrhoids.                          - Likely malignant tumor in the proximal ascending                          colon. Biopsied. Tattooed.                          - The rectum, sigmoid colon, descending colon,                          transverse colon, cecum, ileocecal valve and                          appendiceal orifice are normal.                          - The examined portion of the ileum was normal.     Labs:  CEA 2.8    Pathology:    Patient - THOMPSON CRAWOFRDBIENVENIDO GALLEGOS                          - 1965 Sex - F  Med Rec # - [id number]                           Saw Gan MD  The following is an electronic copy of report # [id number] from:  [facility]  [address] [place] [address]  Phone [phone]  DIAGNOSIS:  2024 RDC/sdc    1. GASTRIC ANTRUM BIOPSY:    - ANTRAL MUCOSA WITH REACTIVE GASTROPATHY.    - GASTRIC OXYNTIC MUCOSA WITH MILD SUPERFICIAL CHRONIC INACTIVE   GASTRITIS.    - NEGATIVE FOR HELICOBACTER PYLORI-LIKE BACTERIA BY ROUTINE STAIN AND   WELL-CONTROLLED      HELICOBACTER PYLORI IMMUNOHISTOCHEMICAL STAIN.    - NEGATIVE FOR INTESTINAL METAPLASIA, EPITHELIAL DYSPLASIA OR   MALIGNANCY.    2. DUODENUM BIOPSIES:    - NO HISTOPATHOLOGIC ABNORMALITY.    - NEGATIVE FOR HISTOPATHOLOGIC FINDINGS OF CELIAC DISEASE.    3. GASTRIC POLYPS:    - FUNDIC GLAND POLYPS.    - NEGATIVE FOR DYSPLASIA OR MALIGNANCY.    4. PROXIMAL ASCENDING COLON MASS BIOPSY:    - INVASIVE MODERATELY DIFFERENTIATED ADENOCARCINOMA IN THE BACKGROUND OF   TUBULOVILLOUS      ADENOMA.       Assessment: This is a 58 year old woman with at least stage 3 adenocarcinoma of the right colon with at least 5 indeterminate subcentimeter liver lesions. There is no other evidence of distant metastatic disease. She presented with anemia but responded to a blood transfusion and is otherwise asymptomatic from her primary tumor. I discussed the case with IR, and a liver biopsy will be tough but may be possible. I have recommended that we proceed with liver biopsy to differentiate stage 3 vs stage 4 disease. If this is stage 3, I would recommend upfront colectomy and adjuvant chemo while keeping a close eye on the liver. If this is stage 4, I would recommend upfront systemic therapy.       Plan:  - Refer to IR for liver biopsy. I will call her with the results of the pathology from the biopsy to discuss next steps.   - I discussed the plan with Dr. Juarez and Dr. Crawford.     Mike Henry,  MD  Surgical Oncology  [facility]ESS[facility] 1965

## 2024-11-21 ENCOUNTER — TELEPHONE (OUTPATIENT)
Dept: INTERVENTIONAL RADIOLOGY/VASCULAR | Facility: CLINIC | Age: 59
End: 2024-11-21
Payer: COMMERCIAL

## 2024-11-21 ENCOUNTER — TELEPHONE (OUTPATIENT)
Dept: HEMATOLOGY/ONCOLOGY | Facility: CLINIC | Age: 59
End: 2024-11-21
Payer: COMMERCIAL

## 2024-11-21 NOTE — TELEPHONE ENCOUNTER
----- Message from Seema sent at 11/21/2024  3:11 PM CST -----  Pt is asking for staff to give her a call regarding the paperwork that she sent for her short term diasbility. Pt is asking for you to confirm.     # [phone]

## 2024-11-25 ENCOUNTER — PATIENT MESSAGE (OUTPATIENT)
Dept: ADMINISTRATIVE | Facility: HOSPITAL | Age: 59
End: 2024-11-25
Payer: COMMERCIAL

## 2024-11-27 ENCOUNTER — OFFICE VISIT (OUTPATIENT)
Dept: HEMATOLOGY/ONCOLOGY | Facility: CLINIC | Age: 59
End: 2024-11-27
Payer: COMMERCIAL

## 2024-11-27 ENCOUNTER — CLINICAL SUPPORT (OUTPATIENT)
Dept: INTERVENTIONAL RADIOLOGY/VASCULAR | Facility: CLINIC | Age: 59
End: 2024-11-27
Payer: COMMERCIAL

## 2024-11-27 ENCOUNTER — TELEPHONE (OUTPATIENT)
Dept: HEMATOLOGY/ONCOLOGY | Facility: CLINIC | Age: 59
End: 2024-11-27

## 2024-11-27 VITALS
DIASTOLIC BLOOD PRESSURE: 72 MMHG | WEIGHT: 206.81 LBS | BODY MASS INDEX: 34.46 KG/M2 | HEIGHT: 65 IN | SYSTOLIC BLOOD PRESSURE: 143 MMHG | TEMPERATURE: 99 F | RESPIRATION RATE: 16 BRPM | OXYGEN SATURATION: 96 % | HEART RATE: 79 BPM

## 2024-11-27 DIAGNOSIS — C18.9 COLON ADENOCARCINOMA: Primary | ICD-10-CM

## 2024-11-27 DIAGNOSIS — C18.9 MALIGNANT NEOPLASM OF COLON, UNSPECIFIED PART OF COLON: Primary | ICD-10-CM

## 2024-11-27 DIAGNOSIS — R16.0 LIVER MASS: ICD-10-CM

## 2024-11-27 PROCEDURE — 99204 OFFICE O/P NEW MOD 45 MIN: CPT | Mod: 95,,,

## 2024-11-27 PROCEDURE — 99215 OFFICE O/P EST HI 40 MIN: CPT | Mod: S$GLB,,, | Performed by: INTERNAL MEDICINE

## 2024-11-27 PROCEDURE — 99999 PR PBB SHADOW E&M-EST. PATIENT-LVL IV: CPT | Mod: PBBFAC,,, | Performed by: INTERNAL MEDICINE

## 2024-11-27 PROCEDURE — 4010F ACE/ARB THERAPY RXD/TAKEN: CPT | Mod: CPTII,S$GLB,, | Performed by: INTERNAL MEDICINE

## 2024-11-27 PROCEDURE — G2211 COMPLEX E/M VISIT ADD ON: HCPCS | Mod: S$GLB,,, | Performed by: INTERNAL MEDICINE

## 2024-11-27 PROCEDURE — 3044F HG A1C LEVEL LT 7.0%: CPT | Mod: CPTII,S$GLB,, | Performed by: INTERNAL MEDICINE

## 2024-11-27 PROCEDURE — 3078F DIAST BP <80 MM HG: CPT | Mod: CPTII,S$GLB,, | Performed by: INTERNAL MEDICINE

## 2024-11-27 PROCEDURE — 3008F BODY MASS INDEX DOCD: CPT | Mod: CPTII,S$GLB,, | Performed by: INTERNAL MEDICINE

## 2024-11-27 PROCEDURE — 3077F SYST BP >= 140 MM HG: CPT | Mod: CPTII,S$GLB,, | Performed by: INTERNAL MEDICINE

## 2024-11-27 PROCEDURE — 3072F LOW RISK FOR RETINOPATHY: CPT | Mod: CPTII,S$GLB,, | Performed by: INTERNAL MEDICINE

## 2024-11-27 NOTE — PROGRESS NOTES
HPI    58 years old female with history of diabetes type II, hypertension and herpes    She was recently diagnosed with cecal mass causing significant bleeding require hospitalization and urgent transfusion.  She received 2 units packed red blood cell during the course of hospitalization.  Colonoscopy showed likely malignant tumor ascending colon nearby cecum.  CT of the abdomen pelvis contrast demonstrate concerning cecal malignancy with to right lower quadrant abnormal lymph nodes concerning for metastases with indeterminate right hepatic lobe 10 mm lesion      Past Medical History:   Diagnosis Date    Diabetes mellitus     Diabetes mellitus, type 2     Herpes     Hypertension      Social History     Socioeconomic History    Marital status: Single   Occupational History    Occupation: supply chain   Tobacco Use    Smoking status: Never    Smokeless tobacco: Never   Substance and Sexual Activity    Alcohol use: No    Drug use: No    Sexual activity: Not Currently     Social Drivers of Health     Financial Resource Strain: Low Risk  (11/26/2024)    Overall Financial Resource Strain (CARDIA)     Difficulty of Paying Living Expenses: Not very hard   Food Insecurity: No Food Insecurity (11/26/2024)    Hunger Vital Sign     Worried About Running Out of Food in the Last Year: Never true     Ran Out of Food in the Last Year: Never true   Transportation Needs: No Transportation Needs (11/7/2024)    TRANSPORTATION NEEDS     Transportation : No   Physical Activity: Insufficiently Active (11/26/2024)    Exercise Vital Sign     Days of Exercise per Week: 1 day     Minutes of Exercise per Session: 20 min   Stress: Stress Concern Present (11/26/2024)    [employer] - Occupational Stress Questionnaire     Feeling of Stress : Rather much   Housing Stability: Low Risk  (11/26/2024)    Housing Stability Vital Sign     Unable to Pay for Housing in the Last Year: No     Homeless in the Last Year: No          Subjective      Review of Systems   Constitutional: Negative for appetite change, fatigue and unexpected weight change.   HENT: Negative for mouth sores.   Eyes: Negative for visual disturbance.   Respiratory: Negative for cough and shortness of breath.   Cardiovascular: Negative for chest pain.   Gastrointestinal: Negative for diarrhea.   Genitourinary: Negative for frequency.   Musculoskeletal: Negative for back pain.   Skin: Negative for rash.   Neurological: Negative for headaches.   Hematological: Negative for adenopathy.   Psychiatric/Behavioral: The patient is not nervous/anxious.   All other systems reviewed and are negative.     Objective    Physical Exam   Vitals:    11/27/24 1011   BP: (!) 143/72   Pulse: 79   Resp: 16   Temp: 99.2 °F (37.3 °C)       Constitutional: patient is oriented to person, place, and time. patient appears well-developed and well-nourished. No distress.   HENT:   Right Ear: External ear normal.   Left Ear: External ear normal.   Nose: Nose normal.   Mouth/Throat: Oropharynx is clear and moist. No oropharyngeal exudate.   Teeth, gums and lips are normal   No sinus tenderness   Palate, tongue, posterior pharynx are normal   Eyes: Conjunctivae and lids are normal.   Neck: Trachea normal and normal range of motion. No thyromegaly   Cardiovascular: Normal rate, regular rhythm, normal heart sounds, intact distal pulses and normal pulses.   No murmur heard.   No edema, no tenderness in the extremities.   Pulmonary/Chest: Effort normal and breath sounds normal. No accessory muscle usage. patient has no wheezes..   Abdominal: Soft. Normal appearance and bowel sounds are normal. patient exhibits no distension and no mass. There is no hepatosplenomegaly. There is no tenderness.   Musculoskeletal: Normal range of motion.   Gait is normal   No clubbing, cyanosis     Lymphadenopathy:   Head (right side): No submental and no submandibular adenopathy present.   Head (left side): No  submental and no submandibular adenopathy present.   patient has no cervical adenopathy.   Right: No supraclavicular adenopathy present.   Left: No supraclavicular adenopathy present.   Neurological: patient is alert and oriented to person, place, and time. patient has normal strength and normal reflexes. No sensory deficit. Gait normal.   Skin: Skin is warm, dry and intact. No bruising, no lesion and no rash noted. No cyanosis. Nails show no clubbing.   No lesions   Psychiatric: patient has a normal mood and affect. patient speech is normal and behavior is normal. Judgment normal. Cognition and memory are normal.   Vitals reviewed.     Lab Results   Component Value Date    WBC 7.22 11/15/2024    HGB 9.2 (L) 11/15/2024    HCT 32.1 (L) 11/15/2024    MCV 76 (L) 11/15/2024     11/15/2024       CMP  Sodium   Date Value Ref Range Status   11/12/2024 139 136 - 145 mmol/L Final     Potassium   Date Value Ref Range Status   11/12/2024 4.3 3.5 - 5.1 mmol/L Final     Chloride   Date Value Ref Range Status   11/12/2024 106 95 - 110 mmol/L Final     CO2   Date Value Ref Range Status   11/12/2024 21 (L) 23 - 29 mmol/L Final     Glucose   Date Value Ref Range Status   11/12/2024 96 70 - 110 mg/dL Final     BUN   Date Value Ref Range Status   11/12/2024 11 6 - 20 mg/dL Final     Creatinine   Date Value Ref Range Status   11/12/2024 0.8 0.5 - 1.4 mg/dL Final     Calcium   Date Value Ref Range Status   11/12/2024 9.3 8.7 - 10.5 mg/dL Final     Total Protein   Date Value Ref Range Status   11/12/2024 7.4 6.0 - 8.4 g/dL Final     Albumin   Date Value Ref Range Status   11/12/2024 4.0 3.5 - 5.2 g/dL Final     Total Bilirubin   Date Value Ref Range Status   11/12/2024 0.3 0.1 - 1.0 mg/dL Final     Comment:     For infants and newborns, interpretation of results should be based  on gestational age, weight and in agreement with clinical  observations.    Premature Infant recommended reference ranges:  Up to 24  hours.............<8.0 mg/dL  Up to 48 hours............<12.0 mg/dL  3-5 days..................<15.0 mg/dL  6-29 days.................<15.0 mg/dL       Alkaline Phosphatase   Date Value Ref Range Status   11/12/2024 77 40 - 150 U/L Final     AST   Date Value Ref Range Status   11/12/2024 32 10 - 40 U/L Final     ALT   Date Value Ref Range Status   11/12/2024 26 10 - 44 U/L Final     Anion Gap   Date Value Ref Range Status   11/12/2024 12 8 - 16 mmol/L Final     eGFR   Date Value Ref Range Status   11/12/2024 >60 >60 mL/min/1.73 m^2 Final     CT chest abdomen pelvis with contrast   Impression:     Findings concerning for cecal malignancy.  There are least 2 right lower quadrant abnormal lymph nodes concerning for metastatic disease.     Indeterminate right hepatic lobe 10 mm lesion.  Recommend further characterization with liver mass protocol MRI.     Small fat containing umbilical and infraumbilical hernias.     Assessment    Clinically cecum malignancy with bleeding.  Status post transfusion 2 unit packed red blood cell.  Her hemoglobin was 4.9 at the time of admission after the 2 needs transfusion her hemoglobin was 7.8.  She is still feeling weak and tired fatigue.    CT demonstrated possible liver involvement with 10 mm right hepatic lobe lesions.    With ongoing bleeding due to malignancy surgical control will be necessary.  Patient is scheduled to have robotic right hemicolectomy this Friday 11/15/2024.  Patient will receive 1 unit packed red blood cell to optimize surgical events.    Recommend PET scan and MRI of the abdomen for further evaluation of liver lesion and to finalize staging.      Ideally would like to take out liver lesion and primary colon simultaneously after staging.  However with ongoing anemia and bleeding concern whether not we should go ahead and take out the primary lesion to control the bleeding then after the liver afterwards.  I will discuss with surgeon.  So far she is tentatively  scheduled for right hemicolectomy for primary bleeding site control this coming Friday.  At meantime as stated above I will order 1 unit packed red blood cell to optimize pending surgical events.    Addendum  Today's CBC came back shows relatively stable hemoglobin.  I will talk to Dr. Henry and Dr Juarez regarding perform liver + colon resection simultaneously.  I can certainly give her 1 unit packed red blood cell as planned to optimize her system.  At meantime I will request PET scan stat for staging purposes.     11/27/24  And is scheduled to see IR for liver biopsy evaluation to determine her colon cancer stage III versus stage IV status.    If stage III recommend upfront surgical resection followed by adjuvant therapy FOLFOX x6 months.  If stage IV recommend upfront systemic chemotherapy.  Patient will need a chemo port.    Dr Juarez to place chemo port    No evidence of acute bleeding.  Her hemoglobin has been improving.    We will follow her in about 2 weeks.    Plan    There are no diagnoses linked to this encounter.

## 2024-11-27 NOTE — PROGRESS NOTES
Subjective     Patient ID: Nik Lowery is a 58 y.o. female.    Chief Complaint: No chief complaint on file.    Referral from Dr. Henry for liver mass biopsy. 57 yo female with PMH of DM II (not taking any GLP-1 Agonists) and HTN. She presented to the hospital with with significant bleeding H/H 4.9/18.5. During workup colonoscopy showed colon malignancy. CT was completed and was concerning for mets to lymph nodes and liver. Request for liver mass biopsy. Patient reports feeling well. She endorses feeling anxious; however, overall welling well.     PMH and medications reviewed.   Not taking any blood thinners.  Uses CPAP will schedule with [facility]   Oncology clinic note reviewed.     Review of Systems   Constitutional:  Positive for fatigue. Negative for appetite change and unexpected weight change.   Respiratory:  Negative for cough and shortness of breath.    Cardiovascular:  Negative for chest pain and leg swelling.   Gastrointestinal:  Negative for abdominal pain.   Neurological:  Negative for facial asymmetry and speech difficulty.   Psychiatric/Behavioral:  Positive for sleep disturbance. Negative for behavioral problems and confusion. The patient is nervous/anxious.         Objective     Physical Exam  Constitutional:       General: She is not in acute distress.     Appearance: Normal appearance.      Comments: Virtual visit.    HENT:      Head: Normocephalic and atraumatic.      Nose: Nose normal.   Eyes:      Conjunctiva/sclera: Conjunctivae normal.   Pulmonary:      Effort: Pulmonary effort is normal.   Neurological:      General: No focal deficit present.      Mental Status: She is alert.   Psychiatric:         Mood and Affect: Mood normal.         Behavior: Behavior normal.            Assessment and Plan     1. Malignant neoplasm of colon, unspecified part of colon  -     IR Biopsy Liver; Future; Expected date: 11/27/2024  -     Protime-INR; Future; Expected date: 11/27/2024    2. Liver mass  -     IR  Biopsy Liver; Future; Expected date: 11/27/2024  -     Protime-INR; Future; Expected date: 11/27/2024    - Referral from Dr. Henry. Approved by Dr. Irving. Liver mass biopsy.   - Discussed the size of the liver masses and could be difficult to see also could result in a false negative results.   - Discussed how the procedure will be performed, risks (including, but not limited to, pain, bleeding, infection, damage to nearby structures, and the need for additional procedures), benefits, possible complications, pre-post procedure expectations, and alternatives. The patient voices understanding and all questions have been answered.  The patient agrees to proceed as planned. Patient scheduled for 12/13/2024.     The patient location is: [place]  The chief complaint leading to consultation is: Liver mass     Visit type: audiovisual    45 minutes of total time spent on the encounter, which includes face to face time and non-face to face time preparing to see the patient (eg, review of tests), Obtaining and/or reviewing separately obtained history, Documenting clinical information in the electronic or other health record, Independently interpreting results (not separately reported) and communicating results to the patient/family/caregiver, or Care coordination (not separately reported).     Each patient to whom he or she provides medical services by telemedicine is:  (1) informed of the relationship between the physician and patient and the respective role of any other health care provider with respect to management of the patient; and (2) notified that he or she may decline to receive medical services by telemedicine and may withdraw from such care at any time.    Lindy Kat PA-C  Interventional Radiology  [phone]

## 2024-11-27 NOTE — TELEPHONE ENCOUNTER
----- Message from Pia sent at 11/27/2024 12:10 PM CST -----  Contact: [facility] Leave  Type:  Needs Medical Advice    Who Called: Yuliana from [facility]   Symptoms (please be specific): checking the status of disability paperwork.   Would the patient rather a call back or a response via [facility]? call  Best Call Back Number:   Additional Information: please advise and thank you.

## 2024-12-02 ENCOUNTER — PATIENT MESSAGE (OUTPATIENT)
Dept: SURGERY | Facility: CLINIC | Age: 59
End: 2024-12-02

## 2024-12-02 ENCOUNTER — OFFICE VISIT (OUTPATIENT)
Dept: SURGERY | Facility: CLINIC | Age: 59
End: 2024-12-02
Payer: COMMERCIAL

## 2024-12-02 VITALS — DIASTOLIC BLOOD PRESSURE: 85 MMHG | HEART RATE: 90 BPM | TEMPERATURE: 99 F | SYSTOLIC BLOOD PRESSURE: 133 MMHG

## 2024-12-02 DIAGNOSIS — C18.9 COLON ADENOCARCINOMA: Primary | ICD-10-CM

## 2024-12-02 PROCEDURE — 3075F SYST BP GE 130 - 139MM HG: CPT | Mod: CPTII,S$GLB,, | Performed by: STUDENT IN AN ORGANIZED HEALTH CARE EDUCATION/TRAINING PROGRAM

## 2024-12-02 PROCEDURE — 99214 OFFICE O/P EST MOD 30 MIN: CPT | Mod: S$GLB,,, | Performed by: STUDENT IN AN ORGANIZED HEALTH CARE EDUCATION/TRAINING PROGRAM

## 2024-12-02 PROCEDURE — 3079F DIAST BP 80-89 MM HG: CPT | Mod: CPTII,S$GLB,, | Performed by: STUDENT IN AN ORGANIZED HEALTH CARE EDUCATION/TRAINING PROGRAM

## 2024-12-02 PROCEDURE — 4010F ACE/ARB THERAPY RXD/TAKEN: CPT | Mod: CPTII,S$GLB,, | Performed by: STUDENT IN AN ORGANIZED HEALTH CARE EDUCATION/TRAINING PROGRAM

## 2024-12-02 PROCEDURE — 3072F LOW RISK FOR RETINOPATHY: CPT | Mod: CPTII,S$GLB,, | Performed by: STUDENT IN AN ORGANIZED HEALTH CARE EDUCATION/TRAINING PROGRAM

## 2024-12-02 PROCEDURE — 1159F MED LIST DOCD IN RCRD: CPT | Mod: CPTII,S$GLB,, | Performed by: STUDENT IN AN ORGANIZED HEALTH CARE EDUCATION/TRAINING PROGRAM

## 2024-12-02 PROCEDURE — 99999 PR PBB SHADOW E&M-EST. PATIENT-LVL III: CPT | Mod: PBBFAC,,, | Performed by: STUDENT IN AN ORGANIZED HEALTH CARE EDUCATION/TRAINING PROGRAM

## 2024-12-02 PROCEDURE — 3044F HG A1C LEVEL LT 7.0%: CPT | Mod: CPTII,S$GLB,, | Performed by: STUDENT IN AN ORGANIZED HEALTH CARE EDUCATION/TRAINING PROGRAM

## 2024-12-02 RX ORDER — SODIUM CHLORIDE 9 MG/ML
INJECTION, SOLUTION INTRAVENOUS CONTINUOUS
OUTPATIENT
Start: 2024-12-20

## 2024-12-02 RX ORDER — CEFAZOLIN SODIUM 2 G/50ML
2 SOLUTION INTRAVENOUS
OUTPATIENT
Start: 2024-12-20

## 2024-12-04 ENCOUNTER — TELEPHONE (OUTPATIENT)
Dept: FAMILY MEDICINE | Facility: CLINIC | Age: 59
End: 2024-12-04
Payer: COMMERCIAL

## 2024-12-04 NOTE — TELEPHONE ENCOUNTER
----- Message from Trish sent at 12/4/2024  4:05 PM CST -----  Contact: Radha  Type:  Needs Medical Advice    Who Called: Radha     Would the patient rather a call back or a response via [facility]?  Call back    Best Call Back Number:      Additional Information:  [employer] paperwork, about her intermittent leave. Needing clarification about the frequency and duration of it for before surgery. Paperwork was a little unclear and wants to make sure she will be covered for the time she was off before surgery.    Case # [id number]    Please call to advise    Thanks

## 2024-12-06 ENCOUNTER — PATIENT MESSAGE (OUTPATIENT)
Dept: HEMATOLOGY/ONCOLOGY | Facility: CLINIC | Age: 59
End: 2024-12-06
Payer: COMMERCIAL

## 2024-12-06 ENCOUNTER — PATIENT OUTREACH (OUTPATIENT)
Dept: ADMINISTRATIVE | Facility: HOSPITAL | Age: 59
End: 2024-12-06
Payer: COMMERCIAL

## 2024-12-06 ENCOUNTER — TELEPHONE (OUTPATIENT)
Dept: HEMATOLOGY/ONCOLOGY | Facility: CLINIC | Age: 59
End: 2024-12-06
Payer: COMMERCIAL

## 2024-12-06 ENCOUNTER — HOSPITAL ENCOUNTER (OUTPATIENT)
Dept: RADIOLOGY | Facility: CLINIC | Age: 59
Discharge: HOME | End: 2024-12-06
Attending: FAMILY MEDICINE
Payer: COMMERCIAL

## 2024-12-06 DIAGNOSIS — Z12.31 OTHER SCREENING MAMMOGRAM: ICD-10-CM

## 2024-12-06 DIAGNOSIS — E11.9 DIABETES MELLITUS WITHOUT COMPLICATION: Primary | ICD-10-CM

## 2024-12-06 PROCEDURE — 77067 SCR MAMMO BI INCL CAD: CPT | Mod: TC,PO

## 2024-12-06 PROCEDURE — 77063 BREAST TOMOSYNTHESIS BI: CPT | Mod: 26,,, | Performed by: RADIOLOGY

## 2024-12-06 PROCEDURE — 77067 SCR MAMMO BI INCL CAD: CPT | Mod: 26,,, | Performed by: RADIOLOGY

## 2024-12-06 NOTE — PROGRESS NOTES
Population Health Chart Review & Patient Outreach Details      Additional Pop Health Notes:               Updates Requested / Reviewed:      Updated Care Coordination Note and Care Everywhere         Health Maintenance Topics Overdue:      [facility] Score: 4     Eye Exam  Lipid Panel  Foot Exam                       Health Maintenance Topic(s) Outreach Outcomes & Actions Taken:    Lab(s) - Outreach Outcomes & Actions Taken  : Overdue Lab(s) Ordered and Overdue Lab(s) Scheduled

## 2024-12-06 NOTE — TELEPHONE ENCOUNTER
Responded back to pt's portal message.     ----- Message from Maria Del Rosario sent at 12/6/2024 10:55 AM CST -----  Regarding: documents   [phone]    In reference to a 4 page fax for work leave of absence needing signature from the doctor. It should have come over on Tuesday and the caller did speak to Xuan.  Pt had the correct fax number      Thank you

## 2024-12-09 ENCOUNTER — TELEPHONE (OUTPATIENT)
Dept: FAMILY MEDICINE | Facility: CLINIC | Age: 59
End: 2024-12-09

## 2024-12-09 ENCOUNTER — LAB VISIT (OUTPATIENT)
Dept: LAB | Facility: HOSPITAL | Age: 59
End: 2024-12-09
Payer: COMMERCIAL

## 2024-12-09 DIAGNOSIS — C18.9 COLON ADENOCARCINOMA: ICD-10-CM

## 2024-12-09 LAB
ALBUMIN SERPL BCP-MCNC: 3.9 G/DL (ref 3.5–5.2)
ALP SERPL-CCNC: 83 U/L (ref 40–150)
ALT SERPL W/O P-5'-P-CCNC: 34 U/L (ref 10–44)
ANION GAP SERPL CALC-SCNC: 14 MMOL/L (ref 8–16)
AST SERPL-CCNC: 38 U/L (ref 10–40)
BASOPHILS # BLD AUTO: 0.06 K/UL (ref 0–0.2)
BASOPHILS NFR BLD: 0.7 % (ref 0–1.9)
BILIRUB SERPL-MCNC: 0.4 MG/DL (ref 0.1–1)
BUN SERPL-MCNC: 18 MG/DL (ref 6–20)
CALCIUM SERPL-MCNC: 9.3 MG/DL (ref 8.7–10.5)
CEA SERPL-MCNC: 5.5 NG/ML (ref 0–5)
CHLORIDE SERPL-SCNC: 104 MMOL/L (ref 95–110)
CO2 SERPL-SCNC: 19 MMOL/L (ref 23–29)
CREAT SERPL-MCNC: 1 MG/DL (ref 0.5–1.4)
DIFFERENTIAL METHOD BLD: ABNORMAL
EOSINOPHIL # BLD AUTO: 0.5 K/UL (ref 0–0.5)
EOSINOPHIL NFR BLD: 5.7 % (ref 0–8)
ERYTHROCYTE [DISTWIDTH] IN BLOOD BY AUTOMATED COUNT: 20.9 % (ref 11.5–14.5)
EST. GFR  (NO RACE VARIABLE): >60 ML/MIN/1.73 M^2
GLUCOSE SERPL-MCNC: 244 MG/DL (ref 70–110)
HCT VFR BLD AUTO: 27.6 % (ref 37–48.5)
HGB BLD-MCNC: 8.1 G/DL (ref 12–16)
IMM GRANULOCYTES # BLD AUTO: 0.01 K/UL (ref 0–0.04)
IMM GRANULOCYTES NFR BLD AUTO: 0.1 % (ref 0–0.5)
LYMPHOCYTES # BLD AUTO: 2.6 K/UL (ref 1–4.8)
LYMPHOCYTES NFR BLD: 31.6 % (ref 18–48)
MCH RBC QN AUTO: 22.3 PG (ref 27–31)
MCHC RBC AUTO-ENTMCNC: 29.3 G/DL (ref 32–36)
MCV RBC AUTO: 76 FL (ref 82–98)
MONOCYTES # BLD AUTO: 0.4 K/UL (ref 0.3–1)
MONOCYTES NFR BLD: 4.3 % (ref 4–15)
NEUTROPHILS # BLD AUTO: 4.8 K/UL (ref 1.8–7.7)
NEUTROPHILS NFR BLD: 57.6 % (ref 38–73)
NRBC BLD-RTO: 0 /100 WBC
PLATELET # BLD AUTO: 342 K/UL (ref 150–450)
PMV BLD AUTO: 9.3 FL (ref 9.2–12.9)
POTASSIUM SERPL-SCNC: 4.4 MMOL/L (ref 3.5–5.1)
PROT SERPL-MCNC: 7.4 G/DL (ref 6–8.4)
RBC # BLD AUTO: 3.63 M/UL (ref 4–5.4)
SODIUM SERPL-SCNC: 137 MMOL/L (ref 136–145)
WBC # BLD AUTO: 8.29 K/UL (ref 3.9–12.7)

## 2024-12-09 PROCEDURE — 36415 COLL VENOUS BLD VENIPUNCTURE: CPT | Mod: PO | Performed by: INTERNAL MEDICINE

## 2024-12-09 PROCEDURE — 85025 COMPLETE CBC W/AUTO DIFF WBC: CPT | Mod: PO | Performed by: INTERNAL MEDICINE

## 2024-12-09 PROCEDURE — 82378 CARCINOEMBRYONIC ANTIGEN: CPT | Performed by: INTERNAL MEDICINE

## 2024-12-09 NOTE — TELEPHONE ENCOUNTER
Spoke to [employer] employer who states they faxed attending physicians statement back to office. The treatment plan was missing in previous signed forms. Must be added in order to approve. They will re fax forms to office.

## 2024-12-09 NOTE — TELEPHONE ENCOUNTER
----- Message from Jackie sent at 12/9/2024  4:34 PM CST -----  Regarding: call back  Contact: :  [employer]  Type: Needs Medical Advice  Who Called:  [employer]   Symptoms (please be specific):    How long has patient had these symptoms:    Pharmacy name and phone #:    Best Call Back Number:[phone]    Additional Information: 2 case open needs an update form given did not state enough that the pt took call back to advise mrn: [phone] [facility] [place]

## 2024-12-10 ENCOUNTER — PATIENT MESSAGE (OUTPATIENT)
Dept: FAMILY MEDICINE | Facility: CLINIC | Age: 59
End: 2024-12-10
Payer: COMMERCIAL

## 2024-12-10 ENCOUNTER — PATIENT MESSAGE (OUTPATIENT)
Dept: INTERVENTIONAL RADIOLOGY/VASCULAR | Facility: HOSPITAL | Age: 59
End: 2024-12-10
Payer: COMMERCIAL

## 2024-12-10 DIAGNOSIS — K76.9 LESION OF LIVER: ICD-10-CM

## 2024-12-10 DIAGNOSIS — K63.89 COLONIC MASS: ICD-10-CM

## 2024-12-10 DIAGNOSIS — E11.65 UNCONTROLLED TYPE 2 DIABETES MELLITUS WITH HYPERGLYCEMIA, WITHOUT LONG-TERM CURRENT USE OF INSULIN: Primary | ICD-10-CM

## 2024-12-11 ENCOUNTER — E-VISIT (OUTPATIENT)
Dept: FAMILY MEDICINE | Facility: CLINIC | Age: 59
End: 2024-12-11
Payer: COMMERCIAL

## 2024-12-11 DIAGNOSIS — E11.65 UNCONTROLLED TYPE 2 DIABETES MELLITUS WITH HYPERGLYCEMIA, WITHOUT LONG-TERM CURRENT USE OF INSULIN: Primary | ICD-10-CM

## 2024-12-12 ENCOUNTER — OFFICE VISIT (OUTPATIENT)
Dept: HEMATOLOGY/ONCOLOGY | Facility: CLINIC | Age: 59
End: 2024-12-12
Payer: COMMERCIAL

## 2024-12-12 ENCOUNTER — LAB VISIT (OUTPATIENT)
Dept: LAB | Facility: HOSPITAL | Age: 59
End: 2024-12-12
Payer: COMMERCIAL

## 2024-12-12 VITALS
BODY MASS INDEX: 34.97 KG/M2 | SYSTOLIC BLOOD PRESSURE: 136 MMHG | TEMPERATURE: 97 F | RESPIRATION RATE: 16 BRPM | HEART RATE: 86 BPM | DIASTOLIC BLOOD PRESSURE: 74 MMHG | WEIGHT: 209.88 LBS | OXYGEN SATURATION: 97 % | HEIGHT: 65 IN

## 2024-12-12 DIAGNOSIS — R16.0 LIVER MASS: ICD-10-CM

## 2024-12-12 DIAGNOSIS — C18.9 MALIGNANT NEOPLASM OF COLON, UNSPECIFIED PART OF COLON: ICD-10-CM

## 2024-12-12 DIAGNOSIS — C18.2 MALIGNANT NEOPLASM OF ASCENDING COLON: Primary | ICD-10-CM

## 2024-12-12 LAB
INR PPP: 1 (ref 0.8–1.2)
PROTHROMBIN TIME: 10.9 SEC (ref 9–12.5)

## 2024-12-12 PROCEDURE — 85610 PROTHROMBIN TIME: CPT

## 2024-12-12 PROCEDURE — 4010F ACE/ARB THERAPY RXD/TAKEN: CPT | Mod: CPTII,S$GLB,, | Performed by: INTERNAL MEDICINE

## 2024-12-12 PROCEDURE — 3060F POS MICROALBUMINURIA REV: CPT | Mod: CPTII,S$GLB,, | Performed by: INTERNAL MEDICINE

## 2024-12-12 PROCEDURE — 99214 OFFICE O/P EST MOD 30 MIN: CPT | Mod: S$GLB,,, | Performed by: INTERNAL MEDICINE

## 2024-12-12 PROCEDURE — 1159F MED LIST DOCD IN RCRD: CPT | Mod: CPTII,S$GLB,, | Performed by: INTERNAL MEDICINE

## 2024-12-12 PROCEDURE — G2211 COMPLEX E/M VISIT ADD ON: HCPCS | Mod: S$GLB,,, | Performed by: INTERNAL MEDICINE

## 2024-12-12 PROCEDURE — 99999 PR PBB SHADOW E&M-EST. PATIENT-LVL IV: CPT | Mod: PBBFAC,,, | Performed by: INTERNAL MEDICINE

## 2024-12-12 PROCEDURE — 3008F BODY MASS INDEX DOCD: CPT | Mod: CPTII,S$GLB,, | Performed by: INTERNAL MEDICINE

## 2024-12-12 PROCEDURE — 3078F DIAST BP <80 MM HG: CPT | Mod: CPTII,S$GLB,, | Performed by: INTERNAL MEDICINE

## 2024-12-12 PROCEDURE — 3075F SYST BP GE 130 - 139MM HG: CPT | Mod: CPTII,S$GLB,, | Performed by: INTERNAL MEDICINE

## 2024-12-12 PROCEDURE — 3066F NEPHROPATHY DOC TX: CPT | Mod: CPTII,S$GLB,, | Performed by: INTERNAL MEDICINE

## 2024-12-12 PROCEDURE — 3044F HG A1C LEVEL LT 7.0%: CPT | Mod: CPTII,S$GLB,, | Performed by: INTERNAL MEDICINE

## 2024-12-12 PROCEDURE — 3072F LOW RISK FOR RETINOPATHY: CPT | Mod: CPTII,S$GLB,, | Performed by: INTERNAL MEDICINE

## 2024-12-12 PROCEDURE — 36415 COLL VENOUS BLD VENIPUNCTURE: CPT | Mod: PO

## 2024-12-12 NOTE — PROGRESS NOTES
HPI    58 years old female with history of diabetes type II, hypertension and herpes    She was recently diagnosed with cecal mass causing significant bleeding require hospitalization and urgent transfusion.  She received 2 units packed red blood cell during the course of hospitalization.  Colonoscopy showed likely malignant tumor ascending colon nearby cecum.  CT of the abdomen pelvis contrast demonstrate concerning cecal malignancy with to right lower quadrant abnormal lymph nodes concerning for metastases with indeterminate right hepatic lobe 10 mm lesion      Past Medical History:   Diagnosis Date    Diabetes mellitus     Diabetes mellitus, type 2     Herpes     Hypertension      Social History     Socioeconomic History    Marital status: Single   Occupational History    Occupation: supply chain   Tobacco Use    Smoking status: Never    Smokeless tobacco: Never   Substance and Sexual Activity    Alcohol use: No    Drug use: No    Sexual activity: Not Currently     Social Drivers of Health     Financial Resource Strain: Low Risk  (11/26/2024)    Overall Financial Resource Strain (CARDIA)     Difficulty of Paying Living Expenses: Not very hard   Food Insecurity: No Food Insecurity (11/26/2024)    Hunger Vital Sign     Worried About Running Out of Food in the Last Year: Never true     Ran Out of Food in the Last Year: Never true   Transportation Needs: No Transportation Needs (11/7/2024)    TRANSPORTATION NEEDS     Transportation : No   Physical Activity: Insufficiently Active (11/26/2024)    Exercise Vital Sign     Days of Exercise per Week: 1 day     Minutes of Exercise per Session: 20 min   Stress: Stress Concern Present (11/26/2024)    [employer] [place] [employer] - Occupational Stress Questionnaire     Feeling of Stress : Rather much   Housing Stability: Low Risk  (11/26/2024)    Housing Stability Vital Sign     Unable to Pay for Housing in the Last Year: No     Homeless in the Last Year: No          Subjective      Review of Systems   Constitutional: Negative for appetite change, fatigue and unexpected weight change.   HENT: Negative for mouth sores.   Eyes: Negative for visual disturbance.   Respiratory: Negative for cough and shortness of breath.   Cardiovascular: Negative for chest pain.   Gastrointestinal: Negative for diarrhea.   Genitourinary: Negative for frequency.   Musculoskeletal: Negative for back pain.   Skin: Negative for rash.   Neurological: Negative for headaches.   Hematological: Negative for adenopathy.   Psychiatric/Behavioral: The patient is not nervous/anxious.   All other systems reviewed and are negative.     Objective    Physical Exam   Vitals:    12/12/24 0942   BP: 136/74   Pulse: 86   Resp: 16   Temp: 97.4 °F (36.3 °C)       Constitutional: patient is oriented to person, place, and time. patient appears well-developed and well-nourished. No distress.   HENT:   Right Ear: External ear normal.   Left Ear: External ear normal.   Nose: Nose normal.   Mouth/Throat: Oropharynx is clear and moist. No oropharyngeal exudate.   Teeth, gums and lips are normal   No sinus tenderness   Palate, tongue, posterior pharynx are normal   Eyes: Conjunctivae and lids are normal.   Neck: Trachea normal and normal range of motion. No thyromegaly   Cardiovascular: Normal rate, regular rhythm, normal heart sounds, intact distal pulses and normal pulses.   No murmur heard.   No edema, no tenderness in the extremities.   Pulmonary/Chest: Effort normal and breath sounds normal. No accessory muscle usage. patient has no wheezes..   Abdominal: Soft. Normal appearance and bowel sounds are normal. patient exhibits no distension and no mass. There is no hepatosplenomegaly. There is no tenderness.   Musculoskeletal: Normal range of motion.   Gait is normal   No clubbing, cyanosis     Lymphadenopathy:   Head (right side): No submental and no submandibular adenopathy present.   Head (left side): No submental  and no submandibular adenopathy present.   patient has no cervical adenopathy.   Right: No supraclavicular adenopathy present.   Left: No supraclavicular adenopathy present.   Neurological: patient is alert and oriented to person, place, and time. patient has normal strength and normal reflexes. No sensory deficit. Gait normal.   Skin: Skin is warm, dry and intact. No bruising, no lesion and no rash noted. No cyanosis. Nails show no clubbing.   No lesions   Psychiatric: patient has a normal mood and affect. patient speech is normal and behavior is normal. Judgment normal. Cognition and memory are normal.   Vitals reviewed.     Lab Results   Component Value Date    WBC 8.29 12/09/2024    HGB 8.1 (L) 12/09/2024    HCT 27.6 (L) 12/09/2024    MCV 76 (L) 12/09/2024     12/09/2024       CMP  Sodium   Date Value Ref Range Status   12/09/2024 137 136 - 145 mmol/L Final     Potassium   Date Value Ref Range Status   12/09/2024 4.4 3.5 - 5.1 mmol/L Final     Chloride   Date Value Ref Range Status   12/09/2024 104 95 - 110 mmol/L Final     CO2   Date Value Ref Range Status   12/09/2024 19 (L) 23 - 29 mmol/L Final     Glucose   Date Value Ref Range Status   12/09/2024 244 (H) 70 - 110 mg/dL Final     BUN   Date Value Ref Range Status   12/09/2024 18 6 - 20 mg/dL Final     Creatinine   Date Value Ref Range Status   12/09/2024 1.0 0.5 - 1.4 mg/dL Final     Calcium   Date Value Ref Range Status   12/09/2024 9.3 8.7 - 10.5 mg/dL Final     Total Protein   Date Value Ref Range Status   12/09/2024 7.4 6.0 - 8.4 g/dL Final     Albumin   Date Value Ref Range Status   12/09/2024 3.9 3.5 - 5.2 g/dL Final     Total Bilirubin   Date Value Ref Range Status   12/09/2024 0.4 0.1 - 1.0 mg/dL Final     Comment:     For infants and newborns, interpretation of results should be based  on gestational age, weight and in agreement with clinical  observations.    Premature Infant recommended reference ranges:  Up to 24 hours.............<8.0  mg/dL  Up to 48 hours............<12.0 mg/dL  3-5 days..................<15.0 mg/dL  6-29 days.................<15.0 mg/dL       Alkaline Phosphatase   Date Value Ref Range Status   12/09/2024 83 40 - 150 U/L Final     AST   Date Value Ref Range Status   12/09/2024 38 10 - 40 U/L Final     ALT   Date Value Ref Range Status   12/09/2024 34 10 - 44 U/L Final     Anion Gap   Date Value Ref Range Status   12/09/2024 14 8 - 16 mmol/L Final     eGFR   Date Value Ref Range Status   12/09/2024 >60 >60 mL/min/1.73 m^2 Final     CT chest abdomen pelvis with contrast   Impression:     Findings concerning for cecal malignancy.  There are least 2 right lower quadrant abnormal lymph nodes concerning for metastatic disease.     Indeterminate right hepatic lobe 10 mm lesion.  Recommend further characterization with liver mass protocol MRI.     Small fat containing umbilical and infraumbilical hernias.     Assessment    Clinically cecum malignancy with bleeding.  Status post transfusion 2 unit packed red blood cell.  Her hemoglobin was 4.9 at the time of admission after the 2 needs transfusion her hemoglobin was 7.8.  She is still feeling weak and tired fatigue.    CT demonstrated possible liver involvement with 10 mm right hepatic lobe lesions.    With ongoing bleeding due to malignancy surgical control will be necessary.  Patient is scheduled to have robotic right hemicolectomy this Friday 11/15/2024.  Patient will receive 1 unit packed red blood cell to optimize surgical events.    Recommend PET scan and MRI of the abdomen for further evaluation of liver lesion and to finalize staging.      Ideally would like to take out liver lesion and primary colon simultaneously after staging.  However with ongoing anemia and bleeding concern whether not we should go ahead and take out the primary lesion to control the bleeding then after the liver afterwards.  I will discuss with surgeon.  So far she is tentatively scheduled for right  hemicolectomy for primary bleeding site control this coming Friday.  At meantime as stated above I will order 1 unit packed red blood cell to optimize pending surgical events.    Addendum  Today's CBC came back shows relatively stable hemoglobin.  I will talk to Dr. Henry and Dr Juarez regarding perform liver + colon resection simultaneously.  I can certainly give her 1 unit packed red blood cell as planned to optimize her system.  At meantime I will request PET scan stat for staging purposes.     11/27/24  And is scheduled to see IR for liver biopsy evaluation to determine her colon cancer stage III versus stage IV status.    If stage III recommend upfront surgical resection followed by adjuvant therapy FOLFOX x6 months.  If stage IV recommend upfront systemic chemotherapy.  Patient will need a chemo port.    Dr Juarez to place chemo port    No evidence of acute bleeding.  Her hemoglobin has been improving.    We will follow her in about 2 weeks.    12/12/24  IR biopsy of liver to determine whether not her colon cancer stage III versus stage IV status.  IR Biopsy scheduled for 12/13/2024.  If stage III disease she will proceed forward with colectomy followed by adjuvant therapy.  However she has stage IV disease we will to up-front systemic therapy then followed by re-evaluation for liver ablation vs resection if feasible.  Extensive discussion with patient today regarding overall direction of the treatment.  She understands that regardless we will have she will require systemic therapy as a part of treatment picture.  Chemo port placement we will need a at some point.    I will follow her after biopsy about one-week was results.    Plan    There are no diagnoses linked to this encounter.

## 2024-12-13 ENCOUNTER — ANESTHESIA EVENT (OUTPATIENT)
Dept: INTERVENTIONAL RADIOLOGY/VASCULAR | Facility: HOSPITAL | Age: 59
End: 2024-12-13
Payer: COMMERCIAL

## 2024-12-13 ENCOUNTER — HOSPITAL ENCOUNTER (OUTPATIENT)
Dept: INTERVENTIONAL RADIOLOGY/VASCULAR | Facility: HOSPITAL | Age: 59
Discharge: HOME | End: 2024-12-13
Payer: COMMERCIAL

## 2024-12-13 VITALS
RESPIRATION RATE: 13 BRPM | WEIGHT: 207 LBS | TEMPERATURE: 98 F | DIASTOLIC BLOOD PRESSURE: 78 MMHG | HEIGHT: 65 IN | BODY MASS INDEX: 34.49 KG/M2 | OXYGEN SATURATION: 95 % | HEART RATE: 72 BPM | SYSTOLIC BLOOD PRESSURE: 136 MMHG

## 2024-12-13 DIAGNOSIS — C18.9 MALIGNANT NEOPLASM OF COLON, UNSPECIFIED PART OF COLON: ICD-10-CM

## 2024-12-13 DIAGNOSIS — R16.0 LIVER MASS: ICD-10-CM

## 2024-12-13 LAB
POCT GLUCOSE: 149 MG/DL (ref 70–110)
POCT GLUCOSE: 195 MG/DL (ref 70–110)

## 2024-12-13 PROCEDURE — 63600175 PHARM REV CODE 636 W HCPCS: Performed by: NURSE ANESTHETIST, CERTIFIED REGISTERED

## 2024-12-13 PROCEDURE — 63600175 PHARM REV CODE 636 W HCPCS: Performed by: RADIOLOGY

## 2024-12-13 PROCEDURE — 37000009 HC ANESTHESIA EA ADD 15 MINS

## 2024-12-13 PROCEDURE — 37000008 HC ANESTHESIA 1ST 15 MINUTES

## 2024-12-13 PROCEDURE — 25000003 PHARM REV CODE 250: Performed by: NURSE ANESTHETIST, CERTIFIED REGISTERED

## 2024-12-13 PROCEDURE — 82962 GLUCOSE BLOOD TEST: CPT

## 2024-12-13 PROCEDURE — 77012 CT SCAN FOR NEEDLE BIOPSY: CPT | Mod: 26,,, | Performed by: RADIOLOGY

## 2024-12-13 PROCEDURE — 47000 NEEDLE BIOPSY OF LIVER PERQ: CPT | Performed by: RADIOLOGY

## 2024-12-13 RX ORDER — HALOPERIDOL 5 MG/ML
0.5 INJECTION INTRAMUSCULAR EVERY 10 MIN PRN
Status: DISCONTINUED | OUTPATIENT
Start: 2024-12-13 | End: 2024-12-14 | Stop reason: HOSPADM

## 2024-12-13 RX ORDER — GLUCAGON 1 MG
1 KIT INJECTION
Status: DISCONTINUED | OUTPATIENT
Start: 2024-12-13 | End: 2024-12-14 | Stop reason: HOSPADM

## 2024-12-13 RX ORDER — LIDOCAINE HYDROCHLORIDE 10 MG/ML
INJECTION, SOLUTION INFILTRATION; PERINEURAL
Status: COMPLETED | OUTPATIENT
Start: 2024-12-13 | End: 2024-12-13

## 2024-12-13 RX ORDER — PROPOFOL 10 MG/ML
VIAL (ML) INTRAVENOUS
Status: DISCONTINUED | OUTPATIENT
Start: 2024-12-13 | End: 2024-12-13

## 2024-12-13 RX ORDER — SODIUM CHLORIDE 9 MG/ML
INJECTION, SOLUTION INTRAVENOUS CONTINUOUS
Status: DISCONTINUED | OUTPATIENT
Start: 2024-12-13 | End: 2024-12-14 | Stop reason: HOSPADM

## 2024-12-13 RX ORDER — LIDOCAINE HYDROCHLORIDE 10 MG/ML
1 INJECTION, SOLUTION EPIDURAL; INFILTRATION; INTRACAUDAL; PERINEURAL ONCE
Status: DISCONTINUED | OUTPATIENT
Start: 2024-12-13 | End: 2024-12-14 | Stop reason: HOSPADM

## 2024-12-13 RX ORDER — MIDAZOLAM HYDROCHLORIDE 1 MG/ML
INJECTION INTRAMUSCULAR; INTRAVENOUS
Status: DISCONTINUED | OUTPATIENT
Start: 2024-12-13 | End: 2024-12-13

## 2024-12-13 RX ORDER — HYDROMORPHONE HYDROCHLORIDE 1 MG/ML
0.2 INJECTION, SOLUTION INTRAMUSCULAR; INTRAVENOUS; SUBCUTANEOUS EVERY 5 MIN PRN
Status: DISCONTINUED | OUTPATIENT
Start: 2024-12-13 | End: 2024-12-14 | Stop reason: HOSPADM

## 2024-12-13 RX ORDER — LIDOCAINE HYDROCHLORIDE 20 MG/ML
INJECTION INTRAVENOUS
Status: DISCONTINUED | OUTPATIENT
Start: 2024-12-13 | End: 2024-12-13

## 2024-12-13 RX ORDER — OXYCODONE AND ACETAMINOPHEN 5; 325 MG/1; MG/1
1 TABLET ORAL
Status: DISCONTINUED | OUTPATIENT
Start: 2024-12-13 | End: 2024-12-14 | Stop reason: HOSPADM

## 2024-12-13 RX ORDER — ONDANSETRON HYDROCHLORIDE 2 MG/ML
4 INJECTION, SOLUTION INTRAVENOUS DAILY PRN
Status: DISCONTINUED | OUTPATIENT
Start: 2024-12-13 | End: 2024-12-14 | Stop reason: HOSPADM

## 2024-12-13 RX ORDER — ONDANSETRON HYDROCHLORIDE 2 MG/ML
4 INJECTION, SOLUTION INTRAVENOUS EVERY 6 HOURS PRN
Status: DISCONTINUED | OUTPATIENT
Start: 2024-12-13 | End: 2024-12-14 | Stop reason: HOSPADM

## 2024-12-13 RX ORDER — SODIUM CHLORIDE 0.9 % (FLUSH) 0.9 %
3 SYRINGE (ML) INJECTION
Status: DISCONTINUED | OUTPATIENT
Start: 2024-12-13 | End: 2024-12-14 | Stop reason: HOSPADM

## 2024-12-13 RX ORDER — PHENYLEPHRINE HYDROCHLORIDE 10 MG/ML
INJECTION INTRAVENOUS
Status: DISCONTINUED | OUTPATIENT
Start: 2024-12-13 | End: 2024-12-13

## 2024-12-13 RX ORDER — FENTANYL CITRATE 50 UG/ML
INJECTION, SOLUTION INTRAMUSCULAR; INTRAVENOUS
Status: DISCONTINUED | OUTPATIENT
Start: 2024-12-13 | End: 2024-12-13

## 2024-12-13 RX ADMIN — MIDAZOLAM HYDROCHLORIDE 2 MG: 2 INJECTION, SOLUTION INTRAMUSCULAR; INTRAVENOUS at 10:12

## 2024-12-13 RX ADMIN — SODIUM CHLORIDE, SODIUM GLUCONATE, SODIUM ACETATE, POTASSIUM CHLORIDE, MAGNESIUM CHLORIDE, SODIUM PHOSPHATE, DIBASIC, AND POTASSIUM PHOSPHATE: .53; .5; .37; .037; .03; .012; .00082 INJECTION, SOLUTION INTRAVENOUS at 10:12

## 2024-12-13 RX ADMIN — PROPOFOL 150 MCG/KG/MIN: 10 INJECTION, EMULSION INTRAVENOUS at 10:12

## 2024-12-13 RX ADMIN — FENTANYL CITRATE 50 MCG: 50 INJECTION, SOLUTION INTRAMUSCULAR; INTRAVENOUS at 10:12

## 2024-12-13 RX ADMIN — LIDOCAINE HYDROCHLORIDE 3 ML: 10 INJECTION, SOLUTION INFILTRATION; PERINEURAL at 11:12

## 2024-12-13 RX ADMIN — LIDOCAINE HYDROCHLORIDE 50 MG: 20 INJECTION INTRAVENOUS at 10:12

## 2024-12-13 RX ADMIN — PROPOFOL 25 MG: 10 INJECTION, EMULSION INTRAVENOUS at 10:12

## 2024-12-13 RX ADMIN — FENTANYL CITRATE 50 MCG: 50 INJECTION, SOLUTION INTRAMUSCULAR; INTRAVENOUS at 11:12

## 2024-12-13 RX ADMIN — PHENYLEPHRINE HYDROCHLORIDE 100 MCG: 10 INJECTION INTRAVENOUS at 11:12

## 2024-12-13 NOTE — PLAN OF CARE
Liver mass biopsy completed, pt tolerated well. No s/s of complications noted. Dressing applied CDI. Specimens collected and handed to pathologist . [facility] for 2 hours recovery, accompanied by CRNA and RN, per Dr Cheng. Report to be given at bedside to RN.

## 2024-12-13 NOTE — Clinical Note
Right: Abdomen.   Scrubbed with ChloroPrep With Tint.    Hair: N/A.  Skin prep dry before draping.  Prepped by: Ryne Cheng MD 12/13/2024 11:37 AM.

## 2024-12-13 NOTE — PRE-PROCEDURE INSTRUCTIONS
Patient was called to give information regarding medication and  pre-procedure instructions. Information left on voicemail. Pt instructed to call back to verify receiving information.      Information added to My Chart Patient Portal

## 2024-12-13 NOTE — DISCHARGE INSTRUCTIONS
LIVER BIOPSY DISCHARGE EDUCATION   Information:   A Liver biopsy is a procedure in which a biopsy needle is used to remove small amounts of liver tissue to evaluate for Liver function and/or if there is a liver mass to evaluate if it is cancerous versus benign (non-cancerous).     What should I expect after the liver biopsy?      There may be some soreness around the biopsy site.    You may return to work after 24 hours unless your primary doctor instructs you otherwise.    You do not have any diet restrictions because of this procedure but should continue any that were given to you by any other doctors.    Continue all previously prescribed medications with the exception of Coumadin/warfarin which should be resumed after 24 hours. Pradaxa, Xarelto, Eliquis or Savaysa can be resumed after 48 hours.     Bathing & Wound Care:    You may shower after 24 hours; do not tub bath or submerge in water for 3 days (bath tub, hot tub, swimming pool, river or any other body of water).    Dressing to stay on 2-3 days (clean and dry)    If the biopsy site(s) become red, tender, swollen, or starts to drain, contact us.    Avoid heavy lifting and strenuous activity for 3 days.     Follow-up visit information:   Your ordering physician will typically get your biopsy results in three to five business days. If you do not hear from the ordering physician in 7 business days, please call his/her office to set up an appointment to review the results. Follow up with Interventional Radiology is not usually necessary.       Occasionally, a situation will require prompt attention and an emergency room visit is necessary:    Sudden chest pain, shortness of breath, or fainting    Increasing redness, swelling or drainage from the biopsy site    Increasing pain not relieved by medication    Bleeding or drainage from the needle site that is saturating the dressing    You have shaking, chills and/or a temperature over 100.3°F    New, sudden  difficulty breathing    Drop in blood pressure, and/or light-headed feeling    Interventional Radiology Clinic    [phone], choose prompt 3,  Monday - Friday, 8:00 am - 4:00 pm    [phone] After hours and on holidays. Ask to speak with the interventional radiologist on call.

## 2024-12-13 NOTE — H&P
Radiology History & Physical      SUBJECTIVE:     Chief Complaint: liver mass     History of Present Illness:  Nik Lowery is a 58 y.o. female with history of ascending colon adenocarcinoma presents for targeted liver biopsy of mass.     Past Medical History:   Diagnosis Date    Colon cancer     Diabetes mellitus     Diabetes mellitus, type 2     Encounter for blood transfusion     Herpes     Hypertension     Sleep apnea      Past Surgical History:   Procedure Laterality Date    COLONOSCOPY N/A 11/8/2024    Procedure: COLONOSCOPY;  Surgeon: Saw Wick MD;  Location: [facility];  Service: Endoscopy;  Laterality: N/A;    ESOPHAGOGASTRODUODENOSCOPY N/A 11/8/2024    Procedure: EGD (ESOPHAGOGASTRODUODENOSCOPY);  Surgeon: Saw Wick MD;  Location: [facility];  Service: Endoscopy;  Laterality: N/A;    EYE SURGERY  2002    lasix Bilateral    HYSTERECTOMY  2012    LUMBAR DISCECTOMY      WISDOM TOOTH EXTRACTION         Home Meds:   Prior to Admission medications    Medication Sig Start Date End Date Taking? Authorizing Provider   acetaminophen (TYLENOL EXTRA STRENGTH ORAL) Take 1,300 mg by mouth as needed.    Provider, Historical   atorvastatin (LIPITOR) 20 MG tablet TAKE 1 TABLET BY MOUTH EVERY DAY 12/5/24   Luana Mejia PA-C   blood-glucose meter kit To check BG 3 times daily, to use with insurance preferred meter 8/8/18 12/12/24  Johana Santillan PA-C   fexofenadine (ALLEGRA) 180 MG tablet Take 180 mg by mouth nightly.    Provider, Historical   fluticasone propionate (FLONASE) 50 mcg/actuation nasal spray 2 sprays (100 mcg total) by Each Nostril route once daily. 12/1/23   Luana Mejia PA-C   gabapentin (NEURONTIN) 800 MG tablet Take 800 mg by mouth 3 (three) times daily.    Provider, Historical   guaiFENesin (MUCINEX) 1,200 mg Ta12 Take 1 tablet by mouth nightly.    Provider, Historical   ibuprofen (ADVIL,MOTRIN) 600 MG tablet Take 1 tablet (600 mg total) by mouth every 8 (eight) hours as needed  for Pain. 11/25/22   Anu Newton PA-C   lamoTRIgine (LAMICTAL) 150 MG Tab TAKE 1 TABLET BY MOUTH EVERY DAY  Patient taking differently: Take 150 mg by mouth every evening. 12/5/24   Luana Mejia PA-C   lancets Misc To check BG 3 times daily, to use with insurance preferred meter 8/8/18   Johana Santillan PA-C   lisinopriL (PRINIVIL,ZESTRIL) 20 MG tablet Take 1 tablet (20 mg total) by mouth once daily. 12/1/23 12/12/24  Luana Mejia PA-C   metFORMIN (GLUCOPHAGE-XR) 500 MG ER 24hr tablet TAKE 2 TABLETS BY MOUTH TWICE A DAY WITH MEALS 9/6/24   Luana Mejia PA-C   multivitamin (THERAGRAN) per tablet Take 1 tablet by mouth once daily.    Provider, Historical   omeprazole (PRILOSEC) 20 MG capsule Take 20 mg by mouth every evening.    Provider, Historical   ONETOUCH ULTRA BLUE TEST STRIP Strp USE TO CHECK BLOOD SUGAR 3 TIMES A DAY 12/10/18   Johana Santillan PA-C   pantoprazole (PROTONIX) 20 MG tablet Take 1 tablet (20 mg total) by mouth once daily. 12/1/23   Luana Mejia PA-C   sertraline (ZOLOFT) 100 MG tablet Take 1 tablet (100 mg total) by mouth once daily. 12/1/23   Luana Mejia PA-C   valACYclovir (VALTREX) 500 MG tablet TAKE 1 TABLET BY MOUTH TWICE A DAY  Patient taking differently: Take 1,000 mg by mouth once daily. 6/16/23   Osmin Newsome MD     Anticoagulants/Antiplatelets: no anticoagulation    Allergies:   Review of patient's allergies indicates:   Allergen Reactions    Robaxin [methocarbamol] Nausea Only     Sedation History:  no adverse reactions    Labs:  Lab Results   Component Value Date    INR 1.0 12/12/2024       Lab Results   Component Value Date    WBC 8.29 12/09/2024    HGB 8.1 (L) 12/09/2024    HCT 27.6 (L) 12/09/2024    MCV 76 (L) 12/09/2024     12/09/2024     Lab Results   Component Value Date     (H) 12/09/2024     12/09/2024    K 4.4 12/09/2024     12/09/2024    CO2 19 (L) 12/09/2024    BUN 18 12/09/2024    CREATININE 1.0 12/09/2024     CALCIUM 9.3 12/09/2024    ALT 34 12/09/2024    AST 38 12/09/2024    ALBUMIN 3.9 12/09/2024    BILITOT 0.4 12/09/2024       Review of Systems:   Hematological: no known coagulopathies  Respiratory: no shortness of breath  Cardiovascular: no chest pain  Gastrointestinal: no abdominal pain  Genito-Urinary: no dysuria  Musculoskeletal: negative  Neurological: no TIA or stroke symptoms         OBJECTIVE:     Vital Signs (Most Recent)  Temp: 98.8 °F (37.1 °C) (12/13/24 0931)  Pulse: 85 (12/13/24 0931)  Resp: 16 (12/13/24 0931)  BP: (!) 154/89 (12/13/24 0932)  SpO2: 97 % (12/13/24 0931)    Physical Exam:  ASA: 3  Mallampati: II    General: no acute distress  Mental Status: alert and oriented to person, place and time  HEENT: normocephalic, atraumatic  Chest: unlabored breathing  Heart: regular heart rate  Abdomen: nondistended  Extremity: moves all extremities    ASSESSMENT/PLAN:     Sedation Plan: per anesthesia  Patient will undergo right hepatic lobe lesion biopsy. Risks and benefits of procedure discussed with patient prior to obtaining written consent.     John Darling MD, PGY-2  Diagnostic Radiology

## 2024-12-13 NOTE — PLAN OF CARE
Patient ready for discharge, vital signs stable, no nausea, pain tolerable. Discharge instructions given to patient and family. No distress noted.

## 2024-12-13 NOTE — ANESTHESIA PREPROCEDURE EVALUATION
12/13/2024  Nik Lowery is a 58 y.o., female.    Diagnosis:      Liver mass [R16.0]      Malignant neoplasm of colon, unspecified part of colon [C18.9]      Liver mass [R16.0]     She was recently diagnosed with cecal mass causing significant bleeding require hospitalization and urgent transfusion.  She received 2 units packed red blood cell during the course of hospitalization.  Colonoscopy showed likely malignant tumor ascending colon nearby cecum.  CT of the abdomen pelvis contrast demonstrate concerning cecal malignancy with to right lower quadrant abnormal lymph nodes concerning for metastases with indeterminate right hepatic lobe 10 mm lesion         Encounter Diagnoses   Name Primary?    Liver mass     Malignant neoplasm of colon, unspecified part of colon        Review of patient's allergies indicates:   Allergen Reactions    Robaxin [methocarbamol] Nausea Only       (Not in a hospital admission)        0.9% NaCl   Intravenous Continuous           Current Outpatient Medications on File Prior to Encounter   Medication Sig Dispense Refill    acetaminophen (TYLENOL EXTRA STRENGTH ORAL) Take 1,300 mg by mouth as needed.      atorvastatin (LIPITOR) 20 MG tablet TAKE 1 TABLET BY MOUTH EVERY DAY 90 tablet 3    fexofenadine (ALLEGRA) 180 MG tablet Take 180 mg by mouth nightly.      fluticasone propionate (FLONASE) 50 mcg/actuation nasal spray 2 sprays (100 mcg total) by Each Nostril route once daily. 16 g 11    gabapentin (NEURONTIN) 800 MG tablet Take 800 mg by mouth 3 (three) times daily.      guaiFENesin (MUCINEX) 1,200 mg Ta12 Take 1 tablet by mouth nightly.      ibuprofen (ADVIL,MOTRIN) 600 MG tablet Take 1 tablet (600 mg total) by mouth every 8 (eight) hours as needed for Pain. 20 tablet 0    lamoTRIgine (LAMICTAL) 150 MG Tab TAKE 1 TABLET BY MOUTH EVERY DAY (Patient taking differently: Take 150 mg by  mouth every evening.) 90 tablet 3    lisinopriL (PRINIVIL,ZESTRIL) 20 MG tablet Take 1 tablet (20 mg total) by mouth once daily. 90 tablet 3    metFORMIN (GLUCOPHAGE-XR) 500 MG ER 24hr tablet TAKE 2 TABLETS BY MOUTH TWICE A DAY WITH MEALS 360 tablet 1    multivitamin (THERAGRAN) per tablet Take 1 tablet by mouth once daily.      omeprazole (PRILOSEC) 20 MG capsule Take 20 mg by mouth every evening.      sertraline (ZOLOFT) 100 MG tablet Take 1 tablet (100 mg total) by mouth once daily. 90 tablet 3    valACYclovir (VALTREX) 500 MG tablet TAKE 1 TABLET BY MOUTH TWICE A DAY (Patient taking differently: Take 1,000 mg by mouth once daily.) 180 tablet 3    blood-glucose meter kit To check BG 3 times daily, to use with insurance preferred meter 1 each 0    lancets Misc To check BG 3 times daily, to use with insurance preferred meter 200 each 1    ONETOUCH ULTRA BLUE TEST STRIP Strp USE TO CHECK BLOOD SUGAR 3 TIMES A  strip 0    pantoprazole (PROTONIX) 20 MG tablet Take 1 tablet (20 mg total) by mouth once daily. 90 tablet 3     Current Facility-Administered Medications on File Prior to Encounter   Medication Dose Route Frequency Provider Last Rate Last Admin    acetaminophen tablet 650 mg  650 mg Oral Amarjit Skinner MD        diphenhydrAMINE capsule 25 mg  25 mg Oral Amarjit Skinner MD        sodium chloride 0.9% flush 10 mL  10 mL Intravenous Amarjit Skinner MD           Past Medical History:  No date: Colon cancer  No date: Diabetes mellitus  No date: Diabetes mellitus, type 2  No date: Encounter for blood transfusion  No date: Herpes  No date: Hypertension  No date: Sleep apnea    Past Surgical History:   Procedure Laterality Date    COLONOSCOPY N/A 11/8/2024    Procedure: COLONOSCOPY;  Surgeon: Saw Wick MD;  Location: [facility];  Service: Endoscopy;  Laterality: N/A;    ESOPHAGOGASTRODUODENOSCOPY N/A 11/8/2024    Procedure: EGD (ESOPHAGOGASTRODUODENOSCOPY);  Surgeon: Saw Wick MD;   "Location: [facility];  Service: Endoscopy;  Laterality: N/A;    EYE SURGERY  2002    lasix Bilateral    HYSTERECTOMY  2012    LUMBAR DISCECTOMY      WISDOM TOOTH EXTRACTION         Social History     Tobacco Use   Smoking Status Never   Smokeless Tobacco Never       Social History     Substance and Sexual Activity   Alcohol Use No       Physical Activity: Insufficiently Active (11/26/2024)    Exercise Vital Sign     Days of Exercise per Week: 1 day     Minutes of Exercise per Session: 20 min       No birth history on file.  No results for input(s): "HCT" in the last 72 hours.  No results for input(s): "PLT" in the last 72 hours.  No results for input(s): "K" in the last 72 hours.  No results for input(s): "CREATININE" in the last 72 hours.  No results for input(s): "GLU" in the last 72 hours.  No results for input(s): "PT" in the last 72 hours.  Vitals:    12/13/24 0931 12/13/24 0932   BP: (!) 159/83 (!) 154/89   BP Location: Left arm Right arm   Patient Position: Lying Lying   Pulse: 85    Resp: 16    Temp: 37.1 °C (98.8 °F)    TempSrc: Temporal    SpO2: 97%    Weight: 93.9 kg (207 lb)    Height: 5' 5" (1.651 m)                        Pre-op Assessment          Review of Systems  Anesthesia Hx:  No problems with previous Anesthesia                Hematology/Oncology:       -- Anemia (12/09/24 10:58 Hematocrit: 27.6 (L)   (L): Data is abnormally low):                  Current/Recent Cancer. (Colon cancer with liver lesion)                Cardiovascular:     Denies Pacemaker. Hypertension, well controlled   Denies MI.     Denies CABG/stent.    Denies Angina.         Walks quarter mile to and back from work, stairs to second story frequently                           Pulmonary:    Denies COPD.  Denies Asthma.   Denies Shortness of breath.  Sleep Apnea, CPAP                Renal/:   Denies Chronic Renal Disease.                Hepatic/GI:     GERD, well controlled Liver Disease, (this mass only)             "   Neurological:  Denies TIA.  Denies CVA.    Denies Seizures.                                Endocrine:  Diabetes, type 2   12/13/24 09:23  POCT Glucose: 195 (H)      (H): Data is abnormally high      Obesity / BMI > 30      Physical Exam  General: Well nourished, Cooperative, Alert and Oriented    Airway:  Mallampati: II   Mouth Opening: Normal  TM Distance: Normal  Tongue: Normal  Neck ROM: Normal ROM        Anesthesia Plan  Type of Anesthesia, risks & benefits discussed:    Anesthesia Type: Gen ETT  Intra-op Monitoring Plan: Standard ASA Monitors  Post Op Pain Control Plan: multimodal analgesia and IV/PO Opioids PRN  Induction:  IV  Informed Consent: Informed consent signed with the Patient and all parties understand the risks and agree with anesthesia plan.  All questions answered.   ASA Score: 3  Day of Surgery Review of History & Physical: H&P Update referred to the surgeon/provider.    Ready For Surgery From Anesthesia Perspective.     .     Latest Reference Range & Units 11/07/24 11:59   BNP 0 - 99 pg/mL 13         Vent. Rate : 095 BPM     Atrial Rate : 095 BPM      P-R Int : 152 ms          QRS Dur : 084 ms       QT Int : 372 ms       P-R-T Axes : 064 045 064 degrees      QTc Int : 467 ms     Normal sinus rhythm   Normal ECG   When compared with ECG of 31-OCT-2018 13:41,   T wave inversion no longer evident in Inferior leads   Confirmed by Markell Lee MD ([id number]) on 11/10/2024 8:42:45 PM

## 2024-12-13 NOTE — ANESTHESIA POSTPROCEDURE EVALUATION
Anesthesia Post Evaluation    Patient: Nik Lowery    Procedure(s) Performed: * No procedures listed *    Final Anesthesia Type: general      Patient location during evaluation: PACU  Patient participation: Yes- Able to Participate  Level of consciousness: awake and alert and oriented  Post-procedure vital signs: reviewed and stable  Pain management: adequate  Airway patency: patent    PONV status at discharge: No PONV  Anesthetic complications: no      Cardiovascular status: stable  Respiratory status: unassisted, spontaneous ventilation and room air  Hydration status: euvolemic  Follow-up not needed.              Vitals Value Taken Time   /78 12/13/24 1401   Temp 36.8 °C (98.3 °F) 12/13/24 1400   Pulse 77 12/13/24 1402   Resp 16 12/13/24 1402   SpO2 95 % 12/13/24 1402   Vitals shown include unfiled device data.      No case tracking events are documented in the log.      Pain/Pablo Score: Pablo Score: 9 (12/13/2024 12:30 PM)

## 2024-12-13 NOTE — PROCEDURES
Radiology Post-Procedure Note    Pre Op Diagnosis: mCRC    Post Op Diagnosis: Same    Procedure: Liver mass biopsy    Procedure performed by: Ryne Cheng MD    Written Informed Consent Obtained: Yes  Specimen Removed: YES 6 x 18 gauge cores  Estimated Blood Loss: Minimal    Findings:   Under US and CT guidance, 17/18 gauge biopsy system was used to sample right liver lesion. No complications. See dictation.    Patient tolerated procedure well.    Ryne Cheng M.D.  Interventional Radiology  Department of Radiology

## 2024-12-13 NOTE — DISCHARGE SUMMARY
Radiology Discharge Summary      Hospital Course: No complications    Admit Date: 12/13/2024  Discharge Date: 12/13/2024     Instructions Given to Patient: Yes  Diet: Resume prior diet  Activity: activity as tolerated and no driving for today    Description of Condition on Discharge: Stable  Vital Signs (Most Recent): Temp: 98.8 °F (37.1 °C) (12/13/24 0931)  Pulse:  (refer to anesthesia chart.) (12/13/24 1035)  Resp:  (refer to anesthesia chart.) (12/13/24 1035)  BP:  (refer to anesthesia chart.) (12/13/24 1035)  SpO2:  (refer to anesthesia chart.) (12/13/24 1035)    Discharge Disposition: Home    Discharge Diagnosis: mCRC s/p liver mass biopsy    Follow up: As scheduled    Ryne Cheng M.D.  Interventional Radiology  Department of Radiology

## 2024-12-13 NOTE — TRANSFER OF CARE
"Anesthesia Transfer of Care Note    Patient: Nik Lowery    Procedure(s) Performed: * No procedures listed *    Patient location: [facility]    Anesthesia Type: general    Transport from OR: Transported from OR on 6-10 L/min O2 by face mask with adequate spontaneous ventilation    Post pain: adequate analgesia    Post assessment: no apparent anesthetic complications    Post vital signs: stable    Level of consciousness: awake    Nausea/Vomiting: no nausea/vomiting    Complications: none    Transfer of care protocol was followed    Last vitals: Visit Vitals  /67   Pulse 72   Temp 37.1 °C (98.8 °F) (Temporal)   Resp 16   Ht 5' 5" (1.651 m)   Wt 93.9 kg (207 lb)   SpO2 100%   Breastfeeding No   BMI 34.45 kg/m²     "

## 2024-12-13 NOTE — PLAN OF CARE
Pt arrived to IR for right hepatic lobe lesion biopsy with anesthesia. CRNA at bedside. Pt oriented to unit and staff. Plan of care reviewed with patient, patient verbalizes understanding. Comfort measures utilized. Pt safely transferred from stretcher to procedural table. Fall risk reviewed with patient, fall risk interventions maintained. Safety strap applied, positioner pillows utilized to minimize pressure points. Blankets applied. Pt prepped and draped utilizing standard sterile technique. Patient placed on continuous monitoring, as required by sedation policy. Timeouts completed utilizing standard universal time-out, per department and facility policy. RN to remain at bedside, continuous monitoring maintained per CRNA. Pt resting comfortably. Denies pain/discomfort. Will continue to monitor. See flow sheets for monitoring, medication administration, and updates.

## 2024-12-17 LAB
ADEQUACY: NORMAL
COMMENT: NORMAL
FINAL PATHOLOGIC DIAGNOSIS: NORMAL
GROSS: NORMAL
Lab: NORMAL

## 2024-12-17 RX ORDER — PEN NEEDLE, DIABETIC 30 GX3/16"
1 NEEDLE, DISPOSABLE MISCELLANEOUS DAILY
Qty: 30 EACH | Refills: 5 | Status: SHIPPED | OUTPATIENT
Start: 2024-12-17

## 2024-12-17 RX ORDER — INSULIN GLARGINE 300 [IU]/ML
20 INJECTION, SOLUTION SUBCUTANEOUS DAILY
Qty: 2 ML | Refills: 11 | Status: SHIPPED | OUTPATIENT
Start: 2024-12-17 | End: 2025-12-17

## 2024-12-17 NOTE — PROGRESS NOTES
Patient ID: Nik Lowery is a 58 y.o. female.    Chief Complaint: Medication Management (Entered automatically based on patient selection in [employer].)    The patient initiated a request through [employer] on 12/11/2024 for evaluation and management with a chief complaint of Medication Management (Entered automatically based on patient selection in [employer].)     I evaluated the questionnaire submission on 12/18/24.    Ohs Peq Evisit Medication    12/17/2024  3:49 PM CST - Filed by Patient   Do you agree to participate in an E-Visit? Yes   If you have any of the following symptoms, please present to your local emergency room or call 911:  I acknowledge   Medication requests for narcotics will not be addressed via an E-Visit.  Please schedule an appointment. I acknowledge   Choose the state of your primary residence [place]   Do you want to address a new or existing medication? I would like to address a medication I currently take   What is the main issue you would like addressed today? Review diabetes meds to keep it in control   Would you like to change or continue your medication? Change medication   What medication would you like changed?  Ozempic   What is your current dose? 1 mg   How often do you take your medication? 1 time weekly   Why do you need the medication changed? Side effects   List the side effects you had with the medication: Diarrhea and now i have to start chemotherapy   Have you stopped taking the medicine? Yes   Are you still having side effects? No    What medical condition is the  medication intended to treat? Diabetes   Are you able to take your vital signs? No         Encounter Diagnosis   Name Primary?    Uncontrolled type 2 diabetes mellitus with hyperglycemia, without long-term current use of insulin Yes        Orders Placed This Encounter   Procedures    Comprehensive Metabolic Panel     Standing Status:   Future     Standing Expiration Date:   2/15/2026    Hemoglobin A1C     Standing  "Status:   Future     Standing Expiration Date:   2/15/2026      Medications Ordered This Encounter   Medications    insulin glargine, TOUJEO, (TOUJEO SOLOSTAR U-300 INSULIN) 300 unit/mL (1.5 mL) InPn pen     Sig: Inject 20 Units into the skin once daily.     Dispense:  2 mL     Refill:  11    pen needle, diabetic (PEN NEEDLE) 31 gauge x 5/16" Ndle     Si Application by Misc.(Non-Drug; Combo Route) route once daily.     Dispense:  30 each     Refill:  5        No follow-ups on file.      E-Visit Time Tracking:    Day 1 Time (in minutes): 14  Day 2 Time (in minutes): 7    Total Time (in minutes): 21                "

## 2024-12-18 ENCOUNTER — OFFICE VISIT (OUTPATIENT)
Dept: HEMATOLOGY/ONCOLOGY | Facility: CLINIC | Age: 59
End: 2024-12-18
Payer: COMMERCIAL

## 2024-12-18 ENCOUNTER — TELEPHONE (OUTPATIENT)
Dept: PSYCHIATRY | Facility: CLINIC | Age: 59
End: 2024-12-18
Payer: COMMERCIAL

## 2024-12-18 VITALS
TEMPERATURE: 97 F | SYSTOLIC BLOOD PRESSURE: 123 MMHG | BODY MASS INDEX: 34.78 KG/M2 | HEIGHT: 65 IN | RESPIRATION RATE: 18 BRPM | HEART RATE: 89 BPM | WEIGHT: 208.75 LBS | DIASTOLIC BLOOD PRESSURE: 61 MMHG | OXYGEN SATURATION: 99 %

## 2024-12-18 DIAGNOSIS — C18.2 MALIGNANT NEOPLASM OF ASCENDING COLON: Primary | ICD-10-CM

## 2024-12-18 DIAGNOSIS — F32.A ANXIETY AND DEPRESSION: ICD-10-CM

## 2024-12-18 DIAGNOSIS — F41.9 ANXIETY AND DEPRESSION: ICD-10-CM

## 2024-12-18 DIAGNOSIS — C78.7 METASTASIS TO LIVER: Primary | ICD-10-CM

## 2024-12-18 DIAGNOSIS — C78.7 METASTASIS TO LIVER: ICD-10-CM

## 2024-12-18 PROCEDURE — 3074F SYST BP LT 130 MM HG: CPT | Mod: CPTII,S$GLB,, | Performed by: INTERNAL MEDICINE

## 2024-12-18 PROCEDURE — G2211 COMPLEX E/M VISIT ADD ON: HCPCS | Mod: S$GLB,,, | Performed by: INTERNAL MEDICINE

## 2024-12-18 PROCEDURE — 4010F ACE/ARB THERAPY RXD/TAKEN: CPT | Mod: CPTII,S$GLB,, | Performed by: INTERNAL MEDICINE

## 2024-12-18 PROCEDURE — 3072F LOW RISK FOR RETINOPATHY: CPT | Mod: CPTII,S$GLB,, | Performed by: INTERNAL MEDICINE

## 2024-12-18 PROCEDURE — 3044F HG A1C LEVEL LT 7.0%: CPT | Mod: CPTII,S$GLB,, | Performed by: INTERNAL MEDICINE

## 2024-12-18 PROCEDURE — 99215 OFFICE O/P EST HI 40 MIN: CPT | Mod: S$GLB,,, | Performed by: INTERNAL MEDICINE

## 2024-12-18 PROCEDURE — 3066F NEPHROPATHY DOC TX: CPT | Mod: CPTII,S$GLB,, | Performed by: INTERNAL MEDICINE

## 2024-12-18 PROCEDURE — 3078F DIAST BP <80 MM HG: CPT | Mod: CPTII,S$GLB,, | Performed by: INTERNAL MEDICINE

## 2024-12-18 PROCEDURE — 99999 PR PBB SHADOW E&M-EST. PATIENT-LVL III: CPT | Mod: PBBFAC,,, | Performed by: INTERNAL MEDICINE

## 2024-12-18 PROCEDURE — 3008F BODY MASS INDEX DOCD: CPT | Mod: CPTII,S$GLB,, | Performed by: INTERNAL MEDICINE

## 2024-12-18 PROCEDURE — 3060F POS MICROALBUMINURIA REV: CPT | Mod: CPTII,S$GLB,, | Performed by: INTERNAL MEDICINE

## 2024-12-18 NOTE — PROGRESS NOTES
HPI    58 years old female with history of diabetes type II, hypertension and herpes    She was recently diagnosed with cecal mass causing significant bleeding require hospitalization and urgent transfusion.  She received 2 units packed red blood cell during the course of hospitalization.  Colonoscopy showed likely malignant tumor ascending colon nearby cecum.  CT of the abdomen pelvis contrast demonstrate concerning cecal malignancy with to right lower quadrant abnormal lymph nodes concerning for metastases with indeterminate right hepatic lobe 10 mm lesion      Past Medical History:   Diagnosis Date    Colon cancer     Diabetes mellitus     Diabetes mellitus, type 2     Encounter for blood transfusion     Herpes     Hypertension     Sleep apnea      Social History     Socioeconomic History    Marital status: Single   Occupational History    Occupation: supply chain   Tobacco Use    Smoking status: Never    Smokeless tobacco: Never   Substance and Sexual Activity    Alcohol use: No    Drug use: No    Sexual activity: Not Currently     Social Drivers of Health     Financial Resource Strain: Low Risk  (11/26/2024)    Overall Financial Resource Strain (CARDIA)     Difficulty of Paying Living Expenses: Not very hard   Food Insecurity: No Food Insecurity (11/26/2024)    Hunger Vital Sign     Worried About Running Out of Food in the Last Year: Never true     Ran Out of Food in the Last Year: Never true   Transportation Needs: No Transportation Needs (11/7/2024)    TRANSPORTATION NEEDS     Transportation : No   Physical Activity: Insufficiently Active (11/26/2024)    Exercise Vital Sign     Days of Exercise per Week: 1 day     Minutes of Exercise per Session: 20 min   Stress: Stress Concern Present (11/26/2024)    [employer] - Occupational Stress Questionnaire     Feeling of Stress : Rather much   Housing Stability: Low Risk  (11/26/2024)    Housing Stability Vital Sign     Unable to  Pay for Housing in the Last Year: No     Homeless in the Last Year: No         Subjective      Review of Systems   Constitutional: Negative for appetite change, fatigue and unexpected weight change.   HENT: Negative for mouth sores.   Eyes: Negative for visual disturbance.   Respiratory: Negative for cough and shortness of breath.   Cardiovascular: Negative for chest pain.   Gastrointestinal: Negative for diarrhea.   Genitourinary: Negative for frequency.   Musculoskeletal: Negative for back pain.   Skin: Negative for rash.   Neurological: Negative for headaches.   Hematological: Negative for adenopathy.   Psychiatric/Behavioral: The patient is not nervous/anxious.   All other systems reviewed and are negative.     Objective    Physical Exam   There were no vitals filed for this visit.      Constitutional: patient is oriented to person, place, and time. patient appears well-developed and well-nourished. No distress.   HENT:   Right Ear: External ear normal.   Left Ear: External ear normal.   Nose: Nose normal.   Mouth/Throat: Oropharynx is clear and moist. No oropharyngeal exudate.   Teeth, gums and lips are normal   No sinus tenderness   Palate, tongue, posterior pharynx are normal   Eyes: Conjunctivae and lids are normal.   Neck: Trachea normal and normal range of motion. No thyromegaly   Cardiovascular: Normal rate, regular rhythm, normal heart sounds, intact distal pulses and normal pulses.   No murmur heard.   No edema, no tenderness in the extremities.   Pulmonary/Chest: Effort normal and breath sounds normal. No accessory muscle usage. patient has no wheezes..   Abdominal: Soft. Normal appearance and bowel sounds are normal. patient exhibits no distension and no mass. There is no hepatosplenomegaly. There is no tenderness.   Musculoskeletal: Normal range of motion.   Gait is normal   No clubbing, cyanosis     Lymphadenopathy:   Head (right side): No submental and no submandibular adenopathy present.   Head  (left side): No submental and no submandibular adenopathy present.   patient has no cervical adenopathy.   Right: No supraclavicular adenopathy present.   Left: No supraclavicular adenopathy present.   Neurological: patient is alert and oriented to person, place, and time. patient has normal strength and normal reflexes. No sensory deficit. Gait normal.   Skin: Skin is warm, dry and intact. No bruising, no lesion and no rash noted. No cyanosis. Nails show no clubbing.   No lesions   Psychiatric: patient has a normal mood and affect. patient speech is normal and behavior is normal. Judgment normal. Cognition and memory are normal.   Vitals reviewed.     Lab Results   Component Value Date    WBC 8.29 12/09/2024    HGB 8.1 (L) 12/09/2024    HCT 27.6 (L) 12/09/2024    MCV 76 (L) 12/09/2024     12/09/2024       CMP  Sodium   Date Value Ref Range Status   12/09/2024 137 136 - 145 mmol/L Final     Potassium   Date Value Ref Range Status   12/09/2024 4.4 3.5 - 5.1 mmol/L Final     Chloride   Date Value Ref Range Status   12/09/2024 104 95 - 110 mmol/L Final     CO2   Date Value Ref Range Status   12/09/2024 19 (L) 23 - 29 mmol/L Final     Glucose   Date Value Ref Range Status   12/09/2024 244 (H) 70 - 110 mg/dL Final     BUN   Date Value Ref Range Status   12/09/2024 18 6 - 20 mg/dL Final     Creatinine   Date Value Ref Range Status   12/09/2024 1.0 0.5 - 1.4 mg/dL Final     Calcium   Date Value Ref Range Status   12/09/2024 9.3 8.7 - 10.5 mg/dL Final     Total Protein   Date Value Ref Range Status   12/09/2024 7.4 6.0 - 8.4 g/dL Final     Albumin   Date Value Ref Range Status   12/09/2024 3.9 3.5 - 5.2 g/dL Final     Total Bilirubin   Date Value Ref Range Status   12/09/2024 0.4 0.1 - 1.0 mg/dL Final     Comment:     For infants and newborns, interpretation of results should be based  on gestational age, weight and in agreement with clinical  observations.    Premature Infant recommended reference ranges:  Up to 24  hours.............<8.0 mg/dL  Up to 48 hours............<12.0 mg/dL  3-5 days..................<15.0 mg/dL  6-29 days.................<15.0 mg/dL       Alkaline Phosphatase   Date Value Ref Range Status   12/09/2024 83 40 - 150 U/L Final     AST   Date Value Ref Range Status   12/09/2024 38 10 - 40 U/L Final     ALT   Date Value Ref Range Status   12/09/2024 34 10 - 44 U/L Final     Anion Gap   Date Value Ref Range Status   12/09/2024 14 8 - 16 mmol/L Final     eGFR   Date Value Ref Range Status   12/09/2024 >60 >60 mL/min/1.73 m^2 Final     CT chest abdomen pelvis with contrast   Impression:     Findings concerning for cecal malignancy.  There are least 2 right lower quadrant abnormal lymph nodes concerning for metastatic disease.     Indeterminate right hepatic lobe 10 mm lesion.  Recommend further characterization with liver mass protocol MRI.     Small fat containing umbilical and infraumbilical hernias.     Assessment    cecum malignancy + right hepatic lobe lesions consistent with colonic malignancy.      CT demonstrated possible liver involvement with 10 mm right hepatic lobe lesions.    Dr Juarez to place chemo port    No evidence of acute bleeding.  Her hemoglobin has been improving.    Status post IR biopsy liver positive for malignancy    > discuss with Dr. Henry - oligo metastases still will be treated as curative intent start on neoadjuvant FOLFOX times six-month.  During the interim three-month into the treatment we will scan patient's CT chest abdomen pelvis and the liver protocol restaging.    > Right Hepatic lobe lesion CT biopsy result:  MSI intact.  Metastatic carcinoma consistent with colon origin    > has diabetes we will consult nutrition    > anxiety consult oncology psychiatry    > pharmacology Pgx    > NGS [facility]        Plan    There are no diagnoses linked to this encounter.

## 2024-12-19 ENCOUNTER — TELEPHONE (OUTPATIENT)
Dept: HEMATOLOGY/ONCOLOGY | Facility: CLINIC | Age: 59
End: 2024-12-19
Payer: COMMERCIAL

## 2024-12-19 ENCOUNTER — LAB VISIT (OUTPATIENT)
Dept: LAB | Facility: HOSPITAL | Age: 59
End: 2024-12-19
Attending: INTERNAL MEDICINE
Payer: COMMERCIAL

## 2024-12-19 ENCOUNTER — OFFICE VISIT (OUTPATIENT)
Dept: SURGERY | Facility: CLINIC | Age: 59
End: 2024-12-19
Payer: COMMERCIAL

## 2024-12-19 ENCOUNTER — ANESTHESIA EVENT (OUTPATIENT)
Dept: SURGERY | Facility: HOSPITAL | Age: 59
End: 2024-12-19
Payer: COMMERCIAL

## 2024-12-19 VITALS
RESPIRATION RATE: 16 BRPM | WEIGHT: 208.75 LBS | HEART RATE: 87 BPM | BODY MASS INDEX: 34.78 KG/M2 | HEIGHT: 65 IN | TEMPERATURE: 97 F | SYSTOLIC BLOOD PRESSURE: 139 MMHG | DIASTOLIC BLOOD PRESSURE: 77 MMHG

## 2024-12-19 DIAGNOSIS — C18.2 MALIGNANT NEOPLASM OF ASCENDING COLON: ICD-10-CM

## 2024-12-19 DIAGNOSIS — Z85.038 HISTORY OF COLON CANCER, STAGE IV: Primary | ICD-10-CM

## 2024-12-19 DIAGNOSIS — C78.7 METASTASIS TO LIVER: ICD-10-CM

## 2024-12-19 LAB
ALBUMIN SERPL BCP-MCNC: 3.8 G/DL (ref 3.5–5.2)
ALP SERPL-CCNC: 81 U/L (ref 40–150)
ALT SERPL W/O P-5'-P-CCNC: 27 U/L (ref 10–44)
ANION GAP SERPL CALC-SCNC: 11 MMOL/L (ref 8–16)
AST SERPL-CCNC: 28 U/L (ref 10–40)
BASOPHILS # BLD AUTO: 0.05 K/UL (ref 0–0.2)
BASOPHILS NFR BLD: 0.7 % (ref 0–1.9)
BILIRUB SERPL-MCNC: 0.3 MG/DL (ref 0.1–1)
BUN SERPL-MCNC: 19 MG/DL (ref 6–20)
CALCIUM SERPL-MCNC: 9.2 MG/DL (ref 8.7–10.5)
CEA SERPL-MCNC: 5.6 NG/ML (ref 0–5)
CHLORIDE SERPL-SCNC: 104 MMOL/L (ref 95–110)
CO2 SERPL-SCNC: 21 MMOL/L (ref 23–29)
CREAT SERPL-MCNC: 1 MG/DL (ref 0.5–1.4)
DIFFERENTIAL METHOD BLD: ABNORMAL
EOSINOPHIL # BLD AUTO: 0.3 K/UL (ref 0–0.5)
EOSINOPHIL NFR BLD: 4.8 % (ref 0–8)
ERYTHROCYTE [DISTWIDTH] IN BLOOD BY AUTOMATED COUNT: 20 % (ref 11.5–14.5)
EST. GFR  (NO RACE VARIABLE): >60 ML/MIN/1.73 M^2
GLUCOSE SERPL-MCNC: 212 MG/DL (ref 70–110)
HCT VFR BLD AUTO: 25.8 % (ref 37–48.5)
HGB BLD-MCNC: 7.5 G/DL (ref 12–16)
IMM GRANULOCYTES # BLD AUTO: 0.02 K/UL (ref 0–0.04)
IMM GRANULOCYTES NFR BLD AUTO: 0.3 % (ref 0–0.5)
LYMPHOCYTES # BLD AUTO: 1.6 K/UL (ref 1–4.8)
LYMPHOCYTES NFR BLD: 24.5 % (ref 18–48)
MCH RBC QN AUTO: 21.7 PG (ref 27–31)
MCHC RBC AUTO-ENTMCNC: 29.1 G/DL (ref 32–36)
MCV RBC AUTO: 75 FL (ref 82–98)
MONOCYTES # BLD AUTO: 0.3 K/UL (ref 0.3–1)
MONOCYTES NFR BLD: 4.9 % (ref 4–15)
NEUTROPHILS # BLD AUTO: 4.3 K/UL (ref 1.8–7.7)
NEUTROPHILS NFR BLD: 64.8 % (ref 38–73)
NRBC BLD-RTO: 0 /100 WBC
PLATELET # BLD AUTO: 268 K/UL (ref 150–450)
PMV BLD AUTO: 9.6 FL (ref 9.2–12.9)
POTASSIUM SERPL-SCNC: 4.4 MMOL/L (ref 3.5–5.1)
PROT SERPL-MCNC: 7.3 G/DL (ref 6–8.4)
RBC # BLD AUTO: 3.46 M/UL (ref 4–5.4)
SODIUM SERPL-SCNC: 136 MMOL/L (ref 136–145)
WBC # BLD AUTO: 6.7 K/UL (ref 3.9–12.7)

## 2024-12-19 PROCEDURE — 99999 PR PBB SHADOW E&M-EST. PATIENT-LVL IV: CPT | Mod: PBBFAC,,, | Performed by: STUDENT IN AN ORGANIZED HEALTH CARE EDUCATION/TRAINING PROGRAM

## 2024-12-19 PROCEDURE — 85025 COMPLETE CBC W/AUTO DIFF WBC: CPT | Performed by: INTERNAL MEDICINE

## 2024-12-19 PROCEDURE — 80053 COMPREHEN METABOLIC PANEL: CPT | Performed by: INTERNAL MEDICINE

## 2024-12-19 PROCEDURE — 36415 COLL VENOUS BLD VENIPUNCTURE: CPT | Performed by: INTERNAL MEDICINE

## 2024-12-19 PROCEDURE — 82378 CARCINOEMBRYONIC ANTIGEN: CPT | Performed by: INTERNAL MEDICINE

## 2024-12-19 NOTE — TELEPHONE ENCOUNTER
Faxed [facility] requisition order form along with path report, face sheet and last progress note to [phone].

## 2024-12-19 NOTE — PROGRESS NOTES
History & Physical    Subjective     History of Present Illness:  Patient is a 58 y.o. female presents with cecal cancer with likely node positivity based off of PET scan as well as new small liver lesions which are too small to characterize on imaging.  She is undergoing attempted IR biopsy next week.    Interval history:   Patient underwent IR biopsy which was positive for metastatic disease    No chief complaint on file.      Review of patient's allergies indicates:   Allergen Reactions    Robaxin [methocarbamol] Nausea Only       Current Outpatient Medications   Medication Sig Dispense Refill    acetaminophen (TYLENOL EXTRA STRENGTH ORAL) Take 1,300 mg by mouth as needed.      atorvastatin (LIPITOR) 20 MG tablet TAKE 1 TABLET BY MOUTH EVERY DAY 90 tablet 3    blood-glucose meter kit To check BG 3 times daily, to use with insurance preferred meter 1 each 0    fexofenadine (ALLEGRA) 180 MG tablet Take 180 mg by mouth nightly.      fluticasone propionate (FLONASE) 50 mcg/actuation nasal spray 2 sprays (100 mcg total) by Each Nostril route once daily. 16 g 11    gabapentin (NEURONTIN) 800 MG tablet Take 800 mg by mouth 3 (three) times daily.      guaiFENesin (MUCINEX) 1,200 mg Ta12 Take 1 tablet by mouth nightly.      ibuprofen (ADVIL,MOTRIN) 600 MG tablet Take 1 tablet (600 mg total) by mouth every 8 (eight) hours as needed for Pain. 20 tablet 0    insulin glargine, TOUJEO, (TOUJEO SOLOSTAR U-300 INSULIN) 300 unit/mL (1.5 mL) InPn pen Inject 20 Units into the skin once daily. 2 mL 11    lamoTRIgine (LAMICTAL) 150 MG Tab TAKE 1 TABLET BY MOUTH EVERY DAY (Patient taking differently: Take 150 mg by mouth every evening.) 90 tablet 3    lancets Misc To check BG 3 times daily, to use with insurance preferred meter 200 each 1    lisinopriL (PRINIVIL,ZESTRIL) 20 MG tablet Take 1 tablet (20 mg total) by mouth once daily. 90 tablet 3    metFORMIN (GLUCOPHAGE-XR) 500 MG ER 24hr tablet TAKE 2 TABLETS BY MOUTH TWICE A DAY WITH  "MEALS 360 tablet 1    multivitamin (THERAGRAN) per tablet Take 1 tablet by mouth once daily.      omeprazole (PRILOSEC) 20 MG capsule Take 20 mg by mouth every evening.      ONETOUCH ULTRA BLUE TEST STRIP Strp USE TO CHECK BLOOD SUGAR 3 TIMES A  strip 0    pantoprazole (PROTONIX) 20 MG tablet Take 1 tablet (20 mg total) by mouth once daily. 90 tablet 3    pen needle, diabetic (PEN NEEDLE) 31 gauge x 5/16" Ndle 1 Application by Misc.(Non-Drug; Combo Route) route once daily. 30 each 5    sertraline (ZOLOFT) 100 MG tablet Take 1 tablet (100 mg total) by mouth once daily. 90 tablet 3    valACYclovir (VALTREX) 500 MG tablet TAKE 1 TABLET BY MOUTH TWICE A  tablet 3     Current Facility-Administered Medications   Medication Dose Route Frequency Provider Last Rate Last Admin    acetaminophen tablet 650 mg  650 mg Oral PRN Amarjit Crawford MD        diphenhydrAMINE capsule 25 mg  25 mg Oral PRN Amarjit Crawford MD        sodium chloride 0.9% flush 10 mL  10 mL Intravenous PRN Amarjit Crawford MD           Past Medical History:   Diagnosis Date    Colon cancer     Diabetes mellitus     Diabetes mellitus, type 2     Encounter for blood transfusion     Herpes     Hypertension     Sleep apnea      Past Surgical History:   Procedure Laterality Date    COLONOSCOPY N/A 11/8/2024    Procedure: COLONOSCOPY;  Surgeon: Saw Wick MD;  Location: [facility];  Service: Endoscopy;  Laterality: N/A;    ESOPHAGOGASTRODUODENOSCOPY N/A 11/8/2024    Procedure: EGD (ESOPHAGOGASTRODUODENOSCOPY);  Surgeon: Saw Wick MD;  Location: [facility];  Service: Endoscopy;  Laterality: N/A;    EYE SURGERY  2002    lasix Bilateral    HYSTERECTOMY  2012    LUMBAR DISCECTOMY      WISDOM TOOTH EXTRACTION       Family History   Problem Relation Name Age of Onset    Heart disease Mother      Hypertension Mother      Cataracts Mother      Pancreatic cancer Father      Cancer Father      Arthritis Sister 2     Diabetes Brother 1     No Known " "Problems Paternal Aunt 1     Heart disease Paternal Uncle 2     Heart disease Maternal Grandfather      Alzheimer's disease Paternal Grandmother      Glaucoma Neg Hx       Social History     Tobacco Use    Smoking status: Never    Smokeless tobacco: Never   Substance Use Topics    Alcohol use: No    Drug use: No        Review of Systems:  Review of Systems   Constitutional:  Negative for fever.   HENT: Negative.     Eyes: Negative.    Respiratory: Negative.     Cardiovascular: Negative.    Gastrointestinal: Negative.    Endocrine: Negative.    Genitourinary: Negative.    Musculoskeletal: Negative.    Skin: Negative.    Allergic/Immunologic: Negative.    Neurological: Negative.    Hematological: Negative.    Psychiatric/Behavioral: Negative.            Objective     Vital Signs (Most Recent)  Temp: 97.3 °F (36.3 °C) (12/19/24 1000)  Pulse: 87 (12/19/24 1000)  Resp: 16 (12/19/24 1000)  BP: 139/77 (12/19/24 1000)  5' 5" (1.651 m)  94.7 kg (208 lb 12.4 oz)     Physical Exam:  Physical Exam  Constitutional:       General: She is not in acute distress.     Appearance: Normal appearance. She is not ill-appearing, toxic-appearing or diaphoretic.   HENT:      Head: Normocephalic.      Nose: Nose normal.   Eyes:      Conjunctiva/sclera: Conjunctivae normal.   Cardiovascular:      Rate and Rhythm: Normal rate and regular rhythm.   Pulmonary:      Effort: Pulmonary effort is normal.   Abdominal:      Palpations: Abdomen is soft.   Musculoskeletal:         General: Normal range of motion.      Cervical back: Normal range of motion.   Skin:     General: Skin is warm.   Neurological:      General: No focal deficit present.      Mental Status: She is alert.   Psychiatric:         Mood and Affect: Mood normal.         Laboratory  H&H has remained relatively stable    Diagnostic Results:  Relevant imaging has been reviewed.  Cecal mass with likely positive nodes on PET imaging.  MRI shows liver lesions    Metastatic disease in the " liver on IR biopsy       Assessment and Plan   58-year-old female with colon cancer, stage IV    PLAN:  Discussed proceeding with port placement for neoadjuvant chemotherapy.  Consent was obtained.  Patient is on the schedule for tomorrow.

## 2024-12-19 NOTE — H&P (VIEW-ONLY)
History & Physical    Subjective     History of Present Illness:  Patient is a 58 y.o. female presents with cecal cancer with likely node positivity based off of PET scan as well as new small liver lesions which are too small to characterize on imaging.  She is undergoing attempted IR biopsy next week.    Interval history:   Patient underwent IR biopsy which was positive for metastatic disease    No chief complaint on file.      Review of patient's allergies indicates:   Allergen Reactions    Robaxin [methocarbamol] Nausea Only       Current Outpatient Medications   Medication Sig Dispense Refill    acetaminophen (TYLENOL EXTRA STRENGTH ORAL) Take 1,300 mg by mouth as needed.      atorvastatin (LIPITOR) 20 MG tablet TAKE 1 TABLET BY MOUTH EVERY DAY 90 tablet 3    blood-glucose meter kit To check BG 3 times daily, to use with insurance preferred meter 1 each 0    fexofenadine (ALLEGRA) 180 MG tablet Take 180 mg by mouth nightly.      fluticasone propionate (FLONASE) 50 mcg/actuation nasal spray 2 sprays (100 mcg total) by Each Nostril route once daily. 16 g 11    gabapentin (NEURONTIN) 800 MG tablet Take 800 mg by mouth 3 (three) times daily.      guaiFENesin (MUCINEX) 1,200 mg Ta12 Take 1 tablet by mouth nightly.      ibuprofen (ADVIL,MOTRIN) 600 MG tablet Take 1 tablet (600 mg total) by mouth every 8 (eight) hours as needed for Pain. 20 tablet 0    insulin glargine, TOUJEO, (TOUJEO SOLOSTAR U-300 INSULIN) 300 unit/mL (1.5 mL) InPn pen Inject 20 Units into the skin once daily. 2 mL 11    lamoTRIgine (LAMICTAL) 150 MG Tab TAKE 1 TABLET BY MOUTH EVERY DAY (Patient taking differently: Take 150 mg by mouth every evening.) 90 tablet 3    lancets Misc To check BG 3 times daily, to use with insurance preferred meter 200 each 1    lisinopriL (PRINIVIL,ZESTRIL) 20 MG tablet Take 1 tablet (20 mg total) by mouth once daily. 90 tablet 3    metFORMIN (GLUCOPHAGE-XR) 500 MG ER 24hr tablet TAKE 2 TABLETS BY MOUTH TWICE A DAY WITH  "MEALS 360 tablet 1    multivitamin (THERAGRAN) per tablet Take 1 tablet by mouth once daily.      omeprazole (PRILOSEC) 20 MG capsule Take 20 mg by mouth every evening.      ONETOUCH ULTRA BLUE TEST STRIP Strp USE TO CHECK BLOOD SUGAR 3 TIMES A  strip 0    pantoprazole (PROTONIX) 20 MG tablet Take 1 tablet (20 mg total) by mouth once daily. 90 tablet 3    pen needle, diabetic (PEN NEEDLE) 31 gauge x 5/16" Ndle 1 Application by Misc.(Non-Drug; Combo Route) route once daily. 30 each 5    sertraline (ZOLOFT) 100 MG tablet Take 1 tablet (100 mg total) by mouth once daily. 90 tablet 3    valACYclovir (VALTREX) 500 MG tablet TAKE 1 TABLET BY MOUTH TWICE A  tablet 3     Current Facility-Administered Medications   Medication Dose Route Frequency Provider Last Rate Last Admin    acetaminophen tablet 650 mg  650 mg Oral PRN Amarjit Crawford MD        diphenhydrAMINE capsule 25 mg  25 mg Oral PRN Amarjit Crawford MD        sodium chloride 0.9% flush 10 mL  10 mL Intravenous PRN Amarjit Crawford MD           Past Medical History:   Diagnosis Date    Colon cancer     Diabetes mellitus     Diabetes mellitus, type 2     Encounter for blood transfusion     Herpes     Hypertension     Sleep apnea      Past Surgical History:   Procedure Laterality Date    COLONOSCOPY N/A 11/8/2024    Procedure: COLONOSCOPY;  Surgeon: Saw Wick MD;  Location[facility];  Service: Endoscopy;  Laterality: N/A;    ESOPHAGOGASTRODUODENOSCOPY N/A 11/8/2024    Procedure: EGD (ESOPHAGOGASTRODUODENOSCOPY);  Surgeon: Saw Wick MD;  Location: [facility];  Service: Endoscopy;  Laterality: N/A;    EYE SURGERY  2002    lasix Bilateral    HYSTERECTOMY  2012    LUMBAR DISCECTOMY      WISDOM TOOTH EXTRACTION       Family History   Problem Relation Name Age of Onset    Heart disease Mother      Hypertension Mother      Cataracts Mother      Pancreatic cancer Father      Cancer Father      Arthritis Sister 2     Diabetes Brother 1     No Known " "Problems Paternal Aunt 1     Heart disease Paternal Uncle 2     Heart disease Maternal Grandfather      Alzheimer's disease Paternal Grandmother      Glaucoma Neg Hx       Social History     Tobacco Use    Smoking status: Never    Smokeless tobacco: Never   Substance Use Topics    Alcohol use: No    Drug use: No        Review of Systems:  Review of Systems   Constitutional:  Negative for fever.   HENT: Negative.     Eyes: Negative.    Respiratory: Negative.     Cardiovascular: Negative.    Gastrointestinal: Negative.    Endocrine: Negative.    Genitourinary: Negative.    Musculoskeletal: Negative.    Skin: Negative.    Allergic/Immunologic: Negative.    Neurological: Negative.    Hematological: Negative.    Psychiatric/Behavioral: Negative.            Objective     Vital Signs (Most Recent)  Temp: 97.3 °F (36.3 °C) (12/19/24 1000)  Pulse: 87 (12/19/24 1000)  Resp: 16 (12/19/24 1000)  BP: 139/77 (12/19/24 1000)  5' 5" (1.651 m)  94.7 kg (208 lb 12.4 oz)     Physical Exam:  Physical Exam  Constitutional:       General: She is not in acute distress.     Appearance: Normal appearance. She is not ill-appearing, toxic-appearing or diaphoretic.   HENT:      Head: Normocephalic.      Nose: Nose normal.   Eyes:      Conjunctiva/sclera: Conjunctivae normal.   Cardiovascular:      Rate and Rhythm: Normal rate and regular rhythm.   Pulmonary:      Effort: Pulmonary effort is normal.   Abdominal:      Palpations: Abdomen is soft.   Musculoskeletal:         General: Normal range of motion.      Cervical back: Normal range of motion.   Skin:     General: Skin is warm.   Neurological:      General: No focal deficit present.      Mental Status: She is alert.   Psychiatric:         Mood and Affect: Mood normal.         Laboratory  H&H has remained relatively stable    Diagnostic Results:  Relevant imaging has been reviewed.  Cecal mass with likely positive nodes on PET imaging.  MRI shows liver lesions    Metastatic disease in the " liver on IR biopsy       Assessment and Plan   58-year-old female with colon cancer, stage IV    PLAN:  Discussed proceeding with port placement for neoadjuvant chemotherapy.  Consent was obtained.  Patient is on the schedule for tomorrow.

## 2024-12-20 ENCOUNTER — HOSPITAL ENCOUNTER (OUTPATIENT)
Facility: HOSPITAL | Age: 59
Discharge: HOME | End: 2024-12-20
Attending: STUDENT IN AN ORGANIZED HEALTH CARE EDUCATION/TRAINING PROGRAM | Admitting: STUDENT IN AN ORGANIZED HEALTH CARE EDUCATION/TRAINING PROGRAM
Payer: COMMERCIAL

## 2024-12-20 ENCOUNTER — ANESTHESIA (OUTPATIENT)
Dept: SURGERY | Facility: HOSPITAL | Age: 59
End: 2024-12-20
Payer: COMMERCIAL

## 2024-12-20 DIAGNOSIS — C18.9 COLON ADENOCARCINOMA: Primary | ICD-10-CM

## 2024-12-20 PROCEDURE — 63600175 PHARM REV CODE 636 W HCPCS: Performed by: NURSE ANESTHETIST, CERTIFIED REGISTERED

## 2024-12-20 PROCEDURE — 71000039 HC RECOVERY, EACH ADD'L HOUR: Performed by: STUDENT IN AN ORGANIZED HEALTH CARE EDUCATION/TRAINING PROGRAM

## 2024-12-20 PROCEDURE — 63600175 PHARM REV CODE 636 W HCPCS: Performed by: STUDENT IN AN ORGANIZED HEALTH CARE EDUCATION/TRAINING PROGRAM

## 2024-12-20 PROCEDURE — 36000706: Performed by: STUDENT IN AN ORGANIZED HEALTH CARE EDUCATION/TRAINING PROGRAM

## 2024-12-20 PROCEDURE — 37000008 HC ANESTHESIA 1ST 15 MINUTES: Performed by: STUDENT IN AN ORGANIZED HEALTH CARE EDUCATION/TRAINING PROGRAM

## 2024-12-20 PROCEDURE — 27200651 HC AIRWAY, LMA: Performed by: ANESTHESIOLOGY

## 2024-12-20 PROCEDURE — 71000015 HC POSTOP RECOV 1ST HR: Performed by: STUDENT IN AN ORGANIZED HEALTH CARE EDUCATION/TRAINING PROGRAM

## 2024-12-20 PROCEDURE — 77001 FLUOROGUIDE FOR VEIN DEVICE: CPT | Mod: 26,,, | Performed by: STUDENT IN AN ORGANIZED HEALTH CARE EDUCATION/TRAINING PROGRAM

## 2024-12-20 PROCEDURE — 25000003 PHARM REV CODE 250: Performed by: STUDENT IN AN ORGANIZED HEALTH CARE EDUCATION/TRAINING PROGRAM

## 2024-12-20 PROCEDURE — C1788 PORT, INDWELLING, IMP: HCPCS | Performed by: STUDENT IN AN ORGANIZED HEALTH CARE EDUCATION/TRAINING PROGRAM

## 2024-12-20 PROCEDURE — 25000003 PHARM REV CODE 250: Performed by: ANESTHESIOLOGY

## 2024-12-20 PROCEDURE — 36561 INSERT TUNNELED CV CATH: CPT | Mod: RT,,, | Performed by: STUDENT IN AN ORGANIZED HEALTH CARE EDUCATION/TRAINING PROGRAM

## 2024-12-20 PROCEDURE — 37000009 HC ANESTHESIA EA ADD 15 MINS: Performed by: STUDENT IN AN ORGANIZED HEALTH CARE EDUCATION/TRAINING PROGRAM

## 2024-12-20 PROCEDURE — 36000707: Performed by: STUDENT IN AN ORGANIZED HEALTH CARE EDUCATION/TRAINING PROGRAM

## 2024-12-20 PROCEDURE — 71000033 HC RECOVERY, INTIAL HOUR: Performed by: STUDENT IN AN ORGANIZED HEALTH CARE EDUCATION/TRAINING PROGRAM

## 2024-12-20 DEVICE — POWERPORT M.R.I. IMPLANTABLE PORT WITH ATTACHABLE 8F CHRONOFLEX OPEN-ENDED SINGLE-LUMEN VENOUS CATHETER INTERMEDIATE KIT  (WITH SUTURE PLUGS)
Type: IMPLANTABLE DEVICE | Site: CHEST | Status: FUNCTIONAL
Brand: POWERPORT M.R.I., CHRONOFLEX

## 2024-12-20 RX ORDER — PROPOFOL 10 MG/ML
VIAL (ML) INTRAVENOUS
Status: DISCONTINUED | OUTPATIENT
Start: 2024-12-20 | End: 2024-12-20

## 2024-12-20 RX ORDER — HEPARIN SODIUM,PORCINE 10 UNIT/ML
VIAL (ML) INTRAVENOUS
Status: DISCONTINUED | OUTPATIENT
Start: 2024-12-20 | End: 2024-12-20 | Stop reason: HOSPADM

## 2024-12-20 RX ORDER — CEFAZOLIN 2 G/1
2 INJECTION, POWDER, FOR SOLUTION INTRAMUSCULAR; INTRAVENOUS
Status: COMPLETED | OUTPATIENT
Start: 2024-12-20 | End: 2024-12-20

## 2024-12-20 RX ORDER — SODIUM CHLORIDE 9 MG/ML
INJECTION, SOLUTION INTRAVENOUS CONTINUOUS
Status: DISCONTINUED | OUTPATIENT
Start: 2024-12-20 | End: 2024-12-20 | Stop reason: HOSPADM

## 2024-12-20 RX ORDER — OXYCODONE HYDROCHLORIDE 5 MG/1
5 TABLET ORAL ONCE AS NEEDED
Status: DISCONTINUED | OUTPATIENT
Start: 2024-12-20 | End: 2024-12-20 | Stop reason: HOSPADM

## 2024-12-20 RX ORDER — DEXAMETHASONE SODIUM PHOSPHATE 4 MG/ML
INJECTION, SOLUTION INTRA-ARTICULAR; INTRALESIONAL; INTRAMUSCULAR; INTRAVENOUS; SOFT TISSUE
Status: DISCONTINUED | OUTPATIENT
Start: 2024-12-20 | End: 2024-12-20

## 2024-12-20 RX ORDER — LIDOCAINE HYDROCHLORIDE 20 MG/ML
INJECTION INTRAVENOUS
Status: DISCONTINUED | OUTPATIENT
Start: 2024-12-20 | End: 2024-12-20

## 2024-12-20 RX ORDER — BUPIVACAINE HYDROCHLORIDE AND EPINEPHRINE 2.5; 5 MG/ML; UG/ML
INJECTION, SOLUTION EPIDURAL; INFILTRATION; INTRACAUDAL; PERINEURAL
Status: DISCONTINUED | OUTPATIENT
Start: 2024-12-20 | End: 2024-12-20 | Stop reason: HOSPADM

## 2024-12-20 RX ORDER — ONDANSETRON HYDROCHLORIDE 2 MG/ML
4 INJECTION, SOLUTION INTRAVENOUS ONCE AS NEEDED
Status: DISCONTINUED | OUTPATIENT
Start: 2024-12-20 | End: 2024-12-20 | Stop reason: HOSPADM

## 2024-12-20 RX ORDER — FENTANYL CITRATE 50 UG/ML
INJECTION, SOLUTION INTRAMUSCULAR; INTRAVENOUS
Status: DISCONTINUED | OUTPATIENT
Start: 2024-12-20 | End: 2024-12-20

## 2024-12-20 RX ORDER — ACETAMINOPHEN 10 MG/ML
INJECTION, SOLUTION INTRAVENOUS
Status: DISCONTINUED | OUTPATIENT
Start: 2024-12-20 | End: 2024-12-20

## 2024-12-20 RX ORDER — MIDAZOLAM HYDROCHLORIDE 1 MG/ML
INJECTION INTRAMUSCULAR; INTRAVENOUS
Status: DISCONTINUED | OUTPATIENT
Start: 2024-12-20 | End: 2024-12-20

## 2024-12-20 RX ORDER — PHENYLEPHRINE HYDROCHLORIDE 10 MG/ML
INJECTION INTRAVENOUS
Status: DISCONTINUED | OUTPATIENT
Start: 2024-12-20 | End: 2024-12-20

## 2024-12-20 RX ORDER — FENTANYL CITRATE 50 UG/ML
25 INJECTION, SOLUTION INTRAMUSCULAR; INTRAVENOUS EVERY 5 MIN PRN
Status: DISCONTINUED | OUTPATIENT
Start: 2024-12-20 | End: 2024-12-20 | Stop reason: HOSPADM

## 2024-12-20 RX ORDER — LIDOCAINE HYDROCHLORIDE 10 MG/ML
1 INJECTION, SOLUTION EPIDURAL; INFILTRATION; INTRACAUDAL; PERINEURAL ONCE
Status: DISCONTINUED | OUTPATIENT
Start: 2024-12-20 | End: 2024-12-20 | Stop reason: HOSPADM

## 2024-12-20 RX ORDER — ONDANSETRON HYDROCHLORIDE 2 MG/ML
INJECTION, SOLUTION INTRAVENOUS
Status: DISCONTINUED | OUTPATIENT
Start: 2024-12-20 | End: 2024-12-20

## 2024-12-20 RX ADMIN — CEFAZOLIN 2 G: 2 INJECTION, POWDER, FOR SOLUTION INTRAMUSCULAR; INTRAVENOUS at 12:12

## 2024-12-20 RX ADMIN — FENTANYL CITRATE 50 MCG: 50 INJECTION, SOLUTION INTRAMUSCULAR; INTRAVENOUS at 12:12

## 2024-12-20 RX ADMIN — DEXAMETHASONE SODIUM PHOSPHATE 4 MG: 4 INJECTION, SOLUTION INTRA-ARTICULAR; INTRALESIONAL; INTRAMUSCULAR; INTRAVENOUS; SOFT TISSUE at 12:12

## 2024-12-20 RX ADMIN — LIDOCAINE HYDROCHLORIDE 100 MG: 20 INJECTION, SOLUTION INTRAVENOUS at 12:12

## 2024-12-20 RX ADMIN — PHENYLEPHRINE HYDROCHLORIDE 200 MCG: 10 INJECTION INTRAVENOUS at 01:12

## 2024-12-20 RX ADMIN — SODIUM CHLORIDE, SODIUM GLUCONATE, SODIUM ACETATE, POTASSIUM CHLORIDE, MAGNESIUM CHLORIDE, SODIUM PHOSPHATE, DIBASIC, AND POTASSIUM PHOSPHATE: .53; .5; .37; .037; .03; .012; .00082 INJECTION, SOLUTION INTRAVENOUS at 12:12

## 2024-12-20 RX ADMIN — ONDANSETRON 4 MG: 2 INJECTION INTRAMUSCULAR; INTRAVENOUS at 12:12

## 2024-12-20 RX ADMIN — PROPOFOL 170 MG: 10 INJECTION, EMULSION INTRAVENOUS at 12:12

## 2024-12-20 RX ADMIN — MIDAZOLAM HYDROCHLORIDE 2 MG: 1 INJECTION, SOLUTION INTRAMUSCULAR; INTRAVENOUS at 12:12

## 2024-12-20 RX ADMIN — ACETAMINOPHEN 1000 MG: 10 INJECTION INTRAVENOUS at 12:12

## 2024-12-20 NOTE — OP NOTE
[facility]  Surgery Department  Operative Note    SUMMARY     Date of Procedure: 12/20/2024     Procedure: Procedure(s) (LRB):  INSERTION, PORT-A-CATH (Right)     Surgeons and Role:     * Logan Juarez MD - Primary    Assisting Surgeon: None    Pre-Operative Diagnosis: Colon adenocarcinoma [C18.9]    Post-Operative Diagnosis: Post-Op Diagnosis Codes:     * Colon adenocarcinoma [C18.9]    Anesthesia: General    Operative Findings (including complications, if any):  Tunneled right IJ port    Description of Technical Procedures:  This is a 58-year-old female who presented with metastatic colon cancer.  She did consent to port placement for neoadjuvant chemo.  She was taken to the OR, placed supine, general endotracheal anesthesia was induced, the neck and chest were prepped and draped in usual fashion, and a time-out was completed.  Using ultrasound, the right internal jugular vein was cannulated with a hollow bore needle.  A wire was advanced and confirmed to be in appropriate location using ultrasound and fluoroscopy.  A location on the right chest wall was chosen for the port site.  A 3 cm incision was made sharply.  The port was secured within the pocket using Vicryl sutures.  The catheter was then tunneled from the chest wall to the wire exit site in the neck.  Using Seldinger technique and under fluoroscopic guidance, the wire was exchanged for a split sheath dilator without apparent complication.  The catheter was advanced down the sheath and the sheath was then removed.  Repeat fluoroscopic imaging confirmed that the catheter was in a slightly high position but otherwise was adequately placed.  The port was accessed, aspirated, and then flushed with heparinized saline.  The port pocket was closed in layers with Vicryl and Monocryl.  The wire exit site in the neck was closed with a buried Vicryl suture.  Dermabond was applied as a dressing.  Patient tolerated the entire procedure  without apparent complication, was extubated in the OR, brought to recovery in stable condition.  All counts were correct.    Significant Surgical Tasks Conducted by the Assistant(s), if Applicable: n/a    Estimated Blood Loss (EBL): 15 mL           Implants:   Implant Name Type Inv. Item Serial No.  Lot No. LRB No. Used Action   KIT POWERPORT SINGLE 8FR - [id number]  KIT POWERPORT SINGLE 8FR  [employer] [id number] 1 Implanted       Specimens:   Specimen (24h ago, onward)      None                    Condition: Good    Disposition: PACU - hemodynamically stable.    Attestation: Op Note Attestation: I was physically present and scrubbed for the entire procedure.

## 2024-12-20 NOTE — PLAN OF CARE
Pre-op complete. Family at bedside. Text notifications initiated. Pt denies need for safe. Belongings in postop.

## 2024-12-20 NOTE — ANESTHESIA PROCEDURE NOTES
Intubation    Date/Time: 12/20/2024 12:48 PM    Performed by: Oseas Fenton CRNA  Authorized by: Matti Swift MD    Intubation:     Induction:  Intravenous    Intubated:  N/a    Mask Ventilation:  N/a    Attempts:  1    Attempted By:  CRNA    Difficult Airway Encountered?: No      Complications:  None    Airway Device:  Supraglottic airway/LMA    Airway Device Size:  3.0    Style/Cuff Inflation:  Cuffed (inflated to minimal occlusive pressure)    Placement Verified By:  Capnometry    Complicating Factors:  None

## 2024-12-20 NOTE — ANESTHESIA PREPROCEDURE EVALUATION
12/20/2024  Nik Lowery is a 58 y.o., female.      Pre-op Assessment    I have reviewed the Patient Summary Reports.     I have reviewed the Nursing Notes. I have reviewed the NPO Status.   I have reviewed the Medications.     Review of Systems  Anesthesia Hx:  No problems with previous Anesthesia                Hematology/Oncology:                      Current/Recent Cancer.                Cardiovascular:  Exercise tolerance: good   Hypertension                                    Hypertension         Pulmonary:        Sleep Apnea                Hepatic/GI:     GERD Liver Disease,  Colon Ca              Endocrine:  Diabetes, type 2    Diabetes                    Obesity / BMI > 30  Psych:  Psychiatric History                  Physical Exam  General: Well nourished    Airway:  Mallampati: II   Mouth Opening: Normal  TM Distance: Normal  Tongue: Normal  Neck ROM: Normal ROM    Dental:  Intact    Chest/Lungs:  Clear to auscultation, Normal Respiratory Rate        Anesthesia Plan  Type of Anesthesia, risks & benefits discussed:    Anesthesia Type: Gen Supraglottic Airway  Intra-op Monitoring Plan: Standard ASA Monitors  Post Op Pain Control Plan: multimodal analgesia and IV/PO Opioids PRN  Induction:  IV and Inhalation  Informed Consent: Informed consent signed with the Patient and all parties understand the risks and agree with anesthesia plan.  All questions answered.   ASA Score: 3    Ready For Surgery From Anesthesia Perspective.     .

## 2024-12-20 NOTE — DISCHARGE SUMMARY
[facility]  Brief Operative Note    Surgery Date: 12/20/2024     Surgeons and Role:     * Logan Juarez MD - Primary    Assisting Surgeon: None    Pre-op Diagnosis:  Colon adenocarcinoma [C18.9]    Post-op Diagnosis:  Post-Op Diagnosis Codes:     * Colon adenocarcinoma [C18.9]    Procedure(s) (LRB):  INSERTION, PORT-A-CATH (Right)    Anesthesia: General    Operative Findings:  Tunneled right IJ port    Estimated Blood Loss: 15 mL         Specimens:   Specimen (24h ago, onward)      None              Discharge Note    OUTCOME: Patient tolerated treatment/procedure well without complication and is now ready for discharge.    DISPOSITION: Home or Self Care    FINAL DIAGNOSIS:  Colon adenocarcinoma    FOLLOWUP: In clinic    DISCHARGE INSTRUCTIONS:    Discharge Procedure Orders   Diet Adult Regular     Notify your health care provider if you experience any of the following:  redness, tenderness, or signs of infection (pain, swelling, redness, odor or green/yellow discharge around incision site)     Notify your health care provider if you experience any of the following:  severe uncontrolled pain     Notify your health care provider if you experience any of the following:  temperature >100.4     No dressing needed   Order Comments: Okay to shower.  No swimming or soaking for 2-3 weeks     Activity as tolerated

## 2024-12-20 NOTE — DISCHARGE INSTRUCTIONS
General Discharge Information:    1.  Do not drink alcoholic beverages including beer for 24 hours or as long as you are on pain medication..  2.  Do not drive a motor vehicle, operate machinery or power tools, or signs legal papers for 24 hours or as long as you are on pain medication.   3.  You may experience light-headedness, dizziness, and sleepiness following surgery. Please do not stay alone. A responsible adult should be with you for this 24 hour period.  4.  Go home and rest.  5. Progress slowly to a normal diet unless instructed.  Otherwise, begin with liquids such as soft drinks, then soup and crackers working up to solid foods. Drink plenty of nonalcoholic fluids.  6.  Certain anesthetics and pain medications produce nausea and vomiting in certain       individuals. If nausea becomes a problem at home, call you doctor.  7. A nurse will be calling you sometime after surgery. Do not be alarmed. This is our way of finding out how you are doing.  8. Several times every hour while you are awake, take 2-3 deep breaths and cough. If you had stomach surgery hold a pillow or rolled towel firmly against your stomach before you cough. This will help with any pain the cough might cause.  9. Several times every hour while you are awake, pump and flex your feet 5-6 times and do foot circles. This will help prevent blood clots.  10.Call your doctor for severe pain, bleeding, fever, or signs or symptoms of infection (pain, swelling, redness, foul odor, drainage).

## 2024-12-20 NOTE — ANESTHESIA POSTPROCEDURE EVALUATION
Anesthesia Post Evaluation    Patient: Nik Lowery    Procedure(s) Performed: Procedure(s) (LRB):  INSERTION, PORT-A-CATH (Right)    Final Anesthesia Type: general      Patient location during evaluation: PACU  Patient participation: Yes- Able to Participate  Level of consciousness: sedated and awake  Post-procedure vital signs: reviewed and stable  Pain management: adequate  Airway patency: patent    PONV status at discharge: No PONV  Anesthetic complications: no      Cardiovascular status: blood pressure returned to baseline and hemodynamically stable  Respiratory status: spontaneous ventilation  Hydration status: euvolemic  Follow-up not needed.              Vitals Value Taken Time   /66 12/20/24 1423   Temp 36.6 °C (97.9 °F) 12/20/24 1320   Pulse 76 12/20/24 1423   Resp 16 12/20/24 1423   SpO2 96 % 12/20/24 1423   Vitals shown include unfiled device data.      No case tracking events are documented in the log.      Pain/Pablo Score: Pablo Score: 10 (12/20/2024  2:15 PM)

## 2024-12-20 NOTE — TRANSFER OF CARE
"Anesthesia Transfer of Care Note    Patient: Nik Lowery    Procedure(s) Performed: Procedure(s) (LRB):  INSERTION, PORT-A-CATH (Right)    Patient location: PACU    Anesthesia Type: general    Transport from OR: Transported from OR on 2-3 L/min O2 by NC with adequate spontaneous ventilation    Post pain: adequate analgesia    Post assessment: no apparent anesthetic complications    Post vital signs: stable    Level of consciousness: awake    Nausea/Vomiting: no nausea/vomiting    Complications: none    Transfer of care protocol was followed      Last vitals: Visit Vitals  /72 (BP Location: Right arm, Patient Position: Lying)   Pulse 82   Temp 36.7 °C (98.1 °F) (Skin)   Resp 18   Ht 5' 5" (1.651 m)   Wt 94.3 kg (208 lb)   SpO2 98%   Breastfeeding No   BMI 34.61 kg/m²     "

## 2024-12-23 VITALS
HEART RATE: 73 BPM | TEMPERATURE: 98 F | HEIGHT: 65 IN | WEIGHT: 208 LBS | BODY MASS INDEX: 34.66 KG/M2 | DIASTOLIC BLOOD PRESSURE: 69 MMHG | SYSTOLIC BLOOD PRESSURE: 127 MMHG | RESPIRATION RATE: 16 BRPM | OXYGEN SATURATION: 98 %

## 2024-12-26 ENCOUNTER — OFFICE VISIT (OUTPATIENT)
Dept: HEMATOLOGY/ONCOLOGY | Facility: CLINIC | Age: 59
End: 2024-12-26
Payer: COMMERCIAL

## 2024-12-26 ENCOUNTER — PATIENT MESSAGE (OUTPATIENT)
Dept: HEMATOLOGY/ONCOLOGY | Facility: CLINIC | Age: 59
End: 2024-12-26

## 2024-12-26 ENCOUNTER — TUMOR BOARD CONFERENCE (OUTPATIENT)
Dept: HEMATOLOGY/ONCOLOGY | Facility: CLINIC | Age: 59
End: 2024-12-26
Payer: COMMERCIAL

## 2024-12-26 ENCOUNTER — LAB VISIT (OUTPATIENT)
Dept: LAB | Facility: HOSPITAL | Age: 59
End: 2024-12-26
Attending: NURSE PRACTITIONER
Payer: COMMERCIAL

## 2024-12-26 VITALS
SYSTOLIC BLOOD PRESSURE: 123 MMHG | OXYGEN SATURATION: 97 % | BODY MASS INDEX: 35.26 KG/M2 | HEART RATE: 91 BPM | WEIGHT: 211.63 LBS | HEIGHT: 65 IN | TEMPERATURE: 98 F | RESPIRATION RATE: 18 BRPM | DIASTOLIC BLOOD PRESSURE: 60 MMHG

## 2024-12-26 DIAGNOSIS — C18.2 MALIGNANT NEOPLASM OF ASCENDING COLON: ICD-10-CM

## 2024-12-26 DIAGNOSIS — C18.2 MALIGNANT NEOPLASM OF ASCENDING COLON: Primary | ICD-10-CM

## 2024-12-26 DIAGNOSIS — C78.7 METASTASIS TO LIVER: ICD-10-CM

## 2024-12-26 LAB
ALBUMIN SERPL BCP-MCNC: 3.9 G/DL (ref 3.5–5.2)
ALP SERPL-CCNC: 75 U/L (ref 40–150)
ALT SERPL W/O P-5'-P-CCNC: 22 U/L (ref 10–44)
ANION GAP SERPL CALC-SCNC: 11 MMOL/L (ref 8–16)
AST SERPL-CCNC: 30 U/L (ref 10–40)
BASOPHILS # BLD AUTO: 0.06 K/UL (ref 0–0.2)
BASOPHILS NFR BLD: 0.8 % (ref 0–1.9)
BILIRUB SERPL-MCNC: 0.3 MG/DL (ref 0.1–1)
BUN SERPL-MCNC: 17 MG/DL (ref 6–20)
CALCIUM SERPL-MCNC: 9.5 MG/DL (ref 8.7–10.5)
CHLORIDE SERPL-SCNC: 105 MMOL/L (ref 95–110)
CO2 SERPL-SCNC: 22 MMOL/L (ref 23–29)
CREAT SERPL-MCNC: 0.9 MG/DL (ref 0.5–1.4)
DIFFERENTIAL METHOD BLD: ABNORMAL
EOSINOPHIL # BLD AUTO: 0.3 K/UL (ref 0–0.5)
EOSINOPHIL NFR BLD: 4.1 % (ref 0–8)
ERYTHROCYTE [DISTWIDTH] IN BLOOD BY AUTOMATED COUNT: 19.5 % (ref 11.5–14.5)
EST. GFR  (NO RACE VARIABLE): >60 ML/MIN/1.73 M^2
FERRITIN SERPL-MCNC: 6 NG/ML (ref 20–300)
GLUCOSE SERPL-MCNC: 217 MG/DL (ref 70–110)
HCT VFR BLD AUTO: 24.5 % (ref 37–48.5)
HGB BLD-MCNC: 7 G/DL (ref 12–16)
IMM GRANULOCYTES # BLD AUTO: 0.02 K/UL (ref 0–0.04)
IMM GRANULOCYTES NFR BLD AUTO: 0.3 % (ref 0–0.5)
IRON SERPL-MCNC: 20 UG/DL (ref 30–160)
LYMPHOCYTES # BLD AUTO: 2.6 K/UL (ref 1–4.8)
LYMPHOCYTES NFR BLD: 34.7 % (ref 18–48)
MCH RBC QN AUTO: 21.8 PG (ref 27–31)
MCHC RBC AUTO-ENTMCNC: 28.6 G/DL (ref 32–36)
MCV RBC AUTO: 76 FL (ref 82–98)
MONOCYTES # BLD AUTO: 0.5 K/UL (ref 0.3–1)
MONOCYTES NFR BLD: 6.1 % (ref 4–15)
NEUTROPHILS # BLD AUTO: 4 K/UL (ref 1.8–7.7)
NEUTROPHILS NFR BLD: 54 % (ref 38–73)
NRBC BLD-RTO: 0 /100 WBC
PLATELET # BLD AUTO: 415 K/UL (ref 150–450)
PMV BLD AUTO: 9.3 FL (ref 9.2–12.9)
POTASSIUM SERPL-SCNC: 4.3 MMOL/L (ref 3.5–5.1)
PROT SERPL-MCNC: 7.4 G/DL (ref 6–8.4)
RBC # BLD AUTO: 3.21 M/UL (ref 4–5.4)
SATURATED IRON: 4 % (ref 20–50)
SODIUM SERPL-SCNC: 138 MMOL/L (ref 136–145)
TOTAL IRON BINDING CAPACITY: 533 UG/DL (ref 250–450)
TRANSFERRIN SERPL-MCNC: 381 MG/DL (ref 200–375)
WBC # BLD AUTO: 7.35 K/UL (ref 3.9–12.7)

## 2024-12-26 PROCEDURE — 85025 COMPLETE CBC W/AUTO DIFF WBC: CPT | Performed by: NURSE PRACTITIONER

## 2024-12-26 PROCEDURE — 83540 ASSAY OF IRON: CPT | Performed by: NURSE PRACTITIONER

## 2024-12-26 PROCEDURE — 99999 PR PBB SHADOW E&M-EST. PATIENT-LVL V: CPT | Mod: PBBFAC,,, | Performed by: NURSE PRACTITIONER

## 2024-12-26 PROCEDURE — 82728 ASSAY OF FERRITIN: CPT | Performed by: NURSE PRACTITIONER

## 2024-12-26 PROCEDURE — 80053 COMPREHEN METABOLIC PANEL: CPT | Performed by: NURSE PRACTITIONER

## 2024-12-26 PROCEDURE — 36415 COLL VENOUS BLD VENIPUNCTURE: CPT | Performed by: NURSE PRACTITIONER

## 2024-12-26 RX ORDER — PROCHLORPERAZINE MALEATE 10 MG
10 TABLET ORAL EVERY 6 HOURS PRN
Qty: 30 TABLET | Refills: 1 | Status: SHIPPED | OUTPATIENT
Start: 2024-12-26 | End: 2025-12-26

## 2024-12-26 RX ORDER — ONDANSETRON HYDROCHLORIDE 8 MG/1
8 TABLET, FILM COATED ORAL EVERY 8 HOURS PRN
Qty: 30 TABLET | Refills: 2 | Status: SHIPPED | OUTPATIENT
Start: 2024-12-26 | End: 2025-12-26

## 2024-12-26 RX ORDER — DEXAMETHASONE 4 MG/1
8 TABLET ORAL DAILY
Qty: 120 TABLET | Refills: 0 | Status: SHIPPED | OUTPATIENT
Start: 2024-12-26 | End: 2025-12-26

## 2024-12-26 RX ORDER — LIDOCAINE AND PRILOCAINE 25; 25 MG/G; MG/G
CREAM TOPICAL
Qty: 30 G | Refills: 0 | Status: SHIPPED | OUTPATIENT
Start: 2024-12-26

## 2024-12-26 RX ORDER — OLANZAPINE 5 MG/1
5 TABLET ORAL NIGHTLY
Qty: 6 TABLET | Refills: 11 | Status: SHIPPED | OUTPATIENT
Start: 2024-12-26 | End: 2024-12-26

## 2024-12-26 NOTE — PROGRESS NOTES
FOLLOW-UP APPOINTMENT    PATIENT:   Nik Lowery  :    1965  MR#:    [id number]  DATE OF VISIT:  2024      Chief Complaint:  Colon cancer with liver met    HPI:   Ms. Nik Lowery presents today for chemotherapy education.  She will be starting treatment with neoadjuvant FOLFOX every 2 weeks for the above diagnosis.       Review of Systems   Constitutional: Negative.  Negative for appetite change and unexpected weight change.   HENT:  Negative.  Negative for mouth sores.    Eyes: Negative.    Respiratory: Negative.  Negative for cough and shortness of breath.    Cardiovascular:  Negative for chest pain.   Gastrointestinal: Negative.  Negative for abdominal pain and diarrhea.   Endocrine: Negative.    Genitourinary: Negative.  Negative for frequency.    Musculoskeletal: Negative.  Negative for back pain.   Skin: Negative.  Negative for rash.   Neurological: Negative.  Negative for headaches.   Hematological: Negative.  Negative for adenopathy.   Psychiatric/Behavioral:  The patient is nervous/anxious.      Oncology History   Malignant neoplasm of ascending colon   2024 Initial Diagnosis    Malignant neoplasm of ascending colon     2024 -  Chemotherapy    Treatment Summary   Plan Name: OP GI [id number] (oxaliplatin leucovorin fluorouracil) Q2W  Treatment Goal: Control  Status: Active  Start Date: 2024 (Planned)  End Date: 2025 (Planned)  Provider: Amarjit Crawford MD  Chemotherapy: FLUOROURACIL 5 G/100 ML IV SOLN, 400 mg/m2, Intravenous, Clinic/HOD 1 time, 0 of 12 cycles  OXALIPLATIN CHEMO INFUSION, 85 mg/m2, Intravenous, Clinic/HOD 1 time, 0 of 12 cycles  FLUOROURACIL FOLFOX/FOLFIRI PUMP ORDERABLE, 2,400 mg/m2, Intravenous, Over 46 hours, 0 of 12 cycles     2024 Cancer Staged    Staging form: Colon and Rectum, AJCC 8th Edition  - Clinical: Stage BIBIANA (cT3, cN1a, cM1a)         Patient Active Problem List   Diagnosis    Uncontrolled type 2 diabetes mellitus with hyperglycemia,  without long-term current use of insulin    Hypertension associated with diabetes    Anxiety and depression    Gastroesophageal reflux disease without esophagitis    Obesity    Decreased ROM of lumbar spine    Hyperlipidemia associated with type 2 diabetes mellitus    Low back pain    Iron deficiency anemia    Malignant neoplasm of ascending colon    Metastasis to liver    Colon adenocarcinoma       Past Medical History:   Diagnosis Date    Colon cancer     Diabetes mellitus     Diabetes mellitus, type 2     Encounter for blood transfusion     Herpes     Hypertension     Sleep apnea        Past Surgical History:   Procedure Laterality Date    COLONOSCOPY N/A 11/8/2024    Procedure: COLONOSCOPY;  Surgeon: Saw Wick MD;  Location: [facility];  Service: Endoscopy;  Laterality: N/A;    ESOPHAGOGASTRODUODENOSCOPY N/A 11/8/2024    Procedure: EGD (ESOPHAGOGASTRODUODENOSCOPY);  Surgeon: Saw Wick MD;  Location: [facility];  Service: Endoscopy;  Laterality: N/A;    EYE SURGERY  2002    lasix Bilateral    HYSTERECTOMY  2012    INSERTION OF TUNNELED CENTRAL VENOUS CATHETER (CVC) WITH SUBCUTANEOUS PORT Right 12/20/2024    Procedure: INSERTION, PORT-A-CATH;  Surgeon: Logan Juarez MD;  Location: [facility];  Service: General;  Laterality: Right;    LUMBAR DISCECTOMY      WISDOM TOOTH EXTRACTION         Social History     Socioeconomic History    Marital status: Single   Occupational History    Occupation: supply chain   Tobacco Use    Smoking status: Never    Smokeless tobacco: Never   Substance and Sexual Activity    Alcohol use: No    Drug use: No    Sexual activity: Not Currently     Social Drivers of Health     Financial Resource Strain: Low Risk  (11/26/2024)    Overall Financial Resource Strain (CARDIA)     Difficulty of Paying Living Expenses: Not very hard   Food Insecurity: No Food Insecurity (11/26/2024)    Hunger Vital Sign     Worried About Running Out of Food in the Last Year: Never true     Ran Out  of Food in the Last Year: Never true   Transportation Needs: No Transportation Needs (11/7/2024)    TRANSPORTATION NEEDS     Transportation : No   Physical Activity: Insufficiently Active (11/26/2024)    Exercise Vital Sign     Days of Exercise per Week: 1 day     Minutes of Exercise per Session: 20 min   Stress: Stress Concern Present (11/26/2024)    [employer] - Occupational Stress Questionnaire     Feeling of Stress : Rather much   Housing Stability: Low Risk  (11/26/2024)    Housing Stability Vital Sign     Unable to Pay for Housing in the Last Year: No     Homeless in the Last Year: No       Family History   Problem Relation Name Age of Onset    Heart disease Mother      Hypertension Mother      Cataracts Mother      Pancreatic cancer Father      Cancer Father      Arthritis Sister 2     Diabetes Brother 1     No Known Problems Paternal Aunt 1     Heart disease Paternal Uncle 2     Heart disease Maternal Grandfather      Alzheimer's disease Paternal Grandmother      Glaucoma Neg Hx           Current Outpatient Medications:     acetaminophen (TYLENOL EXTRA STRENGTH ORAL), Take 1,300 mg by mouth as needed., Disp: , Rfl:     atorvastatin (LIPITOR) 20 MG tablet, TAKE 1 TABLET BY MOUTH EVERY DAY, Disp: 90 tablet, Rfl: 3    fexofenadine (ALLEGRA) 180 MG tablet, Take 180 mg by mouth nightly., Disp: , Rfl:     fluticasone propionate (FLONASE) 50 mcg/actuation nasal spray, 2 sprays (100 mcg total) by Each Nostril route once daily., Disp: 16 g, Rfl: 11    gabapentin (NEURONTIN) 800 MG tablet, Take 800 mg by mouth 3 (three) times daily., Disp: , Rfl:     guaiFENesin (MUCINEX) 1,200 mg Ta12, Take 1 tablet by mouth nightly., Disp: , Rfl:     ibuprofen (ADVIL,MOTRIN) 600 MG tablet, Take 1 tablet (600 mg total) by mouth every 8 (eight) hours as needed for Pain., Disp: 20 tablet, Rfl: 0    insulin glargine, TOUJEO, (TOUJEO SOLOSTAR U-300 INSULIN) 300 unit/mL (1.5 mL) InPn pen, Inject 20 Units into  "the skin once daily., Disp: 2 mL, Rfl: 11    lamoTRIgine (LAMICTAL) 150 MG Tab, TAKE 1 TABLET BY MOUTH EVERY DAY (Patient taking differently: Take 150 mg by mouth every evening.), Disp: 90 tablet, Rfl: 3    lancets Misc, To check BG 3 times daily, to use with insurance preferred meter, Disp: 200 each, Rfl: 1    metFORMIN (GLUCOPHAGE-XR) 500 MG ER 24hr tablet, TAKE 2 TABLETS BY MOUTH TWICE A DAY WITH MEALS, Disp: 360 tablet, Rfl: 1    multivitamin (THERAGRAN) per tablet, Take 1 tablet by mouth once daily., Disp: , Rfl:     omeprazole (PRILOSEC) 20 MG capsule, Take 20 mg by mouth every evening., Disp: , Rfl:     ONETOUCH ULTRA BLUE TEST STRIP Strp, USE TO CHECK BLOOD SUGAR 3 TIMES A DAY, Disp: 200 strip, Rfl: 0    pantoprazole (PROTONIX) 20 MG tablet, Take 1 tablet (20 mg total) by mouth once daily., Disp: 90 tablet, Rfl: 3    pen needle, diabetic (PEN NEEDLE) 31 gauge x 5/16" Ndle, 1 Application by Misc.(Non-Drug; Combo Route) route once daily., Disp: 30 each, Rfl: 5    sertraline (ZOLOFT) 100 MG tablet, Take 1 tablet (100 mg total) by mouth once daily., Disp: 90 tablet, Rfl: 3    valACYclovir (VALTREX) 500 MG tablet, TAKE 1 TABLET BY MOUTH TWICE A DAY, Disp: 180 tablet, Rfl: 3    blood-glucose meter kit, To check BG 3 times daily, to use with insurance preferred meter, Disp: 1 each, Rfl: 0    lisinopriL (PRINIVIL,ZESTRIL) 20 MG tablet, Take 1 tablet (20 mg total) by mouth once daily., Disp: 90 tablet, Rfl: 3    Current Facility-Administered Medications:     acetaminophen tablet 650 mg, 650 mg, Oral, PRN, Amarjit Crawford MD    diphenhydrAMINE capsule 25 mg, 25 mg, Oral, PRN, Amarjit Crawford MD    sodium chloride 0.9% flush 10 mL, 10 mL, Intravenous, PRNYvette Shaun, MD    Review of patient's allergies indicates:   Allergen Reactions    Robaxin [methocarbamol] Nausea Only       Physcial Examination  VITAL SIGNS:    Body surface area is 2.1 meters squared.   Pain Assessment: No pain   Vitals:    12/26/24 0959   BP: 123/60 " "  Pulse: 91   Resp: 18   Temp: 97.6 °F (36.4 °C)   TempSrc: Temporal   SpO2: 97%   Weight: 96 kg (211 lb 10.3 oz)   Height: 5' 5" (1.651 m)   PainSc: 0-No pain          Wt Readings from Last 5 Encounters:   12/26/24 96 kg (211 lb 10.3 oz)   12/20/24 94.3 kg (208 lb)   12/19/24 94.7 kg (208 lb 12.4 oz)   12/18/24 94.7 kg (208 lb 12.4 oz)   12/13/24 93.9 kg (207 lb)       GENERAL:  Nik Lowery is healthy-appearing 58 y.o. female, in no distress.   EYES:   Pupils equal, round, reactive.  Conjunctivae, sclera and lids normal.  HEENT: Head normocephalic and atraumatic, without alopecia.  Oropharynx is unremarkable.  No icterus, jaundice, stomatitis, mucositis, or ulceration is noted.  Ears are clear and unremarkable.  Nose, nares, and septum are unremarkable.    NECK:   No masses.  Thyroid and trachea are normal.    BREASTS:  Deferred.  RESPIRATORY: Clear to auscultation bilaterally.  Symmetrically effortless expansion.  No wheezing and no stridor.    CV: Heart reveals regular rate and rhythm without murmur, rub, or gallops.  ABDOMEN: Soft, non-tender.  No masses, no hernias, and no rebound or rigidity are noted.  /RECTAL:  Deferred.  LYMPHATICS: No preauricular, submandibular, cervical, supraclavicular, axillary, lymphadenopathy.  MUSCULOSKELETAL:Fair musculature, no atrophy.  No arthritic changes.  No edema or cyanosis. Back is without gross abnormal curvature.   NEUROLOGICAL: Cranial nerves II-XII grossly intact.  Motor and sensory exam intact.  SKIN:   No lesions, bruises, petechiae or rashes.  Good turgor.    PSYCHIATRIC: Patient is alert and oriented to time, place and person.  Mood and affect are appropriate.         Laboratory and Radiology   Lab Results   Component Value Date    WBC 6.70 12/19/2024    RBC 3.46 (L) 12/19/2024    HGB 7.5 (L) 12/19/2024    HCT 25.8 (L) 12/19/2024    MCV 75 (L) 12/19/2024    MCH 21.7 (L) 12/19/2024    MCHC 29.1 (L) 12/19/2024    RDW 20.0 (H) 12/19/2024     12/19/2024    " MPV 9.6 12/19/2024    GRAN 4.3 12/19/2024    GRAN 64.8 12/19/2024    LYMPH 1.6 12/19/2024    LYMPH 24.5 12/19/2024    MONO 0.3 12/19/2024    MONO 4.9 12/19/2024    EOS 0.3 12/19/2024    BASO 0.05 12/19/2024    EOSINOPHIL 4.8 12/19/2024    BASOPHIL 0.7 12/19/2024     BMP  Lab Results   Component Value Date     12/19/2024    K 4.4 12/19/2024     12/19/2024    CO2 21 (L) 12/19/2024    BUN 19 12/19/2024    CREATININE 1.0 12/19/2024    CALCIUM 9.2 12/19/2024    ANIONGAP 11 12/19/2024    ESTGFRAFRICA >60.0 05/21/2022    EGFRNONAA >60.0 05/21/2022     Lab Results   Component Value Date    ALT 27 12/19/2024    AST 28 12/19/2024    ALKPHOS 81 12/19/2024    BILITOT 0.3 12/19/2024     Results for orders placed or performed during the hospital encounter of 11/07/24 (from the past 2160 hours)   CT Chest Abdomen Pelvis With IV Contrast (XPD) Routine Oral Contrast    Narrative    EXAMINATION:  CT CHEST ABDOMEN PELVIS WITH IV CONTRAST (XPD)    CLINICAL HISTORY:  Metastatic disease evaluation; Other specified diseases of intestine    TECHNIQUE:  Low dose axial images, sagittal and coronal reformations were obtained from the thoracic inlet to the pubic symphysis following the IV administration of 100 mL of Omnipaque 350 and the oral administration of 30 ml of Omnipaque 350.    COMPARISON:  Abdominal ultrasound 07/14/2021, chest radiograph 11/07/2024    FINDINGS:  Heart size is normal.  No pericardial effusion.  Thoracic aorta and main pulmonary artery are normal in caliber.  No enlarged axillary, mediastinal or hilar lymph nodes.    Central airways are clear.  No pleural effusion, focal consolidation or pneumothorax.    Liver is normal in morphology and with smooth contour.  Too small to characterize hypoattenuating right hepatic lobe 10 mm lesion (series 3, image 36).  The portal, splenic and superior mesenteric veins are patent.    Gallbladder, spleen, adrenal glands and pancreas are normal.  No intra or extrahepatic  biliary ductal dilatation.    Kidneys enhance symmetrically without hydronephrosis.  Left renal 9 mm angiomyolipoma.    Abdominal aorta is normal in caliber.  No enlarged retroperitoneal lymph nodes.    Focal wall thickening of the cecum.  Mild surrounding mesenteric fat stranding.  Two enlarged right lower quadrant lymph nodes versus lymph node conglomerate.  The largest measures 2.9 x 1.6 x 1.5 cm (series 601, image 77).    Normal appendix.  No free fluid or free air. No bowel obstruction.    Bladder is smooth walled.  Uterus is absent.  No adnexal mass.  No enlarged pelvic lymph nodes.    Fat containing infraumbilical hernia neck measuring 8 mm.  Small fat containing umbilical hernia neck measuring 11 mm.    No acute or aggressive osseous abnormality.      Impression    Findings concerning for cecal malignancy.  There are least 2 right lower quadrant abnormal lymph nodes concerning for metastatic disease.    Indeterminate right hepatic lobe 10 mm lesion.  Recommend further characterization with liver mass protocol MRI.    Small fat containing umbilical and infraumbilical hernias.      Electronically signed by: Melly Gamboa  Date:    11/09/2024  Time:    10:33     Results for orders placed or performed during the hospital encounter of 11/15/24 (from the past 2160 hours)   NM PET CT FDG Whole Body    Impression    Hypermetabolic right lower quadrant mesenteric lymph nodes most consistent with metastatic disease.  The cecal lesion seen on prior CT is not appreciated on this exam, however there is physiologic uptake throughout the colon.  The hepatic lesions seen on prior MRI are not hypermetabolic, however are below the threshold of PET sensitivity.      Electronically signed by: Ken Gómez MD  Date:    11/15/2024  Time:    09:32     Results for orders placed or performed during the hospital encounter of 11/14/24 (from the past 2160 hours)   MRI Abdomen W WO Contrast    Impression    1. There are approximately 6  subcentimeter liver lesions with similar characteristics.  One of these corresponds to that seen at CT.  MRI is not able to further characterize these lesions due to small size, and these remain indeterminate.  Recommend continued attention at follow-up imaging with repeat surveillance in the 3-6 month range.  2. Hepatomegaly and borderline splenomegaly.      Electronically signed by: Tera Jc  Date:    11/14/2024  Time:    16:22       Pathology  Pathology Results  (Last 10 years)      None            TITLE: PLAN OF CARE FOR THE CHEMOTHERAPY PATIENT / TEACHING PROTOCOL    PURPOSE: To involve the patient / significant other in the plan of care and to provide teaching to the significant other & patient receiving chemotherapy.    LEVEL: Independent.    CONTENT: The Plan of Care for the chemotherapy patient is individualized and appropriate to the patients needs, strengths, limitations, & goals.  Education includes information regarding chemotherapy side effects, the treatment itself, and self-care  Activities.    GOAL / OUTCOME STANDARDS    PHYSIOLOGIC: The client will remain free or experience minimal side effects or toxicities throughout the chemotherapy treatment period.     PSYCHOLOGIC: The client/significant others will demonstrate positive coping mechanisms in relation to chemotherapy and its side effects.      COGINITIVE: The client/significant others will verbalize understanding of self-care measure to avoid/minimize side effects of the chemotherapy regime.    EVALUATION / COMMENT KEY:    V = Audiovisual/Video  S = Successfully meets outcome  N = Needs further instruction  NA = Not applicable to the patient  P = Previous knowledge  U = Unable to comprehend  * = See progress notes          PLAN OF CARE  INFORMATION TO BE DELIVERED / NURSING INTERVENTIONS DATE EVALUATION   Assessment of client/caregiver,         knowledge of cancer diagnosis,         and chemotherapy as a treatment. 1a. Evaluate  patient/caregiver learning ability    b. Plan teaching sessions with patient/caregiver according to needs and present anxiety level/ability to learn.    c. Provide Chemotherapy Education Packet,        Mouth Care Protocol,         Specific Patient Education Sheets. 12/26/2024 S   Individual chemotherapy treatment         plan. 2a. Review of Chemotherapy Education handout from [facility]            12/26/2024   S   Knowledge Deficit & Self-Management of general side effects common to all chemotherapy:  Nausea/Vomiting  b.   Diarrhea  Mouth Care  Dental care  Constipation  Hair Loss  Potential for infection  Fatigue   3a. Reinforce that the majority of side effects from chemotherapy are reversible and are  controlled both in the hospital and at home        (blood counts recover, hair grows back).   b.  Refer to the following for reinforcement of         information post-treatment:  Mouth Care Protocol.  Bowel Protocol for constipation or diarrhea.  3.  Drug Specific Chemotherapy Information Sheets for each medication patient receiving.    12/26/2024     S     PLAN OF CARE  INFORMATION TO BE DELIVERED / NURSING INTERVENTIONS DATE EVALUATION   h. Potential for bleeding         i. Potential anemia/fatigue         j. Potential sunburn         k. Birth control measures  l. Safety measures post treatment 4.  Chemotherapy Home Care Instruction  and Safety Information Sheet.  A. patient/caregivers to thoroughly cook shellfish (shrimp, crab, etc) to decrease the chance of infection.    B.  Use sunscreen and protective clothing while in the sun.   12/26/2024      Knowledge deficit & Self Management of Drug Specific  Side Effects.    BLADDER EFFECTS        (Hemorrhagic Cystitis)                  Preventable with adequate hydration; occurs 2-3 days or more post treatment.   1.  Instruct patient to:  a.   Void at least every 2 hours; increase intake.  b.   DO NOT hold urine; go when urge is felt.  c.    Empty bladder at bedtime  and on         awakening.  d.   Observe for color changes (red to tea           colored), amount and frequency changes.  e.   Notify oncologist of any abnormalities           in urine or voiding or if you cannot               drink adequate fluids.   12/26/2024   S   b.   CHANGES IN URINE        COLOR:      1.   Instruct patient:  a.   Most evident in first 2-3 voidings after           administration.  Lasts less than 24 hours.  If urine is discolored 2 or more days post- treatment, notify oncologist.      12/26/2024 S   c.    KIDNEY EFFECTS           (Nephrotoxicity)   1.  Instruct patient to:  a.   Drink 8-16 glasses of fluid/day the day   pre-treatment and 3-4 days post-treatment to maintain hydration; the best way to minimize kidney problems.  b.   Notify oncologist immediately if unable to drink fluids or if changes are noted in urinary elimination.     12/26/2024   S   PULMONARY TOXICITY    Instruct patient to report symptoms such as shortness of breath, chest pain, shallow breathing, or chest wall discomfort to physician.  Reinforce preventative measures used by the health care team.  Baseline and periodic PFT and chest x-ray.   12/26/2024   S     PLAN OF CARE INFORMATION TO BE DELIVERED / NURSING INTERVENTIONS DATE EVALUATION   NERVE & MUSCLE EFFECTS (neurotoxocity; neuropathy, possible visual/hearing changes)        Instruct patient to:    Report numbness or tingling of the hands/feet, loss of fine motor movement (buttoning shirt, tying shoelaces), or gait changes to your oncologist.  If numbness/tingling are present:  protect feet with shoes at all times.  Use gloves for washing dishes/gardening & potholders in kitchen.       12/26/2024   S   CARDIOTOXICITY  Decreased effectiveness of             cardiac function. Effective are                  cumulative and irreversible.                                    CARDIAC ARRYTHMIAS              4   Instruct:  Heart function may be tested before treatment and  perdiocally during treatment.  Notify oncologist of irregular pulse, palpitations, shortness of breath, or swelling in lower extremities/feet.          Taxol and Taxotere can cause arrhythmias on infusion that resolve once infusion discontinued. Instruct nurse if any irregularity felt.    12/26/2024   S   EXTRAVASTION  Occurs when vesicants leak outside of vein and cause damage to the skin and underlying tissues.   Reinforce preventive measures used to avoid complications.  Fresh IV site or central line monitored continuously with vesicant IVP.  Continuous infusion via central line site and blood return monitored periodically around the clock.  Instruct to:  Notify nurse of any discomfort, burning, stinging, etc. at IV site during chemotherapy administration.  Notify oncologist of any redness, pain, or swelling at IV site after discharge from hospital.   12/26/2024   S   HYPERSENSITIVITY can happen with any medication.   Instruct patient:  Nurse is with them during the initial part of treatment and will be close by to monitor.  Pre-medication ordered by the oncologist must be taken on time. If doses are missed, treatment will need to be re-scheduled.  Skin redness, itching, or hives appearing after discharge should be reported to oncologist. 12/26/2024   S       PLAN OF CARE INFORMATION TO BE DELIVERED / NURSING INTERVENTIONS DATE EVALUATION   FLU-LIKE SYNDROME      Instruct patient symptoms are hard to prevent and may include fever, shaking chills, muscle and body aches.  Taking prescribed medications from physician if needed.  Adequate fluids are important.    Reinforce the need to call if temperature is         elevated to 100.4 or more  12/26/2024   S   HAND-FOOT SYNDROME  causes painful, symmetric swelling and redness of palms and soles                  Instruct patient to report any numbness or tingling in the hands or feet.  Explain prevention techniques, such as     Use heavy moisturizers to lessen skin  dryness and itching, but to avoid if skin is cracked or broken  Bathe in tepid water, use non-perfumed soap, and wash gently. Baths with oatmeal or diluted baking soda may be soothing.  Avoid tight fitting shoes and repetitive actions, such as rubbing hands or applying pressure to hands/feet.  Review measures to take should syndrome occur:  Cold compresses and elevation for          edema  Pain medications and other measures as ordered by oncologist.   4.   Syndrome resolves few weeks after therapy. 12/26/2024   S   5. DISCHARGE PLANNING /        EDUCATION 1.    Explain importance of compliance with follow- up  tests (CBC, CMP).  2.    Verify patient/caregiver know:  a.    Oncologists office phone number.  b.    Dates of follow-up appointments.  c.    Prescriptions given for nausea  3.   Review side effects to monitor and notify          oncologist about.  4.   Reinforce the need for patient and caregivers to:  a.    Review information given.  b.    Call oncologists office with questions          or symptoms  5.   Provide [facility] [place] [employer]chure make referrals if needed for financial or .   12/26/2024   S     PROGRESS NOTES: I met with the patient  today for chemotherapy education. she will be starting treatment with neoadjuvant FOLFOX every 2 weeks . We discussed the mechanism of action, potential side effects of this treatment as well as ways she can manage them at home. Some of these side effects include but or not limited to fever, nausea, vomiting, decreased appetite, fatigue, weakness, cytopenias, myalgia/arthralgia, constipation, diarrhea, bleeding, headache, shortness of breath, nail changes, taste change, hair thinning/loss, mood disturbances, or edema. We also discussed dietary modifications she should make although this will be discussed in more detail with the dietician. she was provided with anti-emetic medication, a copy of all of the information we discussed today as well as  our contact information. she will be provided a schedule on his first day of treatment. We will obtain labs on a weekly basis and the patient will follow-up with the physician for toxicity monitoring throughout treatment. All questions were answered and an informed consent was obtained. she was reminded to certainly contact us sooner if needed.  Attached to the patients folder and discussed with the patient the 24 hour/ 7 days a week after hours telephone number for the physician.  Patient notified to call anytime 24/7 because their is a physician on call for any problems that may arise.  Patient also notified to report to [facility] [facility] / [facility] if they can not get in touch with a physician after hours.  Discussed the five wishes booklet with the patient and their family.           Assessment/Plan     ICD-10-CM ICD-9-CM   1. Malignant neoplasm of ascending colon  C18.2 153.6   2. Metastasis to liver  C78.7 197.7   -see MD prior to cycle 1 for clearance        Medications Ordered:  Zofran 8mg 1 tab PO Q8h prn nausea  Compazine 10 mg PO Q4-6h prn nausea  Emla cream: Apply to port site 30min prior to treatment and cover with saran wrap  Claritin 10mg PO QD day of chemotherapy and for 5 days after Neulasta to help prevent bone pain  OTC NSAIDS  Take 2 PO QD day of and for 5 days after Neulasta to help prevent bone pain  OTC Imodium as directed  OTC MiraLax 17 g daily for constipation    * please notify clinic or report to the emergency department for fever of 100.4 grade  * avoid tobacco and alcohol use  * maintain a healthy diet and good oral hydration  * good blood pressure and blood sugar control is important.  Please keep all followups with your primary care provider  *good hand hygiene  * please call clinic with any questions or concerns  * please take all medications as directed     Patient is in agreement with the proposed treatment plan. All questions were answered to the patient's satisfaction. Patient knows to call  clinic for any new or worsening symptoms and if anything is needed before the next clinic visit.    Sandra Saul NP-C  Hematology & Medical Oncology     Collaborating physician, Dr. Crawford.    50 minutes of total time spent on the encounter, which includes face-to-face time and non face-to-face time preparing to see the patient (e.g., review of tests), obtaining and/or reviewing separately obtain history, documenting clinical information in the electronic or other health record, independently interpreting results (not separately reported) and communicating results to the patient/family/caregiver or care coordination (not separately reported).  Electronically signed by: Sandra FERNANDES

## 2024-12-27 ENCOUNTER — TELEPHONE (OUTPATIENT)
Facility: CLINIC | Age: 59
End: 2024-12-27
Payer: COMMERCIAL

## 2024-12-27 DIAGNOSIS — C18.2 MALIGNANT NEOPLASM OF ASCENDING COLON: Primary | ICD-10-CM

## 2024-12-27 RX ORDER — EPINEPHRINE 0.3 MG/.3ML
0.3 INJECTION SUBCUTANEOUS ONCE AS NEEDED
OUTPATIENT
Start: 2024-12-27

## 2024-12-27 RX ORDER — SODIUM CHLORIDE 0.9 % (FLUSH) 0.9 %
10 SYRINGE (ML) INJECTION
OUTPATIENT
Start: 2024-12-27

## 2024-12-27 RX ORDER — DIPHENHYDRAMINE HYDROCHLORIDE 50 MG/ML
50 INJECTION INTRAMUSCULAR; INTRAVENOUS ONCE AS NEEDED
OUTPATIENT
Start: 2024-12-27

## 2024-12-27 RX ORDER — HEPARIN 100 UNIT/ML
500 SYRINGE INTRAVENOUS
OUTPATIENT
Start: 2024-12-27

## 2024-12-28 DIAGNOSIS — B00.9 HERPES SIMPLEX: ICD-10-CM

## 2024-12-28 NOTE — TELEPHONE ENCOUNTER
Care Due:                  Date            Visit Type   Department     Provider  --------------------------------------------------------------------------------    Last Visit: None Found      None         None Found  Next Visit: None Scheduled  None         None Found                                                            Last  Test          Frequency    Reason                     Performed    Due Date  --------------------------------------------------------------------------------    Office Visit  15 months..  insulin, valACYclovir....  Not Found    Overdue    [employer] Embedded Care Due Messages. Reference number: [id number].   12/28/2024 3:48:45 PM CST

## 2024-12-30 ENCOUNTER — TELEPHONE (OUTPATIENT)
Dept: HEMATOLOGY/ONCOLOGY | Facility: CLINIC | Age: 59
End: 2024-12-30
Payer: COMMERCIAL

## 2024-12-30 RX ORDER — VALACYCLOVIR HYDROCHLORIDE 500 MG/1
TABLET, FILM COATED ORAL
Qty: 180 TABLET | Refills: 3 | Status: SHIPPED | OUTPATIENT
Start: 2024-12-30

## 2024-12-30 NOTE — TELEPHONE ENCOUNTER
Spoke to Lauren at [facility]. Per Lauren testing on sample began on 12/20/24. Results should be posted on 1/6/25.

## 2025-01-05 ENCOUNTER — PATIENT MESSAGE (OUTPATIENT)
Dept: HEMATOLOGY/ONCOLOGY | Facility: CLINIC | Age: 60
End: 2025-01-05
Payer: COMMERCIAL

## 2025-01-06 ENCOUNTER — INFUSION (OUTPATIENT)
Dept: INFUSION THERAPY | Facility: HOSPITAL | Age: 60
End: 2025-01-06
Attending: NURSE PRACTITIONER
Payer: COMMERCIAL

## 2025-01-06 ENCOUNTER — LAB VISIT (OUTPATIENT)
Dept: LAB | Facility: HOSPITAL | Age: 60
End: 2025-01-06
Attending: INTERNAL MEDICINE
Payer: COMMERCIAL

## 2025-01-06 ENCOUNTER — DOCUMENTATION ONLY (OUTPATIENT)
Dept: HEMATOLOGY/ONCOLOGY | Facility: CLINIC | Age: 60
End: 2025-01-06
Payer: COMMERCIAL

## 2025-01-06 ENCOUNTER — HOSPITAL ENCOUNTER (INPATIENT)
Facility: HOSPITAL | Age: 60
LOS: 4 days | Discharge: HOME HEALTH | DRG: 330 | End: 2025-01-11
Attending: EMERGENCY MEDICINE | Admitting: STUDENT IN AN ORGANIZED HEALTH CARE EDUCATION/TRAINING PROGRAM
Payer: COMMERCIAL

## 2025-01-06 VITALS
SYSTOLIC BLOOD PRESSURE: 152 MMHG | WEIGHT: 212.5 LBS | HEIGHT: 65 IN | HEART RATE: 76 BPM | DIASTOLIC BLOOD PRESSURE: 74 MMHG | RESPIRATION RATE: 18 BRPM | OXYGEN SATURATION: 99 % | BODY MASS INDEX: 35.4 KG/M2 | TEMPERATURE: 98 F

## 2025-01-06 DIAGNOSIS — D64.9 SYMPTOMATIC ANEMIA: ICD-10-CM

## 2025-01-06 DIAGNOSIS — C18.9 MALIGNANT NEOPLASM OF COLON, UNSPECIFIED PART OF COLON: ICD-10-CM

## 2025-01-06 DIAGNOSIS — C18.2 MALIGNANT NEOPLASM OF ASCENDING COLON: Primary | ICD-10-CM

## 2025-01-06 DIAGNOSIS — R00.0 TACHYCARDIA: ICD-10-CM

## 2025-01-06 DIAGNOSIS — D50.0 IRON DEFICIENCY ANEMIA DUE TO CHRONIC BLOOD LOSS: Primary | ICD-10-CM

## 2025-01-06 DIAGNOSIS — C18.2 MALIGNANT NEOPLASM OF ASCENDING COLON: ICD-10-CM

## 2025-01-06 DIAGNOSIS — K63.89 COLONIC MASS: ICD-10-CM

## 2025-01-06 DIAGNOSIS — R16.0 LIVER MASS: Primary | ICD-10-CM

## 2025-01-06 DIAGNOSIS — R53.1 WEAKNESS: ICD-10-CM

## 2025-01-06 LAB
ABO + RH BLD: NORMAL
ALBUMIN SERPL BCP-MCNC: 3.8 G/DL (ref 3.5–5.2)
ALBUMIN SERPL BCP-MCNC: 3.9 G/DL (ref 3.5–5.2)
ALP SERPL-CCNC: 80 U/L (ref 40–150)
ALP SERPL-CCNC: 82 U/L (ref 40–150)
ALT SERPL W/O P-5'-P-CCNC: 21 U/L (ref 10–44)
ALT SERPL W/O P-5'-P-CCNC: 21 U/L (ref 10–44)
ANION GAP SERPL CALC-SCNC: 10 MMOL/L (ref 8–16)
ANION GAP SERPL CALC-SCNC: 10 MMOL/L (ref 8–16)
AST SERPL-CCNC: 22 U/L (ref 10–40)
AST SERPL-CCNC: 23 U/L (ref 10–40)
BASOPHILS # BLD AUTO: 0.05 K/UL (ref 0–0.2)
BASOPHILS # BLD AUTO: 0.05 K/UL (ref 0–0.2)
BASOPHILS NFR BLD: 0.8 % (ref 0–1.9)
BASOPHILS NFR BLD: 0.9 % (ref 0–1.9)
BILIRUB SERPL-MCNC: 0.2 MG/DL (ref 0.1–1)
BILIRUB SERPL-MCNC: 0.3 MG/DL (ref 0.1–1)
BLD GP AB SCN CELLS X3 SERPL QL: NORMAL
BUN SERPL-MCNC: 13 MG/DL (ref 6–20)
BUN SERPL-MCNC: 14 MG/DL (ref 6–20)
CALCIUM SERPL-MCNC: 8.9 MG/DL (ref 8.7–10.5)
CALCIUM SERPL-MCNC: 8.9 MG/DL (ref 8.7–10.5)
CHLORIDE SERPL-SCNC: 108 MMOL/L (ref 95–110)
CHLORIDE SERPL-SCNC: 108 MMOL/L (ref 95–110)
CO2 SERPL-SCNC: 21 MMOL/L (ref 23–29)
CO2 SERPL-SCNC: 21 MMOL/L (ref 23–29)
CREAT SERPL-MCNC: 0.9 MG/DL (ref 0.5–1.4)
CREAT SERPL-MCNC: 1 MG/DL (ref 0.5–1.4)
DIFFERENTIAL METHOD BLD: ABNORMAL
DIFFERENTIAL METHOD BLD: ABNORMAL
EOSINOPHIL # BLD AUTO: 0.2 K/UL (ref 0–0.5)
EOSINOPHIL # BLD AUTO: 0.3 K/UL (ref 0–0.5)
EOSINOPHIL NFR BLD: 3.2 % (ref 0–8)
EOSINOPHIL NFR BLD: 4.6 % (ref 0–8)
ERYTHROCYTE [DISTWIDTH] IN BLOOD BY AUTOMATED COUNT: 18.9 % (ref 11.5–14.5)
ERYTHROCYTE [DISTWIDTH] IN BLOOD BY AUTOMATED COUNT: 19 % (ref 11.5–14.5)
EST. GFR  (NO RACE VARIABLE): >60 ML/MIN/1.73 M^2
EST. GFR  (NO RACE VARIABLE): >60 ML/MIN/1.73 M^2
GLUCOSE SERPL-MCNC: 208 MG/DL (ref 70–110)
GLUCOSE SERPL-MCNC: 209 MG/DL (ref 70–110)
HCT VFR BLD AUTO: 22.8 % (ref 37–48.5)
HCT VFR BLD AUTO: 23 % (ref 37–48.5)
HCV AB SERPL QL IA: NEGATIVE
HGB BLD-MCNC: 6.2 G/DL (ref 12–16)
HGB BLD-MCNC: 6.3 G/DL (ref 12–16)
HIV 1+2 AB+HIV1 P24 AG SERPL QL IA: NEGATIVE
IMM GRANULOCYTES # BLD AUTO: 0.02 K/UL (ref 0–0.04)
IMM GRANULOCYTES # BLD AUTO: 0.03 K/UL (ref 0–0.04)
IMM GRANULOCYTES NFR BLD AUTO: 0.4 % (ref 0–0.5)
IMM GRANULOCYTES NFR BLD AUTO: 0.5 % (ref 0–0.5)
INR PPP: 1.1 (ref 0.8–1.2)
LYMPHOCYTES # BLD AUTO: 1.4 K/UL (ref 1–4.8)
LYMPHOCYTES # BLD AUTO: 2 K/UL (ref 1–4.8)
LYMPHOCYTES NFR BLD: 24.6 % (ref 18–48)
LYMPHOCYTES NFR BLD: 31.4 % (ref 18–48)
MAGNESIUM SERPL-MCNC: 1.7 MG/DL (ref 1.6–2.6)
MCH RBC QN AUTO: 20.6 PG (ref 27–31)
MCH RBC QN AUTO: 20.8 PG (ref 27–31)
MCHC RBC AUTO-ENTMCNC: 27.2 G/DL (ref 32–36)
MCHC RBC AUTO-ENTMCNC: 27.4 G/DL (ref 32–36)
MCV RBC AUTO: 76 FL (ref 82–98)
MCV RBC AUTO: 76 FL (ref 82–98)
MONOCYTES # BLD AUTO: 0.3 K/UL (ref 0.3–1)
MONOCYTES # BLD AUTO: 0.4 K/UL (ref 0.3–1)
MONOCYTES NFR BLD: 5.6 % (ref 4–15)
MONOCYTES NFR BLD: 6.5 % (ref 4–15)
NEUTROPHILS # BLD AUTO: 3.6 K/UL (ref 1.8–7.7)
NEUTROPHILS # BLD AUTO: 3.6 K/UL (ref 1.8–7.7)
NEUTROPHILS NFR BLD: 56.2 % (ref 38–73)
NEUTROPHILS NFR BLD: 65.3 % (ref 38–73)
NRBC BLD-RTO: 0 /100 WBC
NRBC BLD-RTO: 0 /100 WBC
PLATELET # BLD AUTO: 259 K/UL (ref 150–450)
PLATELET # BLD AUTO: 266 K/UL (ref 150–450)
PMV BLD AUTO: 9.1 FL (ref 9.2–12.9)
PMV BLD AUTO: 9.4 FL (ref 9.2–12.9)
POTASSIUM SERPL-SCNC: 4.6 MMOL/L (ref 3.5–5.1)
POTASSIUM SERPL-SCNC: 5 MMOL/L (ref 3.5–5.1)
PROT SERPL-MCNC: 7.3 G/DL (ref 6–8.4)
PROT SERPL-MCNC: 7.4 G/DL (ref 6–8.4)
PROTHROMBIN TIME: 11.8 SEC (ref 9–12.5)
RBC # BLD AUTO: 3.01 M/UL (ref 4–5.4)
RBC # BLD AUTO: 3.03 M/UL (ref 4–5.4)
SODIUM SERPL-SCNC: 139 MMOL/L (ref 136–145)
SODIUM SERPL-SCNC: 139 MMOL/L (ref 136–145)
SPECIMEN OUTDATE: NORMAL
WBC # BLD AUTO: 5.57 K/UL (ref 3.9–12.7)
WBC # BLD AUTO: 6.33 K/UL (ref 3.9–12.7)

## 2025-01-06 PROCEDURE — A4216 STERILE WATER/SALINE, 10 ML: HCPCS | Performed by: NURSE PRACTITIONER

## 2025-01-06 PROCEDURE — 85025 COMPLETE CBC W/AUTO DIFF WBC: CPT | Mod: 91 | Performed by: NURSE PRACTITIONER

## 2025-01-06 PROCEDURE — 85610 PROTHROMBIN TIME: CPT | Performed by: NURSE PRACTITIONER

## 2025-01-06 PROCEDURE — 86803 HEPATITIS C AB TEST: CPT | Performed by: EMERGENCY MEDICINE

## 2025-01-06 PROCEDURE — P9016 RBC LEUKOCYTES REDUCED: HCPCS | Performed by: EMERGENCY MEDICINE

## 2025-01-06 PROCEDURE — 86850 RBC ANTIBODY SCREEN: CPT | Performed by: NURSE PRACTITIONER

## 2025-01-06 PROCEDURE — 30233N1 TRANSFUSION OF NONAUTOLOGOUS RED BLOOD CELLS INTO PERIPHERAL VEIN, PERCUTANEOUS APPROACH: ICD-10-PCS | Performed by: EMERGENCY MEDICINE

## 2025-01-06 PROCEDURE — 80053 COMPREHEN METABOLIC PANEL: CPT | Performed by: NURSE PRACTITIONER

## 2025-01-06 PROCEDURE — 93010 ELECTROCARDIOGRAM REPORT: CPT | Mod: ,,, | Performed by: INTERNAL MEDICINE

## 2025-01-06 PROCEDURE — 94761 N-INVAS EAR/PLS OXIMETRY MLT: CPT

## 2025-01-06 PROCEDURE — 63600175 PHARM REV CODE 636 W HCPCS: Performed by: NURSE PRACTITIONER

## 2025-01-06 PROCEDURE — 25000003 PHARM REV CODE 250: Performed by: NURSE PRACTITIONER

## 2025-01-06 PROCEDURE — 83735 ASSAY OF MAGNESIUM: CPT | Performed by: NURSE PRACTITIONER

## 2025-01-06 PROCEDURE — 87389 HIV-1 AG W/HIV-1&-2 AB AG IA: CPT | Performed by: EMERGENCY MEDICINE

## 2025-01-06 PROCEDURE — 80053 COMPREHEN METABOLIC PANEL: CPT | Mod: 91 | Performed by: NURSE PRACTITIONER

## 2025-01-06 PROCEDURE — 93005 ELECTROCARDIOGRAM TRACING: CPT

## 2025-01-06 PROCEDURE — 36415 COLL VENOUS BLD VENIPUNCTURE: CPT | Performed by: NURSE PRACTITIONER

## 2025-01-06 PROCEDURE — 36430 TRANSFUSION BLD/BLD COMPNT: CPT

## 2025-01-06 PROCEDURE — 86920 COMPATIBILITY TEST SPIN: CPT | Performed by: EMERGENCY MEDICINE

## 2025-01-06 PROCEDURE — 99285 EMERGENCY DEPT VISIT HI MDM: CPT | Mod: 25

## 2025-01-06 PROCEDURE — 96365 THER/PROPH/DIAG IV INF INIT: CPT

## 2025-01-06 PROCEDURE — 94799 UNLISTED PULMONARY SVC/PX: CPT

## 2025-01-06 PROCEDURE — G0378 HOSPITAL OBSERVATION PER HR: HCPCS

## 2025-01-06 PROCEDURE — 94760 N-INVAS EAR/PLS OXIMETRY 1: CPT

## 2025-01-06 PROCEDURE — 99900035 HC TECH TIME PER 15 MIN (STAT)

## 2025-01-06 PROCEDURE — 85025 COMPLETE CBC W/AUTO DIFF WBC: CPT | Performed by: NURSE PRACTITIONER

## 2025-01-06 RX ORDER — SIMETHICONE 80 MG
1 TABLET,CHEWABLE ORAL 4 TIMES DAILY PRN
Status: DISCONTINUED | OUTPATIENT
Start: 2025-01-06 | End: 2025-01-11 | Stop reason: HOSPADM

## 2025-01-06 RX ORDER — SODIUM,POTASSIUM PHOSPHATES 280-250MG
2 POWDER IN PACKET (EA) ORAL
Status: DISCONTINUED | OUTPATIENT
Start: 2025-01-06 | End: 2025-01-11 | Stop reason: HOSPADM

## 2025-01-06 RX ORDER — ALUMINUM HYDROXIDE, MAGNESIUM HYDROXIDE, AND SIMETHICONE 1200; 120; 1200 MG/30ML; MG/30ML; MG/30ML
30 SUSPENSION ORAL 4 TIMES DAILY PRN
Status: DISCONTINUED | OUTPATIENT
Start: 2025-01-06 | End: 2025-01-11 | Stop reason: HOSPADM

## 2025-01-06 RX ORDER — IPRATROPIUM BROMIDE AND ALBUTEROL SULFATE 2.5; .5 MG/3ML; MG/3ML
3 SOLUTION RESPIRATORY (INHALATION) EVERY 4 HOURS PRN
Status: DISCONTINUED | OUTPATIENT
Start: 2025-01-06 | End: 2025-01-11 | Stop reason: HOSPADM

## 2025-01-06 RX ORDER — ONDANSETRON HYDROCHLORIDE 2 MG/ML
4 INJECTION, SOLUTION INTRAVENOUS EVERY 8 HOURS PRN
Status: DISCONTINUED | OUTPATIENT
Start: 2025-01-06 | End: 2025-01-11 | Stop reason: HOSPADM

## 2025-01-06 RX ORDER — LANOLIN ALCOHOL/MO/W.PET/CERES
800 CREAM (GRAM) TOPICAL
Status: DISCONTINUED | OUTPATIENT
Start: 2025-01-06 | End: 2025-01-11 | Stop reason: HOSPADM

## 2025-01-06 RX ORDER — GABAPENTIN 400 MG/1
800 CAPSULE ORAL 3 TIMES DAILY
Status: DISCONTINUED | OUTPATIENT
Start: 2025-01-07 | End: 2025-01-11 | Stop reason: HOSPADM

## 2025-01-06 RX ORDER — INSULIN ASPART 100 [IU]/ML
1-10 INJECTION, SOLUTION INTRAVENOUS; SUBCUTANEOUS
Status: DISCONTINUED | OUTPATIENT
Start: 2025-01-06 | End: 2025-01-11 | Stop reason: HOSPADM

## 2025-01-06 RX ORDER — SODIUM CHLORIDE 0.9 % (FLUSH) 0.9 %
10 SYRINGE (ML) INJECTION EVERY 8 HOURS PRN
Status: DISCONTINUED | OUTPATIENT
Start: 2025-01-06 | End: 2025-01-11 | Stop reason: HOSPADM

## 2025-01-06 RX ORDER — IBUPROFEN 200 MG
24 TABLET ORAL
Status: DISCONTINUED | OUTPATIENT
Start: 2025-01-06 | End: 2025-01-11 | Stop reason: HOSPADM

## 2025-01-06 RX ORDER — GLUCAGON 1 MG
1 KIT INJECTION
Status: DISCONTINUED | OUTPATIENT
Start: 2025-01-06 | End: 2025-01-11 | Stop reason: HOSPADM

## 2025-01-06 RX ORDER — HYDROCODONE BITARTRATE AND ACETAMINOPHEN 500; 5 MG/1; MG/1
TABLET ORAL
Status: DISCONTINUED | OUTPATIENT
Start: 2025-01-06 | End: 2025-01-11 | Stop reason: HOSPADM

## 2025-01-06 RX ORDER — DIPHENHYDRAMINE HYDROCHLORIDE 50 MG/ML
50 INJECTION INTRAMUSCULAR; INTRAVENOUS ONCE AS NEEDED
OUTPATIENT
Start: 2025-01-13

## 2025-01-06 RX ORDER — HYDROCODONE BITARTRATE AND ACETAMINOPHEN 500; 5 MG/1; MG/1
TABLET ORAL ONCE
Status: CANCELLED | OUTPATIENT
Start: 2025-01-06 | End: 2025-01-06

## 2025-01-06 RX ORDER — EPINEPHRINE 0.3 MG/.3ML
0.3 INJECTION SUBCUTANEOUS ONCE AS NEEDED
OUTPATIENT
Start: 2025-01-13

## 2025-01-06 RX ORDER — HEPARIN 100 UNIT/ML
500 SYRINGE INTRAVENOUS
OUTPATIENT
Start: 2025-01-13

## 2025-01-06 RX ORDER — FUROSEMIDE 10 MG/ML
20 INJECTION INTRAMUSCULAR; INTRAVENOUS
Status: CANCELLED | OUTPATIENT
Start: 2025-01-06

## 2025-01-06 RX ORDER — SODIUM CHLORIDE 0.9 % (FLUSH) 0.9 %
10 SYRINGE (ML) INJECTION
OUTPATIENT
Start: 2025-01-13

## 2025-01-06 RX ORDER — HEPARIN 100 UNIT/ML
500 SYRINGE INTRAVENOUS
Status: DISCONTINUED | OUTPATIENT
Start: 2025-01-06 | End: 2025-01-06 | Stop reason: HOSPADM

## 2025-01-06 RX ORDER — ACETAMINOPHEN 325 MG/1
650 TABLET ORAL EVERY 6 HOURS PRN
Status: DISCONTINUED | OUTPATIENT
Start: 2025-01-06 | End: 2025-01-11 | Stop reason: HOSPADM

## 2025-01-06 RX ORDER — TALC
9 POWDER (GRAM) TOPICAL NIGHTLY PRN
Status: DISCONTINUED | OUTPATIENT
Start: 2025-01-06 | End: 2025-01-11 | Stop reason: HOSPADM

## 2025-01-06 RX ORDER — ACETAMINOPHEN 325 MG/1
650 TABLET ORAL EVERY 4 HOURS PRN
Status: DISCONTINUED | OUTPATIENT
Start: 2025-01-06 | End: 2025-01-08

## 2025-01-06 RX ORDER — NALOXONE HCL 0.4 MG/ML
0.02 VIAL (ML) INJECTION
Status: DISCONTINUED | OUTPATIENT
Start: 2025-01-06 | End: 2025-01-11 | Stop reason: HOSPADM

## 2025-01-06 RX ORDER — DIPHENHYDRAMINE HCL 25 MG
25 CAPSULE ORAL
Status: CANCELLED | OUTPATIENT
Start: 2025-01-06

## 2025-01-06 RX ORDER — IBUPROFEN 200 MG
16 TABLET ORAL
Status: DISCONTINUED | OUTPATIENT
Start: 2025-01-06 | End: 2025-01-11 | Stop reason: HOSPADM

## 2025-01-06 RX ORDER — DIPHENHYDRAMINE HCL 25 MG
25 CAPSULE ORAL
Status: DISCONTINUED | OUTPATIENT
Start: 2025-01-06 | End: 2025-01-11 | Stop reason: HOSPADM

## 2025-01-06 RX ORDER — SODIUM CHLORIDE 0.9 % (FLUSH) 0.9 %
10 SYRINGE (ML) INJECTION
Status: DISCONTINUED | OUTPATIENT
Start: 2025-01-06 | End: 2025-01-06 | Stop reason: HOSPADM

## 2025-01-06 RX ORDER — VALACYCLOVIR HYDROCHLORIDE 500 MG/1
500 TABLET, FILM COATED ORAL 2 TIMES DAILY
Status: DISCONTINUED | OUTPATIENT
Start: 2025-01-06 | End: 2025-01-11 | Stop reason: HOSPADM

## 2025-01-06 RX ORDER — ACETAMINOPHEN 325 MG/1
650 TABLET ORAL
Status: CANCELLED | OUTPATIENT
Start: 2025-01-06

## 2025-01-06 RX ADMIN — Medication 10 ML: at 01:01

## 2025-01-06 RX ADMIN — SODIUM CHLORIDE 250 MG: 9 INJECTION, SOLUTION INTRAVENOUS at 01:01

## 2025-01-06 RX ADMIN — HEPARIN 500 UNITS: 100 SYRINGE at 02:01

## 2025-01-06 RX ADMIN — Medication 10 ML: at 02:01

## 2025-01-06 NOTE — FIRST PROVIDER EVALUATION
Emergency Department TeleTriage Encounter Note      CHIEF COMPLAINT    Chief Complaint   Patient presents with    abnormal labs      Abnormal Labs H7H is low and needs a blood transfusion        VITAL SIGNS   Initial Vitals [01/06/25 1731]   BP Pulse Resp Temp SpO2   (!) 118/58 (!) 114 19 98.7 °F (37.1 °C) 96 %      MAP       --            ALLERGIES    Review of patient's allergies indicates:   Allergen Reactions    Robaxin [methocarbamol] Nausea Only       PROVIDER TRIAGE NOTE  This is a teletriage evaluation of a 59 y.o. female presenting to the ED with c/o low H/H on labs for chemo for colon CA.  H/H 6.3 and 23. Reports fatigue. Limited physical exam via telehealth: The patient is awake, alert, answering questions appropriately and is not in respiratory distress. Initial orders will be placed and care will be transferred to an alternate provider when patient is roomed for a full evaluation. Any additional orders and the final disposition will be determined by that provider.         ORDERS  Labs Reviewed   HEPATITIS C ANTIBODY   HIV 1 / 2 ANTIBODY       ED Orders (720h ago, onward)      Start Ordered     Status Ordering Provider    01/06/25 1736 01/06/25 1735  CBC auto differential  STAT         Ordered HOLDEN BLACKMON PPari    01/06/25 1736 01/06/25 1735  Comprehensive metabolic panel  STAT         Ordered HOLDEN BLACKMON PPari    01/06/25 1736 01/06/25 1735  Insert Saline lock IV  Once         Ordered HOLDEN BLACKMON PPari    01/06/25 1736 01/06/25 1735  Type & Screen  Once         Ordered HOLDEN BLACKMON P.    01/06/25 1736 01/06/25 1735  Protime-INR  STAT         Ordered HOLDEN BLACKMON PPari    01/06/25 1736 01/06/25 1735  Possible Hospitalization  Once        Comments: For further elaboration on reason for hospitalization.    Ordered HOLDEN BLACKMON P.    01/06/25 1736 01/06/25 1735  EKG 12-lead  Once         Ordered HOLDEN BLACKMON PPari    01/06/25 1734 01/06/25 1733  Hepatitis C Antibody  STAT         Pending Collection DANGELO  GIOVANNI MCDONNELL    01/06/25 1734 01/06/25 1733  HIV 1/2 Ag/Ab (4th Gen)  STAT         Pending Collection GIOVANNI PIERSON              Virtual Visit Note: The provider triage portion of this emergency department evaluation and documentation was performed via [employer], a HIPAA-compliant telemedicine application, in concert with a tele-presenter in the room. A face to face patient evaluation with one of my colleagues will occur once the patient is placed in an emergency department room.      DISCLAIMER: This note was prepared with [employer] voice recognition transcription software. Garbled syntax, mangled pronouns, and other bizarre constructions may be attributed to that software system.

## 2025-01-06 NOTE — PLAN OF CARE
Problem: Adult Inpatient Plan of Care  Goal: Optimal Comfort and Wellbeing  Outcome: Progressing  Intervention: Monitor Pain and Promote Comfort  Flowsheets (Taken 1/6/2025 1319)  Pain Management Interventions:   quiet environment facilitated   relaxation techniques promoted   warm blanket provided  Intervention: Provide Person-Centered Care  Flowsheets (Taken 1/6/2025 1319)  Trust Relationship/Rapport:   care explained   choices provided   emotional support provided   empathic listening provided   questions answered   questions encouraged   reassurance provided   thoughts/feelings acknowledged

## 2025-01-06 NOTE — PLAN OF CARE
1305 Pt here for infusion tx, placed to chair, vss.  1416 Therapy plan administered. Tolerated well. D/c instructions given including s/s of reaction and to notify administering MD or go to ER. D/c'd to home with sterling.

## 2025-01-07 ENCOUNTER — ANESTHESIA (OUTPATIENT)
Dept: SURGERY | Facility: HOSPITAL | Age: 60
End: 2025-01-07
Payer: COMMERCIAL

## 2025-01-07 ENCOUNTER — ANESTHESIA EVENT (OUTPATIENT)
Dept: SURGERY | Facility: HOSPITAL | Age: 60
End: 2025-01-07
Payer: COMMERCIAL

## 2025-01-07 ENCOUNTER — PATIENT MESSAGE (OUTPATIENT)
Dept: HEMATOLOGY/ONCOLOGY | Facility: CLINIC | Age: 60
End: 2025-01-07
Payer: COMMERCIAL

## 2025-01-07 LAB
ALBUMIN SERPL BCP-MCNC: 4.2 G/DL (ref 3.5–5.2)
ALP SERPL-CCNC: 82 U/L (ref 40–150)
ALT SERPL W/O P-5'-P-CCNC: 22 U/L (ref 10–44)
ANION GAP SERPL CALC-SCNC: 12 MMOL/L (ref 8–16)
AST SERPL-CCNC: 28 U/L (ref 10–40)
BASOPHILS # BLD AUTO: 0.06 K/UL (ref 0–0.2)
BASOPHILS NFR BLD: 0.8 % (ref 0–1.9)
BILIRUB SERPL-MCNC: 1.1 MG/DL (ref 0.1–1)
BLD PROD TYP BPU: NORMAL
BLD PROD TYP BPU: NORMAL
BLOOD UNIT EXPIRATION DATE: NORMAL
BLOOD UNIT EXPIRATION DATE: NORMAL
BLOOD UNIT TYPE CODE: 6200
BLOOD UNIT TYPE CODE: 6200
BLOOD UNIT TYPE: NORMAL
BLOOD UNIT TYPE: NORMAL
BUN SERPL-MCNC: 13 MG/DL (ref 6–20)
CALCIUM SERPL-MCNC: 9.5 MG/DL (ref 8.7–10.5)
CHLORIDE SERPL-SCNC: 105 MMOL/L (ref 95–110)
CO2 SERPL-SCNC: 23 MMOL/L (ref 23–29)
CODING SYSTEM: NORMAL
CODING SYSTEM: NORMAL
CREAT SERPL-MCNC: 0.9 MG/DL (ref 0.5–1.4)
CROSSMATCH INTERPRETATION: NORMAL
CROSSMATCH INTERPRETATION: NORMAL
DIFFERENTIAL METHOD BLD: ABNORMAL
DISPENSE STATUS: NORMAL
DISPENSE STATUS: NORMAL
EOSINOPHIL # BLD AUTO: 0.3 K/UL (ref 0–0.5)
EOSINOPHIL NFR BLD: 4.3 % (ref 0–8)
ERYTHROCYTE [DISTWIDTH] IN BLOOD BY AUTOMATED COUNT: 18 % (ref 11.5–14.5)
EST. GFR  (NO RACE VARIABLE): >60 ML/MIN/1.73 M^2
GLUCOSE SERPL-MCNC: 143 MG/DL (ref 70–110)
HCT VFR BLD AUTO: 29.9 % (ref 37–48.5)
HGB BLD-MCNC: 8.9 G/DL (ref 12–16)
IMM GRANULOCYTES # BLD AUTO: 0.04 K/UL (ref 0–0.04)
IMM GRANULOCYTES NFR BLD AUTO: 0.5 % (ref 0–0.5)
LYMPHOCYTES # BLD AUTO: 2.4 K/UL (ref 1–4.8)
LYMPHOCYTES NFR BLD: 31.4 % (ref 18–48)
MAGNESIUM SERPL-MCNC: 1.7 MG/DL (ref 1.6–2.6)
MCH RBC QN AUTO: 22.6 PG (ref 27–31)
MCHC RBC AUTO-ENTMCNC: 29.8 G/DL (ref 32–36)
MCV RBC AUTO: 76 FL (ref 82–98)
MONOCYTES # BLD AUTO: 0.5 K/UL (ref 0.3–1)
MONOCYTES NFR BLD: 6.6 % (ref 4–15)
NEUTROPHILS # BLD AUTO: 4.4 K/UL (ref 1.8–7.7)
NEUTROPHILS NFR BLD: 56.4 % (ref 38–73)
NRBC BLD-RTO: 0 /100 WBC
NUM UNITS TRANS PACKED RBC: NORMAL
NUM UNITS TRANS PACKED RBC: NORMAL
PHOSPHATE SERPL-MCNC: 4.2 MG/DL (ref 2.7–4.5)
PLATELET # BLD AUTO: 240 K/UL (ref 150–450)
PMV BLD AUTO: 8.9 FL (ref 9.2–12.9)
POCT GLUCOSE: 189 MG/DL (ref 70–110)
POCT GLUCOSE: 203 MG/DL (ref 70–110)
POCT GLUCOSE: 204 MG/DL (ref 70–110)
POTASSIUM SERPL-SCNC: 4 MMOL/L (ref 3.5–5.1)
PROT SERPL-MCNC: 7.9 G/DL (ref 6–8.4)
RBC # BLD AUTO: 3.93 M/UL (ref 4–5.4)
SODIUM SERPL-SCNC: 140 MMOL/L (ref 136–145)
WBC # BLD AUTO: 7.74 K/UL (ref 3.9–12.7)

## 2025-01-07 PROCEDURE — 63600175 PHARM REV CODE 636 W HCPCS: Performed by: INTERNAL MEDICINE

## 2025-01-07 PROCEDURE — 84100 ASSAY OF PHOSPHORUS: CPT | Performed by: NURSE PRACTITIONER

## 2025-01-07 PROCEDURE — 37000008 HC ANESTHESIA 1ST 15 MINUTES: Performed by: STUDENT IN AN ORGANIZED HEALTH CARE EDUCATION/TRAINING PROGRAM

## 2025-01-07 PROCEDURE — 63600175 PHARM REV CODE 636 W HCPCS: Performed by: NURSE PRACTITIONER

## 2025-01-07 PROCEDURE — 85025 COMPLETE CBC W/AUTO DIFF WBC: CPT | Performed by: NURSE PRACTITIONER

## 2025-01-07 PROCEDURE — 63600175 PHARM REV CODE 636 W HCPCS: Mod: TB | Performed by: ANESTHESIOLOGY

## 2025-01-07 PROCEDURE — 71000033 HC RECOVERY, INTIAL HOUR: Performed by: STUDENT IN AN ORGANIZED HEALTH CARE EDUCATION/TRAINING PROGRAM

## 2025-01-07 PROCEDURE — 25000003 PHARM REV CODE 250: Performed by: ANESTHESIOLOGY

## 2025-01-07 PROCEDURE — 25000003 PHARM REV CODE 250: Performed by: NURSE ANESTHETIST, CERTIFIED REGISTERED

## 2025-01-07 PROCEDURE — 27201423 OPTIME MED/SURG SUP & DEVICES STERILE SUPPLY: Performed by: STUDENT IN AN ORGANIZED HEALTH CARE EDUCATION/TRAINING PROGRAM

## 2025-01-07 PROCEDURE — 63600175 PHARM REV CODE 636 W HCPCS: Performed by: STUDENT IN AN ORGANIZED HEALTH CARE EDUCATION/TRAINING PROGRAM

## 2025-01-07 PROCEDURE — 25000003 PHARM REV CODE 250: Performed by: NURSE PRACTITIONER

## 2025-01-07 PROCEDURE — 99900035 HC TECH TIME PER 15 MIN (STAT)

## 2025-01-07 PROCEDURE — 94761 N-INVAS EAR/PLS OXIMETRY MLT: CPT

## 2025-01-07 PROCEDURE — 0DTF4ZZ RESECTION OF RIGHT LARGE INTESTINE, PERCUTANEOUS ENDOSCOPIC APPROACH: ICD-10-PCS | Performed by: STUDENT IN AN ORGANIZED HEALTH CARE EDUCATION/TRAINING PROGRAM

## 2025-01-07 PROCEDURE — 36415 COLL VENOUS BLD VENIPUNCTURE: CPT | Performed by: NURSE PRACTITIONER

## 2025-01-07 PROCEDURE — 36000713 HC OR TIME LEV V EA ADD 15 MIN: Performed by: STUDENT IN AN ORGANIZED HEALTH CARE EDUCATION/TRAINING PROGRAM

## 2025-01-07 PROCEDURE — 11000001 HC ACUTE MED/SURG PRIVATE ROOM

## 2025-01-07 PROCEDURE — 37000009 HC ANESTHESIA EA ADD 15 MINS: Performed by: STUDENT IN AN ORGANIZED HEALTH CARE EDUCATION/TRAINING PROGRAM

## 2025-01-07 PROCEDURE — 97165 OT EVAL LOW COMPLEX 30 MIN: CPT

## 2025-01-07 PROCEDURE — 25000003 PHARM REV CODE 250: Performed by: STUDENT IN AN ORGANIZED HEALTH CARE EDUCATION/TRAINING PROGRAM

## 2025-01-07 PROCEDURE — 71000039 HC RECOVERY, EACH ADD'L HOUR: Performed by: STUDENT IN AN ORGANIZED HEALTH CARE EDUCATION/TRAINING PROGRAM

## 2025-01-07 PROCEDURE — 80053 COMPREHEN METABOLIC PANEL: CPT | Performed by: NURSE PRACTITIONER

## 2025-01-07 PROCEDURE — 8E0W4CZ ROBOTIC ASSISTED PROCEDURE OF TRUNK REGION, PERCUTANEOUS ENDOSCOPIC APPROACH: ICD-10-PCS | Performed by: STUDENT IN AN ORGANIZED HEALTH CARE EDUCATION/TRAINING PROGRAM

## 2025-01-07 PROCEDURE — 44205 LAP COLECTOMY PART W/ILEUM: CPT | Mod: ,,, | Performed by: STUDENT IN AN ORGANIZED HEALTH CARE EDUCATION/TRAINING PROGRAM

## 2025-01-07 PROCEDURE — 27000221 HC OXYGEN, UP TO 24 HOURS

## 2025-01-07 PROCEDURE — 83735 ASSAY OF MAGNESIUM: CPT | Performed by: NURSE PRACTITIONER

## 2025-01-07 PROCEDURE — 94799 UNLISTED PULMONARY SVC/PX: CPT

## 2025-01-07 PROCEDURE — P9016 RBC LEUKOCYTES REDUCED: HCPCS | Performed by: EMERGENCY MEDICINE

## 2025-01-07 PROCEDURE — 0DTB4ZZ RESECTION OF ILEUM, PERCUTANEOUS ENDOSCOPIC APPROACH: ICD-10-PCS | Performed by: STUDENT IN AN ORGANIZED HEALTH CARE EDUCATION/TRAINING PROGRAM

## 2025-01-07 PROCEDURE — 36000712 HC OR TIME LEV V 1ST 15 MIN: Performed by: STUDENT IN AN ORGANIZED HEALTH CARE EDUCATION/TRAINING PROGRAM

## 2025-01-07 RX ORDER — GUAIFENESIN 600 MG/1
600 TABLET, EXTENDED RELEASE ORAL 2 TIMES DAILY
Status: DISCONTINUED | OUTPATIENT
Start: 2025-01-08 | End: 2025-01-10

## 2025-01-07 RX ORDER — ONDANSETRON HYDROCHLORIDE 2 MG/ML
INJECTION, SOLUTION INTRAVENOUS
Status: DISCONTINUED | OUTPATIENT
Start: 2025-01-07 | End: 2025-01-07

## 2025-01-07 RX ORDER — SODIUM CHLORIDE, SODIUM LACTATE, POTASSIUM CHLORIDE, CALCIUM CHLORIDE 600; 310; 30; 20 MG/100ML; MG/100ML; MG/100ML; MG/100ML
INJECTION, SOLUTION INTRAVENOUS CONTINUOUS
Status: DISCONTINUED | OUTPATIENT
Start: 2025-01-07 | End: 2025-01-07

## 2025-01-07 RX ORDER — PROPOFOL 10 MG/ML
VIAL (ML) INTRAVENOUS
Status: DISCONTINUED | OUTPATIENT
Start: 2025-01-07 | End: 2025-01-07

## 2025-01-07 RX ORDER — SCOLOPAMINE TRANSDERMAL SYSTEM 1 MG/1
1 PATCH, EXTENDED RELEASE TRANSDERMAL
Status: COMPLETED | OUTPATIENT
Start: 2025-01-07 | End: 2025-01-10

## 2025-01-07 RX ORDER — DEXAMETHASONE SODIUM PHOSPHATE 4 MG/ML
INJECTION, SOLUTION INTRA-ARTICULAR; INTRALESIONAL; INTRAMUSCULAR; INTRAVENOUS; SOFT TISSUE
Status: DISCONTINUED | OUTPATIENT
Start: 2025-01-07 | End: 2025-01-07

## 2025-01-07 RX ORDER — MEPERIDINE HYDROCHLORIDE 50 MG/ML
12.5 INJECTION INTRAMUSCULAR; INTRAVENOUS; SUBCUTANEOUS ONCE
Status: DISCONTINUED | OUTPATIENT
Start: 2025-01-07 | End: 2025-01-07 | Stop reason: HOSPADM

## 2025-01-07 RX ORDER — ACETAMINOPHEN 325 MG/1
650 TABLET ORAL
Status: DISCONTINUED | OUTPATIENT
Start: 2025-01-07 | End: 2025-01-11 | Stop reason: HOSPADM

## 2025-01-07 RX ORDER — KETAMINE HCL IN 0.9 % NACL 50 MG/5 ML
SYRINGE (ML) INTRAVENOUS
Status: DISCONTINUED | OUTPATIENT
Start: 2025-01-07 | End: 2025-01-07

## 2025-01-07 RX ORDER — VECURONIUM BROMIDE 1 MG/ML
INJECTION, POWDER, LYOPHILIZED, FOR SOLUTION INTRAVENOUS
Status: DISCONTINUED | OUTPATIENT
Start: 2025-01-07 | End: 2025-01-07

## 2025-01-07 RX ORDER — DIPHENHYDRAMINE HYDROCHLORIDE 50 MG/ML
25 INJECTION INTRAMUSCULAR; INTRAVENOUS EVERY 6 HOURS PRN
Status: DISCONTINUED | OUTPATIENT
Start: 2025-01-07 | End: 2025-01-07 | Stop reason: HOSPADM

## 2025-01-07 RX ORDER — MIDAZOLAM HYDROCHLORIDE 1 MG/ML
INJECTION INTRAMUSCULAR; INTRAVENOUS
Status: DISCONTINUED | OUTPATIENT
Start: 2025-01-07 | End: 2025-01-07

## 2025-01-07 RX ORDER — FUROSEMIDE 10 MG/ML
20 INJECTION INTRAMUSCULAR; INTRAVENOUS
Status: DISCONTINUED | OUTPATIENT
Start: 2025-01-07 | End: 2025-01-11 | Stop reason: HOSPADM

## 2025-01-07 RX ORDER — ROCURONIUM BROMIDE 10 MG/ML
INJECTION, SOLUTION INTRAVENOUS
Status: DISCONTINUED | OUTPATIENT
Start: 2025-01-07 | End: 2025-01-07

## 2025-01-07 RX ORDER — DIPHENHYDRAMINE HCL 25 MG
25 CAPSULE ORAL
Status: DISCONTINUED | OUTPATIENT
Start: 2025-01-07 | End: 2025-01-11 | Stop reason: HOSPADM

## 2025-01-07 RX ORDER — MORPHINE SULFATE 2 MG/ML
4 INJECTION, SOLUTION INTRAMUSCULAR; INTRAVENOUS EVERY 6 HOURS PRN
Status: DISCONTINUED | OUTPATIENT
Start: 2025-01-07 | End: 2025-01-08

## 2025-01-07 RX ORDER — ACETAMINOPHEN 10 MG/ML
INJECTION, SOLUTION INTRAVENOUS
Status: DISCONTINUED | OUTPATIENT
Start: 2025-01-07 | End: 2025-01-07

## 2025-01-07 RX ORDER — BUPIVACAINE HYDROCHLORIDE AND EPINEPHRINE 2.5; 5 MG/ML; UG/ML
INJECTION, SOLUTION EPIDURAL; INFILTRATION; INTRACAUDAL; PERINEURAL
Status: DISCONTINUED | OUTPATIENT
Start: 2025-01-07 | End: 2025-01-07 | Stop reason: HOSPADM

## 2025-01-07 RX ORDER — CEFOXITIN 2 G/1
2 INJECTION, POWDER, FOR SOLUTION INTRAVENOUS
Status: COMPLETED | OUTPATIENT
Start: 2025-01-07 | End: 2025-01-07

## 2025-01-07 RX ORDER — MAGNESIUM SULFATE 1 G/100ML
1 INJECTION INTRAVENOUS ONCE
Status: COMPLETED | OUTPATIENT
Start: 2025-01-07 | End: 2025-01-07

## 2025-01-07 RX ORDER — OXYCODONE HYDROCHLORIDE 5 MG/1
5 TABLET ORAL
Status: DISCONTINUED | OUTPATIENT
Start: 2025-01-07 | End: 2025-01-07 | Stop reason: HOSPADM

## 2025-01-07 RX ORDER — FENTANYL CITRATE 50 UG/ML
INJECTION, SOLUTION INTRAMUSCULAR; INTRAVENOUS
Status: DISCONTINUED | OUTPATIENT
Start: 2025-01-07 | End: 2025-01-07

## 2025-01-07 RX ORDER — FENTANYL CITRATE 50 UG/ML
25 INJECTION, SOLUTION INTRAMUSCULAR; INTRAVENOUS EVERY 5 MIN PRN
Status: DISCONTINUED | OUTPATIENT
Start: 2025-01-07 | End: 2025-01-07 | Stop reason: HOSPADM

## 2025-01-07 RX ORDER — HYDROCODONE BITARTRATE AND ACETAMINOPHEN 500; 5 MG/1; MG/1
TABLET ORAL ONCE
Status: DISCONTINUED | OUTPATIENT
Start: 2025-01-07 | End: 2025-01-11 | Stop reason: HOSPADM

## 2025-01-07 RX ORDER — SODIUM CHLORIDE 9 MG/ML
INJECTION, SOLUTION INTRAVENOUS CONTINUOUS
Status: DISCONTINUED | OUTPATIENT
Start: 2025-01-07 | End: 2025-01-08

## 2025-01-07 RX ORDER — EPHEDRINE SULFATE 50 MG/ML
INJECTION, SOLUTION INTRAVENOUS
Status: DISCONTINUED | OUTPATIENT
Start: 2025-01-07 | End: 2025-01-07

## 2025-01-07 RX ORDER — HYDROMORPHONE HYDROCHLORIDE 2 MG/ML
0.2 INJECTION, SOLUTION INTRAMUSCULAR; INTRAVENOUS; SUBCUTANEOUS EVERY 5 MIN PRN
Status: DISCONTINUED | OUTPATIENT
Start: 2025-01-07 | End: 2025-01-07 | Stop reason: HOSPADM

## 2025-01-07 RX ORDER — LIDOCAINE HYDROCHLORIDE 20 MG/ML
INJECTION INTRAVENOUS
Status: DISCONTINUED | OUTPATIENT
Start: 2025-01-07 | End: 2025-01-07

## 2025-01-07 RX ORDER — PROCHLORPERAZINE EDISYLATE 5 MG/ML
5 INJECTION INTRAMUSCULAR; INTRAVENOUS EVERY 4 HOURS PRN
Status: DISCONTINUED | OUTPATIENT
Start: 2025-01-07 | End: 2025-01-07 | Stop reason: HOSPADM

## 2025-01-07 RX ORDER — ONDANSETRON HYDROCHLORIDE 2 MG/ML
4 INJECTION, SOLUTION INTRAVENOUS ONCE
Status: COMPLETED | OUTPATIENT
Start: 2025-01-07 | End: 2025-01-07

## 2025-01-07 RX ORDER — GLUCAGON 1 MG
1 KIT INJECTION
Status: DISCONTINUED | OUTPATIENT
Start: 2025-01-07 | End: 2025-01-07 | Stop reason: HOSPADM

## 2025-01-07 RX ADMIN — SODIUM CHLORIDE, SODIUM GLUCONATE, SODIUM ACETATE, POTASSIUM CHLORIDE AND MAGNESIUM CHLORIDE: 526; 502; 368; 37; 30 INJECTION, SOLUTION INTRAVENOUS at 12:01

## 2025-01-07 RX ADMIN — FENTANYL CITRATE 25 MCG: 50 INJECTION, SOLUTION INTRAMUSCULAR; INTRAVENOUS at 04:01

## 2025-01-07 RX ADMIN — SODIUM CHLORIDE, SODIUM GLUCONATE, SODIUM ACETATE, POTASSIUM CHLORIDE AND MAGNESIUM CHLORIDE: 526; 502; 368; 37; 30 INJECTION, SOLUTION INTRAVENOUS at 02:01

## 2025-01-07 RX ADMIN — HYDROMORPHONE HYDROCHLORIDE 0.2 MG: 2 INJECTION INTRAMUSCULAR; INTRAVENOUS; SUBCUTANEOUS at 04:01

## 2025-01-07 RX ADMIN — FENTANYL CITRATE 50 MCG: 50 INJECTION, SOLUTION INTRAMUSCULAR; INTRAVENOUS at 12:01

## 2025-01-07 RX ADMIN — MORPHINE SULFATE 4 MG: 2 INJECTION, SOLUTION INTRAMUSCULAR; INTRAVENOUS at 11:01

## 2025-01-07 RX ADMIN — MIDAZOLAM HYDROCHLORIDE 2 MG: 1 INJECTION, SOLUTION INTRAMUSCULAR; INTRAVENOUS at 12:01

## 2025-01-07 RX ADMIN — GLYCOPYRROLATE 0.2 MG: 0.2 INJECTION, SOLUTION INTRAMUSCULAR; INTRAVITREAL at 12:01

## 2025-01-07 RX ADMIN — PHENYLEPHRINE HYDROCHLORIDE 0.4 MCG/KG/MIN: 10 INJECTION INTRAVENOUS at 01:01

## 2025-01-07 RX ADMIN — FENTANYL CITRATE 25 MCG: 50 INJECTION, SOLUTION INTRAMUSCULAR; INTRAVENOUS at 03:01

## 2025-01-07 RX ADMIN — LIDOCAINE HYDROCHLORIDE 80 MG: 20 INJECTION, SOLUTION INTRAVENOUS at 12:01

## 2025-01-07 RX ADMIN — MAGNESIUM SULFATE 1 G: 1 INJECTION INTRAVENOUS at 05:01

## 2025-01-07 RX ADMIN — PROPOFOL 180 MG: 10 INJECTION, EMULSION INTRAVENOUS at 12:01

## 2025-01-07 RX ADMIN — ROCURONIUM BROMIDE 50 MG: 10 INJECTION, SOLUTION INTRAVENOUS at 12:01

## 2025-01-07 RX ADMIN — VALACYCLOVIR HYDROCHLORIDE 500 MG: 500 TABLET, FILM COATED ORAL at 12:01

## 2025-01-07 RX ADMIN — SUGAMMADEX 200 MG: 100 INJECTION, SOLUTION INTRAVENOUS at 02:01

## 2025-01-07 RX ADMIN — VECURONIUM BROMIDE 3 MG: 10 INJECTION, POWDER, LYOPHILIZED, FOR SOLUTION INTRAVENOUS at 01:01

## 2025-01-07 RX ADMIN — ONDANSETRON 4 MG: 2 INJECTION INTRAMUSCULAR; INTRAVENOUS at 04:01

## 2025-01-07 RX ADMIN — GABAPENTIN 800 MG: 400 CAPSULE ORAL at 09:01

## 2025-01-07 RX ADMIN — ACETAMINOPHEN 1000 MG: 10 INJECTION INTRAVENOUS at 01:01

## 2025-01-07 RX ADMIN — CEFOXITIN 2 G: 2 INJECTION, POWDER, FOR SOLUTION INTRAVENOUS at 12:01

## 2025-01-07 RX ADMIN — Medication 20 MG: at 12:01

## 2025-01-07 RX ADMIN — HYDROMORPHONE HYDROCHLORIDE 0.2 MG: 2 INJECTION INTRAMUSCULAR; INTRAVENOUS; SUBCUTANEOUS at 05:01

## 2025-01-07 RX ADMIN — FUROSEMIDE 20 MG: 10 INJECTION, SOLUTION INTRAMUSCULAR; INTRAVENOUS at 02:01

## 2025-01-07 RX ADMIN — FUROSEMIDE 20 MG: 10 INJECTION, SOLUTION INTRAMUSCULAR; INTRAVENOUS at 05:01

## 2025-01-07 RX ADMIN — SODIUM CHLORIDE, SODIUM GLUCONATE, SODIUM ACETATE, POTASSIUM CHLORIDE AND MAGNESIUM CHLORIDE: 526; 502; 368; 37; 30 INJECTION, SOLUTION INTRAVENOUS at 11:01

## 2025-01-07 RX ADMIN — ONDANSETRON 4 MG: 2 INJECTION INTRAMUSCULAR; INTRAVENOUS at 11:01

## 2025-01-07 RX ADMIN — FENTANYL CITRATE 50 MCG: 50 INJECTION, SOLUTION INTRAMUSCULAR; INTRAVENOUS at 02:01

## 2025-01-07 RX ADMIN — DEXAMETHASONE SODIUM PHOSPHATE 4 MG: 4 INJECTION, SOLUTION INTRA-ARTICULAR; INTRALESIONAL; INTRAMUSCULAR; INTRAVENOUS; SOFT TISSUE at 12:01

## 2025-01-07 RX ADMIN — VALACYCLOVIR HYDROCHLORIDE 500 MG: 500 TABLET, FILM COATED ORAL at 09:01

## 2025-01-07 RX ADMIN — SODIUM CHLORIDE: 9 INJECTION, SOLUTION INTRAVENOUS at 05:01

## 2025-01-07 RX ADMIN — SCOPOLAMINE 1 PATCH: 1.5 PATCH, EXTENDED RELEASE TRANSDERMAL at 11:01

## 2025-01-07 RX ADMIN — INDIGO CARMINE 50 MG: 8 INJECTION, SOLUTION INTRAMUSCULAR; INTRAVENOUS at 01:01

## 2025-01-07 RX ADMIN — ONDANSETRON 4 MG: 2 INJECTION INTRAMUSCULAR; INTRAVENOUS at 12:01

## 2025-01-07 RX ADMIN — EPHEDRINE SULFATE 10 MG: 50 INJECTION, SOLUTION INTRAMUSCULAR; INTRAVENOUS; SUBCUTANEOUS at 12:01

## 2025-01-07 NOTE — CONSULTS
In brief this is a 59-year-old female, known to me, with a cecal cancer.  She was having ongoing GI blood loss anemia.  Case was discussed in conjunction with Hematology/Oncology as well as [place] [facility]  It was recommended that she proceed with right hemicolectomy to control the bleeding source.      Patient did consent to the planned procedure.    To surgery for right hemicolectomy

## 2025-01-07 NOTE — ASSESSMENT & PLAN NOTE
"Chronic problem   Patient's FSGs are uncontrolled due to hyperglycemia on current medication regimen.  Last A1c reviewed-   Lab Results   Component Value Date    HGBA1C 6.5 (H) 11/07/2024     Most recent fingerstick glucose reviewed- No results for input(s): "POCTGLUCOSE" in the last 24 hours.  Current correctional scale  Medium  Maintain anti-hyperglycemic dose as follows-   Antihyperglycemics (From admission, onward)      None          Hold Oral hypoglycemics while patient is in the hospital.     "

## 2025-01-07 NOTE — HOSPITAL COURSE
Nik Lowery was closely monitored while in the hospital.  Patient was admitted to Hospital Medicine for symptomatic anemia.  She was transfused 2 units of packed red blood cells with improvement in hemoglobin above transfusion threshold.  She has remained hemodynamically stable.  Hematology/Oncology and General surgery were consulted for evaluation for colectomy, which was performed 01/07/2025.  Postop day 1 patient had slight leukocytosis.  Postoperatively on day 2 patient developed a fever of 103.1 with resolution of leukocytosis.  Patient was requiring 3 L of oxygen via nasal cannula during hospitalization.  Patient was encouraged to use incentive spirometer and sit up with meals.  CXR without acute cardiopulmonary processes noted.  Blood cultures NGTD.  She was started on zosyn empirically.  Patient's diet was advanced to low residue diet which she tolerated well.  Patient worked with PT/OT while hospitalized, low intensity therapy was recommended.  Oxygen evaluation was ordered and she did not qualify for home O2.  She has remained afebrile since 1/9/25.  She reports feeling well and is eager for discharge home.  Plan of care discussed with patient and she verbalized understanding.  Patient was seen and examined on the day of discharge.

## 2025-01-07 NOTE — PROGRESS NOTES
1/7 Reports that everything went well infusion. No complaints. Everyone was professional and caring. Was admitted to hospital for anemia with low H/H.

## 2025-01-07 NOTE — ASSESSMENT & PLAN NOTE
Anemia is likely due to megaloblastic anemia. Most recent hemoglobin and hematocrit are listed below.  Recent Labs     01/06/25  1241 01/06/25  1804   HGB 6.3* 6.2*   HCT 23.0* 22.8*     Plan  - Monitor serial CBC: Daily  - Transfuse PRBC if patient becomes hemodynamically unstable, symptomatic or H/H drops below 7/21.  - Patient has received 2 units of PRBCs on 01/06/2025  - Patient's anemia is currently worsening. Will continue current treatment  -

## 2025-01-07 NOTE — PROGRESS NOTES
[facility]     Colorectal Liver Metastasis Tumor Board Note        Date: 12/26/24     Referring Provider: Dr. 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  KRAS G12V mutation.     Participants: medical oncology, surgical oncology, colorectal surgery, transplant surgeons, interventional radiology, and oncology navigator      Imaging Reviewed: 11/14/24 MRI abd, 11/15/24 PET/CT     Radiology Review: Bx-proven 1cm liver metastasis in seg 8.  Hypermetabolic mesenteric chain lymph nodes noted on PET/CT from 11/15.      Orders/Referrals: N/A     Board Recommendations:  Start chemotherapy with FOLFOX and potentially bevacizumab.  Ablation appropriate for liver metastasis.  Restage after 4-6 cycles of chemotherapy.

## 2025-01-07 NOTE — PT/OT/SLP PROGRESS
Physical Therapy      Patient Name:  Nik oLwery   MRN:  [id number]    Patient not seen today secondary to  (awaiting  colectomy). Will follow-up 1/8/2025.

## 2025-01-07 NOTE — H&P
[facility] Medicine  History & Physical    Patient Name: Nik Lowery  MRN: [id number]  Patient Class: OP- Observation  Admission Date: 1/6/2025  Attending Physician: Finn Herring MD   Primary Care Provider: Osmin Newsome MD         Patient information was obtained from patient, past medical records, and ER records.     Subjective:     Principal Problem:Symptomatic anemia    Chief Complaint:   Chief Complaint   Patient presents with    abnormal labs      Abnormal Labs H7H is low and needs a blood transfusion         HPI: Nik Lowery is a 59 year female who presents emergency room for evaluation of fatigue, weakness, and exertional dyspnea.  She has a history of colon cancer.  She is currently in workup to determine chemo versus colectomy.  She reports continued anemia.  The symptoms onset yesterday and progressively worsened.  Denies fever chills.  No known sick contacts or travel.  Previous medical history includes hypertension, diabetes, colon cancer with liver Mets.  ER workup: CBC with H&H of 6.2/22.  CMP with glucose of 208.  Patient admitted to Hospital Medicine for treatment and management.  Will transfuse patient with 2 units of packed red cells tonight.                    Past Medical History:   Diagnosis Date    Colon cancer     Diabetes mellitus     Diabetes mellitus, type 2     Encounter for blood transfusion     Herpes     Hypertension     Sleep apnea        Past Surgical History:   Procedure Laterality Date    COLONOSCOPY N/A 11/8/2024    Procedure: COLONOSCOPY;  Surgeon: Saw Wick MD;  Location: [facility];  Service: Endoscopy;  Laterality: N/A;    ESOPHAGOGASTRODUODENOSCOPY N/A 11/8/2024    Procedure: EGD (ESOPHAGOGASTRODUODENOSCOPY);  Surgeon: Saw Wick MD;  Location: [facility];  Service: Endoscopy;  Laterality: N/A;    EYE SURGERY  2002    lasix Bilateral    HYSTERECTOMY  2012    INSERTION OF TUNNELED CENTRAL VENOUS CATHETER (CVC) WITH  SUBCUTANEOUS PORT Right 12/20/2024    Procedure: INSERTION, PORT-A-CATH;  Surgeon: Logan Juarez MD;  Location: [facility];  Service: General;  Laterality: Right;    LUMBAR DISCECTOMY      WISDOM TOOTH EXTRACTION         Review of patient's allergies indicates:   Allergen Reactions    Robaxin [methocarbamol] Nausea Only       Current Facility-Administered Medications on File Prior to Encounter   Medication    acetaminophen tablet 650 mg    diphenhydrAMINE capsule 25 mg    [COMPLETED] ferric gluconate (FERRLECIT) 250 mg in 0.9% NaCl 100 mL IVPB    sodium chloride 0.9% flush 10 mL    [DISCONTINUED] 0.9% NaCl 100 mL flush bag    [DISCONTINUED] heparin, porcine (PF) 100 unit/mL injection flush 500 Units    [DISCONTINUED] sodium chloride 0.9% flush 10 mL     Current Outpatient Medications on File Prior to Encounter   Medication Sig    acetaminophen (TYLENOL EXTRA STRENGTH ORAL) Take 1,300 mg by mouth as needed.    atorvastatin (LIPITOR) 20 MG tablet TAKE 1 TABLET BY MOUTH EVERY DAY    blood-glucose meter kit To check BG 3 times daily, to use with insurance preferred meter    dexAMETHasone (DECADRON) 4 MG Tab Take 2 tablets (8 mg total) by mouth once daily. On days 2 through 4 of each cycle of chemo    fexofenadine (ALLEGRA) 180 MG tablet Take 180 mg by mouth nightly.    fluticasone propionate (FLONASE) 50 mcg/actuation nasal spray 2 sprays (100 mcg total) by Each Nostril route once daily.    gabapentin (NEURONTIN) 800 MG tablet Take 800 mg by mouth 3 (three) times daily.    guaiFENesin (MUCINEX) 1,200 mg Ta12 Take 1 tablet by mouth nightly.    ibuprofen (ADVIL,MOTRIN) 600 MG tablet Take 1 tablet (600 mg total) by mouth every 8 (eight) hours as needed for Pain.    insulin glargine, TOUJEO, (TOUJEO SOLOSTAR U-300 INSULIN) 300 unit/mL (1.5 mL) InPn pen Inject 20 Units into the skin once daily.    lamoTRIgine (LAMICTAL) 150 MG Tab TAKE 1 TABLET BY MOUTH EVERY DAY (Patient taking differently: Take 150 mg by mouth every  "evening.)    lancets Misc To check BG 3 times daily, to use with insurance preferred meter    LIDOcaine-prilocaine (EMLA) cream Apply topically as needed (Apply to port site 30 minutes before access as needed).    lisinopriL (PRINIVIL,ZESTRIL) 20 MG tablet Take 1 tablet (20 mg total) by mouth once daily.    metFORMIN (GLUCOPHAGE-XR) 500 MG ER 24hr tablet TAKE 2 TABLETS BY MOUTH TWICE A DAY WITH MEALS    multivitamin (THERAGRAN) per tablet Take 1 tablet by mouth once daily.    omeprazole (PRILOSEC) 20 MG capsule Take 20 mg by mouth every evening.    ondansetron (ZOFRAN) 8 MG tablet Take 1 tablet (8 mg total) by mouth every 8 (eight) hours as needed.    ONETOUCH ULTRA BLUE TEST STRIP Strp USE TO CHECK BLOOD SUGAR 3 TIMES A DAY    pantoprazole (PROTONIX) 20 MG tablet Take 1 tablet (20 mg total) by mouth once daily.    pen needle, diabetic (PEN NEEDLE) 31 gauge x 5/16" Ndle 1 Application by Misc.(Non-Drug; Combo Route) route once daily.    prochlorperazine (COMPAZINE) 10 MG tablet Take 1 tablet (10 mg total) by mouth every 6 (six) hours as needed.    sertraline (ZOLOFT) 100 MG tablet Take 1 tablet (100 mg total) by mouth once daily.    valACYclovir (VALTREX) 500 MG tablet TAKE 1 TABLET BY MOUTH TWICE A DAY     Family History       Problem Relation (Age of Onset)    Alzheimer's disease Paternal Grandmother    Arthritis Sister    Cancer Father    Cataracts Mother    Diabetes Brother    Heart disease Mother, Paternal Uncle, Maternal Grandfather    Hypertension Mother    No Known Problems Paternal Aunt    Pancreatic cancer Father          Tobacco Use    Smoking status: Never    Smokeless tobacco: Never   Substance and Sexual Activity    Alcohol use: No    Drug use: No    Sexual activity: Not Currently     Review of Systems   Constitutional:  Positive for activity change and fatigue. Negative for chills, diaphoresis and fever.   HENT:  Negative for congestion, nosebleeds and tinnitus.    Eyes:  Negative for photophobia and " visual disturbance.   Respiratory:  Positive for shortness of breath. Negative for cough, chest tightness and wheezing.    Cardiovascular:  Negative for chest pain, palpitations and leg swelling.   Gastrointestinal:  Negative for abdominal distention, abdominal pain, constipation, diarrhea, nausea and vomiting.   Endocrine: Negative for cold intolerance and heat intolerance.   Genitourinary:  Negative for difficulty urinating, dysuria, frequency, hematuria and urgency.   Musculoskeletal:  Negative for arthralgias, back pain and myalgias.   Skin:  Negative for pallor, rash and wound.   Allergic/Immunologic: Negative for immunocompromised state.   Neurological:  Positive for weakness. Negative for dizziness, tremors, facial asymmetry and speech difficulty.   Hematological:  Negative for adenopathy. Does not bruise/bleed easily.   Psychiatric/Behavioral:  Negative for confusion and sleep disturbance. The patient is not nervous/anxious.      Objective:     Vital Signs (Most Recent):  Temp: 98.7 °F (37.1 °C) (01/06/25 1731)  Pulse: 81 (01/06/25 2102)  Resp: 18 (01/06/25 2002)  BP: 133/69 (01/06/25 2102)  SpO2: 97 % (01/06/25 2102) Vital Signs (24h Range):  Temp:  [97.9 °F (36.6 °C)-98.7 °F (37.1 °C)] 98.7 °F (37.1 °C)  Pulse:  [] 81  Resp:  [18-19] 18  SpO2:  [96 %-99 %] 97 %  BP: (118-155)/(58-74) 133/69     Weight: 96.2 kg (212 lb)  Body mass index is 35.28 kg/m².     Physical Exam  Vitals and nursing note reviewed.   Constitutional:       General: She is not in acute distress.     Appearance: She is well-developed. She is ill-appearing. She is not diaphoretic.   HENT:      Head: Normocephalic.      Mouth/Throat:      Mouth: Mucous membranes are dry.   Eyes:      General: No scleral icterus.     Pupils: Pupils are equal, round, and reactive to light.      Comments: Conjunctival pallor   Neck:      Vascular: No JVD.   Cardiovascular:      Rate and Rhythm: Normal rate and regular rhythm.      Heart sounds: Normal  heart sounds. No murmur heard.     No friction rub. No gallop.   Pulmonary:      Effort: Pulmonary effort is normal. No respiratory distress.      Breath sounds: Normal breath sounds. No wheezing or rales.   Abdominal:      General: Bowel sounds are normal. There is no distension.      Palpations: Abdomen is soft.      Tenderness: There is no abdominal tenderness. There is no guarding or rebound.   Musculoskeletal:         General: No tenderness. Normal range of motion.      Cervical back: Normal range of motion and neck supple.   Lymphadenopathy:      Cervical: No cervical adenopathy.   Skin:     General: Skin is warm and dry.      Capillary Refill: Capillary refill takes less than 2 seconds.      Coloration: Skin is pale.      Findings: No erythema or rash.   Neurological:      Mental Status: She is alert and oriented to person, place, and time.      Cranial Nerves: No cranial nerve deficit.      Sensory: No sensory deficit.      Coordination: Coordination normal.      Deep Tendon Reflexes: Reflexes normal.   Psychiatric:         Behavior: Behavior normal.         Thought Content: Thought content normal.         Judgment: Judgment normal.              CRANIAL NERVES     CN III, IV, VI   Pupils are equal, round, and reactive to light.       Significant Labs: All pertinent labs within the past 24 hours have been reviewed.  CBC:   Recent Labs   Lab 01/06/25  1241 01/06/25  1804   WBC 6.33 5.57   HGB 6.3* 6.2*   HCT 23.0* 22.8*    259     CMP:   Recent Labs   Lab 01/06/25  1241 01/06/25  1804    139   K 5.0 4.6    108   CO2 21* 21*   * 208*   BUN 14 13   CREATININE 0.9 1.0   CALCIUM 8.9 8.9   PROT 7.3 7.4   ALBUMIN 3.8 3.9   BILITOT 0.2 0.3   ALKPHOS 80 82   AST 22 23   ALT 21 21   ANIONGAP 10 10       Significant Imaging: I have reviewed all pertinent imaging results/findings within the past 24 hours.  Assessment/Plan:     * Symptomatic anemia  Anemia is likely due to macrocytic anemia. Most  "recent hemoglobin and hematocrit are listed below.  Recent Labs     01/06/25  1241 01/06/25  1804   HGB 6.3* 6.2*   HCT 23.0* 22.8*     Plan  - Monitor serial CBC: Daily  - Transfuse PRBC if patient becomes hemodynamically unstable, symptomatic or H/H drops below 7/21.  - Patient has received 2 units of PRBCs on 01/06/2025  - Patient's anemia is currently worsening. Will continue current treatment  -     Malignant neoplasm of ascending colon  Chronic problem   History noted      Gastroesophageal reflux disease without esophagitis  Chronic  History noted      Uncontrolled type 2 diabetes mellitus with hyperglycemia, without long-term current use of insulin  Chronic problem   Patient's FSGs are uncontrolled due to hyperglycemia on current medication regimen.  Last A1c reviewed-   Lab Results   Component Value Date    HGBA1C 6.5 (H) 11/07/2024     Most recent fingerstick glucose reviewed- No results for input(s): "POCTGLUCOSE" in the last 24 hours.  Current correctional scale  Medium  Maintain anti-hyperglycemic dose as follows-   Antihyperglycemics (From admission, onward)      None          Hold Oral hypoglycemics while patient is in the hospital.         VTE Risk Mitigation (From admission, onward)      None                 On 01/06/2025, patient should be placed in hospital observation services under my care in collaboration with Dr Herring.           Ricky Pastor NP  Department of Hospital Medicine  [facility]          "

## 2025-01-07 NOTE — TRANSFER OF CARE
"Anesthesia Transfer of Care Note    Patient: Nik Lowery    Procedure(s) Performed: Procedure(s) (LRB):  ROBOTIC COLECTOMY WITH ILEOCOLIC ANASTOMOSIS (Right)    Patient location: PACU    Anesthesia Type: general    Transport from OR: Transported from OR on 2-3 L/min O2 by NC with adequate spontaneous ventilation    Post pain: adequate analgesia    Post assessment: no apparent anesthetic complications    Post vital signs: stable    Level of consciousness: awake    Nausea/Vomiting: no nausea/vomiting    Complications: none    Transfer of care protocol was followed      Last vitals: Visit Vitals  BP (!) 141/85 (BP Location: Right arm, Patient Position: Lying)   Pulse 75   Temp 36.7 °C (98.1 °F) (Skin)   Resp 18   Ht 5' 5" (1.651 m)   Wt 98.7 kg (217 lb 9.5 oz)   SpO2 95%   Breastfeeding No   BMI 36.21 kg/m²     "

## 2025-01-07 NOTE — PT/OT/SLP EVAL
"Occupational Therapy   Evaluation and Discharge Note    Name: Nik Lowery  MRN: [id number]  Admitting Diagnosis: Symptomatic anemia  Recent Surgery: Procedure(s) (LRB):  ROBOTIC COLECTOMY (Right) Day of Surgery    Recommendations:     Discharge Recommendations: No Therapy Indicated  Discharge Equipment Recommendations: none  Barriers to discharge:  None    Assessment:     Nik Lowery is a 59 y.o. female with a medical diagnosis of Symptomatic anemia. At this time, patient is functioning at their prior level of function and does not require further acute OT services.     Plan:     During this hospitalization, patient does not require further acute OT services.  Please re-consult if situation changes.    Plan of Care Reviewed with: patient    Subjective     Chief Complaint: none stated  Patient/Family Comments/goals: " I don't have any issues moving"     Occupational Profile:  Living Environment: Pt lives in [facility] alone, with walk-in shower with no DME.   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  Patient found supine with peripheral IV upon OT entry to room.    General Precautions: Standard, fall  Orthopedic Precautions: N/A  Braces: N/A  Respiratory Status: Room air     Occupational Performance:    Bed Mobility:    Patient completed Rolling/Turning to Left with  independence  Patient completed Scooting/Bridging with independence  Patient completed Supine to Sit with independence  Patient completed Sit to Supine with independence    Functional Mobility/Transfers:  Patient completed Sit <> Stand Transfer with independence  with  no assistive device   Patient completed Toilet Transfer Step Transfer technique " with independence with  no AD  Functional Mobility: pt able to ambulate independently with no AD    Activities of Daily Living:  Toileting: independence for toileting during  toileting demonstration     Cognitive/Visual Perceptual:  Cognitive/Psychosocial Skills:     -       Oriented to: Person, Place, Time, and Situation   -       Follows Commands/attention:Follows multistep  commands  -       Communication: clear/fluent  -       Safety awareness/insight to disability: intact   -       Mood/Affect/Coping skills/emotional control: Appropriate to situation, Cooperative, and Pleasant    Physical Exam:  Balance:    -       pt found with good balance during unsupported sitting and ambulation around room  Upper Extremity Range of Motion:     -       Right Upper Extremity: WFL  -       Left Upper Extremity: WFL  Upper Extremity Strength:    -       Right Upper Extremity: WFL  -       Left Upper Extremity: WFL   Strength:    -       Right Upper Extremity: WFL  -       Left Upper Extremity: WFL  Gross motor coordination:   WFL    AMPAC 6 Click ADL:  AMPAC Total Score: 24    Treatment & Education:  Pt educated on role of OT/POC, importance of OOB/EOB activity, use of call bell, and safety during ADLs, transfers, and functional mobility. Pt educated on importance of requesting OT therapy if mobility changes    Patient left supine with all lines intact and call button in reach    GOALS:   Multidisciplinary Problems       Occupational Therapy Goals       Not on file                    History:     Past Medical History:   Diagnosis Date    Colon cancer     Diabetes mellitus     Diabetes mellitus, type 2     Encounter for blood transfusion     Herpes     Hypertension     Sleep apnea          Past Surgical History:   Procedure Laterality Date    COLONOSCOPY N/A 11/8/2024    Procedure: COLONOSCOPY;  Surgeon: Saw Wick MD;  Location: [facility];  Service: Endoscopy;  Laterality: N/A;    ESOPHAGOGASTRODUODENOSCOPY N/A  11/8/2024    Procedure: EGD (ESOPHAGOGASTRODUODENOSCOPY);  Surgeon: Saw Wick MD;  Location: [facility];  Service: Endoscopy;  Laterality: N/A;    EYE SURGERY  2002    lasix Bilateral    HYSTERECTOMY  2012    INSERTION OF TUNNELED CENTRAL VENOUS CATHETER (CVC) WITH SUBCUTANEOUS PORT Right 12/20/2024    Procedure: INSERTION, PORT-A-CATH;  Surgeon: Logan Juarez MD;  Location: [facility];  Service: General;  Laterality: Right;    LUMBAR DISCECTOMY      WISDOM TOOTH EXTRACTION         Time Tracking:     OT Date of Treatment: 01/07/25  OT Start Time: 0941  OT Stop Time: 0951  OT Total Time (min): 10 min    Billable Minutes:Evaluation 10    1/7/2025

## 2025-01-07 NOTE — ANESTHESIA PROCEDURE NOTES
Intubation    Date/Time: 1/7/2025 12:26 PM    Performed by: Guadalupe Vazquez CRNA  Authorized by: Stan Brooks MD    Intubation:     Induction:  Intravenous    Intubated:  Postinduction    Mask Ventilation:  Easy mask    Attempts:  1    Attempted By:  Student (Johana Mejia)    Method of Intubation:  Video laryngoscopy    Blade:  Fuentes 3    Laryngeal View Grade: Grade IIA - cords partially seen      Difficult Airway Encountered?: No      Complications:  None    Airway Device:  Oral endotracheal tube    Airway Device Size:  7.0    Style/Cuff Inflation:  Cuffed (inflated to minimal occlusive pressure)    Tube secured:  21    Secured at:  The lips    Placement Verified By:  Capnometry    Complicating Factors:  None    Findings Post-Intubation:  BS equal bilateral and atraumatic/condition of teeth unchanged

## 2025-01-07 NOTE — PROGRESS NOTES
Trying to see patient for initial consultation 1/7/25.  However patient is in OR for colectomy.    We will see patient tomorrow 1/8/25

## 2025-01-07 NOTE — PLAN OF CARE
Released per anesthesia when criteria met, pain controlled skin warm and dry. No nausea, emesis, op sites x 5 on abd  dry and intact. Encourage deep breaths. Instructed on IS, encourage use. Patient back to room 206..

## 2025-01-07 NOTE — ANESTHESIA PREPROCEDURE EVALUATION
01/07/2025  Nik Lowery is a 59 y.o., female.      Pre-op Assessment    I have reviewed the Patient Summary Reports.     I have reviewed the Nursing Notes. I have reviewed the NPO Status.   I have reviewed the Medications.     Review of Systems  Anesthesia Hx:  No problems with previous Anesthesia                Social:  Non-Smoker       Hematology/Oncology:                      Current/Recent Cancer. (Ascending Colon CA w/liver mets)                Cardiovascular:     Hypertension, well controlled                                          Pulmonary:        Sleep Apnea                Renal/:  Renal/ Normal                 Hepatic/GI:     GERD Liver Disease, (Liver Mets)  Admitted for GI Bleed               Neurological:  Neurology Normal                                      Endocrine:  Diabetes, well controlled, type 2         Obesity / BMI > 30, Morbid Obesity / BMI > 40  Psych:  Psychiatric History anxiety depression              Physical Exam  General: Well nourished, Cooperative, Alert and Oriented    Airway:  Mallampati: II   Mouth Opening: Normal  TM Distance: Normal  Neck ROM: Normal ROM    Anesthesia Plan  Type of Anesthesia, risks & benefits discussed:    Anesthesia Type: Gen ETT, Gen Supraglottic Airway, Gen Natural Airway, MAC  Intra-op Monitoring Plan: Standard ASA Monitors  Post Op Pain Control Plan: multimodal analgesia  Induction:  IV  Airway Plan: Direct, Video and Fiberoptic, Post-Induction  Informed Consent: Informed consent signed with the Patient and all parties understand the risks and agree with anesthesia plan.  All questions answered.   ASA Score: 3    Ready For Surgery From Anesthesia Perspective.   .

## 2025-01-07 NOTE — SUBJECTIVE & OBJECTIVE
Past Medical History:   Diagnosis Date    Colon cancer     Diabetes mellitus     Diabetes mellitus, type 2     Encounter for blood transfusion     Herpes     Hypertension     Sleep apnea        Past Surgical History:   Procedure Laterality Date    COLONOSCOPY N/A 11/8/2024    Procedure: COLONOSCOPY;  Surgeon: Saw Wick MD;  Location: [facility];  Service: Endoscopy;  Laterality: N/A;    ESOPHAGOGASTRODUODENOSCOPY N/A 11/8/2024    Procedure: EGD (ESOPHAGOGASTRODUODENOSCOPY);  Surgeon: Saw Wick MD;  Location: [facility];  Service: Endoscopy;  Laterality: N/A;    EYE SURGERY  2002    lasix Bilateral    HYSTERECTOMY  2012    INSERTION OF TUNNELED CENTRAL VENOUS CATHETER (CVC) WITH SUBCUTANEOUS PORT Right 12/20/2024    Procedure: INSERTION, PORT-A-CATH;  Surgeon: Logan Juarez MD;  Location: [facility];  Service: General;  Laterality: Right;    LUMBAR DISCECTOMY      WISDOM TOOTH EXTRACTION         Review of patient's allergies indicates:   Allergen Reactions    Robaxin [methocarbamol] Nausea Only       Current Facility-Administered Medications on File Prior to Encounter   Medication    acetaminophen tablet 650 mg    diphenhydrAMINE capsule 25 mg    [COMPLETED] ferric gluconate (FERRLECIT) 250 mg in 0.9% NaCl 100 mL IVPB    sodium chloride 0.9% flush 10 mL    [DISCONTINUED] 0.9% NaCl 100 mL flush bag    [DISCONTINUED] heparin, porcine (PF) 100 unit/mL injection flush 500 Units    [DISCONTINUED] sodium chloride 0.9% flush 10 mL     Current Outpatient Medications on File Prior to Encounter   Medication Sig    acetaminophen (TYLENOL EXTRA STRENGTH ORAL) Take 1,300 mg by mouth as needed.    atorvastatin (LIPITOR) 20 MG tablet TAKE 1 TABLET BY MOUTH EVERY DAY    blood-glucose meter kit To check BG 3 times daily, to use with insurance preferred meter    dexAMETHasone (DECADRON) 4 MG Tab Take 2 tablets (8 mg total) by mouth once daily. On days 2 through 4 of each cycle of chemo    fexofenadine (ALLEGRA) 180 MG  "tablet Take 180 mg by mouth nightly.    fluticasone propionate (FLONASE) 50 mcg/actuation nasal spray 2 sprays (100 mcg total) by Each Nostril route once daily.    gabapentin (NEURONTIN) 800 MG tablet Take 800 mg by mouth 3 (three) times daily.    guaiFENesin (MUCINEX) 1,200 mg Ta12 Take 1 tablet by mouth nightly.    ibuprofen (ADVIL,MOTRIN) 600 MG tablet Take 1 tablet (600 mg total) by mouth every 8 (eight) hours as needed for Pain.    insulin glargine, TOUJEO, (TOUJEO SOLOSTAR U-300 INSULIN) 300 unit/mL (1.5 mL) InPn pen Inject 20 Units into the skin once daily.    lamoTRIgine (LAMICTAL) 150 MG Tab TAKE 1 TABLET BY MOUTH EVERY DAY (Patient taking differently: Take 150 mg by mouth every evening.)    lancets [facility] To check BG 3 times daily, to use with insurance preferred meter    LIDOcaine-prilocaine (EMLA) cream Apply topically as needed (Apply to port site 30 minutes before access as needed).    lisinopriL (PRINIVIL,ZESTRIL) 20 MG tablet Take 1 tablet (20 mg total) by mouth once daily.    metFORMIN (GLUCOPHAGE-XR) 500 MG ER 24hr tablet TAKE 2 TABLETS BY MOUTH TWICE A DAY WITH MEALS    multivitamin (THERAGRAN) per tablet Take 1 tablet by mouth once daily.    omeprazole (PRILOSEC) 20 MG capsule Take 20 mg by mouth every evening.    ondansetron (ZOFRAN) 8 MG tablet Take 1 tablet (8 mg total) by mouth every 8 (eight) hours as needed.    ONETOUCH ULTRA BLUE TEST STRIP Str USE TO CHECK BLOOD SUGAR 3 TIMES A DAY    pantoprazole (PROTONIX) 20 MG tablet Take 1 tablet (20 mg total) by mouth once daily.    pen needle, diabetic (PEN NEEDLE) 31 gauge x 5/16" Ndle 1 Application by Misc.(Non-Drug; Combo Route) route once daily.    prochlorperazine (COMPAZINE) 10 MG tablet Take 1 tablet (10 mg total) by mouth every 6 (six) hours as needed.    sertraline (ZOLOFT) 100 MG tablet Take 1 tablet (100 mg total) by mouth once daily.    valACYclovir (VALTREX) 500 MG tablet TAKE 1 TABLET BY MOUTH TWICE A DAY     Family History       " Problem Relation (Age of Onset)    Alzheimer's disease Paternal Grandmother    Arthritis Sister    Cancer Father    Cataracts Mother    Diabetes Brother    Heart disease Mother, Paternal Uncle, Maternal Grandfather    Hypertension Mother    No Known Problems Paternal Aunt    Pancreatic cancer Father          Tobacco Use    Smoking status: Never    Smokeless tobacco: Never   Substance and Sexual Activity    Alcohol use: No    Drug use: No    Sexual activity: Not Currently     Review of Systems   Constitutional:  Positive for activity change and fatigue. Negative for chills, diaphoresis and fever.   HENT:  Negative for congestion, nosebleeds and tinnitus.    Eyes:  Negative for photophobia and visual disturbance.   Respiratory:  Positive for shortness of breath. Negative for cough, chest tightness and wheezing.    Cardiovascular:  Negative for chest pain, palpitations and leg swelling.   Gastrointestinal:  Negative for abdominal distention, abdominal pain, constipation, diarrhea, nausea and vomiting.   Endocrine: Negative for cold intolerance and heat intolerance.   Genitourinary:  Negative for difficulty urinating, dysuria, frequency, hematuria and urgency.   Musculoskeletal:  Negative for arthralgias, back pain and myalgias.   Skin:  Negative for pallor, rash and wound.   Allergic/Immunologic: Negative for immunocompromised state.   Neurological:  Positive for weakness. Negative for dizziness, tremors, facial asymmetry and speech difficulty.   Hematological:  Negative for adenopathy. Does not bruise/bleed easily.   Psychiatric/Behavioral:  Negative for confusion and sleep disturbance. The patient is not nervous/anxious.      Objective:     Vital Signs (Most Recent):  Temp: 98.7 °F (37.1 °C) (01/06/25 1731)  Pulse: 81 (01/06/25 2102)  Resp: 18 (01/06/25 2002)  BP: 133/69 (01/06/25 2102)  SpO2: 97 % (01/06/25 2102) Vital Signs (24h Range):  Temp:  [97.9 °F (36.6 °C)-98.7 °F (37.1 °C)] 98.7 °F (37.1 °C)  Pulse:   [] 81  Resp:  [18-19] 18  SpO2:  [96 %-99 %] 97 %  BP: (118-155)/(58-74) 133/69     Weight: 96.2 kg (212 lb)  Body mass index is 35.28 kg/m².     Physical Exam  Vitals and nursing note reviewed.   Constitutional:       General: She is not in acute distress.     Appearance: She is well-developed. She is ill-appearing. She is not diaphoretic.   HENT:      Head: Normocephalic.      Mouth/Throat:      Mouth: Mucous membranes are dry.   Eyes:      General: No scleral icterus.     Pupils: Pupils are equal, round, and reactive to light.      Comments: Conjunctival pallor   Neck:      Vascular: No JVD.   Cardiovascular:      Rate and Rhythm: Normal rate and regular rhythm.      Heart sounds: Normal heart sounds. No murmur heard.     No friction rub. No gallop.   Pulmonary:      Effort: Pulmonary effort is normal. No respiratory distress.      Breath sounds: Normal breath sounds. No wheezing or rales.   Abdominal:      General: Bowel sounds are normal. There is no distension.      Palpations: Abdomen is soft.      Tenderness: There is no abdominal tenderness. There is no guarding or rebound.   Musculoskeletal:         General: No tenderness. Normal range of motion.      Cervical back: Normal range of motion and neck supple.   Lymphadenopathy:      Cervical: No cervical adenopathy.   Skin:     General: Skin is warm and dry.      Capillary Refill: Capillary refill takes less than 2 seconds.      Coloration: Skin is pale.      Findings: No erythema or rash.   Neurological:      Mental Status: She is alert and oriented to person, place, and time.      Cranial Nerves: No cranial nerve deficit.      Sensory: No sensory deficit.      Coordination: Coordination normal.      Deep Tendon Reflexes: Reflexes normal.   Psychiatric:         Behavior: Behavior normal.         Thought Content: Thought content normal.         Judgment: Judgment normal.              CRANIAL NERVES     CN III, IV, VI   Pupils are equal, round, and  reactive to light.       Significant Labs: All pertinent labs within the past 24 hours have been reviewed.  CBC:   Recent Labs   Lab 01/06/25  1241 01/06/25  1804   WBC 6.33 5.57   HGB 6.3* 6.2*   HCT 23.0* 22.8*    259     CMP:   Recent Labs   Lab 01/06/25  1241 01/06/25  1804    139   K 5.0 4.6    108   CO2 21* 21*   * 208*   BUN 14 13   CREATININE 0.9 1.0   CALCIUM 8.9 8.9   PROT 7.3 7.4   ALBUMIN 3.8 3.9   BILITOT 0.2 0.3   ALKPHOS 80 82   AST 22 23   ALT 21 21   ANIONGAP 10 10       Significant Imaging: I have reviewed all pertinent imaging results/findings within the past 24 hours.

## 2025-01-07 NOTE — OP NOTE
[facility]  Surgery Department  Operative Note    SUMMARY     Date of Procedure: 1/7/2025     Procedure: Procedure(s) (LRB):  ROBOTIC COLECTOMY (Right)     Surgeons and Role:     * Logan Juarez MD - Primary    Assisting Surgeon: Citlali MARIN    Pre-Operative Diagnosis: Symptomatic anemia [D64.9]    Post-Operative Diagnosis: Post-Op Diagnosis Codes:     * Symptomatic anemia [D64.9]    Anesthesia: General    Operative Findings (including complications, if any):  Robotic right colectomy for a tattooed cecal cancer.  Ileocolic anastomosis    Description of Technical Procedures:  This is a 59-year-old female with known metastatic colon cancer emanating from the cecum.  She was found to be anemic and it was recommended that she have bleeding control of her fungating mass prior to starting chemo to prevent future transfusions.  She did consent to the planned procedure.  She was taken to the OR, placed supine, general endotracheal anesthesia was induced, the abdomen was prepped and draped in the usual fashion, a time-out was completed.  Access to the abdomen was achieved using Optiview technique in the right upper quadrant.  The abdomen was insufflated to 15 mmHg a scope was inserted.  There was no apparent injury during entry.  Robotic trocars were placed on a diagonal across the abdomen.  A 5 mm assist trocar was placed in the lower midline and the robot was docked.  The right colon was identified with a tattoo.  I started a medial to lateral dissection by incising the peritoneum at the ileocolic pedicle.  The vessel was circumferentially controlled and then ligated using a vessel seal device.  The medial to lateral dissection was taking superiorly until the edge of the liver was visualized.  Moving inferiorly from this window, the white line of Toldt was incised to completely medialized the right colon.  The mesentery of the terminal ileum was then divided up to the small bowel at the  planned transection point.  Remaining mesenteric attachments along the mesocolon at the transverse colon were also divided.  ICG was injected which showed viability at the planned transection locations.  The small bowel and transverse colon were then divided with staple loads.  An isoperistaltic anastomosis was then fashioned by creating an enterotomy and a colotomy with an additional blue staple load for the common channel.  The common enterotomy was then closed with a 3-0 dissolvable V lock suture in 2 layers.  The abdomen was inspected for hemostasis which was adequate.  The specimen was then placed in an Endo-Catch bag at the infraumbilical incision.  The robot was then undocked.  Skin incision at the bag site were open superiorly and inferiorly along with fascia.  The specimen was delivered.  Midline fascia was then closed with a PDS suture.  Skin incisions were closed with Monocryl.  Dermabond was applied as a dressing.  Patient tolerated the entire procedure without apparent complication, was extubated in the OR, brought to recovery in stable condition.  All counts were correct.    Significant Surgical Tasks Conducted by the Assistant(s), if Applicable:  Patient positioning and prep, bedside assist, skin closure, bandage application.    Estimated Blood Loss (EBL): 50 mL           Implants: * No implants in log *    Specimens:   Specimen (24h ago, onward)       Start     Ordered    01/07/25 1441  Specimen to Pathology - Surgery  Once        Comments: Pre-op Diagnosis: Symptomatic anemia [D64.9]Procedure(s):ROBOTIC COLECTOMY Number of specimens: 1Name of specimens: 1. TERMINAL ILEUM AND RIGHT COLON     Question:  Release to patient  Answer:  Immediate    01/07/25 1441                            Condition: Good    Disposition: PACU - hemodynamically stable.    Attestation: Op Note Attestation: I was physically present and scrubbed for the entire procedure.

## 2025-01-07 NOTE — ASSESSMENT & PLAN NOTE
Chronic problem   Patient's FSGs are uncontrolled due to hyperglycemia on current medication regimen.  Last A1c reviewed-   Lab Results   Component Value Date    HGBA1C 6.5 (H) 11/07/2024     Most recent fingerstick glucose reviewed-   Recent Labs   Lab 01/07/25  0005   POCTGLUCOSE 204*     Current correctional scale  Medium  Maintain anti-hyperglycemic dose as follows-   Antihyperglycemics (From admission, onward)    Start     Stop Route Frequency Ordered    01/06/25 2238  insulin aspart U-100 pen 1-10 Units         -- SubQ Before meals & nightly PRN 01/06/25 2139        Hold Oral hypoglycemics while patient is in the hospital.

## 2025-01-07 NOTE — ED PROVIDER NOTES
Encounter Date: 1/6/2025       History     Chief Complaint   Patient presents with    abnormal labs      Abnormal Labs H7H is low and needs a blood transfusion      HPI 59-year-old woman who presents emergency department complaining of fatigue.  She has a history of colon cancer being evaluated for chemo versus colectomy.  She had outpatient blood work done showing that she was significantly anemic and sent to the ED.  Review of patient's allergies indicates:   Allergen Reactions    Robaxin [methocarbamol] Nausea Only     Past Medical History:   Diagnosis Date    Colon cancer     Diabetes mellitus     Diabetes mellitus, type 2     Encounter for blood transfusion     Herpes     Hypertension     Sleep apnea      Past Surgical History:   Procedure Laterality Date    COLONOSCOPY N/A 11/8/2024    Procedure: COLONOSCOPY;  Surgeon: Saw Wick MD;  Location: [facility];  Service: Endoscopy;  Laterality: N/A;    ESOPHAGOGASTRODUODENOSCOPY N/A 11/8/2024    Procedure: EGD (ESOPHAGOGASTRODUODENOSCOPY);  Surgeon: Saw Wick MD;  Location: [facility];  Service: Endoscopy;  Laterality: N/A;    EYE SURGERY  2002    lasix Bilateral    HYSTERECTOMY  2012    INSERTION OF TUNNELED CENTRAL VENOUS CATHETER (CVC) WITH SUBCUTANEOUS PORT Right 12/20/2024    Procedure: INSERTION, PORT-A-CATH;  Surgeon: Logan Juarez MD;  Location: [facility];  Service: General;  Laterality: Right;    LUMBAR DISCECTOMY      WISDOM TOOTH EXTRACTION       Family History   Problem Relation Name Age of Onset    Heart disease Mother      Hypertension Mother      Cataracts Mother      Pancreatic cancer Father      Cancer Father      Arthritis Sister 2     Diabetes Brother 1     No Known Problems Paternal Aunt 1     Heart disease Paternal Uncle 2     Heart disease Maternal Grandfather      Alzheimer's disease Paternal Grandmother      Glaucoma Neg Hx       Social History     Tobacco Use    Smoking status: Never    Smokeless tobacco: Never   Substance  Use Topics    Alcohol use: No    Drug use: No     Review of Systems   Constitutional:  Positive for fatigue. Negative for fever.   Gastrointestinal:  Negative for anal bleeding.       Physical Exam     Initial Vitals [01/06/25 1731]   BP Pulse Resp Temp SpO2   (!) 118/58 (!) 114 19 98.7 °F (37.1 °C) 96 %      MAP       --         Physical Exam    Constitutional: Vital signs are normal. She appears well-developed and well-nourished.  Non-toxic appearance. No distress.   HENT:   Head: Normocephalic and atraumatic.   Eyes: EOM are normal. Pupils are equal, round, and reactive to light.   Pale conjunctiva   Neck: Neck supple. No JVD present.   Normal range of motion.  Cardiovascular:  Normal rate, regular rhythm, normal heart sounds and intact distal pulses.     Exam reveals no gallop and no friction rub.       No murmur heard.  Pulmonary/Chest: Breath sounds normal. She has no wheezes. She has no rhonchi. She has no rales.   Abdominal: Abdomen is soft. Bowel sounds are normal. There is no abdominal tenderness. There is no rebound and no guarding.   Musculoskeletal:         General: Normal range of motion.      Cervical back: Normal range of motion and neck supple. No rigidity.     Neurological: She is alert and oriented to person, place, and time. She has normal strength and normal reflexes. No cranial nerve deficit or sensory deficit. She exhibits normal muscle tone. Coordination normal. GCS eye subscore is 4. GCS verbal subscore is 5. GCS motor subscore is 6.   Skin: Skin is warm and dry.   Psychiatric: She has a normal mood and affect. Her speech is normal and behavior is normal. She is not actively hallucinating.         ED Course   Procedures  Labs Reviewed   CBC W/ AUTO DIFFERENTIAL - Abnormal       Result Value    WBC 5.57      RBC 3.01 (*)     Hemoglobin 6.2 (*)     Hematocrit 22.8 (*)     MCV 76 (*)     MCH 20.6 (*)     MCHC 27.2 (*)     RDW 18.9 (*)     Platelets 259      MPV 9.1 (*)     Immature Granulocytes  0.4      Gran # (ANC) 3.6      Immature Grans (Abs) 0.02      Lymph # 1.4      Mono # 0.3      Eos # 0.2      Baso # 0.05      nRBC 0      Gran % 65.3      Lymph % 24.6      Mono % 5.6      Eosinophil % 3.2      Basophil % 0.9      Differential Method Automated      Narrative:     Release to patient->Immediate  HGB critical result(s) called and verbal readback obtained from MARGUERITE MADDOX by KFA1 01/06/2025 18:25   COMPREHENSIVE METABOLIC PANEL - Abnormal    Sodium 139      Potassium 4.6      Chloride 108      CO2 21 (*)     Glucose 208 (*)     BUN 13      Creatinine 1.0      Calcium 8.9      Total Protein 7.4      Albumin 3.9      Total Bilirubin 0.3      Alkaline Phosphatase 82      AST 23      ALT 21      eGFR >60      Anion Gap 10      Narrative:     Release to patient->Immediate   HEPATITIS C ANTIBODY    Hepatitis C Ab Negative      Narrative:     Release to patient->Immediate   HIV 1 / 2 ANTIBODY    HIV 1/2 Ag/Ab Negative      Narrative:     Release to patient->Immediate   PROTIME-INR    Prothrombin Time 11.8      INR 1.1      Narrative:     Release to patient->Immediate   TYPE & SCREEN    Group & Rh A POS      Indirect Cher NEG      Specimen Outdate 01/09/2025 23:59     PREPARE RBC SOFT    UNIT NUMBER [id number]      Product Code F9960Q42      DISPENSE STATUS CROSSMATCHED      CODING SYSTEM [id number]      Unit Blood Type Code 6200      Unit Blood Type A POS      Unit Expiration [id number]      CROSSMATCH INTERPRETATION Compatible      UNIT NUMBER [id number]      Product Code S5433C98      DISPENSE STATUS CROSSMATCHED      CODING SYSTEM [id number]      Unit Blood Type Code 6200      Unit Blood Type A POS      Unit Expiration [id number]      CROSSMATCH INTERPRETATION Compatible            Imaging Results    None          Medications   0.9%  NaCl infusion (for blood administration) (has no administration in time range)   sodium chloride 0.9% flush 10 mL (has no administration in time range)    albuterol-ipratropium 2.5 mg-0.5 mg/3 mL nebulizer solution 3 mL (has no administration in time range)   melatonin tablet 9 mg (has no administration in time range)   ondansetron injection 4 mg (has no administration in time range)   simethicone chewable tablet 80 mg (has no administration in time range)   aluminum-magnesium hydroxide-simethicone 200-200-20 mg/5 mL suspension 30 mL (has no administration in time range)   acetaminophen tablet 650 mg (has no administration in time range)   naloxone 0.4 mg/mL injection 0.02 mg (has no administration in time range)   potassium bicarbonate disintegrating tablet 50 mEq (has no administration in time range)   potassium bicarbonate disintegrating tablet 35 mEq (has no administration in time range)   potassium bicarbonate disintegrating tablet 60 mEq (has no administration in time range)   magnesium oxide tablet 800 mg (has no administration in time range)   magnesium oxide tablet 800 mg (has no administration in time range)   potassium, sodium phosphates 280-160-250 mg packet 2 packet (has no administration in time range)   potassium, sodium phosphates 280-160-250 mg packet 2 packet (has no administration in time range)   potassium, sodium phosphates 280-160-250 mg packet 2 packet (has no administration in time range)   insulin aspart U-100 pen 1-10 Units (has no administration in time range)   glucose chewable tablet 16 g (has no administration in time range)   glucose chewable tablet 24 g (has no administration in time range)   glucagon (human recombinant) injection 1 mg (has no administration in time range)   acetaminophen tablet 650 mg (has no administration in time range)   dextrose 10% bolus 125 mL 125 mL (has no administration in time range)   dextrose 10% bolus 250 mL 250 mL (has no administration in time range)     Medical Decision Making  59-year-old woman who presents emergency department complaining of fatigue.  She has a history of colon cancer being  evaluated for chemo versus colectomy.  She had outpatient blood work done showing that she was significantly anemic and sent to the ED.  Laboratory evaluation reveals microcytic anemia with a hemoglobin 6.2, this likely due to chronic blood loss.  Patient is emergently started on blood transfusion given her tachycardia and symptomatic fatigue.  Discussed Hospital Medicine who will admit the patient for blood transfusion and consult General surgery for evaluation of colectomy.    Risk  Prescription drug management.  Decision regarding hospitalization.    Critical Care  Total time providing critical care: 45 minutes                                      Clinical Impression:  Final diagnoses:  [R00.0] Tachycardia  [D64.9] Symptomatic anemia (Primary)          ED Disposition Condition    Observation                 Manuel Prince MD  01/06/25 9226

## 2025-01-07 NOTE — PROGRESS NOTES
[facility] Medicine  Progress Note    Patient Name: Nik Lowery  MRN: [id number]  Patient Class: OP- Observation   Admission Date: 1/6/2025  Length of Stay: 0 days  Attending Physician: Finn Herring MD  Primary Care Provider: Osmin Newsome MD        Subjective     Principal Problem:Symptomatic anemia        HPI:  Nik Lowery is a 59 year female who presents emergency room for evaluation of fatigue, weakness, and exertional dyspnea.  She has a history of colon cancer.  She is currently in workup to determine chemo versus colectomy.  She reports continued anemia.  The symptoms onset yesterday and progressively worsened.  Denies fever chills.  No known sick contacts or travel.  Previous medical history includes hypertension, diabetes, colon cancer with liver Mets.  ER workup: CBC with H&H of 6.2/22.  CMP with glucose of 208.  Patient admitted to Hospital Medicine for treatment and management.  Will transfuse patient with 2 units of packed red cells tonight.                    Overview/Hospital Course:  Patient was admitted to the hospital medicine service and closely monitored.  She was transfused 2 units of packed red blood cells with improvement in hemoglobin above transfusion threshold.  She has remained hemodynamically stable.  Hematology Oncology and General surgery were consulted for evaluation for possible colectomy.    Interval History:  Patient evaluated at bedside.  Reports weakness and lightheadedness improved with transfusion.  Hemoglobin 8.9 following transfusion.  She is currently NPO awaiting surgical evaluation for possible colectomy this was also mentioned to her by her Hematology Oncology physician prior to admission.    Review of Systems   Constitutional:  Positive for activity change. Negative for appetite change, chills and fever.   Gastrointestinal:  Negative for abdominal pain, blood in stool, constipation, diarrhea, nausea and vomiting.   Neurological:   Negative for weakness and light-headedness.     Objective:     Vital Signs (Most Recent):  Temp: 98 °F (36.7 °C) (01/07/25 0812)  Pulse: 72 (01/07/25 0840)  Resp: 16 (01/07/25 0840)  BP: 134/64 (01/07/25 0812)  SpO2: (!) 94 % (01/07/25 0840) Vital Signs (24h Range):  Temp:  [97.2 °F (36.2 °C)-98.7 °F (37.1 °C)] 98 °F (36.7 °C)  Pulse:  [] 72  Resp:  [16-19] 16  SpO2:  [94 %-99 %] 94 %  BP: (118-170)/(58-85) 134/64     Weight: 98.7 kg (217 lb 9.5 oz)  Body mass index is 36.21 kg/m².    Intake/Output Summary (Last 24 hours) at 1/7/2025 1106  Last data filed at 1/7/2025 0600  Gross per 24 hour   Intake 1611.92 ml   Output --   Net 1611.92 ml         Physical Exam  Constitutional:       Appearance: Normal appearance.   HENT:      Head: Normocephalic.      Mouth/Throat:      Mouth: Mucous membranes are moist.      Pharynx: Oropharynx is clear.   Eyes:      Extraocular Movements: Extraocular movements intact.   Cardiovascular:      Rate and Rhythm: Normal rate and regular rhythm.      Pulses: Normal pulses.      Heart sounds: Normal heart sounds.   Pulmonary:      Effort: Pulmonary effort is normal.      Breath sounds: Normal breath sounds.   Abdominal:      General: Bowel sounds are normal. There is no distension.      Palpations: Abdomen is soft.      Tenderness: There is no abdominal tenderness. There is no guarding.   Skin:     General: Skin is warm.      Capillary Refill: Capillary refill takes less than 2 seconds.   Neurological:      Mental Status: She is alert and oriented to person, place, and time.   Psychiatric:         Mood and Affect: Mood normal.             Significant Labs: All pertinent labs within the past 24 hours have been reviewed.  Recent Lab Results  (Last 5 results in the past 24 hours)        01/07/25  0653   01/07/25  0620   01/07/25  0005   01/06/25  1806   01/06/25  1804        Unit Blood Type Code       6200                6200  [P]         Unit Expiration       [id number]                 [id number]  [P]         Unit Blood Type       A POS                A POS  [P]         Albumin   4.2       3.9       ALP   82       82       ALT   22       21       Anion Gap   12       10       AST   28       23       Baso # 0.06         0.05       Basophil % 0.8         0.9       BILIRUBIN TOTAL   1.1  Comment: For infants and newborns, interpretation of results should be based  on gestational age, weight and in agreement with clinical  observations.    Premature Infant recommended reference ranges:  Up to 24 hours.............<8.0 mg/dL  Up to 48 hours............<12.0 mg/dL  3-5 days..................<15.0 mg/dL  6-29 days.................<15.0 mg/dL         0.3  Comment: For infants and newborns, interpretation of results should be based  on gestational age, weight and in agreement with clinical  observations.    Premature Infant recommended reference ranges:  Up to 24 hours.............<8.0 mg/dL  Up to 48 hours............<12.0 mg/dL  3-5 days..................<15.0 mg/dL  6-29 days.................<15.0 mg/dL         BUN   13       13       Calcium   9.5       8.9       Chloride   105       108       CO2   23       21       CODING SYSTEM       [id number]                [id number]  [P]         Creatinine   0.9       1.0       Crossmatch Interpretation       Compatible                Compatible  [P]         Differential Method Automated         Automated       DISPENSE STATUS       TRANSFUSED                ISSUED  [P]         eGFR   >60       >60       Eos # 0.3         0.2       Eos % 4.3         3.2       Glucose   143       208       Gran # (ANC) 4.4         3.6       Gran % 56.4         65.3       Group & Rh       A POS         Hematocrit 29.9         22.8       Hemoglobin 8.9         6.2  Comment: HGB critical result(s) called and verbal readback obtained from MARGUERITE MADDOX by KFA1 01/06/2025 18:25         Hepatitis C Ab         Negative       HIV 1/2 Ag/Ab         Negative       Immature Grans (Abs)  0.04  Comment: Mild elevation in immature granulocytes is non specific and   can be seen in a variety of conditions including stress response,   acute inflammation, trauma and pregnancy. Correlation with other   laboratory and clinical findings is essential.           0.02  Comment: Mild elevation in immature granulocytes is non specific and   can be seen in a variety of conditions including stress response,   acute inflammation, trauma and pregnancy. Correlation with other   laboratory and clinical findings is essential.         Immature Granulocytes 0.5         0.4       INDIRECT MAEVE       NEG         INR         1.1  Comment: Coumadin Therapy:  2.0 - 3.0 for INR for all indicators except mechanical heart valves  and antiphospholipid syndromes which should use 2.5 - 3.5.         Lymph # 2.4         1.4       Lymph % 31.4         24.6       Magnesium    1.7             MCH 22.6         20.6       MCHC 29.8         27.2       MCV 76         76       Mono # 0.5         0.3       Mono % 6.6         5.6       MPV 8.9         9.1       nRBC 0         0       Phosphorus Level   4.2             Platelet Count 240         259       POCT Glucose     204           Potassium   4.0       4.6       Product Code       P2850S39                [id number]  [P]         PROTEIN TOTAL   7.9       7.4       PT         11.8       RBC 3.93         3.01       RDW 18.0         18.9       Sodium   140       139       Specimen Outdate       01/09/2025 23:59         UNIT NUMBER       [id number]                [id number]  [P]         WBC 7.74         5.57                               [P] - Preliminary Result               Significant Imaging: I have reviewed all pertinent imaging results/findings within the past 24 hours.  I have reviewed and interpreted all pertinent imaging results/findings within the past 24 hours.    Assessment and Plan     * Symptomatic anemia  Anemia is likely due to macrocytic.  Most recent hemoglobin and  hematocrit are listed below.  Recent Labs     01/06/25  1241 01/06/25  1804 01/07/25  0653   HGB 6.3* 6.2* 8.9*   HCT 23.0* 22.8* 29.9*     Plan  - Monitor serial CBC: Daily  - Transfuse PRBC if patient becomes hemodynamically unstable, symptomatic or H/H drops below 7/21.  - Patient has received 2 units of PRBCs on 01/06/2025  - Patient's anemia is currently worsening. Will continue current treatment  -     Malignant neoplasm of ascending colon  Chronic problem   History noted  Hematology Oncology consulted  General surgery consulted for evaluation for possible colectomy.      Gastroesophageal reflux disease without esophagitis  Chronic  History noted      Uncontrolled type 2 diabetes mellitus with hyperglycemia, without long-term current use of insulin  Chronic problem   Patient's FSGs are uncontrolled due to hyperglycemia on current medication regimen.  Last A1c reviewed-   Lab Results   Component Value Date    HGBA1C 6.5 (H) 11/07/2024     Most recent fingerstick glucose reviewed-   Recent Labs   Lab 01/07/25  0005   POCTGLUCOSE 204*     Current correctional scale  Medium  Maintain anti-hyperglycemic dose as follows-   Antihyperglycemics (From admission, onward)      Start     Stop Route Frequency Ordered    01/06/25 2238  insulin aspart U-100 pen 1-10 Units         -- SubQ Before meals & nightly PRN 01/06/25 2139          Hold Oral hypoglycemics while patient is in the hospital.         VTE Risk Mitigation (From admission, onward)           Ordered     Place PRISCILLA hose  Until discontinued         01/06/25 2139     IP VTE HIGH RISK PATIENT  Once         01/06/25 2139     Place sequential compression device  Until discontinued         01/06/25 2139                    Discharge Planning   KARON: 1/8/2025     Code Status: Full Code   Medical Readiness for Discharge Date:                    Please place Justification for RAMYA Valadez  Department of Hospital Medicine   [facility]/Surg

## 2025-01-07 NOTE — ANESTHESIA POSTPROCEDURE EVALUATION
Anesthesia Post Evaluation    Patient: Nik Lowery    Procedure(s) Performed: Procedure(s) (LRB):  ROBOTIC COLECTOMY WITH ILEOCOLIC ANASTOMOSIS (Right)    Final Anesthesia Type: general      Patient location during evaluation: PACU  Patient participation: Yes- Able to Participate  Level of consciousness: awake and alert and oriented  Post-procedure vital signs: reviewed and stable  Pain management: adequate  Airway patency: patent    PONV status at discharge: No PONV  Anesthetic complications: no      Cardiovascular status: blood pressure returned to baseline and stable  Respiratory status: unassisted and spontaneous ventilation  Hydration status: euvolemic  Follow-up not needed.              Vitals Value Taken Time   /59 01/07/25 1525   Temp 37 °C (98.6 °F) 01/07/25 1515   Pulse 84 01/07/25 1526   Resp 16 01/07/25 1526   SpO2 95 % 01/07/25 1526   Vitals shown include unfiled device data.      No case tracking events are documented in the log.      Pain/Pablo Score: Pablo Score: 3 (1/7/2025  3:15 PM)

## 2025-01-07 NOTE — ASSESSMENT & PLAN NOTE
Anemia is likely due to macrocytic.  Most recent hemoglobin and hematocrit are listed below.  Recent Labs     01/06/25  1241 01/06/25  1804 01/07/25  0653   HGB 6.3* 6.2* 8.9*   HCT 23.0* 22.8* 29.9*     Plan  - Monitor serial CBC: Daily  - Transfuse PRBC if patient becomes hemodynamically unstable, symptomatic or H/H drops below 7/21.  - Patient has received 2 units of PRBCs on 01/06/2025  - Patient's anemia is currently worsening. Will continue current treatment  -

## 2025-01-07 NOTE — SUBJECTIVE & OBJECTIVE
Interval History:  Patient evaluated at bedside.  Reports weakness and lightheadedness improved with transfusion.  Hemoglobin 8.9 following transfusion.  She is currently NPO awaiting surgical evaluation for possible colectomy this was also mentioned to her by her Hematology Oncology physician prior to admission.    Review of Systems   Constitutional:  Positive for activity change. Negative for appetite change, chills and fever.   Gastrointestinal:  Negative for abdominal pain, blood in stool, constipation, diarrhea, nausea and vomiting.   Neurological:  Negative for weakness and light-headedness.     Objective:     Vital Signs (Most Recent):  Temp: 98 °F (36.7 °C) (01/07/25 0812)  Pulse: 72 (01/07/25 0840)  Resp: 16 (01/07/25 0840)  BP: 134/64 (01/07/25 0812)  SpO2: (!) 94 % (01/07/25 0840) Vital Signs (24h Range):  Temp:  [97.2 °F (36.2 °C)-98.7 °F (37.1 °C)] 98 °F (36.7 °C)  Pulse:  [] 72  Resp:  [16-19] 16  SpO2:  [94 %-99 %] 94 %  BP: (118-170)/(58-85) 134/64     Weight: 98.7 kg (217 lb 9.5 oz)  Body mass index is 36.21 kg/m².    Intake/Output Summary (Last 24 hours) at 1/7/2025 1106  Last data filed at 1/7/2025 0600  Gross per 24 hour   Intake 1611.92 ml   Output --   Net 1611.92 ml         Physical Exam  Constitutional:       Appearance: Normal appearance.   HENT:      Head: Normocephalic.      Mouth/Throat:      Mouth: Mucous membranes are moist.      Pharynx: Oropharynx is clear.   Eyes:      Extraocular Movements: Extraocular movements intact.   Cardiovascular:      Rate and Rhythm: Normal rate and regular rhythm.      Pulses: Normal pulses.      Heart sounds: Normal heart sounds.   Pulmonary:      Effort: Pulmonary effort is normal.      Breath sounds: Normal breath sounds.   Abdominal:      General: Bowel sounds are normal. There is no distension.      Palpations: Abdomen is soft.      Tenderness: There is no abdominal tenderness. There is no guarding.   Skin:     General: Skin is warm.       Capillary Refill: Capillary refill takes less than 2 seconds.   Neurological:      Mental Status: She is alert and oriented to person, place, and time.   Psychiatric:         Mood and Affect: Mood normal.             Significant Labs: All pertinent labs within the past 24 hours have been reviewed.  Recent Lab Results  (Last 5 results in the past 24 hours)        01/07/25  0653   01/07/25  0620   01/07/25  0005   01/06/25  1806   01/06/25  1804        Unit Blood Type Code       6200                6200  [P]         Unit Expiration       [id number]                [id number]  [P]         Unit Blood Type       A POS                A POS  [P]         Albumin   4.2       3.9       ALP   82       82       ALT   22       21       Anion Gap   12       10       AST   28       23       Baso # 0.06         0.05       Basophil % 0.8         0.9       BILIRUBIN TOTAL   1.1  Comment: For infants and newborns, interpretation of results should be based  on gestational age, weight and in agreement with clinical  observations.    Premature Infant recommended reference ranges:  Up to 24 hours.............<8.0 mg/dL  Up to 48 hours............<12.0 mg/dL  3-5 days..................<15.0 mg/dL  6-29 days.................<15.0 mg/dL         0.3  Comment: For infants and newborns, interpretation of results should be based  on gestational age, weight and in agreement with clinical  observations.    Premature Infant recommended reference ranges:  Up to 24 hours.............<8.0 mg/dL  Up to 48 hours............<12.0 mg/dL  3-5 days..................<15.0 mg/dL  6-29 days.................<15.0 mg/dL         BUN   13       13       Calcium   9.5       8.9       Chloride   105       108       CO2   23       21       CODING SYSTEM       [id number]                [id number]  [P]         Creatinine   0.9       1.0       Crossmatch Interpretation       Compatible                Compatible  [P]         Differential Method Automated         Automated        DISPENSE STATUS       TRANSFUSED                ISSUED  [P]         eGFR   >60       >60       Eos # 0.3         0.2       Eos % 4.3         3.2       Glucose   143       208       Gran # (ANC) 4.4         3.6       Gran % 56.4         65.3       Group & Rh       A POS         Hematocrit 29.9         22.8       Hemoglobin 8.9         6.2  Comment: HGB critical result(s) called and verbal readback obtained from MARGUERITE MADDOX by KFA1 01/06/2025 18:25         Hepatitis C Ab         Negative       HIV 1/2 Ag/Ab         Negative       Immature Grans (Abs) 0.04  Comment: Mild elevation in immature granulocytes is non specific and   can be seen in a variety of conditions including stress response,   acute inflammation, trauma and pregnancy. Correlation with other   laboratory and clinical findings is essential.           0.02  Comment: Mild elevation in immature granulocytes is non specific and   can be seen in a variety of conditions including stress response,   acute inflammation, trauma and pregnancy. Correlation with other   laboratory and clinical findings is essential.         Immature Granulocytes 0.5         0.4       INDIRECT MAEVE       NEG         INR         1.1  Comment: Coumadin Therapy:  2.0 - 3.0 for INR for all indicators except mechanical heart valves  and antiphospholipid syndromes which should use 2.5 - 3.5.         Lymph # 2.4         1.4       Lymph % 31.4         24.6       Magnesium    1.7             MCH 22.6         20.6       MCHC 29.8         27.2       MCV 76         76       Mono # 0.5         0.3       Mono % 6.6         5.6       MPV 8.9         9.1       nRBC 0         0       Phosphorus Level   4.2             Platelet Count 240         259       POCT Glucose     204           Potassium   4.0       4.6       Product Code       [id number]                [id number]  [P]         PROTEIN TOTAL   7.9       7.4       PT         11.8       RBC 3.93         3.01       RDW 18.0         18.9        Sodium   140       139       Specimen Outdate       01/09/2025 23:59         UNIT NUMBER       [id number]                [id number]  [P]         WBC 7.74         5.57                               [P] - Preliminary Result               Significant Imaging: I have reviewed all pertinent imaging results/findings within the past 24 hours.  I have reviewed and interpreted all pertinent imaging results/findings within the past 24 hours.

## 2025-01-07 NOTE — HPI
Nik Lowery is a 59 year female who presents emergency room for evaluation of fatigue, weakness, and exertional dyspnea.  She has a history of colon cancer.  She is currently in workup to determine chemo versus colectomy.  She reports continued anemia.  The symptoms onset yesterday and progressively worsened.  Denies fever chills.  No known sick contacts or travel.  Previous medical history includes hypertension, diabetes, colon cancer with liver Mets.  ER workup: CBC with H&H of 6.2/22.  CMP with glucose of 208.  Patient admitted to Hospital Medicine for treatment and management.  Will transfuse patient with 2 units of packed red cells tonight.

## 2025-01-07 NOTE — ASSESSMENT & PLAN NOTE
Chronic problem   History noted  Hematology Oncology consulted  General surgery consulted for evaluation for possible colectomy.

## 2025-01-07 NOTE — PLAN OF CARE
Pt off the floor to surgery     01/07/25 1132   Discharge Assessment   Assessment Type Discharge Planning Assessment

## 2025-01-08 ENCOUNTER — PATIENT MESSAGE (OUTPATIENT)
Dept: INTERVENTIONAL RADIOLOGY/VASCULAR | Facility: CLINIC | Age: 60
End: 2025-01-08
Payer: COMMERCIAL

## 2025-01-08 DIAGNOSIS — C18.2 MALIGNANT NEOPLASM OF ASCENDING COLON: Primary | ICD-10-CM

## 2025-01-08 LAB
ALBUMIN SERPL BCP-MCNC: 3.3 G/DL (ref 3.5–5.2)
ALP SERPL-CCNC: 67 U/L (ref 40–150)
ALT SERPL W/O P-5'-P-CCNC: 23 U/L (ref 10–44)
ANION GAP SERPL CALC-SCNC: 9 MMOL/L (ref 8–16)
AST SERPL-CCNC: 29 U/L (ref 10–40)
BASOPHILS # BLD AUTO: 0.02 K/UL (ref 0–0.2)
BASOPHILS NFR BLD: 0.2 % (ref 0–1.9)
BILIRUB SERPL-MCNC: 0.5 MG/DL (ref 0.1–1)
BUN SERPL-MCNC: 15 MG/DL (ref 6–20)
CALCIUM SERPL-MCNC: 7.9 MG/DL (ref 8.7–10.5)
CHLORIDE SERPL-SCNC: 105 MMOL/L (ref 95–110)
CO2 SERPL-SCNC: 23 MMOL/L (ref 23–29)
CREAT SERPL-MCNC: 0.9 MG/DL (ref 0.5–1.4)
DIFFERENTIAL METHOD BLD: ABNORMAL
EOSINOPHIL # BLD AUTO: 0 K/UL (ref 0–0.5)
EOSINOPHIL NFR BLD: 0 % (ref 0–8)
ERYTHROCYTE [DISTWIDTH] IN BLOOD BY AUTOMATED COUNT: 19.2 % (ref 11.5–14.5)
EST. GFR  (NO RACE VARIABLE): >60 ML/MIN/1.73 M^2
GLUCOSE SERPL-MCNC: 171 MG/DL (ref 70–110)
HCT VFR BLD AUTO: 27.2 % (ref 37–48.5)
HGB BLD-MCNC: 7.9 G/DL (ref 12–16)
IMM GRANULOCYTES # BLD AUTO: 0.06 K/UL (ref 0–0.04)
IMM GRANULOCYTES NFR BLD AUTO: 0.5 % (ref 0–0.5)
LYMPHOCYTES # BLD AUTO: 1.3 K/UL (ref 1–4.8)
LYMPHOCYTES NFR BLD: 10.1 % (ref 18–48)
MAGNESIUM SERPL-MCNC: 2 MG/DL (ref 1.6–2.6)
MCH RBC QN AUTO: 22.6 PG (ref 27–31)
MCHC RBC AUTO-ENTMCNC: 29 G/DL (ref 32–36)
MCV RBC AUTO: 78 FL (ref 82–98)
MONOCYTES # BLD AUTO: 0.8 K/UL (ref 0.3–1)
MONOCYTES NFR BLD: 6.3 % (ref 4–15)
NEUTROPHILS # BLD AUTO: 11 K/UL (ref 1.8–7.7)
NEUTROPHILS NFR BLD: 82.9 % (ref 38–73)
NRBC BLD-RTO: 0 /100 WBC
OHS QRS DURATION: 84 MS
OHS QTC CALCULATION: 445 MS
PHOSPHATE SERPL-MCNC: 4.6 MG/DL (ref 2.7–4.5)
PLATELET # BLD AUTO: 242 K/UL (ref 150–450)
PMV BLD AUTO: 9.6 FL (ref 9.2–12.9)
POCT GLUCOSE: 171 MG/DL (ref 70–110)
POCT GLUCOSE: 176 MG/DL (ref 70–110)
POCT GLUCOSE: 225 MG/DL (ref 70–110)
POTASSIUM SERPL-SCNC: 4.4 MMOL/L (ref 3.5–5.1)
PROT SERPL-MCNC: 6.5 G/DL (ref 6–8.4)
RBC # BLD AUTO: 3.5 M/UL (ref 4–5.4)
SODIUM SERPL-SCNC: 137 MMOL/L (ref 136–145)
WBC # BLD AUTO: 13.24 K/UL (ref 3.9–12.7)

## 2025-01-08 PROCEDURE — 25000003 PHARM REV CODE 250: Performed by: NURSE PRACTITIONER

## 2025-01-08 PROCEDURE — 99900035 HC TECH TIME PER 15 MIN (STAT)

## 2025-01-08 PROCEDURE — 85025 COMPLETE CBC W/AUTO DIFF WBC: CPT | Performed by: NURSE PRACTITIONER

## 2025-01-08 PROCEDURE — 97161 PT EVAL LOW COMPLEX 20 MIN: CPT

## 2025-01-08 PROCEDURE — 63600175 PHARM REV CODE 636 W HCPCS: Performed by: NURSE PRACTITIONER

## 2025-01-08 PROCEDURE — 94761 N-INVAS EAR/PLS OXIMETRY MLT: CPT

## 2025-01-08 PROCEDURE — 97530 THERAPEUTIC ACTIVITIES: CPT

## 2025-01-08 PROCEDURE — 25000003 PHARM REV CODE 250: Performed by: STUDENT IN AN ORGANIZED HEALTH CARE EDUCATION/TRAINING PROGRAM

## 2025-01-08 PROCEDURE — 11000001 HC ACUTE MED/SURG PRIVATE ROOM

## 2025-01-08 PROCEDURE — 84100 ASSAY OF PHOSPHORUS: CPT | Performed by: NURSE PRACTITIONER

## 2025-01-08 PROCEDURE — 80053 COMPREHEN METABOLIC PANEL: CPT | Performed by: NURSE PRACTITIONER

## 2025-01-08 PROCEDURE — 99223 1ST HOSP IP/OBS HIGH 75: CPT | Mod: ,,, | Performed by: INTERNAL MEDICINE

## 2025-01-08 PROCEDURE — 25000003 PHARM REV CODE 250

## 2025-01-08 PROCEDURE — 36415 COLL VENOUS BLD VENIPUNCTURE: CPT | Performed by: NURSE PRACTITIONER

## 2025-01-08 PROCEDURE — 83735 ASSAY OF MAGNESIUM: CPT | Performed by: NURSE PRACTITIONER

## 2025-01-08 PROCEDURE — 94799 UNLISTED PULMONARY SVC/PX: CPT

## 2025-01-08 PROCEDURE — 27000221 HC OXYGEN, UP TO 24 HOURS

## 2025-01-08 RX ORDER — AMOXICILLIN 250 MG
1 CAPSULE ORAL 2 TIMES DAILY
Status: DISCONTINUED | OUTPATIENT
Start: 2025-01-08 | End: 2025-01-11 | Stop reason: HOSPADM

## 2025-01-08 RX ORDER — MUPIROCIN 20 MG/G
OINTMENT TOPICAL 2 TIMES DAILY
Status: DISCONTINUED | OUTPATIENT
Start: 2025-01-08 | End: 2025-01-11 | Stop reason: HOSPADM

## 2025-01-08 RX ORDER — SERTRALINE HYDROCHLORIDE 50 MG/1
100 TABLET, FILM COATED ORAL DAILY
Status: DISCONTINUED | OUTPATIENT
Start: 2025-01-08 | End: 2025-01-11 | Stop reason: HOSPADM

## 2025-01-08 RX ORDER — LISINOPRIL 10 MG/1
20 TABLET ORAL DAILY
Status: DISCONTINUED | OUTPATIENT
Start: 2025-01-08 | End: 2025-01-11 | Stop reason: HOSPADM

## 2025-01-08 RX ORDER — ATORVASTATIN CALCIUM 20 MG/1
20 TABLET, FILM COATED ORAL DAILY
Status: DISCONTINUED | OUTPATIENT
Start: 2025-01-08 | End: 2025-01-11 | Stop reason: HOSPADM

## 2025-01-08 RX ORDER — HYDROCODONE BITARTRATE AND ACETAMINOPHEN 5; 325 MG/1; MG/1
1 TABLET ORAL EVERY 6 HOURS PRN
Status: DISCONTINUED | OUTPATIENT
Start: 2025-01-08 | End: 2025-01-11 | Stop reason: HOSPADM

## 2025-01-08 RX ORDER — HYDROCODONE BITARTRATE AND ACETAMINOPHEN 10; 325 MG/1; MG/1
1 TABLET ORAL EVERY 6 HOURS PRN
Status: DISCONTINUED | OUTPATIENT
Start: 2025-01-08 | End: 2025-01-11 | Stop reason: HOSPADM

## 2025-01-08 RX ORDER — FAMOTIDINE 20 MG/1
20 TABLET, FILM COATED ORAL 2 TIMES DAILY
Status: DISCONTINUED | OUTPATIENT
Start: 2025-01-08 | End: 2025-01-11 | Stop reason: HOSPADM

## 2025-01-08 RX ADMIN — SENNOSIDES AND DOCUSATE SODIUM 1 TABLET: 50; 8.6 TABLET ORAL at 08:01

## 2025-01-08 RX ADMIN — MORPHINE SULFATE 4 MG: 2 INJECTION, SOLUTION INTRAMUSCULAR; INTRAVENOUS at 06:01

## 2025-01-08 RX ADMIN — MUPIROCIN 1 G: 20 OINTMENT TOPICAL at 08:01

## 2025-01-08 RX ADMIN — GABAPENTIN 800 MG: 400 CAPSULE ORAL at 03:01

## 2025-01-08 RX ADMIN — SODIUM CHLORIDE: 9 INJECTION, SOLUTION INTRAVENOUS at 04:01

## 2025-01-08 RX ADMIN — FAMOTIDINE 20 MG: 20 TABLET, FILM COATED ORAL at 11:01

## 2025-01-08 RX ADMIN — GUAIFENESIN 600 MG: 600 TABLET, EXTENDED RELEASE ORAL at 12:01

## 2025-01-08 RX ADMIN — GUAIFENESIN 600 MG: 600 TABLET, EXTENDED RELEASE ORAL at 08:01

## 2025-01-08 RX ADMIN — GABAPENTIN 800 MG: 400 CAPSULE ORAL at 08:01

## 2025-01-08 RX ADMIN — LAMOTRIGINE 150 MG: 100 TABLET ORAL at 08:01

## 2025-01-08 RX ADMIN — ONDANSETRON 4 MG: 2 INJECTION INTRAMUSCULAR; INTRAVENOUS at 11:01

## 2025-01-08 RX ADMIN — VALACYCLOVIR HYDROCHLORIDE 500 MG: 500 TABLET, FILM COATED ORAL at 08:01

## 2025-01-08 RX ADMIN — ACETAMINOPHEN 650 MG: 325 TABLET, FILM COATED ORAL at 08:01

## 2025-01-08 RX ADMIN — ATORVASTATIN CALCIUM 20 MG: 20 TABLET, FILM COATED ORAL at 11:01

## 2025-01-08 RX ADMIN — SERTRALINE HYDROCHLORIDE 100 MG: 50 TABLET ORAL at 11:01

## 2025-01-08 RX ADMIN — FAMOTIDINE 20 MG: 20 TABLET, FILM COATED ORAL at 08:01

## 2025-01-08 RX ADMIN — ONDANSETRON 4 MG: 2 INJECTION INTRAMUSCULAR; INTRAVENOUS at 06:01

## 2025-01-08 RX ADMIN — HYDROCODONE BITARTRATE AND ACETAMINOPHEN 1 TABLET: 5; 325 TABLET ORAL at 11:01

## 2025-01-08 RX ADMIN — SENNOSIDES AND DOCUSATE SODIUM 1 TABLET: 50; 8.6 TABLET ORAL at 11:01

## 2025-01-08 NOTE — ASSESSMENT & PLAN NOTE
Body mass index is 37.05 kg/m². Morbid obesity complicates all aspects of disease management from diagnostic modalities to treatment. Weight loss encouraged and health benefits explained to patient.

## 2025-01-08 NOTE — ASSESSMENT & PLAN NOTE
Chronic problem   History noted  Hematology Oncology following  General surgery following  S/P hemicolectomy 1/7/2024

## 2025-01-08 NOTE — CARE UPDATE
01/07/25 1932   Patient Assessment/Suction   Level of Consciousness (AVPU) alert   Respiratory Effort Unlabored   Expansion/Accessory Muscles/Retractions no retractions   PRE-TX-O2   Device (Oxygen Therapy) nasal cannula   $ Is the patient on Low Flow Oxygen? Yes   Flow (L/min) (Oxygen Therapy) 3   SpO2 (!) 93 %   Pulse Oximetry Type Intermittent   $ Pulse Oximetry - Multiple Charge Pulse Oximetry - Multiple   Pulse 92   Resp 18   Aerosol Therapy   $ Aerosol Therapy Charges PRN treatment not required   Incentive Spirometer   $ Incentive Spirometer Charges done with encouragement   Administration (IS) proper technique demonstrated   Number of Repetitions (IS) 10   Level Incentive Spirometer (mL) 1000   Patient Tolerance (IS) fair;no adverse signs/symptoms present

## 2025-01-08 NOTE — CONSULTS
HPI    58 years old female with history of diabetes type II, hypertension and herpes     She was recently diagnosed with cecal mass causing significant bleeding require hospitalization and urgent transfusion.  She received 2 units packed red blood cell during the course of hospitalization.  Colonoscopy showed likely malignant tumor ascending colon nearby cecum.  CT of the abdomen pelvis contrast demonstrate concerning cecal malignancy with to right lower quadrant abnormal lymph nodes concerning for metastases with indeterminate right hepatic lobe 10 mm lesion    With ongoing bleeding patient receive surgical resection of primary malignancy on 01/07/2025 she is in the recovery phase.  Is planning on chemotherapy adjuvant after recovery.  Single liver lesion we will be ablated prior to start of chemotherapy    Past Medical History:   Diagnosis Date    Colon cancer     Diabetes mellitus     Diabetes mellitus, type 2     Encounter for blood transfusion     Herpes     Hypertension     Sleep apnea      Past Surgical History:   Procedure Laterality Date    COLONOSCOPY N/A 11/8/2024    Procedure: COLONOSCOPY;  Surgeon: Saw Wick MD;  Location: [facility];  Service: Endoscopy;  Laterality: N/A;    ESOPHAGOGASTRODUODENOSCOPY N/A 11/8/2024    Procedure: EGD (ESOPHAGOGASTRODUODENOSCOPY);  Surgeon: Saw Wick MD;  Location: [facility];  Service: Endoscopy;  Laterality: N/A;    EYE SURGERY  2002    lasix Bilateral    HYSTERECTOMY  2012    INSERTION OF TUNNELED CENTRAL VENOUS CATHETER (CVC) WITH SUBCUTANEOUS PORT Right 12/20/2024    Procedure: INSERTION, PORT-A-CATH;  Surgeon: Logan Juarez MD;  Location: [facility];  Service: General;  Laterality: Right;    LUMBAR DISCECTOMY      ROBOT-ASSISTED COLECTOMY Right 1/7/2025    Procedure: ROBOTIC COLECTOMY WITH ILEOCOLIC ANASTOMOSIS;  Surgeon: Logan Juarez MD;  Location: [facility] OR;  Service: General;  Laterality: Right;    WISDOM TOOTH EXTRACTION       Social History      Socioeconomic History    Marital status: Single   Occupational History    Occupation: supply chain   Tobacco Use    Smoking status: Never    Smokeless tobacco: Never   Substance and Sexual Activity    Alcohol use: No    Drug use: No    Sexual activity: Not Currently     Social Drivers of Health     Financial Resource Strain: Low Risk  (1/6/2025)    Overall Financial Resource Strain (CARDIA)     Difficulty of Paying Living Expenses: Not hard at all   Food Insecurity: No Food Insecurity (1/6/2025)    Hunger Vital Sign     Worried About Running Out of Food in the Last Year: Never true     Ran Out of Food in the Last Year: Never true   Transportation Needs: No Transportation Needs (1/6/2025)    TRANSPORTATION NEEDS     Transportation : No   Physical Activity: Insufficiently Active (11/26/2024)    Exercise Vital Sign     Days of Exercise per Week: 1 day     Minutes of Exercise per Session: 20 min   Stress: No Stress Concern Present (1/6/2025)    [employer] - Occupational Stress Questionnaire     Feeling of Stress : Not at all   Recent Concern: Stress - Stress Concern Present (11/26/2024)    [employer] - Occupational Stress Questionnaire     Feeling of Stress : Rather much   Housing Stability: Low Risk  (1/6/2025)    Housing Stability Vital Sign     Unable to Pay for Housing in the Last Year: No     Homeless in the Last Year: No     Review of patient's allergies indicates:   Allergen Reactions    Robaxin [methocarbamol] Nausea Only       Physical exam  Vitals:    01/08/25 1128   BP:    Pulse:    Resp: 16   Temp:        Lab Results   Component Value Date    WBC 13.24 (H) 01/08/2025    HGB 7.9 (L) 01/08/2025    HCT 27.2 (L) 01/08/2025    MCV 78 (L) 01/08/2025     01/08/2025       CMP  Sodium   Date Value Ref Range Status   01/08/2025 137 136 - 145 mmol/L Final     Potassium   Date Value Ref Range Status   01/08/2025 4.4 3.5 - 5.1 mmol/L Final      Chloride   Date Value Ref Range Status   01/08/2025 105 95 - 110 mmol/L Final     CO2   Date Value Ref Range Status   01/08/2025 23 23 - 29 mmol/L Final     Glucose   Date Value Ref Range Status   01/08/2025 171 (H) 70 - 110 mg/dL Final     BUN   Date Value Ref Range Status   01/08/2025 15 6 - 20 mg/dL Final     Creatinine   Date Value Ref Range Status   01/08/2025 0.9 0.5 - 1.4 mg/dL Final     Calcium   Date Value Ref Range Status   01/08/2025 7.9 (L) 8.7 - 10.5 mg/dL Final     Total Protein   Date Value Ref Range Status   01/08/2025 6.5 6.0 - 8.4 g/dL Final     Albumin   Date Value Ref Range Status   01/08/2025 3.3 (L) 3.5 - 5.2 g/dL Final     Total Bilirubin   Date Value Ref Range Status   01/08/2025 0.5 0.1 - 1.0 mg/dL Final     Comment:     For infants and newborns, interpretation of results should be based  on gestational age, weight and in agreement with clinical  observations.    Premature Infant recommended reference ranges:  Up to 24 hours.............<8.0 mg/dL  Up to 48 hours............<12.0 mg/dL  3-5 days..................<15.0 mg/dL  6-29 days.................<15.0 mg/dL       Alkaline Phosphatase   Date Value Ref Range Status   01/08/2025 67 40 - 150 U/L Final     AST   Date Value Ref Range Status   01/08/2025 29 10 - 40 U/L Final     ALT   Date Value Ref Range Status   01/08/2025 23 10 - 44 U/L Final     Anion Gap   Date Value Ref Range Status   01/08/2025 9 8 - 16 mmol/L Final     eGFR   Date Value Ref Range Status   01/08/2025 >60 >60 mL/min/1.73 m^2 Final     Assessment plan    Stage IV colon cancer right-side with [place] Mets to liver.    Symptomatic anemia secondary to malignancy site    Status post surgical resection of primary colon cancer.    > plan start on adjuvant FOLFOX within 4 weeks of surgical resection  > we will follow up on surgical pathology  > liver lesion will be ablated and patient will be followed by Dr. Henry  > Oncology will continue follow patient.

## 2025-01-08 NOTE — ASSESSMENT & PLAN NOTE
Anemia is likely due to macrocytic.  Most recent hemoglobin and hematocrit are listed below.  Recent Labs     01/06/25  1804 01/07/25  0653 01/08/25  0317   HGB 6.2* 8.9* 7.9*   HCT 22.8* 29.9* 27.2*       Plan  - Monitor serial CBC: Daily  - Transfuse PRBC if patient becomes hemodynamically unstable, symptomatic or H/H drops below 7/21.  - Patient has received 2 units of PRBCs on 01/06/2025  - Patient's anemia is currently worsening. Will continue current treatment  -

## 2025-01-08 NOTE — PLAN OF CARE
Problem: Adult Inpatient Plan of Care  Goal: Plan of Care Review  Outcome: Progressing  Goal: Patient-Specific Goal (Individualized)  Outcome: Progressing  Goal: Absence of Hospital-Acquired Illness or Injury  Outcome: Progressing  Goal: Optimal Comfort and Wellbeing  Outcome: Progressing     Problem: Diabetes Comorbidity  Goal: Blood Glucose Level Within Targeted Range  Outcome: Progressing     Problem: Wound  Goal: Absence of Infection Signs and Symptoms  Outcome: Progressing  Goal: Skin Health and Integrity  Outcome: Progressing

## 2025-01-08 NOTE — ASSESSMENT & PLAN NOTE
Chronic problem   Patient's FSGs are uncontrolled due to hyperglycemia on current medication regimen.  Last A1c reviewed-   Lab Results   Component Value Date    HGBA1C 6.5 (H) 11/07/2024     Most recent fingerstick glucose reviewed-   Recent Labs   Lab 01/07/25  1649 01/07/25  2111 01/08/25  0727   POCTGLUCOSE 189* 203* 171*       Current correctional scale  Medium  Maintain anti-hyperglycemic dose as follows-   Antihyperglycemics (From admission, onward)    Start     Stop Route Frequency Ordered    01/06/25 2238  insulin aspart U-100 pen 1-10 Units         -- SubQ Before meals & nightly PRN 01/06/25 2139        Hold Oral hypoglycemics while patient is in the hospital.

## 2025-01-08 NOTE — PROGRESS NOTES
[facility] Medicine  Progress Note    Patient Name: Nik Lowery  MRN: [id number]  Patient Class: IP- Inpatient   Admission Date: 1/6/2025  Length of Stay: 1 days  Attending Physician: Finn Herring MD  Primary Care Provider: Osmin Newsome MD        Subjective     Principal Problem:Symptomatic anemia        HPI:  Nik Lowery is a 59 year female who presents emergency room for evaluation of fatigue, weakness, and exertional dyspnea.  She has a history of colon cancer.  She is currently in workup to determine chemo versus colectomy.  She reports continued anemia.  The symptoms onset yesterday and progressively worsened.  Denies fever chills.  No known sick contacts or travel.  Previous medical history includes hypertension, diabetes, colon cancer with liver Mets.  ER workup: CBC with H&H of 6.2/22.  CMP with glucose of 208.  Patient admitted to Hospital Medicine for treatment and management.  Will transfuse patient with 2 units of packed red cells tonight.                    Overview/Hospital Course:  Patient was admitted to the hospital medicine service and closely monitored.  She was transfused 2 units of packed red blood cells with improvement in hemoglobin above transfusion threshold.  She has remained hemodynamically stable.  Hematology Oncology and General surgery were consulted for evaluation for possible colectomy.    Interval History:   Patient seen and examined during multidisciplinary.  NAD.  Hemoglobin declined to 7.9.  No signs of bleeding.  Tolerating clear liquid diet. Continue to monitor  Review of Systems   Constitutional:  Positive for activity change.   Respiratory: Negative.     Gastrointestinal:  Positive for abdominal pain (expected postop tenderness). Negative for abdominal distention and blood in stool.   Genitourinary: Negative.    Skin:         Lap sites   Neurological: Negative.      Objective:     Vital Signs (Most Recent):  Temp: 97.9 °F (36.6 °C)  (01/08/25 0738)  Pulse: 99 (01/08/25 0739)  Resp: 19 (01/08/25 0738)  BP: (!) 117/59 (01/08/25 0738)  SpO2: 95 % (01/08/25 0739) Vital Signs (24h Range):  Temp:  [97.2 °F (36.2 °C)-98.6 °F (37 °C)] 97.9 °F (36.6 °C)  Pulse:  [] 99  Resp:  [13-27] 19  SpO2:  [90 %-98 %] 95 %  BP: ()/(52-85) 117/59     Weight: 101 kg (222 lb 10.6 oz)  Body mass index is 37.05 kg/m².    Intake/Output Summary (Last 24 hours) at 1/8/2025 1104  Last data filed at 1/8/2025 0507  Gross per 24 hour   Intake 2240 ml   Output 400 ml   Net 1840 ml         Physical Exam  Vitals and nursing note reviewed. Exam conducted with a chaperone present.   Constitutional:       Appearance: Normal appearance. She is obese.   HENT:      Head: Normocephalic and atraumatic.      Mouth/Throat:      Mouth: Mucous membranes are moist.   Cardiovascular:      Rate and Rhythm: Normal rate and regular rhythm.      Pulses: Normal pulses.      Heart sounds: Normal heart sounds. No murmur heard.  Pulmonary:      Effort: Pulmonary effort is normal.      Breath sounds: Normal breath sounds.   Abdominal:      General: Bowel sounds are normal. There is no distension.      Palpations: Abdomen is soft.      Tenderness: There is abdominal tenderness.   Musculoskeletal:         General: Normal range of motion.      Right lower leg: No edema.      Left lower leg: No edema.   Skin:     General: Skin is warm and dry.   Neurological:      Mental Status: She is alert and oriented to person, place, and time.   Psychiatric:         Mood and Affect: Mood normal.         Behavior: Behavior normal.         Thought Content: Thought content normal.         Judgment: Judgment normal.             Significant Labs: All pertinent labs within the past 24 hours have been reviewed.  CBC:   Recent Labs   Lab 01/06/25  1804 01/07/25  0653 01/08/25  0317   WBC 5.57 7.74 13.24*   HGB 6.2* 8.9* 7.9*   HCT 22.8* 29.9* 27.2*    240 242     CMP:   Recent Labs   Lab 01/06/25  1806  01/07/25  0620 01/08/25  0317    140 137   K 4.6 4.0 4.4    105 105   CO2 21* 23 23   * 143* 171*   BUN 13 13 15   CREATININE 1.0 0.9 0.9   CALCIUM 8.9 9.5 7.9*   PROT 7.4 7.9 6.5   ALBUMIN 3.9 4.2 3.3*   BILITOT 0.3 1.1* 0.5   ALKPHOS 82 82 67   AST 23 28 29   ALT 21 22 23   ANIONGAP 10 12 9     Magnesium:   Recent Labs   Lab 01/06/25  1241 01/07/25  0620 01/08/25  0317   MG 1.7 1.7 2.0     POCT Glucose:   Recent Labs   Lab 01/07/25  1649 01/07/25  2111 01/08/25  0727   POCTGLUCOSE 189* 203* 171*     Lab Results   Component Value Date    CALCIUM 7.9 (L) 01/08/2025    PHOS 4.6 (H) 01/08/2025         Significant Imaging: I have reviewed all pertinent imaging results/findings within the past 24 hours.    Assessment and Plan     * Symptomatic anemia  Anemia is likely due to macrocytic.  Most recent hemoglobin and hematocrit are listed below.  Recent Labs     01/06/25  1804 01/07/25  0653 01/08/25  0317   HGB 6.2* 8.9* 7.9*   HCT 22.8* 29.9* 27.2*       Plan  - Monitor serial CBC: Daily  - Transfuse PRBC if patient becomes hemodynamically unstable, symptomatic or H/H drops below 7/21.  - Patient has received 2 units of PRBCs on 01/06/2025  - Patient's anemia is currently worsening. Will continue current treatment  -     Malignant neoplasm of ascending colon  Chronic problem   History noted  Hematology Oncology following  General surgery following  S/P hemicolectomy 1/7/2024      Obesity  Body mass index is 37.05 kg/m². Morbid obesity complicates all aspects of disease management from diagnostic modalities to treatment. Weight loss encouraged and health benefits explained to patient.         Gastroesophageal reflux disease without esophagitis  Chronic  History noted  Pepcid BID       Anxiety and depression  Chronic.  Noted.    Resume home medications.        Hypertension associated with diabetes  Patients blood pressure range in the last 24 hours was: BP  Min: 91/52  Max: 170/76.The patient's inpatient  anti-hypertensive regimen is listed below:  Current Antihypertensives  furosemide injection 20 mg, As needed (PRN), Intravenous  lisinopriL tablet 20 mg, Daily, Oral    Plan  - BP is controlled, no changes needed to their regimen  - Resumed Home medications        Uncontrolled type 2 diabetes mellitus with hyperglycemia, without long-term current use of insulin  Chronic problem   Patient's FSGs are uncontrolled due to hyperglycemia on current medication regimen.  Last A1c reviewed-   Lab Results   Component Value Date    HGBA1C 6.5 (H) 11/07/2024     Most recent fingerstick glucose reviewed-   Recent Labs   Lab 01/07/25  1649 01/07/25  2111 01/08/25  0727   POCTGLUCOSE 189* 203* 171*       Current correctional scale  Medium  Maintain anti-hyperglycemic dose as follows-   Antihyperglycemics (From admission, onward)      Start     Stop Route Frequency Ordered    01/06/25 2238  insulin aspart U-100 pen 1-10 Units         -- SubQ Before meals & nightly PRN 01/06/25 2139          Hold Oral hypoglycemics while patient is in the hospital.         VTE Risk Mitigation (From admission, onward)           Ordered     Place PRISCILLA hose  Until discontinued         01/06/25 2139     IP VTE HIGH RISK PATIENT  Once         01/06/25 2139     Place sequential compression device  Until discontinued         01/06/25 2139                    Discharge Planning   KARON: 1/9/2025     Code Status: Full Code   Medical Readiness for Discharge Date:                            Jodie Sanchez NP  Department of Hospital Medicine   [facility]/Surg

## 2025-01-08 NOTE — PT/OT/SLP EVAL
Physical Therapy Evaluation    Patient Name:  Nik Lowery   MRN:  [id number]    Recommendations:     Discharge Recommendations: Low Intensity Therapy   Discharge Equipment Recommendations: to be determined by next level of care   Barriers to discharge:  medical status, decreased caregiver support    Assessment:     Nik Lowery is a 59 y.o. female admitted with a medical diagnosis of Symptomatic anemia.  She presents with the following impairments/functional limitations: weakness, impaired endurance, impaired functional mobility, gait instability, impaired balance, pain, impaired cardiopulmonary response to activity Patient found resting in bed and was agreeable to PT evaluation. She c/o pain, nausea and dizziness. Bed mobility was min A and slow. Sat EOB with good balance but continued c/o nausea and dizziness. Bambi for sit <->stand up to RW. Patient stood 30sec before needing to sit back down. Bambi to get sit to supine. Patient moved slowly due to pain complaints.     Rehab Prognosis: Good; patient would benefit from acute skilled PT services to address these deficits and reach maximum level of function.    Recent Surgery: Procedure(s) (LRB):  ROBOTIC COLECTOMY WITH ILEOCOLIC ANASTOMOSIS (Right) 1 Day Post-Op    Plan:     During this hospitalization, patient to be seen 6 x/week to address the identified rehab impairments via gait training, therapeutic activities, therapeutic exercises, neuromuscular re-education and progress toward the following goals:    Plan of Care Expires:  02/08/25    Subjective     Chief Complaint: abdomen pain, dizziness and nausea  Patient/Family Comments/goals: I want to move  Pain/Comfort:  Pain Rating 1: 6/10  Location 1: abdomen  Pain Addressed 1: Reposition, Distraction    Patients cultural, spiritual, Latter-day conflicts given the current situation: no    Living Environment:  Lives alone single story home with walk in shower, no DME  Prior to admission, patients level of function  was IND.  Equipment used at home: none.  DME owned (not currently used): none.  Upon discharge, patient will have assistance from TBD.    Objective:     Communicated with nurse prior to session.  Patient found HOB elevated with peripheral IV, telemetry, PureWick  upon PT entry to room.    General Precautions: Standard, fall  Orthopedic Precautions:    Braces:    Respiratory Status: Room air    Exams:  RLE ROM: WFL  RLE Strength: WFL  LLE ROM: WFL  LLE Strength: WFL    Functional Mobility:  Bed Mobility:     Supine to Sit: minimum assistance  Sit to Supine: minimum assistance  Transfers:     Sit to Stand:  minimum assistance with rolling walker      AM-PAC 6 CLICK MOBILITY  Total Score:16       Treatment & Education:  Pt educated on POC, discharge recommendation, need for assist with mobility, use of call bell to seek assistance as needed and fall prevention      Patient left HOB elevated with all lines intact, call button in reach, bed alarm on, and nurse notified.    GOALS:   Multidisciplinary Problems       Physical Therapy Goals          Problem: Physical Therapy    Goal Priority Disciplines Outcome Interventions   Physical Therapy Goal     PT, PT/OT     Description: Goals to be met by: 25     Patient will increase functional independence with mobility by performin. Supine to sit with Stand-by Assistance  2. Sit to supine with Stand-by Assistance  3. Sit to stand transfer with Stand-by Assistance  4. Gait  x 150 feet with Stand-by Assistance using Rolling Walker.                          History:     Past Medical History:   Diagnosis Date    Colon cancer     Diabetes mellitus     Diabetes mellitus, type 2     Encounter for blood transfusion     Herpes     Hypertension     Sleep apnea        Past Surgical History:   Procedure Laterality Date    COLONOSCOPY N/A 2024    Procedure: COLONOSCOPY;  Surgeon: Saw Wick MD;  Location: [facility];  Service: Endoscopy;  Laterality: N/A;     ESOPHAGOGASTRODUODENOSCOPY N/A 11/8/2024    Procedure: EGD (ESOPHAGOGASTRODUODENOSCOPY);  Surgeon: Saw Wick MD;  Location: [facility];  Service: Endoscopy;  Laterality: N/A;    EYE SURGERY  2002    lasix Bilateral    HYSTERECTOMY  2012    INSERTION OF TUNNELED CENTRAL VENOUS CATHETER (CVC) WITH SUBCUTANEOUS PORT Right 12/20/2024    Procedure: INSERTION, PORT-A-CATH;  Surgeon: Logan Juarez MD;  Location: [facility];  Service: General;  Laterality: Right;    LUMBAR DISCECTOMY      ROBOT-ASSISTED COLECTOMY Right 1/7/2025    Procedure: ROBOTIC COLECTOMY WITH ILEOCOLIC ANASTOMOSIS;  Surgeon: Logan Juarez MD;  Location: [facility];  Service: General;  Laterality: Right;    WISDOM TOOTH EXTRACTION         Time Tracking:     PT Received On: 01/08/25  PT Start Time: 1005     PT Stop Time: 1028  PT Total Time (min): 23 min     Billable Minutes: Evaluation 15 and Therapeutic Activity 8      01/08/2025

## 2025-01-08 NOTE — ASSESSMENT & PLAN NOTE
Patients blood pressure range in the last 24 hours was: BP  Min: 91/52  Max: 170/76.The patient's inpatient anti-hypertensive regimen is listed below:  Current Antihypertensives  furosemide injection 20 mg, As needed (PRN), Intravenous  lisinopriL tablet 20 mg, Daily, Oral    Plan  - BP is controlled, no changes needed to their regimen  - Resumed Home medications

## 2025-01-08 NOTE — SUBJECTIVE & OBJECTIVE
Interval History:   Patient seen and examined during multidisciplinary.  NAD.  Hemoglobin declined to 7.9.  No signs of bleeding.  Tolerating clear liquid diet. Continue to monitor  Review of Systems   Constitutional:  Positive for activity change.   Respiratory: Negative.     Gastrointestinal:  Positive for abdominal pain (expected postop tenderness). Negative for abdominal distention and blood in stool.   Genitourinary: Negative.    Skin:         Lap sites   Neurological: Negative.      Objective:     Vital Signs (Most Recent):  Temp: 97.9 °F (36.6 °C) (01/08/25 0738)  Pulse: 99 (01/08/25 0739)  Resp: 19 (01/08/25 0738)  BP: (!) 117/59 (01/08/25 0738)  SpO2: 95 % (01/08/25 0739) Vital Signs (24h Range):  Temp:  [97.2 °F (36.2 °C)-98.6 °F (37 °C)] 97.9 °F (36.6 °C)  Pulse:  [] 99  Resp:  [13-27] 19  SpO2:  [90 %-98 %] 95 %  BP: ()/(52-85) 117/59     Weight: 101 kg (222 lb 10.6 oz)  Body mass index is 37.05 kg/m².    Intake/Output Summary (Last 24 hours) at 1/8/2025 1104  Last data filed at 1/8/2025 0507  Gross per 24 hour   Intake 2240 ml   Output 400 ml   Net 1840 ml         Physical Exam  Vitals and nursing note reviewed. Exam conducted with a chaperone present.   Constitutional:       Appearance: Normal appearance. She is obese.   HENT:      Head: Normocephalic and atraumatic.      Mouth/Throat:      Mouth: Mucous membranes are moist.   Cardiovascular:      Rate and Rhythm: Normal rate and regular rhythm.      Pulses: Normal pulses.      Heart sounds: Normal heart sounds. No murmur heard.  Pulmonary:      Effort: Pulmonary effort is normal.      Breath sounds: Normal breath sounds.   Abdominal:      General: Bowel sounds are normal. There is no distension.      Palpations: Abdomen is soft.      Tenderness: There is abdominal tenderness.   Musculoskeletal:         General: Normal range of motion.      Right lower leg: No edema.      Left lower leg: No edema.   Skin:     General: Skin is warm and dry.    Neurological:      Mental Status: She is alert and oriented to person, place, and time.   Psychiatric:         Mood and Affect: Mood normal.         Behavior: Behavior normal.         Thought Content: Thought content normal.         Judgment: Judgment normal.             Significant Labs: All pertinent labs within the past 24 hours have been reviewed.  CBC:   Recent Labs   Lab 01/06/25  1804 01/07/25  0653 01/08/25  0317   WBC 5.57 7.74 13.24*   HGB 6.2* 8.9* 7.9*   HCT 22.8* 29.9* 27.2*    240 242     CMP:   Recent Labs   Lab 01/06/25  1804 01/07/25  0620 01/08/25  0317    140 137   K 4.6 4.0 4.4    105 105   CO2 21* 23 23   * 143* 171*   BUN 13 13 15   CREATININE 1.0 0.9 0.9   CALCIUM 8.9 9.5 7.9*   PROT 7.4 7.9 6.5   ALBUMIN 3.9 4.2 3.3*   BILITOT 0.3 1.1* 0.5   ALKPHOS 82 82 67   AST 23 28 29   ALT 21 22 23   ANIONGAP 10 12 9     Magnesium:   Recent Labs   Lab 01/06/25  1241 01/07/25  0620 01/08/25  0317   MG 1.7 1.7 2.0     POCT Glucose:   Recent Labs   Lab 01/07/25  1649 01/07/25  2111 01/08/25  0727   POCTGLUCOSE 189* 203* 171*     Lab Results   Component Value Date    CALCIUM 7.9 (L) 01/08/2025    PHOS 4.6 (H) 01/08/2025         Significant Imaging: I have reviewed all pertinent imaging results/findings within the past 24 hours.

## 2025-01-08 NOTE — PLAN OF CARE
[place] [facility] - Med/Surg  Initial Discharge Assessment       Primary Care Provider: Osmin Newsome MD    Admission Diagnosis: Symptomatic anemia [D64.9]    Admission Date: 1/6/2025  Expected Discharge Date: 1/9/2025    Transition of Care Barriers: None    Payor: BLUE CROSS [facility] / Plan: BCBS ALL OUT OF STATE / Product Type: PPO /     Extended Emergency Contact Information  Primary Emergency Contact: Harleen Lowery  Address: [address] [place] [address] [address], [facility]  Mobile Phone: [phone]  Relation: Sister  Preferred language: English   needed? No    Discharge Plan A: Home with family  Discharge Plan B: Home      [id number] [place] - [address]  [address]  [place] [address]  Phone: [phone] Fax: [phone]    [facility] - [place], [place] - [address] AT [place] [address]  [address]  [place] [address]  Phone: [phone] Fax: [phone]    DC assessment completed at bedside, information verified. Lives alone. Cousin will drive her home. PCP is Jerod. [facility]. Denies blood thinners/hh/hd/dme. Independent, drives self. Insurance verified. POA is Harleen amos. Denies recent admission. Planning to DC home when clear.       Initial Assessment (most recent)       Adult Discharge Assessment - 01/08/25 1541          Discharge Assessment    Assessment Type Discharge Planning Assessment     Confirmed/corrected address, phone number and insurance Yes     Confirmed Demographics Correct on Facesheet     Source of Information patient     Communicated KARON with patient/caregiver Yes     People in Home alone     Facility Arrived From: home     Do you expect to return to your current living situation? Yes     Do you have help at home or someone to help you manage your care at home? No     Prior to hospitilization cognitive status: Alert/Oriented     Current cognitive status: Alert/Oriented     Equipment  Currently Used at Home none     Readmission within 30 days? No     Patient currently being followed by outpatient case management? No     Do you currently have service(s) that help you manage your care at home? No     Do you take prescription medications? Yes     Do you have prescription coverage? Yes     Do you have any problems affording any of your prescribed medications? No     Is the patient taking medications as prescribed? yes     Who is going to help you get home at discharge? cousin     How do you get to doctors appointments? car, drives self     Are you on dialysis? No     Do you take coumadin? No     Discharge Plan A Home with family     Discharge Plan B Home     DME Needed Upon Discharge  none     Discharge Plan discussed with: Patient     Transition of Care Barriers None

## 2025-01-09 PROBLEM — R50.9 FEVER: Status: ACTIVE | Noted: 2025-01-09

## 2025-01-09 LAB
ALBUMIN SERPL BCP-MCNC: 3.3 G/DL (ref 3.5–5.2)
ALP SERPL-CCNC: 75 U/L (ref 40–150)
ALT SERPL W/O P-5'-P-CCNC: 21 U/L (ref 10–44)
ANION GAP SERPL CALC-SCNC: 10 MMOL/L (ref 8–16)
AST SERPL-CCNC: 24 U/L (ref 10–40)
BASOPHILS # BLD AUTO: 0.03 K/UL (ref 0–0.2)
BASOPHILS NFR BLD: 0.2 % (ref 0–1.9)
BILIRUB SERPL-MCNC: 1.2 MG/DL (ref 0.1–1)
BUN SERPL-MCNC: 13 MG/DL (ref 6–20)
CALCIUM SERPL-MCNC: 9 MG/DL (ref 8.7–10.5)
CHLORIDE SERPL-SCNC: 105 MMOL/L (ref 95–110)
CO2 SERPL-SCNC: 20 MMOL/L (ref 23–29)
CREAT SERPL-MCNC: 0.9 MG/DL (ref 0.5–1.4)
DIFFERENTIAL METHOD BLD: ABNORMAL
EOSINOPHIL # BLD AUTO: 0 K/UL (ref 0–0.5)
EOSINOPHIL NFR BLD: 0.2 % (ref 0–8)
ERYTHROCYTE [DISTWIDTH] IN BLOOD BY AUTOMATED COUNT: 20.2 % (ref 11.5–14.5)
EST. GFR  (NO RACE VARIABLE): >60 ML/MIN/1.73 M^2
GLUCOSE SERPL-MCNC: 183 MG/DL (ref 70–110)
HCT VFR BLD AUTO: 28.6 % (ref 37–48.5)
HGB BLD-MCNC: 8.2 G/DL (ref 12–16)
IMM GRANULOCYTES # BLD AUTO: 0.11 K/UL (ref 0–0.04)
IMM GRANULOCYTES NFR BLD AUTO: 0.9 % (ref 0–0.5)
INFLUENZA A, MOLECULAR: NEGATIVE
INFLUENZA B, MOLECULAR: NEGATIVE
LYMPHOCYTES # BLD AUTO: 1.2 K/UL (ref 1–4.8)
LYMPHOCYTES NFR BLD: 9.4 % (ref 18–48)
MAGNESIUM SERPL-MCNC: 2.2 MG/DL (ref 1.6–2.6)
MCH RBC QN AUTO: 22.4 PG (ref 27–31)
MCHC RBC AUTO-ENTMCNC: 28.7 G/DL (ref 32–36)
MCV RBC AUTO: 78 FL (ref 82–98)
MONOCYTES # BLD AUTO: 0.7 K/UL (ref 0.3–1)
MONOCYTES NFR BLD: 5.6 % (ref 4–15)
NEUTROPHILS # BLD AUTO: 10.2 K/UL (ref 1.8–7.7)
NEUTROPHILS NFR BLD: 83.7 % (ref 38–73)
NRBC BLD-RTO: 0 /100 WBC
PHOSPHATE SERPL-MCNC: 2.7 MG/DL (ref 2.7–4.5)
PLATELET # BLD AUTO: 220 K/UL (ref 150–450)
PMV BLD AUTO: 9.4 FL (ref 9.2–12.9)
POCT GLUCOSE: 187 MG/DL (ref 70–110)
POCT GLUCOSE: 255 MG/DL (ref 70–110)
POTASSIUM SERPL-SCNC: 4.5 MMOL/L (ref 3.5–5.1)
PROT SERPL-MCNC: 7.2 G/DL (ref 6–8.4)
RBC # BLD AUTO: 3.66 M/UL (ref 4–5.4)
SARS-COV-2 RDRP RESP QL NAA+PROBE: NEGATIVE
SODIUM SERPL-SCNC: 135 MMOL/L (ref 136–145)
SPECIMEN SOURCE: NORMAL
WBC # BLD AUTO: 12.18 K/UL (ref 3.9–12.7)

## 2025-01-09 PROCEDURE — 25000003 PHARM REV CODE 250: Performed by: NURSE PRACTITIONER

## 2025-01-09 PROCEDURE — 25000003 PHARM REV CODE 250: Performed by: STUDENT IN AN ORGANIZED HEALTH CARE EDUCATION/TRAINING PROGRAM

## 2025-01-09 PROCEDURE — 84100 ASSAY OF PHOSPHORUS: CPT | Performed by: NURSE PRACTITIONER

## 2025-01-09 PROCEDURE — 80053 COMPREHEN METABOLIC PANEL: CPT | Performed by: NURSE PRACTITIONER

## 2025-01-09 PROCEDURE — 94761 N-INVAS EAR/PLS OXIMETRY MLT: CPT

## 2025-01-09 PROCEDURE — 25000003 PHARM REV CODE 250

## 2025-01-09 PROCEDURE — 97116 GAIT TRAINING THERAPY: CPT

## 2025-01-09 PROCEDURE — 36415 COLL VENOUS BLD VENIPUNCTURE: CPT | Performed by: NURSE PRACTITIONER

## 2025-01-09 PROCEDURE — 97530 THERAPEUTIC ACTIVITIES: CPT

## 2025-01-09 PROCEDURE — 87502 INFLUENZA DNA AMP PROBE: CPT

## 2025-01-09 PROCEDURE — 99233 SBSQ HOSP IP/OBS HIGH 50: CPT | Mod: ,,, | Performed by: INTERNAL MEDICINE

## 2025-01-09 PROCEDURE — 85025 COMPLETE CBC W/AUTO DIFF WBC: CPT | Performed by: NURSE PRACTITIONER

## 2025-01-09 PROCEDURE — 63600175 PHARM REV CODE 636 W HCPCS: Performed by: NURSE PRACTITIONER

## 2025-01-09 PROCEDURE — 94799 UNLISTED PULMONARY SVC/PX: CPT

## 2025-01-09 PROCEDURE — 87635 SARS-COV-2 COVID-19 AMP PRB: CPT

## 2025-01-09 PROCEDURE — 83735 ASSAY OF MAGNESIUM: CPT | Performed by: NURSE PRACTITIONER

## 2025-01-09 PROCEDURE — 99900035 HC TECH TIME PER 15 MIN (STAT)

## 2025-01-09 PROCEDURE — 27000221 HC OXYGEN, UP TO 24 HOURS

## 2025-01-09 PROCEDURE — 11000001 HC ACUTE MED/SURG PRIVATE ROOM

## 2025-01-09 RX ADMIN — SENNOSIDES AND DOCUSATE SODIUM 1 TABLET: 50; 8.6 TABLET ORAL at 08:01

## 2025-01-09 RX ADMIN — FAMOTIDINE 20 MG: 20 TABLET, FILM COATED ORAL at 08:01

## 2025-01-09 RX ADMIN — VALACYCLOVIR HYDROCHLORIDE 500 MG: 500 TABLET, FILM COATED ORAL at 09:01

## 2025-01-09 RX ADMIN — MUPIROCIN 1 G: 20 OINTMENT TOPICAL at 08:01

## 2025-01-09 RX ADMIN — VALACYCLOVIR HYDROCHLORIDE 500 MG: 500 TABLET, FILM COATED ORAL at 08:01

## 2025-01-09 RX ADMIN — GUAIFENESIN 600 MG: 600 TABLET, EXTENDED RELEASE ORAL at 09:01

## 2025-01-09 RX ADMIN — SERTRALINE HYDROCHLORIDE 100 MG: 50 TABLET ORAL at 09:01

## 2025-01-09 RX ADMIN — GABAPENTIN 800 MG: 400 CAPSULE ORAL at 06:01

## 2025-01-09 RX ADMIN — LAMOTRIGINE 150 MG: 100 TABLET ORAL at 09:01

## 2025-01-09 RX ADMIN — GABAPENTIN 800 MG: 400 CAPSULE ORAL at 09:01

## 2025-01-09 RX ADMIN — SENNOSIDES AND DOCUSATE SODIUM 1 TABLET: 50; 8.6 TABLET ORAL at 09:01

## 2025-01-09 RX ADMIN — LISINOPRIL 20 MG: 10 TABLET ORAL at 09:01

## 2025-01-09 RX ADMIN — MUPIROCIN 1 G: 20 OINTMENT TOPICAL at 09:01

## 2025-01-09 RX ADMIN — GUAIFENESIN 600 MG: 600 TABLET, EXTENDED RELEASE ORAL at 08:01

## 2025-01-09 RX ADMIN — FAMOTIDINE 20 MG: 20 TABLET, FILM COATED ORAL at 09:01

## 2025-01-09 RX ADMIN — ACETAMINOPHEN 650 MG: 325 TABLET, FILM COATED ORAL at 08:01

## 2025-01-09 RX ADMIN — ATORVASTATIN CALCIUM 20 MG: 20 TABLET, FILM COATED ORAL at 09:01

## 2025-01-09 RX ADMIN — INSULIN ASPART 3 UNITS: 100 INJECTION, SOLUTION INTRAVENOUS; SUBCUTANEOUS at 09:01

## 2025-01-09 RX ADMIN — ACETAMINOPHEN 650 MG: 325 TABLET, FILM COATED ORAL at 04:01

## 2025-01-09 NOTE — PROGRESS NOTES
[facility] Medicine  Progress Note    Patient Name: Nik Lowery  MRN: [id number]  Patient Class: IP- Inpatient   Admission Date: 1/6/2025  Length of Stay: 2 days  Attending Physician: Finn Herring MD  Primary Care Provider: Osmin Newsome MD        Subjective     Principal Problem:Symptomatic anemia        HPI:  Nik Lowery is a 59 year female who presents emergency room for evaluation of fatigue, weakness, and exertional dyspnea.  She has a history of colon cancer.  She is currently in workup to determine chemo versus colectomy.  She reports continued anemia.  The symptoms onset yesterday and progressively worsened.  Denies fever chills.  No known sick contacts or travel.  Previous medical history includes hypertension, diabetes, colon cancer with liver Mets.  ER workup: CBC with H&H of 6.2/22.  CMP with glucose of 208.  Patient admitted to Hospital Medicine for treatment and management.  Will transfuse patient with 2 units of packed red cells tonight.                    Overview/Hospital Course:  Patient was admitted to the hospital medicine service and closely monitored.  She was transfused 2 units of packed red blood cells with improvement in hemoglobin above transfusion threshold.  She has remained hemodynamically stable.  Hematology Oncology and General surgery were consulted for evaluation for possible colectomy, which was performed 01/07/2020 with no complications.  Postop day 1 patient a slight leukocytosis.  Postoperatively on day 2 patient developed a fever of 103.1 with resolution of leukocytosis.  Patient was encouraged to use incentive spirometer and set up with meals.  Patient's diet was advanced to full liquid diet which she tolerated well.  Patient worked with PT/OT while hospitalized, low intensity therapy was recommended.  If patient continues to tolerate diet advanced to low residue diet in a.m. and possible DC.    Interval History:   Patient seen  examined.  NAD.  Leukocytosis resolved.  T-max 103.1.  Tolerating full liquid diet.  General surgery following, recommendations appreciated.  Review of Systems   Constitutional:  Positive for activity change and fever. Negative for chills and diaphoresis.   Cardiovascular:  Negative for chest pain.   Gastrointestinal:  Positive for abdominal pain (Expected postoperative tenderness).   Genitourinary: Negative.    Musculoskeletal: Negative.    Neurological:  Positive for weakness (generalized). Negative for dizziness, facial asymmetry and light-headedness.   Psychiatric/Behavioral: Negative.       Objective:     Vital Signs (Most Recent):  Temp: 98.2 °F (36.8 °C) (01/09/25 1117)  Pulse: 81 (01/09/25 1117)  Resp: 18 (01/09/25 1117)  BP: 113/65 (01/09/25 1117)  SpO2: 96 % (01/09/25 1117) Vital Signs (24h Range):  Temp:  [98.2 °F (36.8 °C)-103.1 °F (39.5 °C)] 98.2 °F (36.8 °C)  Pulse:  [] 81  Resp:  [16-18] 18  SpO2:  [84 %-96 %] 96 %  BP: (110-142)/(60-68) 113/65     Weight: 99.8 kg (220 lb 0.3 oz)  Body mass index is 36.61 kg/m².    Intake/Output Summary (Last 24 hours) at 1/9/2025 1338  Last data filed at 1/9/2025 0517  Gross per 24 hour   Intake 840 ml   Output 2250 ml   Net -1410 ml         Physical Exam  Vitals and nursing note reviewed. Exam conducted with a chaperone present.   Constitutional:       Appearance: Normal appearance. She is obese.   HENT:      Head: Normocephalic and atraumatic.      Mouth/Throat:      Mouth: Mucous membranes are moist.   Cardiovascular:      Rate and Rhythm: Normal rate and regular rhythm.      Pulses: Normal pulses.      Heart sounds: Normal heart sounds. No murmur heard.  Pulmonary:      Effort: Pulmonary effort is normal.      Breath sounds: Normal breath sounds.   Abdominal:      General: Bowel sounds are normal. There is no distension.      Palpations: Abdomen is soft.      Tenderness: There is abdominal tenderness.   Musculoskeletal:         General: Normal range of  motion.      Right lower leg: No edema.      Left lower leg: No edema.   Skin:     General: Skin is warm and dry.   Neurological:      Mental Status: She is alert and oriented to person, place, and time.   Psychiatric:         Mood and Affect: Mood normal.         Behavior: Behavior normal.         Thought Content: Thought content normal.         Judgment: Judgment normal.             Significant Labs: All pertinent labs within the past 24 hours have been reviewed.  CBC:   Recent Labs   Lab 01/08/25 0317 01/09/25  0423   WBC 13.24* 12.18   HGB 7.9* 8.2*   HCT 27.2* 28.6*    220     CMP:   Recent Labs   Lab 01/08/25 0317 01/09/25  0423    135*   K 4.4 4.5    105   CO2 23 20*   * 183*   BUN 15 13   CREATININE 0.9 0.9   CALCIUM 7.9* 9.0   PROT 6.5 7.2   ALBUMIN 3.3* 3.3*   BILITOT 0.5 1.2*   ALKPHOS 67 75   AST 29 24   ALT 23 21   ANIONGAP 9 10     Magnesium:   Recent Labs   Lab 01/08/25 0317 01/09/25  0423   MG 2.0 2.2     POCT Glucose:   Recent Labs   Lab 01/08/25  1124 01/08/25  2004 01/09/25  1234   POCTGLUCOSE 176* 225* 187*       Significant Imaging: I have reviewed all pertinent imaging results/findings within the past 24 hours.         Assessment and Plan     * Symptomatic anemia  Anemia is likely due to macrocytic.  Most recent hemoglobin and hematocrit are listed below.  Recent Labs     01/06/25  1804 01/07/25  0653 01/08/25 0317   HGB 6.2* 8.9* 7.9*   HCT 22.8* 29.9* 27.2*       Plan  - Monitor serial CBC: Daily  - Transfuse PRBC if patient becomes hemodynamically unstable, symptomatic or H/H drops below 7/21.  - Patient has received 2 units of PRBCs on 01/06/2025  - Patient's anemia is currently worsening. Will continue current treatment  -     Fever  P.r.n. antipyretic  CXR  Encourage IS  Encouraged mobility  Cough and deep breathe        Malignant neoplasm of ascending colon  Chronic problem   History noted  Hematology Oncology following  General surgery following  S/P  hemicolectomy 1/7/2024      Obesity  Body mass index is 37.05 kg/m². Morbid obesity complicates all aspects of disease management from diagnostic modalities to treatment. Weight loss encouraged and health benefits explained to patient.         Gastroesophageal reflux disease without esophagitis  Chronic  History noted  Pepcid BID       Anxiety and depression  Chronic.  Noted.    Resume home medications.        Hypertension associated with diabetes  Patients blood pressure range in the last 24 hours was: BP  Min: 91/52  Max: 170/76.The patient's inpatient anti-hypertensive regimen is listed below:  Current Antihypertensives  furosemide injection 20 mg, As needed (PRN), Intravenous  lisinopriL tablet 20 mg, Daily, Oral    Plan  - BP is controlled, no changes needed to their regimen  - Resumed Home medications        Uncontrolled type 2 diabetes mellitus with hyperglycemia, without long-term current use of insulin  Chronic problem   Patient's FSGs are uncontrolled due to hyperglycemia on current medication regimen.  Last A1c reviewed-   Lab Results   Component Value Date    HGBA1C 6.5 (H) 11/07/2024     Most recent fingerstick glucose reviewed-   Recent Labs   Lab 01/07/25  1649 01/07/25  2111 01/08/25  0727   POCTGLUCOSE 189* 203* 171*       Current correctional scale  Medium  Maintain anti-hyperglycemic dose as follows-   Antihyperglycemics (From admission, onward)      Start     Stop Route Frequency Ordered    01/06/25 2238  insulin aspart U-100 pen 1-10 Units         -- SubQ Before meals & nightly PRN 01/06/25 2139          Hold Oral hypoglycemics while patient is in the hospital.         VTE Risk Mitigation (From admission, onward)           Ordered     Place PRISCILLA hose  Until discontinued         01/06/25 2139     IP VTE HIGH RISK PATIENT  Once         01/06/25 2139     Place sequential compression device  Until discontinued         01/06/25 2139                    Discharge Planning   KARON: 1/10/2025     Code  Status: Full Code   Medical Readiness for Discharge Date: 1/9/2025  Discharge Plan A: Home with family                        Jodie Sanchez NP  Department of Hospital Medicine   [facility]/Surg

## 2025-01-09 NOTE — SUBJECTIVE & OBJECTIVE
Interval History:   Patient seen examined.  NAD.  Leukocytosis resolved.  T-max 103.1.  Tolerating full liquid diet.  General surgery following, recommendations appreciated.  Review of Systems   Constitutional:  Positive for activity change and fever. Negative for chills and diaphoresis.   Cardiovascular:  Negative for chest pain.   Gastrointestinal:  Positive for abdominal pain (Expected postoperative tenderness).   Genitourinary: Negative.    Musculoskeletal: Negative.    Neurological:  Positive for weakness (generalized). Negative for dizziness, facial asymmetry and light-headedness.   Psychiatric/Behavioral: Negative.       Objective:     Vital Signs (Most Recent):  Temp: 98.2 °F (36.8 °C) (01/09/25 1117)  Pulse: 81 (01/09/25 1117)  Resp: 18 (01/09/25 1117)  BP: 113/65 (01/09/25 1117)  SpO2: 96 % (01/09/25 1117) Vital Signs (24h Range):  Temp:  [98.2 °F (36.8 °C)-103.1 °F (39.5 °C)] 98.2 °F (36.8 °C)  Pulse:  [] 81  Resp:  [16-18] 18  SpO2:  [84 %-96 %] 96 %  BP: (110-142)/(60-68) 113/65     Weight: 99.8 kg (220 lb 0.3 oz)  Body mass index is 36.61 kg/m².    Intake/Output Summary (Last 24 hours) at 1/9/2025 1338  Last data filed at 1/9/2025 0517  Gross per 24 hour   Intake 840 ml   Output 2250 ml   Net -1410 ml         Physical Exam  Vitals and nursing note reviewed. Exam conducted with a chaperone present.   Constitutional:       Appearance: Normal appearance. She is obese.   HENT:      Head: Normocephalic and atraumatic.      Mouth/Throat:      Mouth: Mucous membranes are moist.   Cardiovascular:      Rate and Rhythm: Normal rate and regular rhythm.      Pulses: Normal pulses.      Heart sounds: Normal heart sounds. No murmur heard.  Pulmonary:      Effort: Pulmonary effort is normal.      Breath sounds: Normal breath sounds.   Abdominal:      General: Bowel sounds are normal. There is no distension.      Palpations: Abdomen is soft.      Tenderness: There is abdominal tenderness.   Musculoskeletal:          General: Normal range of motion.      Right lower leg: No edema.      Left lower leg: No edema.   Skin:     General: Skin is warm and dry.   Neurological:      Mental Status: She is alert and oriented to person, place, and time.   Psychiatric:         Mood and Affect: Mood normal.         Behavior: Behavior normal.         Thought Content: Thought content normal.         Judgment: Judgment normal.             Significant Labs: All pertinent labs within the past 24 hours have been reviewed.  CBC:   Recent Labs   Lab 01/08/25 0317 01/09/25 0423   WBC 13.24* 12.18   HGB 7.9* 8.2*   HCT 27.2* 28.6*    220     CMP:   Recent Labs   Lab 01/08/25 0317 01/09/25  0423    135*   K 4.4 4.5    105   CO2 23 20*   * 183*   BUN 15 13   CREATININE 0.9 0.9   CALCIUM 7.9* 9.0   PROT 6.5 7.2   ALBUMIN 3.3* 3.3*   BILITOT 0.5 1.2*   ALKPHOS 67 75   AST 29 24   ALT 23 21   ANIONGAP 9 10     Magnesium:   Recent Labs   Lab 01/08/25 0317 01/09/25  0423   MG 2.0 2.2     POCT Glucose:   Recent Labs   Lab 01/08/25  1124 01/08/25  2004 01/09/25  1234   POCTGLUCOSE 176* 225* 187*       Significant Imaging: I have reviewed all pertinent imaging results/findings within the past 24 hours.

## 2025-01-09 NOTE — PROGRESS NOTES
Patient seen and examined.  Doing reasonably well.  Tolerating clears.      Vitals are stable   Afebrile   Abdomen is appropriately tender    Labs reviewed.  Leukocytosis has resolved.      Okay to advance to full liquids today  CRP tomorrow morning  If she continues to tolerate full liquids today, okay to advance to low residue diet in the morning with possible DC tomorrow if clinically stable and labs stable

## 2025-01-09 NOTE — PLAN OF CARE
Problem: Adult Inpatient Plan of Care  Goal: Plan of Care Review  Outcome: Progressing  Goal: Patient-Specific Goal (Individualized)  Outcome: Progressing  Goal: Absence of Hospital-Acquired Illness or Injury  Outcome: Progressing  Goal: Optimal Comfort and Wellbeing  Outcome: Progressing     Problem: Infection  Goal: Absence of Infection Signs and Symptoms  Outcome: Progressing     Problem: Diabetes Comorbidity  Goal: Blood Glucose Level Within Targeted Range  Outcome: Progressing     Problem: Wound  Goal: Absence of Infection Signs and Symptoms  Outcome: Progressing

## 2025-01-09 NOTE — PT/OT/SLP PROGRESS
Physical Therapy Treatment    Patient Name:  Nik Lowery   MRN:  [id number]    Recommendations:     Discharge Recommendations: Low Intensity Therapy  Discharge Equipment Recommendations: to be determined by next level of care  Barriers to discharge:  medical status    Assessment:     Nik Lowery is a 59 y.o. female admitted with a medical diagnosis of Symptomatic anemia.  She presents with the following impairments/functional limitations: weakness, impaired endurance, impaired self care skills, impaired functional mobility, gait instability, impaired balance, decreased lower extremity function, decreased safety awareness, pain, impaired cardiopulmonary response to activity Patient found asleep and was agreeable to PT session today. Bed mobility Roya, moving slowly and guarded. Sat EOB with improved tolerance from yesterday's dizziness. Transfer sit to stand with Roya with cues to fully stand upright. Ambulated in room20 ft with RW CGA for safety, slowly. She had decreased graeme, decreased heel strike, decreased toe off but no loss of balance. Improvement noted in mobility from yesterday.    Rehab Prognosis: Good; patient would benefit from acute skilled PT services to address these deficits and reach maximum level of function.    Recent Surgery: Procedure(s) (LRB):  ROBOTIC COLECTOMY WITH ILEOCOLIC ANASTOMOSIS (Right) 2 Days Post-Op    Plan:     During this hospitalization, patient to be seen 6 x/week to address the identified rehab impairments via gait training, therapeutic activities, therapeutic exercises, neuromuscular re-education and progress toward the following goals:    Plan of Care Expires:  02/08/25    Subjective     Chief Complaint: my stomach hurts  Patient/Family Comments/goals: I will get up today  Pain/Comfort:  Pain Rating 1: 6/10  Location 1: abdomen  Pain Addressed 1: Reposition, Distraction      Objective:     Communicated with nurse prior to session.  Patient found HOB elevated with  peripheral IV, telemetry, PureWick, oxygen upon PT entry to room.     General Precautions: Standard, fall  Orthopedic Precautions:    Braces:    Respiratory Status: Nasal cannula, flow 3 L/min     Functional Mobility:  Bed Mobility:     Supine to Sit: minimum assistance  Sit to Supine: minimum assistance  Transfers:     Sit to Stand:  minimum assistance with rolling walker  Gait: 20ft with RW CGA      AM-PAC 6 CLICK MOBILITY  Turning over in bed (including adjusting bedclothes, sheets and blankets)?: 3  Sitting down on and standing up from a chair with arms (e.g., wheelchair, bedside commode, etc.): 3  Moving from lying on back to sitting on the side of the bed?: 3  Moving to and from a bed to a chair (including a wheelchair)?: 3  Need to walk in hospital room?: 3  Climbing 3-5 steps with a railing?: 2  Basic Mobility Total Score: 17       Treatment & Education:  Pt educated on POC, discharge recommendation, need for assist with mobility, importance of time out of bed, use of call bell to seek assistance as needed and fall prevention      Patient left HOB elevated with all lines intact, call button in reach, bed alarm on, and doctor present..    GOALS:   Multidisciplinary Problems       Physical Therapy Goals          Problem: Physical Therapy    Goal Priority Disciplines Outcome Interventions   Physical Therapy Goal     PT, PT/OT Progressing    Description: Goals to be met by: 25     Patient will increase functional independence with mobility by performin. Supine to sit with Stand-by Assistance  2. Sit to supine with Stand-by Assistance  3. Sit to stand transfer with Stand-by Assistance  4. Gait  x 150 feet with Stand-by Assistance using Rolling Walker.                          Time Tracking:     PT Received On: 25  PT Start Time: 1052     PT Stop Time: 1115  PT Total Time (min): 23 min     Billable Minutes: Gait Training 8 and Therapeutic Activity 15    Treatment Type: Treatment  PT/PTA: PT            01/09/2025

## 2025-01-09 NOTE — PROGRESS NOTES
HPI    58 years old female with history of diabetes type II, hypertension and herpes     She was recently diagnosed with cecal mass causing significant bleeding require hospitalization and urgent transfusion.  She received 2 units packed red blood cell during the course of hospitalization.  Colonoscopy showed likely malignant tumor ascending colon nearby cecum.  CT of the abdomen pelvis contrast demonstrate concerning cecal malignancy with to right lower quadrant abnormal lymph nodes concerning for metastases with indeterminate right hepatic lobe 10 mm lesion    With ongoing bleeding patient receive surgical resection of primary malignancy on 01/07/2025 she is in the recovery phase.  Is planning on chemotherapy adjuvant after recovery.  Single liver lesion we will be ablated prior to start of chemotherapy    Past Medical History:   Diagnosis Date    Colon cancer     Diabetes mellitus     Diabetes mellitus, type 2     Encounter for blood transfusion     Herpes     Hypertension     Sleep apnea      Past Surgical History:   Procedure Laterality Date    COLONOSCOPY N/A 11/8/2024    Procedure: COLONOSCOPY;  Surgeon: Saw Wick MD;  Location: [facility];  Service: Endoscopy;  Laterality: N/A;    ESOPHAGOGASTRODUODENOSCOPY N/A 11/8/2024    Procedure: EGD (ESOPHAGOGASTRODUODENOSCOPY);  Surgeon: Saw Wick MD;  Location: [facility];  Service: Endoscopy;  Laterality: N/A;    EYE SURGERY  2002    lasix Bilateral    HYSTERECTOMY  2012    INSERTION OF TUNNELED CENTRAL VENOUS CATHETER (CVC) WITH SUBCUTANEOUS PORT Right 12/20/2024    Procedure: INSERTION, PORT-A-CATH;  Surgeon: Logan Juarez MD;  Location: [facility];  Service: General;  Laterality: Right;    LUMBAR DISCECTOMY      ROBOT-ASSISTED COLECTOMY Right 1/7/2025    Procedure: ROBOTIC COLECTOMY WITH ILEOCOLIC ANASTOMOSIS;  Surgeon: Logan Juarez MD;  Location: [facility] OR;  Service: General;  Laterality: Right;    WISDOM TOOTH EXTRACTION       Social History      Socioeconomic History    Marital status: Single   Occupational History    Occupation: supply chain   Tobacco Use    Smoking status: Never    Smokeless tobacco: Never   Substance and Sexual Activity    Alcohol use: No    Drug use: No    Sexual activity: Not Currently     Social Drivers of Health     Financial Resource Strain: Low Risk  (1/6/2025)    Overall Financial Resource Strain (CARDIA)     Difficulty of Paying Living Expenses: Not hard at all   Food Insecurity: No Food Insecurity (1/6/2025)    Hunger Vital Sign     Worried About Running Out of Food in the Last Year: Never true     Ran Out of Food in the Last Year: Never true   Transportation Needs: No Transportation Needs (1/6/2025)    TRANSPORTATION NEEDS     Transportation : No   Physical Activity: Insufficiently Active (11/26/2024)    Exercise Vital Sign     Days of Exercise per Week: 1 day     Minutes of Exercise per Session: 20 min   Stress: No Stress Concern Present (1/6/2025)    [place] [employer] - Occupational Stress Questionnaire     Feeling of Stress : Not at all   Recent Concern: Stress - Stress Concern Present (11/26/2024)    [place] [employer] - Occupational Stress Questionnaire     Feeling of Stress : Rather much   Housing Stability: Low Risk  (1/6/2025)    Housing Stability Vital Sign     Unable to Pay for Housing in the Last Year: No     Homeless in the Last Year: No     Review of patient's allergies indicates:   Allergen Reactions    Robaxin [methocarbamol] Nausea Only       Physical exam  Vitals:    01/09/25 1511   BP: (!) 153/62   Pulse: 92   Resp: 16   Temp: 99.4 °F (37.4 °C)       Lab Results   Component Value Date    WBC 12.18 01/09/2025    HGB 8.2 (L) 01/09/2025    HCT 28.6 (L) 01/09/2025    MCV 78 (L) 01/09/2025     01/09/2025       CMP  Sodium   Date Value Ref Range Status   01/09/2025 135 (L) 136 - 145 mmol/L Final     Potassium   Date Value Ref Range Status   01/09/2025 4.5 3.5 -  5.1 mmol/L Final     Chloride   Date Value Ref Range Status   01/09/2025 105 95 - 110 mmol/L Final     CO2   Date Value Ref Range Status   01/09/2025 20 (L) 23 - 29 mmol/L Final     Glucose   Date Value Ref Range Status   01/09/2025 183 (H) 70 - 110 mg/dL Final     BUN   Date Value Ref Range Status   01/09/2025 13 6 - 20 mg/dL Final     Creatinine   Date Value Ref Range Status   01/09/2025 0.9 0.5 - 1.4 mg/dL Final     Calcium   Date Value Ref Range Status   01/09/2025 9.0 8.7 - 10.5 mg/dL Final     Total Protein   Date Value Ref Range Status   01/09/2025 7.2 6.0 - 8.4 g/dL Final     Albumin   Date Value Ref Range Status   01/09/2025 3.3 (L) 3.5 - 5.2 g/dL Final     Total Bilirubin   Date Value Ref Range Status   01/09/2025 1.2 (H) 0.1 - 1.0 mg/dL Final     Comment:     For infants and newborns, interpretation of results should be based  on gestational age, weight and in agreement with clinical  observations.    Premature Infant recommended reference ranges:  Up to 24 hours.............<8.0 mg/dL  Up to 48 hours............<12.0 mg/dL  3-5 days..................<15.0 mg/dL  6-29 days.................<15.0 mg/dL       Alkaline Phosphatase   Date Value Ref Range Status   01/09/2025 75 40 - 150 U/L Final     AST   Date Value Ref Range Status   01/09/2025 24 10 - 40 U/L Final     ALT   Date Value Ref Range Status   01/09/2025 21 10 - 44 U/L Final     Anion Gap   Date Value Ref Range Status   01/09/2025 10 8 - 16 mmol/L Final     eGFR   Date Value Ref Range Status   01/09/2025 >60 >60 mL/min/1.73 m^2 Final     Assessment plan    Stage IV colon cancer right-side with Oligo Mets to liver.    Symptomatic anemia secondary to malignancy site    Status post surgical resection of primary colon cancer.    Status post surgical day 2.  Patient developed fever.  She will remain in the hospital till fever resolve.  In addition she is advancing her diet as tolerated.    > plan start on adjuvant FOLFOX within 4 weeks of surgical  resection  > we will follow up on surgical pathology  > liver lesion will be ablated and patient will be followed by Dr. Henry  > Oncology will continue follow patient.

## 2025-01-09 NOTE — PLAN OF CARE
Referral sent to [facility]  via [facility].      01/09/25 8001   Post-Acute Status   Post-Acute Authorization Home Health   Home Health Status Referrals Sent   Patient choice form signed by patient/caregiver List from [facility] Post-Acute Care

## 2025-01-10 LAB
ALBUMIN SERPL BCP-MCNC: 3 G/DL (ref 3.5–5.2)
ALP SERPL-CCNC: 112 U/L (ref 40–150)
ALT SERPL W/O P-5'-P-CCNC: 25 U/L (ref 10–44)
ANION GAP SERPL CALC-SCNC: 8 MMOL/L (ref 8–16)
AST SERPL-CCNC: 33 U/L (ref 10–40)
BASOPHILS # BLD AUTO: 0.03 K/UL (ref 0–0.2)
BASOPHILS NFR BLD: 0.3 % (ref 0–1.9)
BILIRUB SERPL-MCNC: 1.1 MG/DL (ref 0.1–1)
BUN SERPL-MCNC: 9 MG/DL (ref 6–20)
CALCIUM SERPL-MCNC: 9 MG/DL (ref 8.7–10.5)
CHLORIDE SERPL-SCNC: 103 MMOL/L (ref 95–110)
CO2 SERPL-SCNC: 25 MMOL/L (ref 23–29)
CREAT SERPL-MCNC: 0.8 MG/DL (ref 0.5–1.4)
CRP SERPL-MCNC: 191.1 MG/L (ref 0–8.2)
DIFFERENTIAL METHOD BLD: ABNORMAL
EOSINOPHIL # BLD AUTO: 0.2 K/UL (ref 0–0.5)
EOSINOPHIL NFR BLD: 1.9 % (ref 0–8)
ERYTHROCYTE [DISTWIDTH] IN BLOOD BY AUTOMATED COUNT: 19.8 % (ref 11.5–14.5)
EST. GFR  (NO RACE VARIABLE): >60 ML/MIN/1.73 M^2
GLUCOSE SERPL-MCNC: 172 MG/DL (ref 70–110)
HCT VFR BLD AUTO: 25.7 % (ref 37–48.5)
HGB BLD-MCNC: 7.3 G/DL (ref 12–16)
IMM GRANULOCYTES # BLD AUTO: 0.06 K/UL (ref 0–0.04)
IMM GRANULOCYTES NFR BLD AUTO: 0.5 % (ref 0–0.5)
LACTATE SERPL-SCNC: 1.6 MMOL/L (ref 0.5–2.2)
LYMPHOCYTES # BLD AUTO: 1.7 K/UL (ref 1–4.8)
LYMPHOCYTES NFR BLD: 15.6 % (ref 18–48)
MAGNESIUM SERPL-MCNC: 1.8 MG/DL (ref 1.6–2.6)
MCH RBC QN AUTO: 22.5 PG (ref 27–31)
MCHC RBC AUTO-ENTMCNC: 28.4 G/DL (ref 32–36)
MCV RBC AUTO: 79 FL (ref 82–98)
MONOCYTES # BLD AUTO: 0.7 K/UL (ref 0.3–1)
MONOCYTES NFR BLD: 6.2 % (ref 4–15)
NEUTROPHILS # BLD AUTO: 8.2 K/UL (ref 1.8–7.7)
NEUTROPHILS NFR BLD: 75.5 % (ref 38–73)
NRBC BLD-RTO: 0 /100 WBC
PHOSPHATE SERPL-MCNC: 2.5 MG/DL (ref 2.7–4.5)
PLATELET # BLD AUTO: 211 K/UL (ref 150–450)
PMV BLD AUTO: 9.3 FL (ref 9.2–12.9)
POCT GLUCOSE: 181 MG/DL (ref 70–110)
POCT GLUCOSE: 186 MG/DL (ref 70–110)
POCT GLUCOSE: 239 MG/DL (ref 70–110)
POTASSIUM SERPL-SCNC: 4.1 MMOL/L (ref 3.5–5.1)
PROCALCITONIN SERPL IA-MCNC: 1.23 NG/ML (ref 0–0.5)
PROT SERPL-MCNC: 6.8 G/DL (ref 6–8.4)
RBC # BLD AUTO: 3.25 M/UL (ref 4–5.4)
SODIUM SERPL-SCNC: 136 MMOL/L (ref 136–145)
WBC # BLD AUTO: 10.91 K/UL (ref 3.9–12.7)

## 2025-01-10 PROCEDURE — 11000001 HC ACUTE MED/SURG PRIVATE ROOM

## 2025-01-10 PROCEDURE — 63600175 PHARM REV CODE 636 W HCPCS: Performed by: NURSE PRACTITIONER

## 2025-01-10 PROCEDURE — 84100 ASSAY OF PHOSPHORUS: CPT | Performed by: NURSE PRACTITIONER

## 2025-01-10 PROCEDURE — 94799 UNLISTED PULMONARY SVC/PX: CPT

## 2025-01-10 PROCEDURE — 25000242 PHARM REV CODE 250 ALT 637 W/ HCPCS

## 2025-01-10 PROCEDURE — 99900035 HC TECH TIME PER 15 MIN (STAT)

## 2025-01-10 PROCEDURE — 25000003 PHARM REV CODE 250: Performed by: NURSE PRACTITIONER

## 2025-01-10 PROCEDURE — 86140 C-REACTIVE PROTEIN: CPT | Performed by: STUDENT IN AN ORGANIZED HEALTH CARE EDUCATION/TRAINING PROGRAM

## 2025-01-10 PROCEDURE — 83605 ASSAY OF LACTIC ACID: CPT

## 2025-01-10 PROCEDURE — 80053 COMPREHEN METABOLIC PANEL: CPT | Performed by: NURSE PRACTITIONER

## 2025-01-10 PROCEDURE — 87040 BLOOD CULTURE FOR BACTERIA: CPT | Mod: 59

## 2025-01-10 PROCEDURE — 94618 PULMONARY STRESS TESTING: CPT

## 2025-01-10 PROCEDURE — 99233 SBSQ HOSP IP/OBS HIGH 50: CPT | Mod: ,,, | Performed by: INTERNAL MEDICINE

## 2025-01-10 PROCEDURE — 83735 ASSAY OF MAGNESIUM: CPT | Performed by: NURSE PRACTITIONER

## 2025-01-10 PROCEDURE — 36415 COLL VENOUS BLD VENIPUNCTURE: CPT

## 2025-01-10 PROCEDURE — 25000003 PHARM REV CODE 250

## 2025-01-10 PROCEDURE — 94761 N-INVAS EAR/PLS OXIMETRY MLT: CPT

## 2025-01-10 PROCEDURE — 97116 GAIT TRAINING THERAPY: CPT | Mod: CQ

## 2025-01-10 PROCEDURE — 36415 COLL VENOUS BLD VENIPUNCTURE: CPT | Performed by: NURSE PRACTITIONER

## 2025-01-10 PROCEDURE — 63600175 PHARM REV CODE 636 W HCPCS

## 2025-01-10 PROCEDURE — 25000003 PHARM REV CODE 250: Performed by: STUDENT IN AN ORGANIZED HEALTH CARE EDUCATION/TRAINING PROGRAM

## 2025-01-10 PROCEDURE — 27000221 HC OXYGEN, UP TO 24 HOURS

## 2025-01-10 PROCEDURE — 84145 PROCALCITONIN (PCT): CPT

## 2025-01-10 PROCEDURE — 85025 COMPLETE CBC W/AUTO DIFF WBC: CPT | Performed by: NURSE PRACTITIONER

## 2025-01-10 RX ORDER — GUAIFENESIN 600 MG/1
600 TABLET, EXTENDED RELEASE ORAL 2 TIMES DAILY
Status: DISCONTINUED | OUTPATIENT
Start: 2025-01-10 | End: 2025-01-11 | Stop reason: HOSPADM

## 2025-01-10 RX ORDER — FLUTICASONE PROPIONATE 50 MCG
2 SPRAY, SUSPENSION (ML) NASAL DAILY
Status: DISCONTINUED | OUTPATIENT
Start: 2025-01-10 | End: 2025-01-11 | Stop reason: HOSPADM

## 2025-01-10 RX ADMIN — Medication 800 MG: at 06:01

## 2025-01-10 RX ADMIN — MUPIROCIN 1 G: 20 OINTMENT TOPICAL at 09:01

## 2025-01-10 RX ADMIN — ACETAMINOPHEN 650 MG: 325 TABLET, FILM COATED ORAL at 03:01

## 2025-01-10 RX ADMIN — GUAIFENESIN 600 MG: 600 TABLET, EXTENDED RELEASE ORAL at 08:01

## 2025-01-10 RX ADMIN — LAMOTRIGINE 150 MG: 100 TABLET ORAL at 08:01

## 2025-01-10 RX ADMIN — VALACYCLOVIR HYDROCHLORIDE 500 MG: 500 TABLET, FILM COATED ORAL at 09:01

## 2025-01-10 RX ADMIN — POTASSIUM & SODIUM PHOSPHATES POWDER PACK 280-160-250 MG 2 PACKET: 280-160-250 PACK at 06:01

## 2025-01-10 RX ADMIN — GABAPENTIN 800 MG: 400 CAPSULE ORAL at 02:01

## 2025-01-10 RX ADMIN — FLUTICASONE PROPIONATE 100 MCG: 50 SPRAY, METERED NASAL at 01:01

## 2025-01-10 RX ADMIN — INSULIN ASPART 2 UNITS: 100 INJECTION, SOLUTION INTRAVENOUS; SUBCUTANEOUS at 05:01

## 2025-01-10 RX ADMIN — ACETAMINOPHEN 650 MG: 325 TABLET, FILM COATED ORAL at 02:01

## 2025-01-10 RX ADMIN — GABAPENTIN 800 MG: 400 CAPSULE ORAL at 08:01

## 2025-01-10 RX ADMIN — ATORVASTATIN CALCIUM 20 MG: 20 TABLET, FILM COATED ORAL at 08:01

## 2025-01-10 RX ADMIN — MUPIROCIN 1 G: 20 OINTMENT TOPICAL at 08:01

## 2025-01-10 RX ADMIN — INSULIN ASPART 2 UNITS: 100 INJECTION, SOLUTION INTRAVENOUS; SUBCUTANEOUS at 08:01

## 2025-01-10 RX ADMIN — POTASSIUM & SODIUM PHOSPHATES POWDER PACK 280-160-250 MG 2 PACKET: 280-160-250 PACK at 12:01

## 2025-01-10 RX ADMIN — INSULIN ASPART 6 UNITS: 100 INJECTION, SOLUTION INTRAVENOUS; SUBCUTANEOUS at 12:01

## 2025-01-10 RX ADMIN — GUAIFENESIN 600 MG: 600 TABLET, EXTENDED RELEASE ORAL at 09:01

## 2025-01-10 RX ADMIN — FAMOTIDINE 20 MG: 20 TABLET, FILM COATED ORAL at 09:01

## 2025-01-10 RX ADMIN — Medication 800 MG: at 12:01

## 2025-01-10 RX ADMIN — GABAPENTIN 800 MG: 400 CAPSULE ORAL at 09:01

## 2025-01-10 RX ADMIN — INSULIN ASPART 1 UNITS: 100 INJECTION, SOLUTION INTRAVENOUS; SUBCUTANEOUS at 09:01

## 2025-01-10 RX ADMIN — SERTRALINE HYDROCHLORIDE 100 MG: 50 TABLET ORAL at 08:01

## 2025-01-10 RX ADMIN — SENNOSIDES AND DOCUSATE SODIUM 1 TABLET: 50; 8.6 TABLET ORAL at 08:01

## 2025-01-10 RX ADMIN — SENNOSIDES AND DOCUSATE SODIUM 1 TABLET: 50; 8.6 TABLET ORAL at 09:01

## 2025-01-10 RX ADMIN — ACETAMINOPHEN 650 MG: 325 TABLET, FILM COATED ORAL at 09:01

## 2025-01-10 RX ADMIN — LISINOPRIL 20 MG: 10 TABLET ORAL at 08:01

## 2025-01-10 RX ADMIN — PIPERACILLIN SODIUM AND TAZOBACTAM SODIUM 4.5 G: 4; .5 INJECTION, POWDER, LYOPHILIZED, FOR SOLUTION INTRAVENOUS at 02:01

## 2025-01-10 RX ADMIN — FAMOTIDINE 20 MG: 20 TABLET, FILM COATED ORAL at 08:01

## 2025-01-10 RX ADMIN — GABAPENTIN 800 MG: 400 CAPSULE ORAL at 03:01

## 2025-01-10 NOTE — PROGRESS NOTES
HPI    58 years old female with history of diabetes type II, hypertension and herpes     She was recently diagnosed with cecal mass causing significant bleeding require hospitalization and urgent transfusion.  She received 2 units packed red blood cell during the course of hospitalization.  Colonoscopy showed likely malignant tumor ascending colon nearby cecum.  CT of the abdomen pelvis contrast demonstrate concerning cecal malignancy with to right lower quadrant abnormal lymph nodes concerning for metastases with indeterminate right hepatic lobe 10 mm lesion    With ongoing bleeding patient receive surgical resection of primary malignancy on 01/07/2025 she is in the recovery phase.  Is planning on chemotherapy adjuvant after recovery.  Single liver lesion we will be ablated prior to start of chemotherapy    Past Medical History:   Diagnosis Date    Colon cancer     Diabetes mellitus     Diabetes mellitus, type 2     Encounter for blood transfusion     Herpes     Hypertension     Sleep apnea      Past Surgical History:   Procedure Laterality Date    COLONOSCOPY N/A 11/8/2024    Procedure: COLONOSCOPY;  Surgeon: Saw Wick MD;  Location: [facility];  Service: Endoscopy;  Laterality: N/A;    ESOPHAGOGASTRODUODENOSCOPY N/A 11/8/2024    Procedure: EGD (ESOPHAGOGASTRODUODENOSCOPY);  Surgeon: Saw Wick MD;  Location: [facility];  Service: Endoscopy;  Laterality: N/A;    EYE SURGERY  2002    lasix Bilateral    HYSTERECTOMY  2012    INSERTION OF TUNNELED CENTRAL VENOUS CATHETER (CVC) WITH SUBCUTANEOUS PORT Right 12/20/2024    Procedure: INSERTION, PORT-A-CATH;  Surgeon: Logan Juarez MD;  Location: [facility];  Service: General;  Laterality: Right;    LUMBAR DISCECTOMY      ROBOT-ASSISTED COLECTOMY Right 1/7/2025    Procedure: ROBOTIC COLECTOMY WITH ILEOCOLIC ANASTOMOSIS;  Surgeon: Logan Juarez MD;  Location: [facility] OR;  Service: General;  Laterality: Right;    WISDOM TOOTH EXTRACTION       Social History      Socioeconomic History    Marital status: Single   Occupational History    Occupation: supply chain   Tobacco Use    Smoking status: Never    Smokeless tobacco: Never   Substance and Sexual Activity    Alcohol use: No    Drug use: No    Sexual activity: Not Currently     Social Drivers of Health     Financial Resource Strain: Low Risk  (1/6/2025)    Overall Financial Resource Strain (CARDIA)     Difficulty of Paying Living Expenses: Not hard at all   Food Insecurity: No Food Insecurity (1/6/2025)    Hunger Vital Sign     Worried About Running Out of Food in the Last Year: Never true     Ran Out of Food in the Last Year: Never true   Transportation Needs: No Transportation Needs (1/6/2025)    TRANSPORTATION NEEDS     Transportation : No   Physical Activity: Insufficiently Active (11/26/2024)    Exercise Vital Sign     Days of Exercise per Week: 1 day     Minutes of Exercise per Session: 20 min   Stress: No Stress Concern Present (1/6/2025)    [place] [employer] - Occupational Stress Questionnaire     Feeling of Stress : Not at all   Recent Concern: Stress - Stress Concern Present (11/26/2024)    [place] [employer] - Occupational Stress Questionnaire     Feeling of Stress : Rather much   Housing Stability: Low Risk  (1/6/2025)    Housing Stability Vital Sign     Unable to Pay for Housing in the Last Year: No     Homeless in the Last Year: No     Review of patient's allergies indicates:   Allergen Reactions    Robaxin [methocarbamol] Nausea Only       Physical exam  Vitals:    01/10/25 1123   BP: (!) 144/67   Pulse: 89   Resp: 18   Temp: 99.4 °F (37.4 °C)       Lab Results   Component Value Date    WBC 10.91 01/10/2025    HGB 7.3 (L) 01/10/2025    HCT 25.7 (L) 01/10/2025    MCV 79 (L) 01/10/2025     01/10/2025       CMP  Sodium   Date Value Ref Range Status   01/10/2025 136 136 - 145 mmol/L Final     Potassium   Date Value Ref Range Status   01/10/2025 4.1 3.5 - 5.1  mmol/L Final     Chloride   Date Value Ref Range Status   01/10/2025 103 95 - 110 mmol/L Final     CO2   Date Value Ref Range Status   01/10/2025 25 23 - 29 mmol/L Final     Glucose   Date Value Ref Range Status   01/10/2025 172 (H) 70 - 110 mg/dL Final     BUN   Date Value Ref Range Status   01/10/2025 9 6 - 20 mg/dL Final     Creatinine   Date Value Ref Range Status   01/10/2025 0.8 0.5 - 1.4 mg/dL Final     Calcium   Date Value Ref Range Status   01/10/2025 9.0 8.7 - 10.5 mg/dL Final     Total Protein   Date Value Ref Range Status   01/10/2025 6.8 6.0 - 8.4 g/dL Final     Albumin   Date Value Ref Range Status   01/10/2025 3.0 (L) 3.5 - 5.2 g/dL Final     Total Bilirubin   Date Value Ref Range Status   01/10/2025 1.1 (H) 0.1 - 1.0 mg/dL Final     Comment:     For infants and newborns, interpretation of results should be based  on gestational age, weight and in agreement with clinical  observations.    Premature Infant recommended reference ranges:  Up to 24 hours.............<8.0 mg/dL  Up to 48 hours............<12.0 mg/dL  3-5 days..................<15.0 mg/dL  6-29 days.................<15.0 mg/dL       Alkaline Phosphatase   Date Value Ref Range Status   01/10/2025 112 40 - 150 U/L Final     AST   Date Value Ref Range Status   01/10/2025 33 10 - 40 U/L Final     ALT   Date Value Ref Range Status   01/10/2025 25 10 - 44 U/L Final     Anion Gap   Date Value Ref Range Status   01/10/2025 8 8 - 16 mmol/L Final     eGFR   Date Value Ref Range Status   01/10/2025 >60 >60 mL/min/1.73 m^2 Final     Assessment plan    Stage IV colon cancer right-side with Oligo Mets to liver.    Symptomatic anemia secondary to malignancy site    Status post surgical resection of primary colon cancer.    Developed fever postop currently on IV antibiotics    Status post surgical day 3.   She will remain in the hospital till fever resolve.      > plan start on adjuvant FOLFOX within 4 weeks of surgical resection  > we will follow up on  surgical pathology  > liver lesion will be ablated and patient will be followed by Dr. Henry  > Oncology will continue follow patient.

## 2025-01-10 NOTE — ASSESSMENT & PLAN NOTE
Anemia is likely due to macrocytic.  Most recent hemoglobin and hematocrit are listed below.  Recent Labs     01/08/25  0317 01/09/25  0423 01/10/25  0422   HGB 7.9* 8.2* 7.3*   HCT 27.2* 28.6* 25.7*       Plan  - Monitor serial CBC: Daily  - Transfuse PRBC if patient becomes hemodynamically unstable, symptomatic or H/H drops below 7/21.  - Patient has received 2 units of PRBCs on 01/06/2025  - Patient's anemia is currently worsening. Will continue current treatment  - hold anticoagulation for DVT prophylaxis   -SCDs

## 2025-01-10 NOTE — PT/OT/SLP PROGRESS
Physical Therapy Treatment    Patient Name:  Nik Lowery   MRN:  [id number]    Recommendations:     Discharge Recommendations: Low Intensity Therapy  Discharge Equipment Recommendations: to be determined by next level of care  Barriers to discharge: None    Assessment:     Nik Lowery is a 59 y.o. female admitted with a medical diagnosis of Symptomatic anemia.  She presents with the following impairments/functional limitations: weakness, impaired endurance, impaired self care skills, impaired functional mobility, gait instability, pain, impaired cardiopulmonary response to activity . Awake, alert, supine in bed.  Agreed to mobilize.  Sup > sit with Min A ( abdominal discomfort).   Sit > Stand with rw and CGA.  Ambulated 125' x 2 with rw and CGA, seated rest between each , O2 in tow.  Declined up in chair, requested a nap. Returned to bed.     Rehab Prognosis: Good; patient would benefit from acute skilled PT services to address these deficits and reach maximum level of function.    Recent Surgery: Procedure(s) (LRB):  ROBOTIC COLECTOMY WITH ILEOCOLIC ANASTOMOSIS (Right) 3 Days Post-Op    Plan:     During this hospitalization, patient to be seen 6 x/week to address the identified rehab impairments via gait training, therapeutic activities, therapeutic exercises and progress toward the following goals:    Plan of Care Expires:  02/08/25    Subjective     Chief Complaint: abdominal pain with movement  Patient/Family Comments/goals: to return home  Pain/Comfort:  Pain Rating 1: other (see comments) (did not rate)  Pain Addressed 1: Reposition, Nurse notified      Objective:     Communicated with nurse Dyer prior to session.  Patient found supine with bed alarm, oxygen, telemetry upon PT entry to room.     General Precautions: Standard, fall  Orthopedic Precautions: N/A  Braces:    Respiratory Status: Nasal cannula, flow 3 L/min     Functional Mobility:  Bed Mobility:     Supine to Sit: minimum assistance  Sit to  Supine: contact guard assistance  Transfers:     Sit to Stand:  contact guard assistance with rolling walker  Gait: 125' x 2 with rw and CGA, O2 in tow, seated rest       AM-PAC 6 CLICK MOBILITY          Treatment & Education:  Ambulated 125' x 2 with rw and CGA.     Patient left supine with all lines intact, call button in reach, bed alarm on, and nurse Manisha notified..    GOALS:   Multidisciplinary Problems       Physical Therapy Goals          Problem: Physical Therapy    Goal Priority Disciplines Outcome Interventions   Physical Therapy Goal     PT, PT/OT Progressing    Description: Goals to be met by: 25     Patient will increase functional independence with mobility by performin. Supine to sit with Stand-by Assistance  2. Sit to supine with Stand-by Assistance  3. Sit to stand transfer with Stand-by Assistance  4. Gait  x 150 feet with Stand-by Assistance using Rolling Walker.                          Time Tracking:     PT Received On: 01/10/25  PT Start Time: 936     PT Stop Time: 948  PT Total Time (min): 12 min     Billable Minutes: Gait Training 12min    Treatment Type: Treatment  PT/PTA: PTA     Number of PTA visits since last PT visit: 1     01/10/2025

## 2025-01-10 NOTE — RESPIRATORY THERAPY
01/10/25 1057   Home Oxygen Qualification   $ Home O2 Qualification Pulmonary Stress Test/6 min walk;Tech time 15 minutes   Room Air SpO2 At Rest 94 %   Room Air SpO2 During Ambulation 92 %   Heart Rate on O2 91 bpm   SpO2 Post Ambulation 93 %   Post Ambulation Heart Rate 100 bpm   Home O2 Eval Comments pt does not qualify for home O2

## 2025-01-10 NOTE — ASSESSMENT & PLAN NOTE
P.r.n. antipyretic  CXR unremarkable  Encourage IS  Encouraged mobility  Cough and deep breathe  Procalcitonin elevated 1.23  Follow Blood culture   Broad-spectrum antibiotics: Zosyn

## 2025-01-10 NOTE — ASSESSMENT & PLAN NOTE
Chronic problem   Patient's FSGs are uncontrolled due to hyperglycemia on current medication regimen.  Last A1c reviewed-   Lab Results   Component Value Date    HGBA1C 6.5 (H) 11/07/2024     Most recent fingerstick glucose reviewed-   Recent Labs   Lab 01/09/25  1234 01/09/25  2142 01/10/25  0741 01/10/25  1108   POCTGLUCOSE 187* 255* 181* 239*       Current correctional scale  Medium  Maintain anti-hyperglycemic dose as follows-   Antihyperglycemics (From admission, onward)    Start     Stop Route Frequency Ordered    01/06/25 2238  insulin aspart U-100 pen 1-10 Units         -- SubQ Before meals & nightly PRN 01/06/25 2139        Hold Oral hypoglycemics while patient is in the hospital.

## 2025-01-10 NOTE — PROGRESS NOTES
[facility] Medicine  Progress Note    Patient Name: Nik Lowery  MRN: [id number]  Patient Class: IP- Inpatient   Admission Date: 1/6/2025  Length of Stay: 3 days  Attending Physician: Finn Herring MD  Primary Care Provider: Osmin Newsome MD        Subjective     Principal Problem:Symptomatic anemia        HPI:  Nik Lowery is a 59 year female who presents emergency room for evaluation of fatigue, weakness, and exertional dyspnea.  She has a history of colon cancer.  She is currently in workup to determine chemo versus colectomy.  She reports continued anemia.  The symptoms onset yesterday and progressively worsened.  Denies fever chills.  No known sick contacts or travel.  Previous medical history includes hypertension, diabetes, colon cancer with liver Mets.  ER workup: CBC with H&H of 6.2/22.  CMP with glucose of 208.  Patient admitted to Hospital Medicine for treatment and management.  Will transfuse patient with 2 units of packed red cells tonight.                    Overview/Hospital Course:  Nik Lowery was closely monitored while in the hospital.  Patient was admitted to Hospital Medicine for symptomatic anemia.  She was transfused 2 units of packed red blood cells with improvement in hemoglobin above transfusion threshold.  She has remained hemodynamically stable.  Hematology Oncology and General surgery were consulted for evaluation for colectomy, which was performed 01/07/2020 with no complications.  Postop day 1 patient had slight leukocytosis.  Postoperatively on day 2 patient developed a fever of 103.1 with resolution of leukocytosis.  Patient was requiring 3 L of oxygen via nasal cannula during hospitalization.  Patient was encouraged to use incentive spirometer and set up with meals.  CXR no cardiopulmonary processes noted.  Patient's diet was advanced to low residue diet which she tolerated well, per General surgery.  Patient worked with PT/OT while  hospitalized, low intensity therapy was recommended.  Postop day 3 patient continued to have fever of 101.  Blood culture sent.  Patient  started on Zosyn for broad-spectrum coverage.  Procalcitonin elevated at 1.23.  .1.  Oxygen evaluation ordered.  .    Interval History:   Patient seen and examined.  Tolerating were residue diet.  Patient had T-max of 101 throughout the night.  Procalcitonin elevated at 1.23.  Patient initiated on broad-spectrum antibiotics.  Patient requiring 3 L of oxygen via nasal cannula.      Review of Systems   Constitutional:  Positive for activity change and fever. Negative for chills and diaphoresis.   Respiratory:  Negative for cough, chest tightness and shortness of breath.    Cardiovascular:  Negative for chest pain.   Gastrointestinal:  Positive for abdominal pain (Expected postoperative tenderness).   Genitourinary: Negative.    Musculoskeletal: Negative.    Neurological:  Positive for weakness (generalized). Negative for dizziness, facial asymmetry and light-headedness.   Psychiatric/Behavioral: Negative.       Objective:     Vital Signs (Most Recent):  Temp: 99.4 °F (37.4 °C) (01/10/25 1123)  Pulse: 89 (01/10/25 1123)  Resp: 18 (01/10/25 1123)  BP: (!) 144/67 (01/10/25 1123)  SpO2: (!) 91 % (01/10/25 1123) Vital Signs (24h Range):  Temp:  [98.1 °F (36.7 °C)-101 °F (38.3 °C)] 99.4 °F (37.4 °C)  Pulse:  [81-99] 89  Resp:  [16-18] 18  SpO2:  [91 %-99 %] 91 %  BP: (137-155)/(62-79) 144/67     Weight: 99.8 kg (220 lb 0.3 oz)  Body mass index is 36.61 kg/m².    Intake/Output Summary (Last 24 hours) at 1/10/2025 1202  Last data filed at 1/10/2025 0335  Gross per 24 hour   Intake 0 ml   Output 700 ml   Net -700 ml         Physical Exam  Vitals and nursing note reviewed. Exam conducted with a chaperone present.   Constitutional:       Appearance: Normal appearance. She is obese.   HENT:      Head: Normocephalic and atraumatic.      Mouth/Throat:      Mouth: Mucous membranes are  "moist.   Cardiovascular:      Rate and Rhythm: Normal rate and regular rhythm.      Pulses: Normal pulses.      Heart sounds: Normal heart sounds. No murmur heard.  Pulmonary:      Effort: Pulmonary effort is normal.      Breath sounds: Normal breath sounds.   Abdominal:      General: Bowel sounds are normal. There is no distension.      Palpations: Abdomen is soft.      Tenderness: There is abdominal tenderness.   Musculoskeletal:         General: Normal range of motion.      Right lower leg: No edema.      Left lower leg: No edema.   Skin:     General: Skin is warm and dry.   Neurological:      Mental Status: She is alert and oriented to person, place, and time.   Psychiatric:         Mood and Affect: Mood normal.         Behavior: Behavior normal.         Thought Content: Thought content normal.         Judgment: Judgment normal.             Significant Labs: All pertinent labs within the past 24 hours have been reviewed.  Blood Culture: No results for input(s): "LABBLOO" in the last 48 hours.  CBC:   Recent Labs   Lab 01/09/25  0423 01/10/25  0422   WBC 12.18 10.91   HGB 8.2* 7.3*   HCT 28.6* 25.7*    211     CMP:   Recent Labs   Lab 01/09/25  0423 01/10/25  0422   * 136   K 4.5 4.1    103   CO2 20* 25   * 172*   BUN 13 9   CREATININE 0.9 0.8   CALCIUM 9.0 9.0   PROT 7.2 6.8   ALBUMIN 3.3* 3.0*   BILITOT 1.2* 1.1*   ALKPHOS 75 112   AST 24 33   ALT 21 25   ANIONGAP 10 8       Magnesium:   Recent Labs   Lab 01/09/25  0423 01/10/25  0422   MG 2.2 1.8     Procal- 1.23  Lab Results   Component Value Date    .1 (H) 01/10/2025       Significant Imaging: I have reviewed all pertinent imaging results/findings within the past 24 hours.  Imaging Results    None          Assessment and Plan     * Symptomatic anemia  Anemia is likely due to macrocytic.  Most recent hemoglobin and hematocrit are listed below.  Recent Labs     01/08/25  0317 01/09/25  0423 01/10/25  0422   HGB 7.9* 8.2* 7.3* "   HCT 27.2* 28.6* 25.7*       Plan  - Monitor serial CBC: Daily  - Transfuse PRBC if patient becomes hemodynamically unstable, symptomatic or H/H drops below 7/21.  - Patient has received 2 units of PRBCs on 01/06/2025  - Patient's anemia is currently worsening. Will continue current treatment  - hold anticoagulation for DVT prophylaxis   -SCDs    Fever  P.r.n. antipyretic  CXR unremarkable  Encourage IS  Encouraged mobility  Cough and deep breathe  Procalcitonin elevated 1.23  Follow Blood culture   Broad-spectrum antibiotics: Zosyn        Malignant neoplasm of ascending colon  Chronic problem   History noted  Hematology Oncology following  General surgery following  S/P hemicolectomy 1/7/2024      Obesity  Body mass index is 37.05 kg/m². Morbid obesity complicates all aspects of disease management from diagnostic modalities to treatment. Weight loss encouraged and health benefits explained to patient.         Gastroesophageal reflux disease without esophagitis  Chronic  History noted  Pepcid BID       Anxiety and depression  Chronic.  Noted.    Resume home medications.        Hypertension associated with diabetes  Patients blood pressure range in the last 24 hours was: BP  Min: 91/52  Max: 170/76.The patient's inpatient anti-hypertensive regimen is listed below:  Current Antihypertensives  furosemide injection 20 mg, As needed (PRN), Intravenous  lisinopriL tablet 20 mg, Daily, Oral    Plan  - BP is controlled, no changes needed to their regimen  - Resumed Home medications        Uncontrolled type 2 diabetes mellitus with hyperglycemia, without long-term current use of insulin  Chronic problem   Patient's FSGs are uncontrolled due to hyperglycemia on current medication regimen.  Last A1c reviewed-   Lab Results   Component Value Date    HGBA1C 6.5 (H) 11/07/2024     Most recent fingerstick glucose reviewed-   Recent Labs   Lab 01/09/25  1234 01/09/25  2142 01/10/25  0741 01/10/25  1108   POCTGLUCOSE 187* 255* 181*  239*       Current correctional scale  Medium  Maintain anti-hyperglycemic dose as follows-   Antihyperglycemics (From admission, onward)      Start     Stop Route Frequency Ordered    01/06/25 2238  insulin aspart U-100 pen 1-10 Units         -- SubQ Before meals & nightly PRN 01/06/25 2139          Hold Oral hypoglycemics while patient is in the hospital.         VTE Risk Mitigation (From admission, onward)           Ordered     Place PRISCILLA hose  Until discontinued         01/06/25 2139     IP VTE HIGH RISK PATIENT  Once         01/06/25 2139     Place sequential compression device  Until discontinued         01/06/25 2139                    Discharge Planning   KARON: 1/12/2025     Code Status: Full Code   Medical Readiness for Discharge Date:   Discharge Plan A: Home with family                        Jodie Sanchez NP  Department of Hospital Medicine   [facility]/Surg

## 2025-01-10 NOTE — PLAN OF CARE
Problem: Adult Inpatient Plan of Care  Goal: Plan of Care Review  Outcome: Progressing  Goal: Optimal Comfort and Wellbeing  Outcome: Progressing     Problem: Infection  Goal: Absence of Infection Signs and Symptoms  Outcome: Progressing     Problem: Diabetes Comorbidity  Goal: Blood Glucose Level Within Targeted Range  Outcome: Progressing     Problem: Wound  Goal: Optimal Coping  Outcome: Progressing  Goal: Optimal Pain Control and Function  Outcome: Progressing  Goal: Skin Health and Integrity  Outcome: Progressing

## 2025-01-10 NOTE — SUBJECTIVE & OBJECTIVE
Interval History:   Patient seen and examined.  Tolerating were residue diet.  Patient had T-max of 101 throughout the night.  Procalcitonin elevated at 1.23.  Patient initiated on broad-spectrum antibiotics.  Patient requiring 3 L of oxygen via nasal cannula.      Review of Systems   Constitutional:  Positive for activity change and fever. Negative for chills and diaphoresis.   Respiratory:  Negative for cough, chest tightness and shortness of breath.    Cardiovascular:  Negative for chest pain.   Gastrointestinal:  Positive for abdominal pain (Expected postoperative tenderness).   Genitourinary: Negative.    Musculoskeletal: Negative.    Neurological:  Positive for weakness (generalized). Negative for dizziness, facial asymmetry and light-headedness.   Psychiatric/Behavioral: Negative.       Objective:     Vital Signs (Most Recent):  Temp: 99.4 °F (37.4 °C) (01/10/25 1123)  Pulse: 89 (01/10/25 1123)  Resp: 18 (01/10/25 1123)  BP: (!) 144/67 (01/10/25 1123)  SpO2: (!) 91 % (01/10/25 1123) Vital Signs (24h Range):  Temp:  [98.1 °F (36.7 °C)-101 °F (38.3 °C)] 99.4 °F (37.4 °C)  Pulse:  [81-99] 89  Resp:  [16-18] 18  SpO2:  [91 %-99 %] 91 %  BP: (137-155)/(62-79) 144/67     Weight: 99.8 kg (220 lb 0.3 oz)  Body mass index is 36.61 kg/m².    Intake/Output Summary (Last 24 hours) at 1/10/2025 1202  Last data filed at 1/10/2025 0335  Gross per 24 hour   Intake 0 ml   Output 700 ml   Net -700 ml         Physical Exam  Vitals and nursing note reviewed. Exam conducted with a chaperone present.   Constitutional:       Appearance: Normal appearance. She is obese.   HENT:      Head: Normocephalic and atraumatic.      Mouth/Throat:      Mouth: Mucous membranes are moist.   Cardiovascular:      Rate and Rhythm: Normal rate and regular rhythm.      Pulses: Normal pulses.      Heart sounds: Normal heart sounds. No murmur heard.  Pulmonary:      Effort: Pulmonary effort is normal.      Breath sounds: Normal breath sounds.  "  Abdominal:      General: Bowel sounds are normal. There is no distension.      Palpations: Abdomen is soft.      Tenderness: There is abdominal tenderness.   Musculoskeletal:         General: Normal range of motion.      Right lower leg: No edema.      Left lower leg: No edema.   Skin:     General: Skin is warm and dry.   Neurological:      Mental Status: She is alert and oriented to person, place, and time.   Psychiatric:         Mood and Affect: Mood normal.         Behavior: Behavior normal.         Thought Content: Thought content normal.         Judgment: Judgment normal.             Significant Labs: All pertinent labs within the past 24 hours have been reviewed.  Blood Culture: No results for input(s): "LABBLOO" in the last 48 hours.  CBC:   Recent Labs   Lab 01/09/25  0423 01/10/25  0422   WBC 12.18 10.91   HGB 8.2* 7.3*   HCT 28.6* 25.7*    211     CMP:   Recent Labs   Lab 01/09/25 0423 01/10/25  0422   * 136   K 4.5 4.1    103   CO2 20* 25   * 172*   BUN 13 9   CREATININE 0.9 0.8   CALCIUM 9.0 9.0   PROT 7.2 6.8   ALBUMIN 3.3* 3.0*   BILITOT 1.2* 1.1*   ALKPHOS 75 112   AST 24 33   ALT 21 25   ANIONGAP 10 8       Magnesium:   Recent Labs   Lab 01/09/25  0423 01/10/25  0422   MG 2.2 1.8     Procal- 1.23  Lab Results   Component Value Date    .1 (H) 01/10/2025       Significant Imaging: I have reviewed all pertinent imaging results/findings within the past 24 hours.  Imaging Results    None        "

## 2025-01-10 NOTE — PLAN OF CARE
Problem: Physical Therapy  Goal: Physical Therapy Goal  Description: Goals to be met by: 25     Patient will increase functional independence with mobility by performin. Supine to sit with Stand-by Assistance  2. Sit to supine with Stand-by Assistance  3. Sit to stand transfer with Stand-by Assistance  4. Gait  x 150 feet with Stand-by Assistance using Rolling Walker.     Outcome: Progressing   Ambulate with rw and assistance for safety.

## 2025-01-11 VITALS
OXYGEN SATURATION: 94 % | WEIGHT: 220 LBS | TEMPERATURE: 98 F | SYSTOLIC BLOOD PRESSURE: 132 MMHG | HEART RATE: 86 BPM | DIASTOLIC BLOOD PRESSURE: 63 MMHG | BODY MASS INDEX: 36.65 KG/M2 | HEIGHT: 65 IN | RESPIRATION RATE: 18 BRPM

## 2025-01-11 LAB
ALBUMIN SERPL BCP-MCNC: 2.9 G/DL (ref 3.5–5.2)
ALP SERPL-CCNC: 141 U/L (ref 40–150)
ALT SERPL W/O P-5'-P-CCNC: 42 U/L (ref 10–44)
ANION GAP SERPL CALC-SCNC: 10 MMOL/L (ref 8–16)
AST SERPL-CCNC: 43 U/L (ref 10–40)
BASOPHILS # BLD AUTO: 0.04 K/UL (ref 0–0.2)
BASOPHILS NFR BLD: 0.5 % (ref 0–1.9)
BILIRUB SERPL-MCNC: 0.7 MG/DL (ref 0.1–1)
BUN SERPL-MCNC: 9 MG/DL (ref 6–20)
CALCIUM SERPL-MCNC: 9 MG/DL (ref 8.7–10.5)
CHLORIDE SERPL-SCNC: 104 MMOL/L (ref 95–110)
CO2 SERPL-SCNC: 23 MMOL/L (ref 23–29)
CREAT SERPL-MCNC: 0.8 MG/DL (ref 0.5–1.4)
CRP SERPL-MCNC: 102.7 MG/L (ref 0–8.2)
DIFFERENTIAL METHOD BLD: ABNORMAL
EOSINOPHIL # BLD AUTO: 0.4 K/UL (ref 0–0.5)
EOSINOPHIL NFR BLD: 5.2 % (ref 0–8)
ERYTHROCYTE [DISTWIDTH] IN BLOOD BY AUTOMATED COUNT: 19.2 % (ref 11.5–14.5)
EST. GFR  (NO RACE VARIABLE): >60 ML/MIN/1.73 M^2
GLUCOSE SERPL-MCNC: 174 MG/DL (ref 70–110)
HCT VFR BLD AUTO: 25.2 % (ref 37–48.5)
HGB BLD-MCNC: 7.3 G/DL (ref 12–16)
IMM GRANULOCYTES # BLD AUTO: 0.06 K/UL (ref 0–0.04)
IMM GRANULOCYTES NFR BLD AUTO: 0.7 % (ref 0–0.5)
LYMPHOCYTES # BLD AUTO: 1.7 K/UL (ref 1–4.8)
LYMPHOCYTES NFR BLD: 20.6 % (ref 18–48)
MAGNESIUM SERPL-MCNC: 1.8 MG/DL (ref 1.6–2.6)
MCH RBC QN AUTO: 22.6 PG (ref 27–31)
MCHC RBC AUTO-ENTMCNC: 29 G/DL (ref 32–36)
MCV RBC AUTO: 78 FL (ref 82–98)
MONOCYTES # BLD AUTO: 0.6 K/UL (ref 0.3–1)
MONOCYTES NFR BLD: 6.8 % (ref 4–15)
NEUTROPHILS # BLD AUTO: 5.4 K/UL (ref 1.8–7.7)
NEUTROPHILS NFR BLD: 66.2 % (ref 38–73)
NRBC BLD-RTO: 0 /100 WBC
PHOSPHATE SERPL-MCNC: 3.6 MG/DL (ref 2.7–4.5)
PLATELET # BLD AUTO: 223 K/UL (ref 150–450)
PMV BLD AUTO: 9.4 FL (ref 9.2–12.9)
POCT GLUCOSE: 161 MG/DL (ref 70–110)
POCT GLUCOSE: 191 MG/DL (ref 70–110)
POTASSIUM SERPL-SCNC: 3.8 MMOL/L (ref 3.5–5.1)
PROT SERPL-MCNC: 6.7 G/DL (ref 6–8.4)
RBC # BLD AUTO: 3.23 M/UL (ref 4–5.4)
SODIUM SERPL-SCNC: 137 MMOL/L (ref 136–145)
WBC # BLD AUTO: 8.22 K/UL (ref 3.9–12.7)

## 2025-01-11 PROCEDURE — 85025 COMPLETE CBC W/AUTO DIFF WBC: CPT | Performed by: NURSE PRACTITIONER

## 2025-01-11 PROCEDURE — 25000003 PHARM REV CODE 250: Performed by: STUDENT IN AN ORGANIZED HEALTH CARE EDUCATION/TRAINING PROGRAM

## 2025-01-11 PROCEDURE — 63600175 PHARM REV CODE 636 W HCPCS

## 2025-01-11 PROCEDURE — 80053 COMPREHEN METABOLIC PANEL: CPT | Performed by: NURSE PRACTITIONER

## 2025-01-11 PROCEDURE — 94761 N-INVAS EAR/PLS OXIMETRY MLT: CPT

## 2025-01-11 PROCEDURE — 36415 COLL VENOUS BLD VENIPUNCTURE: CPT | Performed by: NURSE PRACTITIONER

## 2025-01-11 PROCEDURE — 84100 ASSAY OF PHOSPHORUS: CPT | Performed by: NURSE PRACTITIONER

## 2025-01-11 PROCEDURE — 94799 UNLISTED PULMONARY SVC/PX: CPT

## 2025-01-11 PROCEDURE — 25000003 PHARM REV CODE 250

## 2025-01-11 PROCEDURE — 25000003 PHARM REV CODE 250: Performed by: NURSE PRACTITIONER

## 2025-01-11 PROCEDURE — 86140 C-REACTIVE PROTEIN: CPT | Performed by: STUDENT IN AN ORGANIZED HEALTH CARE EDUCATION/TRAINING PROGRAM

## 2025-01-11 PROCEDURE — 83735 ASSAY OF MAGNESIUM: CPT | Performed by: NURSE PRACTITIONER

## 2025-01-11 RX ORDER — HYDROCODONE BITARTRATE AND ACETAMINOPHEN 5; 325 MG/1; MG/1
1 TABLET ORAL EVERY 6 HOURS PRN
Qty: 20 TABLET | Refills: 0 | Status: SHIPPED | OUTPATIENT
Start: 2025-01-11

## 2025-01-11 RX ORDER — AMOXICILLIN AND CLAVULANATE POTASSIUM 875; 125 MG/1; MG/1
1 TABLET, FILM COATED ORAL EVERY 12 HOURS
Qty: 10 TABLET | Refills: 0 | Status: SHIPPED | OUTPATIENT
Start: 2025-01-11 | End: 2025-01-16

## 2025-01-11 RX ADMIN — LISINOPRIL 20 MG: 10 TABLET ORAL at 08:01

## 2025-01-11 RX ADMIN — PIPERACILLIN SODIUM AND TAZOBACTAM SODIUM 4.5 G: 4; .5 INJECTION, POWDER, LYOPHILIZED, FOR SOLUTION INTRAVENOUS at 02:01

## 2025-01-11 RX ADMIN — HYDROCODONE BITARTRATE AND ACETAMINOPHEN 1 TABLET: 10; 325 TABLET ORAL at 03:01

## 2025-01-11 RX ADMIN — ATORVASTATIN CALCIUM 20 MG: 20 TABLET, FILM COATED ORAL at 08:01

## 2025-01-11 RX ADMIN — GABAPENTIN 800 MG: 400 CAPSULE ORAL at 08:01

## 2025-01-11 RX ADMIN — SENNOSIDES AND DOCUSATE SODIUM 1 TABLET: 50; 8.6 TABLET ORAL at 08:01

## 2025-01-11 RX ADMIN — MUPIROCIN 1 G: 20 OINTMENT TOPICAL at 09:01

## 2025-01-11 RX ADMIN — INSULIN ASPART 2 UNITS: 100 INJECTION, SOLUTION INTRAVENOUS; SUBCUTANEOUS at 08:01

## 2025-01-11 RX ADMIN — LAMOTRIGINE 150 MG: 100 TABLET ORAL at 08:01

## 2025-01-11 RX ADMIN — GUAIFENESIN 600 MG: 600 TABLET, EXTENDED RELEASE ORAL at 08:01

## 2025-01-11 RX ADMIN — VALACYCLOVIR HYDROCHLORIDE 500 MG: 500 TABLET, FILM COATED ORAL at 08:01

## 2025-01-11 RX ADMIN — FAMOTIDINE 20 MG: 20 TABLET, FILM COATED ORAL at 08:01

## 2025-01-11 RX ADMIN — SERTRALINE HYDROCHLORIDE 100 MG: 50 TABLET ORAL at 08:01

## 2025-01-11 NOTE — NURSING
Upon discharge, patient is AAOx4, no cardiac or respiratory complications. Follow up care and  Medications have been reviewed with patient and has been instructed to return to the ER if needed. Patient verbalized understanding and ambulated to the lobby without difficulty. KATHRYN BOWLING.  AVS has been signed by patient in the computer.

## 2025-01-11 NOTE — DISCHARGE SUMMARY
[facility] Medicine  Discharge Summary      Patient Name: Nik Lowery  MRN: [id number]  [facility]: [id number]  Patient Class: IP- Inpatient  Admission Date: 1/6/2025  Hospital Length of Stay: 4 days  Discharge Date and Time: 1/11/2025 11:37 AM  Attending Physician: Audrey Moyer MD  Discharging Provider: RAMYA Baker  Primary Care Provider: Osmin Newsome MD    Primary Care Team: Networked reference to record PCT     HPI:   Nik Lowery is a 59 year female who presents emergency room for evaluation of fatigue, weakness, and exertional dyspnea.  She has a history of colon cancer.  She is currently in workup to determine chemo versus colectomy.  She reports continued anemia.  The symptoms onset yesterday and progressively worsened.  Denies fever chills.  No known sick contacts or travel.  Previous medical history includes hypertension, diabetes, colon cancer with liver Mets.  ER workup: CBC with H&H of 6.2/22.  CMP with glucose of 208.  Patient admitted to Hospital Medicine for treatment and management.  Will transfuse patient with 2 units of packed red cells tonight.                    Procedure(s) (LRB):  ROBOTIC COLECTOMY WITH ILEOCOLIC ANASTOMOSIS (Right)      Hospital Course:   Nik Lowery was closely monitored while in the hospital.  Patient was admitted to Hospital Medicine for symptomatic anemia.  She was transfused 2 units of packed red blood cells with improvement in hemoglobin above transfusion threshold.  She has remained hemodynamically stable.  Hematology/Oncology and General surgery were consulted for evaluation for colectomy, which was performed 01/07/2025.  Postop day 1 patient had slight leukocytosis.  Postoperatively on day 2 patient developed a fever of 103.1 with resolution of leukocytosis.  Patient was requiring 3 L of oxygen via nasal cannula during hospitalization.  Patient was encouraged to use incentive spirometer and sit up with meals.  CXR without  acute cardiopulmonary processes noted.  Blood cultures NGTD.  She was started on zosyn empirically.  Patient's diet was advanced to low residue diet which she tolerated well.  Patient worked with PT/OT while hospitalized, low intensity therapy was recommended.  Oxygen evaluation was ordered and she did not qualify for home O2.  She has remained afebrile since 1/9/25.  She reports feeling well and is eager for discharge home.  Plan of care discussed with patient and she verbalized understanding.  Patient was seen and examined on the day of discharge.     Goals of Care Treatment Preferences:  Code Status: Full Code    Living Will: Yes              SDOH Screening:  The patient was screened for utility difficulties, food insecurity, transport difficulties, housing insecurity, and interpersonal safety and there were no concerns identified this admission.     Consults:   Consults (From admission, onward)          Status Ordering Provider     Inpatient consult to General Surgery  Once        Provider:  Dinh Webber III, MD    Completed SCARLET ESTRADA     Inpatient consult to Hematology/Oncology  Once        Provider:  Amarjit Crawford MD    Acknowledged SCARLET ESTRADA            * Symptomatic anemia  Anemia is likely due to macrocytic.  Most recent hemoglobin and hematocrit are listed below.  Recent Labs     01/08/25  0317 01/09/25  0423 01/10/25  0422   HGB 7.9* 8.2* 7.3*   HCT 27.2* 28.6* 25.7*       Plan  - Monitor serial CBC: Daily  - Transfuse PRBC if patient becomes hemodynamically unstable, symptomatic or H/H drops below 7/21.  - Patient has received 2 units of PRBCs on 01/06/2025  - Patient's anemia is currently worsening. Will continue current treatment  - hold anticoagulation for DVT prophylaxis   -SCDs    Fever  P.r.n. antipyretic  CXR unremarkable  Encourage IS  Encouraged mobility  Cough and deep breathe  Procalcitonin elevated 1.23  Follow Blood culture   Broad-spectrum antibiotics: Zosyn        Malignant  neoplasm of ascending colon  Chronic problem   History noted  Hematology Oncology following  General surgery following  S/P hemicolectomy 1/7/2024      Obesity  Body mass index is 37.05 kg/m². Morbid obesity complicates all aspects of disease management from diagnostic modalities to treatment. Weight loss encouraged and health benefits explained to patient.         Gastroesophageal reflux disease without esophagitis  Chronic  History noted  Pepcid BID       Anxiety and depression  Chronic.  Noted.    Resume home medications.        Hypertension associated with diabetes  Patients blood pressure range in the last 24 hours was: BP  Min: 91/52  Max: 170/76.The patient's inpatient anti-hypertensive regimen is listed below:  Current Antihypertensives  furosemide injection 20 mg, As needed (PRN), Intravenous  lisinopriL tablet 20 mg, Daily, Oral    Plan  - BP is controlled, no changes needed to their regimen  - Resumed Home medications        Uncontrolled type 2 diabetes mellitus with hyperglycemia, without long-term current use of insulin  Chronic problem   Patient's FSGs are uncontrolled due to hyperglycemia on current medication regimen.  Last A1c reviewed-   Lab Results   Component Value Date    HGBA1C 6.5 (H) 11/07/2024     Most recent fingerstick glucose reviewed-   Recent Labs   Lab 01/09/25  1234 01/09/25  2142 01/10/25  0741 01/10/25  1108   POCTGLUCOSE 187* 255* 181* 239*       Current correctional scale  Medium  Maintain anti-hyperglycemic dose as follows-   Antihyperglycemics (From admission, onward)      Start     Stop Route Frequency Ordered    01/06/25 2238  insulin aspart U-100 pen 1-10 Units         -- SubQ Before meals & nightly PRN 01/06/25 2139          Hold Oral hypoglycemics while patient is in the hospital.         Final Active Diagnoses:    Diagnosis Date Noted POA    PRINCIPAL PROBLEM:  Symptomatic anemia [D64.9] 01/06/2025 Yes    Fever [R50.9] 01/09/2025 No    Malignant neoplasm of ascending colon  [C18.2] 12/12/2024 Yes    Gastroesophageal reflux disease without esophagitis [K21.9] 08/08/2018 Yes    Obesity [E66.9] 08/08/2018 Yes    Uncontrolled type 2 diabetes mellitus with hyperglycemia, without long-term current use of insulin [E11.65] 07/11/2017 Yes    Hypertension associated with diabetes [E11.59, I15.2] 07/11/2017 Yes    Anxiety and depression [F41.9, F32.A] 07/11/2017 Yes      Problems Resolved During this Admission:       Discharged Condition: good    Disposition: Home-Health Care [facility]    Follow Up:   Follow-up Information       Logan Juarez MD. Go on 1/23/2025.    Specialty: General Surgery  Why: post-op appt at 10:15  Contact information:  [address] [facility] [address]  [place] [address]  [phone]               Amarjit Crawford MD Follow up.    Specialties: Hematology and Oncology, Hematology, Oncology  Why: follow ups and appointments as scheduled.  Contact information:  [address] [facility] [address]  [place] [address]  [phone]               Osmin Newsome MD. Go on 1/28/2025.    Specialty: Family Medicine  Why: hospital follow up @1115  Contact information:  [address] [facility]  [place] [address]  [phone]                           Patient Instructions:      Ambulatory referral/consult to Home Health   Standing Status: Future   Referral Priority: Routine Referral Type: Home Health   Referral Reason: Specialty Services Required   Requested Specialty: Home Health Services   Number of Visits Requested: 1     Diet diabetic     Notify your health care provider if you experience any of the following:  temperature >100.4     Notify your health care provider if you experience any of the following:  persistent nausea and vomiting or diarrhea     Notify your health care provider if you experience any of the following:  severe uncontrolled pain     Notify your health care provider if you experience any of the following:  difficulty breathing or increased cough     Notify your health care provider if  you experience any of the following:  severe persistent headache     Notify your health care provider if you experience any of the following:  persistent dizziness, light-headedness, or visual disturbances     Notify your health care provider if you experience any of the following:  increased confusion or weakness     Notify your health care provider if you experience any of the following:   Order Comments: Chest pain, shortness of breath     Activity as tolerated       Significant Diagnostic Studies: Labs: CMP   Recent Labs   Lab 01/10/25  0422 01/11/25  0508    137   K 4.1 3.8    104   CO2 25 23   * 174*   BUN 9 9   CREATININE 0.8 0.8   CALCIUM 9.0 9.0   PROT 6.8 6.7   ALBUMIN 3.0* 2.9*   BILITOT 1.1* 0.7   ALKPHOS 112 141   AST 33 43*   ALT 25 42   ANIONGAP 8 10    and CBC   Recent Labs   Lab 01/10/25  0422 01/11/25  0508   WBC 10.91 8.22   HGB 7.3* 7.3*   HCT 25.7* 25.2*    223       Pending Diagnostic Studies:       Procedure Component Value Units Date/Time    Specimen to Pathology - Surgery [[id number]] Collected: 01/07/25 1443    Order Status: Sent Lab Status: No result     Specimen: Tissue            Medications:  Reconciled Home Medications:      Medication List        START taking these medications      amoxicillin-clavulanate 875-125mg 875-125 mg per tablet  Commonly known as: AUGMENTIN  Take 1 tablet by mouth every 12 (twelve) hours. for 5 days     HYDROcodone-acetaminophen 5-325 mg per tablet  Commonly known as: NORCO  Take 1 tablet by mouth every 6 (six) hours as needed for Pain.            CHANGE how you take these medications      atorvastatin 20 MG tablet  Commonly known as: LIPITOR  TAKE 1 TABLET BY MOUTH EVERY DAY  What changed: when to take this     insulin glargine (TOUJEO) 300 unit/mL (1.5 mL) Inpn pen  Commonly known as: TOUJEO SOLOSTAR U-300 INSULIN  Inject 20 Units into the skin once daily.  What changed: when to take this     ondansetron 8 MG tablet  Commonly known  "as: ZOFRAN  Take 1 tablet (8 mg total) by mouth every 8 (eight) hours as needed.     prochlorperazine 10 MG tablet  Commonly known as: COMPAZINE  Take 1 tablet (10 mg total) by mouth every 6 (six) hours as needed.            CONTINUE taking these medications      blood-glucose meter kit  To check BG 3 times daily, to use with insurance preferred meter     dexAMETHasone 4 MG Tab  Commonly known as: DECADRON  Take 2 tablets (8 mg total) by mouth once daily. On days 2 through 4 of each cycle of chemo     fexofenadine 180 MG tablet  Commonly known as: ALLEGRA  Take 180 mg by mouth nightly.     fluticasone propionate 50 mcg/actuation nasal spray  Commonly known as: FLONASE  2 sprays (100 mcg total) by Each Nostril route once daily.     gabapentin 800 MG tablet  Commonly known as: NEURONTIN  Take 800 mg by mouth 3 (three) times daily.     lamoTRIgine 150 MG Tab  Commonly known as: LAMICTAL  TAKE 1 TABLET BY MOUTH EVERY DAY     lancets Misc  To check BG 3 times daily, to use with insurance preferred meter     LIDOcaine-prilocaine cream  Commonly known as: EMLA  Apply topically as needed (Apply to port site 30 minutes before access as needed).     lisinopriL 20 MG tablet  Commonly known as: PRINIVIL,ZESTRIL  Take 1 tablet (20 mg total) by mouth once daily.     metFORMIN 500 MG ER 24hr tablet  Commonly known as: GLUCOPHAGE-XR  TAKE 2 TABLETS BY MOUTH TWICE A DAY WITH MEALS     MUCINEX 1,200 mg Ta12  Generic drug: guaiFENesin  Take 1 tablet by mouth nightly.     multivitamin per tablet  Commonly known as: THERAGRAN  Take 1 tablet by mouth once daily.     omeprazole 20 MG capsule  Commonly known as: PRILOSEC  Take 20 mg by mouth every evening.     ONETOUCH ULTRA BLUE TEST STRIP Strp  Generic drug: blood sugar diagnostic  USE TO CHECK BLOOD SUGAR 3 TIMES A DAY     pen needle, diabetic 31 gauge x 5/16" Ndle  Commonly known as: PEN NEEDLE  1 Application by Misc.(Non-Drug; Combo Route) route once daily.     sertraline 100 MG " tablet  Commonly known as: ZOLOFT  Take 1 tablet (100 mg total) by mouth once daily.     valACYclovir 500 MG tablet  Commonly known as: VALTREX  TAKE 1 TABLET BY MOUTH TWICE A DAY            STOP taking these medications      TYLENOL EXTRA STRENGTH ORAL              Indwelling Lines/Drains at time of discharge:   Lines/Drains/Airways       Central Venous Catheter Line  Duration                  PowerPort A Cath Single Lumen 12/20/24 Internal Jugular Right 22 days                    Time spent on the discharge of patient: 35 minutes         RAMYA Baker  Department of Hospital Medicine  [facility]/Surg

## 2025-01-11 NOTE — NURSING
Pt c/o post nasal drip, she reports taking allegra and Flonase daily for allergies, message sent to Lucinda TITUS, new orders noted, pt made aware, care ongoing

## 2025-01-11 NOTE — PLAN OF CARE
01/11/25 1057   Final Note   Assessment Type Final Discharge Note   Anticipated Discharge Disposition Home-Health   Post-Acute Status   Discharge Delays None known at this time     Patient cleared for discharge from case management standpoint.    Chart and discharge orders reviewed.  Patient discharged home with  home health([facility])  no further case management needs.

## 2025-01-11 NOTE — PT/OT/SLP PROGRESS
Physical Therapy      Patient Name:  Nik Lowery   MRN:  [id number]    Patient not seen today secondary to Patient unwilling to participate (declined in am secondary to family visiting. unavailable for second attempt secondary to showering.). Will follow-up 1/13/25.

## 2025-01-12 ENCOUNTER — PATIENT MESSAGE (OUTPATIENT)
Dept: HEMATOLOGY/ONCOLOGY | Facility: CLINIC | Age: 60
End: 2025-01-12
Payer: COMMERCIAL

## 2025-01-13 ENCOUNTER — PATIENT OUTREACH (OUTPATIENT)
Dept: ADMINISTRATIVE | Facility: CLINIC | Age: 60
End: 2025-01-13
Payer: COMMERCIAL

## 2025-01-13 ENCOUNTER — INFUSION (OUTPATIENT)
Dept: INFUSION THERAPY | Facility: HOSPITAL | Age: 60
DRG: 330 | End: 2025-01-13
Attending: NURSE PRACTITIONER
Payer: COMMERCIAL

## 2025-01-13 VITALS
OXYGEN SATURATION: 93 % | HEART RATE: 86 BPM | SYSTOLIC BLOOD PRESSURE: 102 MMHG | RESPIRATION RATE: 16 BRPM | TEMPERATURE: 98 F | DIASTOLIC BLOOD PRESSURE: 60 MMHG | WEIGHT: 206.69 LBS | HEIGHT: 65 IN | BODY MASS INDEX: 34.44 KG/M2

## 2025-01-13 DIAGNOSIS — C18.9 COLON ADENOCARCINOMA: ICD-10-CM

## 2025-01-13 DIAGNOSIS — D50.0 IRON DEFICIENCY ANEMIA DUE TO CHRONIC BLOOD LOSS: Primary | ICD-10-CM

## 2025-01-13 DIAGNOSIS — C18.2 MALIGNANT NEOPLASM OF ASCENDING COLON: ICD-10-CM

## 2025-01-13 PROCEDURE — 25000003 PHARM REV CODE 250: Performed by: NURSE PRACTITIONER

## 2025-01-13 PROCEDURE — 63600175 PHARM REV CODE 636 W HCPCS: Performed by: NURSE PRACTITIONER

## 2025-01-13 PROCEDURE — 96365 THER/PROPH/DIAG IV INF INIT: CPT

## 2025-01-13 PROCEDURE — A4216 STERILE WATER/SALINE, 10 ML: HCPCS | Performed by: NURSE PRACTITIONER

## 2025-01-13 RX ORDER — HEPARIN 100 UNIT/ML
500 SYRINGE INTRAVENOUS
Status: DISCONTINUED | OUTPATIENT
Start: 2025-01-13 | End: 2025-01-13 | Stop reason: HOSPADM

## 2025-01-13 RX ORDER — SODIUM CHLORIDE 0.9 % (FLUSH) 0.9 %
10 SYRINGE (ML) INJECTION
Status: CANCELLED | OUTPATIENT
Start: 2025-01-20

## 2025-01-13 RX ORDER — SODIUM CHLORIDE 0.9 % (FLUSH) 0.9 %
10 SYRINGE (ML) INJECTION
Status: DISCONTINUED | OUTPATIENT
Start: 2025-01-13 | End: 2025-01-13 | Stop reason: HOSPADM

## 2025-01-13 RX ORDER — DIPHENHYDRAMINE HYDROCHLORIDE 50 MG/ML
50 INJECTION INTRAMUSCULAR; INTRAVENOUS ONCE AS NEEDED
Status: CANCELLED | OUTPATIENT
Start: 2025-01-20

## 2025-01-13 RX ORDER — EPINEPHRINE 0.3 MG/.3ML
0.3 INJECTION SUBCUTANEOUS ONCE AS NEEDED
Status: CANCELLED | OUTPATIENT
Start: 2025-01-20

## 2025-01-13 RX ORDER — HEPARIN 100 UNIT/ML
500 SYRINGE INTRAVENOUS
OUTPATIENT
Start: 2025-01-13

## 2025-01-13 RX ORDER — SODIUM CHLORIDE 0.9 % (FLUSH) 0.9 %
10 SYRINGE (ML) INJECTION
OUTPATIENT
Start: 2025-01-13

## 2025-01-13 RX ORDER — HEPARIN 100 UNIT/ML
500 SYRINGE INTRAVENOUS
Status: CANCELLED | OUTPATIENT
Start: 2025-01-20

## 2025-01-13 RX ADMIN — HEPARIN 500 UNITS: 100 SYRINGE at 12:01

## 2025-01-13 RX ADMIN — Medication 10 ML: at 12:01

## 2025-01-13 RX ADMIN — SODIUM CHLORIDE 250 MG: 9 INJECTION, SOLUTION INTRAVENOUS at 11:01

## 2025-01-13 NOTE — PROGRESS NOTES
C3 nurse spoke with Nik Lowery  for a TCC post hospital discharge follow up call. Per AVS, the patient has a scheduled HOSFU appointment with Osmin Newsome MD  on 01/08/25 @ 5370.

## 2025-01-13 NOTE — PLAN OF CARE
Problem: Adult Inpatient Plan of Care  Goal: Optimal Comfort and Wellbeing  Outcome: Progressing  Intervention: Provide Person-Centered Care  Flowsheets (Taken 1/13/2025 1228)  Trust Relationship/Rapport:   reassurance provided   choices provided   thoughts/feelings acknowledged   emotional support provided   empathic listening provided   questions answered   questions encouraged   care explained

## 2025-01-15 LAB
BACTERIA BLD CULT: NORMAL
BACTERIA BLD CULT: NORMAL

## 2025-01-17 ENCOUNTER — LAB VISIT (OUTPATIENT)
Dept: LAB | Facility: HOSPITAL | Age: 60
End: 2025-01-17
Attending: FAMILY MEDICINE
Payer: COMMERCIAL

## 2025-01-17 DIAGNOSIS — F32.A ANXIETY AND DEPRESSION: ICD-10-CM

## 2025-01-17 DIAGNOSIS — C18.2 MALIGNANT NEOPLASM OF ASCENDING COLON: Primary | ICD-10-CM

## 2025-01-17 DIAGNOSIS — F41.9 ANXIETY AND DEPRESSION: ICD-10-CM

## 2025-01-17 DIAGNOSIS — J30.9 ALLERGIC RHINITIS, UNSPECIFIED SEASONALITY, UNSPECIFIED TRIGGER: ICD-10-CM

## 2025-01-17 LAB
ANISOCYTOSIS BLD QL SMEAR: ABNORMAL
BASOPHILS # BLD AUTO: 0.04 K/UL (ref 0–0.2)
BASOPHILS NFR BLD: 0.6 % (ref 0–1.9)
DACRYOCYTES BLD QL SMEAR: ABNORMAL
DIFFERENTIAL METHOD BLD: ABNORMAL
EOSINOPHIL # BLD AUTO: 0.1 K/UL (ref 0–0.5)
EOSINOPHIL NFR BLD: 1.7 % (ref 0–8)
ERYTHROCYTE [DISTWIDTH] IN BLOOD BY AUTOMATED COUNT: 21.7 % (ref 11.5–14.5)
HCT VFR BLD AUTO: 32.5 % (ref 37–48.5)
HGB BLD-MCNC: 9.3 G/DL (ref 12–16)
HYPOCHROMIA BLD QL SMEAR: ABNORMAL
IMM GRANULOCYTES # BLD AUTO: 0.11 K/UL (ref 0–0.04)
IMM GRANULOCYTES NFR BLD AUTO: 1.6 % (ref 0–0.5)
LYMPHOCYTES # BLD AUTO: 1.9 K/UL (ref 1–4.8)
LYMPHOCYTES NFR BLD: 27.1 % (ref 18–48)
MCH RBC QN AUTO: 23.5 PG (ref 27–31)
MCHC RBC AUTO-ENTMCNC: 28.6 G/DL (ref 32–36)
MCV RBC AUTO: 82 FL (ref 82–98)
MONOCYTES # BLD AUTO: 0.4 K/UL (ref 0.3–1)
MONOCYTES NFR BLD: 5.8 % (ref 4–15)
NEUTROPHILS # BLD AUTO: 4.3 K/UL (ref 1.8–7.7)
NEUTROPHILS NFR BLD: 63.2 % (ref 38–73)
NRBC BLD-RTO: 0 /100 WBC
OVALOCYTES BLD QL SMEAR: ABNORMAL
PLATELET # BLD AUTO: 615 K/UL (ref 150–450)
PLATELET BLD QL SMEAR: ABNORMAL
PMV BLD AUTO: 9.3 FL (ref 9.2–12.9)
POIKILOCYTOSIS BLD QL SMEAR: SLIGHT
RBC # BLD AUTO: 3.96 M/UL (ref 4–5.4)
WBC # BLD AUTO: 6.87 K/UL (ref 3.9–12.7)

## 2025-01-17 PROCEDURE — 85025 COMPLETE CBC W/AUTO DIFF WBC: CPT | Performed by: FAMILY MEDICINE

## 2025-01-17 PROCEDURE — 36415 COLL VENOUS BLD VENIPUNCTURE: CPT | Performed by: FAMILY MEDICINE

## 2025-01-17 RX ORDER — FLUTICASONE PROPIONATE 50 MCG
2 SPRAY, SUSPENSION (ML) NASAL
Qty: 48 ML | Refills: 3 | Status: SHIPPED | OUTPATIENT
Start: 2025-01-17

## 2025-01-17 RX ORDER — SERTRALINE HYDROCHLORIDE 100 MG/1
100 TABLET, FILM COATED ORAL
Qty: 90 TABLET | Refills: 3 | Status: SHIPPED | OUTPATIENT
Start: 2025-01-17

## 2025-01-17 NOTE — TELEPHONE ENCOUNTER
Unable to retrieve patient chart and identify care due.  [facility] Embedded Care Due Messages. Reference number: [id number].   1/17/2025 12:14:50 AM CST

## 2025-01-21 ENCOUNTER — HOSPITAL ENCOUNTER (EMERGENCY)
Facility: HOSPITAL | Age: 60
Discharge: HOME | End: 2025-01-21
Attending: STUDENT IN AN ORGANIZED HEALTH CARE EDUCATION/TRAINING PROGRAM
Payer: COMMERCIAL

## 2025-01-21 VITALS
HEIGHT: 65 IN | TEMPERATURE: 98 F | OXYGEN SATURATION: 97 % | RESPIRATION RATE: 20 BRPM | DIASTOLIC BLOOD PRESSURE: 78 MMHG | HEART RATE: 90 BPM | SYSTOLIC BLOOD PRESSURE: 162 MMHG | BODY MASS INDEX: 34.16 KG/M2 | WEIGHT: 205 LBS

## 2025-01-21 DIAGNOSIS — Z51.89 VISIT FOR WOUND CHECK: Primary | ICD-10-CM

## 2025-01-21 DIAGNOSIS — L08.9 SKIN INFECTION: ICD-10-CM

## 2025-01-21 PROCEDURE — 63600175 PHARM REV CODE 636 W HCPCS: Performed by: STUDENT IN AN ORGANIZED HEALTH CARE EDUCATION/TRAINING PROGRAM

## 2025-01-21 PROCEDURE — 96372 THER/PROPH/DIAG INJ SC/IM: CPT | Performed by: STUDENT IN AN ORGANIZED HEALTH CARE EDUCATION/TRAINING PROGRAM

## 2025-01-21 PROCEDURE — 25000003 PHARM REV CODE 250

## 2025-01-21 PROCEDURE — 99284 EMERGENCY DEPT VISIT MOD MDM: CPT | Mod: 25

## 2025-01-21 RX ORDER — LIDOCAINE HYDROCHLORIDE 10 MG/ML
5 INJECTION, SOLUTION INFILTRATION; PERINEURAL
Status: COMPLETED | OUTPATIENT
Start: 2025-01-21 | End: 2025-01-21

## 2025-01-21 RX ORDER — CEFTRIAXONE 1 G/1
1 INJECTION, POWDER, FOR SOLUTION INTRAMUSCULAR; INTRAVENOUS
Status: COMPLETED | OUTPATIENT
Start: 2025-01-21 | End: 2025-01-21

## 2025-01-21 RX ORDER — AMOXICILLIN AND CLAVULANATE POTASSIUM 875; 125 MG/1; MG/1
1 TABLET, FILM COATED ORAL 2 TIMES DAILY
Qty: 14 TABLET | Refills: 0 | Status: SHIPPED | OUTPATIENT
Start: 2025-01-21

## 2025-01-21 RX ORDER — AMOXICILLIN AND CLAVULANATE POTASSIUM 875; 125 MG/1; MG/1
1 TABLET, FILM COATED ORAL EVERY 12 HOURS
Status: DISCONTINUED | OUTPATIENT
Start: 2025-01-21 | End: 2025-01-21 | Stop reason: HOSPADM

## 2025-01-21 RX ORDER — MUPIROCIN 20 MG/G
1 OINTMENT TOPICAL
Status: COMPLETED | OUTPATIENT
Start: 2025-01-21 | End: 2025-01-21

## 2025-01-21 RX ADMIN — LIDOCAINE HYDROCHLORIDE 5 ML: 10 INJECTION, SOLUTION INFILTRATION; PERINEURAL at 09:01

## 2025-01-21 RX ADMIN — MUPIROCIN 1 TUBE: 20 OINTMENT TOPICAL at 09:01

## 2025-01-21 RX ADMIN — CEFTRIAXONE SODIUM 1 G: 1 INJECTION, POWDER, FOR SOLUTION INTRAMUSCULAR; INTRAVENOUS at 09:01

## 2025-01-21 NOTE — DISCHARGE INSTRUCTIONS
Follow up with Dr. Juarez next week as scheduled and return to ED for any worsening symptoms.  Continue oral antibiotics.

## 2025-01-21 NOTE — ED PROVIDER NOTES
Encounter Date: 1/21/2025       History     Chief Complaint   Patient presents with    Post-op Problem     Patient has surgical incision with drainage      59-year-old female presents for evaluation of lower abdominal wound infection.  Patient had abdominal surgery 2 weeks ago, intermittently purulent drainage from lower incision and was on Augmentin.  No associated abdominal pain or fever, able to tolerate p.o..        Review of patient's allergies indicates:   Allergen Reactions    Robaxin [methocarbamol] Nausea Only     Past Medical History:   Diagnosis Date    Colon cancer     Diabetes mellitus     Diabetes mellitus, type 2     Encounter for blood transfusion     Herpes     Hypertension     Sleep apnea      Past Surgical History:   Procedure Laterality Date    COLONOSCOPY N/A 11/8/2024    Procedure: COLONOSCOPY;  Surgeon: Saw Wick MD;  Location: [facility];  Service: Endoscopy;  Laterality: N/A;    ESOPHAGOGASTRODUODENOSCOPY N/A 11/8/2024    Procedure: EGD (ESOPHAGOGASTRODUODENOSCOPY);  Surgeon: Saw Wick MD;  Location: [facility];  Service: Endoscopy;  Laterality: N/A;    EYE SURGERY  2002    lasix Bilateral    HYSTERECTOMY  2012    INSERTION OF TUNNELED CENTRAL VENOUS CATHETER (CVC) WITH SUBCUTANEOUS PORT Right 12/20/2024    Procedure: INSERTION, PORT-A-CATH;  Surgeon: Logan Juarez MD;  Location: [facility];  Service: General;  Laterality: Right;    LUMBAR DISCECTOMY      ROBOT-ASSISTED COLECTOMY Right 1/7/2025    Procedure: ROBOTIC COLECTOMY WITH ILEOCOLIC ANASTOMOSIS;  Surgeon: Logan Juarez MD;  Location: [facility] OR;  Service: General;  Laterality: Right;    WISDOM TOOTH EXTRACTION       Family History   Problem Relation Name Age of Onset    Heart disease Mother      Hypertension Mother      Cataracts Mother      Pancreatic cancer Father      Cancer Father      Arthritis Sister 2     Diabetes Brother 1     No Known Problems Paternal Aunt 1     Heart disease Paternal Uncle 2     Heart  disease Maternal Grandfather      Alzheimer's disease Paternal Grandmother      Glaucoma Neg Hx       Social History     Tobacco Use    Smoking status: Never    Smokeless tobacco: Never   Substance Use Topics    Alcohol use: No    Drug use: No     Review of Systems   All other systems reviewed and are negative.      Physical Exam     Initial Vitals [01/21/25 0849]   BP Pulse Resp Temp SpO2   (!) 162/78 90 20 98.4 °F (36.9 °C) 97 %      MAP       --         Physical Exam    Nursing note and vitals reviewed.  Constitutional: No distress.   HENT:   Head: Normocephalic.   Eyes: No scleral icterus.   Cardiovascular:  Normal rate.           Pulmonary/Chest: Effort normal. No stridor. No respiratory distress.   Abdominal:   No abdominal tenderness to palpation There is no guarding.     Neurological: She is alert.   Skin: No rash noted. There is erythema.   Surgical wound under umbilicus with no wound dehiscence, minimal erythema, no active purulent drainage         ED Course   Procedures  Labs Reviewed - No data to display       Imaging Results    None          Medications   amoxicillin-clavulanate 875-125mg per tablet 1 tablet (has no administration in time range)   cefTRIAXone injection 1 g (has no administration in time range)   LIDOcaine HCL 10 mg/ml (1%) injection 5 mL (has no administration in time range)   mupirocin 2 % ointment 1 Tube (has no administration in time range)     Medical Decision Making  59-year-old female presents for evaluation of lower abdominal wound infection.  Do not suspect any surgical abdomen at this time or overwhelming infection requiring IV antibiotics.  Communicated with general surgeon who performed surgery 2 weeks ago Dr. Juarez.  Patient has close follow up with Dr. Juarez, we will administer IM antibiotics here, prescribed oral antibiotics which 3 day course will be field here in the ED due to severe weather and pharmacies closure and also provided prescription and will dressed with  antibiotic ointment and provided strict return precautions for any worsening symptoms.  Patient voiced understanding and agree with plan.  We will defer lab work and CT imaging at this time, no indication at this point.  No definitive abscess to be drained.    Risk  Prescription drug management.                                      Clinical Impression:  Final diagnoses:  [Z51.89] Visit for wound check (Primary)  [L08.9] Skin infection          ED Disposition Condition    Discharge Stable          ED Prescriptions       Medication Sig Dispense Start Date End Date Auth. Provider    amoxicillin-clavulanate 875-125mg (AUGMENTIN) 875-125 mg per tablet Take 1 tablet by mouth 2 (two) times daily. 14 tablet 1/21/2025 -- Richie Renae Jr.,           Follow-up Information    None          Richie Renae Jr.,   01/21/25 1024

## 2025-01-23 ENCOUNTER — OFFICE VISIT (OUTPATIENT)
Dept: SURGERY | Facility: CLINIC | Age: 60
End: 2025-01-23
Payer: COMMERCIAL

## 2025-01-23 VITALS — SYSTOLIC BLOOD PRESSURE: 150 MMHG | DIASTOLIC BLOOD PRESSURE: 89 MMHG | HEART RATE: 84 BPM

## 2025-01-23 DIAGNOSIS — Z98.890 POST-OPERATIVE STATE: Primary | ICD-10-CM

## 2025-01-23 PROCEDURE — 3079F DIAST BP 80-89 MM HG: CPT | Mod: CPTII,S$GLB,, | Performed by: STUDENT IN AN ORGANIZED HEALTH CARE EDUCATION/TRAINING PROGRAM

## 2025-01-23 PROCEDURE — 99999 PR PBB SHADOW E&M-EST. PATIENT-LVL II: CPT | Mod: PBBFAC,,, | Performed by: STUDENT IN AN ORGANIZED HEALTH CARE EDUCATION/TRAINING PROGRAM

## 2025-01-23 PROCEDURE — 1159F MED LIST DOCD IN RCRD: CPT | Mod: CPTII,S$GLB,, | Performed by: STUDENT IN AN ORGANIZED HEALTH CARE EDUCATION/TRAINING PROGRAM

## 2025-01-23 PROCEDURE — 99024 POSTOP FOLLOW-UP VISIT: CPT | Mod: S$GLB,,, | Performed by: STUDENT IN AN ORGANIZED HEALTH CARE EDUCATION/TRAINING PROGRAM

## 2025-01-23 PROCEDURE — 3077F SYST BP >= 140 MM HG: CPT | Mod: CPTII,S$GLB,, | Performed by: STUDENT IN AN ORGANIZED HEALTH CARE EDUCATION/TRAINING PROGRAM

## 2025-01-23 NOTE — PROGRESS NOTES
Post op note    Doing well.  Small amount of erythema at inferior incision.  Improving    Path reviewed.      Overall doing well.  Pt has follow up with onc  F/u with me as needed

## 2025-01-27 ENCOUNTER — PATIENT MESSAGE (OUTPATIENT)
Dept: INTERVENTIONAL RADIOLOGY/VASCULAR | Facility: HOSPITAL | Age: 60
End: 2025-01-27
Payer: COMMERCIAL

## 2025-01-27 ENCOUNTER — INFUSION (OUTPATIENT)
Dept: INFUSION THERAPY | Facility: HOSPITAL | Age: 60
End: 2025-01-27
Attending: NURSE PRACTITIONER
Payer: COMMERCIAL

## 2025-01-27 ENCOUNTER — OFFICE VISIT (OUTPATIENT)
Dept: PSYCHIATRY | Facility: CLINIC | Age: 60
End: 2025-01-27
Payer: COMMERCIAL

## 2025-01-27 VITALS
DIASTOLIC BLOOD PRESSURE: 84 MMHG | WEIGHT: 207 LBS | OXYGEN SATURATION: 97 % | TEMPERATURE: 98 F | HEART RATE: 72 BPM | SYSTOLIC BLOOD PRESSURE: 170 MMHG | BODY MASS INDEX: 34.45 KG/M2

## 2025-01-27 DIAGNOSIS — C18.2 MALIGNANT NEOPLASM OF ASCENDING COLON: ICD-10-CM

## 2025-01-27 DIAGNOSIS — F41.1 GENERALIZED ANXIETY DISORDER: ICD-10-CM

## 2025-01-27 DIAGNOSIS — C78.7 METASTASIS TO LIVER: ICD-10-CM

## 2025-01-27 DIAGNOSIS — D50.0 IRON DEFICIENCY ANEMIA DUE TO CHRONIC BLOOD LOSS: Primary | ICD-10-CM

## 2025-01-27 PROCEDURE — 90791 PSYCH DIAGNOSTIC EVALUATION: CPT | Mod: S$GLB,,, | Performed by: COUNSELOR

## 2025-01-27 PROCEDURE — 96365 THER/PROPH/DIAG IV INF INIT: CPT

## 2025-01-27 PROCEDURE — 63600175 PHARM REV CODE 636 W HCPCS: Performed by: NURSE PRACTITIONER

## 2025-01-27 PROCEDURE — 25000003 PHARM REV CODE 250: Performed by: NURSE PRACTITIONER

## 2025-01-27 PROCEDURE — 99999 PR PBB SHADOW E&M-EST. PATIENT-LVL I: CPT | Mod: PBBFAC,,, | Performed by: COUNSELOR

## 2025-01-27 RX ORDER — EPINEPHRINE 0.3 MG/.3ML
0.3 INJECTION SUBCUTANEOUS ONCE AS NEEDED
Status: DISCONTINUED | OUTPATIENT
Start: 2025-01-27 | End: 2025-01-27 | Stop reason: HOSPADM

## 2025-01-27 RX ORDER — DIPHENHYDRAMINE HYDROCHLORIDE 50 MG/ML
50 INJECTION INTRAMUSCULAR; INTRAVENOUS ONCE AS NEEDED
Status: CANCELLED | OUTPATIENT
Start: 2025-02-03

## 2025-01-27 RX ORDER — SODIUM CHLORIDE 0.9 % (FLUSH) 0.9 %
10 SYRINGE (ML) INJECTION
Status: CANCELLED | OUTPATIENT
Start: 2025-02-03

## 2025-01-27 RX ORDER — SODIUM CHLORIDE 0.9 % (FLUSH) 0.9 %
10 SYRINGE (ML) INJECTION
Status: DISCONTINUED | OUTPATIENT
Start: 2025-01-27 | End: 2025-01-27 | Stop reason: HOSPADM

## 2025-01-27 RX ORDER — HEPARIN 100 UNIT/ML
500 SYRINGE INTRAVENOUS
Status: CANCELLED | OUTPATIENT
Start: 2025-02-03

## 2025-01-27 RX ORDER — EPINEPHRINE 0.3 MG/.3ML
0.3 INJECTION SUBCUTANEOUS ONCE AS NEEDED
Status: CANCELLED | OUTPATIENT
Start: 2025-02-03

## 2025-01-27 RX ORDER — HEPARIN 100 UNIT/ML
500 SYRINGE INTRAVENOUS
Status: DISCONTINUED | OUTPATIENT
Start: 2025-01-27 | End: 2025-01-27 | Stop reason: HOSPADM

## 2025-01-27 RX ORDER — DIPHENHYDRAMINE HYDROCHLORIDE 50 MG/ML
50 INJECTION INTRAMUSCULAR; INTRAVENOUS ONCE AS NEEDED
Status: DISCONTINUED | OUTPATIENT
Start: 2025-01-27 | End: 2025-01-27 | Stop reason: HOSPADM

## 2025-01-27 RX ADMIN — SODIUM CHLORIDE 250 MG: 9 INJECTION, SOLUTION INTRAVENOUS at 10:01

## 2025-01-27 RX ADMIN — HEPARIN 500 UNITS: 100 SYRINGE at 12:01

## 2025-01-27 NOTE — PLAN OF CARE
Problem: Adult Inpatient Plan of Care  Goal: Plan of Care Review  1/27/2025 1128 by Yasmin Dooley RN  Outcome: Met  1/27/2025 1128 by Yasmin Dooley RN  Outcome: Progressing  Goal: Patient-Specific Goal (Individualized)  1/27/2025 1128 by Yasmin Dooley RN  Outcome: Met  1/27/2025 1128 by Yasmin Dooley RN  Outcome: Progressing  Goal: Absence of Hospital-Acquired Illness or Injury  1/27/2025 1128 by Yasmin Dooley RN  Outcome: Met  1/27/2025 1128 by Yasmin Dooley RN  Outcome: Progressing  Goal: Optimal Comfort and Wellbeing  1/27/2025 1128 by Yasmin Dooley RN  Outcome: Met  1/27/2025 1128 by Yasmin Dooley RN  Outcome: Progressing  Goal: Readiness for Transition of Care  1/27/2025 1128 by Yasmin Dooley RN  Outcome: Met  1/27/2025 1128 by Yasmin Dooley RN  Outcome: Progressing

## 2025-01-27 NOTE — PROGRESS NOTES
PSYCHO-ONCOLOGY INTAKE    Diagnostic Interview - CPT 41453    Date: 1/27/2025  Site: [place]    Evaluation Length (direct face-to-face time):  1 hour    This includes face to face time and non-face to face time preparing to see the patient, obtaining and/or reviewing separately obtained history, documenting clinical information in the electronic or other health record, independently interpreting results and communicating results to the patient/family/caregiver, or care coordinator.     Referral Source: Amarjit Crawford MD   Oncologist:   PCP: Osmin Newsome MD    Clinical status of patient: Outpatient    Nik Lowery, a 59 y.o. female, seen for initial evaluation visit.    Nik Lowery reviewed and agreed to informed consent and the limits of confidentiality.    Chief complaint/reason for encounter: adjustment to illness, anxiety     History of Present Illness:     Medical/Surgical History:    Patient Active Problem List   Diagnosis    Uncontrolled type 2 diabetes mellitus with hyperglycemia, without long-term current use of insulin    Hypertension associated with diabetes    Anxiety and depression    Gastroesophageal reflux disease without esophagitis    Obesity    Decreased ROM of lumbar spine    Hyperlipidemia associated with type 2 diabetes mellitus    Low back pain    Iron deficiency anemia    Malignant neoplasm of ascending colon    Metastasis to liver    Colon adenocarcinoma    Symptomatic anemia    Fever       Health Behaviors:       ETOH Use: Denied       Tobacco Use: Denied    Illicit Drug Use:  Denied     Prescription Misuse: Denied    Caffeine: minimal    Exercise:The patient engages in environmental activity only.   Firearms:  Yes: stored in a closet with safety on    Advanced directives: Yes      Family History:   Psychiatric illness: Yes: anxiety and depression (sister)      Alcohol/Drug Abuse: Denied    Suicide: Denied        Past Psychiatric History:   Inpatient treatment: Denied  "    Outpatient treatment: Yes: intermittent outpatient therapy and psychiatry from 2003 - 2016    Prior substance abuse treatment: Denied      Suicide Attempts: Denied    Psychotropic Medications:        Past: unknown antidepressants     Current medications as per below, allergies reviewed in chart.    Current Outpatient Medications   Medication    amoxicillin-clavulanate 875-125mg (AUGMENTIN) 875-125 mg per tablet    atorvastatin (LIPITOR) 20 MG tablet    blood-glucose meter kit    dexAMETHasone (DECADRON) 4 MG Tab    fexofenadine (ALLEGRA) 180 MG tablet    fluticasone propionate (FLONASE) 50 mcg/actuation nasal spray    gabapentin (NEURONTIN) 800 MG tablet    guaiFENesin (MUCINEX) 1,200 mg Ta12    HYDROcodone-acetaminophen (NORCO) 5-325 mg per tablet    insulin glargine, TOUJEO, (TOUJEO SOLOSTAR U-300 INSULIN) 300 unit/mL (1.5 mL) InPn pen    lamoTRIgine (LAMICTAL) 150 MG Tab    lancets Misc    LIDOcaine-prilocaine (EMLA) cream    lisinopriL (PRINIVIL,ZESTRIL) 20 MG tablet    metFORMIN (GLUCOPHAGE-XR) 500 MG ER 24hr tablet    multivitamin (THERAGRAN) per tablet    omeprazole (PRILOSEC) 20 MG capsule    ondansetron (ZOFRAN) 8 MG tablet    ONETOUCH ULTRA BLUE TEST STRIP Strp    pen needle, diabetic (PEN NEEDLE) 31 gauge x 5/16" Ndle    prochlorperazine (COMPAZINE) 10 MG tablet    sertraline (ZOLOFT) 100 MG tablet    valACYclovir (VALTREX) 500 MG tablet     Current Facility-Administered Medications   Medication Frequency    diphenhydrAMINE capsule 25 mg PRN    sodium chloride 0.9% flush 10 mL PRN        Social situation/Stressors: Nik Lowery lives with alone in [place], [place].  She is a full-time  at [employer].  She has been in her job for 25 years. Nik Lowery is single. The patient reports adequate social support. She noted support from family and work friends, but does feel social isolated at times secondary to worry about new situations. Pt described history of anxiety related to new social situations with " worry about being rejected, embarrassed or offending others resulting in physical anxiety symptoms (e.g. racing heart). Nik Lowery is spiritual, but not Lutheran.  Nik Lowery's hobbies include knitting.    Strengths:Steady employment, financial stability, Housing stability, Able to vocalize needs, and Resources - social, interpersonal, monetary  Liabilities: Complicated medical illness    Current Evaluation:     Mental Status Exam: Nik Lowery arrived promptly for the assessment session.  The patient was fully cooperative throughout the interview and was an adequate historian   Appearance: age appropriate, appropriately  dressed, adequately  groomed  Behavior/Cooperation: friendly and cooperative  Speech: normal in rate, volume, and tone and appropriate quality, quantity and organization of sentences  Mood: steady  Affect: mood congruent and appropriate  Thought Process: goal-directed, logical  Thought Content: normal, no suicidality, no homicidality, delusions, or paranoia;did not appear to be responding to internal stimuli during the interview.   Orientation: grossly intact  Memory: grossly intact  Attention Span/Concentration: Attends to interview without distraction; reports subjective difficulty  Fund of Knowledge: average  Estimate of Intelligence: average from verbal skills and history  Cognition: grossly intact  Insight: patient has awareness of illness; good insight into own behavior and behavior of others  Judgment: the patient's behavior is adequate to circumstances      Adjustment Assessment to Current Illness:    Oncology History   Malignant neoplasm of ascending colon   12/12/2024 Initial Diagnosis    Malignant neoplasm of ascending colon     12/18/2024 Cancer Staged    Staging form: Colon and Rectum, AJCC 8th Edition  - Clinical: Stage BIBIANA (cT3, cN1a, cM1a)     12/27/2024 -  Chemotherapy    Treatment Summary   Plan Name: OP GI [id number] (oxaliplatin leucovorin fluorouracil) Q2W  Treatment  Goal: Control  Status: Active  Start Date: 12/27/2024 (Planned)  End Date: 6/1/2025 (Planned)  Provider: Amarjit Crawford MD  Chemotherapy: fluorouraciL injection 830 mg, 400 mg/m2 = 830 mg, Intravenous, Clinic/HOD 1 time, 0 of 12 cycles  oxaliplatin (ELOXATIN) 85 mg/m2 = 177 mg in D5W 535.4 mL chemo infusion, 85 mg/m2 = 177 mg, Intravenous, Clinic/HOD 1 time, 0 of 12 cycles  fluorouracil (Adrucil) 2,400 mg/m2 = 4,990 mg in 0.9% NaCl 100 mL chemo infusion, 2,400 mg/m2 = 4,990 mg, Intravenous, Over 46 hours, 0 of 12 cycles         Nik Lowery has adjusted to illness fairly well primarily through passive coping strategies and prayer. She has engaged in appropriate information gathering.  The patient has adequate family/friend support.  Her support system is coping well with the diagnosis/treatment/prognosis. Illness-related psychosocial stressors include absence from work and changes in ability to engage in leisure activities.  The patient has a good partnership with her [facility] treatment team. The patient reports the following barriers to cancer care:none.     NCCN Distress thermometer:       1/6/2025    12:33 PM 12/26/2024     1:15 AM 12/17/2024    11:37 PM 12/11/2024     7:39 PM 11/26/2024     8:36 PM   DISTRESS SCREENING   Distress Score 5  7  7  2  6    Practical Concerns Finances  Finances  Taking care of myself;Work;Finances;Insurance;Treatment decisions  Taking care of myself;Work;Finances;Insurance;Treatment decisions  Work;Finances;Insurance;Treatment decisions    Social Concerns None of these  None of these  Communication with health care team  None of these  Communication with health care team    Emotional Concerns Worry or anxiety  Worry or anxiety;Fear  Worry or anxiety;Fear;Loneliness  Worry or anxiety;Sadness or depression;Fear;Loneliness  Worry or anxiety    Spiritual or Samaritan Concerns None of these  None of these  Sense of meaning or purpose  None of these  Death, dying, or  afterlife    Physical Concerns Sleep;Memory or concentration  Sleep;Fatigue  Fatigue;Memory or concentration  Memory or concentration;None of these  Sleep        Patient-reported        Symptoms:   Mood: diminished interest, insomnia, fatigue, worthlessness/guilt, poor concentration, and social isolation;  prior depression:throughout adulthood ; no SI/HI  Anxiety: Feeling nervous, anxious, or on edge and Excessive worry (interfering with social functioning); anxiety throughout adulthood  Substance abuse: denied  Cognitive functioning:  Brain fog; trouble concentrating on watching television and easily distracted  Sleep: Trouble falling asleep and disrupted sleep; getting approximately 6 hours of sleep per night         1/27/2025   PHQ-9 Depression Patient Health Questionnaire   Over the last two weeks how often have you been bothered by little interest or pleasure in doing things 1   Over the last two weeks how often have you been bothered by feeling down, depressed or hopeless 0   Over the last two weeks how often have you been bothered by trouble falling or staying asleep, or sleeping too much 3   Over the last two weeks how often have you been bothered by feeling tired or having little energy 2   Over the last two weeks how often have you been bothered by a poor appetite or overeating 0   Over the last two weeks how often have you been bothered by feeling bad about yourself - or that you are a failure or have let yourself or your family down 1   Over the last two weeks how often have you been bothered by trouble concentrating on things, such as reading the newspaper or watching television 3   Over the last two weeks how often have you been bothered by moving or speaking so slowly that other people could have noticed. 0   Over the last two weeks how often have you been bothered by thoughts that you would be better off dead, or of hurting yourself 0   If you checked off any problems, how difficult have these  problems made it for you to do your work, take care of things at home or get along with other people? Not difficult at all   PHQ-9 Score 10          1/27/2025     3:22 PM   MALI-7   Was test performed? Yes   1. Feeling nervous, anxious, or on edge? Several days   2. Not being able to stop or control worrying? Not at all   3. Worrying too much about different things? Several days   4. Trouble relaxing? Not at all   5. Being so restless that it is hard to sit still? Not at all   6. Becoming easily annoyed or irritable? Not at all   7. Feeling afraid as if something awful might happen? Not at all   8. If you checked off any problems, how difficult have these problems made it for you to do your work, take care of things at home, or get along with other people? Somewhat difficult   MALI-7 Score 2   Number answered (out of first 7) 7   Interpretation Normal       Assessment - Diagnosis - Goals:       ICD-10-CM ICD-9-CM   1. Generalized anxiety disorder  F41.1 300.02   2. Malignant neoplasm of ascending colon  C18.2 153.6   3. Metastasis to liver  C78.7 197.7       Plan:individual psychotherapy and medication management by physician    Summary and Recommendations  Nik Lowery is a 59-year-old female referred by Amarjit Santos MD, for psychological evaluation and treatment. She appears to be coping fairly well with her diagnosis and the proposed treatment course. However, her depressive symptoms seem to be more closely related to her anxiety symptoms and ongoing cancer treatment. While her generalized anxiety is generally well-controlled with medication, she continues to experience some challenges in social functioning. The patient expressed concerns about the emotional impact of ongoing treatment and the uncertainty surrounding her emotional response. Additionally, she is interested in managing her worry related to social situations in order to strengthen her social support network. The patient is seeking individual  therapy to develop cancer-related coping skills and to address anxiety through CBT. She will follow up with me to begin therapy for these purposes.    Return to clinic: 2 weeks    GOALS:   Explore and address emotion impact of ongoing cancer treatment and develop coping strategies   Reduce worry related to social situations     Loly Vinson Psy.D.   Clinical Health Psychology Fellow

## 2025-01-28 ENCOUNTER — TELEPHONE (OUTPATIENT)
Dept: FAMILY MEDICINE | Facility: CLINIC | Age: 60
End: 2025-01-28
Payer: COMMERCIAL

## 2025-01-29 ENCOUNTER — HOSPITAL ENCOUNTER (OUTPATIENT)
Dept: INTERVENTIONAL RADIOLOGY/VASCULAR | Facility: HOSPITAL | Age: 60
Discharge: HOME | End: 2025-01-29
Attending: FAMILY MEDICINE
Payer: COMMERCIAL

## 2025-01-29 ENCOUNTER — ANESTHESIA EVENT (OUTPATIENT)
Dept: INTERVENTIONAL RADIOLOGY/VASCULAR | Facility: HOSPITAL | Age: 60
End: 2025-01-29
Payer: COMMERCIAL

## 2025-01-29 VITALS
HEIGHT: 65 IN | HEART RATE: 66 BPM | OXYGEN SATURATION: 91 % | WEIGHT: 205 LBS | RESPIRATION RATE: 20 BRPM | SYSTOLIC BLOOD PRESSURE: 169 MMHG | BODY MASS INDEX: 34.16 KG/M2 | DIASTOLIC BLOOD PRESSURE: 83 MMHG | TEMPERATURE: 98 F

## 2025-01-29 DIAGNOSIS — R16.0 LIVER MASS: ICD-10-CM

## 2025-01-29 DIAGNOSIS — C18.9 MALIGNANT NEOPLASM OF COLON, UNSPECIFIED PART OF COLON: ICD-10-CM

## 2025-01-29 DIAGNOSIS — C78.7 METASTASIS TO LIVER: Primary | ICD-10-CM

## 2025-01-29 LAB
POCT GLUCOSE: 146 MG/DL (ref 70–110)
POCT GLUCOSE: 163 MG/DL (ref 70–110)

## 2025-01-29 PROCEDURE — 77013 CT GUIDE FOR TISSUE ABLATION: CPT | Mod: TC | Performed by: STUDENT IN AN ORGANIZED HEALTH CARE EDUCATION/TRAINING PROGRAM

## 2025-01-29 PROCEDURE — 37000008 HC ANESTHESIA 1ST 15 MINUTES

## 2025-01-29 PROCEDURE — 82962 GLUCOSE BLOOD TEST: CPT

## 2025-01-29 PROCEDURE — 25000003 PHARM REV CODE 250: Performed by: ANESTHESIOLOGY

## 2025-01-29 PROCEDURE — 37000009 HC ANESTHESIA EA ADD 15 MINS

## 2025-01-29 PROCEDURE — 77013 CT GUIDE FOR TISSUE ABLATION: CPT | Mod: 26,,, | Performed by: STUDENT IN AN ORGANIZED HEALTH CARE EDUCATION/TRAINING PROGRAM

## 2025-01-29 PROCEDURE — 63600175 PHARM REV CODE 636 W HCPCS: Performed by: NURSE ANESTHETIST, CERTIFIED REGISTERED

## 2025-01-29 PROCEDURE — 25000003 PHARM REV CODE 250: Performed by: NURSE ANESTHETIST, CERTIFIED REGISTERED

## 2025-01-29 PROCEDURE — 77013 CT GUIDE FOR TISSUE ABLATION: CPT | Mod: TC

## 2025-01-29 PROCEDURE — C2618 PROBE/NEEDLE, CRYO: HCPCS

## 2025-01-29 PROCEDURE — 47382 PERCUT ABLATE LIVER RF: CPT | Performed by: STUDENT IN AN ORGANIZED HEALTH CARE EDUCATION/TRAINING PROGRAM

## 2025-01-29 RX ORDER — DOCUSATE SODIUM 100 MG/1
100 CAPSULE, LIQUID FILLED ORAL 2 TIMES DAILY
Qty: 14 CAPSULE | Refills: 0 | Status: SHIPPED | OUTPATIENT
Start: 2025-01-29

## 2025-01-29 RX ORDER — HALOPERIDOL 5 MG/ML
0.5 INJECTION INTRAMUSCULAR EVERY 10 MIN PRN
Status: DISCONTINUED | OUTPATIENT
Start: 2025-01-29 | End: 2025-01-30 | Stop reason: HOSPADM

## 2025-01-29 RX ORDER — OXYCODONE HYDROCHLORIDE 5 MG/1
5 TABLET ORAL EVERY 6 HOURS PRN
Qty: 20 TABLET | Refills: 0 | Status: SHIPPED | OUTPATIENT
Start: 2025-01-29 | End: 2025-02-03 | Stop reason: SDUPTHER

## 2025-01-29 RX ORDER — AMOXICILLIN AND CLAVULANATE POTASSIUM 875; 125 MG/1; MG/1
1 TABLET, FILM COATED ORAL EVERY 12 HOURS
Qty: 10 TABLET | Refills: 0 | Status: SHIPPED | OUTPATIENT
Start: 2025-01-29 | End: 2025-02-03

## 2025-01-29 RX ORDER — DOCUSATE SODIUM 100 MG/1
100 CAPSULE, LIQUID FILLED ORAL 2 TIMES DAILY
Qty: 14 CAPSULE | Refills: 0 | Status: SHIPPED | OUTPATIENT
Start: 2025-01-29 | End: 2025-02-05

## 2025-01-29 RX ORDER — LIDOCAINE HYDROCHLORIDE 10 MG/ML
1 INJECTION, SOLUTION EPIDURAL; INFILTRATION; INTRACAUDAL; PERINEURAL ONCE
Status: DISCONTINUED | OUTPATIENT
Start: 2025-01-29 | End: 2025-01-30 | Stop reason: HOSPADM

## 2025-01-29 RX ORDER — PROPOFOL 10 MG/ML
VIAL (ML) INTRAVENOUS
Status: DISCONTINUED | OUTPATIENT
Start: 2025-01-29 | End: 2025-01-29

## 2025-01-29 RX ORDER — HYDROMORPHONE HYDROCHLORIDE 1 MG/ML
0.2 INJECTION, SOLUTION INTRAMUSCULAR; INTRAVENOUS; SUBCUTANEOUS EVERY 10 MIN PRN
Status: DISCONTINUED | OUTPATIENT
Start: 2025-01-29 | End: 2025-01-30 | Stop reason: HOSPADM

## 2025-01-29 RX ORDER — KETOROLAC TROMETHAMINE 15 MG/ML
15 INJECTION, SOLUTION INTRAMUSCULAR; INTRAVENOUS EVERY 8 HOURS PRN
Status: DISCONTINUED | OUTPATIENT
Start: 2025-01-29 | End: 2025-01-30 | Stop reason: HOSPADM

## 2025-01-29 RX ORDER — PHENYLEPHRINE HYDROCHLORIDE 10 MG/ML
INJECTION INTRAVENOUS
Status: DISCONTINUED | OUTPATIENT
Start: 2025-01-29 | End: 2025-01-29

## 2025-01-29 RX ORDER — SODIUM CHLORIDE 9 MG/ML
INJECTION, SOLUTION INTRAVENOUS CONTINUOUS
Status: DISCONTINUED | OUTPATIENT
Start: 2025-01-29 | End: 2025-01-30 | Stop reason: HOSPADM

## 2025-01-29 RX ORDER — GLUCAGON 1 MG
1 KIT INJECTION
Status: DISCONTINUED | OUTPATIENT
Start: 2025-01-29 | End: 2025-01-30 | Stop reason: HOSPADM

## 2025-01-29 RX ORDER — FENTANYL CITRATE 50 UG/ML
INJECTION, SOLUTION INTRAMUSCULAR; INTRAVENOUS
Status: DISCONTINUED | OUTPATIENT
Start: 2025-01-29 | End: 2025-01-29

## 2025-01-29 RX ORDER — ONDANSETRON HYDROCHLORIDE 2 MG/ML
INJECTION, SOLUTION INTRAVENOUS
Status: DISCONTINUED | OUTPATIENT
Start: 2025-01-29 | End: 2025-01-29

## 2025-01-29 RX ORDER — LIDOCAINE HYDROCHLORIDE 20 MG/ML
INJECTION INTRAVENOUS
Status: DISCONTINUED | OUTPATIENT
Start: 2025-01-29 | End: 2025-01-29

## 2025-01-29 RX ORDER — ROCURONIUM BROMIDE 10 MG/ML
INJECTION, SOLUTION INTRAVENOUS
Status: DISCONTINUED | OUTPATIENT
Start: 2025-01-29 | End: 2025-01-29

## 2025-01-29 RX ORDER — MIDAZOLAM HYDROCHLORIDE 1 MG/ML
INJECTION INTRAMUSCULAR; INTRAVENOUS
Status: DISCONTINUED | OUTPATIENT
Start: 2025-01-29 | End: 2025-01-29

## 2025-01-29 RX ORDER — ONDANSETRON 4 MG/1
4 TABLET, FILM COATED ORAL EVERY 8 HOURS PRN
Qty: 15 TABLET | Refills: 0 | Status: SHIPPED | OUTPATIENT
Start: 2025-01-29 | End: 2025-02-03

## 2025-01-29 RX ORDER — OXYCODONE AND ACETAMINOPHEN 5; 325 MG/1; MG/1
1 TABLET ORAL
Status: DISCONTINUED | OUTPATIENT
Start: 2025-01-29 | End: 2025-01-30 | Stop reason: HOSPADM

## 2025-01-29 RX ORDER — CEFAZOLIN SODIUM 1 G/3ML
INJECTION, POWDER, FOR SOLUTION INTRAMUSCULAR; INTRAVENOUS
Status: DISCONTINUED | OUTPATIENT
Start: 2025-01-29 | End: 2025-01-29

## 2025-01-29 RX ADMIN — LIDOCAINE HYDROCHLORIDE 80 MG: 20 INJECTION INTRAVENOUS at 08:01

## 2025-01-29 RX ADMIN — ONDANSETRON 4 MG: 2 INJECTION INTRAMUSCULAR; INTRAVENOUS at 09:01

## 2025-01-29 RX ADMIN — MIDAZOLAM HYDROCHLORIDE 2 MG: 2 INJECTION, SOLUTION INTRAMUSCULAR; INTRAVENOUS at 08:01

## 2025-01-29 RX ADMIN — PHENYLEPHRINE HYDROCHLORIDE 100 MCG: 10 INJECTION INTRAVENOUS at 08:01

## 2025-01-29 RX ADMIN — ROCURONIUM BROMIDE 20 MG: 10 INJECTION INTRAVENOUS at 09:01

## 2025-01-29 RX ADMIN — PHENYLEPHRINE HYDROCHLORIDE 100 MCG: 10 INJECTION INTRAVENOUS at 09:01

## 2025-01-29 RX ADMIN — CEFAZOLIN 2 G: 330 INJECTION, POWDER, FOR SOLUTION INTRAMUSCULAR; INTRAVENOUS at 08:01

## 2025-01-29 RX ADMIN — FENTANYL CITRATE 50 MCG: 50 INJECTION, SOLUTION INTRAMUSCULAR; INTRAVENOUS at 08:01

## 2025-01-29 RX ADMIN — PROPOFOL 150 MG: 10 INJECTION, EMULSION INTRAVENOUS at 08:01

## 2025-01-29 RX ADMIN — SUGAMMADEX 200 MG: 100 INJECTION, SOLUTION INTRAVENOUS at 09:01

## 2025-01-29 RX ADMIN — SODIUM CHLORIDE, SODIUM GLUCONATE, SODIUM ACETATE, POTASSIUM CHLORIDE, MAGNESIUM CHLORIDE, SODIUM PHOSPHATE, DIBASIC, AND POTASSIUM PHOSPHATE: .53; .5; .37; .037; .03; .012; .00082 INJECTION, SOLUTION INTRAVENOUS at 08:01

## 2025-01-29 RX ADMIN — ROCURONIUM BROMIDE 40 MG: 10 INJECTION INTRAVENOUS at 08:01

## 2025-01-29 RX ADMIN — OXYCODONE HYDROCHLORIDE AND ACETAMINOPHEN 1 TABLET: 5; 325 TABLET ORAL at 11:01

## 2025-01-29 NOTE — ANESTHESIA PREPROCEDURE EVALUATION
[facility]  Anesthesia Pre-Operative Evaluation     Patient Name: Nik Lowery  YOB: 1965  MRN: [id number]  [facility]: [id number]       Admit Date: 1/29/2025   Admit Team: Networked reference to record PCT   Hospital Day: 1  Date of Procedure: 1/29/2025  Anesthesia: * No anesthesia type entered * Procedure: IR RF ABLATION LIVER TUMORS   Diagnosis:      Liver mass [R16.0]      Malignant neoplasm of colon, unspecified part of colon [C18.9]   Indications: Liver mass MWA     Code Status: Full Code   Advanced Directive: <no information>  Isolation Precautions: No active isolations  Capacity: Full capacity     SUBJECTIVE:   Nik Lowery is a 59 y.o. female who  has a past medical history of Colon cancer, Diabetes mellitus, Diabetes mellitus, type 2, Encounter for blood transfusion, Herpes, Hypertension, and Sleep apnea.  No notes on file   0.9% NaCl   Intravenous Continuous         Hospital LOS: 0 days  ICU LOS: Patient does not have an ICU stay during this admission.    she has a current medication list which includes the following long-term medication(s): atorvastatin, fexofenadine, lamotrigine, lisinopril, metformin, sertraline, valacyclovir, and blood-glucose meter.   Current Outpatient Medications   Medication Instructions    amoxicillin-clavulanate 875-125mg (AUGMENTIN) 875-125 mg per tablet 1 tablet, Oral, 2 times daily    atorvastatin (LIPITOR) 20 mg, Oral    blood-glucose meter kit To check BG 3 times daily, to use with insurance preferred meter    dexAMETHasone (DECADRON) 8 mg, Oral, Daily, On days 2 through 4 of each cycle of chemo    fexofenadine (ALLEGRA) 180 mg, Nightly    fluticasone propionate (FLONASE) 100 mcg, Each Nostril    gabapentin (NEURONTIN) 800 mg, 3 times daily    guaiFENesin (MUCINEX) 1,200 mg Ta12 1 tablet, Nightly    HYDROcodone-acetaminophen (NORCO) 5-325 mg per tablet 1 tablet, Oral, Every 6 hours PRN    insulin glargine (TOUJEO) (TOUJEO SOLOSTAR U-300 INSULIN)  "20 Units, Subcutaneous, Daily    lamoTRIgine (LAMICTAL) 150 mg, Oral    lancets Misc To check BG 3 times daily, to use with insurance preferred meter    LIDOcaine-prilocaine (EMLA) cream Topical (Top), As needed (PRN)    lisinopriL (PRINIVIL,ZESTRIL) 20 mg, Oral, Daily    metFORMIN (GLUCOPHAGE-XR) 1,000 mg, Oral, 2 times daily with meals    multivitamin (THERAGRAN) per tablet 1 tablet, Daily    omeprazole (PRILOSEC) 20 mg, Nightly    ondansetron (ZOFRAN) 8 mg, Oral, Every 8 hours PRN    ONETOUCH ULTRA BLUE TEST STRIP Strp USE TO CHECK BLOOD SUGAR 3 TIMES A DAY    pen needle, diabetic (PEN NEEDLE) 31 gauge x 5/16" Ndle 1 Application, Misc.(Non-Drug; Combo Route), Daily    prochlorperazine (COMPAZINE) 10 mg, Oral, Every 6 hours PRN    sertraline (ZOLOFT) 100 mg, Oral    valACYclovir (VALTREX) 500 MG tablet TAKE 1 TABLET BY MOUTH TWICE A DAY     ALLERGIES:     Review of patient's allergies indicates:   Allergen Reactions    Robaxin [methocarbamol] Nausea Only     LDA:   AIRWAY:         * No LDAs found *      Lines/Drains/Airways       Central Venous Catheter Line  Duration                  PowerPort A Cath Single Lumen 12/20/24 Internal Jugular Right 40 days              Peripheral Intravenous Line  Duration                  Peripheral IV - Single Lumen 01/29/25 0655 20 G Anterior;Left;Proximal Forearm <1 day                   Anesthesia Evaluation      Airway   Mallampati: III  TM distance: Normal  Neck ROM: Normal ROM  Dental    (+) Intact    Pulmonary    (+) sleep apnea  Cardiovascular   (+) hypertension    Neuro/Psych    (+) psychiatric history    GI/Hepatic/Renal    (+) GERD, liver disease    Endo/Other    (+) diabetes mellitus  Abdominal                    MEDICATIONS:     Current Outpatient Medications on File Prior to Encounter   Medication Sig Dispense Refill Last Dose/Taking    amoxicillin-clavulanate 875-125mg (AUGMENTIN) 875-125 mg per tablet Take 1 tablet by mouth 2 (two) times daily. 14 tablet 0 1/28/2025 "    atorvastatin (LIPITOR) 20 MG tablet TAKE 1 TABLET BY MOUTH EVERY DAY 90 tablet 3 1/29/2025 Morning    fexofenadine (ALLEGRA) 180 MG tablet Take 180 mg by mouth nightly.   1/28/2025    fluticasone propionate (FLONASE) 50 mcg/actuation nasal spray SPRAY 2 SPRAYS BY EACH NOSTRIL ROUTE ONCE DAILY. (Patient taking differently: 2 sprays by Each Nostril route nightly.) 48 mL 3 1/28/2025    gabapentin (NEURONTIN) 800 MG tablet Take 800 mg by mouth 3 (three) times daily.   1/29/2025 Morning    guaiFENesin (MUCINEX) 1,200 mg Ta12 Take 1 tablet by mouth nightly.   1/28/2025    HYDROcodone-acetaminophen (NORCO) 5-325 mg per tablet Take 1 tablet by mouth every 6 (six) hours as needed for Pain. 20 tablet 0 Past Month    lamoTRIgine (LAMICTAL) 150 MG Tab TAKE 1 TABLET BY MOUTH EVERY DAY (Patient taking differently: Take 150 mg by mouth every evening.) 90 tablet 3 1/28/2025    lisinopriL (PRINIVIL,ZESTRIL) 20 MG tablet Take 1 tablet (20 mg total) by mouth once daily. 90 tablet 3 1/28/2025 at  9:00 AM    metFORMIN (GLUCOPHAGE-XR) 500 MG ER 24hr tablet TAKE 2 TABLETS BY MOUTH TWICE A DAY WITH MEALS 360 tablet 1 1/28/2025 at  9:00 AM    multivitamin (THERAGRAN) per tablet Take 1 tablet by mouth once daily.   1/28/2025 at  9:00 AM    omeprazole (PRILOSEC) 20 MG capsule Take 20 mg by mouth every evening.   1/28/2025    sertraline (ZOLOFT) 100 MG tablet TAKE 1 TABLET BY MOUTH EVERY DAY (Patient taking differently: Take 100 mg by mouth every evening.) 90 tablet 3 1/29/2025 Morning    valACYclovir (VALTREX) 500 MG tablet TAKE 1 TABLET BY MOUTH TWICE A  tablet 3 1/29/2025 Morning    blood-glucose meter kit To check BG 3 times daily, to use with insurance preferred meter 1 each 0     dexAMETHasone (DECADRON) 4 MG Tab Take 2 tablets (8 mg total) by mouth once daily. On days 2 through 4 of each cycle of chemo (Patient not taking: Reported on 1/23/2025) 120 tablet 0     insulin glargine, TOUJEO, (TOUJEO SOLOSTAR U-300 INSULIN) 300  "unit/mL (1.5 mL) InPn pen Inject 20 Units into the skin once daily. (Patient taking differently: Inject 20 Units into the skin every evening.) 2 mL 11 1/22/2025    lancets [facility] To check BG 3 times daily, to use with insurance preferred meter 200 each 1     LIDOcaine-prilocaine (EMLA) cream Apply topically as needed (Apply to port site 30 minutes before access as needed). 30 g 0     ondansetron (ZOFRAN) 8 MG tablet Take 1 tablet (8 mg total) by mouth every 8 (eight) hours as needed. 30 tablet 2     ONETOUCH ULTRA BLUE TEST STRIP Strp USE TO CHECK BLOOD SUGAR 3 TIMES A  strip 0     pen needle, diabetic (PEN NEEDLE) 31 gauge x 5/16" Ndle 1 Application by Misc.(Non-Drug; Combo Route) route once daily. 30 each 5     prochlorperazine (COMPAZINE) 10 MG tablet Take 1 tablet (10 mg total) by mouth every 6 (six) hours as needed. (Patient not taking: Reported on 1/23/2025) 30 tablet 1       Inpatient Medications:  Antibiotics (From admission, onward)      None          VTE Risk Mitigation (From admission, onward)      None           LIDOcaine (PF) 10 mg/ml (1%)  1 mL Other Once       Current Outpatient Medications   Medication Sig Dispense Refill    amoxicillin-clavulanate 875-125mg (AUGMENTIN) 875-125 mg per tablet Take 1 tablet by mouth 2 (two) times daily. 14 tablet 0    atorvastatin (LIPITOR) 20 MG tablet TAKE 1 TABLET BY MOUTH EVERY DAY 90 tablet 3    fexofenadine (ALLEGRA) 180 MG tablet Take 180 mg by mouth nightly.      fluticasone propionate (FLONASE) 50 mcg/actuation nasal spray SPRAY 2 SPRAYS BY EACH NOSTRIL ROUTE ONCE DAILY. (Patient taking differently: 2 sprays by Each Nostril route nightly.) 48 mL 3    gabapentin (NEURONTIN) 800 MG tablet Take 800 mg by mouth 3 (three) times daily.      guaiFENesin (MUCINEX) 1,200 mg Ta12 Take 1 tablet by mouth nightly.      HYDROcodone-acetaminophen (NORCO) 5-325 mg per tablet Take 1 tablet by mouth every 6 (six) hours as needed for Pain. 20 tablet 0    lamoTRIgine " "(LAMICTAL) 150 MG Tab TAKE 1 TABLET BY MOUTH EVERY DAY (Patient taking differently: Take 150 mg by mouth every evening.) 90 tablet 3    lisinopriL (PRINIVIL,ZESTRIL) 20 MG tablet Take 1 tablet (20 mg total) by mouth once daily. 90 tablet 3    metFORMIN (GLUCOPHAGE-XR) 500 MG ER 24hr tablet TAKE 2 TABLETS BY MOUTH TWICE A DAY WITH MEALS 360 tablet 1    multivitamin (THERAGRAN) per tablet Take 1 tablet by mouth once daily.      omeprazole (PRILOSEC) 20 MG capsule Take 20 mg by mouth every evening.      sertraline (ZOLOFT) 100 MG tablet TAKE 1 TABLET BY MOUTH EVERY DAY (Patient taking differently: Take 100 mg by mouth every evening.) 90 tablet 3    valACYclovir (VALTREX) 500 MG tablet TAKE 1 TABLET BY MOUTH TWICE A  tablet 3    blood-glucose meter kit To check BG 3 times daily, to use with insurance preferred meter 1 each 0    dexAMETHasone (DECADRON) 4 MG Tab Take 2 tablets (8 mg total) by mouth once daily. On days 2 through 4 of each cycle of chemo (Patient not taking: Reported on 1/23/2025) 120 tablet 0    insulin glargine, TOUJEO, (TOUJEO SOLOSTAR U-300 INSULIN) 300 unit/mL (1.5 mL) InPn pen Inject 20 Units into the skin once daily. (Patient taking differently: Inject 20 Units into the skin every evening.) 2 mL 11    lancets Misc To check BG 3 times daily, to use with insurance preferred meter 200 each 1    LIDOcaine-prilocaine (EMLA) cream Apply topically as needed (Apply to port site 30 minutes before access as needed). 30 g 0    ondansetron (ZOFRAN) 8 MG tablet Take 1 tablet (8 mg total) by mouth every 8 (eight) hours as needed. 30 tablet 2    ONETOUCH ULTRA BLUE TEST STRIP Strp USE TO CHECK BLOOD SUGAR 3 TIMES A  strip 0    pen needle, diabetic (PEN NEEDLE) 31 gauge x 5/16" Ndle 1 Application by Misc.(Non-Drug; Combo Route) route once daily. 30 each 5    prochlorperazine (COMPAZINE) 10 MG tablet Take 1 tablet (10 mg total) by mouth every 6 (six) hours as needed. (Patient not taking: Reported on " "1/23/2025) 30 tablet 1     Current Facility-Administered Medications   Medication Dose Route Frequency Provider Last Rate Last Admin    0.9% NaCl infusion   Intravenous Continuous Lindy Kat PA-C        diphenhydrAMINE capsule 25 mg  25 mg Oral PRN Amarjit Crawford MD        LIDOcaine (PF) 10 mg/ml (1%) injection 10 mg  1 mL Other Once Lindy Kat PA-C        sodium chloride 0.9% flush 10 mL  10 mL Intravenous PRN Amarjit Crawford MD              History:   There are no hospital problems to display for this patient.    Surgical History:    has a past surgical history that includes Hysterectomy (2012); Eye surgery (2002); [place] tooth extraction; Lumbar discectomy; Esophagogastroduodenoscopy (N/A, 11/8/2024); Colonoscopy (N/A, 11/8/2024); Insertion of tunneled central venous catheter (CVC) with subcutaneous port (Right, 12/20/2024); and Robot-assisted colectomy (Right, 1/7/2025).   Social History:    reports that she is not currently sexually active.  reports that she has never smoked. She has never used smokeless tobacco. She reports that she does not drink alcohol and does not use drugs.    Vitals:    01/29/25 0704   BP: (!) 165/91   BP Location: Right forearm   Patient Position: Lying   Pulse: 80   Resp: 14   Temp: 36.5 °C (97.7 °F)   TempSrc: Temporal   SpO2: 96%   Weight: 93 kg (205 lb)   Height: 5' 5" (1.651 m)     Vital Signs Range (Last 24H):  Temp:  [36.5 °C (97.7 °F)]   Pulse:  [80]   Resp:  [14]   BP: (165)/(91)   SpO2:  [96 %]     Body mass index is 34.11 kg/m².  Wt Readings from Last 4 Encounters:   01/29/25 93 kg (205 lb)   01/27/25 93.9 kg (207 lb)   01/21/25 93 kg (205 lb)   01/13/25 93.8 kg (206 lb 10.9 oz)        Intake/Output - Last 3 Shifts       None          Lab Results   Component Value Date    WBC 6.87 01/17/2025    HGB 9.3 (L) 01/17/2025    HCT 32.5 (L) 01/17/2025     (H) 01/17/2025     01/11/2025    K 3.8 01/11/2025     01/11/2025    CREATININE 0.8 01/11/2025    BUN 9 " "01/11/2025    CO2 23 01/11/2025     (H) 01/11/2025    CALCIUM 9.0 01/11/2025    MG 1.8 01/11/2025    PHOS 3.6 01/11/2025    ALKPHOS 141 01/11/2025    ALT 42 01/11/2025    AST 43 (H) 01/11/2025    ALBUMIN 2.9 (L) 01/11/2025    INR 1.1 01/06/2025    APTT 24.9 10/31/2018    HGBA1C 6.5 (H) 11/07/2024    LACTATE 1.6 01/10/2025    TROPONINI <0.006 11/07/2024    BNP 13 11/07/2024     Recent Results (from the past 12 hours)   POCT glucose    Collection Time: 01/29/25  6:57 AM   Result Value Ref Range    POCT Glucose 146 (H) 70 - 110 mg/dL     No results for input(s): "WBC", "HGB", "HCT", "PLT", "NA", "K", "CREATININE", "GLU", "INR", "LACTATE", "5HIAAPLASMA", "5HIAAURINT", "5HIAA", "7GXSZ60TC" in the last 168 hours.  No LMP recorded. Patient has had a hysterectomy.    EKG:   Results for orders placed or performed during the hospital encounter of 01/06/25   EKG 12-lead    Collection Time: 01/06/25  7:41 PM   Result Value Ref Range    QRS Duration 84 ms    [facility] QTC Calculation 445 ms    Narrative    Test Reason : R00.0,    Vent. Rate :  76 BPM     Atrial Rate :  76 BPM     P-R Int : 148 ms          QRS Dur :  84 ms      QT Int : 396 ms       P-R-T Axes :  48  13  28 degrees    QTcB Int : 445 ms    Normal sinus rhythm  Poor R-wave progression ; consider anterior infarct, lead placement, or  normal variant  Abnormal ECG  When compared with ECG of 07-Nov-2024 11:41,  No significant change was found  Confirmed by Michael Petersen ([id number]) on 1/8/2025 7:59:19 PM    Referred By: AAAREFERRAL SELF           Confirmed By: Michael Petersen     TTE:  No results found for this or any previous visit.      Pre-op Assessment          Review of Systems  Cardiovascular:     Hypertension                                    Hypertension         Pulmonary:        Sleep Apnea     Obstructive Sleep Apnea (BIJAN).           Hepatic/GI:     GERD Liver Disease,        Gerd       Liver Disease        Endocrine:  Diabetes    Diabetes                    "   Psych:  Psychiatric History                  Physical Exam  General: Well nourished    Airway:  Mallampati: III / II  Mouth Opening: Normal  TM Distance: Normal  Tongue: Normal  Neck ROM: Normal ROM    Dental:  Intact        Anesthesia Plan  Type of Anesthesia, risks & benefits discussed:    Anesthesia Type: Gen ETT, Gen Natural Airway, MAC  Intra-op Monitoring Plan: Standard ASA Monitors  Post Op Pain Control Plan: multimodal analgesia and IV/PO Opioids PRN  Induction:  IV  Airway Plan: Direct and Video, Post-Induction  Informed Consent: Informed consent signed with the Patient and all parties understand the risks and agree with anesthesia plan.  All questions answered.   ASA Score: 3  Day of Surgery Review of History & Physical: H&P completed by Anesthesiologist.  Anesthesia Plan Notes: Chart reviewed, patient interviewed and examined.  The anesthetic plan was explained.  Risks, benefits, and alternatives were discussed. Questions were answered and the consent was signed.        LINDA Dye M.D.         Ready For Surgery From Anesthesia Perspective.     .

## 2025-01-29 NOTE — PLAN OF CARE
Pt tolerated procedure well under crna care, ruq dressing c/d/I, 4 hr recovery in PACU, report given @ bedside

## 2025-01-29 NOTE — H&P
Radiology History & Physical      SUBJECTIVE:     Chief Complaint: Hepatic segment 8 lesion    History of Present Illness:  Nik Lowery is a 59 y.o. female w/ a hx of colon adenocarcinoma, with a liver lesion presenting for MWA of liver lesion.   Past Medical History:   Diagnosis Date    Colon cancer     Diabetes mellitus     Diabetes mellitus, type 2     Encounter for blood transfusion     Herpes     Hypertension     Sleep apnea      Past Surgical History:   Procedure Laterality Date    COLONOSCOPY N/A 11/8/2024    Procedure: COLONOSCOPY;  Surgeon: Saw Wick MD;  Location: [facility];  Service: Endoscopy;  Laterality: N/A;    ESOPHAGOGASTRODUODENOSCOPY N/A 11/8/2024    Procedure: EGD (ESOPHAGOGASTRODUODENOSCOPY);  Surgeon: Saw Wick MD;  Location: [facility];  Service: Endoscopy;  Laterality: N/A;    EYE SURGERY  2002    lasix Bilateral    HYSTERECTOMY  2012    INSERTION OF TUNNELED CENTRAL VENOUS CATHETER (CVC) WITH SUBCUTANEOUS PORT Right 12/20/2024    Procedure: INSERTION, PORT-A-CATH;  Surgeon: Logan Juarez MD;  Location: [facility];  Service: General;  Laterality: Right;    LUMBAR DISCECTOMY      ROBOT-ASSISTED COLECTOMY Right 1/7/2025    Procedure: ROBOTIC COLECTOMY WITH ILEOCOLIC ANASTOMOSIS;  Surgeon: Logan Juarez MD;  Location: [facility];  Service: General;  Laterality: Right;    WISDOM TOOTH EXTRACTION         Home Meds:   Prior to Admission medications    Medication Sig Start Date End Date Taking? Authorizing Provider   amoxicillin-clavulanate 875-125mg (AUGMENTIN) 875-125 mg per tablet Take 1 tablet by mouth 2 (two) times daily. 1/21/25  Yes Richie Renae Jr., DO   atorvastatin (LIPITOR) 20 MG tablet TAKE 1 TABLET BY MOUTH EVERY DAY 12/5/24  Yes Luana Mejia PA-C   fexofenadine (ALLEGRA) 180 MG tablet Take 180 mg by mouth nightly.   Yes Provider, Historical   fluticasone propionate (FLONASE) 50 mcg/actuation nasal spray SPRAY 2 SPRAYS BY EACH NOSTRIL ROUTE ONCE  DAILY.  Patient taking differently: 2 sprays by Each Nostril route nightly. 1/17/25  Yes Osmin Newsome MD   gabapentin (NEURONTIN) 800 MG tablet Take 800 mg by mouth 3 (three) times daily.   Yes Provider, Historical   guaiFENesin (MUCINEX) 1,200 mg Ta12 Take 1 tablet by mouth nightly.   Yes Provider, Historical   HYDROcodone-acetaminophen (NORCO) 5-325 mg per tablet Take 1 tablet by mouth every 6 (six) hours as needed for Pain. 1/11/25  Yes Latonya Parr, RAMYA   lamoTRIgine (LAMICTAL) 150 MG Tab TAKE 1 TABLET BY MOUTH EVERY DAY  Patient taking differently: Take 150 mg by mouth every evening. 12/5/24  Yes Luana Mejia PA-C   lisinopriL (PRINIVIL,ZESTRIL) 20 MG tablet Take 1 tablet (20 mg total) by mouth once daily. 12/1/23 1/29/25 Yes Luana Mejia PA-C   metFORMIN (GLUCOPHAGE-XR) 500 MG ER 24hr tablet TAKE 2 TABLETS BY MOUTH TWICE A DAY WITH MEALS 9/6/24  Yes Luana Mejia PA-C   multivitamin (THERAGRAN) per tablet Take 1 tablet by mouth once daily.   Yes Provider, Historical   omeprazole (PRILOSEC) 20 MG capsule Take 20 mg by mouth every evening.   Yes Provider, Historical   sertraline (ZOLOFT) 100 MG tablet TAKE 1 TABLET BY MOUTH EVERY DAY  Patient taking differently: Take 100 mg by mouth every evening. 1/17/25  Yes Osmin Newsome MD   valACYclovir (VALTREX) 500 MG tablet TAKE 1 TABLET BY MOUTH TWICE A DAY 12/30/24  Yes Osmin Newsome MD   blood-glucose meter kit To check BG 3 times daily, to use with insurance preferred meter 8/8/18 1/13/25  Johana Santillan PA-C   dexAMETHasone (DECADRON) 4 MG Tab Take 2 tablets (8 mg total) by mouth once daily. On days 2 through 4 of each cycle of chemo  Patient not taking: Reported on 1/23/2025 12/26/24 12/26/25  Sandra Saul, ARIELA   insulin glargine, TOUJEO, (TOUJEO SOLOSTAR U-300 INSULIN) 300 unit/mL (1.5 mL) InPn pen Inject 20 Units into the skin once daily.  Patient taking differently: Inject 20 Units into the skin every evening. 12/17/24  "12/17/25  Osmin Newsome MD   lancets [facility] To check BG 3 times daily, to use with insurance preferred meter 8/8/18   Johana Santillan PA-C   LIDOcaine-prilocaine (EMLA) cream Apply topically as needed (Apply to port site 30 minutes before access as needed). 12/26/24   Sanrda Saul NP   ondansetron (ZOFRAN) 8 MG tablet Take 1 tablet (8 mg total) by mouth every 8 (eight) hours as needed. 12/26/24 12/26/25  Sandra Saul NP   ONETOUCH ULTRA BLUE TEST STRIP Strp USE TO CHECK BLOOD SUGAR 3 TIMES A DAY 12/10/18   Johana Santillan PA-C   pen needle, diabetic (PEN NEEDLE) 31 gauge x 5/16" Ndle 1 Application by Misc.(Non-Drug; Combo Route) route once daily. 12/17/24   Osmin Newsome MD   prochlorperazine (COMPAZINE) 10 MG tablet Take 1 tablet (10 mg total) by mouth every 6 (six) hours as needed.  Patient not taking: Reported on 1/23/2025 12/26/24 12/26/25  Sandra Saul NP     Anticoagulants/Antiplatelets: no anticoagulation    Allergies:   Review of patient's allergies indicates:   Allergen Reactions    Robaxin [methocarbamol] Nausea Only     Sedation History:  no adverse reactions    Review of Systems:   Hematological: no known coagulopathies  Respiratory: no shortness of breath  Cardiovascular: no chest pain  Gastrointestinal: no abdominal pain  Genito-Urinary: no dysuria  Musculoskeletal: negative  Neurological: no TIA or stroke symptoms         OBJECTIVE:     Vital Signs (Most Recent)  Temp: 97.7 °F (36.5 °C) (01/29/25 0704)  Pulse: 80 (01/29/25 0704)  Resp: 14 (01/29/25 0704)  BP: (!) 165/91 (01/29/25 0704)  SpO2: 96 % (01/29/25 0704)    Physical Exam:  ASA: per anesthesia  Mallampati: per anesthesia   Access: 20G PIV    General: no acute distress  Mental Status: alert and oriented to person, place and time  HEENT: normocephalic, atraumatic  Chest: unlabored breathing  Heart: regular heart rate  Abdomen: nondistended  Extremity: moves all extremities    ASSESSMENT/PLAN:     Sedation Plan: per " anesthesia  Patient will undergo: CT guided MWA of liver lesion segment 8    Steve Wells  Diagnostic Radiology PGY-2

## 2025-01-29 NOTE — NURSING TRANSFER
Nursing Transfer Note      1/29/2025   12:18 PM    Nurse giving handoff:monserrat hawkins   Nurse receiving handoff:monserrat Castrejon    Reason patient is being transferred: post procedure     Transfer To: [place] [id number]    Transfer via stretcher    Transported by rn    Order for Tele Monitor? No  Any special needs or follow-up needed: routine    Patient belongings transferred with patient: Yes    Chart send with patient: Yes    Notified: sister    Patient reassessed at:1200

## 2025-01-29 NOTE — NURSING
Pt prepped in room 4 on SSCU. Pt is AAOx4 and follows commands appropriately. Pt's vs wnl and pt is free from s/s of pain/distress. IV started, preop questions completed, , and consent not yet completed at this time. Safety measures in place.

## 2025-01-29 NOTE — ANESTHESIA POSTPROCEDURE EVALUATION
Anesthesia Post Evaluation    Patient: Nik Lowery    Procedure(s) Performed: * No procedures listed *    Final Anesthesia Type: general      Patient location during evaluation: PACU  Patient participation: Yes- Able to Participate  Level of consciousness: awake and alert  Post-procedure vital signs: reviewed and stable  Pain management: adequate  Airway patency: patent    PONV status at discharge: No PONV  Anesthetic complications: no      Cardiovascular status: blood pressure returned to baseline  Respiratory status: unassisted  Hydration status: euvolemic  Follow-up not needed.              Vitals Value Taken Time   /93 01/29/25 1245   Temp 36.4 °C (97.6 °F) 01/29/25 1200   Pulse 73 01/29/25 1245   Resp 23 01/29/25 1208   SpO2 96 % 01/29/25 1245   Vitals shown include unfiled device data.      Event Time   Out of Recovery 12:05:00         Pain/Pablo Score: Pain Rating Prior to Med Admin: 5 (1/29/2025 12:00 PM)  Pablo Score: 10 (1/29/2025 12:15 PM)

## 2025-01-29 NOTE — PLAN OF CARE
Pt arrived to 173 for LIVER ABLATION. Pt oriented to unit and staff, Pt safely transferred from stretcher to procedural table. Fall risk reviewed and comfort measures utilized with interventions. Safety strap applied, position pillows to minimize pressure points. Blankets applied. Pt prepped and draped utilizing standard sterile technique. Patient placed on continuous monitoring, as required by sedation policy. Timeouts implemented utilizing standard universal time-out per department and facility policy. CRNA to remain at bedside with continuous monitoring. Pt resting comfortably. Denies pain/discomfort. Will continue to monitor. See flow sheets for monitoring, medication administration, and updates. patient verbalizes understanding.

## 2025-01-29 NOTE — DISCHARGE SUMMARY
Radiology Discharge Summary      Hospital Course: No complications    Admit Date: 1/29/2025  Discharge Date: 01/29/2025     Instructions Given to Patient: Yes  Diet: Resume prior diet  Activity: Activity as tolerated and no driving for today    Description of Condition on Discharge: Stable  Vital Signs (Most Recent): Temp: 97.7 °F (36.5 °C) (01/29/25 0704)  Pulse: 80 (01/29/25 0704)  Resp: 14 (01/29/25 0704)  BP: (!) 165/91 (01/29/25 0704)  SpO2: 96 % (01/29/25 0704)    Discharge Disposition: Home    Discharge Diagnosis: mCRC liver lesions     Follow-up: MRI Abdomen WWO in 4 weeks    Maria Del Rosario Casper MD

## 2025-01-29 NOTE — ANESTHESIA PROCEDURE NOTES
Intubation    Date/Time: 1/29/2025 8:27 AM    Performed by: Ethan Uriarte CRNA  Authorized by: Ethan Uriarte CRNA    Intubation:     Induction:  Intravenous    Intubated:  Postinduction    Mask Ventilation:  Easy mask    Attempts:  1    Attempted By:  CRNA    Method of Intubation:  Video laryngoscopy    Blade:  Fuentes 3    Laryngeal View Grade: Grade I - full view of cords      Difficult Airway Encountered?: No      Complications:  None    Airway Device:  Oral endotracheal tube    Airway Device Size:  7.0    Style/Cuff Inflation:  Cuffed (inflated to minimal occlusive pressure)    Tube secured:  22    Secured at:  The lips    Placement Verified By:  Capnometry and Fiber optic visualization    Complicating Factors:  None    Findings Post-Intubation:  BS equal bilateral

## 2025-01-29 NOTE — PROCEDURES
Interventional Radiology Procedure Note      Pre Op Diagnosis: mCRC  Post Op Diagnosis: Same    Procedure: hepatic microwave ablation    Procedure performed by:  Maria Del Rosario Casper MD    Written Informed Consent Obtained: Yes  Specimen Removed: NO   Estimated Blood Loss: Minimal    Findings:     CT guided microwave ablation of segment 8 lesion.     Patient tolerated procedure well.         Maria Del Rosario Casper MD  Interventional Radiology

## 2025-01-29 NOTE — DISCHARGE INSTRUCTIONS
ABLATION DISCHARGE EDUCATION   Information:   An ablation is a procedure in which a needle is placed through the skin into a mass within the body, and either heat, cold, or alcohol is used to kill tumor or nerve tissue.    What should I expect after the ablation?      There may be some soreness around the biopsy site.    You may return to work after 24 hours unless your primary doctor instructs you otherwise.    You do not have any diet restrictions because of this procedure but should continue any that were given to you by any other doctors.    Continue all previously prescribed medications with the exception of Coumadin/warfarin which should be resumed after 24 hours. Pradaxa, Xarelto, Eliquis or Savaysa can be resumed after 48 hours.   You may be prescribed an antibiotic to take for at least 5 days post op. You will also receive a prescription for a pain medication and potentially antinausea medication. Please take as prescribed    Bathing & Wound Care:    You may shower after 24 hours; do not tub bath or submerge in water for 3 days (bath tub, hot tub, swimming pool, river or any other body of water).    Dressing to stay on 2-3 days (clean and dry)    If the biopsy site(s) become red, tender, swollen, or starts to drain, contact us.    Avoid heavy lifting and strenuous activity for 3 days.     Follow-up visit information:   Repeat imaging will be performed in about a month, and you will have a  clinic visit with either the Interventional radiology department or at your ordering providers office. If you are not contacted within a month to set up follow up imaging or a clinic visit, please call the Interventional radiology office to set up an appointment to review the results.     Emergencies  Occasionally, a situation will require prompt attention and an emergency room visit is necessary:    Sudden chest pain, shortness of breath, or fainting    Increasing redness, swelling or drainage from the access site     Increasing pain not relieved by medication    Bleeding or drainage from the needle site that is saturating the dressing    You have shaking, chills and/or a temperature over 100.3°F    New, sudden difficulty breathing    Drop in blood pressure, and/or light-headed feeling    Interventional Radiology Clinic    [phone], and choose option 3. Monday - Friday, 8:00 am - 4:00 pm    [phone] After hours and on holidays. Ask to speak with the interventional radiologist on call.

## 2025-01-31 ENCOUNTER — LAB VISIT (OUTPATIENT)
Dept: LAB | Facility: HOSPITAL | Age: 60
End: 2025-01-31
Attending: INTERNAL MEDICINE
Payer: COMMERCIAL

## 2025-01-31 ENCOUNTER — OFFICE VISIT (OUTPATIENT)
Dept: HEMATOLOGY/ONCOLOGY | Facility: CLINIC | Age: 60
End: 2025-01-31
Payer: COMMERCIAL

## 2025-01-31 VITALS
OXYGEN SATURATION: 95 % | WEIGHT: 207 LBS | RESPIRATION RATE: 18 BRPM | BODY MASS INDEX: 34.49 KG/M2 | DIASTOLIC BLOOD PRESSURE: 67 MMHG | HEART RATE: 83 BPM | HEIGHT: 65 IN | TEMPERATURE: 97 F | SYSTOLIC BLOOD PRESSURE: 119 MMHG

## 2025-01-31 DIAGNOSIS — C18.2 MALIGNANT NEOPLASM OF ASCENDING COLON: Primary | ICD-10-CM

## 2025-01-31 DIAGNOSIS — D64.9 SYMPTOMATIC ANEMIA: ICD-10-CM

## 2025-01-31 DIAGNOSIS — C18.2 MALIGNANT NEOPLASM OF ASCENDING COLON: ICD-10-CM

## 2025-01-31 DIAGNOSIS — D50.0 ANEMIA DUE TO CHRONIC BLOOD LOSS: ICD-10-CM

## 2025-01-31 DIAGNOSIS — C78.7 METASTASIS TO LIVER: ICD-10-CM

## 2025-01-31 LAB
ALBUMIN SERPL BCP-MCNC: 3.8 G/DL (ref 3.5–5.2)
ALP SERPL-CCNC: 93 U/L (ref 40–150)
ALT SERPL W/O P-5'-P-CCNC: 116 U/L (ref 10–44)
ANION GAP SERPL CALC-SCNC: 11 MMOL/L (ref 8–16)
ANISOCYTOSIS BLD QL SMEAR: ABNORMAL
AST SERPL-CCNC: 121 U/L (ref 10–40)
BASOPHILS # BLD AUTO: 0.03 K/UL (ref 0–0.2)
BASOPHILS NFR BLD: 0.4 % (ref 0–1.9)
BILIRUB SERPL-MCNC: 0.7 MG/DL (ref 0.1–1)
BUN SERPL-MCNC: 10 MG/DL (ref 6–20)
CALCIUM SERPL-MCNC: 9 MG/DL (ref 8.7–10.5)
CHLORIDE SERPL-SCNC: 100 MMOL/L (ref 95–110)
CO2 SERPL-SCNC: 27 MMOL/L (ref 23–29)
CREAT SERPL-MCNC: 1 MG/DL (ref 0.5–1.4)
DACRYOCYTES BLD QL SMEAR: ABNORMAL
DIFFERENTIAL METHOD BLD: ABNORMAL
EOSINOPHIL # BLD AUTO: 0.2 K/UL (ref 0–0.5)
EOSINOPHIL NFR BLD: 2.1 % (ref 0–8)
ERYTHROCYTE [DISTWIDTH] IN BLOOD BY AUTOMATED COUNT: 24.2 % (ref 11.5–14.5)
EST. GFR  (NO RACE VARIABLE): >60 ML/MIN/1.73 M^2
GLUCOSE SERPL-MCNC: 191 MG/DL (ref 70–110)
HCT VFR BLD AUTO: 33 % (ref 37–48.5)
HGB BLD-MCNC: 9.6 G/DL (ref 12–16)
HYPOCHROMIA BLD QL SMEAR: ABNORMAL
IMM GRANULOCYTES # BLD AUTO: 0.04 K/UL (ref 0–0.04)
IMM GRANULOCYTES NFR BLD AUTO: 0.5 % (ref 0–0.5)
LYMPHOCYTES # BLD AUTO: 1.6 K/UL (ref 1–4.8)
LYMPHOCYTES NFR BLD: 20.6 % (ref 18–48)
MAGNESIUM SERPL-MCNC: 1.8 MG/DL (ref 1.6–2.6)
MCH RBC QN AUTO: 24.8 PG (ref 27–31)
MCHC RBC AUTO-ENTMCNC: 29.1 G/DL (ref 32–36)
MCV RBC AUTO: 85 FL (ref 82–98)
MONOCYTES # BLD AUTO: 0.7 K/UL (ref 0.3–1)
MONOCYTES NFR BLD: 9.6 % (ref 4–15)
NEUTROPHILS # BLD AUTO: 5.1 K/UL (ref 1.8–7.7)
NEUTROPHILS NFR BLD: 66.8 % (ref 38–73)
NRBC BLD-RTO: 0 /100 WBC
OVALOCYTES BLD QL SMEAR: ABNORMAL
PLATELET # BLD AUTO: 201 K/UL (ref 150–450)
PLATELET BLD QL SMEAR: ABNORMAL
PMV BLD AUTO: 9.2 FL (ref 9.2–12.9)
POIKILOCYTOSIS BLD QL SMEAR: SLIGHT
POTASSIUM SERPL-SCNC: 4.3 MMOL/L (ref 3.5–5.1)
PROT SERPL-MCNC: 7.8 G/DL (ref 6–8.4)
RBC # BLD AUTO: 3.87 M/UL (ref 4–5.4)
SODIUM SERPL-SCNC: 138 MMOL/L (ref 136–145)
WBC # BLD AUTO: 7.57 K/UL (ref 3.9–12.7)

## 2025-01-31 PROCEDURE — 1111F DSCHRG MED/CURRENT MED MERGE: CPT | Mod: CPTII,S$GLB,, | Performed by: INTERNAL MEDICINE

## 2025-01-31 PROCEDURE — 1159F MED LIST DOCD IN RCRD: CPT | Mod: CPTII,S$GLB,, | Performed by: INTERNAL MEDICINE

## 2025-01-31 PROCEDURE — 3008F BODY MASS INDEX DOCD: CPT | Mod: CPTII,S$GLB,, | Performed by: INTERNAL MEDICINE

## 2025-01-31 PROCEDURE — 85025 COMPLETE CBC W/AUTO DIFF WBC: CPT | Performed by: INTERNAL MEDICINE

## 2025-01-31 PROCEDURE — 99999 PR PBB SHADOW E&M-EST. PATIENT-LVL V: CPT | Mod: PBBFAC,,, | Performed by: INTERNAL MEDICINE

## 2025-01-31 PROCEDURE — 3074F SYST BP LT 130 MM HG: CPT | Mod: CPTII,S$GLB,, | Performed by: INTERNAL MEDICINE

## 2025-01-31 PROCEDURE — 36415 COLL VENOUS BLD VENIPUNCTURE: CPT | Performed by: INTERNAL MEDICINE

## 2025-01-31 PROCEDURE — 99215 OFFICE O/P EST HI 40 MIN: CPT | Mod: S$GLB,,, | Performed by: INTERNAL MEDICINE

## 2025-01-31 PROCEDURE — 3078F DIAST BP <80 MM HG: CPT | Mod: CPTII,S$GLB,, | Performed by: INTERNAL MEDICINE

## 2025-01-31 PROCEDURE — 83735 ASSAY OF MAGNESIUM: CPT | Performed by: INTERNAL MEDICINE

## 2025-01-31 PROCEDURE — G2211 COMPLEX E/M VISIT ADD ON: HCPCS | Mod: S$GLB,,, | Performed by: INTERNAL MEDICINE

## 2025-01-31 PROCEDURE — 80053 COMPREHEN METABOLIC PANEL: CPT | Performed by: INTERNAL MEDICINE

## 2025-01-31 NOTE — Clinical Note
Patient start on systemic therapy FOLFOX every 2 weeks for six-month.  First treatment 02/04/2025 we need a NGS cares now she has metastatic stage IV involving liver.  She is still undergo microwave ablation therapy finished on 01/29/2024.  We will see her one-week after 1st treatment for toxicity evaluation + labs

## 2025-01-31 NOTE — PROGRESS NOTES
HPI    58 years old female with history of diabetes type II, hypertension and herpes    She was recently diagnosed with cecal mass causing significant bleeding require hospitalization and urgent transfusion.  She received 2 units packed red blood cell during the course of hospitalization.  Colonoscopy showed likely malignant tumor ascending colon nearby cecum.  CT of the abdomen pelvis contrast demonstrate concerning cecal malignancy with to right lower quadrant abnormal lymph nodes concerning for metastases with indeterminate right hepatic lobe 10 mm lesion      Past Medical History:   Diagnosis Date    Colon cancer     Diabetes mellitus     Diabetes mellitus, type 2     Encounter for blood transfusion     Herpes     Hypertension     Sleep apnea      Social History     Socioeconomic History    Marital status: Single   Occupational History    Occupation: supply chain   Tobacco Use    Smoking status: Never    Smokeless tobacco: Never   Substance and Sexual Activity    Alcohol use: No    Drug use: No    Sexual activity: Not Currently     Social Drivers of Health     Financial Resource Strain: Low Risk  (1/6/2025)    Overall Financial Resource Strain (CARDIA)     Difficulty of Paying Living Expenses: Not hard at all   Food Insecurity: No Food Insecurity (1/6/2025)    Hunger Vital Sign     Worried About Running Out of Food in the Last Year: Never true     Ran Out of Food in the Last Year: Never true   Transportation Needs: No Transportation Needs (1/6/2025)    TRANSPORTATION NEEDS     Transportation : No   Physical Activity: Insufficiently Active (11/26/2024)    Exercise Vital Sign     Days of Exercise per Week: 1 day     Minutes of Exercise per Session: 20 min   Stress: No Stress Concern Present (1/6/2025)    [employer] [place] [employer] - Occupational Stress Questionnaire     Feeling of Stress : Not at all   Recent Concern: Stress - Stress Concern Present (11/26/2024)    [employer] [place] [employer]  Occupational Health - Occupational Stress Questionnaire     Feeling of Stress : Rather much   Housing Stability: Low Risk  (1/6/2025)    Housing Stability Vital Sign     Unable to Pay for Housing in the Last Year: No     Homeless in the Last Year: No         Subjective      Review of Systems   Constitutional: Negative for appetite change, fatigue and unexpected weight change.   HENT: Negative for mouth sores.   Eyes: Negative for visual disturbance.   Respiratory: Negative for cough and shortness of breath.   Cardiovascular: Negative for chest pain.   Gastrointestinal: Negative for diarrhea.   Genitourinary: Negative for frequency.   Musculoskeletal: Negative for back pain.   Skin: Negative for rash.   Neurological: Negative for headaches.   Hematological: Negative for adenopathy.   Psychiatric/Behavioral: The patient is not nervous/anxious.   All other systems reviewed and are negative.     Objective    Physical Exam   Vitals:    01/31/25 0958   BP: 119/67   Pulse: 83   Resp: 18   Temp: 96.9 °F (36.1 °C)         Constitutional: patient is oriented to person, place, and time. patient appears well-developed and well-nourished. No distress.   HENT:   Right Ear: External ear normal.   Left Ear: External ear normal.   Nose: Nose normal.   Mouth/Throat: Oropharynx is clear and moist. No oropharyngeal exudate.   Teeth, gums and lips are normal   No sinus tenderness   Palate, tongue, posterior pharynx are normal   Eyes: Conjunctivae and lids are normal.   Neck: Trachea normal and normal range of motion. No thyromegaly   Cardiovascular: Normal rate, regular rhythm, normal heart sounds, intact distal pulses and normal pulses.   No murmur heard.   No edema, no tenderness in the extremities.   Pulmonary/Chest: Effort normal and breath sounds normal. No accessory muscle usage. patient has no wheezes..   Abdominal: Soft. Normal appearance and bowel sounds are normal. patient exhibits no distension and no mass. There is no  hepatosplenomegaly. There is no tenderness.   Musculoskeletal: Normal range of motion.   Gait is normal   No clubbing, cyanosis     Lymphadenopathy:   Head (right side): No submental and no submandibular adenopathy present.   Head (left side): No submental and no submandibular adenopathy present.   patient has no cervical adenopathy.   Right: No supraclavicular adenopathy present.   Left: No supraclavicular adenopathy present.   Neurological: patient is alert and oriented to person, place, and time. patient has normal strength and normal reflexes. No sensory deficit. Gait normal.   Skin: Skin is warm, dry and intact. No bruising, no lesion and no rash noted. No cyanosis. Nails show no clubbing.   No lesions   Psychiatric: patient has a normal mood and affect. patient speech is normal and behavior is normal. Judgment normal. Cognition and memory are normal.   Vitals reviewed.     Lab Results   Component Value Date    WBC 7.57 01/31/2025    HGB 9.6 (L) 01/31/2025    HCT 33.0 (L) 01/31/2025    MCV 85 01/31/2025     01/31/2025       CMP  Sodium   Date Value Ref Range Status   01/31/2025 138 136 - 145 mmol/L Final     Potassium   Date Value Ref Range Status   01/31/2025 4.3 3.5 - 5.1 mmol/L Final     Chloride   Date Value Ref Range Status   01/31/2025 100 95 - 110 mmol/L Final     CO2   Date Value Ref Range Status   01/31/2025 27 23 - 29 mmol/L Final     Glucose   Date Value Ref Range Status   01/31/2025 191 (H) 70 - 110 mg/dL Final     BUN   Date Value Ref Range Status   01/31/2025 10 6 - 20 mg/dL Final     Creatinine   Date Value Ref Range Status   01/31/2025 1.0 0.5 - 1.4 mg/dL Final     Calcium   Date Value Ref Range Status   01/31/2025 9.0 8.7 - 10.5 mg/dL Final     Total Protein   Date Value Ref Range Status   01/31/2025 7.8 6.0 - 8.4 g/dL Final     Albumin   Date Value Ref Range Status   01/31/2025 3.8 3.5 - 5.2 g/dL Final     Total Bilirubin   Date Value Ref Range Status   01/31/2025 0.7 0.1 - 1.0 mg/dL  Final     Comment:     For infants and newborns, interpretation of results should be based  on gestational age, weight and in agreement with clinical  observations.    Premature Infant recommended reference ranges:  Up to 24 hours.............<8.0 mg/dL  Up to 48 hours............<12.0 mg/dL  3-5 days..................<15.0 mg/dL  6-29 days.................<15.0 mg/dL       Alkaline Phosphatase   Date Value Ref Range Status   2025 93 40 - 150 U/L Final     AST   Date Value Ref Range Status   2025 121 (H) 10 - 40 U/L Final     ALT   Date Value Ref Range Status   2025 116 (H) 10 - 44 U/L Final     Anion Gap   Date Value Ref Range Status   2025 11 8 - 16 mmol/L Final     eGFR   Date Value Ref Range Status   2025 >60 >60 mL/min/1.73 m^2 Final     CT chest abdomen pelvis with contrast   Impression:     Findings concerning for cecal malignancy.  There are least 2 right lower quadrant abnormal lymph nodes concerning for metastatic disease.     Indeterminate right hepatic lobe 10 mm lesion.  Recommend further characterization with liver mass protocol MRI.     Small fat containing umbilical and infraumbilical hernias.     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  Status post resection cecum for bleeding control tumor size 4.8 cm and 4 mm 2 separate foci.  Lymphovascular invasion positive.  Lymph nodes involvement 3/ positive for malignancy.  MSI stable    Pathological staging [id number]    Pharmacokinetic panel 24 rapid metabolizer CY,  [id number] and CY    CT demonstrated possible liver involvement with 10 mm right hepatic lobe lesions.    Status post IR biopsy liver positive for malignancy    > has diabetes we will consult nutrition    > anxiety consult oncology psychiatry    > NGS Caris results?     > microwave started on 24 single sessions competed     > C1D1 FOLFOX 25    > elevated liver enzymes secondary to  microwave ablation    > anemia blood loss check iron and ferritin        Plan    There are no diagnoses linked to this encounter.

## 2025-02-03 ENCOUNTER — INFUSION (OUTPATIENT)
Dept: INFUSION THERAPY | Facility: HOSPITAL | Age: 60
End: 2025-02-03
Attending: NURSE PRACTITIONER
Payer: COMMERCIAL

## 2025-02-03 ENCOUNTER — LAB VISIT (OUTPATIENT)
Dept: LAB | Facility: HOSPITAL | Age: 60
End: 2025-02-03
Attending: INTERNAL MEDICINE
Payer: COMMERCIAL

## 2025-02-03 ENCOUNTER — PATIENT MESSAGE (OUTPATIENT)
Dept: HEMATOLOGY/ONCOLOGY | Facility: CLINIC | Age: 60
End: 2025-02-03
Payer: COMMERCIAL

## 2025-02-03 VITALS
DIASTOLIC BLOOD PRESSURE: 86 MMHG | BODY MASS INDEX: 34.38 KG/M2 | WEIGHT: 206.38 LBS | TEMPERATURE: 98 F | SYSTOLIC BLOOD PRESSURE: 172 MMHG | OXYGEN SATURATION: 95 % | HEART RATE: 74 BPM | HEIGHT: 65 IN

## 2025-02-03 DIAGNOSIS — C78.7 METASTASIS TO LIVER: ICD-10-CM

## 2025-02-03 DIAGNOSIS — D64.9 SYMPTOMATIC ANEMIA: ICD-10-CM

## 2025-02-03 DIAGNOSIS — C18.2 MALIGNANT NEOPLASM OF ASCENDING COLON: ICD-10-CM

## 2025-02-03 DIAGNOSIS — D50.0 IRON DEFICIENCY ANEMIA DUE TO CHRONIC BLOOD LOSS: Primary | ICD-10-CM

## 2025-02-03 DIAGNOSIS — D50.0 ANEMIA DUE TO CHRONIC BLOOD LOSS: ICD-10-CM

## 2025-02-03 DIAGNOSIS — C78.7 METASTASIS TO LIVER: Primary | ICD-10-CM

## 2025-02-03 LAB
FERRITIN SERPL-MCNC: 352 NG/ML (ref 20–300)
IRON SERPL-MCNC: 50 UG/DL (ref 30–160)
SATURATED IRON: 15 % (ref 20–50)
TOTAL IRON BINDING CAPACITY: 342 UG/DL (ref 250–450)
TRANSFERRIN SERPL-MCNC: 244 MG/DL (ref 200–375)

## 2025-02-03 PROCEDURE — 36415 COLL VENOUS BLD VENIPUNCTURE: CPT | Performed by: INTERNAL MEDICINE

## 2025-02-03 PROCEDURE — 83540 ASSAY OF IRON: CPT | Performed by: INTERNAL MEDICINE

## 2025-02-03 PROCEDURE — 25000003 PHARM REV CODE 250: Performed by: NURSE PRACTITIONER

## 2025-02-03 PROCEDURE — A4216 STERILE WATER/SALINE, 10 ML: HCPCS | Performed by: NURSE PRACTITIONER

## 2025-02-03 PROCEDURE — 36592 COLLECT BLOOD FROM PICC: CPT

## 2025-02-03 PROCEDURE — 96365 THER/PROPH/DIAG IV INF INIT: CPT

## 2025-02-03 PROCEDURE — 63600175 PHARM REV CODE 636 W HCPCS: Performed by: NURSE PRACTITIONER

## 2025-02-03 PROCEDURE — 82728 ASSAY OF FERRITIN: CPT | Performed by: INTERNAL MEDICINE

## 2025-02-03 RX ORDER — SODIUM CHLORIDE 0.9 % (FLUSH) 0.9 %
10 SYRINGE (ML) INJECTION
OUTPATIENT
Start: 2025-02-10

## 2025-02-03 RX ORDER — DIPHENHYDRAMINE HYDROCHLORIDE 50 MG/ML
50 INJECTION INTRAMUSCULAR; INTRAVENOUS ONCE AS NEEDED
OUTPATIENT
Start: 2025-02-10

## 2025-02-03 RX ORDER — OXYCODONE HYDROCHLORIDE 5 MG/1
5 TABLET ORAL EVERY 6 HOURS PRN
Qty: 8 TABLET | Refills: 0 | Status: SHIPPED | OUTPATIENT
Start: 2025-02-03 | End: 2025-02-05

## 2025-02-03 RX ORDER — EPINEPHRINE 0.3 MG/.3ML
0.3 INJECTION SUBCUTANEOUS ONCE AS NEEDED
OUTPATIENT
Start: 2025-02-10

## 2025-02-03 RX ORDER — HEPARIN 100 UNIT/ML
500 SYRINGE INTRAVENOUS
Status: DISCONTINUED | OUTPATIENT
Start: 2025-02-03 | End: 2025-02-03 | Stop reason: HOSPADM

## 2025-02-03 RX ORDER — SODIUM CHLORIDE 0.9 % (FLUSH) 0.9 %
10 SYRINGE (ML) INJECTION
Status: DISCONTINUED | OUTPATIENT
Start: 2025-02-03 | End: 2025-02-03 | Stop reason: HOSPADM

## 2025-02-03 RX ORDER — HEPARIN 100 UNIT/ML
500 SYRINGE INTRAVENOUS
OUTPATIENT
Start: 2025-02-10

## 2025-02-03 RX ADMIN — Medication 10 ML: at 11:02

## 2025-02-03 RX ADMIN — HEPARIN 500 UNITS: 100 SYRINGE at 11:02

## 2025-02-03 RX ADMIN — SODIUM CHLORIDE 250 MG: 9 INJECTION, SOLUTION INTRAVENOUS at 10:02

## 2025-02-03 NOTE — PLAN OF CARE
Problem: Adult Inpatient Plan of Care  Goal: Optimal Comfort and Wellbeing  Outcome: Progressing  Intervention: Provide Person-Centered Care  Flowsheets (Taken 2/3/2025 1119)  Trust Relationship/Rapport:   care explained   choices provided   emotional support provided   empathic listening provided   questions answered   questions encouraged   reassurance provided   thoughts/feelings acknowledged

## 2025-02-03 NOTE — TELEPHONE ENCOUNTER
Spoke with pt to confirm lab appt for 2/10 @ 9:30 am and appt with Dr. Crawford for 2/10 @ 1:00 pm.

## 2025-02-04 ENCOUNTER — SOCIAL WORK (OUTPATIENT)
Dept: HEMATOLOGY/ONCOLOGY | Facility: CLINIC | Age: 60
End: 2025-02-04
Payer: COMMERCIAL

## 2025-02-04 ENCOUNTER — INFUSION (OUTPATIENT)
Dept: INFUSION THERAPY | Facility: HOSPITAL | Age: 60
End: 2025-02-04
Attending: INTERNAL MEDICINE
Payer: COMMERCIAL

## 2025-02-04 ENCOUNTER — DOCUMENTATION ONLY (OUTPATIENT)
Dept: HEMATOLOGY/ONCOLOGY | Facility: CLINIC | Age: 60
End: 2025-02-04
Payer: COMMERCIAL

## 2025-02-04 ENCOUNTER — DOCUMENTATION ONLY (OUTPATIENT)
Dept: NUTRITION | Facility: HOSPITAL | Age: 60
End: 2025-02-04

## 2025-02-04 VITALS
BODY MASS INDEX: 34.18 KG/M2 | TEMPERATURE: 97 F | RESPIRATION RATE: 16 BRPM | DIASTOLIC BLOOD PRESSURE: 84 MMHG | OXYGEN SATURATION: 96 % | SYSTOLIC BLOOD PRESSURE: 139 MMHG | HEIGHT: 65 IN | HEART RATE: 82 BPM | WEIGHT: 205.13 LBS

## 2025-02-04 DIAGNOSIS — F32.A ANXIETY AND DEPRESSION: ICD-10-CM

## 2025-02-04 DIAGNOSIS — C18.2 MALIGNANT NEOPLASM OF ASCENDING COLON: ICD-10-CM

## 2025-02-04 DIAGNOSIS — D64.9 SYMPTOMATIC ANEMIA: ICD-10-CM

## 2025-02-04 DIAGNOSIS — C78.7 METASTASIS TO LIVER: Primary | ICD-10-CM

## 2025-02-04 DIAGNOSIS — C18.2 MALIGNANT NEOPLASM OF ASCENDING COLON: Primary | ICD-10-CM

## 2025-02-04 DIAGNOSIS — F41.9 ANXIETY AND DEPRESSION: ICD-10-CM

## 2025-02-04 PROCEDURE — 96416 CHEMO PROLONG INFUSE W/PUMP: CPT

## 2025-02-04 PROCEDURE — 96368 THER/DIAG CONCURRENT INF: CPT

## 2025-02-04 PROCEDURE — 96411 CHEMO IV PUSH ADDL DRUG: CPT

## 2025-02-04 PROCEDURE — 96367 TX/PROPH/DG ADDL SEQ IV INF: CPT

## 2025-02-04 PROCEDURE — 96415 CHEMO IV INFUSION ADDL HR: CPT

## 2025-02-04 PROCEDURE — 63600175 PHARM REV CODE 636 W HCPCS: Performed by: INTERNAL MEDICINE

## 2025-02-04 PROCEDURE — 25000003 PHARM REV CODE 250: Performed by: INTERNAL MEDICINE

## 2025-02-04 PROCEDURE — 96413 CHEMO IV INFUSION 1 HR: CPT

## 2025-02-04 RX ORDER — PROCHLORPERAZINE EDISYLATE 5 MG/ML
10 INJECTION INTRAMUSCULAR; INTRAVENOUS ONCE AS NEEDED
Status: DISCONTINUED | OUTPATIENT
Start: 2025-02-04 | End: 2025-02-04 | Stop reason: HOSPADM

## 2025-02-04 RX ORDER — HEPARIN 100 UNIT/ML
500 SYRINGE INTRAVENOUS
Status: DISCONTINUED | OUTPATIENT
Start: 2025-02-04 | End: 2025-02-04 | Stop reason: HOSPADM

## 2025-02-04 RX ORDER — FLUOROURACIL 50 MG/ML
400 INJECTION, SOLUTION INTRAVENOUS
Status: CANCELLED | OUTPATIENT
Start: 2025-02-04

## 2025-02-04 RX ORDER — SODIUM CHLORIDE 0.9 % (FLUSH) 0.9 %
10 SYRINGE (ML) INJECTION
Status: CANCELLED | OUTPATIENT
Start: 2025-02-06

## 2025-02-04 RX ORDER — DIPHENHYDRAMINE HYDROCHLORIDE 50 MG/ML
50 INJECTION INTRAMUSCULAR; INTRAVENOUS ONCE AS NEEDED
Status: CANCELLED | OUTPATIENT
Start: 2025-02-04

## 2025-02-04 RX ORDER — EPINEPHRINE 0.3 MG/.3ML
0.3 INJECTION SUBCUTANEOUS ONCE AS NEEDED
Status: DISCONTINUED | OUTPATIENT
Start: 2025-02-04 | End: 2025-02-04 | Stop reason: HOSPADM

## 2025-02-04 RX ORDER — DIPHENHYDRAMINE HYDROCHLORIDE 50 MG/ML
50 INJECTION INTRAMUSCULAR; INTRAVENOUS ONCE AS NEEDED
Status: DISCONTINUED | OUTPATIENT
Start: 2025-02-04 | End: 2025-02-04 | Stop reason: HOSPADM

## 2025-02-04 RX ORDER — SODIUM CHLORIDE 0.9 % (FLUSH) 0.9 %
10 SYRINGE (ML) INJECTION
Status: DISCONTINUED | OUTPATIENT
Start: 2025-02-04 | End: 2025-02-04 | Stop reason: HOSPADM

## 2025-02-04 RX ORDER — OXYCODONE HYDROCHLORIDE 5 MG/1
5 TABLET ORAL EVERY 6 HOURS PRN
Qty: 20 TABLET | Refills: 0 | Status: SHIPPED | OUTPATIENT
Start: 2025-02-04 | End: 2025-02-09

## 2025-02-04 RX ORDER — PROCHLORPERAZINE EDISYLATE 5 MG/ML
10 INJECTION INTRAMUSCULAR; INTRAVENOUS ONCE AS NEEDED
Status: CANCELLED | OUTPATIENT
Start: 2025-02-04

## 2025-02-04 RX ORDER — HEPARIN 100 UNIT/ML
500 SYRINGE INTRAVENOUS
Status: CANCELLED | OUTPATIENT
Start: 2025-02-04

## 2025-02-04 RX ORDER — SODIUM CHLORIDE 0.9 % (FLUSH) 0.9 %
10 SYRINGE (ML) INJECTION
Status: CANCELLED | OUTPATIENT
Start: 2025-02-04

## 2025-02-04 RX ORDER — EPINEPHRINE 0.3 MG/.3ML
0.3 INJECTION SUBCUTANEOUS ONCE AS NEEDED
Status: CANCELLED | OUTPATIENT
Start: 2025-02-04

## 2025-02-04 RX ORDER — FLUOROURACIL 50 MG/ML
400 INJECTION, SOLUTION INTRAVENOUS
Status: COMPLETED | OUTPATIENT
Start: 2025-02-04 | End: 2025-02-04

## 2025-02-04 RX ORDER — HEPARIN 100 UNIT/ML
500 SYRINGE INTRAVENOUS
Status: CANCELLED | OUTPATIENT
Start: 2025-02-06

## 2025-02-04 RX ORDER — PROCHLORPERAZINE EDISYLATE 5 MG/ML
10 INJECTION INTRAMUSCULAR; INTRAVENOUS ONCE AS NEEDED
Status: CANCELLED | OUTPATIENT
Start: 2025-02-06

## 2025-02-04 RX ADMIN — FLUOROURACIL 830 MG: 50 INJECTION, SOLUTION INTRAVENOUS at 12:02

## 2025-02-04 RX ADMIN — OXALIPLATIN 177 MG: 5 INJECTION, SOLUTION INTRAVENOUS at 09:02

## 2025-02-04 RX ADMIN — SODIUM CHLORIDE 0.25 MG: 9 INJECTION, SOLUTION INTRAVENOUS at 09:02

## 2025-02-04 RX ADMIN — DEXTROSE MONOHYDRATE: 5 INJECTION INTRAVENOUS at 09:02

## 2025-02-04 RX ADMIN — FLUOROURACIL 4990 MG: 50 INJECTION, SOLUTION INTRAVENOUS at 12:02

## 2025-02-04 RX ADMIN — DEXTROSE MONOHYDRATE 830 MG: 12500 INJECTION, SOLUTION INTRAVENOUS at 09:02

## 2025-02-04 NOTE — PROGRESS NOTES
"CHEMO SCHOOL- NUTRITION     Nik Lowery, 59 y.o. female is here for chemotherapy infusion of: mFOLFOX. Diagnosis: colon cancer. I met with patient for chemo school today. Patient reports that her appetite is good, she feels a bit constipated and started taking Colace. She drinks water, juice and Body Armor. She is taking 20 units of Glargine daily and Metformin 500mg ER daily but has not been checking her blood sugars. We discussed obtaining a CGM and changes she may have to make to keep BG stable. Patient denies having any nutrition related questions or concerns at the current time.      Wt Readings from Last 5 Encounters:   02/04/25 93 kg (205 lb 1.6 oz)   02/03/25 93.6 kg (206 lb 5.6 oz)   01/31/25 93.9 kg (207 lb 0.2 oz)   01/29/25 93 kg (205 lb)   01/27/25 93.9 kg (207 lb)      Last Labs:  Last Labs:  Glucose   Date Value Ref Range Status   01/31/2025 191 (H) 70 - 110 mg/dL Final   01/11/2025 174 (H) 70 - 110 mg/dL Final     BUN   Date Value Ref Range Status   01/31/2025 10 6 - 20 mg/dL Final   01/11/2025 9 6 - 20 mg/dL Final     Creatinine   Date Value Ref Range Status   01/31/2025 1.0 0.5 - 1.4 mg/dL Final   01/11/2025 0.8 0.5 - 1.4 mg/dL Final     Sodium   Date Value Ref Range Status   01/31/2025 138 136 - 145 mmol/L Final   01/11/2025 137 136 - 145 mmol/L Final     Potassium   Date Value Ref Range Status   01/31/2025 4.3 3.5 - 5.1 mmol/L Final   01/11/2025 3.8 3.5 - 5.1 mmol/L Final     Phosphorus   Date Value Ref Range Status   01/11/2025 3.6 2.7 - 4.5 mg/dL Final   01/10/2025 2.5 (L) 2.7 - 4.5 mg/dL Final     Calcium   Date Value Ref Range Status   01/31/2025 9.0 8.7 - 10.5 mg/dL Final   01/11/2025 9.0 8.7 - 10.5 mg/dL Final     No results found for: "PREALBUMIN"  Total Protein   Date Value Ref Range Status   01/31/2025 7.8 6.0 - 8.4 g/dL Final   01/11/2025 6.7 6.0 - 8.4 g/dL Final     Cholesterol   Date Value Ref Range Status   12/01/2023 155 120 - 199 mg/dL Final     Comment:     The National " "Cholesterol Education Program (NCEP) has set the  following guidelines (reference ranges) for Cholesterol:  Optimal.....................<200 mg/dL  Borderline High.............200-239 mg/dL  High........................> or = 240 mg/dL     04/12/2023 149 120 - 199 mg/dL Final     Comment:     The National Cholesterol Education Program (NCEP) has set the  following guidelines (reference ranges) for Cholesterol:  Optimal.....................<200 mg/dL  Borderline High.............200-239 mg/dL  High........................> or = 240 mg/dL       Hemoglobin A1C   Date Value Ref Range Status   11/07/2024 6.5 (H) 4.5 - 6.2 % Final     Comment:     ADA Screening Guidelines:  5.7-6.4%  Consistent with prediabetes  >or=6.5%  Consistent with diabetes    High levels of fetal hemoglobin interfere with the HbA1C  assay. Heterozygous hemoglobin variants (HbS, HgC, etc)do  not significantly interfere with this assay.   However, presence of multiple variants may affect accuracy.     12/01/2023 6.1 (H) 4.0 - 5.6 % Final     Comment:     ADA Screening Guidelines:  5.7-6.4%  Consistent with prediabetes  >or=6.5%  Consistent with diabetes    High levels of fetal hemoglobin interfere with the HbA1C  assay. Heterozygous hemoglobin variants (HbS, HgC, etc)do  not significantly interfere with this assay.   However, presence of multiple variants may affect accuracy.       Hemoglobin   Date Value Ref Range Status   01/31/2025 9.6 (L) 12.0 - 16.0 g/dL Final   01/17/2025 9.3 (L) 12.0 - 16.0 g/dL Final     Hematocrit   Date Value Ref Range Status   01/31/2025 33.0 (L) 37.0 - 48.5 % Final   01/17/2025 32.5 (L) 37.0 - 48.5 % Final     Iron   Date Value Ref Range Status   02/03/2025 50 30 - 160 ug/dL Final   12/26/2024 20 (L) 30 - 160 ug/dL Final     No components found for: "FROLATE"  No results found for: "[id number]"  WBC   Date Value Ref Range Status   01/31/2025 7.57 3.90 - 12.70 K/uL Final   01/17/2025 6.87 3.90 - 12.70 K/uL Final "     Plan:   Reviewed chemo school packet & provided copy to pt.   Discussed importance of maintaining wt & staying hydrated.   Reviewed food safety guidelines recommended during treatment.   Discussed alternate sources of hydration  Reviewed blood sugar goals  Provided RD contact info & encouraged pt to call with any questions/concerns.   Will f/u as needed.     Electronically signed by: Toña Lynch MS, RDN/LDN, [facility]

## 2025-02-04 NOTE — PLAN OF CARE
Problem: Fatigue  Goal: Improved Activity Tolerance  Reactivated  Intervention: Promote Improved Energy  Flowsheets (Taken 2/4/2025 9689)  Fatigue Management:   fatigue-related activity identified   frequent rest breaks encouraged   paced activity encouraged  Sleep/Rest Enhancement:   regular sleep/rest pattern promoted   relaxation techniques promoted  Activity Management: Ambulated -L4  Environmental Support: rest periods encouraged

## 2025-02-04 NOTE — NURSING
Med/Onc follow up with patient at the chairside for her C1D1 of chemotherapy to provide continued support during her treatment pt do report any acute needs and no barriers or distress noted will continue to monitor patient she has my direct contact information advised to call for any assistance needed she verbalized understanding to all  Oncology Navigation   Intake  Cancer Type: GI (Symptomatic anemia-Cecal CA)  Type of Referral: Internal (Dr Juarez)  Date of Referral: 24  Initial Nurse Navigator Contact: 24  Referral to Initial Contact Timeline (days): 0  First Appointment Available: 24  Appointment Date: 24 (Dr Crawford)  First Available Date vs. Scheduled Date (days): 0     Treatment  Current Status: Active    Surgical Oncologist: Dr Logan Juarez;Dr Mike Henry    Medical Oncologist: Dr Amarjit Crawford  Consult Date: 24  Chemotherapy: Initiated  Chemotherapy Regimen: FOLFOX       Procedures: MRI; PET scan  MRI Schedule Date: 24  PET Scan Schedule Date: 11/15/24             Support Systems: Friends / neighbors  Concerns: Patients has severe Anemia     Acuity  Treatment Tolerability: Minimal symptoms  ECO  Comorbidities in Medical History: 2   Needed: 0  Support: 0  Transportation: 0  Verbalizes the need for more education: 0  Navigation Acuity: 3     Follow Up  No follow-ups on file.   Treatment Summary   Plan Name: OP GI [id number] (oxaliplatin leucovorin fluorouracil) Q2W  Treatment Goal: Control  Status: Active  Start Date: 2025  End Date: 7/10/2025 (Planned)  Provider: Amarjit Crawford MD  Chemotherapy: fluorouraciL injection 830 mg, 400 mg/m2 = 830 mg, Intravenous, Clinic/HOD 1 time, 1 of 12 cycles  Administration: 830 mg (2025)    oxaliplatin (ELOXATIN) 85 mg/m2 = 177 mg in D5W 600.4 mL chemo infusion, 85 mg/m2 = 177 mg, Intravenous, Clinic/HOD 1 time, 1 of 12 cycles  Administration: 177 mg (2025)    fluorouracil (Adrucil) 2,400 mg/m2 = 4,990 mg in 0.9% NaCl  250 mL chemo infusion, 2,400 mg/m2 = 4,990 mg, Intravenous, Over 46 hours, 1 of 12 cycles  Administration: 4,990 mg (2/4/2025)

## 2025-02-06 ENCOUNTER — INFUSION (OUTPATIENT)
Dept: INFUSION THERAPY | Facility: HOSPITAL | Age: 60
End: 2025-02-06
Attending: INTERNAL MEDICINE
Payer: COMMERCIAL

## 2025-02-06 VITALS
BODY MASS INDEX: 33.82 KG/M2 | SYSTOLIC BLOOD PRESSURE: 125 MMHG | RESPIRATION RATE: 18 BRPM | DIASTOLIC BLOOD PRESSURE: 83 MMHG | HEIGHT: 65 IN | OXYGEN SATURATION: 95 % | HEART RATE: 84 BPM | WEIGHT: 203 LBS | TEMPERATURE: 97 F

## 2025-02-06 DIAGNOSIS — C18.2 MALIGNANT NEOPLASM OF ASCENDING COLON: Primary | ICD-10-CM

## 2025-02-06 PROCEDURE — 96523 IRRIG DRUG DELIVERY DEVICE: CPT

## 2025-02-06 PROCEDURE — 25000003 PHARM REV CODE 250: Performed by: INTERNAL MEDICINE

## 2025-02-06 PROCEDURE — A4216 STERILE WATER/SALINE, 10 ML: HCPCS | Performed by: INTERNAL MEDICINE

## 2025-02-06 PROCEDURE — 63600175 PHARM REV CODE 636 W HCPCS: Performed by: INTERNAL MEDICINE

## 2025-02-06 RX ORDER — HEPARIN 100 UNIT/ML
500 SYRINGE INTRAVENOUS
Status: DISCONTINUED | OUTPATIENT
Start: 2025-02-06 | End: 2025-02-06 | Stop reason: HOSPADM

## 2025-02-06 RX ORDER — PROCHLORPERAZINE EDISYLATE 5 MG/ML
10 INJECTION INTRAMUSCULAR; INTRAVENOUS ONCE AS NEEDED
Status: DISCONTINUED | OUTPATIENT
Start: 2025-02-06 | End: 2025-02-06 | Stop reason: HOSPADM

## 2025-02-06 RX ORDER — SODIUM CHLORIDE 0.9 % (FLUSH) 0.9 %
10 SYRINGE (ML) INJECTION
Status: DISCONTINUED | OUTPATIENT
Start: 2025-02-06 | End: 2025-02-06 | Stop reason: HOSPADM

## 2025-02-06 RX ADMIN — HEPARIN 500 UNITS: 100 SYRINGE at 10:02

## 2025-02-06 RX ADMIN — Medication 10 ML: at 10:02

## 2025-02-06 NOTE — PROGRESS NOTES
Nik Lowery is a 59 year old diagnosed with colon cancer.  I met with patient during her chemo infusion to complete new patient orientation and to complete the NCCN Distress Screening; patient indicated a rating of 2.  Patient is connected with mental health supports; she is seen by Dr. Loly Vinson, [facility].  She denied needing communitt supports at this time.  I provided patient with the [facility] community events flyer and my contact information in the event supportive services arise in the future.

## 2025-02-06 NOTE — NURSING
Pt has a blister from the central line dressing that popped when the dressing was removed. Irritated sit was cleaned with CHG and dresses with gauze and paper tape. Patient was instructed to keep area clean and to apply triple antibiotic until healed. Patient verbalized understanding. She will need a sensitive skin dressing for all future treatments. Noted on sticky note in chart.    Briana Carey RN

## 2025-02-10 ENCOUNTER — OFFICE VISIT (OUTPATIENT)
Dept: HEMATOLOGY/ONCOLOGY | Facility: CLINIC | Age: 60
End: 2025-02-10
Payer: COMMERCIAL

## 2025-02-10 ENCOUNTER — EXTERNAL HOME HEALTH (OUTPATIENT)
Dept: HOME HEALTH SERVICES | Facility: HOSPITAL | Age: 60
End: 2025-02-10
Payer: COMMERCIAL

## 2025-02-10 ENCOUNTER — LAB VISIT (OUTPATIENT)
Dept: LAB | Facility: HOSPITAL | Age: 60
End: 2025-02-10
Attending: INTERNAL MEDICINE
Payer: COMMERCIAL

## 2025-02-10 VITALS
OXYGEN SATURATION: 99 % | BODY MASS INDEX: 33.28 KG/M2 | HEIGHT: 65 IN | WEIGHT: 199.75 LBS | RESPIRATION RATE: 18 BRPM | DIASTOLIC BLOOD PRESSURE: 83 MMHG | TEMPERATURE: 97 F | SYSTOLIC BLOOD PRESSURE: 127 MMHG | HEART RATE: 87 BPM

## 2025-02-10 DIAGNOSIS — C78.7 METASTASIS TO LIVER: ICD-10-CM

## 2025-02-10 DIAGNOSIS — C78.7 METASTASIS TO LIVER: Primary | ICD-10-CM

## 2025-02-10 DIAGNOSIS — D64.9 SYMPTOMATIC ANEMIA: ICD-10-CM

## 2025-02-10 DIAGNOSIS — C18.2 MALIGNANT NEOPLASM OF ASCENDING COLON: ICD-10-CM

## 2025-02-10 LAB
ALBUMIN SERPL BCP-MCNC: 4.1 G/DL (ref 3.5–5.2)
ALP SERPL-CCNC: 110 U/L (ref 40–150)
ALT SERPL W/O P-5'-P-CCNC: 60 U/L (ref 10–44)
ANION GAP SERPL CALC-SCNC: 12 MMOL/L (ref 8–16)
AST SERPL-CCNC: 58 U/L (ref 10–40)
BASOPHILS # BLD AUTO: 0.04 K/UL (ref 0–0.2)
BASOPHILS NFR BLD: 0.5 % (ref 0–1.9)
BILIRUB SERPL-MCNC: 0.4 MG/DL (ref 0.1–1)
BUN SERPL-MCNC: 14 MG/DL (ref 6–20)
CALCIUM SERPL-MCNC: 9.5 MG/DL (ref 8.7–10.5)
CHLORIDE SERPL-SCNC: 101 MMOL/L (ref 95–110)
CO2 SERPL-SCNC: 24 MMOL/L (ref 23–29)
CREAT SERPL-MCNC: 1 MG/DL (ref 0.5–1.4)
DIFFERENTIAL METHOD BLD: ABNORMAL
EOSINOPHIL # BLD AUTO: 0.3 K/UL (ref 0–0.5)
EOSINOPHIL NFR BLD: 3.8 % (ref 0–8)
ERYTHROCYTE [DISTWIDTH] IN BLOOD BY AUTOMATED COUNT: 22.1 % (ref 11.5–14.5)
EST. GFR  (NO RACE VARIABLE): >60 ML/MIN/1.73 M^2
GLUCOSE SERPL-MCNC: 149 MG/DL (ref 70–110)
HCT VFR BLD AUTO: 38.1 % (ref 37–48.5)
HGB BLD-MCNC: 11.7 G/DL (ref 12–16)
IMM GRANULOCYTES # BLD AUTO: 0.04 K/UL (ref 0–0.04)
IMM GRANULOCYTES NFR BLD AUTO: 0.5 % (ref 0–0.5)
LYMPHOCYTES # BLD AUTO: 3.4 K/UL (ref 1–4.8)
LYMPHOCYTES NFR BLD: 44.1 % (ref 18–48)
MAGNESIUM SERPL-MCNC: 1.5 MG/DL (ref 1.6–2.6)
MCH RBC QN AUTO: 25.9 PG (ref 27–31)
MCHC RBC AUTO-ENTMCNC: 30.7 G/DL (ref 32–36)
MCV RBC AUTO: 84 FL (ref 82–98)
MONOCYTES # BLD AUTO: 0.4 K/UL (ref 0.3–1)
MONOCYTES NFR BLD: 5.3 % (ref 4–15)
NEUTROPHILS # BLD AUTO: 3.5 K/UL (ref 1.8–7.7)
NEUTROPHILS NFR BLD: 45.8 % (ref 38–73)
NRBC BLD-RTO: 0 /100 WBC
PLATELET # BLD AUTO: 299 K/UL (ref 150–450)
PMV BLD AUTO: 9.6 FL (ref 9.2–12.9)
POTASSIUM SERPL-SCNC: 4.2 MMOL/L (ref 3.5–5.1)
PROT SERPL-MCNC: 8.4 G/DL (ref 6–8.4)
RBC # BLD AUTO: 4.52 M/UL (ref 4–5.4)
SODIUM SERPL-SCNC: 137 MMOL/L (ref 136–145)
WBC # BLD AUTO: 7.68 K/UL (ref 3.9–12.7)

## 2025-02-10 PROCEDURE — 99215 OFFICE O/P EST HI 40 MIN: CPT | Mod: S$GLB,,, | Performed by: INTERNAL MEDICINE

## 2025-02-10 PROCEDURE — 4010F ACE/ARB THERAPY RXD/TAKEN: CPT | Mod: CPTII,S$GLB,, | Performed by: INTERNAL MEDICINE

## 2025-02-10 PROCEDURE — 3008F BODY MASS INDEX DOCD: CPT | Mod: CPTII,S$GLB,, | Performed by: INTERNAL MEDICINE

## 2025-02-10 PROCEDURE — 1111F DSCHRG MED/CURRENT MED MERGE: CPT | Mod: CPTII,S$GLB,, | Performed by: INTERNAL MEDICINE

## 2025-02-10 PROCEDURE — 83735 ASSAY OF MAGNESIUM: CPT | Performed by: INTERNAL MEDICINE

## 2025-02-10 PROCEDURE — 85025 COMPLETE CBC W/AUTO DIFF WBC: CPT | Performed by: INTERNAL MEDICINE

## 2025-02-10 PROCEDURE — 3079F DIAST BP 80-89 MM HG: CPT | Mod: CPTII,S$GLB,, | Performed by: INTERNAL MEDICINE

## 2025-02-10 PROCEDURE — 1159F MED LIST DOCD IN RCRD: CPT | Mod: CPTII,S$GLB,, | Performed by: INTERNAL MEDICINE

## 2025-02-10 PROCEDURE — 99999 PR PBB SHADOW E&M-EST. PATIENT-LVL V: CPT | Mod: PBBFAC,,, | Performed by: INTERNAL MEDICINE

## 2025-02-10 PROCEDURE — G2211 COMPLEX E/M VISIT ADD ON: HCPCS | Mod: S$GLB,,, | Performed by: INTERNAL MEDICINE

## 2025-02-10 PROCEDURE — 36415 COLL VENOUS BLD VENIPUNCTURE: CPT | Performed by: INTERNAL MEDICINE

## 2025-02-10 PROCEDURE — 80053 COMPREHEN METABOLIC PANEL: CPT | Performed by: INTERNAL MEDICINE

## 2025-02-10 PROCEDURE — 3074F SYST BP LT 130 MM HG: CPT | Mod: CPTII,S$GLB,, | Performed by: INTERNAL MEDICINE

## 2025-02-10 RX ORDER — EPINEPHRINE 0.3 MG/.3ML
0.3 INJECTION SUBCUTANEOUS ONCE AS NEEDED
OUTPATIENT
Start: 2025-02-18

## 2025-02-10 RX ORDER — SODIUM CHLORIDE 0.9 % (FLUSH) 0.9 %
10 SYRINGE (ML) INJECTION
OUTPATIENT
Start: 2025-02-20

## 2025-02-10 RX ORDER — HEPARIN 100 UNIT/ML
500 SYRINGE INTRAVENOUS
OUTPATIENT
Start: 2025-02-20

## 2025-02-10 RX ORDER — DIPHENHYDRAMINE HYDROCHLORIDE 50 MG/ML
50 INJECTION INTRAMUSCULAR; INTRAVENOUS ONCE AS NEEDED
OUTPATIENT
Start: 2025-02-18

## 2025-02-10 RX ORDER — PROCHLORPERAZINE EDISYLATE 5 MG/ML
10 INJECTION INTRAMUSCULAR; INTRAVENOUS ONCE AS NEEDED
OUTPATIENT
Start: 2025-02-18

## 2025-02-10 RX ORDER — PROCHLORPERAZINE EDISYLATE 5 MG/ML
10 INJECTION INTRAMUSCULAR; INTRAVENOUS ONCE AS NEEDED
OUTPATIENT
Start: 2025-02-20

## 2025-02-10 RX ORDER — HEPARIN 100 UNIT/ML
500 SYRINGE INTRAVENOUS
OUTPATIENT
Start: 2025-02-18

## 2025-02-10 RX ORDER — SODIUM CHLORIDE 0.9 % (FLUSH) 0.9 %
10 SYRINGE (ML) INJECTION
OUTPATIENT
Start: 2025-02-18

## 2025-02-10 RX ORDER — FLUOROURACIL 50 MG/ML
400 INJECTION, SOLUTION INTRAVENOUS
OUTPATIENT
Start: 2025-02-18

## 2025-02-10 NOTE — PROGRESS NOTES
HPI    58 years old female with history of diabetes type II, hypertension and herpes    She was recently diagnosed with cecal mass causing significant bleeding require hospitalization and urgent transfusion.  She received 2 units packed red blood cell during the course of hospitalization.  Colonoscopy showed likely malignant tumor ascending colon nearby cecum.  CT of the abdomen pelvis contrast demonstrate concerning cecal malignancy with to right lower quadrant abnormal lymph nodes concerning for metastases with indeterminate right hepatic lobe 10 mm lesion      Past Medical History:   Diagnosis Date    Colon cancer     Diabetes mellitus     Diabetes mellitus, type 2     Encounter for blood transfusion     Herpes     Hypertension     Sleep apnea      Social History     Socioeconomic History    Marital status: Single   Occupational History    Occupation: supply chain   Tobacco Use    Smoking status: Never    Smokeless tobacco: Never   Substance and Sexual Activity    Alcohol use: No    Drug use: No    Sexual activity: Not Currently     Social Drivers of Health     Financial Resource Strain: Low Risk  (1/6/2025)    Overall Financial Resource Strain (CARDIA)     Difficulty of Paying Living Expenses: Not hard at all   Food Insecurity: No Food Insecurity (1/6/2025)    Hunger Vital Sign     Worried About Running Out of Food in the Last Year: Never true     Ran Out of Food in the Last Year: Never true   Transportation Needs: No Transportation Needs (1/6/2025)    TRANSPORTATION NEEDS     Transportation : No   Physical Activity: Insufficiently Active (11/26/2024)    Exercise Vital Sign     Days of Exercise per Week: 1 day     Minutes of Exercise per Session: 20 min   Stress: No Stress Concern Present (1/6/2025)    [employer] [place] [employer] - Occupational Stress Questionnaire     Feeling of Stress : Not at all   Recent Concern: Stress - Stress Concern Present (11/26/2024)    [employer] [place] [employer] - Occupational Stress Questionnaire     Feeling of Stress : Rather much   Housing Stability: Low Risk  (1/6/2025)    Housing Stability Vital Sign     Unable to Pay for Housing in the Last Year: No     Homeless in the Last Year: No         Subjective      Review of Systems   Constitutional: Negative for appetite change, fatigue and unexpected weight change.   HENT: Negative for mouth sores.   Eyes: Negative for visual disturbance.   Respiratory: Negative for cough and shortness of breath.   Cardiovascular: Negative for chest pain.   Gastrointestinal: Negative for diarrhea.   Genitourinary: Negative for frequency.   Musculoskeletal: Negative for back pain.   Skin: Negative for rash.   Neurological: Negative for headaches.   Hematological: Negative for adenopathy.   Psychiatric/Behavioral: The patient is not nervous/anxious.   All other systems reviewed and are negative.     Objective    Physical Exam   Vitals:    02/10/25 1259   BP: 127/83   Pulse: 87   Resp: 18   Temp: 97.1 °F (36.2 °C)         Constitutional: patient is oriented to person, place, and time. patient appears well-developed and well-nourished. No distress.   HENT:   Right Ear: External ear normal.   Left Ear: External ear normal.   Nose: Nose normal.   Mouth/Throat: Oropharynx is clear and moist. No oropharyngeal exudate.   Teeth, gums and lips are normal   No sinus tenderness   Palate, tongue, posterior pharynx are normal   Eyes: Conjunctivae and lids are normal.   Neck: Trachea normal and normal range of motion. No thyromegaly   Cardiovascular: Normal rate, regular rhythm, normal heart sounds, intact distal pulses and normal pulses.   No murmur heard.   No edema, no tenderness in the extremities.   Pulmonary/Chest: Effort normal and breath sounds normal. No accessory muscle usage. patient has no wheezes..   Abdominal: Soft. Normal appearance and bowel sounds are normal. patient exhibits no distension and no mass. There is no  hepatosplenomegaly. There is no tenderness.   Musculoskeletal: Normal range of motion.   Gait is normal   No clubbing, cyanosis     Lymphadenopathy:   Head (right side): No submental and no submandibular adenopathy present.   Head (left side): No submental and no submandibular adenopathy present.   patient has no cervical adenopathy.   Right: No supraclavicular adenopathy present.   Left: No supraclavicular adenopathy present.   Neurological: patient is alert and oriented to person, place, and time. patient has normal strength and normal reflexes. No sensory deficit. Gait normal.   Skin: Skin is warm, dry and intact. No bruising, no lesion and no rash noted. No cyanosis. Nails show no clubbing.   No lesions   Psychiatric: patient has a normal mood and affect. patient speech is normal and behavior is normal. Judgment normal. Cognition and memory are normal.   Vitals reviewed.     Lab Results   Component Value Date    WBC 7.68 02/10/2025    HGB 11.7 (L) 02/10/2025    HCT 38.1 02/10/2025    MCV 84 02/10/2025     02/10/2025       CMP  Sodium   Date Value Ref Range Status   02/10/2025 137 136 - 145 mmol/L Final     Potassium   Date Value Ref Range Status   02/10/2025 4.2 3.5 - 5.1 mmol/L Final     Chloride   Date Value Ref Range Status   02/10/2025 101 95 - 110 mmol/L Final     CO2   Date Value Ref Range Status   02/10/2025 24 23 - 29 mmol/L Final     Glucose   Date Value Ref Range Status   02/10/2025 149 (H) 70 - 110 mg/dL Final     BUN   Date Value Ref Range Status   02/10/2025 14 6 - 20 mg/dL Final     Creatinine   Date Value Ref Range Status   02/10/2025 1.0 0.5 - 1.4 mg/dL Final     Calcium   Date Value Ref Range Status   02/10/2025 9.5 8.7 - 10.5 mg/dL Final     Total Protein   Date Value Ref Range Status   02/10/2025 8.4 6.0 - 8.4 g/dL Final     Albumin   Date Value Ref Range Status   02/10/2025 4.1 3.5 - 5.2 g/dL Final     Total Bilirubin   Date Value Ref Range Status   02/10/2025 0.4 0.1 - 1.0 mg/dL Final      Comment:     For infants and newborns, interpretation of results should be based  on gestational age, weight and in agreement with clinical  observations.    Premature Infant recommended reference ranges:  Up to 24 hours.............<8.0 mg/dL  Up to 48 hours............<12.0 mg/dL  3-5 days..................<15.0 mg/dL  6-29 days.................<15.0 mg/dL       Alkaline Phosphatase   Date Value Ref Range Status   02/10/2025 110 40 - 150 U/L Final     AST   Date Value Ref Range Status   02/10/2025 58 (H) 10 - 40 U/L Final     ALT   Date Value Ref Range Status   02/10/2025 60 (H) 10 - 44 U/L Final     Anion Gap   Date Value Ref Range Status   02/10/2025 12 8 - 16 mmol/L Final     eGFR   Date Value Ref Range Status   02/10/2025 >60 >60 mL/min/1.73 m^2 Final     CT chest abdomen pelvis with contrast   Impression:     Findings concerning for cecal malignancy.  There are least 2 right lower quadrant abnormal lymph nodes concerning for metastatic disease.     Indeterminate right hepatic lobe 10 mm lesion.  Recommend further characterization with liver mass protocol MRI.     Small fat containing umbilical and infraumbilical hernias.     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  Status post resection cecum for bleeding control tumor size 4.8 cm and 4 mm 2 separate foci.  Lymphovascular invasion positive.  Lymph nodes involvement 3/16 positive for malignancy.  MSI stable    Pathological staging [id number]    Pharmacokinetic panel 24 rapid metabolizer CY,  [id number] and CY    CT demonstrated possible liver involvement with 10 mm right hepatic lobe lesions.    Status post IR biopsy liver positive for malignancy    > has diabetes we will consult nutrition    > anxiety consult oncology psychiatry    > NGS Caris results?     > microwave started on 24 single sessions competed     > C1D1 FOLFOX 25    > elevated liver enzymes secondary to  microwave ablation    > anemia blood loss check iron and ferritin        Plan    There are no diagnoses linked to this encounter.

## 2025-02-12 ENCOUNTER — PATIENT MESSAGE (OUTPATIENT)
Dept: HEMATOLOGY/ONCOLOGY | Facility: CLINIC | Age: 60
End: 2025-02-12
Payer: COMMERCIAL

## 2025-02-13 ENCOUNTER — OFFICE VISIT (OUTPATIENT)
Dept: PSYCHIATRY | Facility: CLINIC | Age: 60
End: 2025-02-13
Payer: COMMERCIAL

## 2025-02-13 ENCOUNTER — TELEPHONE (OUTPATIENT)
Dept: HEMATOLOGY/ONCOLOGY | Facility: CLINIC | Age: 60
End: 2025-02-13
Payer: COMMERCIAL

## 2025-02-13 ENCOUNTER — PATIENT MESSAGE (OUTPATIENT)
Dept: HEMATOLOGY/ONCOLOGY | Facility: CLINIC | Age: 60
End: 2025-02-13
Payer: COMMERCIAL

## 2025-02-13 DIAGNOSIS — F41.1 GENERALIZED ANXIETY DISORDER: Primary | ICD-10-CM

## 2025-02-13 DIAGNOSIS — E08.00 DIABETES MELLITUS DUE TO UNDERLYING CONDITION WITH HYPEROSMOLARITY WITHOUT COMA, UNSPECIFIED WHETHER LONG TERM INSULIN USE: Primary | ICD-10-CM

## 2025-02-13 DIAGNOSIS — C78.7 METASTASIS TO LIVER: ICD-10-CM

## 2025-02-13 DIAGNOSIS — C18.2 MALIGNANT NEOPLASM OF ASCENDING COLON: ICD-10-CM

## 2025-02-13 RX ORDER — FLASH GLUCOSE SENSOR
1 KIT MISCELLANEOUS DAILY PRN
Qty: 1 KIT | Refills: 24 | Status: SHIPPED | OUTPATIENT
Start: 2025-02-13

## 2025-02-13 NOTE — PROGRESS NOTES
PSYCHO-ONCOLOGY NOTE/ Individual Psychotherapy     Date: 2/13/2025   Site:  [facility]      Therapeutic Intervention: Met with patient.  Outpatient - Behavior modifying psychotherapy 45 min - CPT code 34079    This includes face to face time and non-face to face time preparing to see the patient, obtaining and/or reviewing separately obtained history, documenting clinical information in the electronic or other health record, independently interpreting results and communicating results to the patient/family/caregiver, or care coordinator.      Patient was last seen by me on 1/27/2025    Problem list  Patient Active Problem List   Diagnosis    Uncontrolled type 2 diabetes mellitus with hyperglycemia, without long-term current use of insulin    Hypertension associated with diabetes    Anxiety and depression    Gastroesophageal reflux disease without esophagitis    Obesity    Decreased ROM of lumbar spine    Hyperlipidemia associated with type 2 diabetes mellitus    Low back pain    Iron deficiency anemia    Malignant neoplasm of ascending colon    Metastasis to liver    Colon adenocarcinoma    Symptomatic anemia    Fever       Chief complaint/reason for encounter: anxiety and adjustment    Met with patient to evaluate psychosocial adaptation to diagnosis/treatment/survivorship of colon cancer.     Current Medications  Current Outpatient Medications   Medication    amoxicillin-clavulanate 875-125mg (AUGMENTIN) 875-125 mg per tablet    atorvastatin (LIPITOR) 20 MG tablet    blood-glucose meter kit    dexAMETHasone (DECADRON) 4 MG Tab    docusate sodium (COLACE) 100 MG capsule    fexofenadine (ALLEGRA) 180 MG tablet    flash glucose sensor (FREESTYLE JOCELIN 14 DAY SENSOR) Kit    fluticasone propionate (FLONASE) 50 mcg/actuation nasal spray    gabapentin (NEURONTIN) 800 MG tablet    guaiFENesin (MUCINEX) 1,200 mg Ta12    HYDROcodone-acetaminophen (NORCO) 5-325 mg per tablet    insulin glargine, TOUJEO, (TOUJEO SOLOSTAR  "U-300 INSULIN) 300 unit/mL (1.5 mL) InPn pen    lamoTRIgine (LAMICTAL) 150 MG Tab    lancets Misc    LIDOcaine-prilocaine (EMLA) cream    lisinopriL (PRINIVIL,ZESTRIL) 20 MG tablet    metFORMIN (GLUCOPHAGE-XR) 500 MG ER 24hr tablet    multivitamin (THERAGRAN) per tablet    omeprazole (PRILOSEC) 20 MG capsule    ondansetron (ZOFRAN) 8 MG tablet    ONETOUCH ULTRA BLUE TEST STRIP Strp    pen needle, diabetic (PEN NEEDLE) 31 gauge x 5/16" Ndle    prochlorperazine (COMPAZINE) 10 MG tablet    sertraline (ZOLOFT) 100 MG tablet    valACYclovir (VALTREX) 500 MG tablet     Current Facility-Administered Medications   Medication Frequency    diphenhydrAMINE capsule 25 mg PRN    sodium chloride 0.9% flush 10 mL PRN       ONCOLOGY HISTORY  Oncology History   Malignant neoplasm of ascending colon   12/12/2024 Initial Diagnosis    Malignant neoplasm of ascending colon     12/18/2024 Cancer Staged    Staging form: Colon and Rectum, AJCC 8th Edition  - Clinical: Stage BIBIANA (cT3, cN1a, cM1a)     2/4/2025 -  Chemotherapy    Treatment Summary   Plan Name: OP GI [id number] (oxaliplatin leucovorin fluorouracil) Q2W  Treatment Goal: Control  Status: Active  Start Date: 2/4/2025  End Date: 7/10/2025 (Planned)  Provider: Amarjit Crawford MD  Chemotherapy: fluorouraciL injection 830 mg, 400 mg/m2 = 830 mg, Intravenous, Clinic/HOD 1 time, 1 of 12 cycles  Administration: 830 mg (2/4/2025)  oxaliplatin (ELOXATIN) 85 mg/m2 = 177 mg in D5W 600.4 mL chemo infusion, 85 mg/m2 = 177 mg, Intravenous, Clinic/HOD 1 time, 1 of 12 cycles  Administration: 177 mg (2/4/2025)  fluorouracil (Adrucil) 2,400 mg/m2 = 4,990 mg in 0.9% NaCl 250 mL chemo infusion, 2,400 mg/m2 = 4,990 mg, Intravenous, Over 46 hours, 1 of 12 cycles  Administration: 4,990 mg (2/4/2025)         Objective:  Nik Lowery arrived promptly for the session.  Ms. Lowery was independently ambulatory at the time of session. The patient was fully cooperative throughout the session.  Appearance: age " appropriate, appropriately  dressed, adequately  groomed  Behavior/Cooperation: friendly and cooperative  Speech: normal in rate, volume, and tone and appropriate quality, quantity and organization of sentences  Mood: steady  Affect: mood congruent and appropriate  Thought Process: goal-directed, logical  Thought Content: normal,  No delusions or paranoia; did not appear to be responding to internal stimuli during the session  Orientation: grossly intact  Memory: grossly intact  Attention Span/Concentration: Attends to session without distraction; reports no difficulty  Fund of Knowledge: average  Estimate of Intelligence: average from verbal skills and history  Cognition: grossly intact  Insight: patient has awareness of illness; good insight into own behavior and behavior of others  Judgment: the patient's behavior is adequate to circumstances    [facility]:       2/10/2025    12:01 AM 2/4/2025     2:43 PM 1/31/2025    10:03 AM 1/30/2025    11:42 PM 1/6/2025    12:33 PM 12/26/2024     1:15 AM 12/17/2024    11:37 PM   DISTRESS SCREENING   Distress Score 3  2 0 - No Distress 3  5  7  7    Practical Concerns None of these   None of these Finances;Transportation  Finances  Finances  Taking care of myself;Work;Finances;Insurance;Treatment decisions    Social Concerns None of these   None of these None of these  None of these  None of these  Communication with health care team    Emotional Concerns None of these  Worry or anxiety None of these Worry or anxiety  Worry or anxiety  Worry or anxiety;Fear  Worry or anxiety;Fear;Loneliness    Spiritual or Hindu Concerns None of these   None of these None of these  None of these  None of these  Sense of meaning or purpose    Physical Concerns None of these   Loss or change of physical abilities;Pain Pain  Sleep;Memory or concentration  Sleep;Fatigue  Fatigue;Memory or concentration        Patient-reported          2/14/2025   PHQ-9 Depression Patient Health  Questionnaire   Over the last two weeks how often have you been bothered by little interest or pleasure in doing things 1   Over the last two weeks how often have you been bothered by feeling down, depressed or hopeless 0   Over the last two weeks how often have you been bothered by trouble falling or staying asleep, or sleeping too much 3   Over the last two weeks how often have you been bothered by feeling tired or having little energy 3   Over the last two weeks how often have you been bothered by a poor appetite or overeating 1   Over the last two weeks how often have you been bothered by feeling bad about yourself - or that you are a failure or have let yourself or your family down 0   Over the last two weeks how often have you been bothered by trouble concentrating on things, such as reading the newspaper or watching television 2   Over the last two weeks how often have you been bothered by moving or speaking so slowly that other people could have noticed. 0   Over the last two weeks how often have you been bothered by thoughts that you would be better off dead, or of hurting yourself 0   If you checked off any problems, how difficult have these problems made it for you to do your work, take care of things at home or get along with other people? Somewhat difficult   PHQ-9 Score 10          1/27/2025     3:22 PM 2/14/2025     9:06 AM   MALI-7   Was test performed? Yes Yes   1. Feeling nervous, anxious, or on edge? Several days Several days   2. Not being able to stop or control worrying? Not at all Several days   3. Worrying too much about different things? Several days Several days   4. Trouble relaxing? Not at all Not at all   5. Being so restless that it is hard to sit still? Not at all Not at all   6. Becoming easily annoyed or irritable? Not at all Not at all   7. Feeling afraid as if something awful might happen? Not at all Not at all   8. If you checked off any problems, how difficult have these problems  made it for you to do your work, take care of things at home, or get along with other people? Somewhat difficult Not difficult at all   MALI-7 Score 2 3   Number answered (out of first 7) 7 7   Interpretation Normal Normal       Interval history and content of current session:Patient reported experiencing mild side effects from cancer treatment but expressed concern about potential worsening as treatment progresses. Emotional processing techniques were utilized to validate the patient's experience and enhance insight into her emotions and current coping strategies. The patient acknowledged significant difficulties with sleep onset, often not falling asleep until 5 AM despite spending several hours in bed, and recognized that negative racing thoughts at night may be contributing to these challenges. Psychoeducation on insomnia and sleep hygiene was provided, along with cognitive restructuring techniques to address maladaptive sleep-related thoughts.     Risk parameters:   Patient reports no suicidal ideation  Patient reports no homicidal ideation  Patient reports no self-injurious behavior  Patient reports no violent behavior     Safety needs:  None at this time      Verbal deficits: None     Patient's response to intervention:The patient's response to intervention is accepting.     Progress toward goals and other mental status changes:  The patient's progress toward goals is good.      Progress to date:Progress as Expected      Patient Strengths: verbal, intelligent, successful, good social support, good insight, commitment to wellness,     Treatment Plan:individual psychotherapy and medication management by physician  Target symptoms: anxiety , adjustment  Why chosen therapy is appropriate versus another modality: relevant to diagnosis  Outcome monitoring methods: self-report, observation, checklist/rating scale  Therapeutic intervention type: insight oriented psychotherapy, supportive psychotherapy  Prognosis:  Good      Behavioral goals:    Therapy: Patient will identify and challenge negative sleep-related thoughts by replacing them with more helpful and supportive alternatives.    Return to clinic: 3 weeks     Length of Service (minutes direct face-to-face contact): 45    Diagnosis:     ICD-10-CM ICD-9-CM   1. Generalized anxiety disorder  F41.1 300.02   2. Malignant neoplasm of ascending colon  C18.2 153.6   3. Metastasis to liver  C78.7 197.7       Loyl Vinson PsyD   Clinical Health Psychology Fellow

## 2025-02-16 NOTE — PROGRESS NOTES
The patient location is: home  The chief complaint leading to consultation is: colon cancer    Visit type: audiovisual    Face to Face time with patient: 10  20 minutes of total time spent on the encounter, which includes face to face time and non-face to face time preparing to see the patient (eg, review of tests), Obtaining and/or reviewing separately obtained history, Documenting clinical information in the electronic or other health record, Independently interpreting results (not separately reported) and communicating results to the patient/family/caregiver, or Care coordination (not separately reported).         Each patient to whom he or she provides medical services by telemedicine is:  (1) informed of the relationship between the physician and patient and the respective role of any other health care provider with respect to management of the patient; and (2) notified that he or she may decline to receive medical services by telemedicine and may withdraw from such care at any time.    Notes:        HPI    59 years old female with history of diabetes type II, hypertension and herpes    She was recently diagnosed with cecal mass causing significant bleeding require hospitalization and urgent transfusion.  She received 2 units packed red blood cell during the course of hospitalization.  Colonoscopy showed likely malignant tumor ascending colon nearby cecum.  CT of the abdomen pelvis contrast demonstrate concerning cecal malignancy with to right lower quadrant abnormal lymph nodes concerning for metastases with indeterminate right hepatic lobe 10 mm lesion      Past Medical History:   Diagnosis Date    Colon cancer     Diabetes mellitus     Diabetes mellitus, type 2     Encounter for blood transfusion     Herpes     Hypertension     Sleep apnea      Social History     Socioeconomic History    Marital status: Single   Occupational History    Occupation: supply chain   Tobacco Use    Smoking status: Never     Smokeless tobacco: Never   Substance and Sexual Activity    Alcohol use: No    Drug use: No    Sexual activity: Not Currently     Social Drivers of Health     Financial Resource Strain: Low Risk  (1/6/2025)    Overall Financial Resource Strain (CARDIA)     Difficulty of Paying Living Expenses: Not hard at all   Food Insecurity: No Food Insecurity (1/6/2025)    Hunger Vital Sign     Worried About Running Out of Food in the Last Year: Never true     Ran Out of Food in the Last Year: Never true   Transportation Needs: No Transportation Needs (1/6/2025)    TRANSPORTATION NEEDS     Transportation : No   Physical Activity: Insufficiently Active (11/26/2024)    Exercise Vital Sign     Days of Exercise per Week: 1 day     Minutes of Exercise per Session: 20 min   Stress: No Stress Concern Present (1/6/2025)    [employer] - Occupational Stress Questionnaire     Feeling of Stress : Not at all   Recent Concern: Stress - Stress Concern Present (11/26/2024)    [employer] - Occupational Stress Questionnaire     Feeling of Stress : Rather much   Housing Stability: Unknown (1/6/2025)    Housing Stability Vital Sign     Unable to Pay for Housing in the Last Year: No     Homeless in the Last Year: No         Objective    Physical Exam     VV       Lab Results   Component Value Date    WBC 7.68 02/10/2025    HGB 11.7 (L) 02/10/2025    HCT 38.1 02/10/2025    MCV 84 02/10/2025     02/10/2025       CMP  Sodium   Date Value Ref Range Status   02/10/2025 137 136 - 145 mmol/L Final     Potassium   Date Value Ref Range Status   02/10/2025 4.2 3.5 - 5.1 mmol/L Final     Chloride   Date Value Ref Range Status   02/10/2025 101 95 - 110 mmol/L Final     CO2   Date Value Ref Range Status   02/10/2025 24 23 - 29 mmol/L Final     Glucose   Date Value Ref Range Status   02/10/2025 149 (H) 70 - 110 mg/dL Final     BUN   Date Value Ref Range Status   02/10/2025 14 6 - 20 mg/dL Final      Creatinine   Date Value Ref Range Status   02/10/2025 1.0 0.5 - 1.4 mg/dL Final     Calcium   Date Value Ref Range Status   02/10/2025 9.5 8.7 - 10.5 mg/dL Final     Total Protein   Date Value Ref Range Status   02/10/2025 8.4 6.0 - 8.4 g/dL Final     Albumin   Date Value Ref Range Status   02/10/2025 4.1 3.5 - 5.2 g/dL Final     Total Bilirubin   Date Value Ref Range Status   02/10/2025 0.4 0.1 - 1.0 mg/dL Final     Comment:     For infants and newborns, interpretation of results should be based  on gestational age, weight and in agreement with clinical  observations.    Premature Infant recommended reference ranges:  Up to 24 hours.............<8.0 mg/dL  Up to 48 hours............<12.0 mg/dL  3-5 days..................<15.0 mg/dL  6-29 days.................<15.0 mg/dL       Alkaline Phosphatase   Date Value Ref Range Status   02/10/2025 110 40 - 150 U/L Final     AST   Date Value Ref Range Status   02/10/2025 58 (H) 10 - 40 U/L Final     ALT   Date Value Ref Range Status   02/10/2025 60 (H) 10 - 44 U/L Final     Anion Gap   Date Value Ref Range Status   02/10/2025 12 8 - 16 mmol/L Final     eGFR   Date Value Ref Range Status   02/10/2025 >60 >60 mL/min/1.73 m^2 Final     CT chest abdomen pelvis with contrast   Impression:     Findings concerning for cecal malignancy.  There are least 2 right lower quadrant abnormal lymph nodes concerning for metastatic disease.     Indeterminate right hepatic lobe 10 mm lesion.  Recommend further characterization with liver mass protocol MRI.     Small fat containing umbilical and infraumbilical hernias.     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  Status post resection cecum for bleeding control tumor size 4.8 cm and 4 mm 2 separate foci.  Lymphovascular invasion positive.  Lymph nodes involvement 3/16 positive for malignancy.  MSI stable    Pathological staging [id number]    Pharmacokinetic panel 12/19/24 rapid  metabolizer CY,  [id number] and CY    CT demonstrated possible liver involvement with 10 mm right hepatic lobe lesions.    Status post IR biopsy liver positive for malignancy    Microwave started on 24 single sessions competed     > NGS Caris results?     > C1D1 FOLFOX 25    > plan for C2D1 on 25    > elevated liver enzymes secondary to microwave ablation    > anemia blood loss check iron and ferritin        Plan    There are no diagnoses linked to this encounter.

## 2025-02-17 ENCOUNTER — OFFICE VISIT (OUTPATIENT)
Dept: HEMATOLOGY/ONCOLOGY | Facility: CLINIC | Age: 60
End: 2025-02-17
Payer: COMMERCIAL

## 2025-02-17 ENCOUNTER — LAB VISIT (OUTPATIENT)
Dept: LAB | Facility: HOSPITAL | Age: 60
End: 2025-02-17
Attending: INTERNAL MEDICINE
Payer: COMMERCIAL

## 2025-02-17 DIAGNOSIS — C78.7 METASTASIS TO LIVER: ICD-10-CM

## 2025-02-17 DIAGNOSIS — D64.9 SYMPTOMATIC ANEMIA: ICD-10-CM

## 2025-02-17 DIAGNOSIS — C18.2 MALIGNANT NEOPLASM OF ASCENDING COLON: ICD-10-CM

## 2025-02-17 DIAGNOSIS — E08.00 DIABETES MELLITUS DUE TO UNDERLYING CONDITION WITH HYPEROSMOLARITY WITHOUT COMA, UNSPECIFIED WHETHER LONG TERM INSULIN USE: Primary | ICD-10-CM

## 2025-02-17 LAB
ALBUMIN SERPL BCP-MCNC: 3.9 G/DL (ref 3.5–5.2)
ALP SERPL-CCNC: 100 U/L (ref 40–150)
ALT SERPL W/O P-5'-P-CCNC: 36 U/L (ref 10–44)
ANION GAP SERPL CALC-SCNC: 12 MMOL/L (ref 8–16)
AST SERPL-CCNC: 30 U/L (ref 10–40)
BASOPHILS # BLD AUTO: 0.02 K/UL (ref 0–0.2)
BASOPHILS NFR BLD: 0.3 % (ref 0–1.9)
BILIRUB SERPL-MCNC: 0.4 MG/DL (ref 0.1–1)
BUN SERPL-MCNC: 7 MG/DL (ref 6–20)
CALCIUM SERPL-MCNC: 9.2 MG/DL (ref 8.7–10.5)
CHLORIDE SERPL-SCNC: 104 MMOL/L (ref 95–110)
CO2 SERPL-SCNC: 23 MMOL/L (ref 23–29)
CREAT SERPL-MCNC: 0.8 MG/DL (ref 0.5–1.4)
DIFFERENTIAL METHOD BLD: ABNORMAL
EOSINOPHIL # BLD AUTO: 0.3 K/UL (ref 0–0.5)
EOSINOPHIL NFR BLD: 4.4 % (ref 0–8)
ERYTHROCYTE [DISTWIDTH] IN BLOOD BY AUTOMATED COUNT: 21.7 % (ref 11.5–14.5)
EST. GFR  (NO RACE VARIABLE): >60 ML/MIN/1.73 M^2
GLUCOSE SERPL-MCNC: 189 MG/DL (ref 70–110)
HCT VFR BLD AUTO: 37.1 % (ref 37–48.5)
HGB BLD-MCNC: 11.3 G/DL (ref 12–16)
IMM GRANULOCYTES # BLD AUTO: 0.02 K/UL (ref 0–0.04)
IMM GRANULOCYTES NFR BLD AUTO: 0.3 % (ref 0–0.5)
LYMPHOCYTES # BLD AUTO: 1.6 K/UL (ref 1–4.8)
LYMPHOCYTES NFR BLD: 25.9 % (ref 18–48)
MAGNESIUM SERPL-MCNC: 1.8 MG/DL (ref 1.6–2.6)
MCH RBC QN AUTO: 26 PG (ref 27–31)
MCHC RBC AUTO-ENTMCNC: 30.5 G/DL (ref 32–36)
MCV RBC AUTO: 86 FL (ref 82–98)
MONOCYTES # BLD AUTO: 0.5 K/UL (ref 0.3–1)
MONOCYTES NFR BLD: 8.4 % (ref 4–15)
NEUTROPHILS # BLD AUTO: 3.7 K/UL (ref 1.8–7.7)
NEUTROPHILS NFR BLD: 60.7 % (ref 38–73)
NRBC BLD-RTO: 0 /100 WBC
PLATELET # BLD AUTO: 240 K/UL (ref 150–450)
PMV BLD AUTO: 9.4 FL (ref 9.2–12.9)
POTASSIUM SERPL-SCNC: 4.3 MMOL/L (ref 3.5–5.1)
PROT SERPL-MCNC: 7.8 G/DL (ref 6–8.4)
RBC # BLD AUTO: 4.34 M/UL (ref 4–5.4)
SODIUM SERPL-SCNC: 139 MMOL/L (ref 136–145)
WBC # BLD AUTO: 6.07 K/UL (ref 3.9–12.7)

## 2025-02-17 PROCEDURE — 36415 COLL VENOUS BLD VENIPUNCTURE: CPT | Performed by: INTERNAL MEDICINE

## 2025-02-17 PROCEDURE — 83735 ASSAY OF MAGNESIUM: CPT | Performed by: INTERNAL MEDICINE

## 2025-02-17 PROCEDURE — 80053 COMPREHEN METABOLIC PANEL: CPT | Performed by: INTERNAL MEDICINE

## 2025-02-17 PROCEDURE — 85025 COMPLETE CBC W/AUTO DIFF WBC: CPT | Performed by: INTERNAL MEDICINE

## 2025-02-18 ENCOUNTER — INFUSION (OUTPATIENT)
Dept: INFUSION THERAPY | Facility: HOSPITAL | Age: 60
End: 2025-02-18
Attending: INTERNAL MEDICINE
Payer: COMMERCIAL

## 2025-02-18 ENCOUNTER — DOCUMENTATION ONLY (OUTPATIENT)
Dept: NUTRITION | Facility: HOSPITAL | Age: 60
End: 2025-02-18

## 2025-02-18 VITALS
HEART RATE: 91 BPM | SYSTOLIC BLOOD PRESSURE: 139 MMHG | OXYGEN SATURATION: 96 % | BODY MASS INDEX: 34.02 KG/M2 | DIASTOLIC BLOOD PRESSURE: 85 MMHG | WEIGHT: 204.19 LBS | TEMPERATURE: 98 F | HEIGHT: 65 IN | RESPIRATION RATE: 18 BRPM

## 2025-02-18 DIAGNOSIS — C18.2 MALIGNANT NEOPLASM OF ASCENDING COLON: Primary | ICD-10-CM

## 2025-02-18 PROCEDURE — 96368 THER/DIAG CONCURRENT INF: CPT

## 2025-02-18 PROCEDURE — 96415 CHEMO IV INFUSION ADDL HR: CPT

## 2025-02-18 PROCEDURE — A4216 STERILE WATER/SALINE, 10 ML: HCPCS | Performed by: INTERNAL MEDICINE

## 2025-02-18 PROCEDURE — 96416 CHEMO PROLONG INFUSE W/PUMP: CPT

## 2025-02-18 PROCEDURE — 25000003 PHARM REV CODE 250: Performed by: INTERNAL MEDICINE

## 2025-02-18 PROCEDURE — 96413 CHEMO IV INFUSION 1 HR: CPT

## 2025-02-18 PROCEDURE — 96411 CHEMO IV PUSH ADDL DRUG: CPT

## 2025-02-18 PROCEDURE — 63600175 PHARM REV CODE 636 W HCPCS: Performed by: INTERNAL MEDICINE

## 2025-02-18 PROCEDURE — 96367 TX/PROPH/DG ADDL SEQ IV INF: CPT

## 2025-02-18 RX ORDER — DIPHENHYDRAMINE HYDROCHLORIDE 50 MG/ML
50 INJECTION INTRAMUSCULAR; INTRAVENOUS ONCE AS NEEDED
Status: DISCONTINUED | OUTPATIENT
Start: 2025-02-18 | End: 2025-02-18 | Stop reason: HOSPADM

## 2025-02-18 RX ORDER — SODIUM CHLORIDE 0.9 % (FLUSH) 0.9 %
10 SYRINGE (ML) INJECTION
Status: DISCONTINUED | OUTPATIENT
Start: 2025-02-18 | End: 2025-02-18 | Stop reason: HOSPADM

## 2025-02-18 RX ORDER — EPINEPHRINE 0.3 MG/.3ML
0.3 INJECTION SUBCUTANEOUS ONCE AS NEEDED
Status: DISCONTINUED | OUTPATIENT
Start: 2025-02-18 | End: 2025-02-18 | Stop reason: HOSPADM

## 2025-02-18 RX ORDER — PROCHLORPERAZINE EDISYLATE 5 MG/ML
10 INJECTION INTRAMUSCULAR; INTRAVENOUS ONCE AS NEEDED
Status: DISCONTINUED | OUTPATIENT
Start: 2025-02-18 | End: 2025-02-18 | Stop reason: HOSPADM

## 2025-02-18 RX ORDER — FLUOROURACIL 50 MG/ML
400 INJECTION, SOLUTION INTRAVENOUS
Status: COMPLETED | OUTPATIENT
Start: 2025-02-18 | End: 2025-02-18

## 2025-02-18 RX ADMIN — Medication 10 ML: at 01:02

## 2025-02-18 RX ADMIN — SODIUM CHLORIDE 0.25 MG: 9 INJECTION, SOLUTION INTRAVENOUS at 10:02

## 2025-02-18 RX ADMIN — FLUOROURACIL 830 MG: 50 INJECTION, SOLUTION INTRAVENOUS at 01:02

## 2025-02-18 RX ADMIN — OXALIPLATIN 177 MG: 5 INJECTION, SOLUTION INTRAVENOUS at 11:02

## 2025-02-18 RX ADMIN — DEXTROSE MONOHYDRATE: 5 INJECTION INTRAVENOUS at 10:02

## 2025-02-18 RX ADMIN — DEXTROSE MONOHYDRATE 830 MG: 50 INJECTION, SOLUTION INTRAVENOUS at 10:02

## 2025-02-18 RX ADMIN — FLUOROURACIL 4990 MG: 50 INJECTION, SOLUTION INTRAVENOUS at 01:02

## 2025-02-18 NOTE — PLAN OF CARE
Problem: Fatigue  Goal: Improved Activity Tolerance  Outcome: Progressing  Intervention: Promote Improved Energy  Flowsheets (Taken 2/18/2025 1003)  Fatigue Management: frequent rest breaks encouraged  Sleep/Rest Enhancement: regular sleep/rest pattern promoted  Activity Management:   Ambulated -L4   Up in chair - L3  Environmental Support:   calm environment promoted   rest periods encouraged

## 2025-02-18 NOTE — PROGRESS NOTES
Follow up- NUTRITION     Nik Lowery, 59 y.o. female is here for chemotherapy infusion of: mFOLFOX. Diagnosis: colon cancer. I met with patient today and she reports that she is has not gotten her CGM yet but it is at the pharmacy and she is picking it up soon. Patient still reports good appetite, tolerating meals well and has been taking her insulin daily. No nutrition related questions at this time.       Wt Readings from Last 5 Encounters:   02/04/25 93 kg (205 lb 1.6 oz)   02/03/25 93.6 kg (206 lb 5.6 oz)   01/31/25 93.9 kg (207 lb 0.2 oz)   01/29/25 93 kg (205 lb)   01/27/25 93.9 kg (207 lb)     Plan:   Encouraged patient to aaron if she had any questions. Will f/u as needed.     Electronically signed by: Toña Lynch MS, RDN/LDN, CDCES

## 2025-02-20 ENCOUNTER — INFUSION (OUTPATIENT)
Dept: INFUSION THERAPY | Facility: HOSPITAL | Age: 60
End: 2025-02-20
Attending: INTERNAL MEDICINE
Payer: COMMERCIAL

## 2025-02-20 VITALS
WEIGHT: 205 LBS | HEIGHT: 65 IN | RESPIRATION RATE: 18 BRPM | OXYGEN SATURATION: 95 % | DIASTOLIC BLOOD PRESSURE: 87 MMHG | TEMPERATURE: 97 F | HEART RATE: 85 BPM | BODY MASS INDEX: 34.16 KG/M2 | SYSTOLIC BLOOD PRESSURE: 148 MMHG

## 2025-02-20 DIAGNOSIS — C18.2 MALIGNANT NEOPLASM OF ASCENDING COLON: Primary | ICD-10-CM

## 2025-02-20 PROCEDURE — 96523 IRRIG DRUG DELIVERY DEVICE: CPT

## 2025-02-20 PROCEDURE — 25000003 PHARM REV CODE 250: Performed by: INTERNAL MEDICINE

## 2025-02-20 PROCEDURE — 63600175 PHARM REV CODE 636 W HCPCS: Performed by: INTERNAL MEDICINE

## 2025-02-20 PROCEDURE — A4216 STERILE WATER/SALINE, 10 ML: HCPCS | Performed by: INTERNAL MEDICINE

## 2025-02-20 RX ORDER — HEPARIN 100 UNIT/ML
500 SYRINGE INTRAVENOUS
Status: DISCONTINUED | OUTPATIENT
Start: 2025-02-20 | End: 2025-02-20 | Stop reason: HOSPADM

## 2025-02-20 RX ORDER — SODIUM CHLORIDE 0.9 % (FLUSH) 0.9 %
10 SYRINGE (ML) INJECTION
Status: DISCONTINUED | OUTPATIENT
Start: 2025-02-20 | End: 2025-02-20 | Stop reason: HOSPADM

## 2025-02-20 RX ADMIN — SODIUM CHLORIDE, PRESERVATIVE FREE 10 ML: 5 INJECTION INTRAVENOUS at 11:02

## 2025-02-20 RX ADMIN — HEPARIN 500 UNITS: 100 SYRINGE at 11:02

## 2025-02-25 ENCOUNTER — PATIENT MESSAGE (OUTPATIENT)
Dept: HEMATOLOGY/ONCOLOGY | Facility: CLINIC | Age: 60
End: 2025-02-25
Payer: COMMERCIAL

## 2025-02-27 ENCOUNTER — PATIENT MESSAGE (OUTPATIENT)
Dept: HEMATOLOGY/ONCOLOGY | Facility: CLINIC | Age: 60
End: 2025-02-27
Payer: COMMERCIAL

## 2025-02-28 ENCOUNTER — TELEPHONE (OUTPATIENT)
Dept: HEMATOLOGY/ONCOLOGY | Facility: CLINIC | Age: 60
End: 2025-02-28
Payer: COMMERCIAL

## 2025-02-28 ENCOUNTER — PATIENT MESSAGE (OUTPATIENT)
Facility: CLINIC | Age: 60
End: 2025-02-28
Payer: COMMERCIAL

## 2025-02-28 ENCOUNTER — PATIENT MESSAGE (OUTPATIENT)
Dept: FAMILY MEDICINE | Facility: CLINIC | Age: 60
End: 2025-02-28
Payer: COMMERCIAL

## 2025-02-28 ENCOUNTER — HOSPITAL ENCOUNTER (OUTPATIENT)
Dept: RADIOLOGY | Facility: HOSPITAL | Age: 60
Discharge: HOME | End: 2025-02-28
Attending: FAMILY MEDICINE
Payer: COMMERCIAL

## 2025-02-28 ENCOUNTER — PATIENT MESSAGE (OUTPATIENT)
Dept: HEMATOLOGY/ONCOLOGY | Facility: CLINIC | Age: 60
End: 2025-02-28
Payer: COMMERCIAL

## 2025-02-28 DIAGNOSIS — C18.9 MALIGNANT NEOPLASM OF COLON, UNSPECIFIED PART OF COLON: ICD-10-CM

## 2025-02-28 DIAGNOSIS — E08.00 DIABETES MELLITUS DUE TO UNDERLYING CONDITION WITH HYPEROSMOLARITY WITHOUT COMA, UNSPECIFIED WHETHER LONG TERM INSULIN USE: ICD-10-CM

## 2025-02-28 DIAGNOSIS — C78.7 METASTASIS TO LIVER: ICD-10-CM

## 2025-02-28 PROCEDURE — A9585 GADOBUTROL INJECTION: HCPCS | Mod: PO | Performed by: FAMILY MEDICINE

## 2025-02-28 PROCEDURE — 74183 MRI ABD W/O CNTR FLWD CNTR: CPT | Mod: TC,PO

## 2025-02-28 PROCEDURE — 25500020 PHARM REV CODE 255: Mod: PO | Performed by: FAMILY MEDICINE

## 2025-02-28 PROCEDURE — 74183 MRI ABD W/O CNTR FLWD CNTR: CPT | Mod: 26,,, | Performed by: RADIOLOGY

## 2025-02-28 RX ORDER — GADOBUTROL 604.72 MG/ML
9.5 INJECTION INTRAVENOUS
Status: COMPLETED | OUTPATIENT
Start: 2025-02-28 | End: 2025-02-28

## 2025-02-28 RX ORDER — FLASH GLUCOSE SENSOR
1 KIT MISCELLANEOUS DAILY PRN
Qty: 1 KIT | Refills: 24 | Status: SHIPPED | OUTPATIENT
Start: 2025-02-28 | End: 2025-02-28

## 2025-02-28 RX ORDER — FLASH GLUCOSE SENSOR
1 KIT MISCELLANEOUS DAILY PRN
Qty: 1 KIT | Refills: 24 | Status: SHIPPED | OUTPATIENT
Start: 2025-02-28

## 2025-02-28 RX ADMIN — GADOBUTROL 9.5 ML: 604.72 INJECTION INTRAVENOUS at 09:02

## 2025-02-28 NOTE — TELEPHONE ENCOUNTER
This nurse spoke with pt. Explained that rx was sent via e-scribe and fax, and NP Kg is gone for the day. Advised pt to request rx through PCP. Pt states that this can wait until next wk. She does have scheduled f/u with our clinic on 3/3.

## 2025-02-28 NOTE — TELEPHONE ENCOUNTER
----- Message from Maria Del Rosario sent at 2/28/2025  2:47 PM CST -----  FREESTYLE JOCELIN 14 DAY SENSOR Kit IS NOT NEEDED please correct and send the proper as discussed with doctor....Please order Freestyle Jocelin 3 plus sensorCB [phone] speak with pharmacistThere have been several attempts to get this changed, by patient in the portal and by messaging from [employer].

## 2025-03-03 ENCOUNTER — LAB VISIT (OUTPATIENT)
Dept: LAB | Facility: HOSPITAL | Age: 60
End: 2025-03-03
Attending: INTERNAL MEDICINE
Payer: COMMERCIAL

## 2025-03-03 ENCOUNTER — OFFICE VISIT (OUTPATIENT)
Dept: HEMATOLOGY/ONCOLOGY | Facility: CLINIC | Age: 60
End: 2025-03-03
Payer: COMMERCIAL

## 2025-03-03 DIAGNOSIS — E08.00 DIABETES MELLITUS DUE TO UNDERLYING CONDITION WITH HYPEROSMOLARITY WITHOUT COMA, UNSPECIFIED WHETHER LONG TERM INSULIN USE: ICD-10-CM

## 2025-03-03 DIAGNOSIS — T45.1X5A CHEMOTHERAPY-INDUCED NAUSEA: ICD-10-CM

## 2025-03-03 DIAGNOSIS — C78.7 METASTASIS TO LIVER: ICD-10-CM

## 2025-03-03 DIAGNOSIS — G62.0 CHEMOTHERAPY-INDUCED NEUROPATHY: ICD-10-CM

## 2025-03-03 DIAGNOSIS — C18.2 MALIGNANT NEOPLASM OF ASCENDING COLON: Primary | ICD-10-CM

## 2025-03-03 DIAGNOSIS — R11.0 CHEMOTHERAPY-INDUCED NAUSEA: ICD-10-CM

## 2025-03-03 DIAGNOSIS — K12.30 MUCOSITIS: ICD-10-CM

## 2025-03-03 DIAGNOSIS — T45.1X5A CHEMOTHERAPY-INDUCED NEUROPATHY: ICD-10-CM

## 2025-03-03 DIAGNOSIS — E83.42 HYPOMAGNESEMIA: ICD-10-CM

## 2025-03-03 DIAGNOSIS — D64.9 SYMPTOMATIC ANEMIA: ICD-10-CM

## 2025-03-03 LAB
ALBUMIN SERPL BCP-MCNC: 3.5 G/DL (ref 3.5–5.2)
ALP SERPL-CCNC: 88 U/L (ref 40–150)
ALT SERPL W/O P-5'-P-CCNC: 34 U/L (ref 10–44)
ANION GAP SERPL CALC-SCNC: 10 MMOL/L (ref 8–16)
AST SERPL-CCNC: 27 U/L (ref 10–40)
BASOPHILS # BLD AUTO: 0.03 K/UL (ref 0–0.2)
BASOPHILS NFR BLD: 0.6 % (ref 0–1.9)
BILIRUB SERPL-MCNC: 0.2 MG/DL (ref 0.1–1)
BUN SERPL-MCNC: 16 MG/DL (ref 6–20)
CALCIUM SERPL-MCNC: 8.9 MG/DL (ref 8.7–10.5)
CHLORIDE SERPL-SCNC: 103 MMOL/L (ref 95–110)
CO2 SERPL-SCNC: 25 MMOL/L (ref 23–29)
CREAT SERPL-MCNC: 0.8 MG/DL (ref 0.5–1.4)
DIFFERENTIAL METHOD BLD: ABNORMAL
EOSINOPHIL # BLD AUTO: 0.2 K/UL (ref 0–0.5)
EOSINOPHIL NFR BLD: 3.6 % (ref 0–8)
ERYTHROCYTE [DISTWIDTH] IN BLOOD BY AUTOMATED COUNT: 20.4 % (ref 11.5–14.5)
EST. GFR  (NO RACE VARIABLE): >60 ML/MIN/1.73 M^2
GLUCOSE SERPL-MCNC: 274 MG/DL (ref 70–110)
HCT VFR BLD AUTO: 35.2 % (ref 37–48.5)
HGB BLD-MCNC: 11.1 G/DL (ref 12–16)
IMM GRANULOCYTES # BLD AUTO: 0.01 K/UL (ref 0–0.04)
IMM GRANULOCYTES NFR BLD AUTO: 0.2 % (ref 0–0.5)
LYMPHOCYTES # BLD AUTO: 1.5 K/UL (ref 1–4.8)
LYMPHOCYTES NFR BLD: 30 % (ref 18–48)
MAGNESIUM SERPL-MCNC: 1.5 MG/DL (ref 1.6–2.6)
MCH RBC QN AUTO: 26.7 PG (ref 27–31)
MCHC RBC AUTO-ENTMCNC: 31.5 G/DL (ref 32–36)
MCV RBC AUTO: 85 FL (ref 82–98)
MONOCYTES # BLD AUTO: 0.4 K/UL (ref 0.3–1)
MONOCYTES NFR BLD: 8.7 % (ref 4–15)
NEUTROPHILS # BLD AUTO: 2.9 K/UL (ref 1.8–7.7)
NEUTROPHILS NFR BLD: 56.9 % (ref 38–73)
NRBC BLD-RTO: 0 /100 WBC
PLATELET # BLD AUTO: 153 K/UL (ref 150–450)
PMV BLD AUTO: 9 FL (ref 9.2–12.9)
POTASSIUM SERPL-SCNC: 4.2 MMOL/L (ref 3.5–5.1)
PROT SERPL-MCNC: 7.3 G/DL (ref 6–8.4)
RBC # BLD AUTO: 4.15 M/UL (ref 4–5.4)
SODIUM SERPL-SCNC: 138 MMOL/L (ref 136–145)
WBC # BLD AUTO: 5.04 K/UL (ref 3.9–12.7)

## 2025-03-03 PROCEDURE — 36415 COLL VENOUS BLD VENIPUNCTURE: CPT | Performed by: INTERNAL MEDICINE

## 2025-03-03 PROCEDURE — 85025 COMPLETE CBC W/AUTO DIFF WBC: CPT | Performed by: INTERNAL MEDICINE

## 2025-03-03 PROCEDURE — 83735 ASSAY OF MAGNESIUM: CPT | Performed by: INTERNAL MEDICINE

## 2025-03-03 PROCEDURE — 4010F ACE/ARB THERAPY RXD/TAKEN: CPT | Mod: CPTII,95,, | Performed by: INTERNAL MEDICINE

## 2025-03-03 PROCEDURE — 80053 COMPREHEN METABOLIC PANEL: CPT | Performed by: INTERNAL MEDICINE

## 2025-03-03 PROCEDURE — 98007 SYNCH AUDIO-VIDEO EST HI 40: CPT | Mod: 95,,, | Performed by: INTERNAL MEDICINE

## 2025-03-03 RX ORDER — LANOLIN ALCOHOL/MO/W.PET/CERES
400 CREAM (GRAM) TOPICAL 2 TIMES DAILY
Qty: 60 TABLET | Refills: 3 | Status: SHIPPED | OUTPATIENT
Start: 2025-03-03

## 2025-03-03 NOTE — PROGRESS NOTES
The patient location is: home  Visit type: Virtual visit with synchronous audio and video  Face-to-face or time spent with patient on the encounter: 25 min  Total time spent on and for  this encounter which includes non face-to-face time preparing to see patient, review of tests, obtaining and or reviewing separately obtained records documenting clinical information in the electronic or other health records, independently interpreting results which is not separately reported ,and communicating results to the patient/family/caregiver and in care coordination and treatment planning/communicating with pharmacy for prescriptions/addressing social needs/arranging follow-up and or referrals : 25 min     Each patient I provide medical services by telemedicine is:  (1) informed of the relationship between the physician and patient and the respective role of any other health care provider with respect to management of the patient; and (2) notified that he or she may decline to receive medical services by telemedicine and may withdraw from such care at any time.  This is a video visit therefore some elements of the physical exam such as vital signs, heart sounds are breath sounds are not included and may be included if found in recent clinic notes of other providers assessing same patient. Any symptoms or signs that were visualized were stated by the patient may be included in this note.      Service Date:  3/3/25    Chief Complaint: colon cancer    Nik Lwoery is a 59 y.o. female here with metastatic colon cancer.  Patient had a met to the liver that had been ablated at the end of January.  Recently had an MRI which shows the ablation but also multiple new liver lesions unfortunately.  Patient did just start chemotherapy in his here for cycle 3.  Tolerating it mostly well.  Complains of some mucositis that is self-limited.  Has some nausea that improves with antiemetics.  Has some cold induced neuropathy.  No neuropathy  when at regular temperature is.    Review of Systems   Constitutional: Negative.  Negative for appetite change and unexpected weight change.   HENT:  Positive for mouth sores.    Eyes: Negative.  Negative for visual disturbance.   Respiratory: Negative.  Negative for cough and shortness of breath.    Cardiovascular: Negative.  Negative for chest pain.   Gastrointestinal: Negative.  Negative for abdominal pain and diarrhea.   Endocrine: Negative.    Genitourinary: Negative.  Negative for frequency.   Musculoskeletal: Negative.  Negative for back pain.   Integumentary:  Negative for rash. Negative.   Neurological: Negative.  Negative for headaches.   Hematological: Negative.  Negative for adenopathy.   Psychiatric/Behavioral: Negative.  The patient is not nervous/anxious.         Current Outpatient Medications   Medication Instructions    amoxicillin-clavulanate 875-125mg (AUGMENTIN) 875-125 mg per tablet 1 tablet, Oral, 2 times daily    atorvastatin (LIPITOR) 20 mg, Oral    blood-glucose meter kit To check BG 3 times daily, to use with insurance preferred meter    dexAMETHasone (DECADRON) 8 mg, Oral, Daily, On days 2 through 4 of each cycle of chemo    docusate sodium (COLACE) 100 mg, Oral, 2 times daily    fexofenadine (ALLEGRA) 180 mg, Nightly    fluticasone propionate (FLONASE) 100 mcg, Each Nostril    FREESTYLE JOCELIN 14 DAY SENSOR Kit 1 application , Misc.(Non-Drug; Combo Route), Daily PRN    gabapentin (NEURONTIN) 800 mg, 3 times daily    guaiFENesin (MUCINEX) 1,200 mg Ta12 1 tablet, Nightly    HYDROcodone-acetaminophen (NORCO) 5-325 mg per tablet 1 tablet, Oral, Every 6 hours PRN    insulin glargine (TOUJEO) (TOUJEO SOLOSTAR U-300 INSULIN) 20 Units, Subcutaneous, Daily    lamoTRIgine (LAMICTAL) 150 mg, Oral    lancets Misc To check BG 3 times daily, to use with insurance preferred meter    LIDOcaine-prilocaine (EMLA) cream Topical (Top), As needed (PRN)    lisinopriL (PRINIVIL,ZESTRIL) 20 mg, Oral    metFORMIN  "(GLUCOPHAGE-XR) 1,000 mg, Oral, 2 times daily with meals    multivitamin (THERAGRAN) per tablet 1 tablet, Daily    omeprazole (PRILOSEC) 20 mg, Nightly    ondansetron (ZOFRAN) 8 mg, Oral, Every 8 hours PRN    ONETOUCH ULTRA BLUE TEST STRIP Strp USE TO CHECK BLOOD SUGAR 3 TIMES A DAY    pen needle, diabetic (PEN NEEDLE) 31 gauge x 5/16" Ndle 1 Application, Misc.(Non-Drug; Combo Route), Daily    prochlorperazine (COMPAZINE) 10 mg, Oral, Every 6 hours PRN    sertraline (ZOLOFT) 100 mg, Oral    valACYclovir (VALTREX) 500 MG tablet TAKE 1 TABLET BY MOUTH TWICE A DAY        Past Medical History:   Diagnosis Date    Colon cancer     Diabetes mellitus     Diabetes mellitus, type 2     Encounter for blood transfusion     Herpes     Hypertension     Sleep apnea         Past Surgical History:   Procedure Laterality Date    COLONOSCOPY N/A 11/8/2024    Procedure: COLONOSCOPY;  Surgeon: Saw Wick MD;  Location: [facility];  Service: Endoscopy;  Laterality: N/A;    ESOPHAGOGASTRODUODENOSCOPY N/A 11/8/2024    Procedure: EGD (ESOPHAGOGASTRODUODENOSCOPY);  Surgeon: Saw Wick MD;  Location: [facility];  Service: Endoscopy;  Laterality: N/A;    EYE SURGERY  2002    lasix Bilateral    HYSTERECTOMY  2012    INSERTION OF TUNNELED CENTRAL VENOUS CATHETER (CVC) WITH SUBCUTANEOUS PORT Right 12/20/2024    Procedure: INSERTION, PORT-A-CATH;  Surgeon: Logan Juarez MD;  Location: [facility];  Service: General;  Laterality: Right;    LUMBAR DISCECTOMY      ROBOT-ASSISTED COLECTOMY Right 1/7/2025    Procedure: ROBOTIC COLECTOMY WITH ILEOCOLIC ANASTOMOSIS;  Surgeon: Logan Juarez MD;  Location: [facility] OR;  Service: General;  Laterality: Right;    WISDOM TOOTH EXTRACTION          Family History   Problem Relation Name Age of Onset    Heart disease Mother      Hypertension Mother      Cataracts Mother      Pancreatic cancer Father      Cancer Father      Arthritis Sister 2     Diabetes Brother 1     No Known Problems Paternal Aunt " 1     Heart disease Paternal Uncle 2     Heart disease Maternal Grandfather      Alzheimer's disease Paternal Grandmother      Glaucoma Neg Hx         Social History[1]      There were no vitals filed for this visit.     Physical Exam:  There were no vitals taken for this visit.    Physical Exam  Constitutional:       Appearance: Normal appearance.   HENT:      Head: Normocephalic and atraumatic.      Nose: Nose normal.      Mouth/Throat:      Mouth: Mucous membranes are moist.      Pharynx: Oropharynx is clear.   Eyes:      Conjunctiva/sclera: Conjunctivae normal.   Cardiovascular:      Rate and Rhythm: Normal rate and regular rhythm.      Heart sounds: Normal heart sounds.   Pulmonary:      Effort: Pulmonary effort is normal.      Breath sounds: Normal breath sounds.   Abdominal:      General: Abdomen is flat. Bowel sounds are normal.      Palpations: Abdomen is soft.   Musculoskeletal:         General: Normal range of motion.      Cervical back: Normal range of motion and neck supple.   Skin:     General: Skin is warm and dry.   Neurological:      General: No focal deficit present.      Mental Status: She is alert and oriented to person, place, and time. Mental status is at baseline.   Psychiatric:         Mood and Affect: Mood normal.          Labs:  Lab Results   Component Value Date    WBC 5.04 03/03/2025    RBC 4.15 03/03/2025    HGB 11.1 (L) 03/03/2025    HCT 35.2 (L) 03/03/2025    MCV 85 03/03/2025    MCH 26.7 (L) 03/03/2025    MCHC 31.5 (L) 03/03/2025    RDW 20.4 (H) 03/03/2025     03/03/2025    MPV 9.0 (L) 03/03/2025    GRAN 2.9 03/03/2025    GRAN 56.9 03/03/2025    LYMPH 1.5 03/03/2025    LYMPH 30.0 03/03/2025    MONO 0.4 03/03/2025    MONO 8.7 03/03/2025    EOS 0.2 03/03/2025    BASO 0.03 03/03/2025    EOSINOPHIL 3.6 03/03/2025    BASOPHIL 0.6 03/03/2025     Sodium   Date Value Ref Range Status   03/03/2025 138 136 - 145 mmol/L Final     Potassium   Date Value Ref Range Status   03/03/2025 4.2  3.5 - 5.1 mmol/L Final     Chloride   Date Value Ref Range Status   03/03/2025 103 95 - 110 mmol/L Final     CO2   Date Value Ref Range Status   03/03/2025 25 23 - 29 mmol/L Final     Glucose   Date Value Ref Range Status   03/03/2025 274 (H) 70 - 110 mg/dL Final     BUN   Date Value Ref Range Status   03/03/2025 16 6 - 20 mg/dL Final     Creatinine   Date Value Ref Range Status   03/03/2025 0.8 0.5 - 1.4 mg/dL Final     Calcium   Date Value Ref Range Status   03/03/2025 8.9 8.7 - 10.5 mg/dL Final     Total Protein   Date Value Ref Range Status   03/03/2025 7.3 6.0 - 8.4 g/dL Final     Albumin   Date Value Ref Range Status   03/03/2025 3.5 3.5 - 5.2 g/dL Final     Total Bilirubin   Date Value Ref Range Status   03/03/2025 0.2 0.1 - 1.0 mg/dL Final     Comment:     For infants and newborns, interpretation of results should be based  on gestational age, weight and in agreement with clinical  observations.    Premature Infant recommended reference ranges:  Up to 24 hours.............<8.0 mg/dL  Up to 48 hours............<12.0 mg/dL  3-5 days..................<15.0 mg/dL  6-29 days.................<15.0 mg/dL       Alkaline Phosphatase   Date Value Ref Range Status   03/03/2025 88 40 - 150 U/L Final     AST   Date Value Ref Range Status   03/03/2025 27 10 - 40 U/L Final     ALT   Date Value Ref Range Status   03/03/2025 34 10 - 44 U/L Final     Anion Gap   Date Value Ref Range Status   03/03/2025 10 8 - 16 mmol/L Final     eGFR if    Date Value Ref Range Status   05/21/2022 >60.0 >60 mL/min/1.73 m^2 Final     eGFR if non    Date Value Ref Range Status   05/21/2022 >60.0 >60 mL/min/1.73 m^2 Final     Comment:     Calculation used to obtain the estimated glomerular filtration  rate (eGFR) is the CKD-EPI equation.          A/P:    Metastatic colon cancer with liver metastases  -s/p ablation of 1 liver lesion 1/29/25  -repeat MRI 2/28/25 unfortunately shows multiple liver lesions.  We discuss  the results.  I asked her to discuss this with Dr Crawford as well.  I encouraged her to continue her chemotherapy as she has only had 2 cycles of treatment so far.  -proceed with cycle 3 of treatment   -return to clinic in 2 weeks for cycle 4    Mucositis   -self-limited     Hypomagnesemia   -will give prescription for oral magnesium b.i.d.    Chemotherapy-induced neuropathy   -grade 1   -continue to monitor     Chemotherapy-induced nausea   -controlled well with antiemetics      Aurash Khoobehi, MD  Hematology and Oncology         [1]   Social History  Tobacco Use    Smoking status: Never    Smokeless tobacco: Never   Substance Use Topics    Alcohol use: No    Drug use: No

## 2025-03-05 ENCOUNTER — INFUSION (OUTPATIENT)
Dept: INFUSION THERAPY | Facility: HOSPITAL | Age: 60
End: 2025-03-05
Attending: INTERNAL MEDICINE
Payer: COMMERCIAL

## 2025-03-05 VITALS
HEART RATE: 79 BPM | HEIGHT: 65 IN | OXYGEN SATURATION: 96 % | SYSTOLIC BLOOD PRESSURE: 137 MMHG | RESPIRATION RATE: 16 BRPM | TEMPERATURE: 97 F | WEIGHT: 201.81 LBS | DIASTOLIC BLOOD PRESSURE: 86 MMHG | BODY MASS INDEX: 33.62 KG/M2

## 2025-03-05 DIAGNOSIS — C18.2 MALIGNANT NEOPLASM OF ASCENDING COLON: Primary | ICD-10-CM

## 2025-03-05 PROCEDURE — 96413 CHEMO IV INFUSION 1 HR: CPT

## 2025-03-05 PROCEDURE — 96367 TX/PROPH/DG ADDL SEQ IV INF: CPT

## 2025-03-05 PROCEDURE — 25000003 PHARM REV CODE 250: Performed by: INTERNAL MEDICINE

## 2025-03-05 PROCEDURE — 96415 CHEMO IV INFUSION ADDL HR: CPT

## 2025-03-05 PROCEDURE — 96411 CHEMO IV PUSH ADDL DRUG: CPT

## 2025-03-05 PROCEDURE — 96368 THER/DIAG CONCURRENT INF: CPT

## 2025-03-05 PROCEDURE — 63600175 PHARM REV CODE 636 W HCPCS: Performed by: INTERNAL MEDICINE

## 2025-03-05 PROCEDURE — 96416 CHEMO PROLONG INFUSE W/PUMP: CPT

## 2025-03-05 RX ORDER — FLUOROURACIL 50 MG/ML
400 INJECTION, SOLUTION INTRAVENOUS
Status: CANCELLED | OUTPATIENT
Start: 2025-03-05

## 2025-03-05 RX ORDER — SODIUM CHLORIDE 0.9 % (FLUSH) 0.9 %
10 SYRINGE (ML) INJECTION
Status: CANCELLED | OUTPATIENT
Start: 2025-03-07

## 2025-03-05 RX ORDER — DIPHENHYDRAMINE HYDROCHLORIDE 50 MG/ML
50 INJECTION INTRAMUSCULAR; INTRAVENOUS ONCE AS NEEDED
Status: CANCELLED | OUTPATIENT
Start: 2025-03-05

## 2025-03-05 RX ORDER — SODIUM CHLORIDE 0.9 % (FLUSH) 0.9 %
10 SYRINGE (ML) INJECTION
Status: DISCONTINUED | OUTPATIENT
Start: 2025-03-05 | End: 2025-03-05 | Stop reason: HOSPADM

## 2025-03-05 RX ORDER — EPINEPHRINE 0.3 MG/.3ML
0.3 INJECTION SUBCUTANEOUS ONCE AS NEEDED
Status: CANCELLED | OUTPATIENT
Start: 2025-03-05

## 2025-03-05 RX ORDER — HEPARIN 100 UNIT/ML
500 SYRINGE INTRAVENOUS
Status: CANCELLED | OUTPATIENT
Start: 2025-03-07

## 2025-03-05 RX ORDER — FLUOROURACIL 50 MG/ML
400 INJECTION, SOLUTION INTRAVENOUS
Status: COMPLETED | OUTPATIENT
Start: 2025-03-05 | End: 2025-03-05

## 2025-03-05 RX ORDER — PROCHLORPERAZINE EDISYLATE 5 MG/ML
10 INJECTION INTRAMUSCULAR; INTRAVENOUS ONCE AS NEEDED
Status: DISCONTINUED | OUTPATIENT
Start: 2025-03-05 | End: 2025-03-05 | Stop reason: HOSPADM

## 2025-03-05 RX ORDER — SODIUM CHLORIDE 0.9 % (FLUSH) 0.9 %
10 SYRINGE (ML) INJECTION
Status: CANCELLED | OUTPATIENT
Start: 2025-03-05

## 2025-03-05 RX ORDER — EPINEPHRINE 0.3 MG/.3ML
0.3 INJECTION SUBCUTANEOUS ONCE AS NEEDED
Status: DISCONTINUED | OUTPATIENT
Start: 2025-03-05 | End: 2025-03-05 | Stop reason: HOSPADM

## 2025-03-05 RX ORDER — PROCHLORPERAZINE EDISYLATE 5 MG/ML
10 INJECTION INTRAMUSCULAR; INTRAVENOUS ONCE AS NEEDED
Status: CANCELLED | OUTPATIENT
Start: 2025-03-07

## 2025-03-05 RX ORDER — DIPHENHYDRAMINE HYDROCHLORIDE 50 MG/ML
50 INJECTION INTRAMUSCULAR; INTRAVENOUS ONCE AS NEEDED
Status: DISCONTINUED | OUTPATIENT
Start: 2025-03-05 | End: 2025-03-05 | Stop reason: HOSPADM

## 2025-03-05 RX ORDER — PROCHLORPERAZINE EDISYLATE 5 MG/ML
10 INJECTION INTRAMUSCULAR; INTRAVENOUS ONCE AS NEEDED
Status: CANCELLED | OUTPATIENT
Start: 2025-03-05

## 2025-03-05 RX ORDER — HEPARIN 100 UNIT/ML
500 SYRINGE INTRAVENOUS
Status: CANCELLED | OUTPATIENT
Start: 2025-03-05

## 2025-03-05 RX ADMIN — OXALIPLATIN 177 MG: 5 INJECTION, SOLUTION INTRAVENOUS at 09:03

## 2025-03-05 RX ADMIN — FLUOROURACIL 4990 MG: 50 INJECTION, SOLUTION INTRAVENOUS at 12:03

## 2025-03-05 RX ADMIN — DEXTROSE MONOHYDRATE: 50 INJECTION, SOLUTION INTRAVENOUS at 09:03

## 2025-03-05 RX ADMIN — FLUOROURACIL 830 MG: 50 INJECTION, SOLUTION INTRAVENOUS at 11:03

## 2025-03-05 RX ADMIN — DEXTROSE MONOHYDRATE 830 MG: 50 INJECTION, SOLUTION INTRAVENOUS at 09:03

## 2025-03-05 RX ADMIN — SODIUM CHLORIDE 0.25 MG: 9 INJECTION, SOLUTION INTRAVENOUS at 09:03

## 2025-03-05 NOTE — NURSING
Educated pt on numbers on back of pump to call with any pump related issues pt stated understanding.

## 2025-03-05 NOTE — TELEPHONE ENCOUNTER
Patient has not been seen by me in 3 years. Will need an appointment before I can treat her any further.

## 2025-03-07 ENCOUNTER — INFUSION (OUTPATIENT)
Dept: INFUSION THERAPY | Facility: HOSPITAL | Age: 60
End: 2025-03-07
Attending: INTERNAL MEDICINE
Payer: COMMERCIAL

## 2025-03-07 VITALS
WEIGHT: 201.38 LBS | TEMPERATURE: 98 F | HEIGHT: 65 IN | SYSTOLIC BLOOD PRESSURE: 122 MMHG | BODY MASS INDEX: 33.55 KG/M2 | DIASTOLIC BLOOD PRESSURE: 80 MMHG | OXYGEN SATURATION: 95 % | HEART RATE: 105 BPM | RESPIRATION RATE: 18 BRPM

## 2025-03-07 DIAGNOSIS — C18.2 MALIGNANT NEOPLASM OF ASCENDING COLON: Primary | ICD-10-CM

## 2025-03-07 PROCEDURE — 96523 IRRIG DRUG DELIVERY DEVICE: CPT

## 2025-03-07 PROCEDURE — 63600175 PHARM REV CODE 636 W HCPCS: Performed by: INTERNAL MEDICINE

## 2025-03-07 PROCEDURE — A4216 STERILE WATER/SALINE, 10 ML: HCPCS | Performed by: INTERNAL MEDICINE

## 2025-03-07 PROCEDURE — 25000003 PHARM REV CODE 250: Performed by: INTERNAL MEDICINE

## 2025-03-07 RX ORDER — HEPARIN 100 UNIT/ML
500 SYRINGE INTRAVENOUS
Status: DISCONTINUED | OUTPATIENT
Start: 2025-03-07 | End: 2025-03-07 | Stop reason: HOSPADM

## 2025-03-07 RX ORDER — SODIUM CHLORIDE 0.9 % (FLUSH) 0.9 %
10 SYRINGE (ML) INJECTION
Status: DISCONTINUED | OUTPATIENT
Start: 2025-03-07 | End: 2025-03-07 | Stop reason: HOSPADM

## 2025-03-07 RX ADMIN — Medication 10 ML: at 10:03

## 2025-03-07 RX ADMIN — HEPARIN 500 UNITS: 100 SYRINGE at 10:03

## 2025-03-10 ENCOUNTER — TELEPHONE (OUTPATIENT)
Dept: HEMATOLOGY/ONCOLOGY | Facility: CLINIC | Age: 60
End: 2025-03-10
Payer: COMMERCIAL

## 2025-03-11 ENCOUNTER — OFFICE VISIT (OUTPATIENT)
Dept: PSYCHIATRY | Facility: CLINIC | Age: 60
End: 2025-03-11
Payer: COMMERCIAL

## 2025-03-11 DIAGNOSIS — C18.2 MALIGNANT NEOPLASM OF ASCENDING COLON: ICD-10-CM

## 2025-03-11 DIAGNOSIS — F41.1 GENERALIZED ANXIETY DISORDER: Primary | ICD-10-CM

## 2025-03-11 NOTE — PROGRESS NOTES
PSYCHO-ONCOLOGY NOTE/ Individual Psychotherapy     Date: 3/11/2025   Site:  [facility]      Therapeutic Intervention: Met with patient.  Outpatient - Behavior modifying psychotherapy 45 min - CPT code 63524    This includes face to face time and non-face to face time preparing to see the patient, obtaining and/or reviewing separately obtained history, documenting clinical information in the electronic or other health record, independently interpreting results and communicating results to the patient/family/caregiver, or care coordinator.      Patient was last seen by me on 2/13/2025    Problem list  Patient Active Problem List   Diagnosis    Uncontrolled type 2 diabetes mellitus with hyperglycemia, without long-term current use of insulin    Hypertension associated with diabetes    Anxiety and depression    Gastroesophageal reflux disease without esophagitis    Obesity    Decreased ROM of lumbar spine    Hyperlipidemia associated with type 2 diabetes mellitus    Low back pain    Iron deficiency anemia    Malignant neoplasm of ascending colon    Metastasis to liver    Colon adenocarcinoma    Symptomatic anemia    Fever       Chief complaint/reason for encounter: anxiety and adjustment     Met with patient to evaluate psychosocial adaptation to diagnosis/treatment/survivorship of colon cancer.    Current Medications  Current Outpatient Medications   Medication    amoxicillin-clavulanate 875-125mg (AUGMENTIN) 875-125 mg per tablet    atorvastatin (LIPITOR) 20 MG tablet    blood-glucose meter kit    dexAMETHasone (DECADRON) 4 MG Tab    docusate sodium (COLACE) 100 MG capsule    fexofenadine (ALLEGRA) 180 MG tablet    fluticasone propionate (FLONASE) 50 mcg/actuation nasal spray    FREESTYLE JOCELIN 14 DAY SENSOR Kit    gabapentin (NEURONTIN) 800 MG tablet    guaiFENesin (MUCINEX) 1,200 mg Ta12    HYDROcodone-acetaminophen (NORCO) 5-325 mg per tablet    insulin glargine, TOUJEO, (TOUJEO SOLOSTAR U-300 INSULIN) 300  "unit/mL (1.5 mL) InPn pen    lamoTRIgine (LAMICTAL) 150 MG Tab    lancets Misc    LIDOcaine-prilocaine (EMLA) cream    lisinopriL (PRINIVIL,ZESTRIL) 20 MG tablet    magnesium oxide (MAG-OX) 400 mg (241.3 mg magnesium) tablet    metFORMIN (GLUCOPHAGE-XR) 500 MG ER 24hr tablet    multivitamin (THERAGRAN) per tablet    omeprazole (PRILOSEC) 20 MG capsule    ondansetron (ZOFRAN) 8 MG tablet    ONETOUCH ULTRA BLUE TEST STRIP Strp    pen needle, diabetic (PEN NEEDLE) 31 gauge x 5/16" Ndle    prochlorperazine (COMPAZINE) 10 MG tablet    sertraline (ZOLOFT) 100 MG tablet    valACYclovir (VALTREX) 500 MG tablet     Current Facility-Administered Medications   Medication Frequency    diphenhydrAMINE capsule 25 mg PRN    sodium chloride 0.9% flush 10 mL PRN       ONCOLOGY HISTORY  Oncology History   Malignant neoplasm of ascending colon   12/12/2024 Initial Diagnosis    Malignant neoplasm of ascending colon     12/18/2024 Cancer Staged    Staging form: Colon and Rectum, AJCC 8th Edition  - Clinical: Stage BIBIANA (cT3, cN1a, cM1a)     2/4/2025 -  Chemotherapy    Treatment Summary   Plan Name: OP GI [id number] (oxaliplatin leucovorin fluorouracil) Q2W  Treatment Goal: Control  Status: Active  Start Date: 2/4/2025  End Date: 7/11/2025 (Planned)  Provider: Amarjit Crawford MD  Chemotherapy: fluorouraciL injection 830 mg, 400 mg/m2 = 830 mg, Intravenous, Clinic/HOD 1 time, 3 of 12 cycles  Administration: 830 mg (2/4/2025), 830 mg (2/18/2025), 830 mg (3/5/2025)  oxaliplatin (ELOXATIN) 85 mg/m2 = 177 mg in D5W 600.4 mL chemo infusion, 85 mg/m2 = 177 mg, Intravenous, Clinic/HOD 1 time, 3 of 12 cycles  Administration: 177 mg (2/4/2025), 177 mg (2/18/2025), 177 mg (3/5/2025)  fluorouracil (Adrucil) 2,400 mg/m2 = 4,990 mg in 0.9% NaCl 250 mL chemo infusion, 2,400 mg/m2 = 4,990 mg, Intravenous, Over 46 hours, 3 of 12 cycles  Administration: 4,990 mg (2/4/2025), 4,990 mg (2/18/2025), 4,990 mg (3/5/2025)         Objective:  Nik Lowery arrived " promptly for the session. Ms. Lowery was independently ambulatory at the time of session. The patient was fully cooperative throughout the session.  Appearance: age appropriate, appropriately  dressed, adequately  groomed  Behavior/Cooperation: friendly and cooperative  Speech: normal in rate, volume, and tone and appropriate quality, quantity and organization of sentences  Mood: steady  Affect: mood congruent and appropriate  Thought Process: goal-directed, logical  Thought Content: normal,  No delusions or paranoia; did not appear to be responding to internal stimuli during the session  Orientation: grossly intact  Memory: grossly intact  Attention Span/Concentration: Attends to session without distraction; reports no difficulty  Fund of Knowledge: average  Estimate of Intelligence: average from verbal skills and history  Cognition: grossly intact  Insight: patient has awareness of illness; good insight into own behavior and behavior of others  Judgment: the patient's behavior is adequate to circumstances    NCCN Distress thermometer:       3/3/2025     1:53 AM 2/16/2025    10:22 PM 2/10/2025    12:01 AM 2/4/2025     2:43 PM 1/31/2025    10:03 AM 1/30/2025    11:42 PM 1/6/2025    12:33 PM   DISTRESS SCREENING   Distress Score 2 4 3 2 0 - No Distress 3 5   Practical Concerns Work;Finances;Treatment decisions Finances;None of these None of these   None of these  Finances;Transportation  Finances    Social Concerns None of these None of these None of these  None of these None of these None of these   Emotional Concerns Worry or anxiety;Sadness or depression;Loneliness Worry or anxiety None of these Worry or anxiety None of these Worry or anxiety Worry or anxiety   Spiritual or Syn[employer] Concerns None of these None of these None of these  None of these None of these None of these   Physical Concerns Sleep;Fatigue;Memory or concentration Sleep;Fatigue None of these  Loss or change of physical abilities;Pain Pain  Sleep;Memory or concentration       Data saved with a previous flowsheet row definition          2/14/2025 3/12/2025   PHQ-9 Depression Patient Health Questionnaire   Over the last two weeks how often have you been bothered by little interest or pleasure in doing things 1 1   Over the last two weeks how often have you been bothered by feeling down, depressed or hopeless 0 0   Over the last two weeks how often have you been bothered by trouble falling or staying asleep, or sleeping too much 3 1   Over the last two weeks how often have you been bothered by feeling tired or having little energy 3 2   Over the last two weeks how often have you been bothered by a poor appetite or overeating 1 0   Over the last two weeks how often have you been bothered by feeling bad about yourself - or that you are a failure or have let yourself or your family down 0 1   Over the last two weeks how often have you been bothered by trouble concentrating on things, such as reading the newspaper or watching television 2 0   Over the last two weeks how often have you been bothered by moving or speaking so slowly that other people could have noticed. 0 0   Over the last two weeks how often have you been bothered by thoughts that you would be better off dead, or of hurting yourself 0 0   If you checked off any problems, how difficult have these problems made it for you to do your work, take care of things at home or get along with other people? Somewhat difficult Not difficult at all   PHQ-9 Score 10 5           1/27/2025     3:22 PM 2/14/2025     9:06 AM 3/12/2025     9:59 AM   MALI-7   Was test performed? Yes Yes Yes   1. Feeling nervous, anxious, or on edge? Several days Several days Not at all   2. Not being able to stop or control worrying? Not at all Several days Not at all   3. Worrying too much about different things? Several days Several days Not at all   4. Trouble relaxing? Not at all Not at all Not at all   5. Being so restless that  "it is hard to sit still? Not at all Not at all Not at all   6. Becoming easily annoyed or irritable? Not at all Not at all Not at all   7. Feeling afraid as if something awful might happen? Not at all Not at all Not at all   8. If you checked off any problems, how difficult have these problems made it for you to do your work, take care of things at home, or get along with other people? Somewhat difficult Not difficult at all Not difficult at all   MALI-7 Score 2 3 0   Number answered (out of first 7) 7 7 7   Interpretation Normal Normal Normal       Interval history and content of current session: The patient reported improvement in sleep since the previous session, identifying helpful strategies such as going to bed when tired and utilizing cognitive restructuring techniques. However, she described ongoing challenges with feeling "lazy" due to fatigue and prolonged periods of rest. Supportive therapeutic techniques were used to validate her emotional experience and examine the meaning she associates with the term "lazy." Additionally, Socratic and open-ended questioning facilitated a discussion on the value of movement, promoting insight into motivational factors. Pacing behavioral strategies were introduced to help manage cancer-related fatigue while supporting her ability to meet personal goals.     Risk parameters:   Patient reports no suicidal ideation  Patient reports no homicidal ideation  Patient reports no self-injurious behavior  Patient reports no violent behavior     Safety needs:  None at this time      Verbal deficits: None     Patient's response to intervention:The patient's response to intervention is accepting.     Progress toward goals and other mental status changes:  The patient's progress toward goals is good.      Progress to date:Progress as Expected      Goals from last visit: Met      Patient Strengths: verbal, intelligent, successful, good social support, good insight, commitment to wellness " "    Treatment Plan:individual psychotherapy and medication management by physician  Target symptoms: anxiety   Why chosen therapy is appropriate versus another modality: relevant to diagnosis  Outcome monitoring methods: self-report, observation, checklist/rating scale  Therapeutic intervention type: insight oriented psychotherapy, behavior modifying psychotherapy  Prognosis: Good      Behavioral goals:    Therapy: Patient will implement pacing strategies, including chunking time and planning tasks into "must, need, and bonus" categories, to manage cancer-related fatigue while maintaining engagement in meaningful activities.    Return to clinic: 3 weeks     Length of Service (minutes direct face-to-face contact): 45    Diagnosis:     ICD-10-CM ICD-9-CM   1. Generalized anxiety disorder  F41.1 300.02   2. Malignant neoplasm of ascending colon  C18.2 153.6       Loly Vinson PsyD   Clinical Health Psychology Fellow      "

## 2025-03-13 ENCOUNTER — OFFICE VISIT (OUTPATIENT)
Dept: INTERVENTIONAL RADIOLOGY/VASCULAR | Facility: CLINIC | Age: 60
End: 2025-03-13
Payer: COMMERCIAL

## 2025-03-13 DIAGNOSIS — C18.9 MALIGNANT NEOPLASM OF COLON, UNSPECIFIED PART OF COLON: ICD-10-CM

## 2025-03-13 DIAGNOSIS — C78.7 METASTASIS TO LIVER: Primary | ICD-10-CM

## 2025-03-13 NOTE — PROGRESS NOTES
Subjective     Patient ID: Nik Lowery is a 59 y.o. female.    Chief Complaint: Cancer    Virtual visit with patient for follow up of biopsy proven (12/13/2024) metastatic colon cancer to liver. Patient's liver lesion was treated with microwave ablation on 1/29/2025. Follow up MRI obtained on 2/28/2025. She endorses feeling well. She denies any abdominal pain or abdominal distention.     Reviewed [facility]/[place] progress note      Review of Systems   Constitutional:  Negative for activity change, appetite change, chills, fatigue and fever.   Respiratory:  Negative for cough, shortness of breath, wheezing and stridor.    Cardiovascular:  Negative for chest pain, palpitations and leg swelling.   Gastrointestinal:  Negative for abdominal distention, abdominal pain, constipation, diarrhea, nausea and vomiting.          Objective     Physical Exam  Constitutional:       General: She is not in acute distress.     Appearance: She is well-developed. She is not diaphoretic.   HENT:      Head: Normocephalic and atraumatic.   Pulmonary:      Effort: Pulmonary effort is normal. No respiratory distress.   Neurological:      Mental Status: She is alert and oriented to person, place, and time.   Psychiatric:         Behavior: Behavior normal.         Thought Content: Thought content normal.         Judgment: Judgment normal.     MRI 2/28/2025  FINDINGS:  There is degradation by motion.     There has been an interval percutaneous ablation of the right hepatic lobe.  A new area of signal alteration peripherally within the anterior segment of the right lobe at the ablation site in complexes the largest of the previously demonstrated subcentimeter hepatic lesions.  The ablation site measures approximately 3.7 cm in transverse dimension, is of mixed signal alteration and demonstrates marginal postcontrast enhancement.     There are numerous additional small, predominantly subcentimeter enhancing lesions scattered throughout the liver which  are T2 hyperintense, likely reflection of multiple additional metastatic foci.  These have increased in size and number in the interval.  The largest of these located within the lateral segment of the left lobe measures 11 mm in maximum dimension.     Otherwise, the liver is normal in size.  The remainder of the visualized intra-abdominal structures demonstrate no significant abnormality or interval change.     Impression:     Interval changes of percutaneous ablation within the anterior segment of the right hepatic lobe.     Interval increase in size/number of multiple small hepatic masses suggestive of multifocal metastatic disease.       Assessment and Plan     1. Metastasis to liver    2. Malignant neoplasm of colon, unspecified part of colon        The patient location is: [place]  The chief complaint leading to consultation is: cancer    Visit type: audiovisual    Face to Face time with patient: 20 minutes  20 minutes minutes of total time spent on the encounter, which includes face to face time and non-face to face time preparing to see the patient (eg, review of tests), Obtaining and/or reviewing separately obtained history, Documenting clinical information in the electronic or other health record, Independently interpreting results (not separately reported) and communicating results to the patient/family/caregiver, or Care coordination (not separately reported).         Each patient to whom he or she provides medical services by telemedicine is:  (1) informed of the relationship between the physician and patient and the respective role of any other health care provider with respect to management of the patient; and (2) notified that he or she may decline to receive medical services by telemedicine and may withdraw from such care at any time.    Notes:   Reviewed MRI with Dr. Casper. Explained to patient MRI shows good response to treatment. Unfortunately, also notes progression. Recommendation is to  continue with systemic therapy as unable to offer any locoregional therapy at this time. Patient verbalized understanding and agreement.

## 2025-03-17 ENCOUNTER — LAB VISIT (OUTPATIENT)
Dept: LAB | Facility: HOSPITAL | Age: 60
End: 2025-03-17
Attending: INTERNAL MEDICINE
Payer: COMMERCIAL

## 2025-03-17 ENCOUNTER — OFFICE VISIT (OUTPATIENT)
Dept: HEMATOLOGY/ONCOLOGY | Facility: CLINIC | Age: 60
End: 2025-03-17
Payer: COMMERCIAL

## 2025-03-17 DIAGNOSIS — F41.9 ANXIETY AND DEPRESSION: ICD-10-CM

## 2025-03-17 DIAGNOSIS — R11.0 CHEMOTHERAPY-INDUCED NAUSEA: ICD-10-CM

## 2025-03-17 DIAGNOSIS — C18.2 MALIGNANT NEOPLASM OF ASCENDING COLON: ICD-10-CM

## 2025-03-17 DIAGNOSIS — G62.0 CHEMOTHERAPY-INDUCED NEUROPATHY: Primary | ICD-10-CM

## 2025-03-17 DIAGNOSIS — T45.1X5A CHEMOTHERAPY-INDUCED NEUROPATHY: Primary | ICD-10-CM

## 2025-03-17 DIAGNOSIS — F32.A ANXIETY AND DEPRESSION: ICD-10-CM

## 2025-03-17 DIAGNOSIS — D64.9 SYMPTOMATIC ANEMIA: ICD-10-CM

## 2025-03-17 DIAGNOSIS — C78.7 METASTASIS TO LIVER: ICD-10-CM

## 2025-03-17 DIAGNOSIS — T45.1X5A CHEMOTHERAPY-INDUCED NAUSEA: ICD-10-CM

## 2025-03-17 LAB
ALBUMIN SERPL BCP-MCNC: 3.6 G/DL (ref 3.5–5.2)
ALP SERPL-CCNC: 91 U/L (ref 40–150)
ALT SERPL W/O P-5'-P-CCNC: 40 U/L (ref 10–44)
ANION GAP SERPL CALC-SCNC: 11 MMOL/L (ref 8–16)
AST SERPL-CCNC: 28 U/L (ref 10–40)
BASOPHILS # BLD AUTO: 0.02 K/UL (ref 0–0.2)
BASOPHILS NFR BLD: 0.4 % (ref 0–1.9)
BILIRUB SERPL-MCNC: 0.3 MG/DL (ref 0.1–1)
BUN SERPL-MCNC: 18 MG/DL (ref 6–20)
CALCIUM SERPL-MCNC: 9 MG/DL (ref 8.7–10.5)
CHLORIDE SERPL-SCNC: 101 MMOL/L (ref 95–110)
CO2 SERPL-SCNC: 24 MMOL/L (ref 23–29)
CREAT SERPL-MCNC: 1 MG/DL (ref 0.5–1.4)
DIFFERENTIAL METHOD BLD: ABNORMAL
EOSINOPHIL # BLD AUTO: 0.1 K/UL (ref 0–0.5)
EOSINOPHIL NFR BLD: 2.6 % (ref 0–8)
ERYTHROCYTE [DISTWIDTH] IN BLOOD BY AUTOMATED COUNT: 20.8 % (ref 11.5–14.5)
EST. GFR  (NO RACE VARIABLE): >60 ML/MIN/1.73 M^2
GLUCOSE SERPL-MCNC: 368 MG/DL (ref 70–110)
HCT VFR BLD AUTO: 36.5 % (ref 37–48.5)
HGB BLD-MCNC: 11.6 G/DL (ref 12–16)
IMM GRANULOCYTES # BLD AUTO: 0.01 K/UL (ref 0–0.04)
IMM GRANULOCYTES NFR BLD AUTO: 0.2 % (ref 0–0.5)
LYMPHOCYTES # BLD AUTO: 1.7 K/UL (ref 1–4.8)
LYMPHOCYTES NFR BLD: 33.5 % (ref 18–48)
MAGNESIUM SERPL-MCNC: 1.5 MG/DL (ref 1.6–2.6)
MCH RBC QN AUTO: 27.3 PG (ref 27–31)
MCHC RBC AUTO-ENTMCNC: 31.8 G/DL (ref 32–36)
MCV RBC AUTO: 86 FL (ref 82–98)
MONOCYTES # BLD AUTO: 0.5 K/UL (ref 0.3–1)
MONOCYTES NFR BLD: 10 % (ref 4–15)
NEUTROPHILS # BLD AUTO: 2.6 K/UL (ref 1.8–7.7)
NEUTROPHILS NFR BLD: 53.3 % (ref 38–73)
NRBC BLD-RTO: 0 /100 WBC
PLATELET # BLD AUTO: 154 K/UL (ref 150–450)
PMV BLD AUTO: 9.3 FL (ref 9.2–12.9)
POTASSIUM SERPL-SCNC: 4.4 MMOL/L (ref 3.5–5.1)
PROT SERPL-MCNC: 7.3 G/DL (ref 6–8.4)
RBC # BLD AUTO: 4.25 M/UL (ref 4–5.4)
SODIUM SERPL-SCNC: 136 MMOL/L (ref 136–145)
WBC # BLD AUTO: 4.92 K/UL (ref 3.9–12.7)

## 2025-03-17 PROCEDURE — 36415 COLL VENOUS BLD VENIPUNCTURE: CPT | Performed by: INTERNAL MEDICINE

## 2025-03-17 PROCEDURE — 85025 COMPLETE CBC W/AUTO DIFF WBC: CPT | Performed by: INTERNAL MEDICINE

## 2025-03-17 PROCEDURE — 83735 ASSAY OF MAGNESIUM: CPT | Performed by: INTERNAL MEDICINE

## 2025-03-17 PROCEDURE — 80053 COMPREHEN METABOLIC PANEL: CPT | Performed by: INTERNAL MEDICINE

## 2025-03-17 RX ORDER — DIPHENHYDRAMINE HYDROCHLORIDE 50 MG/ML
50 INJECTION, SOLUTION INTRAMUSCULAR; INTRAVENOUS ONCE AS NEEDED
Status: CANCELLED | OUTPATIENT
Start: 2025-03-19

## 2025-03-17 RX ORDER — PROCHLORPERAZINE EDISYLATE 5 MG/ML
10 INJECTION INTRAMUSCULAR; INTRAVENOUS ONCE AS NEEDED
Status: CANCELLED | OUTPATIENT
Start: 2025-03-21

## 2025-03-17 RX ORDER — SODIUM CHLORIDE 0.9 % (FLUSH) 0.9 %
10 SYRINGE (ML) INJECTION
Status: CANCELLED | OUTPATIENT
Start: 2025-03-21

## 2025-03-17 RX ORDER — PROCHLORPERAZINE EDISYLATE 5 MG/ML
10 INJECTION INTRAMUSCULAR; INTRAVENOUS ONCE AS NEEDED
Status: CANCELLED | OUTPATIENT
Start: 2025-03-19

## 2025-03-17 RX ORDER — HEPARIN 100 UNIT/ML
500 SYRINGE INTRAVENOUS
Status: CANCELLED | OUTPATIENT
Start: 2025-03-21

## 2025-03-17 RX ORDER — SODIUM CHLORIDE 0.9 % (FLUSH) 0.9 %
10 SYRINGE (ML) INJECTION
Status: CANCELLED | OUTPATIENT
Start: 2025-03-19

## 2025-03-17 RX ORDER — DULOXETIN HYDROCHLORIDE 20 MG/1
40 CAPSULE, DELAYED RELEASE ORAL DAILY
Qty: 60 CAPSULE | Refills: 11 | Status: SHIPPED | OUTPATIENT
Start: 2025-03-17 | End: 2026-03-17

## 2025-03-17 RX ORDER — DULOXETIN HYDROCHLORIDE 20 MG/1
20 CAPSULE, DELAYED RELEASE ORAL DAILY
Qty: 30 CAPSULE | Refills: 2 | Status: SHIPPED | OUTPATIENT
Start: 2025-03-17 | End: 2025-03-17

## 2025-03-17 RX ORDER — EPINEPHRINE 0.3 MG/.3ML
0.3 INJECTION SUBCUTANEOUS ONCE AS NEEDED
Status: CANCELLED | OUTPATIENT
Start: 2025-03-19

## 2025-03-17 RX ORDER — FLUOROURACIL 50 MG/ML
400 INJECTION, SOLUTION INTRAVENOUS
Status: CANCELLED | OUTPATIENT
Start: 2025-03-19

## 2025-03-17 RX ORDER — HEPARIN 100 UNIT/ML
500 SYRINGE INTRAVENOUS
Status: CANCELLED | OUTPATIENT
Start: 2025-03-19

## 2025-03-17 NOTE — PROGRESS NOTES
The patient location is: home  The chief complaint leading to consultation is: colon cancer    Visit type: audiovisual    Face to Face time with patient: 10  20 minutes of total time spent on the encounter, which includes face to face time and non-face to face time preparing to see the patient (eg, review of tests), Obtaining and/or reviewing separately obtained history, Documenting clinical information in the electronic or other health record, Independently interpreting results (not separately reported) and communicating results to the patient/family/caregiver, or Care coordination (not separately reported).         Each patient to whom he or she provides medical services by telemedicine is:  (1) informed of the relationship between the physician and patient and the respective role of any other health care provider with respect to management of the patient; and (2) notified that he or she may decline to receive medical services by telemedicine and may withdraw from such care at any time.    Notes:        HPI    59 years old female with history of diabetes type II, hypertension and herpes    She was recently diagnosed with cecal mass causing significant bleeding require hospitalization and urgent transfusion.  She received 2 units packed red blood cell during the course of hospitalization.  Colonoscopy showed likely malignant tumor ascending colon nearby cecum.  CT of the abdomen pelvis contrast demonstrate concerning cecal malignancy with to right lower quadrant abnormal lymph nodes concerning for metastases with indeterminate right hepatic lobe 10 mm lesion      Past Medical History:   Diagnosis Date    Colon cancer     Diabetes mellitus     Diabetes mellitus, type 2     Encounter for blood transfusion     Herpes     Hypertension     Sleep apnea      Social History     Socioeconomic History    Marital status: Single   Occupational History    Occupation: supply chain   Tobacco Use    Smoking status: Never     Smokeless tobacco: Never   Substance and Sexual Activity    Alcohol use: No    Drug use: No    Sexual activity: Not Currently     Social Drivers of Health     Financial Resource Strain: Low Risk  (2/16/2025)    Overall Financial Resource Strain (CARDIA)     Difficulty of Paying Living Expenses: Not hard at all   Food Insecurity: No Food Insecurity (2/16/2025)    Hunger Vital Sign     Worried About Running Out of Food in the Last Year: Never true     Ran Out of Food in the Last Year: Never true   Transportation Needs: No Transportation Needs (2/16/2025)    PRAPARE - Transportation     Lack of Transportation (Medical): No     Lack of Transportation (Non-Medical): No   Physical Activity: Insufficiently Active (2/16/2025)    Exercise Vital Sign     Days of Exercise per Week: 1 day     Minutes of Exercise per Session: 10 min   Stress: No Stress Concern Present (2/16/2025)    [employer] - Occupational Stress Questionnaire     Feeling of Stress : Only a little   Recent Concern: Stress - Stress Concern Present (11/26/2024)    [employer] - Occupational Stress Questionnaire     Feeling of Stress : Rather much   Housing Stability: Low Risk  (2/16/2025)    Housing Stability Vital Sign     Unable to Pay for Housing in the Last Year: No     Number of Times Moved in the Last Year: 0     Homeless in the Last Year: No         Objective    Physical Exam     VV       Lab Results   Component Value Date    WBC 4.92 03/17/2025    HGB 11.6 (L) 03/17/2025    HCT 36.5 (L) 03/17/2025    MCV 86 03/17/2025     03/17/2025       CMP  Sodium   Date Value Ref Range Status   03/17/2025 136 136 - 145 mmol/L Final     Potassium   Date Value Ref Range Status   03/17/2025 4.4 3.5 - 5.1 mmol/L Final     Chloride   Date Value Ref Range Status   03/17/2025 101 95 - 110 mmol/L Final     CO2   Date Value Ref Range Status   03/17/2025 24 23 - 29 mmol/L Final     Glucose   Date Value Ref Range  Status   03/17/2025 368 (H) 70 - 110 mg/dL Final     BUN   Date Value Ref Range Status   03/17/2025 18 6 - 20 mg/dL Final     Creatinine   Date Value Ref Range Status   03/17/2025 1.0 0.5 - 1.4 mg/dL Final     Calcium   Date Value Ref Range Status   03/17/2025 9.0 8.7 - 10.5 mg/dL Final     Total Protein   Date Value Ref Range Status   03/17/2025 7.3 6.0 - 8.4 g/dL Final     Albumin   Date Value Ref Range Status   03/17/2025 3.6 3.5 - 5.2 g/dL Final     Total Bilirubin   Date Value Ref Range Status   03/17/2025 0.3 0.1 - 1.0 mg/dL Final     Comment:     For infants and newborns, interpretation of results should be based  on gestational age, weight and in agreement with clinical  observations.    Premature Infant recommended reference ranges:  Up to 24 hours.............<8.0 mg/dL  Up to 48 hours............<12.0 mg/dL  3-5 days..................<15.0 mg/dL  6-29 days.................<15.0 mg/dL       Alkaline Phosphatase   Date Value Ref Range Status   03/17/2025 91 40 - 150 U/L Final     AST   Date Value Ref Range Status   03/17/2025 28 10 - 40 U/L Final     ALT   Date Value Ref Range Status   03/17/2025 40 10 - 44 U/L Final     Anion Gap   Date Value Ref Range Status   03/17/2025 11 8 - 16 mmol/L Final     eGFR   Date Value Ref Range Status   03/17/2025 >60 >60 mL/min/1.73 m^2 Final     CT chest abdomen pelvis with contrast   Impression:     Findings concerning for cecal malignancy.  There are least 2 right lower quadrant abnormal lymph nodes concerning for metastatic disease.     Indeterminate right hepatic lobe 10 mm lesion.  Recommend further characterization with liver mass protocol MRI.     Small fat containing umbilical and infraumbilical hernias.     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  Status post resection cecum for bleeding control tumor size 4.8 cm and 4 mm 2 separate foci.  Lymphovascular invasion positive.  Lymph nodes involvement  3/16 positive for malignancy.  MSI stable    Pathological staging [id number]    Pharmacokinetic panel 24 rapid metabolizer CY,  [id number] and CY    CT demonstrated possible liver involvement with 10 mm right hepatic lobe lesions.    Status post IR biopsy liver positive for malignancy    Microwave started on 24 single sessions competed     MRI post ablations study shows 25 interval increase in size/number of multiple small hepatic masses suggestive of multifocal - thus far completed only 3 cycle of folfox    > NGS Caris results?     > C1D1 FOLFOX 25    > low Mag - continue take Mag replacement     > elevated liver enzymes  resolved    > anemia blood loss check iron and ferritin        Plan    There are no diagnoses linked to this encounter.

## 2025-03-19 ENCOUNTER — INFUSION (OUTPATIENT)
Dept: INFUSION THERAPY | Facility: HOSPITAL | Age: 60
End: 2025-03-19
Attending: INTERNAL MEDICINE
Payer: COMMERCIAL

## 2025-03-19 VITALS
SYSTOLIC BLOOD PRESSURE: 163 MMHG | OXYGEN SATURATION: 97 % | DIASTOLIC BLOOD PRESSURE: 94 MMHG | HEIGHT: 65 IN | RESPIRATION RATE: 18 BRPM | BODY MASS INDEX: 33.77 KG/M2 | TEMPERATURE: 97 F | WEIGHT: 202.69 LBS | HEART RATE: 71 BPM

## 2025-03-19 DIAGNOSIS — C18.2 MALIGNANT NEOPLASM OF ASCENDING COLON: Primary | ICD-10-CM

## 2025-03-19 PROCEDURE — 96368 THER/DIAG CONCURRENT INF: CPT

## 2025-03-19 PROCEDURE — 96367 TX/PROPH/DG ADDL SEQ IV INF: CPT

## 2025-03-19 PROCEDURE — 25000003 PHARM REV CODE 250: Performed by: INTERNAL MEDICINE

## 2025-03-19 PROCEDURE — 96416 CHEMO PROLONG INFUSE W/PUMP: CPT

## 2025-03-19 PROCEDURE — 96413 CHEMO IV INFUSION 1 HR: CPT

## 2025-03-19 PROCEDURE — 96415 CHEMO IV INFUSION ADDL HR: CPT

## 2025-03-19 PROCEDURE — 63600175 PHARM REV CODE 636 W HCPCS: Performed by: INTERNAL MEDICINE

## 2025-03-19 PROCEDURE — 96411 CHEMO IV PUSH ADDL DRUG: CPT

## 2025-03-19 RX ORDER — PROCHLORPERAZINE EDISYLATE 5 MG/ML
10 INJECTION INTRAMUSCULAR; INTRAVENOUS ONCE AS NEEDED
Status: DISCONTINUED | OUTPATIENT
Start: 2025-03-19 | End: 2025-03-19 | Stop reason: HOSPADM

## 2025-03-19 RX ORDER — FLUOROURACIL 50 MG/ML
400 INJECTION, SOLUTION INTRAVENOUS
Status: COMPLETED | OUTPATIENT
Start: 2025-03-19 | End: 2025-03-19

## 2025-03-19 RX ORDER — DIPHENHYDRAMINE HYDROCHLORIDE 50 MG/ML
50 INJECTION, SOLUTION INTRAMUSCULAR; INTRAVENOUS ONCE AS NEEDED
Status: DISCONTINUED | OUTPATIENT
Start: 2025-03-19 | End: 2025-03-19 | Stop reason: HOSPADM

## 2025-03-19 RX ORDER — SODIUM CHLORIDE 0.9 % (FLUSH) 0.9 %
10 SYRINGE (ML) INJECTION
Status: DISCONTINUED | OUTPATIENT
Start: 2025-03-19 | End: 2025-03-19 | Stop reason: HOSPADM

## 2025-03-19 RX ORDER — EPINEPHRINE 0.3 MG/.3ML
0.3 INJECTION SUBCUTANEOUS ONCE AS NEEDED
Status: DISCONTINUED | OUTPATIENT
Start: 2025-03-19 | End: 2025-03-19 | Stop reason: HOSPADM

## 2025-03-19 RX ADMIN — SODIUM CHLORIDE 0.25 MG: 9 INJECTION, SOLUTION INTRAVENOUS at 09:03

## 2025-03-19 RX ADMIN — FLUOROURACIL 830 MG: 50 INJECTION, SOLUTION INTRAVENOUS at 11:03

## 2025-03-19 RX ADMIN — DEXTROSE MONOHYDRATE 830 MG: 50 INJECTION, SOLUTION INTRAVENOUS at 09:03

## 2025-03-19 RX ADMIN — FLUOROURACIL 4990 MG: 50 INJECTION, SOLUTION INTRAVENOUS at 11:03

## 2025-03-19 RX ADMIN — OXALIPLATIN 177 MG: 5 INJECTION, SOLUTION INTRAVENOUS at 09:03

## 2025-03-19 RX ADMIN — DEXTROSE MONOHYDRATE: 50 INJECTION, SOLUTION INTRAVENOUS at 09:03

## 2025-03-19 NOTE — PLAN OF CARE
Problem: Fatigue  Goal: Improved Activity Tolerance  Outcome: Progressing  Intervention: Promote Improved Energy  Flowsheets (Taken 3/19/2025 5208)  Fatigue Management:   fatigue-related activity identified   paced activity encouraged   frequent rest breaks encouraged  Sleep/Rest Enhancement:   regular sleep/rest pattern promoted   relaxation techniques promoted  Activity Management: Ambulated -L4  Environmental Support: rest periods encouraged

## 2025-03-21 ENCOUNTER — INFUSION (OUTPATIENT)
Dept: INFUSION THERAPY | Facility: HOSPITAL | Age: 60
End: 2025-03-21
Attending: INTERNAL MEDICINE
Payer: COMMERCIAL

## 2025-03-21 VITALS
TEMPERATURE: 97 F | WEIGHT: 202 LBS | RESPIRATION RATE: 17 BRPM | HEART RATE: 112 BPM | BODY MASS INDEX: 33.66 KG/M2 | HEIGHT: 65 IN | DIASTOLIC BLOOD PRESSURE: 96 MMHG | SYSTOLIC BLOOD PRESSURE: 154 MMHG

## 2025-03-21 DIAGNOSIS — C18.2 MALIGNANT NEOPLASM OF ASCENDING COLON: Primary | ICD-10-CM

## 2025-03-21 PROCEDURE — 63600175 PHARM REV CODE 636 W HCPCS: Performed by: INTERNAL MEDICINE

## 2025-03-21 PROCEDURE — A4216 STERILE WATER/SALINE, 10 ML: HCPCS | Performed by: INTERNAL MEDICINE

## 2025-03-21 PROCEDURE — 25000003 PHARM REV CODE 250: Performed by: INTERNAL MEDICINE

## 2025-03-21 PROCEDURE — 96523 IRRIG DRUG DELIVERY DEVICE: CPT

## 2025-03-21 RX ORDER — HEPARIN 100 UNIT/ML
500 SYRINGE INTRAVENOUS
Status: DISCONTINUED | OUTPATIENT
Start: 2025-03-21 | End: 2025-03-21 | Stop reason: HOSPADM

## 2025-03-21 RX ORDER — SODIUM CHLORIDE 0.9 % (FLUSH) 0.9 %
10 SYRINGE (ML) INJECTION
Status: DISCONTINUED | OUTPATIENT
Start: 2025-03-21 | End: 2025-03-21 | Stop reason: HOSPADM

## 2025-03-21 RX ADMIN — Medication 10 ML: at 10:03

## 2025-03-21 RX ADMIN — HEPARIN 500 UNITS: 100 SYRINGE at 10:03

## 2025-03-21 NOTE — PLAN OF CARE
Problem: Fatigue  Goal: Improved Activity Tolerance  Intervention: Promote Improved Energy  Flowsheets (Taken 3/21/2025 1042)  Fatigue Management: fatigue-related activity identified  Activity Management: Ambulated -L4

## 2025-03-25 ENCOUNTER — TELEPHONE (OUTPATIENT)
Facility: CLINIC | Age: 60
End: 2025-03-25
Payer: COMMERCIAL

## 2025-03-31 ENCOUNTER — LAB VISIT (OUTPATIENT)
Dept: LAB | Facility: HOSPITAL | Age: 60
End: 2025-03-31
Attending: INTERNAL MEDICINE
Payer: COMMERCIAL

## 2025-03-31 DIAGNOSIS — C18.2 MALIGNANT NEOPLASM OF ASCENDING COLON: ICD-10-CM

## 2025-03-31 DIAGNOSIS — T45.1X5A CHEMOTHERAPY-INDUCED NAUSEA: ICD-10-CM

## 2025-03-31 DIAGNOSIS — T45.1X5A CHEMOTHERAPY-INDUCED NEUROPATHY: ICD-10-CM

## 2025-03-31 DIAGNOSIS — C78.7 METASTASIS TO LIVER: ICD-10-CM

## 2025-03-31 DIAGNOSIS — D64.9 SYMPTOMATIC ANEMIA: ICD-10-CM

## 2025-03-31 DIAGNOSIS — G62.0 CHEMOTHERAPY-INDUCED NEUROPATHY: ICD-10-CM

## 2025-03-31 DIAGNOSIS — R11.0 CHEMOTHERAPY-INDUCED NAUSEA: ICD-10-CM

## 2025-03-31 LAB
ABSOLUTE EOSINOPHIL (SMH): 0.08 K/UL
ABSOLUTE MONOCYTE (SMH): 0.45 K/UL (ref 0.3–1)
ABSOLUTE NEUTROPHIL COUNT (SMH): 2.9 K/UL (ref 1.8–7.7)
ALBUMIN SERPL-MCNC: 3.6 G/DL (ref 3.5–5.2)
ALP SERPL-CCNC: 91 UNIT/L (ref 40–150)
ALT SERPL-CCNC: 38 UNIT/L (ref 10–44)
ANION GAP (SMH): 10 MMOL/L (ref 8–16)
AST SERPL-CCNC: 37 UNIT/L (ref 11–45)
BASOPHILS # BLD AUTO: 0.03 K/UL
BASOPHILS NFR BLD AUTO: 0.6 %
BILIRUB SERPL-MCNC: 0.4 MG/DL (ref 0.1–1)
BUN SERPL-MCNC: 11 MG/DL (ref 6–20)
CALCIUM SERPL-MCNC: 8.7 MG/DL (ref 8.7–10.5)
CHLORIDE SERPL-SCNC: 105 MMOL/L (ref 95–110)
CO2 SERPL-SCNC: 21 MMOL/L (ref 23–29)
CREAT SERPL-MCNC: 0.8 MG/DL (ref 0.5–1.4)
ERYTHROCYTE [DISTWIDTH] IN BLOOD BY AUTOMATED COUNT: 19.7 % (ref 11.5–14.5)
GFR SERPLBLD CREATININE-BSD FMLA CKD-EPI: >60 ML/MIN/1.73/M2
GLUCOSE SERPL-MCNC: 295 MG/DL (ref 70–110)
HCT VFR BLD AUTO: 35.5 % (ref 37–48.5)
HGB BLD-MCNC: 11.6 GM/DL (ref 12–16)
IMM GRANULOCYTES # BLD AUTO: 0.02 K/UL (ref 0–0.04)
IMM GRANULOCYTES NFR BLD AUTO: 0.4 % (ref 0–0.5)
INDIRECT COOMBS: NORMAL
LYMPHOCYTES # BLD AUTO: 1.14 K/UL (ref 1–4.8)
MAGNESIUM SERPL-MCNC: 1.8 MG/DL (ref 1.6–2.6)
MCH RBC QN AUTO: 27.6 PG (ref 27–31)
MCHC RBC AUTO-ENTMCNC: 32.7 G/DL (ref 32–36)
MCV RBC AUTO: 84 FL (ref 82–98)
NUCLEATED RBC (/100WBC) (SMH): 0 /100 WBC
PLATELET # BLD AUTO: 159 K/UL (ref 150–450)
PMV BLD AUTO: 8.8 FL (ref 9.2–12.9)
POTASSIUM SERPL-SCNC: 4 MMOL/L (ref 3.5–5.1)
PROT SERPL-MCNC: 6.8 GM/DL (ref 6–8.4)
RBC # BLD AUTO: 4.21 M/UL (ref 4–5.4)
RELATIVE EOSINOPHIL (SMH): 1.7 % (ref 0–8)
RELATIVE LYMPHOCYTE (SMH): 24.6 % (ref 18–48)
RELATIVE MONOCYTE (SMH): 9.7 % (ref 4–15)
RELATIVE NEUTROPHIL (SMH): 63 % (ref 38–73)
RH BLD: NORMAL
SODIUM SERPL-SCNC: 136 MMOL/L (ref 136–145)
SPECIMEN OUTDATE: NORMAL
WBC # BLD AUTO: 4.64 K/UL (ref 3.9–12.7)

## 2025-03-31 PROCEDURE — 82040 ASSAY OF SERUM ALBUMIN: CPT

## 2025-03-31 PROCEDURE — 85025 COMPLETE CBC W/AUTO DIFF WBC: CPT

## 2025-03-31 PROCEDURE — 83735 ASSAY OF MAGNESIUM: CPT

## 2025-03-31 PROCEDURE — 86901 BLOOD TYPING SEROLOGIC RH(D): CPT | Performed by: INTERNAL MEDICINE

## 2025-03-31 PROCEDURE — 36415 COLL VENOUS BLD VENIPUNCTURE: CPT

## 2025-04-01 ENCOUNTER — OFFICE VISIT (OUTPATIENT)
Dept: HEMATOLOGY/ONCOLOGY | Facility: CLINIC | Age: 60
End: 2025-04-01
Payer: COMMERCIAL

## 2025-04-01 DIAGNOSIS — G62.0 CHEMOTHERAPY-INDUCED NEUROPATHY: ICD-10-CM

## 2025-04-01 DIAGNOSIS — T45.1X5A CHEMOTHERAPY-INDUCED NAUSEA: ICD-10-CM

## 2025-04-01 DIAGNOSIS — R11.0 CHEMOTHERAPY-INDUCED NAUSEA: ICD-10-CM

## 2025-04-01 DIAGNOSIS — C78.7 METASTASIS TO LIVER: ICD-10-CM

## 2025-04-01 DIAGNOSIS — T45.1X5A CHEMOTHERAPY-INDUCED NEUROPATHY: ICD-10-CM

## 2025-04-01 DIAGNOSIS — C18.2 MALIGNANT NEOPLASM OF ASCENDING COLON: Primary | ICD-10-CM

## 2025-04-01 RX ORDER — EPINEPHRINE 0.3 MG/.3ML
0.3 INJECTION SUBCUTANEOUS ONCE AS NEEDED
Status: CANCELLED | OUTPATIENT
Start: 2025-04-02

## 2025-04-01 RX ORDER — FLUOROURACIL 50 MG/ML
400 INJECTION, SOLUTION INTRAVENOUS
Status: CANCELLED | OUTPATIENT
Start: 2025-04-02

## 2025-04-01 RX ORDER — PROCHLORPERAZINE EDISYLATE 5 MG/ML
10 INJECTION INTRAMUSCULAR; INTRAVENOUS ONCE AS NEEDED
Status: CANCELLED | OUTPATIENT
Start: 2025-04-04

## 2025-04-01 RX ORDER — HEPARIN 100 UNIT/ML
500 SYRINGE INTRAVENOUS
Status: CANCELLED | OUTPATIENT
Start: 2025-04-04

## 2025-04-01 RX ORDER — HEPARIN 100 UNIT/ML
500 SYRINGE INTRAVENOUS
Status: CANCELLED | OUTPATIENT
Start: 2025-04-02

## 2025-04-01 RX ORDER — SODIUM CHLORIDE 0.9 % (FLUSH) 0.9 %
10 SYRINGE (ML) INJECTION
Status: CANCELLED | OUTPATIENT
Start: 2025-04-04

## 2025-04-01 RX ORDER — PROCHLORPERAZINE EDISYLATE 5 MG/ML
10 INJECTION INTRAMUSCULAR; INTRAVENOUS ONCE AS NEEDED
Status: CANCELLED | OUTPATIENT
Start: 2025-04-02

## 2025-04-01 RX ORDER — SODIUM CHLORIDE 0.9 % (FLUSH) 0.9 %
10 SYRINGE (ML) INJECTION
Status: CANCELLED | OUTPATIENT
Start: 2025-04-02

## 2025-04-01 RX ORDER — DIPHENHYDRAMINE HYDROCHLORIDE 50 MG/ML
50 INJECTION, SOLUTION INTRAMUSCULAR; INTRAVENOUS ONCE AS NEEDED
Status: CANCELLED | OUTPATIENT
Start: 2025-04-02

## 2025-04-01 NOTE — PROGRESS NOTES
The patient location is: home  The chief complaint leading to consultation is: colon cancer    Visit type: audiovisual    Face to Face time with patient: 10  20 minutes of total time spent on the encounter, which includes face to face time and non-face to face time preparing to see the patient (eg, review of tests), Obtaining and/or reviewing separately obtained history, Documenting clinical information in the electronic or other health record, Independently interpreting results (not separately reported) and communicating results to the patient/family/caregiver, or Care coordination (not separately reported).         Each patient to whom he or she provides medical services by telemedicine is:  (1) informed of the relationship between the physician and patient and the respective role of any other health care provider with respect to management of the patient; and (2) notified that he or she may decline to receive medical services by telemedicine and may withdraw from such care at any time.    Notes:        HPI    59 years old female with history of diabetes type II, hypertension and herpes    She was recently diagnosed with cecal mass causing significant bleeding require hospitalization and urgent transfusion.  She received 2 units packed red blood cell during the course of hospitalization.  Colonoscopy showed likely malignant tumor ascending colon nearby cecum.  CT of the abdomen pelvis contrast demonstrate concerning cecal malignancy with to right lower quadrant abnormal lymph nodes concerning for metastases with indeterminate right hepatic lobe 10 mm lesion      Past Medical History:   Diagnosis Date    Colon cancer     Diabetes mellitus     Diabetes mellitus, type 2     Encounter for blood transfusion     Herpes     Hypertension     Sleep apnea      Social History     Socioeconomic History    Marital status: Single   Occupational History    Occupation: supply chain   Tobacco Use    Smoking status: Never     Smokeless tobacco: Never   Substance and Sexual Activity    Alcohol use: No    Drug use: No    Sexual activity: Not Currently     Social Drivers of Health     Financial Resource Strain: Low Risk  (2/16/2025)    Overall Financial Resource Strain (CARDIA)     Difficulty of Paying Living Expenses: Not hard at all   Food Insecurity: No Food Insecurity (2/16/2025)    Hunger Vital Sign     Worried About Running Out of Food in the Last Year: Never true     Ran Out of Food in the Last Year: Never true   Transportation Needs: No Transportation Needs (2/16/2025)    PRAPARE - Transportation     Lack of Transportation (Medical): No     Lack of Transportation (Non-Medical): No   Physical Activity: Insufficiently Active (2/16/2025)    Exercise Vital Sign     Days of Exercise per Week: 1 day     Minutes of Exercise per Session: 10 min   Stress: No Stress Concern Present (2/16/2025)    [employer] - Occupational Stress Questionnaire     Feeling of Stress : Only a little   Recent Concern: Stress - Stress Concern Present (11/26/2024)    [employer] - Occupational Stress Questionnaire     Feeling of Stress : Rather much   Housing Stability: Low Risk  (2/16/2025)    Housing Stability Vital Sign     Unable to Pay for Housing in the Last Year: No     Number of Times Moved in the Last Year: 0     Homeless in the Last Year: No         Objective    Physical Exam     VV       Lab Results   Component Value Date    WBC 4.64 03/31/2025    HGB 11.6 (L) 03/31/2025    HCT 35.5 (L) 03/31/2025    MCV 84 03/31/2025     03/31/2025       CMP  Sodium   Date Value Ref Range Status   03/31/2025 136 136 - 145 mmol/L Final     Potassium   Date Value Ref Range Status   03/31/2025 4.0 3.5 - 5.1 mmol/L Final     Chloride   Date Value Ref Range Status   03/17/2025 101 95 - 110 mmol/L Final     CO2   Date Value Ref Range Status   03/31/2025 21 (L) 23 - 29 mmol/L Final     Glucose   Date Value Ref  Range Status   03/17/2025 368 (H) 70 - 110 mg/dL Final     BUN   Date Value Ref Range Status   03/31/2025 11 6 - 20 mg/dL Final     Creatinine   Date Value Ref Range Status   03/31/2025 0.8 0.5 - 1.4 mg/dL Final     Calcium   Date Value Ref Range Status   03/31/2025 8.7 8.7 - 10.5 mg/dL Final     Total Protein   Date Value Ref Range Status   03/17/2025 7.3 6.0 - 8.4 g/dL Final     Albumin   Date Value Ref Range Status   03/31/2025 3.6 3.5 - 5.2 g/dL Final     Bilirubin Total   Date Value Ref Range Status   03/31/2025 0.4 0.1 - 1.0 mg/dL Final     Comment:     For infants and newborns, interpretation of results should be based   on gestational age, weight and in agreement with clinical   observations.    Premature Infant recommended reference ranges:   0-24 hours:  <8.0 mg/dL   24-48 hours: <12.0 mg/dL   3-5 days:    <15.0 mg/dL   6-29 days:   <15.0 mg/dL     ALP   Date Value Ref Range Status   03/31/2025 91 40 - 150 unit/L Final     AST   Date Value Ref Range Status   03/31/2025 37 11 - 45 unit/L Final     ALT   Date Value Ref Range Status   03/31/2025 38 10 - 44 unit/L Final     Anion Gap   Date Value Ref Range Status   03/17/2025 11 8 - 16 mmol/L Final     eGFR   Date Value Ref Range Status   03/31/2025 >60 >60 mL/min/1.73/m2 Final   03/17/2025 >60 >60 mL/min/1.73 m^2 Final     CT chest abdomen pelvis with contrast   Impression:     Findings concerning for cecal malignancy.  There are least 2 right lower quadrant abnormal lymph nodes concerning for metastatic disease.     Indeterminate right hepatic lobe 10 mm lesion.  Recommend further characterization with liver mass protocol MRI.     Small fat containing umbilical and infraumbilical hernias.     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  Status post resection cecum for bleeding control tumor size 4.8 cm and 4 mm 2 separate foci.  Lymphovascular invasion positive.  Lymph nodes involvement 3/16  positive for malignancy.  MSI stable    Pathological staging [id number]    Pharmacokinetic panel 24 rapid metabolizer CY,  [id number] and CY    CT demonstrated possible liver involvement with 10 mm right hepatic lobe lesions.    Status post IR biopsy liver positive for malignancy    Microwave started on 24 single sessions competed     MRI post ablations study shows 25 interval increase in size/number of multiple small hepatic masses suggestive of multifocal - thus far completed only 3 cycle of folfox    > NGS Caris results?     > C1D1 FOLFOX 25 currently on cycle #5 (completed of number 6 will do CT for respond)    > low Mag - continue take Mag replacement     > elevated liver enzymes  resolved    > anemia blood loss check iron and ferritin        Plan    There are no diagnoses linked to this encounter.

## 2025-04-02 ENCOUNTER — INFUSION (OUTPATIENT)
Dept: INFUSION THERAPY | Facility: HOSPITAL | Age: 60
End: 2025-04-02
Attending: INTERNAL MEDICINE
Payer: COMMERCIAL

## 2025-04-02 VITALS
TEMPERATURE: 97 F | BODY MASS INDEX: 33.09 KG/M2 | OXYGEN SATURATION: 99 % | SYSTOLIC BLOOD PRESSURE: 145 MMHG | WEIGHT: 198.63 LBS | HEART RATE: 72 BPM | HEIGHT: 65 IN | DIASTOLIC BLOOD PRESSURE: 102 MMHG | RESPIRATION RATE: 17 BRPM

## 2025-04-02 DIAGNOSIS — C18.2 MALIGNANT NEOPLASM OF ASCENDING COLON: Primary | ICD-10-CM

## 2025-04-02 PROCEDURE — 25000003 PHARM REV CODE 250: Performed by: INTERNAL MEDICINE

## 2025-04-02 PROCEDURE — 96367 TX/PROPH/DG ADDL SEQ IV INF: CPT

## 2025-04-02 PROCEDURE — 96368 THER/DIAG CONCURRENT INF: CPT

## 2025-04-02 PROCEDURE — 96416 CHEMO PROLONG INFUSE W/PUMP: CPT

## 2025-04-02 PROCEDURE — 96413 CHEMO IV INFUSION 1 HR: CPT

## 2025-04-02 PROCEDURE — 96415 CHEMO IV INFUSION ADDL HR: CPT

## 2025-04-02 PROCEDURE — 96411 CHEMO IV PUSH ADDL DRUG: CPT

## 2025-04-02 PROCEDURE — 63600175 PHARM REV CODE 636 W HCPCS: Performed by: INTERNAL MEDICINE

## 2025-04-02 RX ORDER — DIPHENHYDRAMINE HYDROCHLORIDE 50 MG/ML
50 INJECTION, SOLUTION INTRAMUSCULAR; INTRAVENOUS ONCE AS NEEDED
Status: DISCONTINUED | OUTPATIENT
Start: 2025-04-02 | End: 2025-04-02 | Stop reason: HOSPADM

## 2025-04-02 RX ORDER — HEPARIN 100 UNIT/ML
500 SYRINGE INTRAVENOUS
Status: DISCONTINUED | OUTPATIENT
Start: 2025-04-02 | End: 2025-04-02 | Stop reason: HOSPADM

## 2025-04-02 RX ORDER — SODIUM CHLORIDE 0.9 % (FLUSH) 0.9 %
10 SYRINGE (ML) INJECTION
Status: DISCONTINUED | OUTPATIENT
Start: 2025-04-02 | End: 2025-04-02 | Stop reason: HOSPADM

## 2025-04-02 RX ORDER — EPINEPHRINE 0.3 MG/.3ML
0.3 INJECTION SUBCUTANEOUS ONCE AS NEEDED
Status: DISCONTINUED | OUTPATIENT
Start: 2025-04-02 | End: 2025-04-02 | Stop reason: HOSPADM

## 2025-04-02 RX ORDER — FLUOROURACIL 50 MG/ML
400 INJECTION, SOLUTION INTRAVENOUS
Status: COMPLETED | OUTPATIENT
Start: 2025-04-02 | End: 2025-04-02

## 2025-04-02 RX ORDER — PROCHLORPERAZINE EDISYLATE 5 MG/ML
10 INJECTION INTRAMUSCULAR; INTRAVENOUS ONCE AS NEEDED
Status: DISCONTINUED | OUTPATIENT
Start: 2025-04-02 | End: 2025-04-02 | Stop reason: HOSPADM

## 2025-04-02 RX ADMIN — LEUCOVORIN CALCIUM 830 MG: 350 INJECTION, POWDER, LYOPHILIZED, FOR SOLUTION INTRAMUSCULAR; INTRAVENOUS at 10:04

## 2025-04-02 RX ADMIN — DEXTROSE MONOHYDRATE: 50 INJECTION, SOLUTION INTRAVENOUS at 09:04

## 2025-04-02 RX ADMIN — FLUOROURACIL 830 MG: 50 INJECTION, SOLUTION INTRAVENOUS at 12:04

## 2025-04-02 RX ADMIN — OXALIPLATIN 177 MG: 5 INJECTION, SOLUTION INTRAVENOUS at 10:04

## 2025-04-02 RX ADMIN — SODIUM CHLORIDE 0.25 MG: 9 INJECTION, SOLUTION INTRAVENOUS at 09:04

## 2025-04-02 RX ADMIN — FLUOROURACIL 4990 MG: 50 INJECTION, SOLUTION INTRAVENOUS at 12:04

## 2025-04-02 NOTE — PLAN OF CARE
Problem: Fatigue  Goal: Improved Activity Tolerance  Outcome: Progressing  Intervention: Promote Improved Energy  Flowsheets (Taken 4/2/2025 4233)  Fatigue Management: frequent rest breaks encouraged  Sleep/Rest Enhancement: regular sleep/rest pattern promoted  Activity Management: Ambulated -L4  Environmental Support: calm environment promoted

## 2025-04-04 ENCOUNTER — INFUSION (OUTPATIENT)
Dept: INFUSION THERAPY | Facility: HOSPITAL | Age: 60
End: 2025-04-04
Attending: INTERNAL MEDICINE
Payer: COMMERCIAL

## 2025-04-04 VITALS
RESPIRATION RATE: 18 BRPM | HEART RATE: 90 BPM | SYSTOLIC BLOOD PRESSURE: 146 MMHG | HEIGHT: 65 IN | WEIGHT: 198.88 LBS | TEMPERATURE: 98 F | DIASTOLIC BLOOD PRESSURE: 100 MMHG | BODY MASS INDEX: 33.13 KG/M2

## 2025-04-04 DIAGNOSIS — C18.2 MALIGNANT NEOPLASM OF ASCENDING COLON: Primary | ICD-10-CM

## 2025-04-04 PROCEDURE — 25000003 PHARM REV CODE 250: Performed by: INTERNAL MEDICINE

## 2025-04-04 PROCEDURE — 96523 IRRIG DRUG DELIVERY DEVICE: CPT

## 2025-04-04 PROCEDURE — 63600175 PHARM REV CODE 636 W HCPCS: Performed by: INTERNAL MEDICINE

## 2025-04-04 PROCEDURE — A4216 STERILE WATER/SALINE, 10 ML: HCPCS | Performed by: INTERNAL MEDICINE

## 2025-04-04 RX ORDER — HEPARIN 100 UNIT/ML
500 SYRINGE INTRAVENOUS
Status: DISCONTINUED | OUTPATIENT
Start: 2025-04-04 | End: 2025-04-04 | Stop reason: HOSPADM

## 2025-04-04 RX ORDER — SODIUM CHLORIDE 0.9 % (FLUSH) 0.9 %
10 SYRINGE (ML) INJECTION
Status: DISCONTINUED | OUTPATIENT
Start: 2025-04-04 | End: 2025-04-04 | Stop reason: HOSPADM

## 2025-04-04 RX ADMIN — HEPARIN 500 UNITS: 100 SYRINGE at 10:04

## 2025-04-04 RX ADMIN — SODIUM CHLORIDE, PRESERVATIVE FREE 10 ML: 5 INJECTION INTRAVENOUS at 10:04

## 2025-04-04 NOTE — PLAN OF CARE
Problem: Fatigue  Goal: Improved Activity Tolerance  4/4/2025 1032 by Abeba Disla, RN  Outcome: Met  4/4/2025 1032 by Abeba Disla, RN  Outcome: Progressing

## 2025-04-07 ENCOUNTER — TELEPHONE (OUTPATIENT)
Dept: HEMATOLOGY/ONCOLOGY | Facility: CLINIC | Age: 60
End: 2025-04-07
Payer: COMMERCIAL

## 2025-04-07 ENCOUNTER — OFFICE VISIT (OUTPATIENT)
Dept: FAMILY MEDICINE | Facility: CLINIC | Age: 60
End: 2025-04-07
Payer: COMMERCIAL

## 2025-04-07 VITALS
OXYGEN SATURATION: 98 % | TEMPERATURE: 98 F | HEART RATE: 121 BPM | DIASTOLIC BLOOD PRESSURE: 76 MMHG | HEIGHT: 65 IN | WEIGHT: 192.44 LBS | BODY MASS INDEX: 32.06 KG/M2 | RESPIRATION RATE: 16 BRPM | SYSTOLIC BLOOD PRESSURE: 106 MMHG

## 2025-04-07 DIAGNOSIS — E11.65 UNCONTROLLED TYPE 2 DIABETES MELLITUS WITH HYPERGLYCEMIA, WITHOUT LONG-TERM CURRENT USE OF INSULIN: Primary | ICD-10-CM

## 2025-04-07 DIAGNOSIS — E11.59 HYPERTENSION ASSOCIATED WITH DIABETES: ICD-10-CM

## 2025-04-07 DIAGNOSIS — E11.69 HYPERLIPIDEMIA ASSOCIATED WITH TYPE 2 DIABETES MELLITUS: ICD-10-CM

## 2025-04-07 DIAGNOSIS — C78.7 METASTASIS TO LIVER: Primary | ICD-10-CM

## 2025-04-07 DIAGNOSIS — R00.0 TACHYCARDIA: ICD-10-CM

## 2025-04-07 DIAGNOSIS — E78.5 HYPERLIPIDEMIA ASSOCIATED WITH TYPE 2 DIABETES MELLITUS: ICD-10-CM

## 2025-04-07 DIAGNOSIS — I15.2 HYPERTENSION ASSOCIATED WITH DIABETES: ICD-10-CM

## 2025-04-07 LAB
OHS QRS DURATION: 78 MS
OHS QTC CALCULATION: 462 MS

## 2025-04-07 PROCEDURE — 93010 ELECTROCARDIOGRAM REPORT: CPT | Mod: S$GLB,,, | Performed by: INTERNAL MEDICINE

## 2025-04-07 RX ORDER — INSULIN GLARGINE 300 [IU]/ML
25 INJECTION, SOLUTION SUBCUTANEOUS DAILY
Qty: 2.5 ML | Refills: 11 | Status: SHIPPED | OUTPATIENT
Start: 2025-04-07 | End: 2026-04-07

## 2025-04-10 NOTE — PROGRESS NOTES
Subjective:   Patient ID: Nik Lowery is a 59 y.o. female     Chief Complaint:Hypertension      Here for routine chekcup but notes palpitations. Denies sob/cp/syncope. Does endorse inadequate fluid intake at times. Other doing well.       Review of Systems   Respiratory:  Negative for shortness of breath.    Cardiovascular:  Negative for chest pain.   Gastrointestinal:  Negative for abdominal pain.   Genitourinary:  Negative for dysuria.     Past Medical History:   Diagnosis Date    Colon cancer     Diabetes mellitus     Diabetes mellitus, type 2     Encounter for blood transfusion     Herpes     Hypertension     Sleep apnea      Past Surgical History:   Procedure Laterality Date    COLONOSCOPY N/A 11/8/2024    Procedure: COLONOSCOPY;  Surgeon: Saw Wick MD;  Location: [facility];  Service: Endoscopy;  Laterality: N/A;    ESOPHAGOGASTRODUODENOSCOPY N/A 11/8/2024    Procedure: EGD (ESOPHAGOGASTRODUODENOSCOPY);  Surgeon: Saw iWck MD;  Location: [facility];  Service: Endoscopy;  Laterality: N/A;    EYE SURGERY  2002    lasix Bilateral    HYSTERECTOMY  2012    INSERTION OF TUNNELED CENTRAL VENOUS CATHETER (CVC) WITH SUBCUTANEOUS PORT Right 12/20/2024    Procedure: INSERTION, PORT-A-CATH;  Surgeon: Logan Juarez MD;  Location: [facility];  Service: General;  Laterality: Right;    LUMBAR DISCECTOMY      ROBOT-ASSISTED COLECTOMY Right 1/7/2025    Procedure: ROBOTIC COLECTOMY WITH ILEOCOLIC ANASTOMOSIS;  Surgeon: Logan Juarez MD;  Location: [facility];  Service: General;  Laterality: Right;    WISDOM TOOTH EXTRACTION       Objective:     Vitals:    04/07/25 1421   BP: 106/76   Pulse: (!) 121   Resp: 16   Temp: 97.7 °F (36.5 °C)     Body mass index is 32.03 kg/m².  Physical Exam  Vitals and nursing note reviewed.   Constitutional:       Appearance: She is well-developed.   HENT:      Head: Normocephalic and atraumatic.   Eyes:      General: No scleral icterus.     Conjunctiva/sclera: Conjunctivae  normal.   Cardiovascular:      Heart sounds: No murmur heard.  Pulmonary:      Effort: Pulmonary effort is normal. No respiratory distress.   Musculoskeletal:         General: No deformity. Normal range of motion.      Cervical back: Normal range of motion and neck supple.   Skin:     Coloration: Skin is not pale.      Findings: No rash.   Neurological:      Mental Status: She is alert and oriented to person, place, and time.   Psychiatric:         Behavior: Behavior normal.         Thought Content: Thought content normal.         Judgment: Judgment normal.       Assessment:     1. Uncontrolled type 2 diabetes mellitus with hyperglycemia, without long-term current use of insulin    2. Tachycardia    3. Hypertension associated with diabetes    4. Hyperlipidemia associated with type 2 diabetes mellitus      Plan:   Uncontrolled type 2 diabetes mellitus with hyperglycemia, without long-term current use of insulin  -     insulin glargine, TOUJEO, (TOUJEO SOLOSTAR U-300 INSULIN) 300 unit/mL (1.5 mL) InPn pen; Inject 25 Units into the skin once daily.  Dispense: 2.5 mL; Refill: 11  -     Comprehensive Metabolic Panel; Future; Expected date: 04/07/2025  -     Hemoglobin A1C; Future; Expected date: 04/07/2025  Blood sugar running more elevated likely multifactorial. Cx treatment certainly a factor but patient also endorses non-compliance at times. Will increase from 20 to 25 units and counseled at importance of compliance and follow up. May need further increase in future if compliant and not seeing improvements.  Tachycardia  -     IN OFFICE EKG 12-LEAD (to Romel)  Counseled at length. No concern for ACS at this time. Suspect dehydration/stress. Will f/u closely  Hypertension associated with diabetes  -     Comprehensive Metabolic Panel; Future; Expected date: 04/07/2025    Hyperlipidemia associated with type 2 diabetes mellitus  -     Comprehensive Metabolic Panel; Future; Expected date: 04/07/2025      Chronic condition  not otherwise mentioned is stable      Total time spent of Greater than 30 minutes minutes on the day of the visit.This includes face to face time and preparing to see the patient, obtaining and reviewing separately obtained history, documenting clinical information in the electronic or other health record, independently interpreting results and communicating results to the patient/family/caregiver, or care coordinator.    Established patient with me has been instructed that must see me at least 1 time yearly (every 365 days) for refills of medications. Seeing other providers in this clinic is fine but expectation is to see me yearly.    Osmin Newsome MD  04/10/2025    Portions of this note have been dictated with EL Hendricks

## 2025-04-11 ENCOUNTER — TELEPHONE (OUTPATIENT)
Facility: CLINIC | Age: 60
End: 2025-04-11
Payer: COMMERCIAL

## 2025-04-11 ENCOUNTER — LAB VISIT (OUTPATIENT)
Dept: LAB | Facility: HOSPITAL | Age: 60
End: 2025-04-11
Attending: INTERNAL MEDICINE
Payer: COMMERCIAL

## 2025-04-11 DIAGNOSIS — C78.7 METASTASIS TO LIVER: ICD-10-CM

## 2025-04-11 DIAGNOSIS — C18.2 MALIGNANT NEOPLASM OF ASCENDING COLON: ICD-10-CM

## 2025-04-11 DIAGNOSIS — R11.0 CHEMOTHERAPY-INDUCED NAUSEA: ICD-10-CM

## 2025-04-11 DIAGNOSIS — G62.0 CHEMOTHERAPY-INDUCED NEUROPATHY: ICD-10-CM

## 2025-04-11 DIAGNOSIS — T45.1X5A CHEMOTHERAPY-INDUCED NAUSEA: ICD-10-CM

## 2025-04-11 DIAGNOSIS — T45.1X5A CHEMOTHERAPY-INDUCED NEUROPATHY: ICD-10-CM

## 2025-04-11 LAB
ABSOLUTE EOSINOPHIL (SMH): 0.08 K/UL
ABSOLUTE MONOCYTE (SMH): 0.52 K/UL (ref 0.3–1)
ABSOLUTE NEUTROPHIL COUNT (SMH): 2.9 K/UL (ref 1.8–7.7)
ALBUMIN SERPL-MCNC: 3.7 G/DL (ref 3.5–5.2)
ALP SERPL-CCNC: 96 UNIT/L (ref 40–150)
ALT SERPL-CCNC: 47 UNIT/L (ref 10–44)
ANION GAP (SMH): 11 MMOL/L (ref 8–16)
AST SERPL-CCNC: 52 UNIT/L (ref 11–45)
BASOPHILS # BLD AUTO: 0.03 K/UL
BASOPHILS NFR BLD AUTO: 0.5 %
BILIRUB SERPL-MCNC: 0.4 MG/DL (ref 0.1–1)
BUN SERPL-MCNC: 15 MG/DL (ref 6–20)
CALCIUM SERPL-MCNC: 9.1 MG/DL (ref 8.7–10.5)
CHLORIDE SERPL-SCNC: 104 MMOL/L (ref 95–110)
CO2 SERPL-SCNC: 21 MMOL/L (ref 23–29)
CREAT SERPL-MCNC: 0.9 MG/DL (ref 0.5–1.4)
ERYTHROCYTE [DISTWIDTH] IN BLOOD BY AUTOMATED COUNT: 18.3 % (ref 11.5–14.5)
GFR SERPLBLD CREATININE-BSD FMLA CKD-EPI: >60 ML/MIN/1.73/M2
GLUCOSE SERPL-MCNC: 247 MG/DL (ref 70–110)
HCT VFR BLD AUTO: 38.5 % (ref 37–48.5)
HGB BLD-MCNC: 12.9 GM/DL (ref 12–16)
IMM GRANULOCYTES # BLD AUTO: 0.08 K/UL (ref 0–0.04)
IMM GRANULOCYTES NFR BLD AUTO: 1.4 % (ref 0–0.5)
LYMPHOCYTES # BLD AUTO: 2.01 K/UL (ref 1–4.8)
MAGNESIUM SERPL-MCNC: 1.4 MG/DL (ref 1.6–2.6)
MCH RBC QN AUTO: 28.9 PG (ref 27–31)
MCHC RBC AUTO-ENTMCNC: 33.5 G/DL (ref 32–36)
MCV RBC AUTO: 86 FL (ref 82–98)
NUCLEATED RBC (/100WBC) (SMH): 0 /100 WBC
PLATELET # BLD AUTO: 215 K/UL (ref 150–450)
PMV BLD AUTO: 8.7 FL (ref 9.2–12.9)
POTASSIUM SERPL-SCNC: 3.9 MMOL/L (ref 3.5–5.1)
PROT SERPL-MCNC: 7.2 GM/DL (ref 6–8.4)
RBC # BLD AUTO: 4.46 M/UL (ref 4–5.4)
RELATIVE EOSINOPHIL (SMH): 1.4 % (ref 0–8)
RELATIVE LYMPHOCYTE (SMH): 35.9 % (ref 18–48)
RELATIVE MONOCYTE (SMH): 9.3 % (ref 4–15)
RELATIVE NEUTROPHIL (SMH): 51.5 % (ref 38–73)
SODIUM SERPL-SCNC: 136 MMOL/L (ref 136–145)
WBC # BLD AUTO: 5.6 K/UL (ref 3.9–12.7)

## 2025-04-11 PROCEDURE — 36415 COLL VENOUS BLD VENIPUNCTURE: CPT

## 2025-04-11 PROCEDURE — 85025 COMPLETE CBC W/AUTO DIFF WBC: CPT

## 2025-04-11 PROCEDURE — 80053 COMPREHEN METABOLIC PANEL: CPT

## 2025-04-11 PROCEDURE — 83735 ASSAY OF MAGNESIUM: CPT

## 2025-04-11 NOTE — NURSING
Left voicemail notifying patient of restaging appointment 4/28/25. Patient has imaging in AM and will see Dr. Henry at 3:15. Encouraged to call if she has any questions.

## 2025-04-14 ENCOUNTER — OFFICE VISIT (OUTPATIENT)
Dept: HEMATOLOGY/ONCOLOGY | Facility: CLINIC | Age: 60
End: 2025-04-14
Payer: COMMERCIAL

## 2025-04-14 DIAGNOSIS — C78.7 METASTASIS TO LIVER: Primary | ICD-10-CM

## 2025-04-14 PROCEDURE — 98007 SYNCH AUDIO-VIDEO EST HI 40: CPT | Mod: 95,,, | Performed by: INTERNAL MEDICINE

## 2025-04-14 PROCEDURE — 4010F ACE/ARB THERAPY RXD/TAKEN: CPT | Mod: CPTII,95,, | Performed by: INTERNAL MEDICINE

## 2025-04-14 PROCEDURE — G2211 COMPLEX E/M VISIT ADD ON: HCPCS | Mod: 95,,, | Performed by: INTERNAL MEDICINE

## 2025-04-14 RX ORDER — FLUOROURACIL 50 MG/ML
400 INJECTION, SOLUTION INTRAVENOUS
Status: CANCELLED | OUTPATIENT
Start: 2025-04-16

## 2025-04-14 RX ORDER — EPINEPHRINE 0.3 MG/.3ML
0.3 INJECTION SUBCUTANEOUS ONCE AS NEEDED
Status: CANCELLED | OUTPATIENT
Start: 2025-04-16

## 2025-04-14 RX ORDER — HEPARIN 100 UNIT/ML
500 SYRINGE INTRAVENOUS
Status: CANCELLED | OUTPATIENT
Start: 2025-04-16

## 2025-04-14 RX ORDER — DIPHENHYDRAMINE HYDROCHLORIDE 50 MG/ML
50 INJECTION, SOLUTION INTRAMUSCULAR; INTRAVENOUS ONCE AS NEEDED
Status: CANCELLED | OUTPATIENT
Start: 2025-04-16

## 2025-04-14 RX ORDER — PROCHLORPERAZINE EDISYLATE 5 MG/ML
10 INJECTION INTRAMUSCULAR; INTRAVENOUS ONCE AS NEEDED
Status: CANCELLED | OUTPATIENT
Start: 2025-04-16

## 2025-04-14 RX ORDER — SODIUM CHLORIDE 0.9 % (FLUSH) 0.9 %
10 SYRINGE (ML) INJECTION
Status: CANCELLED | OUTPATIENT
Start: 2025-04-18

## 2025-04-14 RX ORDER — HEPARIN 100 UNIT/ML
500 SYRINGE INTRAVENOUS
Status: CANCELLED | OUTPATIENT
Start: 2025-04-18

## 2025-04-14 RX ORDER — SODIUM CHLORIDE 0.9 % (FLUSH) 0.9 %
10 SYRINGE (ML) INJECTION
Status: CANCELLED | OUTPATIENT
Start: 2025-04-16

## 2025-04-14 RX ORDER — PROCHLORPERAZINE EDISYLATE 5 MG/ML
10 INJECTION INTRAMUSCULAR; INTRAVENOUS ONCE AS NEEDED
Status: CANCELLED | OUTPATIENT
Start: 2025-04-18

## 2025-04-14 NOTE — PROGRESS NOTES
The patient location is: home  The chief complaint leading to consultation is: colon cancer    Visit type: audiovisual    Face to Face time with patient: 10  20 minutes of total time spent on the encounter, which includes face to face time and non-face to face time preparing to see the patient (eg, review of tests), Obtaining and/or reviewing separately obtained history, Documenting clinical information in the electronic or other health record, Independently interpreting results (not separately reported) and communicating results to the patient/family/caregiver, or Care coordination (not separately reported).         Each patient to whom he or she provides medical services by telemedicine is:  (1) informed of the relationship between the physician and patient and the respective role of any other health care provider with respect to management of the patient; and (2) notified that he or she may decline to receive medical services by telemedicine and may withdraw from such care at any time.    Notes:        HPI    59 years old female with history of diabetes type II, hypertension and herpes    She was recently diagnosed with cecal mass causing significant bleeding require hospitalization and urgent transfusion.  She received 2 units packed red blood cell during the course of hospitalization.  Colonoscopy showed likely malignant tumor ascending colon nearby cecum.  CT of the abdomen pelvis contrast demonstrate concerning cecal malignancy with to right lower quadrant abnormal lymph nodes concerning for metastases with indeterminate right hepatic lobe 10 mm lesion      Past Medical History:   Diagnosis Date    Colon cancer     Diabetes mellitus     Diabetes mellitus, type 2     Encounter for blood transfusion     Herpes     Hypertension     Sleep apnea      Social History     Socioeconomic History    Marital status: Single   Occupational History    Occupation: supply chain   Tobacco Use    Smoking status: Never     Smokeless tobacco: Never   Substance and Sexual Activity    Alcohol use: No    Drug use: No    Sexual activity: Not Currently     Social Drivers of Health     Financial Resource Strain: Low Risk  (2/16/2025)    Overall Financial Resource Strain (CARDIA)     Difficulty of Paying Living Expenses: Not hard at all   Food Insecurity: No Food Insecurity (2/16/2025)    Hunger Vital Sign     Worried About Running Out of Food in the Last Year: Never true     Ran Out of Food in the Last Year: Never true   Transportation Needs: No Transportation Needs (2/16/2025)    PRAPARE - Transportation     Lack of Transportation (Medical): No     Lack of Transportation (Non-Medical): No   Physical Activity: Insufficiently Active (2/16/2025)    Exercise Vital Sign     Days of Exercise per Week: 1 day     Minutes of Exercise per Session: 10 min   Stress: No Stress Concern Present (2/16/2025)    [employer] - Occupational Stress Questionnaire     Feeling of Stress : Only a little   Recent Concern: Stress - Stress Concern Present (11/26/2024)    [employer] - Occupational Stress Questionnaire     Feeling of Stress : Rather much   Housing Stability: Low Risk  (2/16/2025)    Housing Stability Vital Sign     Unable to Pay for Housing in the Last Year: No     Number of Times Moved in the Last Year: 0     Homeless in the Last Year: No         Objective    Physical Exam     VV       Lab Results   Component Value Date    WBC 5.60 04/11/2025    HGB 12.9 04/11/2025    HCT 38.5 04/11/2025    MCV 86 04/11/2025     04/11/2025       CMP  Sodium   Date Value Ref Range Status   04/11/2025 136 136 - 145 mmol/L Final     Potassium   Date Value Ref Range Status   04/11/2025 3.9 3.5 - 5.1 mmol/L Final     Chloride   Date Value Ref Range Status   03/17/2025 101 95 - 110 mmol/L Final     CO2   Date Value Ref Range Status   04/11/2025 21 (L) 23 - 29 mmol/L Final     Glucose   Date Value Ref Range  Status   03/17/2025 368 (H) 70 - 110 mg/dL Final     BUN   Date Value Ref Range Status   04/11/2025 15 6 - 20 mg/dL Final     Creatinine   Date Value Ref Range Status   04/11/2025 0.9 0.5 - 1.4 mg/dL Final     Calcium   Date Value Ref Range Status   04/11/2025 9.1 8.7 - 10.5 mg/dL Final     Total Protein   Date Value Ref Range Status   03/17/2025 7.3 6.0 - 8.4 g/dL Final     Albumin   Date Value Ref Range Status   04/11/2025 3.7 3.5 - 5.2 g/dL Final     Bilirubin Total   Date Value Ref Range Status   04/11/2025 0.4 0.1 - 1.0 mg/dL Final     Comment:     For infants and newborns, interpretation of results should be based   on gestational age, weight and in agreement with clinical   observations.    Premature Infant recommended reference ranges:   0-24 hours:  <8.0 mg/dL   24-48 hours: <12.0 mg/dL   3-5 days:    <15.0 mg/dL   6-29 days:   <15.0 mg/dL     ALP   Date Value Ref Range Status   04/11/2025 96 40 - 150 unit/L Final     AST   Date Value Ref Range Status   04/11/2025 52 (H) 11 - 45 unit/L Final     ALT   Date Value Ref Range Status   04/11/2025 47 (H) 10 - 44 unit/L Final     Anion Gap   Date Value Ref Range Status   03/17/2025 11 8 - 16 mmol/L Final     eGFR   Date Value Ref Range Status   04/11/2025 >60 >60 mL/min/1.73/m2 Final   03/17/2025 >60 >60 mL/min/1.73 m^2 Final     CT chest abdomen pelvis with contrast   Impression:     Findings concerning for cecal malignancy.  There are least 2 right lower quadrant abnormal lymph nodes concerning for metastatic disease.     Indeterminate right hepatic lobe 10 mm lesion.  Recommend further characterization with liver mass protocol MRI.     Small fat containing umbilical and infraumbilical hernias.     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  Status post resection cecum for bleeding control tumor size 4.8 cm and 4 mm 2 separate foci.  Lymphovascular invasion positive.  Lymph nodes involvement 3/16  positive for malignancy.  MSI stable    Pathological staging [id number]    Pharmacokinetic panel 24 rapid metabolizer CY,  [id number] and CY    CT demonstrated possible liver involvement with 10 mm right hepatic lobe lesions.    Status post IR biopsy liver positive for malignancy    Microwave started on 24 single sessions competed     MRI post ablations study shows 25 interval increase in size/number of multiple small hepatic masses suggestive of multifocal     > NGS Caris results?     > C1D1 FOLFOX 25 currently on cycle #6 (check CT and MRI for respond)    > low Mag - continue take Mag replacement     > elevated liver enzymes  resolved    > anemia blood loss check iron and ferritin        Plan    There are no diagnoses linked to this encounter.

## 2025-04-14 NOTE — Clinical Note
Patient is good for treatment.  RTC prior to next cycle of therapy with labs.  Patient will also have images with Dr. Henry.  I will review the images with patient as well once ready

## 2025-04-15 ENCOUNTER — INFUSION (OUTPATIENT)
Dept: INFUSION THERAPY | Facility: HOSPITAL | Age: 60
End: 2025-04-15
Attending: INTERNAL MEDICINE
Payer: COMMERCIAL

## 2025-04-15 VITALS
SYSTOLIC BLOOD PRESSURE: 131 MMHG | BODY MASS INDEX: 33.1 KG/M2 | TEMPERATURE: 97 F | DIASTOLIC BLOOD PRESSURE: 80 MMHG | RESPIRATION RATE: 16 BRPM | HEART RATE: 74 BPM | WEIGHT: 198.69 LBS | OXYGEN SATURATION: 97 % | HEIGHT: 65 IN

## 2025-04-15 DIAGNOSIS — C18.2 MALIGNANT NEOPLASM OF ASCENDING COLON: Primary | ICD-10-CM

## 2025-04-15 PROCEDURE — 96411 CHEMO IV PUSH ADDL DRUG: CPT

## 2025-04-15 PROCEDURE — 63600175 PHARM REV CODE 636 W HCPCS: Performed by: INTERNAL MEDICINE

## 2025-04-15 PROCEDURE — 96367 TX/PROPH/DG ADDL SEQ IV INF: CPT

## 2025-04-15 PROCEDURE — 25000003 PHARM REV CODE 250: Performed by: INTERNAL MEDICINE

## 2025-04-15 PROCEDURE — 96413 CHEMO IV INFUSION 1 HR: CPT

## 2025-04-15 PROCEDURE — 96368 THER/DIAG CONCURRENT INF: CPT

## 2025-04-15 PROCEDURE — 96415 CHEMO IV INFUSION ADDL HR: CPT

## 2025-04-15 RX ORDER — FLUOROURACIL 50 MG/ML
400 INJECTION, SOLUTION INTRAVENOUS
Status: COMPLETED | OUTPATIENT
Start: 2025-04-15 | End: 2025-04-15

## 2025-04-15 RX ORDER — DIPHENHYDRAMINE HYDROCHLORIDE 50 MG/ML
50 INJECTION, SOLUTION INTRAMUSCULAR; INTRAVENOUS ONCE AS NEEDED
Status: DISCONTINUED | OUTPATIENT
Start: 2025-04-15 | End: 2025-04-15 | Stop reason: HOSPADM

## 2025-04-15 RX ORDER — PROCHLORPERAZINE EDISYLATE 5 MG/ML
10 INJECTION INTRAMUSCULAR; INTRAVENOUS ONCE AS NEEDED
Status: DISCONTINUED | OUTPATIENT
Start: 2025-04-15 | End: 2025-04-15 | Stop reason: HOSPADM

## 2025-04-15 RX ORDER — SODIUM CHLORIDE 0.9 % (FLUSH) 0.9 %
10 SYRINGE (ML) INJECTION
Status: DISCONTINUED | OUTPATIENT
Start: 2025-04-15 | End: 2025-04-15 | Stop reason: HOSPADM

## 2025-04-15 RX ORDER — EPINEPHRINE 0.3 MG/.3ML
0.3 INJECTION SUBCUTANEOUS ONCE AS NEEDED
Status: DISCONTINUED | OUTPATIENT
Start: 2025-04-15 | End: 2025-04-15 | Stop reason: HOSPADM

## 2025-04-15 RX ADMIN — LEUCOVORIN CALCIUM 830 MG: 350 INJECTION, POWDER, LYOPHILIZED, FOR SOLUTION INTRAMUSCULAR; INTRAVENOUS at 09:04

## 2025-04-15 RX ADMIN — DEXTROSE MONOHYDRATE: 50 INJECTION, SOLUTION INTRAVENOUS at 09:04

## 2025-04-15 RX ADMIN — OXALIPLATIN 177 MG: 5 INJECTION, SOLUTION INTRAVENOUS at 09:04

## 2025-04-15 RX ADMIN — PALONOSETRON HYDROCHLORIDE 0.25 MG: 0.25 INJECTION, SOLUTION INTRAVENOUS at 09:04

## 2025-04-15 RX ADMIN — SODIUM CHLORIDE 4990 MG: 9 INJECTION, SOLUTION INTRAVENOUS at 11:04

## 2025-04-15 RX ADMIN — FLUOROURACIL 830 MG: 50 INJECTION, SOLUTION INTRAVENOUS at 11:04

## 2025-04-15 NOTE — PLAN OF CARE
Problem: Fatigue  Goal: Improved Activity Tolerance  Outcome: Progressing  Intervention: Promote Improved Energy  Flowsheets (Taken 4/15/2025 0970)  Fatigue Management: frequent rest breaks encouraged  Activity Management: Ambulated -L4  Environmental Support: rest periods encouraged

## 2025-04-16 ENCOUNTER — OFFICE VISIT (OUTPATIENT)
Dept: PSYCHIATRY | Facility: CLINIC | Age: 60
End: 2025-04-16
Payer: COMMERCIAL

## 2025-04-16 DIAGNOSIS — F41.1 GENERALIZED ANXIETY DISORDER: ICD-10-CM

## 2025-04-16 DIAGNOSIS — C18.2 MALIGNANT NEOPLASM OF ASCENDING COLON: ICD-10-CM

## 2025-04-16 DIAGNOSIS — F43.21 ADJUSTMENT DISORDER WITH DEPRESSED MOOD: Primary | ICD-10-CM

## 2025-04-16 DIAGNOSIS — C78.7 METASTASIS TO LIVER: ICD-10-CM

## 2025-04-16 NOTE — PROGRESS NOTES
PSYCHO-ONCOLOGY NOTE/ Individual Psychotherapy     Date: 4/16/2025   Site:  Telehealth ([facility])     The patient location is: [place], [place]    Each patient to whom he or she provides medical services by telemedicine is:  (1) informed of the relationship between the physician and patient and the respective role of any other health care provider with respect to management of the patient; and (2) notified that he or she may decline to receive medical services by telemedicine and may withdraw from such care at any time.    Therapeutic Intervention: Met with patient for individual psychotherapy.     This includes face to face time and non-face to face time preparing to see the patient, obtaining and/or reviewing separately obtained history, documenting clinical information in the electronic or other health record, independently interpreting results and communicating results to the patient/family/caregiver, or care coordinator.      Patient was last seen by me on 3/11/2025    Problem list  Patient Active Problem List   Diagnosis    Uncontrolled type 2 diabetes mellitus with hyperglycemia, without long-term current use of insulin    Hypertension associated with diabetes    Anxiety and depression    Gastroesophageal reflux disease without esophagitis    Obesity    Decreased ROM of lumbar spine    Hyperlipidemia associated with type 2 diabetes mellitus    Low back pain    Iron deficiency anemia    Malignant neoplasm of ascending colon    Metastasis to liver    Colon adenocarcinoma    Symptomatic anemia    Fever       Chief complaint/reason for encounter: depression    Met with patient to evaluate psychosocial adaptation to diagnosis/treatment/survivorship of colon cancer.     Current Medications  Current Outpatient Medications   Medication    amoxicillin-clavulanate 875-125mg (AUGMENTIN) 875-125 mg per tablet    atorvastatin (LIPITOR) 20 MG tablet    blood-glucose meter kit    dexAMETHasone (DECADRON) 4 MG Tab    docusate  "sodium (COLACE) 100 MG capsule    DULoxetine (CYMBALTA) 20 MG capsule    fexofenadine (ALLEGRA) 180 MG tablet    fluticasone propionate (FLONASE) 50 mcg/actuation nasal spray    FREESTYLE JOCELIN 14 DAY SENSOR Kit    gabapentin (NEURONTIN) 800 MG tablet    guaiFENesin (MUCINEX) 1,200 mg Ta12    HYDROcodone-acetaminophen (NORCO) 5-325 mg per tablet    insulin glargine, TOUJEO, (TOUJEO SOLOSTAR U-300 INSULIN) 300 unit/mL (1.5 mL) InPn pen    lamoTRIgine (LAMICTAL) 150 MG Tab    lancets Misc    LIDOcaine-prilocaine (EMLA) cream    lisinopriL (PRINIVIL,ZESTRIL) 20 MG tablet    magnesium oxide (MAG-OX) 400 mg (241.3 mg magnesium) tablet    metFORMIN (GLUCOPHAGE-XR) 500 MG ER 24hr tablet    multivitamin (THERAGRAN) per tablet    omeprazole (PRILOSEC) 20 MG capsule    ondansetron (ZOFRAN) 8 MG tablet    ONETOUCH ULTRA BLUE TEST STRIP Strp    pen needle, diabetic (PEN NEEDLE) 31 gauge x 5/16" Ndle    prochlorperazine (COMPAZINE) 10 MG tablet    sertraline (ZOLOFT) 100 MG tablet    valACYclovir (VALTREX) 500 MG tablet     Current Facility-Administered Medications   Medication Frequency    diphenhydrAMINE capsule 25 mg PRN    sodium chloride 0.9% flush 10 mL PRN       ONCOLOGY HISTORY  Oncology History   Malignant neoplasm of ascending colon   12/12/2024 Initial Diagnosis    Malignant neoplasm of ascending colon     12/18/2024 Cancer Staged    Staging form: Colon and Rectum, AJCC 8th Edition  - Clinical: Stage BIBIANA (cT3, cN1a, cM1a)     2/4/2025 -  Chemotherapy    Treatment Summary   Plan Name: OP GI [id number] (oxaliplatin leucovorin fluorouracil) Q2W  Treatment Goal: Control  Status: Active  Start Date: 2/4/2025  End Date: 7/11/2025 (Planned)  Provider: Amarjit Crawford MD  Chemotherapy: fluorouraciL injection 830 mg, 400 mg/m2 = 830 mg, Intravenous, Clinic/HOD 1 time, 6 of 12 cycles  Administration: 830 mg (2/4/2025), 830 mg (2/18/2025), 830 mg (3/5/2025), 830 mg (3/19/2025), 830 mg (4/2/2025), 830 mg (4/15/2025)  oxaliplatin " (ELOXATIN) 85 mg/m2 = 177 mg in D5W 600.4 mL chemo infusion, 85 mg/m2 = 177 mg, Intravenous, Clinic/HOD 1 time, 6 of 12 cycles  Administration: 177 mg (2/4/2025), 177 mg (2/18/2025), 177 mg (3/5/2025), 177 mg (3/19/2025), 177 mg (4/2/2025), 177 mg (4/15/2025)  fluorouracil (Adrucil) 2,400 mg/m2 = 4,990 mg in 0.9% NaCl 250 mL chemo infusion, 2,400 mg/m2 = 4,990 mg, Intravenous, Over 46 hours, 6 of 12 cycles  Administration: 4,990 mg (2/4/2025), 4,990 mg (2/18/2025), 4,990 mg (3/5/2025), 4,990 mg (3/19/2025), 4,990 mg (4/2/2025), 4,990 mg (4/15/2025)         Objective:  Nik Lowery arrived  promptly for the session. The patient was fully cooperative throughout the session.  Appearance: age appropriate, appropriately  dressed, adequately  groomed  Behavior/Cooperation: friendly and cooperative  Speech: normal in rate, volume, and tone and appropriate quality, quantity and organization of sentences  Mood: depressed  Affect: mood congruent  Thought Process: goal-directed, logical  Thought Content: normal,  No delusions or paranoia; did not appear to be responding to internal stimuli during the session  Orientation: grossly intact  Memory: grossly intact  Attention Span/Concentration: Attends to session without distraction; reports no difficulty  Fund of Knowledge: average  Estimate of Intelligence: average from verbal skills and history  Cognition: grossly intact  Insight: patient has awareness of illness; good insight into own behavior and behavior of others  Judgment: the patient's behavior is adequate to circumstances    NCCN Distress thermometer:       4/14/2025     1:14 PM 4/1/2025    11:12 AM 3/17/2025     9:43 AM 3/3/2025     1:53 AM 2/16/2025    10:22 PM 2/10/2025    12:01 AM 2/4/2025     2:43 PM   DISTRESS SCREENING   Distress Score 0 - No Distress 2 3 2 4 3 2   Practical Concerns None of these None of these None of these Work;Finances;Treatment decisions Finances;None of these None of these     Social  Concerns None of these None of these None of these None of these None of these None of these    Emotional Concerns Sadness or depression;Loss of interest or enjoyment Sadness or depression;Loneliness Sadness or depression;Loss of interest or enjoyment;Loneliness Worry or anxiety;Sadness or depression;Loneliness Worry or anxiety None of these Worry or anxiety   Spiritual or Sabianism Concerns None of these None of these None of these None of these None of these None of these    Physical Concerns None of these None of these Fatigue;Memory or concentration Sleep;Fatigue;Memory or concentration Sleep;Fatigue None of these        Data saved with a previous flowsheet row definition        Interval history and content of current session: The patient reported difficulty implementing goals from the previous session, citing increased low mood, anhedonia, and fatigue. She identified cancer treatment-related fatigue as a primary barrier to getting out of bed and engaging in daily activities. Supportive therapeutic techniques were used to validate her emotional experience and explore underlying thoughts contributing to her mood. The patient acknowledged self-critical thoughts such as I am lazy, which often lead to feelings of embarrassment, sadness, and emotional withdrawal. Psychoeducation was provided regarding the connection between activity engagement and mood improvement, and behavioral strategies were introduced to support gradual re-engagement. Values exploration was conducted to identify areas of interest that could guide meaningful activity selection. The patient expressed interest in gardening but noted that fatigue often prevents her from initiating the activity. Problem-solving strategies were employed to introduce pacing techniques as part of a behavioral activation approach.     Risk parameters:   Patient reports no suicidal ideation  Patient reports no homicidal ideation  Patient reports no self-injurious  behavior  Patient reports no violent behavior     Safety needs:  None at this time      Verbal deficits: None     Patient's response to intervention:The patient's response to intervention is accepting.     Progress toward goals and other mental status changes:  The patient's progress toward goals is fair .      Progress to date:Progress - Ongoing, but Slow      Goals from last visit: Attempted, not met       Patient Strengths: verbal, intelligent, successful, good social support, good insight, commitment to wellnes       Treatment Plan:individual psychotherapy and medication management by physician  Target symptoms: anxiety , side effect management  Why chosen therapy is appropriate versus another modality: relevant to diagnosis  Outcome monitoring methods: self-report, observation  Therapeutic intervention type: insight oriented psychotherapy, behavior modifying psychotherapy, supportive psychotherapy  Prognosis: Good      Behavioral goals:    Therapy: Engage in gardening for 15 minutes, twice daily, while incorporating scheduled rest periods to support energy conservation and promote meaningful activity engagement.    Return to clinic: 2 weeks     Length of Service (minutes direct face-to-face contact): 60    Diagnosis:     ICD-10-CM ICD-9-CM   1. Adjustment disorder with depressed mood  F43.21 309.0   2. Generalized anxiety disorder  F41.1 300.02   3. Malignant neoplasm of ascending colon  C18.2 153.6   4. Metastasis to liver  C78.7 197.7       Loly Vinson PsyD   Clinical Health Psychology Fellow

## 2025-04-17 ENCOUNTER — INFUSION (OUTPATIENT)
Dept: INFUSION THERAPY | Facility: HOSPITAL | Age: 60
End: 2025-04-17
Attending: INTERNAL MEDICINE
Payer: COMMERCIAL

## 2025-04-17 VITALS
RESPIRATION RATE: 17 BRPM | HEIGHT: 65 IN | TEMPERATURE: 97 F | DIASTOLIC BLOOD PRESSURE: 77 MMHG | WEIGHT: 201.88 LBS | BODY MASS INDEX: 33.63 KG/M2 | HEART RATE: 93 BPM | SYSTOLIC BLOOD PRESSURE: 117 MMHG | OXYGEN SATURATION: 96 %

## 2025-04-17 DIAGNOSIS — C18.2 MALIGNANT NEOPLASM OF ASCENDING COLON: Primary | ICD-10-CM

## 2025-04-17 PROCEDURE — 96523 IRRIG DRUG DELIVERY DEVICE: CPT

## 2025-04-17 PROCEDURE — 25000003 PHARM REV CODE 250: Performed by: INTERNAL MEDICINE

## 2025-04-17 PROCEDURE — 63600175 PHARM REV CODE 636 W HCPCS: Performed by: INTERNAL MEDICINE

## 2025-04-17 PROCEDURE — A4216 STERILE WATER/SALINE, 10 ML: HCPCS | Performed by: INTERNAL MEDICINE

## 2025-04-17 RX ORDER — PROCHLORPERAZINE EDISYLATE 5 MG/ML
10 INJECTION INTRAMUSCULAR; INTRAVENOUS ONCE AS NEEDED
Status: DISCONTINUED | OUTPATIENT
Start: 2025-04-17 | End: 2025-04-17 | Stop reason: HOSPADM

## 2025-04-17 RX ORDER — SODIUM CHLORIDE 0.9 % (FLUSH) 0.9 %
10 SYRINGE (ML) INJECTION
Status: DISCONTINUED | OUTPATIENT
Start: 2025-04-17 | End: 2025-04-17 | Stop reason: HOSPADM

## 2025-04-17 RX ORDER — HEPARIN 100 UNIT/ML
500 SYRINGE INTRAVENOUS
Status: DISCONTINUED | OUTPATIENT
Start: 2025-04-17 | End: 2025-04-17 | Stop reason: HOSPADM

## 2025-04-17 RX ADMIN — Medication 10 ML: at 10:04

## 2025-04-17 RX ADMIN — HEPARIN 500 UNITS: 100 SYRINGE at 10:04

## 2025-04-17 NOTE — PLAN OF CARE
Problem: Fatigue  Goal: Improved Activity Tolerance  Intervention: Promote Improved Energy  Flowsheets (Taken 4/17/2025 1032)  Fatigue Management: fatigue-related activity identified  Activity Management: Ambulated -L4

## 2025-04-23 NOTE — ED NOTES
"Pt. Refused second IV for blood administration.  When explaining reason for second IV, pt stated that "they can just use my port if they need"    "
Assumed care:  Nik Lowery is awake, alert and oriented x 3, skin warm and dry, in NAD.  Patient sent by oncology for low H&H.  Patient CO fatigue.    Patient identifiers for Nik Lowery checked and correct.  LOC:  Nik Lowery is awake, alert, and aware of environment with an appropriate affect. She is oriented x 3 and speaking appropriately.  APPEARANCE:  She is resting comfortably and in no acute distress. She is clean and well groomed, patient's clothing is properly fastened.  SKIN:  The skin is warm and dry. She has normal skin turgor and moist mucus membranes. Skin is intact; no bruising or breakdown noted.  MUSCULOSKELETAL:  She is moving all extremities well, no obvious deformities noted. Pulses intact.   RESPIRATORY:  Airway is open and patent. Respirations are spontaneous and non-labored with normal effort and rate.  CARDIAC:  She has a normal rate and rhythm. No peripheral edema noted. Capillary refill < 3 seconds.  ABDOMEN:  No distention noted.  Soft and non-tender upon palpation.  Rectal bleeding  NEUROLOGICAL:  PERRL. Facial expression is symmetrical. Hand grasps are equal bilaterally. Normal sensation in all extremities when touched with finger.  fatigue  Allergies reported:    Review of patient's allergies indicates:   Allergen Reactions    Robaxin [methocarbamol] Nausea Only       
Report given to Kyle  
Telebox applied to pt.  Monitor room notified.  Telelbox [id number].  NSR 82   
heart catheterization revealed nonobstructive coronary artery disease with a minor lesion of 55 percent, which could potentially cause some discomfort.  - A short course of prednisone for 5 days will be prescribed to alleviate the inflammation. Additionally, a muscle relaxant will be provided for use at bedtime to manage the discomfort.  - She is advised to apply a heating pad over the affected area and engage in stretching exercises to facilitate muscle relaxation and pain reduction.    2. Hypoglycemia.  - The insurance company has declined coverage for continuous sensors due to her non-insulin treatment regimen.  - She is advised to monitor her blood sugar levels during episodes of shaking and sweating, which are indicative of hypoglycemia.  - If she tolerates Ozempic 25 mg well, the dosage can be increased to 50 mg.  - A prescription for Ozempic 25 mg with 3 refills will be sent to the pharmacy.    3. Hypertension.  - Her blood pressure is currently well-controlled.  - She will discontinue amlodipine and losartan/hydrochlorothiazide, and continue with losartan alone.  - She is advised to monitor her blood pressure regularly.    4. Renal cyst.  - The MRI conducted in October 2024 revealed a hemorrhagic cyst and a benign cyst in the kidney, with no further workup required at that time.  - An ultrasound will be ordered to assess any changes in the cysts since the last MRI.        Subjective:   HPI  History of Present Illness  The patient presents for evaluation of chest pain, hypoglycemia, hypertension, and renal cyst.    She was recently discharged from the hospital on 04/19/2025 after experiencing a significant episode of chest pain, which has since subsided. The pain was severe enough to warrant an emergency room visit, during which she experienced syncope. Weakness and dizziness were also reported throughout the day. Initially, the pain was thought to be gas-related, but it escalated in intensity and radiated to

## 2025-04-28 ENCOUNTER — OFFICE VISIT (OUTPATIENT)
Dept: HEMATOLOGY/ONCOLOGY | Facility: CLINIC | Age: 60
End: 2025-04-28
Payer: COMMERCIAL

## 2025-04-28 ENCOUNTER — HOSPITAL ENCOUNTER (OUTPATIENT)
Dept: RADIOLOGY | Facility: HOSPITAL | Age: 60
Discharge: HOME | End: 2025-04-28
Attending: SURGERY
Payer: COMMERCIAL

## 2025-04-28 ENCOUNTER — OFFICE VISIT (OUTPATIENT)
Facility: CLINIC | Age: 60
End: 2025-04-28
Payer: COMMERCIAL

## 2025-04-28 VITALS
SYSTOLIC BLOOD PRESSURE: 108 MMHG | OXYGEN SATURATION: 97 % | TEMPERATURE: 97 F | BODY MASS INDEX: 33.38 KG/M2 | WEIGHT: 200.38 LBS | DIASTOLIC BLOOD PRESSURE: 69 MMHG | HEART RATE: 97 BPM | RESPIRATION RATE: 18 BRPM | HEIGHT: 65 IN

## 2025-04-28 VITALS
SYSTOLIC BLOOD PRESSURE: 108 MMHG | RESPIRATION RATE: 18 BRPM | BODY MASS INDEX: 33.41 KG/M2 | TEMPERATURE: 97 F | HEART RATE: 97 BPM | DIASTOLIC BLOOD PRESSURE: 69 MMHG | WEIGHT: 200.81 LBS

## 2025-04-28 DIAGNOSIS — C18.9 METASTATIC COLON CANCER TO LIVER: Primary | ICD-10-CM

## 2025-04-28 DIAGNOSIS — C78.7 METASTATIC COLON CANCER TO LIVER: Primary | ICD-10-CM

## 2025-04-28 DIAGNOSIS — C18.2 MALIGNANT NEOPLASM OF ASCENDING COLON: ICD-10-CM

## 2025-04-28 DIAGNOSIS — C78.7 METASTASIS TO LIVER: ICD-10-CM

## 2025-04-28 DIAGNOSIS — T45.1X5A CHEMOTHERAPY-INDUCED NEUROPATHY: ICD-10-CM

## 2025-04-28 DIAGNOSIS — G62.0 CHEMOTHERAPY-INDUCED NEUROPATHY: ICD-10-CM

## 2025-04-28 DIAGNOSIS — C78.7 METASTASIS TO LIVER: Primary | ICD-10-CM

## 2025-04-28 PROCEDURE — G2211 COMPLEX E/M VISIT ADD ON: HCPCS | Mod: S$GLB,,, | Performed by: INTERNAL MEDICINE

## 2025-04-28 PROCEDURE — 3008F BODY MASS INDEX DOCD: CPT | Mod: CPTII,S$GLB,, | Performed by: INTERNAL MEDICINE

## 2025-04-28 PROCEDURE — 3078F DIAST BP <80 MM HG: CPT | Mod: CPTII,S$GLB,, | Performed by: SURGERY

## 2025-04-28 PROCEDURE — 71260 CT THORAX DX C+: CPT | Mod: 26,,, | Performed by: RADIOLOGY

## 2025-04-28 PROCEDURE — 99999 PR PBB SHADOW E&M-EST. PATIENT-LVL III: CPT | Mod: PBBFAC,,, | Performed by: INTERNAL MEDICINE

## 2025-04-28 PROCEDURE — 74177 CT ABD & PELVIS W/CONTRAST: CPT | Mod: 26,,, | Performed by: RADIOLOGY

## 2025-04-28 PROCEDURE — 74183 MRI ABD W/O CNTR FLWD CNTR: CPT | Mod: 26,,, | Performed by: RADIOLOGY

## 2025-04-28 PROCEDURE — 99215 OFFICE O/P EST HI 40 MIN: CPT | Mod: S$GLB,,, | Performed by: INTERNAL MEDICINE

## 2025-04-28 PROCEDURE — 74183 MRI ABD W/O CNTR FLWD CNTR: CPT | Mod: TC

## 2025-04-28 PROCEDURE — 3074F SYST BP LT 130 MM HG: CPT | Mod: CPTII,S$GLB,, | Performed by: INTERNAL MEDICINE

## 2025-04-28 PROCEDURE — 3074F SYST BP LT 130 MM HG: CPT | Mod: CPTII,S$GLB,, | Performed by: SURGERY

## 2025-04-28 PROCEDURE — 4010F ACE/ARB THERAPY RXD/TAKEN: CPT | Mod: CPTII,S$GLB,, | Performed by: INTERNAL MEDICINE

## 2025-04-28 PROCEDURE — A9585 GADOBUTROL INJECTION: HCPCS

## 2025-04-28 PROCEDURE — 25500020 PHARM REV CODE 255

## 2025-04-28 PROCEDURE — 99999 PR PBB SHADOW E&M-EST. PATIENT-LVL III: CPT | Mod: PBBFAC,,, | Performed by: SURGERY

## 2025-04-28 PROCEDURE — 4010F ACE/ARB THERAPY RXD/TAKEN: CPT | Mod: CPTII,S$GLB,, | Performed by: SURGERY

## 2025-04-28 PROCEDURE — 1159F MED LIST DOCD IN RCRD: CPT | Mod: CPTII,S$GLB,, | Performed by: SURGERY

## 2025-04-28 PROCEDURE — 1159F MED LIST DOCD IN RCRD: CPT | Mod: CPTII,S$GLB,, | Performed by: INTERNAL MEDICINE

## 2025-04-28 PROCEDURE — 3008F BODY MASS INDEX DOCD: CPT | Mod: CPTII,S$GLB,, | Performed by: SURGERY

## 2025-04-28 PROCEDURE — 3078F DIAST BP <80 MM HG: CPT | Mod: CPTII,S$GLB,, | Performed by: INTERNAL MEDICINE

## 2025-04-28 PROCEDURE — 74177 CT ABD & PELVIS W/CONTRAST: CPT | Mod: TC

## 2025-04-28 PROCEDURE — 99215 OFFICE O/P EST HI 40 MIN: CPT | Mod: S$GLB,,, | Performed by: SURGERY

## 2025-04-28 RX ORDER — FLUOROURACIL 50 MG/ML
400 INJECTION, SOLUTION INTRAVENOUS
Status: CANCELLED | OUTPATIENT
Start: 2025-04-30

## 2025-04-28 RX ORDER — HEPARIN 100 UNIT/ML
500 SYRINGE INTRAVENOUS
Status: CANCELLED | OUTPATIENT
Start: 2025-04-30

## 2025-04-28 RX ORDER — PROCHLORPERAZINE EDISYLATE 5 MG/ML
10 INJECTION INTRAMUSCULAR; INTRAVENOUS ONCE AS NEEDED
Status: CANCELLED | OUTPATIENT
Start: 2025-04-30

## 2025-04-28 RX ORDER — PROCHLORPERAZINE EDISYLATE 5 MG/ML
10 INJECTION INTRAMUSCULAR; INTRAVENOUS ONCE AS NEEDED
Status: CANCELLED | OUTPATIENT
Start: 2025-05-02

## 2025-04-28 RX ORDER — DIPHENHYDRAMINE HYDROCHLORIDE 50 MG/ML
50 INJECTION, SOLUTION INTRAMUSCULAR; INTRAVENOUS ONCE AS NEEDED
Status: CANCELLED | OUTPATIENT
Start: 2025-04-30

## 2025-04-28 RX ORDER — HEPARIN 100 UNIT/ML
500 SYRINGE INTRAVENOUS
Status: CANCELLED | OUTPATIENT
Start: 2025-05-02

## 2025-04-28 RX ORDER — SODIUM CHLORIDE 0.9 % (FLUSH) 0.9 %
10 SYRINGE (ML) INJECTION
Status: CANCELLED | OUTPATIENT
Start: 2025-05-02

## 2025-04-28 RX ORDER — EPINEPHRINE 0.3 MG/.3ML
0.3 INJECTION SUBCUTANEOUS ONCE AS NEEDED
Status: CANCELLED | OUTPATIENT
Start: 2025-04-30

## 2025-04-28 RX ORDER — SODIUM CHLORIDE 0.9 % (FLUSH) 0.9 %
10 SYRINGE (ML) INJECTION
Status: CANCELLED | OUTPATIENT
Start: 2025-04-30

## 2025-04-28 RX ORDER — GADOBUTROL 604.72 MG/ML
INJECTION INTRAVENOUS
Status: COMPLETED
Start: 2025-04-28 | End: 2025-04-28

## 2025-04-28 RX ADMIN — IOHEXOL 100 ML: 350 INJECTION, SOLUTION INTRAVENOUS at 09:04

## 2025-04-28 RX ADMIN — GADOBUTROL 9 ML: 604.72 INJECTION INTRAVENOUS at 11:04

## 2025-04-28 NOTE — PROGRESS NOTES
Surgical Oncology Restaging Visit    Encounter Date:  2025    Patient ID: Nik Lowery  Age:  59 y.o. :  1965    Chief Complaint:  Colon Cancer Metastatic to Liver      History:    Ms. Lowery is a 59 y.o. female with metastatic colon cancer with liver metastases. At diagnosis, she had recurrent bleeding that necessitated upfront right colectomy. Pathology showed two separate foci of moderately differentiated adenocarcinoma with 3/16 positive lymph nodes. She had a synchronous biopsy confirmed liver metastasis in segment 8 as well as at least 5 other indeterminate subcentimeter foci of restricted diffusion on MRI. She had follow up MRI after the ablation and 2 cycles of chemotherapy, which showed no evidence of disease at the ablation cavity but progression in the rest of the liver with numerous new small metastases. She has now completed 6 cycles of FOLFOX.     Past Medical History:   Diagnosis Date    Colon cancer     Diabetes mellitus     Diabetes mellitus, type 2     Encounter for blood transfusion     Herpes     Hypertension     Sleep apnea      Past Surgical History:   Procedure Laterality Date    COLONOSCOPY N/A 2024    Procedure: COLONOSCOPY;  Surgeon: Saw Wick MD;  Location: [facility];  Service: Endoscopy;  Laterality: N/A;    ESOPHAGOGASTRODUODENOSCOPY N/A 2024    Procedure: EGD (ESOPHAGOGASTRODUODENOSCOPY);  Surgeon: Saw Wick MD;  Location: [facility];  Service: Endoscopy;  Laterality: N/A;    EYE SURGERY      lasix Bilateral    HYSTERECTOMY      INSERTION OF TUNNELED CENTRAL VENOUS CATHETER (CVC) WITH SUBCUTANEOUS PORT Right 2024    Procedure: INSERTION, PORT-A-CATH;  Surgeon: Logan Juarez MD;  Location: [facility];  Service: General;  Laterality: Right;    LUMBAR DISCECTOMY      ROBOT-ASSISTED COLECTOMY Right 2025    Procedure: ROBOTIC COLECTOMY WITH ILEOCOLIC ANASTOMOSIS;  Surgeon: Logan Juarez MD;  Location: [facility];  Service:  General;  Laterality: Right;    WISDOM TOOTH EXTRACTION       Medications Ordered Prior to Encounter[1]  Review of patient's allergies indicates:   Allergen Reactions    Robaxin [methocarbamol] Nausea Only       Family History:  Her family history includes Alzheimer's disease in her paternal grandmother; Arthritis in her sister; Cancer in her father; Cataracts in her mother; Diabetes in her brother; Heart disease in her maternal grandfather, mother, and paternal uncle; Hypertension in her mother; No Known Problems in her paternal aunt; Pancreatic cancer in her father.     Social History:  She reports that she has never smoked. She has never used smokeless tobacco. She reports that she does not drink alcohol and does not use drugs.     ROS:     Review of Systems  Pertinent positive/negatives detailed in HPI, all other systems negative.     Physical Exam:  Blood pressure 108/69, pulse 97, temperature 96.9 °F (36.1 °C), resp. rate 18, weight 91.1 kg (200 lb 12.8 oz).     Physical Exam    Constitutional:  Non-toxic, no acute distress.  Performance status: ECOG 0  Eyes:  Sclerae anicteric, gaze symmetrical  Neck:  Trachea midline, thyroid, non enlarged without palpable nodules,  FROM  Resp:  Easy work of breathing, no wheezes  CV:  Regular pulse, no JVD  Abd:  Soft, non-tender, no masses, no hepatosplenomegaly, no ascites, no superficial varices  Lymphatics:  No cervical, supraclavicular, axillary, or inguinal lymphadenopathy  Musculoskeletal:  Ambulatory, normal gait, no muscle wasting  Neuro:  No gross deficits  Psych:  Awake, alert, oriented.  Answers and asks questions appropriately    Data:     Radiology:  I personally reviewed these images: I reviewed her CT and MRI. There iare approximately 15-20 small bilobar liver metastases. There is clear progression in the liver compared to the disease burden at diagnosis. The disease volume is similar to what it was 2 months ago following 2 cycles of chemotherapy but has not  responded significantly.     Labs:  No recent CEA.    Pathology:    Collected: 12/13/24 1145   Result status: Final   Resulting lab: [facility]   Value: RIGHT HEPATIC LOBE LESION, CT-GUIDED BIOPSY WITH PATHOLOGIST ASSISTED ADEQUACY (CYTOLOGY AND CELL BLOCK):  Metastatic carcinoma, consistent with colonic origin.    Immunohistochemistry (IHC) Testing for Mismatch Repair (MMR) Proteins:    MLH1 - Intact nuclear expression  MSH2 - Intact nuclear expression  MSH6 - Intact nuclear expression  PMS2 - Intact nuclear expression    Background nonneoplastic tissue/internal control with intact nuclear expression    IHC Interpretation  No loss of nuclear expression of MMR proteins: low probability of microsatellite instability    There are exceptions to the above IHC interpretations. These results should not be considered in isolation, and clinical correlation with genetic counseling is recommended to assess the need for germline testing.   Comment: Interp By Radha Garcia M.D., Signed on 12/17/2024 at 12:51       Assessment: Ms. Lowery initially presented 5 months ago with a right colon cancer with synchronous small bilobar liver metastases. She has progressed since diagnosis and has stable to slightly progressive disease compared to her last imaging from 2 months ago. Her disease is not currently amenable to treatment with ablation/resection given the diffuse bilobar distribution, although the overall disease volume is low. I think the best option at this point would be to either switch chemotherapy to FOLFIRI/Avastin or place an BOB pump and switch chemo. I discussed BOB pump placement and the risks and benefits with the patient extensively. She will think about this, and I will also refer her to Dr. Stanford to discuss BOB chemotherapy in more detail. I will also present her case at next Thursday's tumor board.       Plan:  - Proceed with next cycle of chemo tomorrow  - Refer to Dr. Stanford for BOB  discussion  - [facility] Tumor Board. I will regroup with her after she talks to Dr. Stanford and her case has been presented at [facility] Tumor Board.     I spent 40 minutes with Ms. Lowery, with >50% of time spent face to face and additional time preparing to see the patient (eg, review of tests), obtaining and/or reviewing separately obtained history, documenting clinical information in the electronic or other health record, independently interpreting results (not separately reported) and communicating results to the patient/family/caregiver, or Care coordination (not separately reported).            Mike Henry MD  Surgical Oncology  [facility] 1965               [1]   Current Outpatient Medications on File Prior to Visit   Medication Sig Dispense Refill    amoxicillin-clavulanate 875-125mg (AUGMENTIN) 875-125 mg per tablet Take 1 tablet by mouth 2 (two) times daily. 14 tablet 0    atorvastatin (LIPITOR) 20 MG tablet TAKE 1 TABLET BY MOUTH EVERY DAY 90 tablet 3    blood-glucose meter kit To check BG 3 times daily, to use with insurance preferred meter 1 each 0    dexAMETHasone (DECADRON) 4 MG Tab Take 2 tablets (8 mg total) by mouth once daily. On days 2 through 4 of each cycle of chemo 120 tablet 0    docusate sodium (COLACE) 100 MG capsule Take 1 capsule (100 mg total) by mouth 2 (two) times daily. 14 capsule 0    DULoxetine (CYMBALTA) 20 MG capsule Take 2 capsules (40 mg total) by mouth once daily. 60 capsule 11    fexofenadine (ALLEGRA) 180 MG tablet Take 180 mg by mouth nightly.      fluticasone propionate (FLONASE) 50 mcg/actuation nasal spray SPRAY 2 SPRAYS BY EACH NOSTRIL ROUTE ONCE DAILY. 48 mL 3    FREESTYLE JOCELIN 14 DAY SENSOR Kit 1 APPLICATION BY MISC.(NON-DRUG COMBO ROUTE) ROUTE DAILY AS NEEDED. 1 kit 24    gabapentin (NEURONTIN) 800 MG tablet Take 800 mg by mouth 3 (three) times daily.      guaiFENesin (MUCINEX) 1,200 mg Ta12 Take 1 tablet by mouth  "nightly.      HYDROcodone-acetaminophen (NORCO) 5-325 mg per tablet Take 1 tablet by mouth every 6 (six) hours as needed for Pain. 20 tablet 0    insulin glargine, TOUJEO, (TOUJEO SOLOSTAR U-300 INSULIN) 300 unit/mL (1.5 mL) InPn pen Inject 25 Units into the skin once daily. 2.5 mL 11    lamoTRIgine (LAMICTAL) 150 MG Tab TAKE 1 TABLET BY MOUTH EVERY DAY 90 tablet 3    lancets Misc To check BG 3 times daily, to use with insurance preferred meter 200 each 1    LIDOcaine-prilocaine (EMLA) cream Apply topically as needed (Apply to port site 30 minutes before access as needed). 30 g 0    lisinopriL (PRINIVIL,ZESTRIL) 20 MG tablet TAKE 1 TABLET BY MOUTH EVERY DAY 90 tablet 3    magnesium oxide (MAG-OX) 400 mg (241.3 mg magnesium) tablet Take 1 tablet (400 mg total) by mouth 2 (two) times daily. 60 tablet 3    metFORMIN (GLUCOPHAGE-XR) 500 MG ER 24hr tablet TAKE 2 TABLETS BY MOUTH TWICE A DAY WITH MEALS 360 tablet 3    multivitamin (THERAGRAN) per tablet Take 1 tablet by mouth once daily.      omeprazole (PRILOSEC) 20 MG capsule Take 20 mg by mouth every evening.      ondansetron (ZOFRAN) 8 MG tablet Take 1 tablet (8 mg total) by mouth every 8 (eight) hours as needed. 30 tablet 2    ONETOUCH ULTRA BLUE TEST STRIP Str USE TO CHECK BLOOD SUGAR 3 TIMES A  strip 0    pen needle, diabetic (PEN NEEDLE) 31 gauge x 5/16" Ndle 1 Application by Misc.(Non-Drug; Combo Route) route once daily. 30 each 5    prochlorperazine (COMPAZINE) 10 MG tablet Take 1 tablet (10 mg total) by mouth every 6 (six) hours as needed. 30 tablet 1    sertraline (ZOLOFT) 100 MG tablet TAKE 1 TABLET BY MOUTH EVERY DAY 90 tablet 3    valACYclovir (VALTREX) 500 MG tablet TAKE 1 TABLET BY MOUTH TWICE A  tablet 3     Current Facility-Administered Medications on File Prior to Visit   Medication Dose Route Frequency Provider Last Rate Last Admin    diphenhydrAMINE capsule 25 mg  25 mg Oral PRN Amarjit Crawford MD        gadobutroL (GADAVIST) 10 mmol/10 " mL (1 mmol/mL) injection             [COMPLETED] iohexoL (OMNIPAQUE 350) 350 mg iodine/mL injection        100 mL at 04/28/25 0956    sodium chloride 0.9% flush 10 mL  10 mL Intravenous PRN Amarjit Crawford MD

## 2025-04-28 NOTE — Clinical Note
Moving forward with treatment.  Cachectic can we see if the patient has NGS cares results yet?  We will bring her back with next cycle of therapy with lab

## 2025-04-28 NOTE — PROGRESS NOTES
The patient location is: home  The chief complaint leading to consultation is: colon cancer    Visit type: audiovisual    Face to Face time with patient: 10  20 minutes of total time spent on the encounter, which includes face to face time and non-face to face time preparing to see the patient (eg, review of tests), Obtaining and/or reviewing separately obtained history, Documenting clinical information in the electronic or other health record, Independently interpreting results (not separately reported) and communicating results to the patient/family/caregiver, or Care coordination (not separately reported).         Each patient to whom he or she provides medical services by telemedicine is:  (1) informed of the relationship between the physician and patient and the respective role of any other health care provider with respect to management of the patient; and (2) notified that he or she may decline to receive medical services by telemedicine and may withdraw from such care at any time.    Notes:        HPI    59 years old female with history of diabetes type II, hypertension and herpes    She was recently diagnosed with cecal mass causing significant bleeding require hospitalization and urgent transfusion.  She received 2 units packed red blood cell during the course of hospitalization.  Colonoscopy showed likely malignant tumor ascending colon nearby cecum.  CT of the abdomen pelvis contrast demonstrate concerning cecal malignancy with to right lower quadrant abnormal lymph nodes concerning for metastases with indeterminate right hepatic lobe 10 mm lesion      Past Medical History:   Diagnosis Date    Colon cancer     Diabetes mellitus     Diabetes mellitus, type 2     Encounter for blood transfusion     Herpes     Hypertension     Sleep apnea      Social History     Socioeconomic History    Marital status: Single   Occupational History    Occupation: supply chain   Tobacco Use    Smoking status: Never     Smokeless tobacco: Never   Substance and Sexual Activity    Alcohol use: No    Drug use: No    Sexual activity: Not Currently     Social Drivers of Health     Financial Resource Strain: Low Risk  (2/16/2025)    Overall Financial Resource Strain (CARDIA)     Difficulty of Paying Living Expenses: Not hard at all   Food Insecurity: No Food Insecurity (2/16/2025)    Hunger Vital Sign     Worried About Running Out of Food in the Last Year: Never true     Ran Out of Food in the Last Year: Never true   Transportation Needs: No Transportation Needs (2/16/2025)    PRAPARE - Transportation     Lack of Transportation (Medical): No     Lack of Transportation (Non-Medical): No   Physical Activity: Insufficiently Active (2/16/2025)    Exercise Vital Sign     Days of Exercise per Week: 1 day     Minutes of Exercise per Session: 10 min   Stress: No Stress Concern Present (2/16/2025)    [place] citizen [employer] - Occupational Stress Questionnaire     Feeling of Stress : Only a little   Recent Concern: Stress - Stress Concern Present (11/26/2024)    [place] citizen [employer] - Occupational Stress Questionnaire     Feeling of Stress : Rather much   Housing Stability: Low Risk  (2/16/2025)    Housing Stability Vital Sign     Unable to Pay for Housing in the Last Year: No     Number of Times Moved in the Last Year: 0     Homeless in the Last Year: No         Objective    Physical Exam     VV       Lab Results   Component Value Date    WBC 4.51 04/28/2025    HGB 11.7 (L) 04/28/2025    HCT 35.7 (L) 04/28/2025    MCV 91 04/28/2025     04/28/2025       CMP  Sodium   Date Value Ref Range Status   04/28/2025 137 136 - 145 mmol/L Final     Potassium   Date Value Ref Range Status   04/28/2025 3.7 3.5 - 5.1 mmol/L Final     Chloride   Date Value Ref Range Status   03/17/2025 101 95 - 110 mmol/L Final     CO2   Date Value Ref Range Status   04/28/2025 21 (L) 23 - 29 mmol/L Final     Glucose   Date Value Ref  Range Status   03/17/2025 368 (H) 70 - 110 mg/dL Final     BUN   Date Value Ref Range Status   04/28/2025 11 6 - 20 mg/dL Final     Creatinine   Date Value Ref Range Status   04/28/2025 0.8 0.5 - 1.4 mg/dL Final     Calcium   Date Value Ref Range Status   04/28/2025 8.8 8.7 - 10.5 mg/dL Final     Total Protein   Date Value Ref Range Status   03/17/2025 7.3 6.0 - 8.4 g/dL Final     Albumin   Date Value Ref Range Status   04/28/2025 3.5 3.5 - 5.2 g/dL Final     Bilirubin Total   Date Value Ref Range Status   04/28/2025 0.4 0.1 - 1.0 mg/dL Final     Comment:     For infants and newborns, interpretation of results should be based   on gestational age, weight and in agreement with clinical   observations.    Premature Infant recommended reference ranges:   0-24 hours:  <8.0 mg/dL   24-48 hours: <12.0 mg/dL   3-5 days:    <15.0 mg/dL   6-29 days:   <15.0 mg/dL     ALP   Date Value Ref Range Status   04/28/2025 102 40 - 150 unit/L Final     AST   Date Value Ref Range Status   04/28/2025 38 11 - 45 unit/L Final     ALT   Date Value Ref Range Status   04/28/2025 32 10 - 44 unit/L Final     Anion Gap   Date Value Ref Range Status   03/17/2025 11 8 - 16 mmol/L Final     eGFR   Date Value Ref Range Status   04/28/2025 >60 >60 mL/min/1.73/m2 Final   03/17/2025 >60 >60 mL/min/1.73 m^2 Final     CT chest abdomen pelvis with contrast   Impression:     Findings concerning for cecal malignancy.  There are least 2 right lower quadrant abnormal lymph nodes concerning for metastatic disease.     Indeterminate right hepatic lobe 10 mm lesion.  Recommend further characterization with liver mass protocol MRI.     Small fat containing umbilical and infraumbilical hernias.     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  Status post resection cecum for bleeding control tumor size 4.8 cm and 4 mm 2 separate foci.  Lymphovascular invasion positive.  Lymph nodes involvement 3/16  positive for malignancy.  MSI stable    Pathological staging [id number]    Pharmacokinetic panel 24 rapid metabolizer CY,  [id number] and CY    CT demonstrated possible liver involvement with 10 mm right hepatic lobe lesions.    Status post IR biopsy liver positive for malignancy    Microwave started on 24 single sessions competed     MRI post ablations study shows 25 interval increase in size/number of multiple small hepatic masses suggestive of multifocal     > NGS Caris results?     > C1D1 FOLFOX 25 currently on cycle #7 MRI 2025 demonstrated finding consists of change in of ablation with further decrease of the ablation cavity and no suspicious nodular enhancement.  In numerous small subcentimeter suggestive of metastases disease some appearing more apparent but not through overall significantly changed compared to prior study of MRI     > low Mag - continue take Mag replacement     > elevated liver enzymes  resolved    > anemia         Plan    There are no diagnoses linked to this encounter.

## 2025-04-29 ENCOUNTER — TELEPHONE (OUTPATIENT)
Dept: HEMATOLOGY/ONCOLOGY | Facility: CLINIC | Age: 60
End: 2025-04-29
Payer: COMMERCIAL

## 2025-04-29 ENCOUNTER — INFUSION (OUTPATIENT)
Dept: INFUSION THERAPY | Facility: HOSPITAL | Age: 60
End: 2025-04-29
Attending: INTERNAL MEDICINE
Payer: COMMERCIAL

## 2025-04-29 VITALS
TEMPERATURE: 97 F | HEIGHT: 65 IN | WEIGHT: 198.38 LBS | SYSTOLIC BLOOD PRESSURE: 160 MMHG | RESPIRATION RATE: 16 BRPM | HEART RATE: 79 BPM | OXYGEN SATURATION: 96 % | BODY MASS INDEX: 33.05 KG/M2 | DIASTOLIC BLOOD PRESSURE: 99 MMHG

## 2025-04-29 DIAGNOSIS — C18.2 MALIGNANT NEOPLASM OF ASCENDING COLON: Primary | ICD-10-CM

## 2025-04-29 PROCEDURE — 63600175 PHARM REV CODE 636 W HCPCS: Performed by: INTERNAL MEDICINE

## 2025-04-29 PROCEDURE — 96415 CHEMO IV INFUSION ADDL HR: CPT

## 2025-04-29 PROCEDURE — 96367 TX/PROPH/DG ADDL SEQ IV INF: CPT

## 2025-04-29 PROCEDURE — 96368 THER/DIAG CONCURRENT INF: CPT

## 2025-04-29 PROCEDURE — 25000003 PHARM REV CODE 250: Performed by: INTERNAL MEDICINE

## 2025-04-29 PROCEDURE — 96416 CHEMO PROLONG INFUSE W/PUMP: CPT

## 2025-04-29 PROCEDURE — 96411 CHEMO IV PUSH ADDL DRUG: CPT

## 2025-04-29 PROCEDURE — 96413 CHEMO IV INFUSION 1 HR: CPT

## 2025-04-29 RX ORDER — HEPARIN 100 UNIT/ML
500 SYRINGE INTRAVENOUS
Status: DISCONTINUED | OUTPATIENT
Start: 2025-04-29 | End: 2025-04-29 | Stop reason: HOSPADM

## 2025-04-29 RX ORDER — FLUOROURACIL 50 MG/ML
400 INJECTION, SOLUTION INTRAVENOUS
Status: COMPLETED | OUTPATIENT
Start: 2025-04-29 | End: 2025-04-29

## 2025-04-29 RX ORDER — DIPHENHYDRAMINE HYDROCHLORIDE 50 MG/ML
50 INJECTION, SOLUTION INTRAMUSCULAR; INTRAVENOUS ONCE AS NEEDED
Status: DISCONTINUED | OUTPATIENT
Start: 2025-04-29 | End: 2025-04-29 | Stop reason: HOSPADM

## 2025-04-29 RX ORDER — EPINEPHRINE 0.3 MG/.3ML
0.3 INJECTION SUBCUTANEOUS ONCE AS NEEDED
Status: DISCONTINUED | OUTPATIENT
Start: 2025-04-29 | End: 2025-04-29 | Stop reason: HOSPADM

## 2025-04-29 RX ORDER — SODIUM CHLORIDE 0.9 % (FLUSH) 0.9 %
10 SYRINGE (ML) INJECTION
Status: DISCONTINUED | OUTPATIENT
Start: 2025-04-29 | End: 2025-04-29 | Stop reason: HOSPADM

## 2025-04-29 RX ORDER — PROCHLORPERAZINE EDISYLATE 5 MG/ML
10 INJECTION INTRAMUSCULAR; INTRAVENOUS ONCE AS NEEDED
Status: DISCONTINUED | OUTPATIENT
Start: 2025-04-29 | End: 2025-04-29 | Stop reason: HOSPADM

## 2025-04-29 RX ADMIN — PALONOSETRON HYDROCHLORIDE 0.25 MG: 0.25 INJECTION, SOLUTION INTRAVENOUS at 09:04

## 2025-04-29 RX ADMIN — FLUOROURACIL 4990 MG: 50 INJECTION, SOLUTION INTRAVENOUS at 11:04

## 2025-04-29 RX ADMIN — FLUOROURACIL 830 MG: 50 INJECTION, SOLUTION INTRAVENOUS at 11:04

## 2025-04-29 RX ADMIN — OXALIPLATIN 177 MG: 5 INJECTION, SOLUTION INTRAVENOUS at 09:04

## 2025-04-29 RX ADMIN — LEUCOVORIN CALCIUM 830 MG: 350 INJECTION, POWDER, LYOPHILIZED, FOR SOLUTION INTRAMUSCULAR; INTRAVENOUS at 09:04

## 2025-04-29 RX ADMIN — DEXTROSE MONOHYDRATE: 50 INJECTION, SOLUTION INTRAVENOUS at 09:04

## 2025-04-29 NOTE — PLAN OF CARE
Problem: Fatigue  Goal: Improved Activity Tolerance  Outcome: Progressing  Intervention: Promote Improved Energy  Flowsheets (Taken 4/29/2025 9334)  Fatigue Management: frequent rest breaks encouraged  Sleep/Rest Enhancement: regular sleep/rest pattern promoted  Activity Management: Ambulated -L4  Environmental Support: calm environment promoted

## 2025-04-29 NOTE — NURSING
Patient in infusion clinic and unable to schedule with medical oncology. Agreed to call her tomorrow per her request

## 2025-04-30 ENCOUNTER — TELEPHONE (OUTPATIENT)
Dept: HEMATOLOGY/ONCOLOGY | Facility: CLINIC | Age: 60
End: 2025-04-30
Payer: COMMERCIAL

## 2025-04-30 ENCOUNTER — OFFICE VISIT (OUTPATIENT)
Dept: HEMATOLOGY/ONCOLOGY | Facility: CLINIC | Age: 60
End: 2025-04-30
Payer: COMMERCIAL

## 2025-04-30 DIAGNOSIS — E11.9 TYPE 2 DIABETES MELLITUS WITHOUT COMPLICATION: ICD-10-CM

## 2025-04-30 DIAGNOSIS — E08.00 DIABETES MELLITUS DUE TO UNDERLYING CONDITION WITH HYPEROSMOLARITY WITHOUT COMA, UNSPECIFIED WHETHER LONG TERM INSULIN USE: ICD-10-CM

## 2025-04-30 DIAGNOSIS — T45.1X5A CHEMOTHERAPY-INDUCED NEUROPATHY: ICD-10-CM

## 2025-04-30 DIAGNOSIS — C18.2 MALIGNANT NEOPLASM OF ASCENDING COLON: Primary | ICD-10-CM

## 2025-04-30 DIAGNOSIS — C78.7 METASTASIS TO LIVER: ICD-10-CM

## 2025-04-30 DIAGNOSIS — G62.0 CHEMOTHERAPY-INDUCED NEUROPATHY: ICD-10-CM

## 2025-04-30 NOTE — TELEPHONE ENCOUNTER
Ms. Cornejo was called on 4/29/25 at 4:53pm , but no answer. A detailed message left on v/m to reach out for scheduling. 4/30/25 @11:00 was mention to have a virtual if can make it.

## 2025-04-30 NOTE — NURSING
Spoke with patient and scheduled a virtual visit appointment for 04/30/2025 with Dr. Stanford; Provided patient with appointment time and date, address of [facility] facility and direct line to navigator. All questions and concerns addressed.

## 2025-04-30 NOTE — Clinical Note
Hey guys, I talked to her today and she is interested in proceeding with placement of BOB pump.   Amarjit, I'm happy to temporarily take over things while she has the pump in place since it's not something that she can get at [facility] at this time.  Might be helpful if she can get her 5-FU infusion pump taken off at [facility] to help with travel, but we would have to do the BOB chemo and refills every 2 weeks here at [facility].  I'd probably recommend you just do 5-FU alone until her surgery. Benjamin, want to let us know when her surgery is going to be so we can coordinate her first FUDR? Ban, mind building a supportive plan for FUDR? Thanks, Sinan

## 2025-05-01 ENCOUNTER — INFUSION (OUTPATIENT)
Dept: INFUSION THERAPY | Facility: HOSPITAL | Age: 60
End: 2025-05-01
Attending: INTERNAL MEDICINE
Payer: COMMERCIAL

## 2025-05-01 VITALS
WEIGHT: 195.19 LBS | DIASTOLIC BLOOD PRESSURE: 95 MMHG | TEMPERATURE: 97 F | HEIGHT: 65 IN | RESPIRATION RATE: 18 BRPM | BODY MASS INDEX: 32.52 KG/M2 | HEART RATE: 104 BPM | SYSTOLIC BLOOD PRESSURE: 143 MMHG

## 2025-05-01 DIAGNOSIS — C78.7 METASTASIS TO LIVER: ICD-10-CM

## 2025-05-01 DIAGNOSIS — C18.9 COLON ADENOCARCINOMA: Primary | ICD-10-CM

## 2025-05-01 DIAGNOSIS — C78.7 METASTASIS TO LIVER: Primary | ICD-10-CM

## 2025-05-01 DIAGNOSIS — C18.9 COLON ADENOCARCINOMA: ICD-10-CM

## 2025-05-01 DIAGNOSIS — C18.2 MALIGNANT NEOPLASM OF ASCENDING COLON: ICD-10-CM

## 2025-05-01 DIAGNOSIS — C18.2 MALIGNANT NEOPLASM OF ASCENDING COLON: Primary | ICD-10-CM

## 2025-05-01 PROCEDURE — 25000003 PHARM REV CODE 250: Performed by: INTERNAL MEDICINE

## 2025-05-01 PROCEDURE — A4216 STERILE WATER/SALINE, 10 ML: HCPCS | Performed by: INTERNAL MEDICINE

## 2025-05-01 PROCEDURE — 96523 IRRIG DRUG DELIVERY DEVICE: CPT

## 2025-05-01 PROCEDURE — 63600175 PHARM REV CODE 636 W HCPCS: Performed by: INTERNAL MEDICINE

## 2025-05-01 RX ORDER — HEPARIN 100 UNIT/ML
500 SYRINGE INTRAVENOUS
Status: DISCONTINUED | OUTPATIENT
Start: 2025-05-01 | End: 2025-05-01 | Stop reason: HOSPADM

## 2025-05-01 RX ORDER — SODIUM CHLORIDE 0.9 % (FLUSH) 0.9 %
10 SYRINGE (ML) INJECTION
Status: DISCONTINUED | OUTPATIENT
Start: 2025-05-01 | End: 2025-05-01 | Stop reason: HOSPADM

## 2025-05-01 RX ADMIN — SODIUM CHLORIDE, PRESERVATIVE FREE 10 ML: 5 INJECTION INTRAVENOUS at 09:05

## 2025-05-01 RX ADMIN — HEPARIN 500 UNITS: 100 SYRINGE at 09:05

## 2025-05-01 NOTE — PLAN OF CARE
Problem: Fatigue  Goal: Improved Activity Tolerance  5/1/2025 1104 by Abeba Disla, RN  Outcome: Met  5/1/2025 1104 by Abeba Disla, RN  Outcome: Progressing

## 2025-05-04 ENCOUNTER — PATIENT MESSAGE (OUTPATIENT)
Dept: HEMATOLOGY/ONCOLOGY | Facility: CLINIC | Age: 60
End: 2025-05-04
Payer: COMMERCIAL

## 2025-05-05 ENCOUNTER — TELEPHONE (OUTPATIENT)
Dept: HEMATOLOGY/ONCOLOGY | Facility: CLINIC | Age: 60
End: 2025-05-05
Payer: COMMERCIAL

## 2025-05-05 ENCOUNTER — PATIENT MESSAGE (OUTPATIENT)
Dept: PSYCHIATRY | Facility: CLINIC | Age: 60
End: 2025-05-05
Payer: COMMERCIAL

## 2025-05-06 ENCOUNTER — PATIENT MESSAGE (OUTPATIENT)
Dept: HEMATOLOGY/ONCOLOGY | Facility: CLINIC | Age: 60
End: 2025-05-06
Payer: COMMERCIAL

## 2025-05-06 RX ORDER — HEPARIN 100 UNIT/ML
500 SYRINGE INTRAVENOUS
Status: CANCELLED | OUTPATIENT
Start: 2025-05-06

## 2025-05-06 RX ORDER — SODIUM CHLORIDE 0.9 % (FLUSH) 0.9 %
10 SYRINGE (ML) INJECTION
Status: CANCELLED | OUTPATIENT
Start: 2025-05-06

## 2025-05-07 ENCOUNTER — INFUSION (OUTPATIENT)
Dept: INFUSION THERAPY | Facility: HOSPITAL | Age: 60
End: 2025-05-07
Attending: INTERNAL MEDICINE
Payer: COMMERCIAL

## 2025-05-07 ENCOUNTER — HOSPITAL ENCOUNTER (EMERGENCY)
Facility: HOSPITAL | Age: 60
Discharge: HOME | End: 2025-05-07
Attending: EMERGENCY MEDICINE
Payer: COMMERCIAL

## 2025-05-07 VITALS
WEIGHT: 187 LBS | DIASTOLIC BLOOD PRESSURE: 68 MMHG | HEIGHT: 65 IN | TEMPERATURE: 98 F | BODY MASS INDEX: 31.16 KG/M2 | SYSTOLIC BLOOD PRESSURE: 115 MMHG | HEART RATE: 78 BPM | OXYGEN SATURATION: 98 % | RESPIRATION RATE: 17 BRPM

## 2025-05-07 VITALS
RESPIRATION RATE: 18 BRPM | TEMPERATURE: 97 F | HEIGHT: 65 IN | WEIGHT: 187.13 LBS | DIASTOLIC BLOOD PRESSURE: 73 MMHG | OXYGEN SATURATION: 100 % | SYSTOLIC BLOOD PRESSURE: 112 MMHG | BODY MASS INDEX: 31.18 KG/M2 | HEART RATE: 105 BPM

## 2025-05-07 DIAGNOSIS — C18.2 MALIGNANT NEOPLASM OF ASCENDING COLON: ICD-10-CM

## 2025-05-07 DIAGNOSIS — C18.9 COLON ADENOCARCINOMA: Primary | ICD-10-CM

## 2025-05-07 DIAGNOSIS — C18.2 MALIGNANT NEOPLASM OF ASCENDING COLON: Primary | ICD-10-CM

## 2025-05-07 DIAGNOSIS — R73.9 HYPERGLYCEMIA: Primary | ICD-10-CM

## 2025-05-07 LAB
ABSOLUTE EOSINOPHIL (SMH): 0.24 K/UL
ABSOLUTE MONOCYTE (SMH): 0.47 K/UL (ref 0.3–1)
ABSOLUTE NEUTROPHIL COUNT (SMH): 3.3 K/UL (ref 1.8–7.7)
ALBUMIN SERPL-MCNC: 4.3 G/DL (ref 3.5–5.2)
ALP SERPL-CCNC: 99 UNIT/L (ref 55–135)
ALT SERPL-CCNC: 34 UNIT/L (ref 10–44)
ANION GAP (SMH): 11 MMOL/L (ref 8–16)
AST SERPL-CCNC: 27 UNIT/L (ref 10–40)
BASOPHILS # BLD AUTO: 0.04 K/UL
BASOPHILS NFR BLD AUTO: 0.6 %
BILIRUB SERPL-MCNC: 0.9 MG/DL (ref 0.1–1)
BUN SERPL-MCNC: 34 MG/DL (ref 6–20)
CALCIUM SERPL-MCNC: 9.5 MG/DL (ref 8.7–10.5)
CHLORIDE SERPL-SCNC: 94 MMOL/L (ref 95–110)
CO2 SERPL-SCNC: 20 MMOL/L (ref 23–29)
CREAT SERPL-MCNC: 1.1 MG/DL (ref 0.5–1.4)
ERYTHROCYTE [DISTWIDTH] IN BLOOD BY AUTOMATED COUNT: 16.8 % (ref 11.5–14.5)
GFR SERPLBLD CREATININE-BSD FMLA CKD-EPI: 58 ML/MIN/1.73/M2
GLUCOSE SERPL-MCNC: 585 MG/DL (ref 70–110)
HCT VFR BLD AUTO: 38.9 % (ref 37–48.5)
HGB BLD-MCNC: 13.5 GM/DL (ref 12–16)
IMM GRANULOCYTES # BLD AUTO: 0.05 K/UL (ref 0–0.04)
IMM GRANULOCYTES NFR BLD AUTO: 0.7 % (ref 0–0.5)
LYMPHOCYTES # BLD AUTO: 2.66 K/UL (ref 1–4.8)
MAGNESIUM SERPL-MCNC: 2.1 MG/DL (ref 1.6–2.6)
MCH RBC QN AUTO: 31 PG (ref 27–31)
MCHC RBC AUTO-ENTMCNC: 34.7 G/DL (ref 32–36)
MCV RBC AUTO: 89 FL (ref 82–98)
NUCLEATED RBC (/100WBC) (SMH): 0 /100 WBC
PLATELET # BLD AUTO: 239 K/UL (ref 150–450)
PMV BLD AUTO: 9.1 FL (ref 9.2–12.9)
POCT GLUCOSE: 295 MG/DL (ref 70–110)
POCT GLUCOSE: >500 MG/DL (ref 70–110)
POCT GLUCOSE: >500 MG/DL (ref 70–110)
POTASSIUM SERPL-SCNC: 5.1 MMOL/L (ref 3.5–5.1)
PROT SERPL-MCNC: 7.5 GM/DL (ref 6–8.4)
RBC # BLD AUTO: 4.35 M/UL (ref 4–5.4)
RELATIVE EOSINOPHIL (SMH): 3.5 % (ref 0–8)
RELATIVE LYMPHOCYTE (SMH): 39.3 % (ref 18–48)
RELATIVE MONOCYTE (SMH): 6.9 % (ref 4–15)
RELATIVE NEUTROPHIL (SMH): 49 % (ref 38–73)
SODIUM SERPL-SCNC: 125 MMOL/L (ref 136–145)
WBC # BLD AUTO: 6.77 K/UL (ref 3.9–12.7)

## 2025-05-07 PROCEDURE — 63600175 PHARM REV CODE 636 W HCPCS

## 2025-05-07 PROCEDURE — 96374 THER/PROPH/DIAG INJ IV PUSH: CPT

## 2025-05-07 PROCEDURE — 84295 ASSAY OF SERUM SODIUM: CPT | Performed by: EMERGENCY MEDICINE

## 2025-05-07 PROCEDURE — 63600175 PHARM REV CODE 636 W HCPCS: Performed by: EMERGENCY MEDICINE

## 2025-05-07 PROCEDURE — 85025 COMPLETE CBC W/AUTO DIFF WBC: CPT | Performed by: EMERGENCY MEDICINE

## 2025-05-07 PROCEDURE — 82962 GLUCOSE BLOOD TEST: CPT

## 2025-05-07 PROCEDURE — 83735 ASSAY OF MAGNESIUM: CPT | Performed by: EMERGENCY MEDICINE

## 2025-05-07 PROCEDURE — G0463 HOSPITAL OUTPT CLINIC VISIT: HCPCS

## 2025-05-07 PROCEDURE — 99284 EMERGENCY DEPT VISIT MOD MDM: CPT | Mod: 25

## 2025-05-07 PROCEDURE — 96361 HYDRATE IV INFUSION ADD-ON: CPT

## 2025-05-07 RX ORDER — HEPARIN 100 UNIT/ML
500 SYRINGE INTRAVENOUS
OUTPATIENT
Start: 2025-05-07

## 2025-05-07 RX ORDER — SODIUM CHLORIDE 0.9 % (FLUSH) 0.9 %
10 SYRINGE (ML) INJECTION
OUTPATIENT
Start: 2025-05-07

## 2025-05-07 RX ORDER — SODIUM CHLORIDE 0.9 % (FLUSH) 0.9 %
10 SYRINGE (ML) INJECTION
Status: DISCONTINUED | OUTPATIENT
Start: 2025-05-07 | End: 2025-05-07 | Stop reason: HOSPADM

## 2025-05-07 RX ORDER — HEPARIN 100 UNIT/ML
500 SYRINGE INTRAVENOUS
Status: DISCONTINUED | OUTPATIENT
Start: 2025-05-07 | End: 2025-05-07 | Stop reason: HOSPADM

## 2025-05-07 RX ORDER — HEPARIN 100 UNIT/ML
SYRINGE INTRAVENOUS
Status: COMPLETED
Start: 2025-05-07 | End: 2025-05-07

## 2025-05-07 RX ORDER — HEPARIN 100 UNIT/ML
5 SYRINGE INTRAVENOUS ONCE
Status: COMPLETED | OUTPATIENT
Start: 2025-05-07 | End: 2025-05-07

## 2025-05-07 RX ADMIN — SODIUM CHLORIDE, POTASSIUM CHLORIDE, SODIUM LACTATE AND CALCIUM CHLORIDE 1000 ML: 600; 310; 30; 20 INJECTION, SOLUTION INTRAVENOUS at 12:05

## 2025-05-07 RX ADMIN — INSULIN HUMAN 4 UNITS: 100 INJECTION, SOLUTION PARENTERAL at 02:05

## 2025-05-07 RX ADMIN — HEPARIN 500 UNITS: 100 SYRINGE at 04:05

## 2025-05-07 NOTE — NURSING
Patient arrived to [facility] for IVF as ordered by MD. Patient states she has had extreme fatigue and weakness. Patient states she has been drinking plenty of fluids with electrolytes and stays thirsty. Patient is a diabetic. Her sugars when she last checked her glucose two days ago were over 600. Patient states she took her metformin and treated with insulin. Sugars then came down to 300. Patient has not checked her glucose since. POC glucose taken at chairside and results showed >500. MD office notified. No labs to be done here. Patient to report to ED for workup there. Patient's port to be left accessed. Report called to ED. Patient to travel to ED via private vehicle escorted by friend. Patient to report to [facility] ED.

## 2025-05-07 NOTE — DISCHARGE INSTRUCTIONS
Mild hyponatremia (low sodium) present along with mild worsening of kidney function with recommendation to recheck on close outpatient follow up.

## 2025-05-07 NOTE — ED NOTES
Pt to er with c/o hyperglycemia. Chest wall port noted accessed pta. She denies cp, sob. Aaox4. Skin w/dr/pk. Rr even/unlab. Maew. Speech clear.

## 2025-05-07 NOTE — PLAN OF CARE
Problem: Fatigue  Goal: Improved Activity Tolerance  Outcome: Progressing  Intervention: Promote Improved Energy  Flowsheets (Taken 5/7/2025 1010)  Fatigue Management:   fatigue-related activity identified   paced activity encouraged   frequent rest breaks encouraged  Sleep/Rest Enhancement:   noise level reduced   regular sleep/rest pattern promoted   relaxation techniques promoted  Activity Management:   Ambulated -L4   Up in chair - L3  Environmental Support:   calm environment promoted   distractions minimized   rest periods encouraged

## 2025-05-07 NOTE — ED PROVIDER NOTES
Chief complaint:  Hyperglycemia (SENT FROM [facility])      HPI:  Nik Lowery is a 59 y.o. female colon CA metastatic to liver on chemotherapy, DM, htn, BIJAN presenting with hyperglycemia sent from infusion center.  Patient has been feeling fatigued longer than normal since recent chemotherapy infusion from last week.  She has been on course of steroids finished three days ago since infusion per normal protocol.  She describes generalized fatigue without focal numbness or weakness.  She had presented to infusion center for IV fluids but was referred to the emergency department with hyperglycemia noted.  She notes she had temporarily discontinued metformin after recent scan but has since resumed.  She has been noncompliant with insulin glargine for at least the past three days.  She describes polyuria without other ear symptoms such as frequency, urgency, dysuria, hematuria.  No emesis.    ROS: As per HPI and below:  No headache, fever, flank pain, chest pain, dyspnea, abdominal pain, swelling, syncope.    Review of patient's allergies indicates:   Allergen Reactions    Robaxin [methocarbamol] Nausea Only       Discharge Medication List as of 5/7/2025  3:21 PM        CONTINUE these medications which have NOT CHANGED    Details   amoxicillin-clavulanate 875-125mg (AUGMENTIN) 875-125 mg per tablet Take 1 tablet by mouth 2 (two) times daily., Starting Tue 1/21/2025, Print      atorvastatin (LIPITOR) 20 MG tablet TAKE 1 TABLET BY MOUTH EVERY DAY, Starting Thu 12/5/2024, Normal      blood-glucose meter kit To check BG 3 times daily, to use with insurance preferred meter, Normal      dexAMETHasone (DECADRON) 4 MG Tab Take 2 tablets (8 mg total) by mouth once daily. On days 2 through 4 of each cycle of chemo, Starting Thu 12/26/2024, Until Fri 12/26/2025, Normal      docusate sodium (COLACE) 100 MG capsule Take 1 capsule (100 mg total) by mouth 2 (two) times daily., Starting Wed 1/29/2025, Normal      DULoxetine (CYMBALTA)  20 MG capsule Take 2 capsules (40 mg total) by mouth once daily., Starting Mon 3/17/2025, Until Tue 3/17/2026, Normal      fexofenadine (ALLEGRA) 180 MG tablet Take 180 mg by mouth nightly., Historical Med      fluticasone propionate (FLONASE) 50 mcg/actuation nasal spray SPRAY 2 SPRAYS BY EACH NOSTRIL ROUTE ONCE DAILY., Starting Fri 1/17/2025, Normal      FREESTYLE JOCELIN 14 DAY SENSOR Kit 1 APPLICATION BY MISC.(NON-DRUG COMBO ROUTE) ROUTE DAILY AS NEEDED., Starting Fri 2/28/2025, Normal      gabapentin (NEURONTIN) 800 MG tablet Take 800 mg by mouth 3 (three) times daily., Historical Med      guaiFENesin (MUCINEX) 1,200 mg Ta12 Take 1 tablet by mouth nightly., Historical Med      HYDROcodone-acetaminophen (NORCO) 5-325 mg per tablet Take 1 tablet by mouth every 6 (six) hours as needed for Pain., Starting Sat 1/11/2025, Normal      insulin glargine, TOUJEO, (TOUJEO SOLOSTAR U-300 INSULIN) 300 unit/mL (1.5 mL) InPn pen Inject 25 Units into the skin once daily., Starting Mon 4/7/2025, Until Tue 4/7/2026, Normal      lamoTRIgine (LAMICTAL) 150 MG Tab TAKE 1 TABLET BY MOUTH EVERY DAY, Starting Thu 12/5/2024, Normal      lancets Misc To check BG 3 times daily, to use with insurance preferred meter, Normal      LIDOcaine-prilocaine (EMLA) cream Apply topically as needed (Apply to port site 30 minutes before access as needed)., Starting Thu 12/26/2024, Normal      lisinopriL (PRINIVIL,ZESTRIL) 20 MG tablet TAKE 1 TABLET BY MOUTH EVERY DAY, Starting Fri 1/31/2025, Normal      magnesium oxide (MAG-OX) 400 mg (241.3 mg magnesium) tablet Take 1 tablet (400 mg total) by mouth 2 (two) times daily., Starting Mon 3/3/2025, Normal      metFORMIN (GLUCOPHAGE-XR) 500 MG ER 24hr tablet TAKE 2 TABLETS BY MOUTH TWICE A DAY WITH MEALS, Starting Thu 3/6/2025, Normal      multivitamin (THERAGRAN) per tablet Take 1 tablet by mouth once daily., Historical Med      omeprazole (PRILOSEC) 20 MG capsule Take 20 mg by mouth every evening.,  "Historical Med      ondansetron (ZOFRAN) 8 MG tablet Take 1 tablet (8 mg total) by mouth every 8 (eight) hours as needed., Starting Thu 12/26/2024, Until Fri 12/26/2025 at 2359, Normal      ONETOUCH ULTRA BLUE TEST STRIP Strp USE TO CHECK BLOOD SUGAR 3 TIMES A DAY, Normal      pen needle, diabetic (PEN NEEDLE) 31 gauge x 5/16" Ndle 1 Application by Misc.(Non-Drug; Combo Route) route once daily., Starting Tue 12/17/2024, Normal      prochlorperazine (COMPAZINE) 10 MG tablet Take 1 tablet (10 mg total) by mouth every 6 (six) hours as needed., Starting Thu 12/26/2024, Until Fri 12/26/2025 at 2359, Normal      sertraline (ZOLOFT) 100 MG tablet TAKE 1 TABLET BY MOUTH EVERY DAY, Starting Fri 1/17/2025, Normal      valACYclovir (VALTREX) 500 MG tablet TAKE 1 TABLET BY MOUTH TWICE A DAY, Normal             PMH:  As per HPI and below:  Past Medical History:   Diagnosis Date    Colon cancer     Diabetes mellitus     Diabetes mellitus, type 2     Encounter for blood transfusion     Herpes     Hypertension     Sleep apnea      Past Surgical History:   Procedure Laterality Date    COLONOSCOPY N/A 11/8/2024    Procedure: COLONOSCOPY;  Surgeon: Saw Wick MD;  Location: [facility];  Service: Endoscopy;  Laterality: N/A;    ESOPHAGOGASTRODUODENOSCOPY N/A 11/8/2024    Procedure: EGD (ESOPHAGOGASTRODUODENOSCOPY);  Surgeon: Saw Wick MD;  Location: [facility];  Service: Endoscopy;  Laterality: N/A;    EYE SURGERY  2002    lasix Bilateral    HYSTERECTOMY  2012    INSERTION OF TUNNELED CENTRAL VENOUS CATHETER (CVC) WITH SUBCUTANEOUS PORT Right 12/20/2024    Procedure: INSERTION, PORT-A-CATH;  Surgeon: Logan Juarez MD;  Location: [facility];  Service: General;  Laterality: Right;    LUMBAR DISCECTOMY      ROBOT-ASSISTED COLECTOMY Right 1/7/2025    Procedure: ROBOTIC COLECTOMY WITH ILEOCOLIC ANASTOMOSIS;  Surgeon: Logan Juarez MD;  Location: [facility];  Service: General;  Laterality: Right;    WISDOM TOOTH EXTRACTION   "       Social History     Socioeconomic History    Marital status: Single   Occupational History    Occupation: supply chain   Tobacco Use    Smoking status: Never    Smokeless tobacco: Never   Substance and Sexual Activity    Alcohol use: No    Drug use: No    Sexual activity: Not Currently     Social Drivers of Health     Financial Resource Strain: Low Risk  (2/16/2025)    Overall Financial Resource Strain (CARDIA)     Difficulty of Paying Living Expenses: Not hard at all   Food Insecurity: No Food Insecurity (2/16/2025)    Hunger Vital Sign     Worried About Running Out of Food in the Last Year: Never true     Ran Out of Food in the Last Year: Never true   Transportation Needs: No Transportation Needs (2/16/2025)    PRAPARE - Transportation     Lack of Transportation (Medical): No     Lack of Transportation (Non-Medical): No   Physical Activity: Insufficiently Active (2/16/2025)    Exercise Vital Sign     Days of Exercise per Week: 1 day     Minutes of Exercise per Session: 10 min   Stress: No Stress Concern Present (2/16/2025)    [employer] - Occupational Stress Questionnaire     Feeling of Stress : Only a little   Recent Concern: Stress - Stress Concern Present (11/26/2024)    [employer] - Occupational Stress Questionnaire     Feeling of Stress : Rather much   Housing Stability: Low Risk  (2/16/2025)    Housing Stability Vital Sign     Unable to Pay for Housing in the Last Year: No     Number of Times Moved in the Last Year: 0     Homeless in the Last Year: No       Family History   Problem Relation Name Age of Onset    Heart disease Mother      Hypertension Mother      Cataracts Mother      Pancreatic cancer Father      Cancer Father      Arthritis Sister 2     Diabetes Brother 1     No Known Problems Paternal Aunt 1     Heart disease Paternal Uncle 2     Heart disease Maternal Grandfather      Alzheimer's disease Paternal Grandmother      Glaucoma Neg  Hx         Physical Exam:    Vitals:    05/07/25 1649   BP: 115/68   Pulse: 78   Resp: 17   Temp: 97.9 °F (36.6 °C)     GENERAL:  No apparent distress.  Alert.    HEENT:  Moist mucous membranes.  Normocephalic and atraumatic.    NECK:  No swelling.  Midline trachea.   CARDIOVASCULAR:  Regular rate and rhythm.  2+ radial pulses.  No murmur auscultated.  Right chest wall port currently accessed without surrounding erythema or tenderness.  PULMONARY:  Lungs clear to auscultation bilaterally.  No wheezes, rales, or rhonci.  Unlabored respirations.  ABDOMEN:  Non-tender and non-distended.    EXTREMITIES:  Warm and well perfused.  Brisk capillary refill.    NEUROLOGICAL:  Normal mental status.  Appropriate and conversant.  5/5 strength with equal sensation to light touch bilaterally.  Cranial nerves 3-12 intact.  No pronator drift.  SKIN:  No rashes or ecchymoses.    BACK:  Atraumatic.  No CVA tenderness to palpation.      Labs Reviewed   COMPREHENSIVE METABOLIC PANEL - Abnormal       Result Value    Sodium 125 (*)     Potassium 5.1      Chloride 94 (*)     CO2 20 (*)     Glucose 585 (*)     BUN 34 (*)     Creatinine 1.1      Calcium 9.5      Protein Total 7.5      Albumin 4.3      Bilirubin Total 0.9      ALP 99      AST 27      ALT 34      Anion Gap 11      eGFR 58 (*)    CBC WITH DIFFERENTIAL - Abnormal    WBC 6.77      RBC 4.35      Hgb 13.5      Hct 38.9      MCV 89      MCH 31.0      MCHC 34.7      RDW 16.8 (*)     Platelet Count 239      MPV 9.1 (*)     Nucleated RBC 0      Neut % 49.0      Lymph % 39.3      Mono % 6.9      Eos % 3.5      Basophil % 0.6      Imm Grans % 0.7 (*)     Neut # 3.3      Lymph # 2.66      Mono # 0.47      Eos # 0.24      Baso # 0.04      Imm Grans # 0.05 (*)    POCT GLUCOSE - Abnormal    POCT Glucose >500 (*)    POCT GLUCOSE - Abnormal    POCT Glucose 295 (*)    MAGNESIUM - Normal    Magnesium 2.1     CBC W/ AUTO DIFFERENTIAL    Narrative:     The following orders were created for panel  order CBC auto differential.  Procedure                               Abnormality         Status                     ---------                               -----------         ------                     CBC with Differential[[id number]]       Abnormal            Final result                 Please view results for these tests on the individual orders.       Discharge Medication List as of 5/7/2025  3:21 PM        CONTINUE these medications which have NOT CHANGED    Details   amoxicillin-clavulanate 875-125mg (AUGMENTIN) 875-125 mg per tablet Take 1 tablet by mouth 2 (two) times daily., Starting Tue 1/21/2025, Print      atorvastatin (LIPITOR) 20 MG tablet TAKE 1 TABLET BY MOUTH EVERY DAY, Starting Thu 12/5/2024, Normal      blood-glucose meter kit To check BG 3 times daily, to use with insurance preferred meter, Normal      dexAMETHasone (DECADRON) 4 MG Tab Take 2 tablets (8 mg total) by mouth once daily. On days 2 through 4 of each cycle of chemo, Starting Thu 12/26/2024, Until Fri 12/26/2025, Normal      docusate sodium (COLACE) 100 MG capsule Take 1 capsule (100 mg total) by mouth 2 (two) times daily., Starting Wed 1/29/2025, Normal      DULoxetine (CYMBALTA) 20 MG capsule Take 2 capsules (40 mg total) by mouth once daily., Starting Mon 3/17/2025, Until Tue 3/17/2026, Normal      fexofenadine (ALLEGRA) 180 MG tablet Take 180 mg by mouth nightly., Historical Med      fluticasone propionate (FLONASE) 50 mcg/actuation nasal spray SPRAY 2 SPRAYS BY EACH NOSTRIL ROUTE ONCE DAILY., Starting Fri 1/17/2025, Normal      FREESTYLE JOCELIN 14 DAY SENSOR Kit 1 APPLICATION BY MISC.(NON-DRUG COMBO ROUTE) ROUTE DAILY AS NEEDED., Starting Fri 2/28/2025, Normal      gabapentin (NEURONTIN) 800 MG tablet Take 800 mg by mouth 3 (three) times daily., Historical Med      guaiFENesin (MUCINEX) 1,200 mg Ta12 Take 1 tablet by mouth nightly., Historical Med      HYDROcodone-acetaminophen (NORCO) 5-325 mg per tablet Take 1 tablet by mouth  "every 6 (six) hours as needed for Pain., Starting Sat 1/11/2025, Normal      insulin glargine, TOUJEO, (TOUJEO SOLOSTAR U-300 INSULIN) 300 unit/mL (1.5 mL) InPn pen Inject 25 Units into the skin once daily., Starting Mon 4/7/2025, Until Tue 4/7/2026, Normal      lamoTRIgine (LAMICTAL) 150 MG Tab TAKE 1 TABLET BY MOUTH EVERY DAY, Starting Thu 12/5/2024, Normal      lancets Misc To check BG 3 times daily, to use with insurance preferred meter, Normal      LIDOcaine-prilocaine (EMLA) cream Apply topically as needed (Apply to port site 30 minutes before access as needed)., Starting Thu 12/26/2024, Normal      lisinopriL (PRINIVIL,ZESTRIL) 20 MG tablet TAKE 1 TABLET BY MOUTH EVERY DAY, Starting Fri 1/31/2025, Normal      magnesium oxide (MAG-OX) 400 mg (241.3 mg magnesium) tablet Take 1 tablet (400 mg total) by mouth 2 (two) times daily., Starting Mon 3/3/2025, Normal      metFORMIN (GLUCOPHAGE-XR) 500 MG ER 24hr tablet TAKE 2 TABLETS BY MOUTH TWICE A DAY WITH MEALS, Starting Thu 3/6/2025, Normal      multivitamin (THERAGRAN) per tablet Take 1 tablet by mouth once daily., Historical Med      omeprazole (PRILOSEC) 20 MG capsule Take 20 mg by mouth every evening., Historical Med      ondansetron (ZOFRAN) 8 MG tablet Take 1 tablet (8 mg total) by mouth every 8 (eight) hours as needed., Starting Thu 12/26/2024, Until Fri 12/26/2025 at 2359, Normal      ONETOUCH ULTRA BLUE TEST STRIP Strp USE TO CHECK BLOOD SUGAR 3 TIMES A DAY, Normal      pen needle, diabetic (PEN NEEDLE) 31 gauge x 5/16" Ndle 1 Application by Misc.(Non-Drug; Combo Route) route once daily., Starting Tue 12/17/2024, Normal      prochlorperazine (COMPAZINE) 10 MG tablet Take 1 tablet (10 mg total) by mouth every 6 (six) hours as needed., Starting Thu 12/26/2024, Until Fri 12/26/2025 at 2359, Normal      sertraline (ZOLOFT) 100 MG tablet TAKE 1 TABLET BY MOUTH EVERY DAY, Starting Fri 1/17/2025, Normal      valACYclovir (VALTREX) 500 MG tablet TAKE 1 TABLET BY " MOUTH TWICE A DAY, Normal             Orders Placed This Encounter   Procedures    CBC auto differential    Comprehensive metabolic panel    CBC with Differential    Magnesium    Vital signs    Insert Saline lock IV       Imaging Results    None              MDM:    59 y.o. female with fatigue in the setting of hyperglycemia with multiple potential stressors including recent chemotherapy with following course of dexamethasone as well as recent noncompliance with long-acting insulin glargine.  Patient is well-appearing here.  IV fluids planned along with laboratories to assess for end-organ dysfunction.  Metabolic panel sent to exclude DKA, electrolyte derangement, renal insult.  No urinary complaints apart from expected polyuria and I doubt UTI.  There is no focal neurological deficit and I have very low suspicion for central neurologic process such as CVA.  I do not think brain imaging is indicated.  Hyperglycemia without sign of DKA noted.  Minimal renal insufficiency compared to baseline noted.  I have discussed with the patient who strongly prefers to manage on outpatient basis and and will resume long-acting insulin in addition to p.o. antihyperglycemics.  Dose of regular insulin given here along with IV fluids with appropriate reduction of glucose.  No sign of DKA or need for other continued alteration to insulin therapy at this point.  Continue oral hydration at home.  Follow up closely for recheck discussion of repeat labs as well.  Return precautions reviewed.    Diagnoses:    1. Hyperglycemia       Tera Conway MD  05/08/25 1935

## 2025-05-08 ENCOUNTER — E-VISIT (OUTPATIENT)
Dept: FAMILY MEDICINE | Facility: CLINIC | Age: 60
End: 2025-05-08
Payer: COMMERCIAL

## 2025-05-08 DIAGNOSIS — E11.65 UNCONTROLLED TYPE 2 DIABETES MELLITUS WITH HYPERGLYCEMIA, WITHOUT LONG-TERM CURRENT USE OF INSULIN: ICD-10-CM

## 2025-05-09 RX ORDER — INSULIN GLARGINE 300 [IU]/ML
30 INJECTION, SOLUTION SUBCUTANEOUS DAILY
Qty: 3 ML | Refills: 11 | Status: SHIPPED | OUTPATIENT
Start: 2025-05-09 | End: 2026-05-09

## 2025-05-09 NOTE — PROGRESS NOTES
Patient ID: Nik Lowery is a 59 y.o. female.    Chief Complaint: Follow-up (Entered automatically based on patient selection in [facility].)    The patient initiated a request through [facility] on 5/8/2025 for evaluation and management with a chief complaint of Follow-up (Entered automatically based on patient selection in [facility].)     I evaluated the questionnaire submission on 5/9/25.    [facility] Peq Evisit Ed Follow Up    5/8/2025 10:02 PM CDT - Filed by Patient   Do you agree to participate in an E-Visit? Yes   If you have any of the following symptoms, please present to your local emergency room or call 911:  I acknowledge   What problem(s) was addressed at your recent emergency room visit? Glucose level over 500 and extreme weakness   How is your problem since your Emergency Room visit? Better   Were you given any new medication(s)? No   Did the emergency room provider ask you to schedule any labs or testing after discharge? No   Provide any additional information you feel is important. Glucose count is still high   Please attach any relevant images or files    Are you able to take your vital signs? No         Encounter Diagnosis   Name Primary?    Uncontrolled type 2 diabetes mellitus with hyperglycemia, without long-term current use of insulin         No orders of the defined types were placed in this encounter.     Medications Ordered This Encounter   Medications    insulin glargine, TOUJEO, (TOUJEO SOLOSTAR U-300 INSULIN) 300 unit/mL (1.5 mL) InPn pen     Sig: Inject 30 Units into the skin once daily.     Dispense:  3 mL     Refill:  11        No follow-ups on file.      E-Visit Time Tracking:    Day 1 Time (in minutes): 13    Total Time (in minutes): 13

## 2025-05-12 ENCOUNTER — OFFICE VISIT (OUTPATIENT)
Dept: HEMATOLOGY/ONCOLOGY | Facility: CLINIC | Age: 60
End: 2025-05-12
Payer: COMMERCIAL

## 2025-05-12 ENCOUNTER — LAB VISIT (OUTPATIENT)
Dept: LAB | Facility: HOSPITAL | Age: 60
End: 2025-05-12
Attending: INTERNAL MEDICINE
Payer: COMMERCIAL

## 2025-05-12 VITALS
TEMPERATURE: 98 F | WEIGHT: 195.13 LBS | SYSTOLIC BLOOD PRESSURE: 107 MMHG | HEART RATE: 106 BPM | OXYGEN SATURATION: 98 % | BODY MASS INDEX: 32.51 KG/M2 | DIASTOLIC BLOOD PRESSURE: 70 MMHG | RESPIRATION RATE: 18 BRPM | HEIGHT: 65 IN

## 2025-05-12 DIAGNOSIS — C78.7 METASTASIS TO LIVER: ICD-10-CM

## 2025-05-12 DIAGNOSIS — G62.0 CHEMOTHERAPY-INDUCED NEUROPATHY: ICD-10-CM

## 2025-05-12 DIAGNOSIS — C18.2 MALIGNANT NEOPLASM OF ASCENDING COLON: Primary | ICD-10-CM

## 2025-05-12 DIAGNOSIS — T45.1X5A CHEMOTHERAPY-INDUCED NEUROPATHY: ICD-10-CM

## 2025-05-12 DIAGNOSIS — E08.00 DIABETES MELLITUS DUE TO UNDERLYING CONDITION WITH HYPEROSMOLARITY WITHOUT COMA, UNSPECIFIED WHETHER LONG TERM INSULIN USE: ICD-10-CM

## 2025-05-12 DIAGNOSIS — C18.2 MALIGNANT NEOPLASM OF ASCENDING COLON: ICD-10-CM

## 2025-05-12 LAB
ABSOLUTE EOSINOPHIL (SMH): 0.12 K/UL
ABSOLUTE MONOCYTE (SMH): 0.44 K/UL (ref 0.3–1)
ABSOLUTE NEUTROPHIL COUNT (SMH): 2.8 K/UL (ref 1.8–7.7)
ALBUMIN SERPL-MCNC: 3.3 G/DL (ref 3.5–5.2)
ALP SERPL-CCNC: 92 UNIT/L (ref 40–150)
ALT SERPL-CCNC: 42 UNIT/L (ref 10–44)
ANION GAP (SMH): 12 MMOL/L (ref 8–16)
AST SERPL-CCNC: 45 UNIT/L (ref 11–45)
BASOPHILS # BLD AUTO: 0.02 K/UL
BASOPHILS NFR BLD AUTO: 0.4 %
BILIRUB SERPL-MCNC: 0.3 MG/DL (ref 0.1–1)
BUN SERPL-MCNC: 17 MG/DL (ref 6–20)
CALCIUM SERPL-MCNC: 8.8 MG/DL (ref 8.7–10.5)
CHLORIDE SERPL-SCNC: 103 MMOL/L (ref 95–110)
CO2 SERPL-SCNC: 20 MMOL/L (ref 23–29)
CREAT SERPL-MCNC: 0.9 MG/DL (ref 0.5–1.4)
ERYTHROCYTE [DISTWIDTH] IN BLOOD BY AUTOMATED COUNT: 18 % (ref 11.5–14.5)
GFR SERPLBLD CREATININE-BSD FMLA CKD-EPI: >60 ML/MIN/1.73/M2
GLUCOSE SERPL-MCNC: 311 MG/DL (ref 70–110)
HCT VFR BLD AUTO: 34.5 % (ref 37–48.5)
HGB BLD-MCNC: 11.6 GM/DL (ref 12–16)
IMM GRANULOCYTES # BLD AUTO: 0.02 K/UL (ref 0–0.04)
IMM GRANULOCYTES NFR BLD AUTO: 0.4 % (ref 0–0.5)
LYMPHOCYTES # BLD AUTO: 1.32 K/UL (ref 1–4.8)
MAGNESIUM SERPL-MCNC: 1.6 MG/DL (ref 1.6–2.6)
MCH RBC QN AUTO: 30.9 PG (ref 27–31)
MCHC RBC AUTO-ENTMCNC: 33.6 G/DL (ref 32–36)
MCV RBC AUTO: 92 FL (ref 82–98)
NUCLEATED RBC (/100WBC) (SMH): 0 /100 WBC
PLATELET # BLD AUTO: 152 K/UL (ref 150–450)
PLATELET BLD QL SMEAR: NORMAL
PMV BLD AUTO: 9.2 FL (ref 9.2–12.9)
POTASSIUM SERPL-SCNC: 4.7 MMOL/L (ref 3.5–5.1)
PROT SERPL-MCNC: 6.3 GM/DL (ref 6–8.4)
RBC # BLD AUTO: 3.75 M/UL (ref 4–5.4)
RELATIVE EOSINOPHIL (SMH): 2.5 % (ref 0–8)
RELATIVE LYMPHOCYTE (SMH): 28 % (ref 18–48)
RELATIVE MONOCYTE (SMH): 9.3 % (ref 4–15)
RELATIVE NEUTROPHIL (SMH): 59.4 % (ref 38–73)
SODIUM SERPL-SCNC: 135 MMOL/L (ref 136–145)
WBC # BLD AUTO: 4.72 K/UL (ref 3.9–12.7)

## 2025-05-12 PROCEDURE — 3078F DIAST BP <80 MM HG: CPT | Mod: CPTII,S$GLB,, | Performed by: INTERNAL MEDICINE

## 2025-05-12 PROCEDURE — 3074F SYST BP LT 130 MM HG: CPT | Mod: CPTII,S$GLB,, | Performed by: INTERNAL MEDICINE

## 2025-05-12 PROCEDURE — 85025 COMPLETE CBC W/AUTO DIFF WBC: CPT

## 2025-05-12 PROCEDURE — 99215 OFFICE O/P EST HI 40 MIN: CPT | Mod: S$GLB,,, | Performed by: INTERNAL MEDICINE

## 2025-05-12 PROCEDURE — 36415 COLL VENOUS BLD VENIPUNCTURE: CPT

## 2025-05-12 PROCEDURE — 83735 ASSAY OF MAGNESIUM: CPT

## 2025-05-12 PROCEDURE — 3008F BODY MASS INDEX DOCD: CPT | Mod: CPTII,S$GLB,, | Performed by: INTERNAL MEDICINE

## 2025-05-12 PROCEDURE — 4010F ACE/ARB THERAPY RXD/TAKEN: CPT | Mod: CPTII,S$GLB,, | Performed by: INTERNAL MEDICINE

## 2025-05-12 PROCEDURE — 99999 PR PBB SHADOW E&M-EST. PATIENT-LVL III: CPT | Mod: PBBFAC,,, | Performed by: INTERNAL MEDICINE

## 2025-05-12 PROCEDURE — G2211 COMPLEX E/M VISIT ADD ON: HCPCS | Mod: S$GLB,,, | Performed by: INTERNAL MEDICINE

## 2025-05-12 PROCEDURE — 82040 ASSAY OF SERUM ALBUMIN: CPT

## 2025-05-12 NOTE — PROGRESS NOTES
The patient location is: home  The chief complaint leading to consultation is: colon cancer    Visit type: audiovisual    Face to Face time with patient: 10  20 minutes of total time spent on the encounter, which includes face to face time and non-face to face time preparing to see the patient (eg, review of tests), Obtaining and/or reviewing separately obtained history, Documenting clinical information in the electronic or other health record, Independently interpreting results (not separately reported) and communicating results to the patient/family/caregiver, or Care coordination (not separately reported).         Each patient to whom he or she provides medical services by telemedicine is:  (1) informed of the relationship between the physician and patient and the respective role of any other health care provider with respect to management of the patient; and (2) notified that he or she may decline to receive medical services by telemedicine and may withdraw from such care at any time.    Notes:        HPI    59 years old female with history of diabetes type II, hypertension and herpes    She was recently diagnosed with cecal mass causing significant bleeding require hospitalization and urgent transfusion.  She received 2 units packed red blood cell during the course of hospitalization.  Colonoscopy showed likely malignant tumor ascending colon nearby cecum.  CT of the abdomen pelvis contrast demonstrate concerning cecal malignancy with to right lower quadrant abnormal lymph nodes concerning for metastases with indeterminate right hepatic lobe 10 mm lesion      Past Medical History:   Diagnosis Date    Colon cancer     Diabetes mellitus     Diabetes mellitus, type 2     Encounter for blood transfusion     Herpes     Hypertension     Sleep apnea      Social History     Socioeconomic History    Marital status: Single   Occupational History    Occupation: supply chain   Tobacco Use    Smoking status: Never     Smokeless tobacco: Never   Substance and Sexual Activity    Alcohol use: No    Drug use: No    Sexual activity: Not Currently     Social Drivers of Health     Financial Resource Strain: Low Risk  (2/16/2025)    Overall Financial Resource Strain (CARDIA)     Difficulty of Paying Living Expenses: Not hard at all   Food Insecurity: No Food Insecurity (2/16/2025)    Hunger Vital Sign     Worried About Running Out of Food in the Last Year: Never true     Ran Out of Food in the Last Year: Never true   Transportation Needs: No Transportation Needs (2/16/2025)    PRAPARE - Transportation     Lack of Transportation (Medical): No     Lack of Transportation (Non-Medical): No   Physical Activity: Insufficiently Active (2/16/2025)    Exercise Vital Sign     Days of Exercise per Week: 1 day     Minutes of Exercise per Session: 10 min   Stress: No Stress Concern Present (2/16/2025)    [employer] - Occupational Stress Questionnaire     Feeling of Stress : Only a little   Recent Concern: Stress - Stress Concern Present (11/26/2024)    [employer] - Occupational Stress Questionnaire     Feeling of Stress : Rather much   Housing Stability: Low Risk  (2/16/2025)    Housing Stability Vital Sign     Unable to Pay for Housing in the Last Year: No     Number of Times Moved in the Last Year: 0     Homeless in the Last Year: No         Objective    Physical Exam     VV       Lab Results   Component Value Date    WBC 4.72 05/12/2025    HGB 11.6 (L) 05/12/2025    HCT 34.5 (L) 05/12/2025    MCV 92 05/12/2025     05/12/2025       CMP  Sodium   Date Value Ref Range Status   05/12/2025 135 (L) 136 - 145 mmol/L Final     Potassium   Date Value Ref Range Status   05/12/2025 4.7 3.5 - 5.1 mmol/L Final     Chloride   Date Value Ref Range Status   05/12/2025 103 95 - 110 mmol/L Final     CO2   Date Value Ref Range Status   05/12/2025 20 (L) 23 - 29 mmol/L Final     Glucose   Date Value  Ref Range Status   05/12/2025 311 (H) 70 - 110 mg/dL Final     BUN   Date Value Ref Range Status   05/12/2025 17 6 - 20 mg/dL Final     Creatinine   Date Value Ref Range Status   05/12/2025 0.9 0.5 - 1.4 mg/dL Final     Calcium   Date Value Ref Range Status   05/12/2025 8.8 8.7 - 10.5 mg/dL Final     Protein Total   Date Value Ref Range Status   05/12/2025 6.3 6.0 - 8.4 gm/dL Final     Albumin   Date Value Ref Range Status   05/12/2025 3.3 (L) 3.5 - 5.2 g/dL Final     Bilirubin Total   Date Value Ref Range Status   05/12/2025 0.3 0.1 - 1.0 mg/dL Final     Comment:     For infants and newborns, interpretation of results should be based   on gestational age, weight and in agreement with clinical   observations.    Premature Infant recommended reference ranges:   0-24 hours:  <8.0 mg/dL   24-48 hours: <12.0 mg/dL   3-5 days:    <15.0 mg/dL   6-29 days:   <15.0 mg/dL     ALP   Date Value Ref Range Status   05/12/2025 92 40 - 150 unit/L Final     AST   Date Value Ref Range Status   05/12/2025 45 11 - 45 unit/L Final     ALT   Date Value Ref Range Status   05/12/2025 42 10 - 44 unit/L Final     Anion Gap   Date Value Ref Range Status   05/12/2025 12 8 - 16 mmol/L Final     eGFR   Date Value Ref Range Status   05/12/2025 >60 >60 mL/min/1.73/m2 Final   03/17/2025 >60 >60 mL/min/1.73 m^2 Final     CT chest abdomen pelvis with contrast   Impression:     Findings concerning for cecal malignancy.  There are least 2 right lower quadrant abnormal lymph nodes concerning for metastatic disease.     Indeterminate right hepatic lobe 10 mm lesion.  Recommend further characterization with liver mass protocol MRI.     Small fat containing umbilical and infraumbilical hernias.     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  Status post resection cecum for bleeding control tumor size 4.8 cm and 4 mm 2 separate foci.  Lymphovascular invasion positive.  Lymph nodes involvement  3/16 positive for malignancy.  MSI stable    Pathological staging [id number]    Pharmacokinetic panel 24 rapid metabolizer CY,  [id number] and CY    CT demonstrated possible liver involvement with 10 mm right hepatic lobe lesions.    Status post IR biopsy liver positive for malignancy    Microwave started on 24 single sessions competed     MRI post ablations study shows 25 interval increase in size/number of multiple small hepatic masses suggestive of multifocal     > NGS Caris results?     > C1D1 FOLFOX 25 currently on cycle #7 MRI 2025 demonstrated finding consists of change in of ablation with further decrease of the ablation cavity and no suspicious nodular enhancement.  In numerous small subcentimeter suggestive of metastases disease some appearing more apparent but not through overall significantly changed compared to prior study of MRI     > low Mag - continue take Mag replacement     > elevated liver enzymes  resolved    > anemia     > BOB implantation     > patient will come off oxaliplatin and proceed with maintenance of 5fu till pre BOB implantation - addendum implantation 2025.  We will hold the chemotherapy for now.  After implantation patient will start the treatment with Dr. Stanford           Plan    There are no diagnoses linked to this encounter.

## 2025-05-12 NOTE — Clinical Note
Hold the treatment patient will have BOB implantation with  he will assuming care till patient finish with him and may return to [facility] thereafter

## 2025-05-15 ENCOUNTER — DOCUMENTATION ONLY (OUTPATIENT)
Dept: HEMATOLOGY/ONCOLOGY | Facility: CLINIC | Age: 60
End: 2025-05-15
Payer: COMMERCIAL

## 2025-05-15 NOTE — PROGRESS NOTES
Surgical Oncology Preoperative History and Physical    Encounter Date:  2025    Patient ID: Nik Lowery  Age:  59 y.o. :  1965    Chief Complaint:  Colon Cancer Metastatic to Liver      History:    Ms. Lowery is a 59 y.o. female with synchronous colorectal liver metastases. She underwent upfront right hemicolectomy and ablation of a liver metastasis followed by 7 cycles of FOLFOX. We reviewed her imaging at [facility], and there was clear progression in the liver with no evidence of extrahepatic disease. She presents for a preoperative visit prior to planned hepatic artery infusion pump placement. She has met with Dr. Stanford to discuss BOB therapy.     Past Medical History:   Diagnosis Date    Colon cancer     Diabetes mellitus     Diabetes mellitus, type 2     Encounter for blood transfusion     Herpes     Hypertension     Sleep apnea      Past Surgical History:   Procedure Laterality Date    COLONOSCOPY N/A 2024    Procedure: COLONOSCOPY;  Surgeon: Saw Wick MD;  Location: [facility];  Service: Endoscopy;  Laterality: N/A;    ESOPHAGOGASTRODUODENOSCOPY N/A 2024    Procedure: EGD (ESOPHAGOGASTRODUODENOSCOPY);  Surgeon: Saw Wick MD;  Location: [facility];  Service: Endoscopy;  Laterality: N/A;    EYE SURGERY      lasix Bilateral    HYSTERECTOMY      INSERTION OF TUNNELED CENTRAL VENOUS CATHETER (CVC) WITH SUBCUTANEOUS PORT Right 2024    Procedure: INSERTION, PORT-A-CATH;  Surgeon: Logan Juarez MD;  Location: [facility];  Service: General;  Laterality: Right;    LUMBAR DISCECTOMY      ROBOT-ASSISTED COLECTOMY Right 2025    Procedure: ROBOTIC COLECTOMY WITH ILEOCOLIC ANASTOMOSIS;  Surgeon: Logan Juarez MD;  Location: [facility];  Service: General;  Laterality: Right;    WISDOM TOOTH EXTRACTION       Medications Ordered Prior to Encounter[1]  Review of patient's allergies indicates:   Allergen Reactions    Robaxin [methocarbamol] Nausea Only        Family History:  Her family history includes Alzheimer's disease in her paternal grandmother; Arthritis in her sister; Cancer in her father; Cataracts in her mother; Diabetes in her brother; Heart disease in her maternal grandfather, mother, and paternal uncle; Hypertension in her mother; No Known Problems in her paternal aunt; Pancreatic cancer in her father.     Social History:  She reports that she has never smoked. She has never used smokeless tobacco. She reports that she does not drink alcohol and does not use drugs.     ROS:     Review of Systems  Pertinent positive/negatives detailed in HPI, all other systems negative.     Physical Exam:  Blood pressure 131/84, pulse 91, temperature 97.2 °F (36.2 °C), resp. rate 16, weight 91.4 kg (201 lb 6.4 oz).     Physical Exam    Constitutional:  Non-toxic, no acute distress.  Performance status: ECOG 0  Eyes:  Sclerae anicteric, gaze symmetrical  Neck:  Trachea midline, thyroid, non enlarged without palpable nodules,  FROM  Resp:  Easy work of breathing, no wheezes  CV:  Regular pulse, no JVD  Abd:  Soft, non-tender, no masses, no hepatosplenomegaly, no ascites, no superficial varices. Lower midline incision.  Lymphatics:  No cervical, supraclavicular, axillary, or inguinal lymphadenopathy  Musculoskeletal:  Ambulatory, normal gait, no muscle wasting  Neuro:  No gross deficits  Psych:  Awake, alert, oriented.  Answers and asks questions appropriately    Data:     Radiology:  I personally reviewed these images: I reviewed her CT. There is typical hepatic arterial anatomy with no replaced or accessory vessels.     Labs:  CEA 6.3    Pathology:    Collected: 12/13/24 1145   Result status: Final   Resulting lab: [facility]   Value: RIGHT HEPATIC LOBE LESION, CT-GUIDED BIOPSY WITH PATHOLOGIST ASSISTED ADEQUACY (CYTOLOGY AND CELL BLOCK):  Metastatic carcinoma, consistent with colonic origin.    Immunohistochemistry (IHC) Testing for Mismatch  Repair (MMR) Proteins:    MLH1 - Intact nuclear expression  MSH2 - Intact nuclear expression  MSH6 - Intact nuclear expression  PMS2 - Intact nuclear expression    Background nonneoplastic tissue/internal control with intact nuclear expression    IHC Interpretation  No loss of nuclear expression of MMR proteins: low probability of microsatellite instability    There are exceptions to the above IHC interpretations. These results should not be considered in isolation, and clinical correlation with genetic counseling is recommended to assess the need for germline testing.   Comment: Interp By Radha Garcia M.D., Signed on 12/17/2024 at 12:51   *Additional information available - comment, narrative       Assessment: We will proceed with hepatic artery infusion pump placement and cholecystectomy.       Plan:  - We discussed the risks of BOB pump placement and cholecystectomy including but not limited to: infection, bleeding, injury to the blood vessels to the liver, duodenum, or pancreas, injury to surrounding organs, pump malfunction, biliary sclerosis, bile leak, common bile duct injury, hernia, progression, cardiovascular and pulmonary complications, death, and imponderables. She understands the risks and signed written informed consent to proceed.     Mike Henry MD  Surgical Oncology  [facility] [place], [place]         Javier EL Lowery 1965               [1]   Current Outpatient Medications on File Prior to Visit   Medication Sig Dispense Refill    amoxicillin-clavulanate 875-125mg (AUGMENTIN) 875-125 mg per tablet Take 1 tablet by mouth 2 (two) times daily. 14 tablet 0    atorvastatin (LIPITOR) 20 MG tablet TAKE 1 TABLET BY MOUTH EVERY DAY 90 tablet 3    blood-glucose meter kit To check BG 3 times daily, to use with insurance preferred meter 1 each 0    dexAMETHasone (DECADRON) 4 MG Tab Take 2 tablets (8 mg total) by mouth once daily. On days 2 through 4 of each cycle of chemo 120 tablet 0     docusate sodium (COLACE) 100 MG capsule Take 1 capsule (100 mg total) by mouth 2 (two) times daily. 14 capsule 0    DULoxetine (CYMBALTA) 20 MG capsule Take 2 capsules (40 mg total) by mouth once daily. 60 capsule 11    fexofenadine (ALLEGRA) 180 MG tablet Take 180 mg by mouth nightly.      fluticasone propionate (FLONASE) 50 mcg/actuation nasal spray SPRAY 2 SPRAYS BY EACH NOSTRIL ROUTE ONCE DAILY. 48 mL 3    FREESTYLE JOCELIN 14 DAY SENSOR Kit 1 APPLICATION BY MISC.(NON-DRUG COMBO ROUTE) ROUTE DAILY AS NEEDED. 1 kit 24    gabapentin (NEURONTIN) 800 MG tablet Take 800 mg by mouth 3 (three) times daily.      guaiFENesin (MUCINEX) 1,200 mg Ta12 Take 1 tablet by mouth nightly.      HYDROcodone-acetaminophen (NORCO) 5-325 mg per tablet Take 1 tablet by mouth every 6 (six) hours as needed for Pain. 20 tablet 0    insulin glargine, TOUJEO, (TOUJEO SOLOSTAR U-300 INSULIN) 300 unit/mL (1.5 mL) InPn pen Inject 30 Units into the skin once daily. 3 mL 11    lamoTRIgine (LAMICTAL) 150 MG Tab TAKE 1 TABLET BY MOUTH EVERY DAY 90 tablet 3    lancets Misc To check BG 3 times daily, to use with insurance preferred meter 200 each 1    LIDOcaine-prilocaine (EMLA) cream Apply topically as needed (Apply to port site 30 minutes before access as needed). 30 g 0    lisinopriL (PRINIVIL,ZESTRIL) 20 MG tablet TAKE 1 TABLET BY MOUTH EVERY DAY 90 tablet 3    magnesium oxide (MAG-OX) 400 mg (241.3 mg magnesium) tablet Take 1 tablet (400 mg total) by mouth 2 (two) times daily. 60 tablet 3    metFORMIN (GLUCOPHAGE-XR) 500 MG ER 24hr tablet TAKE 2 TABLETS BY MOUTH TWICE A DAY WITH MEALS 360 tablet 3    multivitamin (THERAGRAN) per tablet Take 1 tablet by mouth once daily.      omeprazole (PRILOSEC) 20 MG capsule Take 20 mg by mouth every evening.      ondansetron (ZOFRAN) 8 MG tablet Take 1 tablet (8 mg total) by mouth every 8 (eight) hours as needed. 30 tablet 2    ONETOUCH ULTRA BLUE TEST STRIP Strp USE TO CHECK BLOOD SUGAR 3 TIMES A   "strip 0    pen needle, diabetic (PEN NEEDLE) 31 gauge x 5/16" Ndle 1 Application by Misc.(Non-Drug; Combo Route) route once daily. 30 each 5    prochlorperazine (COMPAZINE) 10 MG tablet Take 1 tablet (10 mg total) by mouth every 6 (six) hours as needed. 30 tablet 1    sertraline (ZOLOFT) 100 MG tablet TAKE 1 TABLET BY MOUTH EVERY DAY 90 tablet 3    valACYclovir (VALTREX) 500 MG tablet TAKE 1 TABLET BY MOUTH TWICE A  tablet 3     Current Facility-Administered Medications on File Prior to Visit   Medication Dose Route Frequency Provider Last Rate Last Admin    diphenhydrAMINE capsule 25 mg  25 mg Oral PRN Amarjit Crawford MD        sodium chloride 0.9% flush 10 mL  10 mL Intravenous PRN Amarjit Crawford MD         "

## 2025-05-15 NOTE — PROGRESS NOTES
[facility]     Colorectal Liver Metastasis Tumor Board Note      Date: 05/15/2025    Referring Provider: Dr. Henry     Case Overview: Mrs. Lowery (59F) was initially diagnosed with ascending colon cancer in 11/2024 s/p R colectomy, hepatic ablation and 7 cycles of FOLFOX.     Participants: medical oncology, surgical oncology, colorectal surgery, transplant surgeons, interventional radiology, and oncology navigator     Imaging Reviewed: MRI     Radiology Review:   Numerous DWI lesions that seem to have increased in number and size, especially comnpared to Nov 2024     Orders/Referrals: none.     Board Recommendations: Proceed with BOB pump placement as scheduled.

## 2025-05-15 NOTE — H&P (VIEW-ONLY)
Surgical Oncology Preoperative History and Physical    Encounter Date:  2025    Patient ID: Nik Lowery  Age:  59 y.o. :  1965    Chief Complaint:  Colon Cancer Metastatic to Liver      History:    Ms. Lowery is a 59 y.o. female with synchronous colorectal liver metastases. She underwent upfront right hemicolectomy and ablation of a liver metastasis followed by 7 cycles of FOLFOX. We reviewed her imaging at [facility], and there was clear progression in the liver with no evidence of extrahepatic disease. She presents for a preoperative visit prior to planned hepatic artery infusion pump placement. She has met with Dr. Stanford to discuss BOB therapy.     Past Medical History:   Diagnosis Date    Colon cancer     Diabetes mellitus     Diabetes mellitus, type 2     Encounter for blood transfusion     Herpes     Hypertension     Sleep apnea      Past Surgical History:   Procedure Laterality Date    COLONOSCOPY N/A 2024    Procedure: COLONOSCOPY;  Surgeon: Saw Wick MD;  Location: [facility];  Service: Endoscopy;  Laterality: N/A;    ESOPHAGOGASTRODUODENOSCOPY N/A 2024    Procedure: EGD (ESOPHAGOGASTRODUODENOSCOPY);  Surgeon: Saw Wick MD;  Location: [facility];  Service: Endoscopy;  Laterality: N/A;    EYE SURGERY      lasix Bilateral    HYSTERECTOMY      INSERTION OF TUNNELED CENTRAL VENOUS CATHETER (CVC) WITH SUBCUTANEOUS PORT Right 2024    Procedure: INSERTION, PORT-A-CATH;  Surgeon: Logan Juarez MD;  Location: [facility];  Service: General;  Laterality: Right;    LUMBAR DISCECTOMY      ROBOT-ASSISTED COLECTOMY Right 2025    Procedure: ROBOTIC COLECTOMY WITH ILEOCOLIC ANASTOMOSIS;  Surgeon: Logan Juarez MD;  Location: [facility];  Service: General;  Laterality: Right;    WISDOM TOOTH EXTRACTION       Medications Ordered Prior to Encounter[1]  Review of patient's allergies indicates:   Allergen Reactions    Robaxin [methocarbamol] Nausea Only        Family History:  Her family history includes Alzheimer's disease in her paternal grandmother; Arthritis in her sister; Cancer in her father; Cataracts in her mother; Diabetes in her brother; Heart disease in her maternal grandfather, mother, and paternal uncle; Hypertension in her mother; No Known Problems in her paternal aunt; Pancreatic cancer in her father.     Social History:  She reports that she has never smoked. She has never used smokeless tobacco. She reports that she does not drink alcohol and does not use drugs.     ROS:     Review of Systems  Pertinent positive/negatives detailed in HPI, all other systems negative.     Physical Exam:  Blood pressure 131/84, pulse 91, temperature 97.2 °F (36.2 °C), resp. rate 16, weight 91.4 kg (201 lb 6.4 oz).     Physical Exam    Constitutional:  Non-toxic, no acute distress.  Performance status: ECOG 0  Eyes:  Sclerae anicteric, gaze symmetrical  Neck:  Trachea midline, thyroid, non enlarged without palpable nodules,  FROM  Resp:  Easy work of breathing, no wheezes  CV:  Regular pulse, no JVD  Abd:  Soft, non-tender, no masses, no hepatosplenomegaly, no ascites, no superficial varices. Lower midline incision.  Lymphatics:  No cervical, supraclavicular, axillary, or inguinal lymphadenopathy  Musculoskeletal:  Ambulatory, normal gait, no muscle wasting  Neuro:  No gross deficits  Psych:  Awake, alert, oriented.  Answers and asks questions appropriately    Data:     Radiology:  I personally reviewed these images: I reviewed her CT. There is typical hepatic arterial anatomy with no replaced or accessory vessels.     Labs:  CEA 6.3    Pathology:    Collected: 12/13/24 1145   Result status: Final   Resulting lab: [facility] [place]   Value: RIGHT HEPATIC LOBE LESION, CT-GUIDED BIOPSY WITH PATHOLOGIST ASSISTED ADEQUACY (CYTOLOGY AND CELL BLOCK):  Metastatic carcinoma, consistent with colonic origin.    Immunohistochemistry (IHC) Testing for Mismatch  Repair (MMR) Proteins:    MLH1 - Intact nuclear expression  MSH2 - Intact nuclear expression  MSH6 - Intact nuclear expression  PMS2 - Intact nuclear expression    Background nonneoplastic tissue/internal control with intact nuclear expression    IHC Interpretation  No loss of nuclear expression of MMR proteins: low probability of microsatellite instability    There are exceptions to the above IHC interpretations. These results should not be considered in isolation, and clinical correlation with genetic counseling is recommended to assess the need for germline testing.   Comment: Interp By Radha Garcia M.D., Signed on 12/17/2024 at 12:51   *Additional information available - comment, narrative       Assessment: We will proceed with hepatic artery infusion pump placement and cholecystectomy.       Plan:  - We discussed the risks of BOB pump placement and cholecystectomy including but not limited to: infection, bleeding, injury to the blood vessels to the liver, duodenum, or pancreas, injury to surrounding organs, pump malfunction, biliary sclerosis, bile leak, common bile duct injury, hernia, progression, cardiovascular and pulmonary complications, death, and imponderables. She understands the risks and signed written informed consent to proceed.     Mike Henry MD  Surgical Oncology  [facility] [place], [place]         Javier EL Lowery 1965               [1]   Current Outpatient Medications on File Prior to Visit   Medication Sig Dispense Refill    amoxicillin-clavulanate 875-125mg (AUGMENTIN) 875-125 mg per tablet Take 1 tablet by mouth 2 (two) times daily. 14 tablet 0    atorvastatin (LIPITOR) 20 MG tablet TAKE 1 TABLET BY MOUTH EVERY DAY 90 tablet 3    blood-glucose meter kit To check BG 3 times daily, to use with insurance preferred meter 1 each 0    dexAMETHasone (DECADRON) 4 MG Tab Take 2 tablets (8 mg total) by mouth once daily. On days 2 through 4 of each cycle of chemo 120 tablet 0     docusate sodium (COLACE) 100 MG capsule Take 1 capsule (100 mg total) by mouth 2 (two) times daily. 14 capsule 0    DULoxetine (CYMBALTA) 20 MG capsule Take 2 capsules (40 mg total) by mouth once daily. 60 capsule 11    fexofenadine (ALLEGRA) 180 MG tablet Take 180 mg by mouth nightly.      fluticasone propionate (FLONASE) 50 mcg/actuation nasal spray SPRAY 2 SPRAYS BY EACH NOSTRIL ROUTE ONCE DAILY. 48 mL 3    FREESTYLE JOCELIN 14 DAY SENSOR Kit 1 APPLICATION BY MISC.(NON-DRUG COMBO ROUTE) ROUTE DAILY AS NEEDED. 1 kit 24    gabapentin (NEURONTIN) 800 MG tablet Take 800 mg by mouth 3 (three) times daily.      guaiFENesin (MUCINEX) 1,200 mg Ta12 Take 1 tablet by mouth nightly.      HYDROcodone-acetaminophen (NORCO) 5-325 mg per tablet Take 1 tablet by mouth every 6 (six) hours as needed for Pain. 20 tablet 0    insulin glargine, TOUJEO, (TOUJEO SOLOSTAR U-300 INSULIN) 300 unit/mL (1.5 mL) InPn pen Inject 30 Units into the skin once daily. 3 mL 11    lamoTRIgine (LAMICTAL) 150 MG Tab TAKE 1 TABLET BY MOUTH EVERY DAY 90 tablet 3    lancets Misc To check BG 3 times daily, to use with insurance preferred meter 200 each 1    LIDOcaine-prilocaine (EMLA) cream Apply topically as needed (Apply to port site 30 minutes before access as needed). 30 g 0    lisinopriL (PRINIVIL,ZESTRIL) 20 MG tablet TAKE 1 TABLET BY MOUTH EVERY DAY 90 tablet 3    magnesium oxide (MAG-OX) 400 mg (241.3 mg magnesium) tablet Take 1 tablet (400 mg total) by mouth 2 (two) times daily. 60 tablet 3    metFORMIN (GLUCOPHAGE-XR) 500 MG ER 24hr tablet TAKE 2 TABLETS BY MOUTH TWICE A DAY WITH MEALS 360 tablet 3    multivitamin (THERAGRAN) per tablet Take 1 tablet by mouth once daily.      omeprazole (PRILOSEC) 20 MG capsule Take 20 mg by mouth every evening.      ondansetron (ZOFRAN) 8 MG tablet Take 1 tablet (8 mg total) by mouth every 8 (eight) hours as needed. 30 tablet 2    ONETOUCH ULTRA BLUE TEST STRIP Strp USE TO CHECK BLOOD SUGAR 3 TIMES A   "strip 0    pen needle, diabetic (PEN NEEDLE) 31 gauge x 5/16" Ndle 1 Application by Misc.(Non-Drug; Combo Route) route once daily. 30 each 5    prochlorperazine (COMPAZINE) 10 MG tablet Take 1 tablet (10 mg total) by mouth every 6 (six) hours as needed. 30 tablet 1    sertraline (ZOLOFT) 100 MG tablet TAKE 1 TABLET BY MOUTH EVERY DAY 90 tablet 3    valACYclovir (VALTREX) 500 MG tablet TAKE 1 TABLET BY MOUTH TWICE A  tablet 3     Current Facility-Administered Medications on File Prior to Visit   Medication Dose Route Frequency Provider Last Rate Last Admin    diphenhydrAMINE capsule 25 mg  25 mg Oral PRN Amarjit Crawford MD        sodium chloride 0.9% flush 10 mL  10 mL Intravenous PRN Amarjit Crawford MD         "

## 2025-05-16 DIAGNOSIS — C18.2 MALIGNANT NEOPLASM OF ASCENDING COLON: ICD-10-CM

## 2025-05-16 DIAGNOSIS — C18.9 COLON ADENOCARCINOMA: Primary | ICD-10-CM

## 2025-05-16 RX ORDER — CELECOXIB 200 MG/1
400 CAPSULE ORAL
OUTPATIENT
Start: 2025-05-16

## 2025-05-16 RX ORDER — METRONIDAZOLE 500 MG/100ML
500 INJECTION, SOLUTION INTRAVENOUS
OUTPATIENT
Start: 2025-05-16

## 2025-05-16 RX ORDER — HEPARIN SODIUM 5000 [USP'U]/ML
5000 INJECTION, SOLUTION INTRAVENOUS; SUBCUTANEOUS
OUTPATIENT
Start: 2025-05-16

## 2025-05-16 RX ORDER — ACETAMINOPHEN 500 MG
1000 TABLET ORAL
OUTPATIENT
Start: 2025-05-16

## 2025-05-19 ENCOUNTER — DOCUMENTATION ONLY (OUTPATIENT)
Dept: PHARMACY | Facility: HOSPITAL | Age: 60
End: 2025-05-19
Payer: COMMERCIAL

## 2025-05-19 ENCOUNTER — PATIENT MESSAGE (OUTPATIENT)
Dept: HEMATOLOGY/ONCOLOGY | Facility: CLINIC | Age: 60
End: 2025-05-19
Payer: COMMERCIAL

## 2025-05-19 ENCOUNTER — OFFICE VISIT (OUTPATIENT)
Dept: HEMATOLOGY/ONCOLOGY | Facility: CLINIC | Age: 60
End: 2025-05-19
Payer: COMMERCIAL

## 2025-05-19 ENCOUNTER — OFFICE VISIT (OUTPATIENT)
Facility: CLINIC | Age: 60
End: 2025-05-19
Payer: COMMERCIAL

## 2025-05-19 ENCOUNTER — LAB VISIT (OUTPATIENT)
Dept: LAB | Facility: HOSPITAL | Age: 60
End: 2025-05-19
Attending: SURGERY
Payer: COMMERCIAL

## 2025-05-19 VITALS
BODY MASS INDEX: 33.51 KG/M2 | SYSTOLIC BLOOD PRESSURE: 131 MMHG | DIASTOLIC BLOOD PRESSURE: 84 MMHG | WEIGHT: 201.38 LBS | RESPIRATION RATE: 16 BRPM | TEMPERATURE: 97 F | HEART RATE: 91 BPM

## 2025-05-19 DIAGNOSIS — E08.00 DIABETES MELLITUS DUE TO UNDERLYING CONDITION WITH HYPEROSMOLARITY WITHOUT COMA, UNSPECIFIED WHETHER LONG TERM INSULIN USE: ICD-10-CM

## 2025-05-19 DIAGNOSIS — C78.7 METASTASIS TO LIVER: ICD-10-CM

## 2025-05-19 DIAGNOSIS — K21.9 GASTROESOPHAGEAL REFLUX DISEASE, UNSPECIFIED WHETHER ESOPHAGITIS PRESENT: ICD-10-CM

## 2025-05-19 DIAGNOSIS — C78.7 METASTATIC COLON CANCER TO LIVER: Primary | ICD-10-CM

## 2025-05-19 DIAGNOSIS — C18.2 MALIGNANT NEOPLASM OF ASCENDING COLON: ICD-10-CM

## 2025-05-19 DIAGNOSIS — G62.0 CHEMOTHERAPY-INDUCED NEUROPATHY: ICD-10-CM

## 2025-05-19 DIAGNOSIS — C18.9 METASTATIC COLON CANCER TO LIVER: Primary | ICD-10-CM

## 2025-05-19 DIAGNOSIS — T45.1X5A CHEMOTHERAPY-INDUCED NEUROPATHY: ICD-10-CM

## 2025-05-19 DIAGNOSIS — C18.2 MALIGNANT NEOPLASM OF ASCENDING COLON: Primary | ICD-10-CM

## 2025-05-19 DIAGNOSIS — C18.9 COLON ADENOCARCINOMA: Primary | ICD-10-CM

## 2025-05-19 DIAGNOSIS — C18.9 COLON ADENOCARCINOMA: ICD-10-CM

## 2025-05-19 LAB
APTT PPP: 24.5 SECONDS (ref 21–32)
CARCINOEMBRYONIC ANTIGEN (SMH): 6.3 NG/ML
INR PPP: 1 (ref 0.8–1.2)
PROTHROMBIN TIME: 10.9 SECONDS (ref 9–12.5)

## 2025-05-19 PROCEDURE — 3075F SYST BP GE 130 - 139MM HG: CPT | Mod: CPTII,S$GLB,, | Performed by: SURGERY

## 2025-05-19 PROCEDURE — 85730 THROMBOPLASTIN TIME PARTIAL: CPT

## 2025-05-19 PROCEDURE — 82378 CARCINOEMBRYONIC ANTIGEN: CPT

## 2025-05-19 PROCEDURE — 99999 PR PBB SHADOW E&M-EST. PATIENT-LVL V: CPT | Mod: PBBFAC,,, | Performed by: SURGERY

## 2025-05-19 PROCEDURE — G2211 COMPLEX E/M VISIT ADD ON: HCPCS | Mod: 95,,, | Performed by: INTERNAL MEDICINE

## 2025-05-19 PROCEDURE — 99215 OFFICE O/P EST HI 40 MIN: CPT | Mod: S$GLB,,, | Performed by: SURGERY

## 2025-05-19 PROCEDURE — 3008F BODY MASS INDEX DOCD: CPT | Mod: CPTII,S$GLB,, | Performed by: SURGERY

## 2025-05-19 PROCEDURE — 98006 SYNCH AUDIO-VIDEO EST MOD 30: CPT | Mod: 95,,, | Performed by: INTERNAL MEDICINE

## 2025-05-19 PROCEDURE — 4010F ACE/ARB THERAPY RXD/TAKEN: CPT | Mod: CPTII,S$GLB,, | Performed by: SURGERY

## 2025-05-19 PROCEDURE — 36415 COLL VENOUS BLD VENIPUNCTURE: CPT

## 2025-05-19 PROCEDURE — 4010F ACE/ARB THERAPY RXD/TAKEN: CPT | Mod: CPTII,95,, | Performed by: INTERNAL MEDICINE

## 2025-05-19 PROCEDURE — 3079F DIAST BP 80-89 MM HG: CPT | Mod: CPTII,S$GLB,, | Performed by: SURGERY

## 2025-05-19 PROCEDURE — 85610 PROTHROMBIN TIME: CPT

## 2025-05-19 RX ORDER — OMEPRAZOLE 40 MG/1
40 CAPSULE, DELAYED RELEASE ORAL DAILY
Qty: 90 CAPSULE | Refills: 3 | Status: SHIPPED | OUTPATIENT
Start: 2025-05-19 | End: 2026-05-19

## 2025-05-19 NOTE — PROGRESS NOTES
The patient location is: [facility] in [place], [place]  The chief complaint leading to consultation is: Colon cancer    Visit type: audiovisual    Face to Face time with patient: 40 minutes  55 minutes of total time spent on the encounter, which includes face to face time and non-face to face time preparing to see the patient (eg, review of tests), Obtaining and/or reviewing separately obtained history, Documenting clinical information in the electronic or other health record, Independently interpreting results (not separately reported) and communicating results to the patient/family/caregiver, or Care coordination (not separately reported).     Each patient to whom he or she provides medical services by telemedicine is:  (1) informed of the relationship between the physician and patient and the respective role of any other health care provider with respect to management of the patient; and (2) notified that he or she may decline to receive medical services by telemedicine and may withdraw from such care at any time.    GI MEDICAL ONCOLOGY    Reason for visit: Colon cancer    Best Contact Phone Number(s): There are no phone numbers on file.     Cancer/Stage/TNM:    Cancer Staging   Malignant neoplasm of ascending colon  Staging form: Colon and Rectum, AJCC 8th Edition  - Clinical: Stage BIBIANA (cT3, cN1a, cM1a) - Signed by Amarjit Crawford MD on 12/18/2024       Oncology History   Malignant neoplasm of ascending colon   12/12/2024 Initial Diagnosis    Malignant neoplasm of ascending colon     12/18/2024 Cancer Staged    Staging form: Colon and Rectum, AJCC 8th Edition  - Clinical: Stage BIBIANA (cT3, cN1a, cM1a)     2/4/2025 -  Chemotherapy    Treatment Summary   Plan Name: OP GI mFOLFOX6 (oxaliplatin leucovorin fluorouracil) Q2W  Treatment Goal: Control  Status: Active  Start Date: 2/4/2025  End Date: 8/15/2025 (Planned)  Provider: Amarjit Crawford MD  Chemotherapy: fluorouraciL injection 830 mg, 400 mg/m2 = 830 mg, Intravenous,  Clinic/HOD 1 time, 7 of 7 cycles  Administration: 830 mg (2/4/2025), 830 mg (2/18/2025), 830 mg (3/5/2025), 830 mg (3/19/2025), 830 mg (4/2/2025), 830 mg (4/15/2025), 830 mg (4/29/2025)  oxaliplatin (ELOXATIN) 85 mg/m2 = 177 mg in D5W 600.4 mL chemo infusion, 85 mg/m2 = 177 mg, Intravenous, Clinic/HOD 1 time, 7 of 11 cycles  Administration: 177 mg (2/4/2025), 177 mg (2/18/2025), 177 mg (3/5/2025), 177 mg (3/19/2025), 177 mg (4/2/2025), 177 mg (4/15/2025), 177 mg (4/29/2025)  fluorouracil (Adrucil) 2,400 mg/m2 = 4,990 mg in 0.9% NaCl 250 mL chemo infusion, 2,400 mg/m2 = 4,990 mg, Intravenous, Over 46 hours, 7 of 12 cycles  Dose modification: 2,000 mg/m2 (original dose 2,400 mg/m2, Cycle 8)  Administration: 4,990 mg (2/4/2025), 4,990 mg (2/18/2025), 4,990 mg (3/5/2025), 4,990 mg (3/19/2025), 4,990 mg (4/2/2025), 4,990 mg (4/15/2025), 4,990 mg (4/29/2025)          INTERVAL HISTORY:  She presents today for follow-up after completion of 7 cycles of FOLFOX for her metastatic cecal cancer to liver, last cycle 4/29/25.  She feels well other than neuropathy.  She is waiting to see Dr. Henry today with plans for BOB pump placement this Wednesday.  She is on omeprazole 20mg daily.          Patient presents alone.  ECOG PS is 0.  History has been obtained by chart review and discussion with the patient.    ROS:   Review of Systems   Constitutional:  Positive for malaise/fatigue.   Gastrointestinal:  Negative for abdominal pain, constipation, diarrhea, nausea and vomiting.   Neurological:  Positive for tingling.       Past Medical History:   Past Medical History:   Diagnosis Date    Colon cancer     Diabetes mellitus     Diabetes mellitus, type 2     Encounter for blood transfusion     Herpes     Hypertension     Sleep apnea         Past Surgical History:   Past Surgical History:   Procedure Laterality Date    COLONOSCOPY N/A 11/8/2024    Procedure: COLONOSCOPY;  Surgeon: Saw Wick MD;  Location: [facility];  Service:  Endoscopy;  Laterality: N/A;    ESOPHAGOGASTRODUODENOSCOPY N/A 11/8/2024    Procedure: EGD (ESOPHAGOGASTRODUODENOSCOPY);  Surgeon: Saw Wick MD;  Location: [facility];  Service: Endoscopy;  Laterality: N/A;    EYE SURGERY  2002    lasix Bilateral    HYSTERECTOMY  2012    INSERTION OF TUNNELED CENTRAL VENOUS CATHETER (CVC) WITH SUBCUTANEOUS PORT Right 12/20/2024    Procedure: INSERTION, PORT-A-CATH;  Surgeon: Logan Juarez MD;  Location: [facility];  Service: General;  Laterality: Right;    LUMBAR DISCECTOMY      ROBOT-ASSISTED COLECTOMY Right 1/7/2025    Procedure: ROBOTIC COLECTOMY WITH ILEOCOLIC ANASTOMOSIS;  Surgeon: Logan Juarez MD;  Location: [facility];  Service: General;  Laterality: Right;    WISDOM TOOTH EXTRACTION          Family History:   Family History   Problem Relation Name Age of Onset    Heart disease Mother      Hypertension Mother      Cataracts Mother      Pancreatic cancer Father      Cancer Father      Arthritis Sister 2     Diabetes Brother 1     No Known Problems Paternal Aunt 1     Heart disease Paternal Uncle 2     Heart disease Maternal Grandfather      Alzheimer's disease Paternal Grandmother      Glaucoma Neg Hx          Social History:   Social History     Tobacco Use    Smoking status: Never    Smokeless tobacco: Never   Substance Use Topics    Alcohol use: No        I have reviewed and updated the patient's past medical, surgical, family and social histories.    Allergies:   Review of patient's allergies indicates:   Allergen Reactions    Robaxin [methocarbamol] Nausea Only        Medications:   Current Medications[1]     Physical Exam:   There were no vitals taken for this visit.               Physical Exam  Constitutional:       General: She is not in acute distress.     Appearance: Normal appearance. She is not ill-appearing.      Comments: Exam limited by virtual nature   HENT:      Head: Normocephalic and atraumatic.   Eyes:      General: No scleral icterus.      Extraocular Movements: Extraocular movements intact.      Conjunctiva/sclera: Conjunctivae normal.   Pulmonary:      Effort: Pulmonary effort is normal. No respiratory distress.   Neurological:      Mental Status: She is alert and oriented to person, place, and time.   Psychiatric:         Mood and Affect: Mood normal.         Behavior: Behavior normal.         Thought Content: Thought content normal.         Judgment: Judgment normal.           Labs:   No results found for this or any previous visit (from the past 48 hours).     Imaging:       MRI - 4/28/25:  Impression:     Findings consistent with changes of ablation with further decrease of the ablation cavity and no suspicious nodular enhancement.  Innumerable small subcentimeter suggesting metastatic disease some appearing more apparent but not thought overall significantly changed compared to the prior study MRI 02/28/2025.    Path:   COLON:     PROCEDURE: Right hemicolectomy   MACROSCOPIC EVALUATION OF MESORECTUM: Not applicable   TUMOR SITE: Cecum   RECTAL TUMOR LOCATION: Not applicable   HISTOLOGIC TYPE: Adenocarcinoma   HISTOLOGIC GRADE: Moderately differentiated   TUMOR SIZE: 48 mm and 4 mm   MULTIPLE PRIMARY SITES: Two separate foci, both in cecum/ascending colon    region   TUMOR EXTENT: Tumor invades kristopher-intestinal adipose tissue   SUB-MUCOSAL INVASION: Not applicable   MACROSCOPIC TUMOR PERFORATION: Negative   LYMPHATIC AND/OR VASCULAR INVASION: Positive   PERINEURAL INVASION: Negative   TUMOR BUDDING SCORE: Moderate   TREATMENT EFFECT: Not applicable   MARGIN STATUS FOR INVASIVE CARCINOMA: Negative   DISTANCE OF INVASIVE CARCINOMA TO RADIAL MARGIN: 35 mm   DISTANCE OF INVASIVE CARCINOMA TO CLOSEST MARGIN: 35 mm to radial margin   MARGINS INVOLVED BY INVASIVE CARCINOMA: Not applicable   MARGIN STATUS FOR NON-INVASIVE TUMOR: Negative   DNA MISMATCH REPAIR STATUS (MMR): Previously performed ([id number]). No    loss of nuclear expression of MMR  proteins: Low probability of MSI-H.   REGIONAL LYMPH NODE STATUS:   NUMBER OF LYMPH NODES WITH TUMOR: 3   NUMBER OF LYMPH NODES EXAMINED: 16   TUMOR DEPOSITS: Negative   DISTANT SITES INVOLVED: Liver ([facility]; [id number]).   PATHOLOGIC STAGE CLASSIFICATION: pT3 pN1b pM1a       Assessment:       1. Malignant neoplasm of ascending colon    2. Metastasis to liver    3. Chemotherapy-induced neuropathy    4. Diabetes mellitus due to underlying condition with hyperosmolarity without coma, unspecified whether long term insulin use    5. Gastroesophageal reflux disease, unspecified whether esophagitis present            Plan:             # Cecal adenocarcinoma  Diagnosed on colonoscopy 11/8/24.  Had synchronous liver metastases, small but diffuse and bilobar.  Biopsy proven, MWA to segment 8 metastasis done 1/29/25.  Underwent upfront R hemicolectomy 1/7/25 due to recurrent bleeding with Dr. Juarez.  Path showed pT3 N1b M1a disease, pMMR.  Started on FOLFOX on 2/4/25 with Dr. Crawford.  Repeat imaging after cycle 6 shows stable unresectable bilobar small liver metastases.  No clear evidence of extrahepatic disease.  She received cycle 7 of FOLFOX on 4/29/25.    At our initial visit, we discussed options of continuing FOLFOX with addition of bevacizumab, understanding she would have to come off oxaliplatin shortly due to neuropathy and proceed with maintenance chemovs switching to FOLFIRI + antoine second line vs BOB pump.  Also could consider future liver transplantation.  Will discuss her at [facility] tumor board with recommendation to proceed with BOB pump placement.    After extensive discussion of options and potential risks/toxicities of BOB, she wants to proceed with BOB pump.      She is scheduled for pump placement 5/21/25.  She will return to clinic 6/4/25 for cycle 1 of floxuridine.  Will plan to start 5-FU maintenance with pump flush on 6/18/25.  Can always add irinotecan in future to 5-FU if there are signs of intrahepatic  or extrahepatic progression.    Because of increased risk of gastritis/duodenitis with BOB pump, we need to send her increased dose of omeprazole at 40mg daily to her pharmacy.  She has been on 20 mg prior to this.    Tempus xT: INDIA, AMER1, APC, KRAS G12V, SMAD3, TMB  10.5  PGX: DPYD nml, UGT1A1 nml    # CIPN  Will recommend she hold further oxaliplatin.  Continue Cymbalta, gabapentin    # T2DM  Continue medical therapy.    Follow up: 2 weeks.    The above information has been reviewed with the patient and all questions have been answered to their apparent satisfaction.  They understand that they can call the clinic with any questions.     code applied: patient requires or will require a continuous, longitudinal, and active collaborative plan of care related to this patient's health condition, colon cancer --the management of which requires the direction of a practitioner with specialized clinical knowledge, skill, and expertise.       Thom Stanford MD  Hematology/Oncology  [facility]    Route Chart for Scheduling    Med Onc Chart Routing      Follow up with physician 4 weeks. 6/18 for BOB pump flush and 5-FU   Follow up with [facility] 2 weeks. 6/4 for BOB FUDR   Infusion scheduling note   5-FU pump d/c on day 3 in [place] (starting 6/20)   Injection scheduling note    Labs CBC, CMP and CEA   Scheduling:  Preferred lab:  Lab interval:     Imaging    Pharmacy appointment    Other referrals                Treatment Plan Information   OP GI mFOLFOX6 (oxaliplatin leucovorin fluorouracil) Q2W Amarjit Crawford MD   Associated diagnosis: Malignant neoplasm of ascending colon Stage BIBIANA cT3, cN1a, cM1a noted on 12/12/2024   Line of treatment: Neoadjuvant  Treatment Goal: Control     Upcoming Treatment Dates - OP GI mFOLFOX6 (oxaliplatin leucovorin fluorouracil) Q2W    6/18/2025       Chemotherapy       fluorouracil (Adrucil) 2,000 mg/m2 = 4,160 mg in 0.9% NaCl 100 mL chemo infusion       Antiemetics        palonosetron 0.25mg/dexAMETHasone 12mg in NS IVPB 0.25 mg 50 mL  7/2/2025       Chemotherapy       oxaliplatin (ELOXATIN) 85 mg/m2 = 177 mg in D5W 535.4 mL chemo infusion       fluorouracil (Adrucil) 2,000 mg/m2 = 4,160 mg in 0.9% NaCl 100 mL chemo infusion       Antiemetics       palonosetron (ALOXI) 0.25 mg with Dexamethasone (DECADRON) 12 mg in NS 50 mL IVPB  7/16/2025       Chemotherapy       oxaliplatin (ELOXATIN) 85 mg/m2 = 177 mg in D5W 535.4 mL chemo infusion       fluorouracil (Adrucil) 2,000 mg/m2 = 4,160 mg in 0.9% NaCl 100 mL chemo infusion       Antiemetics       palonosetron (ALOXI) 0.25 mg with Dexamethasone (DECADRON) 12 mg in NS 50 mL IVPB  7/30/2025       Chemotherapy       oxaliplatin (ELOXATIN) 85 mg/m2 = 177 mg in D5W 535.4 mL chemo infusion       fluorouracil (Adrucil) 2,000 mg/m2 = 4,160 mg in 0.9% NaCl 100 mL chemo infusion       Antiemetics       palonosetron (ALOXI) 0.25 mg with Dexamethasone (DECADRON) 12 mg in NS 50 mL IVPB    Supportive Plan Information  OP Intera 3000 Floxuridine Hepatic Artery Infusion (BOB) Pump Thom Stanford MD   Associated Diagnosis: Malignant neoplasm of ascending colon Stage BIBIANA cT3, cN1a, cM1a noted on 12/12/2024  Associated Diagnosis: Metastasis to liver   noted on 12/18/2024   Line of treatment: Consolidation   Treatment goal: Curative     Upcoming Treatment Dates - OP Intera 3000 Floxuridine Hepatic Artery Infusion (BOB) Pump    6/4/2025       Chemotherapy       floxuridine, heparin (porcine) 30,000 Units, dexAMETHasone 25 mg in 0.9% NaCl 30 mL INTRA-ARTERIAL infusion syringe  6/18/2025       Medications       heparin 30,000 units in NS 30 mL INTRA-ARTERIAL infusion syringe  6/19/2025       Chemotherapy       floxuridine, heparin (porcine) 30,000 Units, dexAMETHasone 25 mg in 0.9% NaCl 30 mL INTRA-ARTERIAL infusion syringe  7/3/2025       Medications       heparin 30,000 units in NS 30 mL INTRA-ARTERIAL infusion syringe    Therapy Plan  Information  FERRLECIT (FERRIC GLUCONATE) QW for Iron deficiency anemia, noted on 11/7/2024  Anaphylaxis/Hypersensitivity  EPINEPHrine (EPIPEN) 0.3 mg/0.3 mL pen injection 0.3 mg  0.3 mg, Intramuscular, PRN  diphenhydrAMINE injection 50 mg  50 mg, Intravenous, PRN  hydrocortisone sodium succinate injection 100 mg  100 mg, Intravenous, PRN  Flushes  heparin, porcine (PF) 100 unit/mL injection flush 500 Units  500 Units, Intravenous, PRN  sodium chloride 0.9% flush 10 mL  10 mL, Intravenous, 1 time a week  0.9% NaCl 100 mL flush bag  Intravenous, 1 time a week    PORT FLUSH for Colon adenocarcinoma, noted on 12/20/2024  PORT FLUSH for Malignant neoplasm of ascending colon, noted on 12/12/2024  Flushes  heparin, porcine (PF) 100 unit/mL injection flush 500 Units  500 Units, Intravenous, Every visit  sodium chloride 0.9% flush 10 mL  10 mL, Intravenous, Every visit    INF FLUIDS for Colon adenocarcinoma, noted on 12/20/2024  IV Fluids  sodium chloride 0.9% bolus 1,000 mL 1,000 mL  1,000 mL, Intravenous, Every visit  Flushes  sodium chloride 0.9% flush 10 mL  10 mL, Intravenous, PRN  heparin, porcine (PF) 100 unit/mL injection flush 500 Units  500 Units, Intravenous, PRN             [1]   Current Outpatient Medications   Medication Sig Dispense Refill    amoxicillin-clavulanate 875-125mg (AUGMENTIN) 875-125 mg per tablet Take 1 tablet by mouth 2 (two) times daily. 14 tablet 0    atorvastatin (LIPITOR) 20 MG tablet TAKE 1 TABLET BY MOUTH EVERY DAY 90 tablet 3    blood-glucose meter kit To check BG 3 times daily, to use with insurance preferred meter 1 each 0    dexAMETHasone (DECADRON) 4 MG Tab Take 2 tablets (8 mg total) by mouth once daily. On days 2 through 4 of each cycle of chemo 120 tablet 0    docusate sodium (COLACE) 100 MG capsule Take 1 capsule (100 mg total) by mouth 2 (two) times daily. 14 capsule 0    DULoxetine (CYMBALTA) 20 MG capsule Take 2 capsules (40 mg total) by mouth once daily. 60 capsule 11     "fexofenadine (ALLEGRA) 180 MG tablet Take 180 mg by mouth nightly.      fluticasone propionate (FLONASE) 50 mcg/actuation nasal spray SPRAY 2 SPRAYS BY EACH NOSTRIL ROUTE ONCE DAILY. 48 mL 3    FREESTYLE JOCELIN 14 DAY SENSOR Kit 1 APPLICATION BY MISC.(NON-DRUG COMBO ROUTE) ROUTE DAILY AS NEEDED. 1 kit 24    gabapentin (NEURONTIN) 800 MG tablet Take 800 mg by mouth 3 (three) times daily.      guaiFENesin (MUCINEX) 1,200 mg Ta12 Take 1 tablet by mouth nightly.      HYDROcodone-acetaminophen (NORCO) 5-325 mg per tablet Take 1 tablet by mouth every 6 (six) hours as needed for Pain. 20 tablet 0    insulin glargine, TOUJEO, (TOUJEO SOLOSTAR U-300 INSULIN) 300 unit/mL (1.5 mL) InPn pen Inject 30 Units into the skin once daily. 3 mL 11    lamoTRIgine (LAMICTAL) 150 MG Tab TAKE 1 TABLET BY MOUTH EVERY DAY 90 tablet 3    lancets Misc To check BG 3 times daily, to use with insurance preferred meter 200 each 1    LIDOcaine-prilocaine (EMLA) cream Apply topically as needed (Apply to port site 30 minutes before access as needed). 30 g 0    lisinopriL (PRINIVIL,ZESTRIL) 20 MG tablet TAKE 1 TABLET BY MOUTH EVERY DAY 90 tablet 3    magnesium oxide (MAG-OX) 400 mg (241.3 mg magnesium) tablet Take 1 tablet (400 mg total) by mouth 2 (two) times daily. 60 tablet 3    metFORMIN (GLUCOPHAGE-XR) 500 MG ER 24hr tablet TAKE 2 TABLETS BY MOUTH TWICE A DAY WITH MEALS 360 tablet 3    multivitamin (THERAGRAN) per tablet Take 1 tablet by mouth once daily.      omeprazole (PRILOSEC) 40 MG capsule Take 1 capsule (40 mg total) by mouth once daily. 90 capsule 3    ondansetron (ZOFRAN) 8 MG tablet Take 1 tablet (8 mg total) by mouth every 8 (eight) hours as needed. 30 tablet 2    ONETOUCH ULTRA BLUE TEST STRIP Str USE TO CHECK BLOOD SUGAR 3 TIMES A  strip 0    pen needle, diabetic (PEN NEEDLE) 31 gauge x 5/16" Ndle 1 Application by Misc.(Non-Drug; Combo Route) route once daily. 30 each 5    prochlorperazine (COMPAZINE) 10 MG tablet Take 1 " tablet (10 mg total) by mouth every 6 (six) hours as needed. 30 tablet 1    sertraline (ZOLOFT) 100 MG tablet TAKE 1 TABLET BY MOUTH EVERY DAY 90 tablet 3    valACYclovir (VALTREX) 500 MG tablet TAKE 1 TABLET BY MOUTH TWICE A  tablet 3     Current Facility-Administered Medications   Medication Dose Route Frequency Provider Last Rate Last Admin    diphenhydrAMINE capsule 25 mg  25 mg Oral PRN Amarjit Crawford MD        sodium chloride 0.9% flush 10 mL  10 mL Intravenous PRAmarjit Light MD

## 2025-05-20 ENCOUNTER — OFFICE VISIT (OUTPATIENT)
Dept: PSYCHIATRY | Facility: CLINIC | Age: 60
End: 2025-05-20
Payer: COMMERCIAL

## 2025-05-20 ENCOUNTER — ANESTHESIA EVENT (OUTPATIENT)
Dept: SURGERY | Facility: HOSPITAL | Age: 60
End: 2025-05-20
Payer: COMMERCIAL

## 2025-05-20 ENCOUNTER — TELEPHONE (OUTPATIENT)
Facility: CLINIC | Age: 60
End: 2025-05-20
Payer: COMMERCIAL

## 2025-05-20 DIAGNOSIS — F43.21 ADJUSTMENT DISORDER WITH DEPRESSED MOOD: ICD-10-CM

## 2025-05-20 DIAGNOSIS — F41.1 GENERALIZED ANXIETY DISORDER: Primary | ICD-10-CM

## 2025-05-20 DIAGNOSIS — C18.2 MALIGNANT NEOPLASM OF ASCENDING COLON: ICD-10-CM

## 2025-05-20 DIAGNOSIS — C78.7 METASTASIS TO LIVER: ICD-10-CM

## 2025-05-20 PROCEDURE — 99999 PR PBB SHADOW E&M-EST. PATIENT-LVL I: CPT | Mod: PBBFAC,,, | Performed by: COUNSELOR

## 2025-05-20 RX ORDER — POLYETHYLENE GLYCOL 3350 17 G/17G
17 POWDER, FOR SOLUTION ORAL DAILY
Status: ON HOLD | COMMUNITY
End: 2025-05-24

## 2025-05-20 NOTE — PROGRESS NOTES
PSYCHO-ONCOLOGY NOTE/ Individual Psychotherapy     Date: 5/20/2025   Site:  [facility]      Therapeutic Intervention: Met with patient.  Outpatient - Insight oriented psychotherapy 60 min - CPT code 52174, Outpatient - Behavior modifying psychotherapy 60 min - CPT code 35483, and Outpatient - Supportive psychotherapy 60 min - CPT Code 64747    This includes face to face time and non-face to face time preparing to see the patient, obtaining and/or reviewing separately obtained history, documenting clinical information in the electronic or other health record, independently interpreting results and communicating results to the patient/family/caregiver, or care coordinator.      Patient was last seen by me on 4/16/2025    Problem list  Patient Active Problem List   Diagnosis    Uncontrolled type 2 diabetes mellitus with hyperglycemia, without long-term current use of insulin    Hypertension associated with diabetes    Anxiety and depression    Gastroesophageal reflux disease without esophagitis    Obesity    Decreased ROM of lumbar spine    Hyperlipidemia associated with type 2 diabetes mellitus    Low back pain    Iron deficiency anemia    Malignant neoplasm of ascending colon    Metastasis to liver    Colon adenocarcinoma    Symptomatic anemia    Fever       Chief complaint/reason for encounter: anxiety   Met with patient to evaluate psychosocial adaptation to diagnosis/treatment/survivorship of colon cancer.     Current Medications  No current facility-administered medications for this visit.     No current outpatient medications on file.     Facility-Administered Medications Ordered in Other Visits   Medication Frequency    acetaminophen tablet 1,000 mg On Call Procedure    ceFAZolin 2 g On Call Procedure    ceFAZolin injection PRN    celecoxib capsule 400 mg On Call Procedure    electrolyte-S (ISOLYTE) Continuous PRN    fentaNYL injection PRN    heparin (porcine) injection 5,000 Units On Call Procedure     ketamine in 0.9 % sod chloride 50 mg/5 mL (10 mg/mL) injection PRN    LIDOcaine (PF) 10 mg/ml (1%) injection 10 mg Once PRN    LIDOcaine (PF) 20 mg/mL (2%) injection PRN    metronidazole IVPB 500 mg On Call Procedure    midazolam injection PRN    PHENYLephrine HCl in 0.9% NaCl 1 mg/10 mL (100 mcg/mL) PRN    propofol (DIPRIVAN) 10 mg/mL infusion PRN    rocuronium injection PRN    sodium chloride 0.9% infusion Continuous PRN       ONCOLOGY HISTORY  Oncology History   Malignant neoplasm of ascending colon   12/12/2024 Initial Diagnosis    Malignant neoplasm of ascending colon     12/18/2024 Cancer Staged    Staging form: Colon and Rectum, AJCC 8th Edition  - Clinical: Stage BIBIANA (cT3, cN1a, cM1a)     2/4/2025 -  Chemotherapy    Treatment Summary   Plan Name: OP GI [id number] (oxaliplatin leucovorin fluorouracil) Q2W  Treatment Goal: Control  Status: Active  Start Date: 2/4/2025  End Date: 8/15/2025 (Planned)  Provider: Amarjit Crawford MD  Chemotherapy: fluorouraciL injection 830 mg, 400 mg/m2 = 830 mg, Intravenous, Clinic/HOD 1 time, 7 of 7 cycles  Administration: 830 mg (2/4/2025), 830 mg (2/18/2025), 830 mg (3/5/2025), 830 mg (3/19/2025), 830 mg (4/2/2025), 830 mg (4/15/2025), 830 mg (4/29/2025)  oxaliplatin (ELOXATIN) 85 mg/m2 = 177 mg in D5W 600.4 mL chemo infusion, 85 mg/m2 = 177 mg, Intravenous, Clinic/HOD 1 time, 7 of 11 cycles  Administration: 177 mg (2/4/2025), 177 mg (2/18/2025), 177 mg (3/5/2025), 177 mg (3/19/2025), 177 mg (4/2/2025), 177 mg (4/15/2025), 177 mg (4/29/2025)  fluorouracil (Adrucil) 2,400 mg/m2 = 4,990 mg in 0.9% NaCl 250 mL chemo infusion, 2,400 mg/m2 = 4,990 mg, Intravenous, Over 46 hours, 7 of 12 cycles  Dose modification: 2,000 mg/m2 (original dose 2,400 mg/m2, Cycle 8)  Administration: 4,990 mg (2/4/2025), 4,990 mg (2/18/2025), 4,990 mg (3/5/2025), 4,990 mg (3/19/2025), 4,990 mg (4/2/2025), 4,990 mg (4/15/2025), 4,990 mg (4/29/2025)         Objective:  Nik Lowery arrived  promptly for the  session. Ms. Lowery was independently ambulatory at the time of session. The patient was fully cooperative throughout the session.  Appearance: age appropriate, appropriately  dressed, adequately  groomed  Behavior/Cooperation: friendly and cooperative  Speech: normal in rate, volume, and tone and appropriate quality, quantity and organization of sentences  Mood: steady  Affect: mood congruent  Thought Process: goal-directed, logical  Thought Content: normal,  No delusions or paranoia; did not appear to be responding to internal stimuli during the session  Orientation: grossly intact  Memory: grossly intact  Attention Span/Concentration: Attends to session without distraction; reports no difficulty  Fund of Knowledge: average  Estimate of Intelligence: average from verbal skills and history  Cognition: grossly intact  Insight: patient has awareness of illness; good insight into own behavior and behavior of others  Judgment: the patient's behavior is adequate to circumstances    NCCN Distress thermometer:       5/19/2025     1:31 PM 5/12/2025     2:26 PM 5/11/2025     7:48 PM 4/28/2025     3:17 PM 4/27/2025     8:33 PM 4/14/2025     1:14 PM 4/1/2025    11:12 AM   DISTRESS SCREENING   Distress Score 4 0 - No Distress 6 0 - No Distress 1 0 - No Distress 2   Practical Concerns Finances  Taking care of myself None of these Finances None of these None of these   Social Concerns None of these  None of these None of these None of these None of these None of these   Emotional Concerns Worry or anxiety  Worry or anxiety;Loneliness None of these Sadness or depression;Loss of interest or enjoyment;Loneliness Sadness or depression;Loss of interest or enjoyment Sadness or depression;Loneliness   Spiritual or Jain Concerns Death, dying, or afterlife  None of these None of these None of these None of these None of these   Physical Concerns Fatigue;Memory or concentration  Fatigue;Memory or concentration None of these Fatigue  None of these None of these          5/21/2025   PHQ-9 Depression Patient Health Questionnaire   Over the last two weeks how often have you been bothered by little interest or pleasure in doing things 1   Over the last two weeks how often have you been bothered by feeling down, depressed or hopeless 1   Over the last two weeks how often have you been bothered by trouble falling or staying asleep, or sleeping too much 0   Over the last two weeks how often have you been bothered by feeling tired or having little energy 2   Over the last two weeks how often have you been bothered by a poor appetite or overeating 1   Over the last two weeks how often have you been bothered by feeling bad about yourself - or that you are a failure or have let yourself or your family down 1   Over the last two weeks how often have you been bothered by trouble concentrating on things, such as reading the newspaper or watching television 2   Over the last two weeks how often have you been bothered by moving or speaking so slowly that other people could have noticed. 1   Over the last two weeks how often have you been bothered by thoughts that you would be better off dead, or of hurting yourself 0   If you checked off any problems, how difficult have these problems made it for you to do your work, take care of things at home or get along with other people? Somewhat difficult   PHQ-9 Score 9         2/14/2025     9:06 AM 3/12/2025     9:59 AM 5/21/2025     8:39 AM   MALI-7   Was test performed? Yes Yes Yes   1. Feeling nervous, anxious, or on edge? Several days Not at all Several days   2. Not being able to stop or control worrying? Several days Not at all More than half the days   3. Worrying too much about different things? Several days Not at all More than half the days   4. Trouble relaxing? Not at all Not at all Several days   5. Being so restless that it is hard to sit still? Not at all Not at all Not at all   6. Becoming easily annoyed  or irritable? Not at all Not at all Not at all   7. Feeling afraid as if something awful might happen? Not at all Not at all Several days   8. If you checked off any problems, how difficult have these problems made it for you to do your work, take care of things at home, or get along with other people? Not difficult at all Not difficult at all Somewhat difficult   MALI-7 Score 3 0 7   Number answered (out of first 7) 7 7 7   Interpretation Normal Normal Mild Anxiety       Interval history and content of current session: Pt provided an update regarding her diagnosis and treatment. She described increased challenges related to her physical health, including a recent hospitalization; however, she reported that her current treatment has been effective, with noticeable improvements in energy levels. She shared that she is scheduled for an intensive surgical procedure tomorrow and expressed cautious optimism that it will support ongoing cancer management. Supportive therapeutic techniques were utilized to validate her emotional experience and facilitate deeper insight into the psychological impact of her cancer journey. Emotional processing interventions helped her identify and articulate the emotional toll of her health challenges and how this influences her self-talk. She acknowledged frequent self-critical thoughts, including labeling herself as lazy, even when experiencing significant fatigue or physical limitations. ACT techniques were introduced to support acceptance of difficult internal experiences and promote psychological flexibility. Specific focus was placed on recognizing and diffusing from self-critical thoughts, while developing more compassionate and adaptive responses to physical and emotional limitations.     Risk parameters:   Patient reports no suicidal ideation  Patient reports no homicidal ideation  Patient reports no self-injurious behavior  Patient reports no violent behavior     Safety needs:   None at this time      Verbal deficits: None     Patient's response to intervention:The patient's response to intervention is accepting.     Progress toward goals and other mental status changes:  The patient's progress toward goals is fair .      Progress to date:Progress - Ongoing, but Slow      Goals from last visit: Met      Patient Strengths: Patient Strengths: verbal, intelligent, successful, good social support, good insight, commitment to wellness       Treatment Plan:individual psychotherapy and medication management by physician  Target symptoms: anxiety   Why chosen therapy is appropriate versus another modality: relevant to diagnosis  Outcome monitoring methods: self-report, observation, checklist/rating scale  Therapeutic intervention type: insight oriented psychotherapy, behavior modifying psychotherapy, supportive psychotherapy  Prognosis: Good      Behavioral goals:    Therapy: Practice using the two-friend metaphor to observe self-critical thoughts without judgment and respond with self-compassion.     Return to clinic: 3 weeks     Length of Service (minutes direct face-to-face contact): 60    Diagnosis:     ICD-10-CM ICD-9-CM   1. Generalized anxiety disorder  F41.1 300.02   2. Adjustment disorder with depressed mood  F43.21 309.0   3. Malignant neoplasm of ascending colon  C18.2 153.6   4. Metastasis to liver  C78.7 197.7     Loly Vinson PsyD   Clinical Health Psychology Fellow

## 2025-05-20 NOTE — PRE-PROCEDURE INSTRUCTIONS
PreOp Instructions given:    -- Medication information (what to hold and what to take)   -- NPO guidelines as follows: (or as per your Surgeon)  Stop ALL solid food, gum, candy 8 hours before arrival time.  Stop all CLOUDY liquids: coffee with creamer, cloudy juices, 8 hours prior to arrival time.  The patient should be ENCOURAGED to drink carbohydrate-rich clear liquids (sports drinks, clear juices) until 2 hours prior to arrival time.  Stop clear liquids 2 hours prior to arrival time.  CLEAR liquids include water, black coffee NO creamer, clear oral rehydration drinks, clear sports drinks and clear fruit juices (no orange juice, no pulpy juices, no apple cider).   IF IN DOUBT, drink water instead.   NOTHING TO DRINK 2 hours before to surgery/procedure  time. If you are told to take medication on the morning of surgery, it may be taken with a sip of water.   -- *Arrival place and directions given*.  Time to be given the day before procedure by the Surgeon's Office   -- Bathe with antibacterial soap (dial or Hibiclens as instructed)  -- Don't wear any jewelry or valuables and not metals on skin or hair AM of surgery   -- No makeup or moisturizer to face   -- No perfume/cologne, powder, lotions, aftershave or deodorant    Pt verbalized understanding.         *If going to [facility], see below:     Directions and Instructions for [facility]   At [facility], we have an outstanding team of physicians, anesthesiologists, CRNAs, Registered Nurses, Surgical Technologists, and other ancillary team members all focused on your surgical and procedural care.   Before Your Procedure:   The physician's office will call you with a specific arrival time and directions a day or two before your scheduled procedure. You may also receive these instructions through your [facility] portal.   Day of Procedure:   Please be sure to arrive at the arrival time given or you may risk your  surgery being delayed or canceled. The arrival time is earlier than your scheduled surgery or procedure time. In the winter months please dress warm and bring blankets for you or your child as the waiting room may be cold. If you have difficulty locating the facility, please give us a call at [phone].   Directions:   The [facility] is located on the 1st floor of the hospital building near the [place] entrance.   Parking:   You will park in the [facility] (note location on map). [facility] opens at 5:00 a.m. and has a drop off area by the entrance.  parking is available starting at 7:00 a.m. Please see below for further  parking instructions.   Directions from the parking garage elevators   Blue [facility] Elevators: From the parking garage, take the blue Tineo Goldsmith elevators (located in the center of the parking garage) to the 1st floor of the garage. You will then take a right once off the elevators then another right to the outside of the parking garage. You will be across from the [facility]. You will walk down the sidewalk, pass the  curve at the [place] entrance and continue to follow the sidewalk. You will pass the radiation oncology entrance on your right. Continue to follow the sidewalk to the [facility] glass door entrance.   Hospital Entrance (Inside Route): If a mostly inside route is preferred: Take the inside elevator bank (located at the far north end of the garage) from the parking garage to the 1st floor. On the 1st floor walk past [employer]. Keep walking down the center of the hallway towards the hospital elevators. Once you reach the red brick sandie, take a left and go past the hospital elevators. Take another left and follow the blue and white Tineo Barclay signs around the hallway to the end. Go outside of the door. You will see the [facility] entrance to your right.   Drop Off:    There is a drop off area at the doors of the [facility] surgery center for your convenience. If utilized for pediatric patients, an adult must accompany the patient into the surgery center while another adult arboleda the vehicle.   Yu (at 7:00 a.m.):   Upon check-in, please let the  know that you are utilizing [employer] parking which is free. The . will then call [employer] for your car to be picked up. Your keys and phone number will be collected and given to [employer] services. You will then be given a ticket. Upon discharge, [employer] will be notified to bring your vehicle back when you are ready.   2/6/2024      If going to 2nd floor surgery center ([facility]), see below:    Directions to the 2nd floor ([facility]) Surgery Center  The hallway to get to the surgery center is on the 2nd fl between the gold elevators in the atrium.  Follow the hallway into the waiting room and check in at desk.

## 2025-05-20 NOTE — ANESTHESIA PREPROCEDURE EVALUATION
[facility]  Anesthesia Pre-Operative Evaluation         Patient Name: Nik Lowery  YOB: 1965  MRN: [id number]    SUBJECTIVE:     Pre-operative evaluation for Procedure(s) (LRB):  LAPAROTOMY, EXPLORATORY (N/A)  INSERTION, INFUSION PUMP, TOTALLY IMPLANTABLE; BOB PUMP (N/A)  CHOLECYSTECTOMY (N/A)     05/20/2025  Nik Lowery is a 59 y.o. female w/ a significant PMHx of T2DM, BIJAN, HTN, anxiety, depression, GERD, ascending colon cancer with mets to liver.     she has a current medication list which includes the following long-term medication(s): atorvastatin, blood-glucose meter, duloxetine, fexofenadine, lamotrigine, lisinopril, metformin, omeprazole, sertraline, and valacyclovir.     Patient now presents for the above procedure(s).    NPO Status: Appropriate    LDA:        PowerPort A Cath Single Lumen 12/20/24 Internal Jugular Right (Active)   Site Assessment No drainage;No redness;No swelling;No warmth 05/07/25 1030   Line Securement Device Secured with sutureless device 05/07/25 1030   Dressing Type CHG impregnated dressing/sponge;Central line dressing 05/07/25 1030   Dressing Status Clean;Dry;Intact 05/07/25 1030   Dressing Intervention First dressing 05/07/25 1030   Date on Dressing 05/07/25 05/07/25 1030   Patency/Care flushed w/o difficulty;blood return present;normal saline locked 05/07/25 1030   Status Accessed 05/07/25 1030   Accessed by: LISANDRA Modi RN 05/07/25 1030   Needle Insertion Date 05/07/25 05/07/25 1030   Needle Insertion Time 1030 05/07/25 1030   Type of Needle Harris 05/07/25 1030   Gauge 20 05/07/25 1030   Needle Length 1 in 05/07/25 1030   Needle Status Left in place 05/07/25 1030   Flush Performed Yes 05/01/25 1000   Needle Removal Date 05/01/25 05/01/25 1000   Needle Removal Time 1001 05/01/25 1000   Deaccessed By ML Disla RN 05/01/25 1000   Number of days: 151     Vent/Oxygen: None documented    Drips: None documented.  Previous Airway: None  "documented.  Allergies:  Review of patient's allergies indicates:   Allergen Reactions    Robaxin [methocarbamol] Nausea Only     Medications:     Current Outpatient Medications   Medication Instructions    amoxicillin-clavulanate 875-125mg (AUGMENTIN) 875-125 mg per tablet 1 tablet, Oral, 2 times daily    atorvastatin (LIPITOR) 20 mg, Oral    blood-glucose meter kit To check BG 3 times daily, to use with insurance preferred meter    dexAMETHasone (DECADRON) 8 mg, Oral, Daily, On days 2 through 4 of each cycle of chemo    docusate sodium (COLACE) 100 mg, Oral, 2 times daily    DULoxetine (CYMBALTA) 40 mg, Oral, Daily    fexofenadine (ALLEGRA) 180 mg, Nightly    fluticasone propionate (FLONASE) 100 mcg, Each Nostril    FREESTYLE JOCELIN 14 DAY SENSOR Kit 1 application , Misc.(Non-Drug; Combo Route), Daily PRN    gabapentin (NEURONTIN) 800 mg, 3 times daily    guaiFENesin (MUCINEX) 1,200 mg Ta12 1 tablet, Nightly    insulin glargine (TOUJEO) (TOUJEO SOLOSTAR U-300 INSULIN) 30 Units, Subcutaneous, Daily    lamoTRIgine (LAMICTAL) 150 mg, Oral    lancets Misc To check BG 3 times daily, to use with insurance preferred meter    LIDOcaine-prilocaine (EMLA) cream Topical (Top), As needed (PRN)    lisinopriL (PRINIVIL,ZESTRIL) 20 mg, Oral    magnesium oxide (MAG-OX) 400 mg, Oral, 2 times daily    metFORMIN (GLUCOPHAGE-XR) 1,000 mg, Oral, 2 times daily with meals    multivitamin (THERAGRAN) per tablet 1 tablet, Daily    omeprazole (PRILOSEC) 40 mg, Oral, Daily    ondansetron (ZOFRAN) 8 mg, Oral, Every 8 hours PRN    ONETOUCH ULTRA BLUE TEST STRIP Strp USE TO CHECK BLOOD SUGAR 3 TIMES A DAY    pen needle, diabetic (PEN NEEDLE) 31 gauge x 5/16" Ndle 1 Application, Misc.(Non-Drug; Combo Route), Daily    polyethylene glycol (GLYCOLAX) 17 g, Oral, Daily    prochlorperazine (COMPAZINE) 10 mg, Oral, Every 6 hours PRN    sertraline (ZOLOFT) 100 mg, Oral    valACYclovir (VALTREX) 500 MG tablet TAKE 1 TABLET BY MOUTH TWICE A DAY "     Inpatient Medications:    History:   There are no hospital problems to display for this patient.    Medical History:  Past Medical History:   Diagnosis Date    Colon cancer     Diabetes mellitus     Diabetes mellitus, type 2     Encounter for blood transfusion     Herpes     Hypertension     Sleep apnea      Surgical History:    has a past surgical history that includes Hysterectomy (2012); Eye surgery (2002); [place] tooth extraction; Lumbar discectomy; Esophagogastroduodenoscopy (N/A, 11/8/2024); Colonoscopy (N/A, 11/8/2024); Insertion of tunneled central venous catheter (CVC) with subcutaneous port (Right, 12/20/2024); and Robot-assisted colectomy (Right, 1/7/2025).   Social History:    reports that she is not currently sexually active.  reports that she has never smoked. She has never used smokeless tobacco. She reports that she does not drink alcohol and does not use drugs.    OBJECTIVE:   Vital Signs Range:  Wt Readings from Last 1 Encounters:   05/19/25 91.4 kg (201 lb 6.4 oz)      BMI Readings from Last 1 Encounters:   05/19/25 33.51 kg/m²     BP Readings from Last 3 Encounters:   05/19/25 131/84   05/12/25 107/70   05/07/25 115/68     Pulse Readings from Last 3 Encounters:   05/19/25 91   05/12/25 106   05/07/25 78       Significant Labs:      Component Value Date/Time    WBC 4.72 05/12/2025 1057    HGB 11.6 (L) 05/12/2025 1057    HCT 34.5 (L) 05/12/2025 1057     05/12/2025 1057     (L) 05/12/2025 1057    K 4.7 05/12/2025 1057     05/12/2025 1057    CO2 20 (L) 05/12/2025 1057     (H) 05/12/2025 1057    BUN 17 05/12/2025 1057    CREATININE 0.9 05/12/2025 1057    MG 1.6 05/12/2025 1057    PHOS 3.6 01/11/2025 0508    CALCIUM 8.8 05/12/2025 1057    ALBUMIN 3.3 (L) 05/12/2025 1057    PROT 6.3 05/12/2025 1057    ALKPHOS 92 05/12/2025 1057    BILITOT 0.3 05/12/2025 1057    AST 45 05/12/2025 1057    ALT 42 05/12/2025 1057    INR 1.0 05/19/2025 1432    HGBA1C 6.5 (H) 11/07/2024 1159       Please see Results Review for additional labs.     Diagnostic Studies: No relevant studies.    EKG:   Results for orders placed or performed in visit on 04/07/25   IN OFFICE EKG 12-LEAD (to [place])    Collection Time: 04/07/25  2:23 PM   Result Value Ref Range    QRS Duration 78 ms    [facility] QTC Calculation 462 ms    Narrative    Test Reason : R00.0,    Vent. Rate : 110 BPM     Atrial Rate : 110 BPM     P-R Int : 136 ms          QRS Dur :  78 ms      QT Int : 342 ms       P-R-T Axes :  35   0  50 degrees    QTcB Int : 462 ms    Sinus tachycardia  Minimal voltage criteria for LVH, may be normal variant ( R in aVL )  Borderline Abnormal ECG  When compared with ECG of 06-Jan-2025 19:41,  No significant change was found  Confirmed by Nader Irwin ([id number]) on 4/7/2025 2:55:17 PM    Referred By: ANTONY CALLAWAY           Confirmed By: Nader Irwin     ECHO:  See subjective, if available.  ASSESSMENT/PLAN:      Pre-op Assessment    I have reviewed the Patient Summary Reports.     I have reviewed the Nursing Notes. I have reviewed the NPO Status.   I have reviewed the Medications.     Review of Systems  Anesthesia Hx:  No problems with previous Anesthesia             Denies Family Hx of Anesthesia complications.    Denies Personal Hx of Anesthesia complications.                    Social:  Non-Smoker, No Alcohol Use       Hematology/Oncology:       -- Denies Anemia:                  Current/Recent Cancer.                EENT/Dental:  EENT/Dental Normal           Cardiovascular:     Hypertension   Denies MI.      Denies Dysrhythmias.    Denies CHF.                                   Pulmonary:    Denies COPD.  Denies Asthma.    Sleep Apnea                Renal/:   Denies Chronic Renal Disease.                Hepatic/GI:     GERD Liver Disease,               Neurological:    Denies CVA.    Denies Seizures.                                Endocrine:  Diabetes, type 2           Psych:   anxiety depression                Physical  Exam  General: Well nourished, Cooperative, Alert and Oriented    Airway:  Mallampati: II   Mouth Opening: Normal  TM Distance: Normal  Tongue: Normal  Neck ROM: Normal ROM    Dental:  Intact    Chest/Lungs:  Normal Respiratory Rate    Heart:  Rate: Normal        Anesthesia Plan  Type of Anesthesia, risks & benefits discussed:    Anesthesia Type: Gen ETT, Regional  Intra-op Monitoring Plan: Standard ASA Monitors and Art Line  Post Op Pain Control Plan: multimodal analgesia, IV/PO Opioids PRN and peripheral nerve block  Induction:  IV  Airway Plan: Direct and Video, Post-Induction  Informed Consent: Informed consent signed with the Patient and all parties understand the risks and agree with anesthesia plan.  All questions answered.   ASA Score: 3  Day of Surgery Review of History & Physical: H&P Update referred to the surgeon/provider.    Ready For Surgery From Anesthesia Perspective.     .

## 2025-05-20 NOTE — TELEPHONE ENCOUNTER
Spoke to Ms. Sebastián and confirmed procedure for 5/21/25 with Dr. Henry. Informed to arrive at the [facility] on the 2nd floor on [facility] for 0630 and surgery time is approximately 0800. Surgery Instructions provided as follows:  instructed to remain NPO solids 8 hours prior to surgery, clear liquids until 2 hours prior to surgery, to shower with antibacterial soap the night before surgery and morning of surgery before arrival, not to apply any lotions, powders or deodorant, remove all metal and jewelry, to wear comfortable clothes, and leave all valuables at home. Pre op anesthesia spoke to pt this am regarding DOS meds. Confirmed pt  will have family members accompany pt on DOS. Instructed to bring surgery folder on DOS. Pt verbalized understanding to all of the above.

## 2025-05-21 ENCOUNTER — HOSPITAL ENCOUNTER (INPATIENT)
Facility: HOSPITAL | Age: 60
LOS: 3 days | Discharge: HOME | DRG: 416 | End: 2025-05-24
Attending: SURGERY | Admitting: SURGERY
Payer: COMMERCIAL

## 2025-05-21 ENCOUNTER — ANESTHESIA (OUTPATIENT)
Dept: SURGERY | Facility: HOSPITAL | Age: 60
End: 2025-05-21
Payer: COMMERCIAL

## 2025-05-21 DIAGNOSIS — C78.7 METASTASIS TO LIVER: ICD-10-CM

## 2025-05-21 DIAGNOSIS — C18.9 COLON ADENOCARCINOMA: ICD-10-CM

## 2025-05-21 DIAGNOSIS — E66.9 OBESITY, UNSPECIFIED CLASS, UNSPECIFIED OBESITY TYPE, UNSPECIFIED WHETHER SERIOUS COMORBIDITY PRESENT: ICD-10-CM

## 2025-05-21 DIAGNOSIS — E11.59 HYPERTENSION ASSOCIATED WITH DIABETES: ICD-10-CM

## 2025-05-21 DIAGNOSIS — I15.2 HYPERTENSION ASSOCIATED WITH DIABETES: ICD-10-CM

## 2025-05-21 DIAGNOSIS — C18.2 MALIGNANT NEOPLASM OF ASCENDING COLON: ICD-10-CM

## 2025-05-21 DIAGNOSIS — E11.65 UNCONTROLLED TYPE 2 DIABETES MELLITUS WITH HYPERGLYCEMIA, WITHOUT LONG-TERM CURRENT USE OF INSULIN: Primary | ICD-10-CM

## 2025-05-21 LAB
ABSOLUTE EOSINOPHIL (OHS): 0.04 K/UL
ABSOLUTE MONOCYTE (OHS): 0.51 K/UL (ref 0.3–1)
ABSOLUTE NEUTROPHIL COUNT (OHS): 5.98 K/UL (ref 1.8–7.7)
ALBUMIN SERPL BCP-MCNC: 2.8 G/DL (ref 3.5–5.2)
ALP SERPL-CCNC: 74 UNIT/L (ref 40–150)
ALT SERPL W/O P-5'-P-CCNC: 35 UNIT/L (ref 10–44)
ANION GAP (OHS): 11 MMOL/L (ref 8–16)
AST SERPL-CCNC: 51 UNIT/L (ref 11–45)
BASOPHILS # BLD AUTO: 0.03 K/UL
BASOPHILS NFR BLD AUTO: 0.4 %
BILIRUB SERPL-MCNC: 0.3 MG/DL (ref 0.1–1)
BUN SERPL-MCNC: 15 MG/DL (ref 6–20)
CALCIUM SERPL-MCNC: 8.1 MG/DL (ref 8.7–10.5)
CHLORIDE SERPL-SCNC: 107 MMOL/L (ref 95–110)
CO2 SERPL-SCNC: 21 MMOL/L (ref 23–29)
CREAT SERPL-MCNC: 0.8 MG/DL (ref 0.5–1.4)
ERYTHROCYTE [DISTWIDTH] IN BLOOD BY AUTOMATED COUNT: 18 % (ref 11.5–14.5)
GFR SERPLBLD CREATININE-BSD FMLA CKD-EPI: >60 ML/MIN/1.73/M2
GLUCOSE SERPL-MCNC: 225 MG/DL (ref 70–110)
GLUCOSE SERPL-MCNC: 226 MG/DL (ref 70–110)
GLUCOSE SERPL-MCNC: 248 MG/DL (ref 70–110)
HCO3 UR-SCNC: 21.9 MMOL/L (ref 24–28)
HCO3 UR-SCNC: 22.6 MMOL/L (ref 24–28)
HCT VFR BLD AUTO: 32.5 % (ref 37–48.5)
HCT VFR BLD CALC: 30 %PCV (ref 36–54)
HCT VFR BLD CALC: 32 %PCV (ref 36–54)
HGB BLD-MCNC: 10.3 GM/DL (ref 12–16)
IMM GRANULOCYTES # BLD AUTO: 0.08 K/UL (ref 0–0.04)
IMM GRANULOCYTES NFR BLD AUTO: 1.1 % (ref 0–0.5)
INDIRECT COOMBS: NORMAL
LYMPHOCYTES # BLD AUTO: 0.84 K/UL (ref 1–4.8)
MCH RBC QN AUTO: 30.8 PG (ref 27–31)
MCHC RBC AUTO-ENTMCNC: 31.7 G/DL (ref 32–36)
MCV RBC AUTO: 97 FL (ref 82–98)
NUCLEATED RBC (/100WBC) (OHS): 0 /100 WBC
PCO2 BLDA: 37.6 MMHG (ref 35–45)
PCO2 BLDA: 39.9 MMHG (ref 35–45)
PH SMN: 7.36 [PH] (ref 7.35–7.45)
PH SMN: 7.37 [PH] (ref 7.35–7.45)
PLATELET # BLD AUTO: 151 K/UL (ref 150–450)
PMV BLD AUTO: 9.8 FL (ref 9.2–12.9)
PO2 BLDA: 100 MMHG (ref 80–100)
PO2 BLDA: 164 MMHG (ref 80–100)
POC BE: -3 MMOL/L (ref -2–2)
POC BE: -3 MMOL/L (ref -2–2)
POC IONIZED CALCIUM: 1.14 MMOL/L (ref 1.06–1.42)
POC IONIZED CALCIUM: 1.16 MMOL/L (ref 1.06–1.42)
POC SATURATED O2: 98 % (ref 95–100)
POC SATURATED O2: 99 % (ref 95–100)
POC TCO2: 23 MMOL/L (ref 23–27)
POC TCO2: 24 MMOL/L (ref 23–27)
POCT GLUCOSE: 134 MG/DL (ref 70–110)
POCT GLUCOSE: 218 MG/DL (ref 70–110)
POCT GLUCOSE: 283 MG/DL (ref 70–110)
POTASSIUM BLD-SCNC: 3.8 MMOL/L (ref 3.5–5.1)
POTASSIUM BLD-SCNC: 4.2 MMOL/L (ref 3.5–5.1)
POTASSIUM SERPL-SCNC: 4.6 MMOL/L (ref 3.5–5.1)
PROT SERPL-MCNC: 5.6 GM/DL (ref 6–8.4)
RBC # BLD AUTO: 3.34 M/UL (ref 4–5.4)
RELATIVE EOSINOPHIL (OHS): 0.5 %
RELATIVE LYMPHOCYTE (OHS): 11.2 % (ref 18–48)
RELATIVE MONOCYTE (OHS): 6.8 % (ref 4–15)
RELATIVE NEUTROPHIL (OHS): 80 % (ref 38–73)
RH BLD: NORMAL
SAMPLE: ABNORMAL
SAMPLE: ABNORMAL
SODIUM BLD-SCNC: 137 MMOL/L (ref 136–145)
SODIUM BLD-SCNC: 138 MMOL/L (ref 136–145)
SODIUM SERPL-SCNC: 139 MMOL/L (ref 136–145)
WBC # BLD AUTO: 7.48 K/UL (ref 3.9–12.7)

## 2025-05-21 PROCEDURE — 25000003 PHARM REV CODE 250

## 2025-05-21 PROCEDURE — 27000221 HC OXYGEN, UP TO 24 HOURS

## 2025-05-21 PROCEDURE — 36620 INSERTION CATHETER ARTERY: CPT | Mod: 59,,, | Performed by: ANESTHESIOLOGY

## 2025-05-21 PROCEDURE — 04H333Z INSERTION OF INFUSION DEVICE INTO HEPATIC ARTERY, PERCUTANEOUS APPROACH: ICD-10-PCS | Performed by: SURGERY

## 2025-05-21 PROCEDURE — 20600001 HC STEP DOWN PRIVATE ROOM

## 2025-05-21 PROCEDURE — 63600175 PHARM REV CODE 636 W HCPCS

## 2025-05-21 PROCEDURE — 36000706: Performed by: SURGERY

## 2025-05-21 PROCEDURE — 36260 INSERTION OF INFUSION PUMP: CPT | Mod: 51,,, | Performed by: SURGERY

## 2025-05-21 PROCEDURE — 27201423 OPTIME MED/SURG SUP & DEVICES STERILE SUPPLY: Performed by: SURGERY

## 2025-05-21 PROCEDURE — 88304 TISSUE EXAM BY PATHOLOGIST: CPT | Mod: TC | Performed by: SURGERY

## 2025-05-21 PROCEDURE — 47600 CHOLECYSTECTOMY: CPT | Mod: ,,, | Performed by: SURGERY

## 2025-05-21 PROCEDURE — 36000707: Performed by: SURGERY

## 2025-05-21 PROCEDURE — 25000003 PHARM REV CODE 250: Performed by: STUDENT IN AN ORGANIZED HEALTH CARE EDUCATION/TRAINING PROGRAM

## 2025-05-21 PROCEDURE — 0JHT0VZ INSERTION OF INFUSION PUMP INTO TRUNK SUBCUTANEOUS TISSUE AND FASCIA, OPEN APPROACH: ICD-10-PCS | Performed by: SURGERY

## 2025-05-21 PROCEDURE — 0FT40ZZ RESECTION OF GALLBLADDER, OPEN APPROACH: ICD-10-PCS | Performed by: SURGERY

## 2025-05-21 PROCEDURE — 63600175 PHARM REV CODE 636 W HCPCS: Performed by: ANESTHESIOLOGY

## 2025-05-21 PROCEDURE — 25000242 PHARM REV CODE 250 ALT 637 W/ HCPCS: Performed by: NURSE PRACTITIONER

## 2025-05-21 PROCEDURE — 99900035 HC TECH TIME PER 15 MIN (STAT)

## 2025-05-21 PROCEDURE — 94761 N-INVAS EAR/PLS OXIMETRY MLT: CPT

## 2025-05-21 PROCEDURE — 94664 DEMO&/EVAL PT USE INHALER: CPT

## 2025-05-21 PROCEDURE — 71000016 HC POSTOP RECOV ADDL HR: Performed by: SURGERY

## 2025-05-21 PROCEDURE — 37000009 HC ANESTHESIA EA ADD 15 MINS: Performed by: SURGERY

## 2025-05-21 PROCEDURE — 27000646 HC AEROBIKA DEVICE

## 2025-05-21 PROCEDURE — 86901 BLOOD TYPING SEROLOGIC RH(D): CPT

## 2025-05-21 PROCEDURE — 64461 PVB THORACIC SINGLE INJ SITE: CPT

## 2025-05-21 PROCEDURE — 84295 ASSAY OF SERUM SODIUM: CPT

## 2025-05-21 PROCEDURE — 37000008 HC ANESTHESIA 1ST 15 MINUTES: Performed by: SURGERY

## 2025-05-21 PROCEDURE — 64468 THRC FASCIAL PLN BLK BI NJX: CPT | Mod: 59,,, | Performed by: ANESTHESIOLOGY

## 2025-05-21 PROCEDURE — 63600175 PHARM REV CODE 636 W HCPCS: Performed by: PHYSICIAN ASSISTANT

## 2025-05-21 PROCEDURE — 63600175 PHARM REV CODE 636 W HCPCS: Performed by: STUDENT IN AN ORGANIZED HEALTH CARE EDUCATION/TRAINING PROGRAM

## 2025-05-21 PROCEDURE — 94799 UNLISTED PULMONARY SVC/PX: CPT

## 2025-05-21 PROCEDURE — 82962 GLUCOSE BLOOD TEST: CPT | Performed by: SURGERY

## 2025-05-21 PROCEDURE — 71000015 HC POSTOP RECOV 1ST HR: Performed by: SURGERY

## 2025-05-21 PROCEDURE — 71000033 HC RECOVERY, INTIAL HOUR: Performed by: SURGERY

## 2025-05-21 PROCEDURE — 94640 AIRWAY INHALATION TREATMENT: CPT

## 2025-05-21 PROCEDURE — 63600175 PHARM REV CODE 636 W HCPCS: Performed by: SURGERY

## 2025-05-21 PROCEDURE — 85025 COMPLETE CBC W/AUTO DIFF WBC: CPT

## 2025-05-21 PROCEDURE — C1772 INFUSION PUMP, PROGRAMMABLE: HCPCS | Performed by: SURGERY

## 2025-05-21 PROCEDURE — 99222 1ST HOSP IP/OBS MODERATE 55: CPT | Mod: ,,, | Performed by: PHYSICIAN ASSISTANT

## 2025-05-21 RX ORDER — LIDOCAINE HYDROCHLORIDE 10 MG/ML
1 INJECTION, SOLUTION EPIDURAL; INFILTRATION; INTRACAUDAL; PERINEURAL ONCE AS NEEDED
Status: DISCONTINUED | OUTPATIENT
Start: 2025-05-21 | End: 2025-05-24 | Stop reason: HOSPADM

## 2025-05-21 RX ORDER — IBUPROFEN 200 MG
24 TABLET ORAL
Status: DISCONTINUED | OUTPATIENT
Start: 2025-05-21 | End: 2025-05-24 | Stop reason: HOSPADM

## 2025-05-21 RX ORDER — TALC
6 POWDER (GRAM) TOPICAL NIGHTLY PRN
Status: DISCONTINUED | OUTPATIENT
Start: 2025-05-21 | End: 2025-05-24 | Stop reason: HOSPADM

## 2025-05-21 RX ORDER — PAPAVERINE HYDROCHLORIDE 30 MG/ML
INJECTION INTRAMUSCULAR; INTRAVENOUS
Status: DISCONTINUED | OUTPATIENT
Start: 2025-05-21 | End: 2025-05-21 | Stop reason: HOSPADM

## 2025-05-21 RX ORDER — LIDOCAINE AND PRILOCAINE 25; 25 MG/G; MG/G
CREAM TOPICAL
Status: DISCONTINUED | OUTPATIENT
Start: 2025-05-21 | End: 2025-05-24 | Stop reason: HOSPADM

## 2025-05-21 RX ORDER — METRONIDAZOLE 500 MG/100ML
500 INJECTION, SOLUTION INTRAVENOUS
Status: DISCONTINUED | OUTPATIENT
Start: 2025-05-21 | End: 2025-05-21

## 2025-05-21 RX ORDER — KETAMINE HCL IN 0.9 % NACL 50 MG/5 ML
SYRINGE (ML) INTRAVENOUS
Status: DISCONTINUED | OUTPATIENT
Start: 2025-05-21 | End: 2025-05-21

## 2025-05-21 RX ORDER — MIDAZOLAM HYDROCHLORIDE 1 MG/ML
INJECTION INTRAMUSCULAR; INTRAVENOUS
Status: DISCONTINUED | OUTPATIENT
Start: 2025-05-21 | End: 2025-05-21

## 2025-05-21 RX ORDER — ROCURONIUM BROMIDE 10 MG/ML
INJECTION, SOLUTION INTRAVENOUS
Status: DISCONTINUED | OUTPATIENT
Start: 2025-05-21 | End: 2025-05-21

## 2025-05-21 RX ORDER — FENTANYL CITRATE 50 UG/ML
25 INJECTION, SOLUTION INTRAMUSCULAR; INTRAVENOUS EVERY 5 MIN PRN
Status: DISCONTINUED | OUTPATIENT
Start: 2025-05-21 | End: 2025-05-21 | Stop reason: HOSPADM

## 2025-05-21 RX ORDER — DULOXETIN HYDROCHLORIDE 20 MG/1
40 CAPSULE, DELAYED RELEASE ORAL DAILY
Status: DISCONTINUED | OUTPATIENT
Start: 2025-05-21 | End: 2025-05-24 | Stop reason: HOSPADM

## 2025-05-21 RX ORDER — FENTANYL CITRATE 50 UG/ML
25-200 INJECTION, SOLUTION INTRAMUSCULAR; INTRAVENOUS
Status: DISCONTINUED | OUTPATIENT
Start: 2025-05-21 | End: 2025-05-21 | Stop reason: HOSPADM

## 2025-05-21 RX ORDER — SERTRALINE HYDROCHLORIDE 50 MG/1
100 TABLET, FILM COATED ORAL DAILY
Status: DISCONTINUED | OUTPATIENT
Start: 2025-05-22 | End: 2025-05-24 | Stop reason: HOSPADM

## 2025-05-21 RX ORDER — SODIUM CHLORIDE 0.9 % (FLUSH) 0.9 %
10 SYRINGE (ML) INJECTION
Status: DISCONTINUED | OUTPATIENT
Start: 2025-05-21 | End: 2025-05-21 | Stop reason: HOSPADM

## 2025-05-21 RX ORDER — ONDANSETRON 8 MG/1
8 TABLET, ORALLY DISINTEGRATING ORAL EVERY 8 HOURS PRN
Status: DISCONTINUED | OUTPATIENT
Start: 2025-05-21 | End: 2025-05-24 | Stop reason: HOSPADM

## 2025-05-21 RX ORDER — LAMOTRIGINE 25 MG/1
150 TABLET ORAL DAILY
Status: DISCONTINUED | OUTPATIENT
Start: 2025-05-21 | End: 2025-05-24 | Stop reason: HOSPADM

## 2025-05-21 RX ORDER — SODIUM CHLORIDE 0.9 % (FLUSH) 0.9 %
10 SYRINGE (ML) INJECTION
Status: DISCONTINUED | OUTPATIENT
Start: 2025-05-21 | End: 2025-05-24 | Stop reason: HOSPADM

## 2025-05-21 RX ORDER — HEPARIN SODIUM 1000 [USP'U]/ML
INJECTION, SOLUTION INTRAVENOUS; SUBCUTANEOUS
Status: DISCONTINUED | OUTPATIENT
Start: 2025-05-21 | End: 2025-05-21 | Stop reason: HOSPADM

## 2025-05-21 RX ORDER — ENOXAPARIN SODIUM 100 MG/ML
40 INJECTION SUBCUTANEOUS EVERY 24 HOURS
Status: DISCONTINUED | OUTPATIENT
Start: 2025-05-21 | End: 2025-05-24 | Stop reason: HOSPADM

## 2025-05-21 RX ORDER — INSULIN GLARGINE 100 [IU]/ML
23 INJECTION, SOLUTION SUBCUTANEOUS NIGHTLY
Status: DISCONTINUED | OUTPATIENT
Start: 2025-05-21 | End: 2025-05-22

## 2025-05-21 RX ORDER — PROCHLORPERAZINE EDISYLATE 5 MG/ML
INJECTION INTRAMUSCULAR; INTRAVENOUS
Status: DISCONTINUED | OUTPATIENT
Start: 2025-05-21 | End: 2025-05-21

## 2025-05-21 RX ORDER — LIDOCAINE HYDROCHLORIDE 10 MG/ML
1 INJECTION, SOLUTION EPIDURAL; INFILTRATION; INTRACAUDAL; PERINEURAL ONCE AS NEEDED
Status: DISCONTINUED | OUTPATIENT
Start: 2025-05-21 | End: 2025-05-21

## 2025-05-21 RX ORDER — GABAPENTIN 300 MG/1
300 CAPSULE ORAL 3 TIMES DAILY
Status: DISCONTINUED | OUTPATIENT
Start: 2025-05-21 | End: 2025-05-22

## 2025-05-21 RX ORDER — NALOXONE HCL 0.4 MG/ML
0.02 VIAL (ML) INJECTION
Status: DISCONTINUED | OUTPATIENT
Start: 2025-05-21 | End: 2025-05-22

## 2025-05-21 RX ORDER — GLUCAGON 1 MG
1 KIT INJECTION
Status: DISCONTINUED | OUTPATIENT
Start: 2025-05-21 | End: 2025-05-21 | Stop reason: HOSPADM

## 2025-05-21 RX ORDER — PROPOFOL 10 MG/ML
VIAL (ML) INTRAVENOUS
Status: DISCONTINUED | OUTPATIENT
Start: 2025-05-21 | End: 2025-05-21

## 2025-05-21 RX ORDER — HEPARIN SODIUM 5000 [USP'U]/ML
5000 INJECTION, SOLUTION INTRAVENOUS; SUBCUTANEOUS
Status: DISCONTINUED | OUTPATIENT
Start: 2025-05-21 | End: 2025-05-21

## 2025-05-21 RX ORDER — INSULIN ASPART 100 [IU]/ML
0-10 INJECTION, SOLUTION INTRAVENOUS; SUBCUTANEOUS
Status: DISCONTINUED | OUTPATIENT
Start: 2025-05-21 | End: 2025-05-21

## 2025-05-21 RX ORDER — PANTOPRAZOLE SODIUM 40 MG/1
40 TABLET, DELAYED RELEASE ORAL DAILY
Status: DISCONTINUED | OUTPATIENT
Start: 2025-05-21 | End: 2025-05-24 | Stop reason: HOSPADM

## 2025-05-21 RX ORDER — LEVALBUTEROL INHALATION SOLUTION 0.63 MG/3ML
0.63 SOLUTION RESPIRATORY (INHALATION)
Status: DISPENSED | OUTPATIENT
Start: 2025-05-21 | End: 2025-05-23

## 2025-05-21 RX ORDER — HALOPERIDOL LACTATE 5 MG/ML
0.5 INJECTION, SOLUTION INTRAMUSCULAR EVERY 10 MIN PRN
Status: DISCONTINUED | OUTPATIENT
Start: 2025-05-21 | End: 2025-05-21 | Stop reason: HOSPADM

## 2025-05-21 RX ORDER — FENTANYL CITRATE 50 UG/ML
INJECTION, SOLUTION INTRAMUSCULAR; INTRAVENOUS
Status: DISCONTINUED | OUTPATIENT
Start: 2025-05-21 | End: 2025-05-21

## 2025-05-21 RX ORDER — SODIUM CHLORIDE, SODIUM LACTATE, POTASSIUM CHLORIDE, CALCIUM CHLORIDE 600; 310; 30; 20 MG/100ML; MG/100ML; MG/100ML; MG/100ML
INJECTION, SOLUTION INTRAVENOUS CONTINUOUS
Status: DISCONTINUED | OUTPATIENT
Start: 2025-05-21 | End: 2025-05-22

## 2025-05-21 RX ORDER — INSULIN ASPART 100 [IU]/ML
0-10 INJECTION, SOLUTION INTRAVENOUS; SUBCUTANEOUS
Status: DISCONTINUED | OUTPATIENT
Start: 2025-05-21 | End: 2025-05-24 | Stop reason: HOSPADM

## 2025-05-21 RX ORDER — LIDOCAINE HYDROCHLORIDE 20 MG/ML
INJECTION, SOLUTION EPIDURAL; INFILTRATION; INTRACAUDAL; PERINEURAL
Status: DISCONTINUED | OUTPATIENT
Start: 2025-05-21 | End: 2025-05-21

## 2025-05-21 RX ORDER — PHENYLEPHRINE HCL IN 0.9% NACL 1 MG/10 ML
SYRINGE (ML) INTRAVENOUS
Status: DISCONTINUED | OUTPATIENT
Start: 2025-05-21 | End: 2025-05-21

## 2025-05-21 RX ORDER — CEFAZOLIN SODIUM 1 G/3ML
INJECTION, POWDER, FOR SOLUTION INTRAMUSCULAR; INTRAVENOUS
Status: DISCONTINUED | OUTPATIENT
Start: 2025-05-21 | End: 2025-05-21

## 2025-05-21 RX ORDER — ATORVASTATIN CALCIUM 20 MG/1
20 TABLET, FILM COATED ORAL DAILY
Status: DISCONTINUED | OUTPATIENT
Start: 2025-05-22 | End: 2025-05-24 | Stop reason: HOSPADM

## 2025-05-21 RX ORDER — ACETAMINOPHEN 500 MG
1000 TABLET ORAL
Status: DISCONTINUED | OUTPATIENT
Start: 2025-05-21 | End: 2025-05-21

## 2025-05-21 RX ORDER — ACETAMINOPHEN 500 MG
1000 TABLET ORAL EVERY 8 HOURS
Status: DISCONTINUED | OUTPATIENT
Start: 2025-05-21 | End: 2025-05-21

## 2025-05-21 RX ORDER — CELECOXIB 200 MG/1
400 CAPSULE ORAL
Status: DISCONTINUED | OUTPATIENT
Start: 2025-05-21 | End: 2025-05-21

## 2025-05-21 RX ORDER — CEFAZOLIN 2 G/1
2 INJECTION, POWDER, FOR SOLUTION INTRAMUSCULAR; INTRAVENOUS
Status: DISCONTINUED | OUTPATIENT
Start: 2025-05-21 | End: 2025-05-21

## 2025-05-21 RX ORDER — MIDAZOLAM HYDROCHLORIDE 1 MG/ML
.5-4 INJECTION, SOLUTION INTRAMUSCULAR; INTRAVENOUS
Status: DISCONTINUED | OUTPATIENT
Start: 2025-05-21 | End: 2025-05-21 | Stop reason: HOSPADM

## 2025-05-21 RX ORDER — HEPARIN 100 UNIT/ML
SYRINGE INTRAVENOUS
Status: DISCONTINUED | OUTPATIENT
Start: 2025-05-21 | End: 2025-05-21 | Stop reason: HOSPADM

## 2025-05-21 RX ORDER — ONDANSETRON HYDROCHLORIDE 2 MG/ML
INJECTION, SOLUTION INTRAVENOUS
Status: DISCONTINUED | OUTPATIENT
Start: 2025-05-21 | End: 2025-05-21

## 2025-05-21 RX ORDER — ACETAMINOPHEN 10 MG/ML
1000 INJECTION, SOLUTION INTRAVENOUS EVERY 8 HOURS
Status: COMPLETED | OUTPATIENT
Start: 2025-05-21 | End: 2025-05-22

## 2025-05-21 RX ORDER — ACETAMINOPHEN 325 MG/1
650 TABLET ORAL EVERY 8 HOURS PRN
Status: DISCONTINUED | OUTPATIENT
Start: 2025-05-21 | End: 2025-05-24 | Stop reason: HOSPADM

## 2025-05-21 RX ORDER — BUPIVACAINE HYDROCHLORIDE 7.5 MG/ML
INJECTION, SOLUTION EPIDURAL; RETROBULBAR
Status: COMPLETED | OUTPATIENT
Start: 2025-05-21 | End: 2025-05-21

## 2025-05-21 RX ORDER — HYDROMORPHONE HCL IN 0.9% NACL 6 MG/30 ML
PATIENT CONTROLLED ANALGESIA SYRINGE INTRAVENOUS CONTINUOUS
Refills: 0 | Status: DISCONTINUED | OUTPATIENT
Start: 2025-05-21 | End: 2025-05-22

## 2025-05-21 RX ORDER — IBUPROFEN 200 MG
16 TABLET ORAL
Status: DISCONTINUED | OUTPATIENT
Start: 2025-05-21 | End: 2025-05-24 | Stop reason: HOSPADM

## 2025-05-21 RX ORDER — GLUCAGON 1 MG
1 KIT INJECTION
Status: DISCONTINUED | OUTPATIENT
Start: 2025-05-21 | End: 2025-05-24 | Stop reason: HOSPADM

## 2025-05-21 RX ADMIN — ACETAMINOPHEN 1000 MG: 10 INJECTION, SOLUTION INTRAVENOUS at 01:05

## 2025-05-21 RX ADMIN — PROCHLORPERAZINE EDISYLATE 5 MG: 5 INJECTION INTRAMUSCULAR; INTRAVENOUS at 09:05

## 2025-05-21 RX ADMIN — FENTANYL CITRATE 12.5 MCG: 50 INJECTION INTRAMUSCULAR; INTRAVENOUS at 12:05

## 2025-05-21 RX ADMIN — ACETAMINOPHEN 1000 MG: 10 INJECTION, SOLUTION INTRAVENOUS at 08:05

## 2025-05-21 RX ADMIN — CEFAZOLIN 2 G: 330 INJECTION, POWDER, FOR SOLUTION INTRAMUSCULAR; INTRAVENOUS at 08:05

## 2025-05-21 RX ADMIN — LEVALBUTEROL HYDROCHLORIDE 0.63 MG: 0.63 SOLUTION RESPIRATORY (INHALATION) at 08:05

## 2025-05-21 RX ADMIN — CELECOXIB 400 MG: 200 CAPSULE ORAL at 07:05

## 2025-05-21 RX ADMIN — ONDANSETRON 4 MG: 2 INJECTION INTRAMUSCULAR; INTRAVENOUS at 11:05

## 2025-05-21 RX ADMIN — ENOXAPARIN SODIUM 40 MG: 40 INJECTION SUBCUTANEOUS at 05:05

## 2025-05-21 RX ADMIN — SUGAMMADEX 200 MG: 100 INJECTION, SOLUTION INTRAVENOUS at 11:05

## 2025-05-21 RX ADMIN — GABAPENTIN 300 MG: 300 CAPSULE ORAL at 08:05

## 2025-05-21 RX ADMIN — HEPARIN SODIUM 5000 UNITS: 5000 INJECTION INTRAVENOUS; SUBCUTANEOUS at 07:05

## 2025-05-21 RX ADMIN — METRONIDAZOLE 500 MG: 5 INJECTION, SOLUTION INTRAVENOUS at 07:05

## 2025-05-21 RX ADMIN — PHENYLEPHRINE HYDROCHLORIDE 0.3 MCG/KG/MIN: 10 INJECTION INTRAVENOUS at 09:05

## 2025-05-21 RX ADMIN — Medication 50 MCG: at 09:05

## 2025-05-21 RX ADMIN — SODIUM CHLORIDE, POTASSIUM CHLORIDE, SODIUM LACTATE AND CALCIUM CHLORIDE: 600; 310; 30; 20 INJECTION, SOLUTION INTRAVENOUS at 12:05

## 2025-05-21 RX ADMIN — SODIUM CHLORIDE, SODIUM GLUCONATE, SODIUM ACETATE, POTASSIUM CHLORIDE, MAGNESIUM CHLORIDE, SODIUM PHOSPHATE, DIBASIC, AND POTASSIUM PHOSPHATE: .53; .5; .37; .037; .03; .012; .00082 INJECTION, SOLUTION INTRAVENOUS at 08:05

## 2025-05-21 RX ADMIN — ROCURONIUM BROMIDE 30 MG: 10 INJECTION, SOLUTION INTRAVENOUS at 09:05

## 2025-05-21 RX ADMIN — Medication: at 12:05

## 2025-05-21 RX ADMIN — PROPOFOL 180 MG: 10 INJECTION, EMULSION INTRAVENOUS at 08:05

## 2025-05-21 RX ADMIN — Medication 100 MCG: at 11:05

## 2025-05-21 RX ADMIN — ROCURONIUM BROMIDE 40 MG: 10 INJECTION, SOLUTION INTRAVENOUS at 08:05

## 2025-05-21 RX ADMIN — MIDAZOLAM HYDROCHLORIDE 2 MG: 1 INJECTION, SOLUTION INTRAMUSCULAR; INTRAVENOUS at 07:05

## 2025-05-21 RX ADMIN — Medication 20 MG: at 08:05

## 2025-05-21 RX ADMIN — PROPOFOL 20 MG: 10 INJECTION, EMULSION INTRAVENOUS at 10:05

## 2025-05-21 RX ADMIN — FENTANYL CITRATE 75 MCG: 50 INJECTION INTRAMUSCULAR; INTRAVENOUS at 08:05

## 2025-05-21 RX ADMIN — Medication 100 MCG: at 12:05

## 2025-05-21 RX ADMIN — Medication 50 MCG: at 08:05

## 2025-05-21 RX ADMIN — FENTANYL CITRATE 25 MCG: 50 INJECTION INTRAMUSCULAR; INTRAVENOUS at 07:05

## 2025-05-21 RX ADMIN — Medication 100 MCG: at 08:05

## 2025-05-21 RX ADMIN — DULOXETINE HYDROCHLORIDE 40 MG: 20 CAPSULE, DELAYED RELEASE ORAL at 01:05

## 2025-05-21 RX ADMIN — Medication 10 MG: at 10:05

## 2025-05-21 RX ADMIN — PANTOPRAZOLE SODIUM 40 MG: 40 TABLET, DELAYED RELEASE ORAL at 01:05

## 2025-05-21 RX ADMIN — Medication 10 MG: at 09:05

## 2025-05-21 RX ADMIN — ROCURONIUM BROMIDE 20 MG: 10 INJECTION, SOLUTION INTRAVENOUS at 10:05

## 2025-05-21 RX ADMIN — Medication 100 MCG: at 10:05

## 2025-05-21 RX ADMIN — BUPIVACAINE HYDROCHLORIDE 30 ML: 7.5 INJECTION, SOLUTION EPIDURAL; RETROBULBAR at 07:05

## 2025-05-21 RX ADMIN — SODIUM CHLORIDE, POTASSIUM CHLORIDE, SODIUM LACTATE AND CALCIUM CHLORIDE: 600; 310; 30; 20 INJECTION, SOLUTION INTRAVENOUS at 11:05

## 2025-05-21 RX ADMIN — INSULIN GLARGINE 23 UNITS: 100 INJECTION, SOLUTION SUBCUTANEOUS at 08:05

## 2025-05-21 RX ADMIN — ACETAMINOPHEN 1000 MG: 500 TABLET ORAL at 07:05

## 2025-05-21 RX ADMIN — PROPOFOL 40 MG: 10 INJECTION, EMULSION INTRAVENOUS at 10:05

## 2025-05-21 RX ADMIN — Medication 50 MCG: at 11:05

## 2025-05-21 RX ADMIN — LIDOCAINE HYDROCHLORIDE 90 MG: 20 INJECTION, SOLUTION EPIDURAL; INFILTRATION; INTRACAUDAL; PERINEURAL at 08:05

## 2025-05-21 RX ADMIN — INSULIN ASPART 6 UNITS: 100 INJECTION, SOLUTION INTRAVENOUS; SUBCUTANEOUS at 12:05

## 2025-05-21 RX ADMIN — FENTANYL CITRATE 25 MCG: 50 INJECTION INTRAMUSCULAR; INTRAVENOUS at 11:05

## 2025-05-21 RX ADMIN — ROCURONIUM BROMIDE 60 MG: 10 INJECTION, SOLUTION INTRAVENOUS at 08:05

## 2025-05-21 RX ADMIN — SODIUM CHLORIDE: 9 INJECTION, SOLUTION INTRAVENOUS at 07:05

## 2025-05-21 RX ADMIN — ROCURONIUM BROMIDE 20 MG: 10 INJECTION, SOLUTION INTRAVENOUS at 09:05

## 2025-05-21 RX ADMIN — GABAPENTIN 300 MG: 300 CAPSULE ORAL at 02:05

## 2025-05-21 RX ADMIN — LAMOTRIGINE 150 MG: 25 TABLET ORAL at 01:05

## 2025-05-21 NOTE — HPI
Reason for Consult: Management of T2DM, Hyperglycemia     Surgical Procedure and Date: Hepatic artery infusion pump placement     Diabetes diagnosis year:  Over 5 years ago    Home Diabetes Medications:  Toujeo 30u, metformin 1000 mg b.i.d.    How often checking glucose at home? 1-3 x day   BG readings on regimen:  200 to 300s  Hypoglycemia on the regimen?  No  Missed doses on regimen?  No    Diabetes Complications include:     Hyperglycemia    Complicating diabetes co morbidities:   Active Cancer      HPI:   Patient is a 59 y.o. female with type 2 DM with colon cancer metastatic to the liver. She underwent upfront right hemicolectomy and ablation of a known segment 8 liver metastasis. She received 7 cycles of FOLFOX with progression of disease in the liver.  Status post Hepatic artery infusion pump placement .  Endocrine consulted for BG management

## 2025-05-21 NOTE — ANESTHESIA PROCEDURE NOTES
Arterial    Diagnosis: colon adenocarcinoma    Patient location during procedure: done in OR  Timeout: 5/21/2025 8:22 AM  Procedure end time: 5/21/2025 8:24 AM    Staffing  Authorizing Provider: Seble Harvey MD  Performing Provider: Glenroy Pena DO    Staffing  Performed by: Glenroy Pena DO  Authorized by: Seble Harvey MD    Anesthesiologist was present at the time of the procedure.    Preanesthetic Checklist  Completed: patient identified, IV checked, site marked, risks and benefits discussed, surgical consent, monitors and equipment checked, pre-op evaluation, timeout performed and anesthesia consent givenArterial  Skin Prep: chlorhexidine gluconate  Orientation: left  Location: radial    Catheter Size: 20 G  Catheter placement by Ultrasound guidance. Heme positive aspiration all ports.   Vessel Caliber: medium, patent, compressibility normal  Needle advanced into vessel with real time Ultrasound guidance.  Guidewire confirmed in vessel.Insertion Attempts: 1  Assessment  Dressing: secured with tape and tegaderm  Patient: Tolerated well

## 2025-05-21 NOTE — ANESTHESIA POSTPROCEDURE EVALUATION
Anesthesia Post Evaluation    Patient: Nik Lowery    Procedure(s) Performed: Procedure(s) (LRB):  LAPAROTOMY, EXPLORATORY (N/A)  INSERTION, INFUSION PUMP, TOTALLY IMPLANTABLE; BOB PUMP (N/A)  CHOLECYSTECTOMY (N/A)    Final Anesthesia Type: general      Patient location during evaluation: PACU  Patient participation: Yes- Able to Participate  Level of consciousness: awake and alert and oriented  Post-procedure vital signs: reviewed and stable  Pain management: adequate  Airway patency: patent    PONV status at discharge: No PONV  Anesthetic complications: no      Cardiovascular status: hemodynamically stable  Respiratory status: unassisted and spontaneous ventilation  Hydration status: euvolemic  Follow-up not needed.              Vitals Value Taken Time   BP 97/58 05/21/25 12:47   Temp 36.7 °C (98.1 °F) 05/21/25 12:25   Pulse 67 05/21/25 12:59   Resp 13 05/21/25 12:59   SpO2 92 % 05/21/25 12:59   Vitals shown include unfiled device data.      No case tracking events are documented in the log.      Pain/Pablo Score: Pain Rating Prior to Med Admin: 0 (5/21/2025 12:45 PM)  Pablo Score: 8 (5/21/2025 12:45 PM)

## 2025-05-21 NOTE — OP NOTE
[facility] - [facility] ([facility])  Surgery Department  Operative Note       Date of Procedure: 5/21/2025     Procedure: Procedure(s) (LRB):  LAPAROTOMY, EXPLORATORY (N/A)  INSERTION, INFUSION PUMP, TOTALLY IMPLANTABLE; BOB PUMP (N/A)  CHOLECYSTECTOMY (N/A)     Surgeons and Role:     * Mike Henry MD - Primary     * Antoine Vuong MD - Resident - Assisting        Pre-Operative Diagnosis:  Colon cancer metastatic to liver  Unresectable colorectal liver metastases      Post-Operative Diagnosis:   Same  Same    Comorbidities:  Diabetes mellitus  HTN  BIJAN  Anemia    Anesthesia: General    Operative Findings:   No evidence of distant intraperitoneal metastases   There was at least one superficial metastasis in segment 3. The other tumors were difficult to visualize on ultrasound.     Procedures:  Exploratory laparotomy  Intraoperative liver ultrasound  Cholecystectomy  Hepatic artery infusion pump placement    Estimated Blood Loss (EBL):  25 mL           Indications:  Nik Lowery is a 59 year old woman with colon cancer metastatic to the liver. She underwent upfront right hemicolectomy and ablation of a known segment 8 liver metastasis. She received 7 cycles of FOLFOX with progression of disease in the liver. The disease is small in volume but miliary in nature making it unresectable. Her case was reviewed at tumor board, and hepatic artery infusion pump placement was recommended. Risks and benefits of hepatic artery infusion pump placement and cholecystectomy were reviewed including but not limited to: bleeding, infection, catheter malfunction or dislodgement, biliary sclerosis, bile leak, common bile duct injury, disease progression, hernia, cardiovascular and pulmonary complications, need for additional procedures, death, and imponderables. She understands and signed written informed consent to proceed.     Details:  The patient was transported to the operating room and satisfactory anesthesia established.  Preoperative antibiotics were administered. The patient was placed in supine position, and extremities were padded and protected throughout. The abdomen was prepped, and ioban was placed over it. An appropriate timeout was performed.     An upper midline incision was made. The falciform ligament was divided from the abdominal wall to the level of the hepatic veins. The abdomen was explored. There was no evidence of peritoneal metastases. The liver was fibrotic. There was a single visible 1 cm metastasis in segment 3. A liver ultrasound was performed. There was type 1 portal vein anatomy and typical hepatic vein anatomy. The liver metastases were difficult to visualize on ultrasound. This was not surprising as they were quite small of the preoperative imaging. The Tenorio retractor was placed.      The gastrohepatic ligament was opened. The right gastric artery was ligated with a tie and the Ligasure. There was an enlarged common hepatic artery lymph node that was dissected off the common hepatic artery and sent to pathology. The common hepatic artery was dissected out and traced to the GDA and proper hepatic artery. The GDA was circumferentially dissected, and small branches were ligated with ties and the Ligasure to the level of the pancreatic head. This dissection was continued along the proper hepatic artery. A vessel loop was placed around the GDA, and it was test clamped. There was no change in the proper hepatic artery pulse when it was clamped. Fibrillar soaked in papaverine was placed on the GDA. A limited Kocher maneuver was performed, mobilizing the duodenum from the retroperitoneum.      The gallbladder was removed in top down fashion. It was dissected off the cystic plate. The cystic duct and artery were identified, ligated with ties, and divided. The gallbladder was sent to pathology.      The retractor was removed. An 8 cm pump pocket on the left abdominal wall was marked. Skin flaps were raised with  2/3 inferiorly and 1/3 superiorly. The pump had been filled with high dose heparin on the back table. The flow rate of the pump was 1.3 mL/day. I test flushed it with low dose heparin, and it flushed easily. 2-0 Prolene sutures were placed in the anterior fascia in each corner of the pump pocket. The catheter was pulled through the abdominal wall posterior to the pump. The pump was secured in place with the prolene sutures.      The GDA was test clamped again, and there remained a strong pulse in the proper hepatic artery. A 2-0 silk tie was placed around the distal GDA to ligate it. A bulldog was placed on the proximal GDA. The catheter was beveled. A transverse arteriotomy was made in the GDA with an 11 blade. The catheter was placed in the GDA. Four silk ties were placed around the catheter - 3 behind the first ring and 1 behind the second ring. The bulldog was removed after the first tie. The catheter was test flushed after each tie was placed. 2 cc of methylene blue was injected into the pump. There was evidence of blue dye throughout the liver. There was a tiny area of blue in the superior first portion of the duodenum. There was a blue lymphatic extending to this area on the duodenum. This lymphatic and additional lymphatic tissue to the right of the bile duct was ligated with the Ligasure. An additional 2 cc of methylene blue was injected through the pump. There was no evidence of extrahepatic perfusion at this point. The abdomen was examined for hemostasis. The retractor was removed.      The fascia was closed with running #1 non-looped PDS and interrupted 0 Vicryl internal retention sutures. The pump pocket and midline incisions were closed with 3-0 Vicryl, 4-0 Monocryl, and dermabond. The pump was accessed once more once the pocket had been closed, and it flushed easily.      All needle, lap, and sponge counts were reported as correct. I communicated the intraoperative findings with her friend following  the procedure.     Implants:   Implant Name Type Inv. Item Serial No.  Lot No. LRB No. Used Action   Intera 3000 hepatic artery infusion pump   [id number]   N/A 1 Implanted       Specimens:   Specimen (24h ago, onward)       Start     Ordered    05/21/25 0956  Specimen to Pathology General Surgery  RELEASE UPON ORDERING        References:    Click here for ordering Quick Tip   Question:  Release to patient  Answer:  Immediate    05/21/25 0956                            Condition: Good    Disposition: PACU - hemodynamically stable.    Attestation: I was present and scrubbed for the entire procedure.    Mike Henry MD  Staff Surgeon  Surgical Oncology

## 2025-05-21 NOTE — ANESTHESIA PROCEDURE NOTES
Peripheral Block- BL SHELLEY SS    Patient location during procedure: pre-op   Block not for primary anesthetic.  Reason for block: at surgeon's request and post-op pain management   Post-op Pain Location: Abdominal pain   Start time: 5/21/2025 7:48 AM  Timeout: 5/21/2025 7:48 AM   End time: 5/21/2025 7:56 AM    Staffing  Authorizing Provider: Julia Wyatt MD  Performing Provider: Saeid Doshi MD    Staffing  Performed by: Saeid Doshi MD  Authorized by: Julia Wyatt MD    Preanesthetic Checklist  Completed: patient identified, IV checked, site marked, risks and benefits discussed, surgical consent, monitors and equipment checked, pre-op evaluation and timeout performed  Peripheral Block  Patient position: sitting  Prep: ChloraPrep  Patient monitoring: heart rate, cardiac monitor, continuous pulse ox, continuous capnometry and frequent blood pressure checks  Block type: erector spinae plane  Laterality: bilateral  Injection technique: single shot  Interspace: T8-9    Needle  Needle type: Echogenic   Needle gauge: 20 G  Needle length: 4 in  Needle localization: anatomical landmarks and ultrasound guidance   -ultrasound image captured on disc.  Assessment  Injection assessment: negative aspiration, negative parasthesia and local visualized surrounding nerve  Paresthesia pain: none  Heart rate change: no  Slow fractionated injection: yes  Pain Tolerance: comfortable throughout block and no complaints  Medications:    Medications: bupivacaine (pf) (MARCAINE) injection 0.75% - Perineural   30 mL - 5/21/2025 7:54:00 AM    Additional Notes  Patient tolerated well.  See intra-procedure record for vitals. 30 mL 0.375% bupivacaine injected bilaterally.

## 2025-05-21 NOTE — CONSULTS
[facility] - Surgery ([facility])  Endocrinology  Diabetes Consult Note    Consult Requested by: Mike Henry MD   Reason for admit: <principal problem not specified>    HISTORY OF PRESENT ILLNESS:  Reason for Consult: Management of T2DM, Hyperglycemia     Surgical Procedure and Date: Hepatic artery infusion pump placement     Diabetes diagnosis year:  Over 5 years ago    Home Diabetes Medications:  Toujeo 30u, metformin 1000 mg b.i.d.    How often checking glucose at home? 1-3 x day   BG readings on regimen:  200 to 300s  Hypoglycemia on the regimen?  No  Missed doses on regimen?  No    Diabetes Complications include:     Hyperglycemia    Complicating diabetes co morbidities:   Active Cancer      HPI:   Patient is a 59 y.o. female with type 2 DM with colon cancer metastatic to the liver. She underwent upfront right hemicolectomy and ablation of a known segment 8 liver metastasis. She received 7 cycles of FOLFOX with progression of disease in the liver.  Status post Hepatic artery infusion pump placement .  Endocrine consulted for BG management       Interval HPI:   No acute events overnight. Patient in room [address] [facility] Blood glucose stable. BG at and above goal on current insulin regimen (SSI ). Steroid use- None .   * Day of Surgery *  Renal function- Normal   Vasopressors-  None     Diet Clear Liquid (No Sugar)     Eating:   Sugar free clear liquids  Nausea: No  Hypoglycemia and intervention: No  Fever: No  TPN and/or TF: No     PMH, PSH, FH, SH updated and reviewed     ROS:       Review of Systems   Constitutional:  Negative for chills.   Respiratory:  Negative for cough and shortness of breath.        Current Medications and/or Treatments Impacting Glycemic Control  Immunotherapy:    Immunosuppressants       None          Steroids:   Hormones (From admission, onward)      Start     Stop Route Frequency Ordered    05/21/25 1314  melatonin tablet 6 mg         -- Oral Nightly PRN 05/21/25 1216       "    Pressors:    Autonomic Drugs (From admission, onward)      None          Hyperglycemia/Diabetes Medications:   Antihyperglycemics (From admission, onward)      Start     Stop Route Frequency Ordered    05/21/25 1313  insulin aspart U-100 pen 0-10 Units         -- SubQ Before meals & nightly PRN 05/21/25 1217             PHYSICAL EXAMINATION:  Vitals:    05/21/25 1259   BP:    Pulse: 68   Resp: 14   Temp:      Body mass index is 33.53 kg/m².     Physical Exam  Constitutional:       General: She is not in acute distress.  Pulmonary:      Effort: No respiratory distress.   Psychiatric:         Mood and Affect: Mood normal.         Behavior: Behavior normal.            Labs Reviewed and Include   No results for input(s): "GLU", "CALCIUM", "ALBUMIN", "PROT", "NA", "K", "CO2", "CL", "BUN", "CREATININE", "ALKPHOS", "ALT", "AST", "BILITOT" in the last 24 hours.  Lab Results   Component Value Date    WBC 7.48 05/21/2025    HGB 10.3 (L) 05/21/2025    HCT 32.5 (L) 05/21/2025    MCV 97 05/21/2025     05/21/2025     No results for input(s): "TSH", "FREET4" in the last 168 hours.  Lab Results   Component Value Date    HGBA1C 6.5 (H) 11/07/2024       Nutritional status:   Body mass index is 33.53 kg/m².  Lab Results   Component Value Date    ALBUMIN 3.3 (L) 05/12/2025    ALBUMIN 4.3 05/07/2025    ALBUMIN 3.5 04/28/2025     No results found for: "PREALBUMIN"    CrCl cannot be calculated (Patient's most recent lab result is older than the maximum 7 days allowed.).    Accu-Checks  Recent Labs     05/21/25  0728   POCTGLUCOSE 218*        ASSESSMENT and PLAN    Endocrine  Obesity  Body mass index is 33.53 kg/m². Morbid obesity complicates all aspects of disease management from diagnostic modalities to treatment.     Uncontrolled type 2 diabetes mellitus with hyperglycemia, without long-term current use of insulin  BG goal: 140-180  T2DM.  Colon cancer metastatic to liver.  Hepatic artery infusion pump placement.  On sugar " free clear liquids.      - start Lantus 23 units nightly.  - -Continue Novolog Moderate dose correction with ISF 25 starting at 150    - POCT Glucose before meals, at bedtime and at 2 am  - Hypoglycemia protocol in place      ** Please notify Endocrine for any change and/or advance in diet**  ** Please call Endocrine for any BG related issues **     Discharge Planning:   TBD. Please notify endocrinology prior to discharge.            Plan discussed with patient, family, and RN at bedside.     Oseas Rachel PA-C  Endocrinology  [facility] - Surgery (2nd Fl)

## 2025-05-21 NOTE — SUBJECTIVE & OBJECTIVE
Interval HPI:   No acute events overnight. Patient in room [facility] 2ND FLR Periop [facility]*. Blood glucose stable. BG at and above goal on current insulin regimen (SSI ). Steroid use- None .   * Day of Surgery *  Renal function- Normal   Vasopressors-  None     Diet Clear Liquid (No Sugar)     Eating:   Sugar free clear liquids  Nausea: No  Hypoglycemia and intervention: No  Fever: No  TPN and/or TF: No     PMH, PSH, FH, SH updated and reviewed     ROS:       Review of Systems   Constitutional:  Negative for chills.   Respiratory:  Negative for cough and shortness of breath.        Current Medications and/or Treatments Impacting Glycemic Control  Immunotherapy:    Immunosuppressants       None          Steroids:   Hormones (From admission, onward)      Start     Stop Route Frequency Ordered    05/21/25 1314  melatonin tablet 6 mg         -- Oral Nightly PRN 05/21/25 1216          Pressors:    Autonomic Drugs (From admission, onward)      None          Hyperglycemia/Diabetes Medications:   Antihyperglycemics (From admission, onward)      Start     Stop Route Frequency Ordered    05/21/25 1313  insulin aspart U-100 pen 0-10 Units         -- SubQ Before meals & nightly PRN 05/21/25 1217             PHYSICAL EXAMINATION:  Vitals:    05/21/25 1259   BP:    Pulse: 68   Resp: 14   Temp:      Body mass index is 33.53 kg/m².     Physical Exam  Constitutional:       General: She is not in acute distress.  Pulmonary:      Effort: No respiratory distress.   Psychiatric:         Mood and Affect: Mood normal.         Behavior: Behavior normal.

## 2025-05-21 NOTE — TRANSFER OF CARE
"Anesthesia Transfer of Care Note    Patient: Nik Lowery    Procedure(s) Performed: Procedure(s) (LRB):  LAPAROTOMY, EXPLORATORY (N/A)  INSERTION, INFUSION PUMP, TOTALLY IMPLANTABLE; BOB PUMP (N/A)  CHOLECYSTECTOMY (N/A)    Patient location: PACU    Anesthesia Type: general and regional    Transport from OR: Transported from OR on 6-10 L/min O2 by face mask with adequate spontaneous ventilation    Post pain: adequate analgesia    Post assessment: no apparent anesthetic complications and tolerated procedure well    Post vital signs: stable    Level of consciousness: awake, alert and oriented    Nausea/Vomiting: no nausea/vomiting    Complications: none    Transfer of care protocol was followed      Last vitals: Visit Vitals  BP (!) 163/99 (BP Location: Left arm, Patient Position: Lying)   Pulse 80   Temp 36.2 °C (97.2 °F) (Temporal)   Resp 18   Ht 5' 5" (1.651 m)   Wt 91.4 kg (201 lb 8 oz)   SpO2 98%   Breastfeeding No   BMI 33.53 kg/m²     "

## 2025-05-21 NOTE — PROGRESS NOTES
Pt arrived to [facility] s/p ex-lap with cholecystectomy and BOB pump insertion. Pt oriented to room and staff. Pt is aaox4. Vitals obtained and reviewed. On 2L NC. Afebrile. Pt with midline and horizontal L abdominal incision both with dermabond and C/D/I. Dunlap to gravity with blue urine ouput. SCDs/TEDs in place and functioning. Pt has PCA pump for current pain control, reports good pain control at this time. Pain management reviewed with pt and she verbalized understanding. LR infusing per orders. Dr. García notified of pt's arrival to unit. Pt with CLD in place, broth and sugar free jello provided. Fall risk reviewed with pt, she verbalized understanding to have staff present when she is ready to get OOB. Bed locked/lowest position, nonskid socks on, call bell left within reach. Pt verbalized understanding to call for needs/assistance. Hand hygiene practiced per protocol.

## 2025-05-21 NOTE — NURSING TRANSFER
Nursing Transfer Note      5/21/2025   6:30 PM    Nurse giving handoff:monserrat Weaver  Nurse receiving handoff:TSU,rn    Reason patient is being transferred: admit    Transfer To: [id number]    Transfer via stretcher    Transfer with     Transported by techs    Telemetry:   Order for Tele Monitor? No    Additional Lines:     Medicines sent: na    Any special needs or follow-up needed: na    Patient belongings transferred with patient: Yes    Chart send with patient: Yes    Notified: friend

## 2025-05-21 NOTE — ANESTHESIA PROCEDURE NOTES
Intubation    Date/Time: 5/21/2025 8:22 AM    Performed by: Glenroy Pena DO  Authorized by: Seble Harvey MD    Intubation:     Induction:  Intravenous    Intubated:  Postinduction    Mask Ventilation:  2 handed mask ventilation with 2 providers    Attempts:  1    Attempted By:  Student and staff anesthesiologist    Method of Intubation:  Video laryngoscopy    Blade:  Fuentes 3    Laryngeal View Grade: Grade I - full view of cords      Difficult Airway Encountered?: No      Complications:  None    Airway Device:  Oral endotracheal tube    Airway Device Size:  7.0    Style/Cuff Inflation:  Cuffed (inflated to minimal occlusive pressure)    Tube secured:  21    Secured at:  The teeth    Placement Verified By:  Capnometry and Revisualization with laryngoscopy    Complicating Factors:  Obesity, short neck and small mouth    Findings Post-Intubation:  BS equal bilateral and atraumatic/condition of teeth unchanged

## 2025-05-22 LAB
ABSOLUTE EOSINOPHIL (OHS): 0.17 K/UL
ABSOLUTE MONOCYTE (OHS): 0.84 K/UL (ref 0.3–1)
ABSOLUTE NEUTROPHIL COUNT (OHS): 5.72 K/UL (ref 1.8–7.7)
ALBUMIN SERPL BCP-MCNC: 2.7 G/DL (ref 3.5–5.2)
ALP SERPL-CCNC: 74 UNIT/L (ref 40–150)
ALT SERPL W/O P-5'-P-CCNC: 28 UNIT/L (ref 10–44)
ANION GAP (OHS): 9 MMOL/L (ref 8–16)
AST SERPL-CCNC: 42 UNIT/L (ref 11–45)
BASOPHILS # BLD AUTO: 0.04 K/UL
BASOPHILS NFR BLD AUTO: 0.5 %
BILIRUB SERPL-MCNC: 0.4 MG/DL (ref 0.1–1)
BUN SERPL-MCNC: 15 MG/DL (ref 6–20)
CALCIUM SERPL-MCNC: 8.3 MG/DL (ref 8.7–10.5)
CHLORIDE SERPL-SCNC: 108 MMOL/L (ref 95–110)
CO2 SERPL-SCNC: 20 MMOL/L (ref 23–29)
CREAT SERPL-MCNC: 0.7 MG/DL (ref 0.5–1.4)
ERYTHROCYTE [DISTWIDTH] IN BLOOD BY AUTOMATED COUNT: 18.4 % (ref 11.5–14.5)
ESTROGEN SERPL-MCNC: NORMAL PG/ML
GFR SERPLBLD CREATININE-BSD FMLA CKD-EPI: >60 ML/MIN/1.73/M2
GLUCOSE SERPL-MCNC: 53 MG/DL (ref 70–110)
GLUCOSE SERPL-MCNC: 98 MG/DL (ref 70–110)
HCO3 UR-SCNC: 4.4 MMOL/L (ref 24–28)
HCT VFR BLD AUTO: 36.1 % (ref 37–48.5)
HCT VFR BLD CALC: <15 %PCV (ref 36–54)
HGB BLD-MCNC: 11.2 GM/DL (ref 12–16)
IMM GRANULOCYTES # BLD AUTO: 0.05 K/UL (ref 0–0.04)
IMM GRANULOCYTES NFR BLD AUTO: 0.6 % (ref 0–0.5)
INSULIN SERPL-ACNC: NORMAL U[IU]/ML
LAB AP CLINICAL INFORMATION: NORMAL
LAB AP DIAGNOSIS CATEGORY: NORMAL
LAB AP GROSS DESCRIPTION: NORMAL
LAB AP PERFORMING LOCATION(S): NORMAL
LAB AP REPORT FOOTNOTES: NORMAL
LYMPHOCYTES # BLD AUTO: 1.29 K/UL (ref 1–4.8)
MAGNESIUM SERPL-MCNC: 2 MG/DL (ref 1.6–2.6)
MCH RBC QN AUTO: 30.9 PG (ref 27–31)
MCHC RBC AUTO-ENTMCNC: 31 G/DL (ref 32–36)
MCV RBC AUTO: 100 FL (ref 82–98)
NUCLEATED RBC (/100WBC) (OHS): 0 /100 WBC
PCO2 BLDA: 9.1 MMHG (ref 35–45)
PH SMN: 7.3 [PH] (ref 7.35–7.45)
PHOSPHATE SERPL-MCNC: 4.6 MG/DL (ref 2.7–4.5)
PLATELET # BLD AUTO: 150 K/UL (ref 150–450)
PMV BLD AUTO: 9.6 FL (ref 9.2–12.9)
PO2 BLDA: 153 MMHG (ref 80–100)
POC BE: -22 MMOL/L (ref -2–2)
POC IONIZED CALCIUM: 0.4 MMOL/L (ref 1.06–1.42)
POC SATURATED O2: 99 % (ref 95–100)
POC TCO2: <5 MMOL/L (ref 23–27)
POCT GLUCOSE: 114 MG/DL (ref 70–110)
POCT GLUCOSE: 121 MG/DL (ref 70–110)
POCT GLUCOSE: 87 MG/DL (ref 70–110)
POCT GLUCOSE: 97 MG/DL (ref 70–110)
POCT GLUCOSE: 98 MG/DL (ref 70–110)
POTASSIUM BLD-SCNC: <2 MMOL/L (ref 3.5–5.1)
POTASSIUM SERPL-SCNC: 4 MMOL/L (ref 3.5–5.1)
PROT SERPL-MCNC: 5.6 GM/DL (ref 6–8.4)
RBC # BLD AUTO: 3.62 M/UL (ref 4–5.4)
RELATIVE EOSINOPHIL (OHS): 2.1 %
RELATIVE LYMPHOCYTE (OHS): 15.9 % (ref 18–48)
RELATIVE MONOCYTE (OHS): 10.4 % (ref 4–15)
RELATIVE NEUTROPHIL (OHS): 70.5 % (ref 38–73)
SAMPLE: ABNORMAL
SODIUM BLD-SCNC: 157 MMOL/L (ref 136–145)
SODIUM SERPL-SCNC: 137 MMOL/L (ref 136–145)
WBC # BLD AUTO: 8.11 K/UL (ref 3.9–12.7)

## 2025-05-22 PROCEDURE — 25000003 PHARM REV CODE 250: Performed by: STUDENT IN AN ORGANIZED HEALTH CARE EDUCATION/TRAINING PROGRAM

## 2025-05-22 PROCEDURE — 20600001 HC STEP DOWN PRIVATE ROOM

## 2025-05-22 PROCEDURE — 25000003 PHARM REV CODE 250: Performed by: SURGERY

## 2025-05-22 PROCEDURE — 84100 ASSAY OF PHOSPHORUS: CPT | Performed by: STUDENT IN AN ORGANIZED HEALTH CARE EDUCATION/TRAINING PROGRAM

## 2025-05-22 PROCEDURE — 94640 AIRWAY INHALATION TREATMENT: CPT

## 2025-05-22 PROCEDURE — 99232 SBSQ HOSP IP/OBS MODERATE 35: CPT | Mod: ,,, | Performed by: NURSE PRACTITIONER

## 2025-05-22 PROCEDURE — 25000242 PHARM REV CODE 250 ALT 637 W/ HCPCS: Performed by: NURSE PRACTITIONER

## 2025-05-22 PROCEDURE — 99900035 HC TECH TIME PER 15 MIN (STAT)

## 2025-05-22 PROCEDURE — 97162 PT EVAL MOD COMPLEX 30 MIN: CPT

## 2025-05-22 PROCEDURE — 94761 N-INVAS EAR/PLS OXIMETRY MLT: CPT

## 2025-05-22 PROCEDURE — 27000221 HC OXYGEN, UP TO 24 HOURS

## 2025-05-22 PROCEDURE — 85025 COMPLETE CBC W/AUTO DIFF WBC: CPT | Performed by: STUDENT IN AN ORGANIZED HEALTH CARE EDUCATION/TRAINING PROGRAM

## 2025-05-22 PROCEDURE — 83735 ASSAY OF MAGNESIUM: CPT | Performed by: STUDENT IN AN ORGANIZED HEALTH CARE EDUCATION/TRAINING PROGRAM

## 2025-05-22 PROCEDURE — 63600175 PHARM REV CODE 636 W HCPCS: Performed by: STUDENT IN AN ORGANIZED HEALTH CARE EDUCATION/TRAINING PROGRAM

## 2025-05-22 PROCEDURE — 27000646 HC AEROBIKA DEVICE

## 2025-05-22 PROCEDURE — 97535 SELF CARE MNGMENT TRAINING: CPT

## 2025-05-22 PROCEDURE — 36415 COLL VENOUS BLD VENIPUNCTURE: CPT | Performed by: STUDENT IN AN ORGANIZED HEALTH CARE EDUCATION/TRAINING PROGRAM

## 2025-05-22 PROCEDURE — 94664 DEMO&/EVAL PT USE INHALER: CPT

## 2025-05-22 PROCEDURE — 82040 ASSAY OF SERUM ALBUMIN: CPT | Performed by: STUDENT IN AN ORGANIZED HEALTH CARE EDUCATION/TRAINING PROGRAM

## 2025-05-22 PROCEDURE — 97116 GAIT TRAINING THERAPY: CPT

## 2025-05-22 PROCEDURE — 97165 OT EVAL LOW COMPLEX 30 MIN: CPT

## 2025-05-22 RX ORDER — INSULIN GLARGINE 100 [IU]/ML
18 INJECTION, SOLUTION SUBCUTANEOUS NIGHTLY
Status: DISCONTINUED | OUTPATIENT
Start: 2025-05-22 | End: 2025-05-24 | Stop reason: HOSPADM

## 2025-05-22 RX ORDER — CELECOXIB 100 MG/1
200 CAPSULE ORAL 2 TIMES DAILY
Status: DISCONTINUED | OUTPATIENT
Start: 2025-05-22 | End: 2025-05-24 | Stop reason: HOSPADM

## 2025-05-22 RX ORDER — OXYCODONE HYDROCHLORIDE 10 MG/1
10 TABLET ORAL EVERY 6 HOURS PRN
Refills: 0 | Status: DISCONTINUED | OUTPATIENT
Start: 2025-05-22 | End: 2025-05-24 | Stop reason: HOSPADM

## 2025-05-22 RX ORDER — GABAPENTIN 400 MG/1
800 CAPSULE ORAL 3 TIMES DAILY
Status: DISCONTINUED | OUTPATIENT
Start: 2025-05-22 | End: 2025-05-24 | Stop reason: HOSPADM

## 2025-05-22 RX ORDER — OXYCODONE HYDROCHLORIDE 5 MG/1
5 TABLET ORAL EVERY 6 HOURS PRN
Refills: 0 | Status: DISCONTINUED | OUTPATIENT
Start: 2025-05-22 | End: 2025-05-22

## 2025-05-22 RX ORDER — OXYCODONE HYDROCHLORIDE 5 MG/1
5 TABLET ORAL EVERY 6 HOURS PRN
Refills: 0 | Status: DISCONTINUED | OUTPATIENT
Start: 2025-05-22 | End: 2025-05-24 | Stop reason: HOSPADM

## 2025-05-22 RX ORDER — ACETAMINOPHEN 325 MG/1
650 TABLET ORAL EVERY 6 HOURS
Status: DISCONTINUED | OUTPATIENT
Start: 2025-05-22 | End: 2025-05-24 | Stop reason: HOSPADM

## 2025-05-22 RX ADMIN — ACETAMINOPHEN 650 MG: 325 TABLET ORAL at 12:05

## 2025-05-22 RX ADMIN — ENOXAPARIN SODIUM 40 MG: 40 INJECTION SUBCUTANEOUS at 05:05

## 2025-05-22 RX ADMIN — ATORVASTATIN CALCIUM 20 MG: 20 TABLET, FILM COATED ORAL at 08:05

## 2025-05-22 RX ADMIN — OXYCODONE HYDROCHLORIDE 10 MG: 10 TABLET ORAL at 08:05

## 2025-05-22 RX ADMIN — DULOXETINE HYDROCHLORIDE 40 MG: 20 CAPSULE, DELAYED RELEASE ORAL at 08:05

## 2025-05-22 RX ADMIN — ACETAMINOPHEN 650 MG: 325 TABLET ORAL at 05:05

## 2025-05-22 RX ADMIN — LAMOTRIGINE 150 MG: 25 TABLET ORAL at 08:05

## 2025-05-22 RX ADMIN — SERTRALINE HYDROCHLORIDE 100 MG: 50 TABLET ORAL at 08:05

## 2025-05-22 RX ADMIN — OXYCODONE HYDROCHLORIDE 10 MG: 10 TABLET ORAL at 09:05

## 2025-05-22 RX ADMIN — ACETAMINOPHEN 650 MG: 325 TABLET ORAL at 11:05

## 2025-05-22 RX ADMIN — LEVALBUTEROL HYDROCHLORIDE 0.63 MG: 0.63 SOLUTION RESPIRATORY (INHALATION) at 08:05

## 2025-05-22 RX ADMIN — GABAPENTIN 800 MG: 400 CAPSULE ORAL at 08:05

## 2025-05-22 RX ADMIN — OXYCODONE HYDROCHLORIDE 10 MG: 10 TABLET ORAL at 02:05

## 2025-05-22 RX ADMIN — INSULIN GLARGINE 18 UNITS: 100 INJECTION, SOLUTION SUBCUTANEOUS at 09:05

## 2025-05-22 RX ADMIN — GABAPENTIN 800 MG: 400 CAPSULE ORAL at 02:05

## 2025-05-22 RX ADMIN — LEVALBUTEROL HYDROCHLORIDE 0.63 MG: 0.63 SOLUTION RESPIRATORY (INHALATION) at 12:05

## 2025-05-22 RX ADMIN — ONDANSETRON 8 MG: 8 TABLET, ORALLY DISINTEGRATING ORAL at 11:05

## 2025-05-22 RX ADMIN — ACETAMINOPHEN 1000 MG: 10 INJECTION, SOLUTION INTRAVENOUS at 05:05

## 2025-05-22 RX ADMIN — CELECOXIB 200 MG: 100 CAPSULE ORAL at 08:05

## 2025-05-22 RX ADMIN — PANTOPRAZOLE SODIUM 40 MG: 40 TABLET, DELAYED RELEASE ORAL at 08:05

## 2025-05-22 RX ADMIN — ACETAMINOPHEN 650 MG: 325 TABLET ORAL at 08:05

## 2025-05-22 NOTE — PLAN OF CARE
Problem: Occupational Therapy  Goal: Occupational Therapy Goal  Description: Goals to be met by: 6/21/25     Patient will increase functional independence with ADLs by performing:    Education on energy conservation and work simplification.  Education on assistive devices for LB dsg tasks    Outcome: Progressing

## 2025-05-22 NOTE — PROGRESS NOTES
[facility] - Transplant Stepdown  General Surgery  Progress Note    Subjective:     History of Present Illness:  Ms. Lowery is a 59 y.o. female with synchronous colorectal liver metastases. She underwent upfront right hemicolectomy and ablation of a liver metastasis followed by 7 cycles of FOLFOX. We reviewed her imaging at [facility], and there was clear progression in the liver with no evidence of extrahepatic disease. She presents for a preoperative visit prior to planned hepatic artery infusion pump placement. She has met with Dr. Stanford to discuss BOB therapy.   Ms. Lowery is a 60 y/o female here for a BOB pump placement and cholecystectomy on 5/21/2025.     Post-Op Info:  Procedure(s) (LRB):  LAPAROTOMY, EXPLORATORY (N/A)  INSERTION, INFUSION PUMP, TOTALLY IMPLANTABLE; BOB PUMP (N/A)  CHOLECYSTECTOMY (N/A)   1 Day Post-Op     Interval History: VSS. AF. Pain controlled with PO pain medications. Kaitlin CL diet. No c/o nausea, vomiting, chest pain or shortness of breath. + ambulation in room this am.  Dunlap removed at approximately 9am and has not voided yet. - flatus. + belching.     Medications:  Continuous Infusions:  Scheduled Meds:   acetaminophen  650 mg Oral Q6H    atorvastatin  20 mg Oral Daily    celecoxib  200 mg Oral BID    DULoxetine  40 mg Oral Daily    enoxparin  40 mg Subcutaneous Q24H (prophylaxis, 1700)    gabapentin  800 mg Oral TID    insulin glargine U-100  18 Units Subcutaneous QHS    lamoTRIgine  150 mg Oral Daily    levalbuterol  0.63 mg Nebulization Q6H WAKE    pantoprazole  40 mg Oral Daily    sertraline  100 mg Oral Daily     PRN Meds:  Current Facility-Administered Medications:     acetaminophen, 650 mg, Oral, Q8H PRN    dextrose 50%, 12.5 g, Intravenous, PRN    dextrose 50%, 25 g, Intravenous, PRN    glucagon (human recombinant), 1 mg, Intramuscular, PRN    glucose, 16 g, Oral, PRN    glucose, 24 g, Oral, PRN    insulin aspart U-100, 0-10 Units, Subcutaneous, AC + HS + 0200 PRN     LIDOcaine (PF) 10 mg/ml (1%), 1 mL, Intradermal, Once PRN    LIDOcaine-prilocaine, , Topical (Top), PRN    melatonin, 6 mg, Oral, Nightly PRN    ondansetron, 8 mg, Oral, Q8H PRN    oxyCODONE, 5 mg, Oral, Q6H PRN    oxyCODONE, 10 mg, Oral, Q6H PRN    sodium chloride 0.9%, 10 mL, Intravenous, PRN     Review of patient's allergies indicates:   Allergen Reactions    Robaxin [methocarbamol] Nausea Only     Objective:     Vital Signs (Most Recent):  Temp: 98.1 °F (36.7 °C) (05/22/25 1132)  Pulse: 75 (05/22/25 1213)  Resp: 18 (05/22/25 1213)  BP: (!) 152/80 (05/22/25 1132)  SpO2: 98 % (05/22/25 1213) Vital Signs (24h Range):  Temp:  [97.8 °F (36.6 °C)-98.9 °F (37.2 °C)] 98.1 °F (36.7 °C)  Pulse:  [68-97] 75  Resp:  [8-21] 18  SpO2:  [95 %-99 %] 98 %  BP: (107-157)/(65-80) 152/80     Weight: 92.9 kg (204 lb 12.9 oz)  Body mass index is 34.08 kg/m².    Intake/Output - Last 3 Shifts         05/20 0700  05/21 0659 05/21 0700 05/22 0659 05/22 0700  05/23 0659    P.O.  160     I.V. (mL/kg)  1992.5 (21.4)     IV Piggyback  1078.7     Total Intake(mL/kg)  3231.2 (34.8)     Urine (mL/kg/hr)  660 (0.3)     Total Output  660     Net  +2571.2                     Physical Exam  Vitals and nursing note reviewed.   Constitutional:       Appearance: She is obese.   HENT:      Mouth/Throat:      Mouth: Mucous membranes are moist.   Eyes:      Pupils: Pupils are equal, round, and reactive to light.   Cardiovascular:      Rate and Rhythm: Normal rate and regular rhythm.   Pulmonary:      Effort: Pulmonary effort is normal.      Breath sounds: Normal breath sounds.   Abdominal:      Palpations: Abdomen is soft.      Tenderness: There is abdominal tenderness.      Comments: Abdominal incisions c/d/I with derma bond   Musculoskeletal:         General: Normal range of motion.      Cervical back: Normal range of motion and neck supple.   Skin:     General: Skin is warm and dry.   Neurological:      Mental Status: She is alert and oriented to  person, place, and time.   Psychiatric:         Mood and Affect: Mood normal.          Significant Labs:  I have reviewed all pertinent lab results within the past 24 hours.  CBC:   Recent Labs   Lab 05/22/25  0552   WBC 8.11   RBC 3.62*   HGB 11.2*   HCT 36.1*      *   MCH 30.9   MCHC 31.0*     CMP:   Recent Labs   Lab 05/22/25  0552   GLU 98   CALCIUM 8.3*   ALBUMIN 2.7*   PROT 5.6*      K 4.0   CO2 20*      BUN 15   CREATININE 0.7   ALKPHOS 74   ALT 28   AST 42   BILITOT 0.4       Significant Diagnostics:  I have reviewed all pertinent imaging results/findings within the past 24 hours.  Assessment/Plan:     * Metastasis to liver  Ms. Lowery is a 60 y/o female s/p Exploratory laparotomy, Hepatic artery infusion pump placement and Cholecystectomy on 5/21/2025.    CL sugar free diet  DVT/GI prophylaxis  PT/OT, OOB chair, ambulate TID   Endocrine recommendations for Hx of DM2. Appreciate recommendations.  Oxycodone, MM for pain control  Resp tx, IS, acapella  D/c rutledge  Home medications    Dispo: continue inpatient care        Hyperlipidemia associated with type 2 diabetes mellitus  Continue home medications.     Obesity  Body mass index is 34.08 kg/m². Morbid obesity complicates all aspects of disease management from diagnostic modalities to treatment. Weight loss encouraged and health benefits explained to patient.      Gastroesophageal reflux disease without esophagitis  Continue home medications.     Anxiety and depression  Continue home medications.     Hypertension associated with diabetes  Continue home medications.     Uncontrolled type 2 diabetes mellitus with hyperglycemia, without long-term current use of insulin  - SSI  - Endocrine recommendations        Mary James NP  General Surgery  [facility] - Transplant Stepdown

## 2025-05-22 NOTE — PT/OT/SLP EVAL
"Physical Therapy   Co-Evaluation    Patient Name:  Nik Lowery   MRN:  [id number]    Recommendations:     Discharge Recommendations: No Therapy Indicated   Discharge Equipment Recommendations: walker, rolling   Barriers to discharge: None    Assessment:     Nik Lowery is a 59 y.o. female admitted with a medical diagnosis of Metastasis to liver.  She presents with the following impairments/functional limitations: weakness, impaired endurance, impaired functional mobility, gait instability, pain Patient pleasantly agreeable to therapy eval, demonstrates ability to mobilize effectively. Improved stability with ambulation with introduction of RW. Patient will continue to benefit from skilled PT during this admit to address BLE strength and endurance deficits, and maximize independence with functional mobility.    Rehab Prognosis: Good; patient would benefit from acute skilled PT services to address these deficits and reach maximum level of function.    Recent Surgery: Procedure(s) (LRB):  LAPAROTOMY, EXPLORATORY (N/A)  INSERTION, INFUSION PUMP, TOTALLY IMPLANTABLE; BOB PUMP (N/A)  CHOLECYSTECTOMY (N/A) 1 Day Post-Op    Plan:     During this hospitalization, patient to be seen 3 x/week to address the identified rehab impairments via gait training, therapeutic activities, therapeutic exercises, neuromuscular re-education and progress toward the following goals:    Plan of Care Expires:  06/21/25    Subjective     Chief Complaint: "just a little pain in my belly"  Patient/Family Comments/goals: return home to [facility]  Pain/Comfort:  Pain Rating 1: 6/10  Location 1: abdomen  Pain Addressed 1: Distraction, Cessation of Activity  Pain Rating Post-Intervention 1: 6/10    Patients cultural, spiritual, Voodoo conflicts given the current situation: no    Living Environment:  Patient lives alone in a [facility] with 0 SEMAJ.  Bathroom contains walk-in shower.  Prior to admission, patients level of function was independence.  Equipment " used at home: none.  DME owned (not currently used): none.  Upon discharge, patient will have assistance from family/friends, as needed.    Objective:     Communicated with RN prior to session.  Patient found HOB elevated with oxygen, pulse ox (continuous)  upon PT entry to room.    General Precautions: Standard, fall  Orthopedic Precautions:N/A   Braces: N/A  Respiratory Status: Room air    Exams:  Cognitive Exam:  Patient is oriented to Person, Place, Time, and Situation  Gross Motor Coordination:  WFL  Postural Exam:  Patient presented with the following abnormalities:    -       Rounded shoulders  RLE ROM: WFL  RLE Strength: WFL  LLE ROM: WFL  LLE Strength: WFL    Functional Mobility:  Bed Mobility:     Scooting: stand by assistance  Supine to Sit: stand by assistance  Transfers:     Sit to Stand:  stand by assistance with no AD  Gait: ~12 ft with no AD and CGA, 60 ft with RW and SBA  Improved stability noted with introduction of RW, decreased graeme, removed oxygen with RN approval - no SOB and O2 saturation >93%  Balance:   Sitting: good  Standing: SBA with BUE support on RW      AM-PAC 6 CLICK MOBILITY  Total Score:24       Treatment & Education:  Patient educated on calling for assistance for any needs to improve overall safety awareness.  Patient educated on importance of OOB activity to promote overall endurance.   Patient required co-evaluation with OT for highest quality care d/t decreased activity tolerance and increased level of assistance necessary.  All items placed within reach, and notified on call light usage for assistance with any needs for fall prevention.  Patient educated on role of PT in acute care, PT POC, and PT goals.    Patient left up in chair with all lines intact, call button in reach, and nursing notified.    GOALS:   Multidisciplinary Problems       Physical Therapy Goals          Problem: Physical Therapy    Goal Priority Disciplines Outcome Interventions   Physical Therapy Goal      PT, PT/OT Progressing    Description: Goals to be met by: 25     Patient will increase functional independence with mobility by performin. Supine to sit with Modified [place]  2. Sit to supine with Modified [place]  3. Sit to stand transfer with Modified [place]  4. Bed to chair transfer with Modified [place] using LRAD  5. Gait  x 250 feet with Modified [place] using LRAD.   6. Ascend/Descend 4 inch curb step with Modified [place] using LRAD.  7. Lower extremity exercise program x15 reps per handout, with assistance as needed                         DME Justifications:   Nik's mobility limitation cannot be sufficiently resolved by the use of a cane. Her functional mobility deficit can be sufficiently resolved with the use of a Rolling Walker. Patient's mobility limitation significantly impairs their ability to participate in one of more activities of daily living.  The use of a RW will significantly improve the patient's ability to participate in MRADLS and the patient will use it on regular basis in the home.    History:     Past Medical History:   Diagnosis Date    Colon cancer     Diabetes mellitus     Diabetes mellitus, type 2     Encounter for blood transfusion     Herpes     Hypertension     Sleep apnea        Past Surgical History:   Procedure Laterality Date    CHOLECYSTECTOMY N/A 2025    Procedure: CHOLECYSTECTOMY;  Surgeon: Mike Henry MD;  Location: [address];  Service: General;  Laterality: N/A;    COLONOSCOPY N/A 2024    Procedure: COLONOSCOPY;  Surgeon: Saw Wick MD;  Location: [facility];  Service: Endoscopy;  Laterality: N/A;    ESOPHAGOGASTRODUODENOSCOPY N/A 2024    Procedure: EGD (ESOPHAGOGASTRODUODENOSCOPY);  Surgeon: Saw Wick MD;  Location: [facility];  Service: Endoscopy;  Laterality: N/A;    EYE SURGERY      lasix Bilateral    HYSTERECTOMY      INSERTION OF TOTALLY IMPLANTABLE INFUSION PUMP N/A  5/21/2025    Procedure: INSERTION, INFUSION PUMP, TOTALLY IMPLANTABLE; BOB PUMP;  Surgeon: Mike Henry MD;  [facility];  Service: General;  Laterality: N/A;    INSERTION OF TUNNELED CENTRAL VENOUS CATHETER (CVC) WITH SUBCUTANEOUS PORT Right 12/20/2024    Procedure: INSERTION, PORT-A-CATH;  Surgeon: Logan Juarez MD;  Location: [facility] OR;  Service: General;  Laterality: Right;    LAPAROTOMY, EXPLORATORY N/A 5/21/2025    Procedure: LAPAROTOMY, EXPLORATORY;  Surgeon: Mike Henry MD;  [facility];  Service: General;  Laterality: N/A;    LUMBAR DISCECTOMY      ROBOT-ASSISTED COLECTOMY Right 1/7/2025    Procedure: ROBOTIC COLECTOMY WITH ILEOCOLIC ANASTOMOSIS;  Surgeon: Logan Juarez MD;  Location: [facility];  Service: General;  Laterality: Right;    WISDOM TOOTH EXTRACTION         Time Tracking:     PT Received On: 05/22/25  PT Start Time: 1356     PT Stop Time: 1416  PT Total Time (min): 20 min     Billable Minutes: Evaluation 10 and Gait Training 10      05/22/2025

## 2025-05-22 NOTE — PLAN OF CARE
Pt post of from ex-lap/cholecystotomy/BOB insertion. Pt AAOx4, VSS on 2L NC, titrated to 1.5L. Dilaudid PCA infusing with .2mg q6 in with a lockout of 1mg/h. LR infusing @75cc/h. Upper and L side incisions ANTHONY with dermabond, pt reports mild pain. Bg monuitored ACHS/0200, insulin administered per MAR. Cont clear liquid diet. Bed in lowest locked position, call light within reach, POC ongoing. Plan to ambulate this am.

## 2025-05-22 NOTE — PLAN OF CARE
[facility]  Initial Discharge Assessment       Primary Care Provider: Osmin Newsome MD    Admission Diagnosis: Colon adenocarcinoma [C18.9]  Metastasis to liver [C78.7]  Malignant neoplasm of ascending colon [C18.2]    Admission Date: 5/21/2025  Expected Discharge Date: 5/27/2025    Transition of Care Barriers: None    Payor: [employer] [facility] / Plan: BCBS ALL OUT OF STATE / Product Type: PPO /     Extended Emergency Contact Information  Primary Emergency Contact: Harleen Lowery  Address: [address] [place] [address] [facility]  Mobile Phone: [phone]  Relation: Sister  Preferred language: English   needed? No    Discharge Plan A: Home with family, Home Health  Discharge Plan B: Home      [id number] [place] - [address]  [address] [address]  Phone: [phone] Fax: [phone]    [facility] - [place], [place] - [address]  [address]  [facility] [address]  Phone: [phone] Fax: [phone]      Initial Assessment (most recent)       Adult Discharge Assessment - 05/22/25 1502          Discharge Assessment    Assessment Type Discharge Planning Assessment     Confirmed/corrected address, phone number and insurance Yes     Confirmed Demographics Correct on Facesheet     Source of Information patient     Communicated KARON with patient/caregiver Yes     People in Home alone     Do you expect to return to your current living situation? Yes     Do you have help at home or someone to help you manage your care at home? Yes     Who are your caregiver(s) and their phone number(s)? Harleen Lowery (Sister)  [phone] (Mobile)     Prior to hospitilization cognitive status: Alert/Oriented;No Deficits     Current cognitive status: Alert/Oriented;No Deficits     Walking or Climbing Stairs Difficulty no     Dressing/Bathing Difficulty no     Home Accessibility not wheelchair  accessible     Home Layout Able to live on 1st floor     Equipment Currently Used at Home none     Readmission within 30 days? No     Patient currently being followed by outpatient case management? No     Do you currently have service(s) that help you manage your care at home? No     Do you take prescription medications? Yes     Do you have any problems affording any of your prescribed medications? No     Is the patient taking medications as prescribed? yes     Who is going to help you get home at discharge? Friends     How do you get to doctors appointments? family or friend will provide     Are you on dialysis? No     Do you take coumadin? No     Discharge Plan A Home with family;Home Health     Discharge Plan B Home     DME Needed Upon Discharge  walker, standard     Discharge Plan discussed with: Patient     Transition of Care Barriers None        Financial Resource Strain    How hard is it for you to pay for the very basics like food, housing, medical care, and heating? Not hard at all        Housing Stability    In the last 12 months, was there a time when you were not able to pay the mortgage or rent on time? No     At any time in the past 12 months, were you homeless or living in a shelter (including now)? No        Transportation Needs    In the past 12 months, has lack of transportation kept you from medical appointments or from getting medications? No     In the past 12 months, has lack of transportation kept you from meetings, work, or from getting things needed for daily living? No        Food Insecurity    Within the past 12 months, you worried that your food would run out before you got the money to buy more. Never true     Within the past 12 months, the food you bought just didn't last and you didn't have money to get more. Never true        Stress    Do you feel stress - tense, restless, nervous, or anxious, or unable to sleep at night because your mind is troubled all the time - these days? Not at  all        Social Isolation    How often do you feel lonely or isolated from those around you?  Never        Alcohol Use    Q1: How often do you have a drink containing alcohol? Never     Q2: How many drinks containing alcohol do you have on a typical day when you are drinking? Patient does not drink     Q3: How often do you have six or more drinks on one occasion? Never        Utilities    In the past 12 months has the electric, gas, oil, or water company threatened to shut off services in your home? No        Health Literacy    How often do you need to have someone help you when you read instructions, pamphlets, or other written material from your doctor or pharmacy? Never                   CM spoke with patient in Room 52924 at bedside. All information was verified on facesheet. Patient lives in a 1 story house alone, 1 step to get inside with no pets. Patient states no assistance is needed. Patient can ambulate, drive, run errands, and complete ADL's independently. Patient does not use any DME or any in-home assistive equipment. Patient has not used Home Health previously. Patient is not on dialysis nor use Coumadin as a blood thinner. Patient takes medication as prescribed and has resources for all prescriptive needs. Patient will have help from friends upon discharge. Family will provide transportation home. All Questions and concerns addressed and whiteboard updated with CM contact information. Will continue to follow for course of hospitalization.           Discharge Plan A and Plan B have been determined by review of patient's clinical status, future medical and therapeutic needs, and coverage/benefits for post-acute care in coordination with multidisciplinary team members.     Froilan Corrales RN, BSN  Case Management  [phone]

## 2025-05-22 NOTE — SUBJECTIVE & OBJECTIVE
Interval History: VSS. AF. Pain controlled with PO pain medications. Kaitlin CL diet. No c/o nausea, vomiting, chest pain or shortness of breath. + ambulation in room this am.  Dunlap removed at approximately 9am and has not voided yet. - flatus. + belching.     Medications:  Continuous Infusions:  Scheduled Meds:   acetaminophen  650 mg Oral Q6H    atorvastatin  20 mg Oral Daily    celecoxib  200 mg Oral BID    DULoxetine  40 mg Oral Daily    enoxparin  40 mg Subcutaneous Q24H (prophylaxis, 1700)    gabapentin  800 mg Oral TID    insulin glargine U-100  18 Units Subcutaneous QHS    lamoTRIgine  150 mg Oral Daily    levalbuterol  0.63 mg Nebulization Q6H WAKE    pantoprazole  40 mg Oral Daily    sertraline  100 mg Oral Daily     PRN Meds:  Current Facility-Administered Medications:     acetaminophen, 650 mg, Oral, Q8H PRN    dextrose 50%, 12.5 g, Intravenous, PRN    dextrose 50%, 25 g, Intravenous, PRN    glucagon (human recombinant), 1 mg, Intramuscular, PRN    glucose, 16 g, Oral, PRN    glucose, 24 g, Oral, PRN    insulin aspart U-100, 0-10 Units, Subcutaneous, AC + HS + 0200 PRN    LIDOcaine (PF) 10 mg/ml (1%), 1 mL, Intradermal, Once PRN    LIDOcaine-prilocaine, , Topical (Top), PRN    melatonin, 6 mg, Oral, Nightly PRN    ondansetron, 8 mg, Oral, Q8H PRN    oxyCODONE, 5 mg, Oral, Q6H PRN    oxyCODONE, 10 mg, Oral, Q6H PRN    sodium chloride 0.9%, 10 mL, Intravenous, PRN     Review of patient's allergies indicates:   Allergen Reactions    Robaxin [methocarbamol] Nausea Only     Objective:     Vital Signs (Most Recent):  Temp: 98.1 °F (36.7 °C) (05/22/25 1132)  Pulse: 75 (05/22/25 1213)  Resp: 18 (05/22/25 1213)  BP: (!) 152/80 (05/22/25 1132)  SpO2: 98 % (05/22/25 1213) Vital Signs (24h Range):  Temp:  [97.8 °F (36.6 °C)-98.9 °F (37.2 °C)] 98.1 °F (36.7 °C)  Pulse:  [68-97] 75  Resp:  [8-21] 18  SpO2:  [95 %-99 %] 98 %  BP: (107-157)/(65-80) 152/80     Weight: 92.9 kg (204 lb 12.9 oz)  Body mass index is 34.08  kg/m².    Intake/Output - Last 3 Shifts         05/20 0700 05/21 0659 05/21 0700 05/22 0659 05/22 0700 05/23 0659    P.O.  160     I.V. (mL/kg)  1992.5 (21.4)     IV Piggyback  1078.7     Total Intake(mL/kg)  3231.2 (34.8)     Urine (mL/kg/hr)  660 (0.3)     Total Output  660     Net  +2571.2                     Physical Exam  Vitals and nursing note reviewed.   Constitutional:       Appearance: She is obese.   HENT:      Mouth/Throat:      Mouth: Mucous membranes are moist.   Eyes:      Pupils: Pupils are equal, round, and reactive to light.   Cardiovascular:      Rate and Rhythm: Normal rate and regular rhythm.   Pulmonary:      Effort: Pulmonary effort is normal.      Breath sounds: Normal breath sounds.   Abdominal:      Palpations: Abdomen is soft.      Tenderness: There is abdominal tenderness.      Comments: Abdominal incisions c/d/I with derma bond   Musculoskeletal:         General: Normal range of motion.      Cervical back: Normal range of motion and neck supple.   Skin:     General: Skin is warm and dry.   Neurological:      Mental Status: She is alert and oriented to person, place, and time.   Psychiatric:         Mood and Affect: Mood normal.          Significant Labs:  I have reviewed all pertinent lab results within the past 24 hours.  CBC:   Recent Labs   Lab 05/22/25  0552   WBC 8.11   RBC 3.62*   HGB 11.2*   HCT 36.1*      *   MCH 30.9   MCHC 31.0*     CMP:   Recent Labs   Lab 05/22/25  0552   GLU 98   CALCIUM 8.3*   ALBUMIN 2.7*   PROT 5.6*      K 4.0   CO2 20*      BUN 15   CREATININE 0.7   ALKPHOS 74   ALT 28   AST 42   BILITOT 0.4       Significant Diagnostics:  I have reviewed all pertinent imaging results/findings within the past 24 hours.

## 2025-05-22 NOTE — PT/OT/SLP EVAL
Occupational Therapy   Evaluation    Name: Nik Lowery  MRN: [id number]  Admitting Diagnosis: Metastasis to liver  Recent Surgery: Procedure(s) (LRB):  LAPAROTOMY, EXPLORATORY (N/A)  INSERTION, INFUSION PUMP, TOTALLY IMPLANTABLE; BOB PUMP (N/A)  CHOLECYSTECTOMY (N/A) 1 Day Post-Op    Recommendations:     Discharge Recommendations: No Therapy Indicated  Discharge Equipment Recommendations:  none  Barriers to discharge:  None    Assessment:   CO-TX with PT for pt safety and max participation with both disciplines.     Nik Lowery is a 59 y.o. female with a medical diagnosis of Metastasis to liver.  She presents with abdominal pain. Performance deficits affecting function: weakness, impaired endurance, impaired self care skills, gait instability, pain, impaired functional mobility.  Pt is near baseline and can benefit from another session regarding education and training on AD that can be used for LB dsg and clothing mgmt during recovery as well as energy conservation.    Rehab Prognosis: Good; patient would benefit from acute skilled OT services to address these deficits and reach maximum level of function.       Plan:     Patient to be seen 2 x/week (trial 1-2 visits) to address the above listed problems via self-care/home management, therapeutic activities, therapeutic exercises  Plan of Care Expires: 06/21/25  Plan of Care Reviewed with: patient    Subjective     Chief Complaint: Abdominal pain  Patient/Family Comments/goals: return home    Occupational Profile:  Living Environment: Lives alone, in SSH, 0 SEMAJ and no hand rail, WIS in bathroom  Previous level of function: Indp  Equipment Used at Home: shower chair  Assistance upon Discharge: as needed     Pain/Comfort:  Pain Rating 1: 6/10  Location - Orientation 1: generalized  Location 1: abdomen  Pain Addressed 1: Reposition, Distraction, Cessation of Activity    Patients cultural, spiritual, Taoism conflicts given the current situation: no    Objective:      Communicated with: nsg prior to session.  Patient found HOB elevated with pulse ox (continuous), oxygen upon OT entry to room.    General Precautions: Standard, fall  Orthopedic Precautions: N/A  Braces: N/A  Respiratory Status: Nasal cannula, flow 1 L/min    Occupational Performance:    Bed Mobility:    Patient completed Supine to Sit with stand by assistance    Functional Mobility/Transfers:  Patient completed Sit <> Stand Transfer with stand by assistance  with  no assistive device   Chair t/f c/ SBA using RW  Functional Mobility: Pt ambulated hallway level c/ SBA using RW (refer to PT note)    Activities of Daily Living:  Feeding:  independence    Grooming: stand by assistance facial hygiene at sink  Upper Body Dressing: stand by assistance don gown for backside    Cognitive/Visual Perceptual:  A&O x4    Physical Exam:  BUE WNL  Balance: intact    AMPAC 6 Click ADL:  AMPAC Total Score: 24    Treatment & Education:  Pt educated on role and purpose of therapy  Pt educated on goal setting  Pt educated on benefits of OOB activity  Pt educated on self advocacy     Patient left up in chair with all lines intact, call button in reach, and nsg notified    GOALS:   Multidisciplinary Problems       Occupational Therapy Goals          Problem: Occupational Therapy    Goal Priority Disciplines Outcome Interventions   Occupational Therapy Goal     OT, PT/OT Progressing    Description: Goals to be met by: 6/21/25     Patient will increase functional independence with ADLs by performing:    Education on energy conservation and work simplification.  Education on assistive devices for LB dsg tasks                         DME Justifications:  No DME recommended requiring DME justifications    History:     Past Medical History:   Diagnosis Date    Colon cancer     Diabetes mellitus     Diabetes mellitus, type 2     Encounter for blood transfusion     Herpes     Hypertension     Sleep apnea          Past Surgical History:    Procedure Laterality Date    CHOLECYSTECTOMY N/A 5/21/2025    Procedure: CHOLECYSTECTOMY;  Surgeon: Mike Henry MD;  Location: [facility] OR 2ND FLR;  Service: General;  Laterality: N/A;    COLONOSCOPY N/A 11/8/2024    Procedure: COLONOSCOPY;  Surgeon: Saw Wick MD;  Location: [facility];  Service: Endoscopy;  Laterality: N/A;    ESOPHAGOGASTRODUODENOSCOPY N/A 11/8/2024    Procedure: EGD (ESOPHAGOGASTRODUODENOSCOPY);  Surgeon: Saw Wick MD;  Location: [facility] ENDO;  Service: Endoscopy;  Laterality: N/A;    EYE SURGERY  2002    lasix Bilateral    HYSTERECTOMY  2012    INSERTION OF TOTALLY IMPLANTABLE INFUSION PUMP N/A 5/21/2025    Procedure: INSERTION, INFUSION PUMP, TOTALLY IMPLANTABLE; BOB PUMP;  Surgeon: Mike Henry MD;  [facility];  Service: General;  Laterality: N/A;    INSERTION OF TUNNELED CENTRAL VENOUS CATHETER (CVC) WITH SUBCUTANEOUS PORT Right 12/20/2024    Procedure: INSERTION, PORT-A-CATH;  Surgeon: Logan Juarez MD;  Location: [facility];  Service: General;  Laterality: Right;    LAPAROTOMY, EXPLORATORY N/A 5/21/2025    Procedure: LAPAROTOMY, EXPLORATORY;  Surgeon: Mike Henry MD;  [facility];  Service: General;  Laterality: N/A;    LUMBAR DISCECTOMY      ROBOT-ASSISTED COLECTOMY Right 1/7/2025    Procedure: ROBOTIC COLECTOMY WITH ILEOCOLIC ANASTOMOSIS;  Surgeon: Logan Juarez MD;  Location: [facility];  Service: General;  Laterality: Right;    WISDOM TOOTH EXTRACTION         Time Tracking:     OT Date of Treatment: 05/22/25  OT Start Time: 1352  OT Stop Time: 1409  OT Total Time (min): 17 min    Billable Minutes:Evaluation 8  Self Care/Home Management 9    5/22/2025

## 2025-05-22 NOTE — PROGRESS NOTES
"[facility] - Transplant Stepdown  Endocrinology  Progress Note    Admit Date: 2025     Reason for Consult: Management of T2DM, Hyperglycemia     Surgical Procedure and Date: Hepatic artery infusion pump placement     Diabetes diagnosis year:  Over 5 years ago    Home Diabetes Medications:  Toujeo 30u, metformin 1000 mg b.i.d.    How often checking glucose at home? 1-3 x day   BG readings on regimen:  200 to 300s  Hypoglycemia on the regimen?  No  Missed doses on regimen?  No    Diabetes Complications include:     Hyperglycemia    Complicating diabetes co morbidities:   Active Cancer      HPI:   Patient is a 59 y.o. female with type 2 DM with colon cancer metastatic to the liver. She underwent upfront right hemicolectomy and ablation of a known segment 8 liver metastasis. She received 7 cycles of FOLFOX with progression of disease in the liver.  Status post Hepatic artery infusion pump placement .  Endocrine consulted for BG management       Interval HPI:   Overnight events: No acute events overnight. Patient in room 28348/26375 A. Blood glucose stable. BG at goal on current insulin regimen (SSI + basal insulin). Steroid use- None. 1 Day Post-Op  Renal function- Normal   Vasopressors-  None       Endocrine will continue to follow and manage insulin orders inpatient.         Diet Clear Liquid (No Sugar)     Eatin%  Nausea: No  Hypoglycemia and intervention: No  Fever: No  TPN and/or TF: No      BP (!) 152/80   Pulse 75   Temp 98.1 °F (36.7 °C)   Resp 18   Ht 5' 5" (1.651 m)   Wt 92.9 kg (204 lb 12.9 oz)   SpO2 98%   Breastfeeding No   BMI 34.08 kg/m²     Labs Reviewed and Include    Recent Labs   Lab 25  0552   GLU 98   CALCIUM 8.3*   ALBUMIN 2.7*   PROT 5.6*      K 4.0   CO2 20*      BUN 15   CREATININE 0.7   ALKPHOS 74   ALT 28   AST 42   BILITOT 0.4     Lab Results   Component Value Date    WBC 8.11 2025    HGB 11.2 (L) 2025    HCT 36.1 (L) 2025     (H) " "05/22/2025     05/22/2025     No results for input(s): "TSH", "FREET4" in the last 168 hours.  Lab Results   Component Value Date    HGBA1C 6.5 (H) 11/07/2024       Nutritional status:   Body mass index is 34.08 kg/m².  Lab Results   Component Value Date    ALBUMIN 2.7 (L) 05/22/2025    ALBUMIN 2.8 (L) 05/21/2025    ALBUMIN 3.3 (L) 05/12/2025     No results found for: "PREALBUMIN"    Estimated Creatinine Clearance: 97.5 mL/min (based on SCr of 0.7 mg/dL).    Accu-Checks  Recent Labs     05/21/25  0728 05/21/25  1226 05/21/25  2039 05/22/25  0228   POCTGLUCOSE 218* 283* 134* 98       Current Medications and/or Treatments Impacting Glycemic Control  Immunotherapy:    Immunosuppressants       None          Steroids:   Hormones (From admission, onward)      Start     Stop Route Frequency Ordered    05/21/25 1314  melatonin tablet 6 mg         -- Oral Nightly PRN 05/21/25 1216          Pressors:    Autonomic Drugs (From admission, onward)      None          Hyperglycemia/Diabetes Medications:   Antihyperglycemics (From admission, onward)      Start     Stop Route Frequency Ordered    05/22/25 2100  insulin glargine U-100 (Lantus) pen 18 Units         -- SubQ Nightly 05/22/25 1033    05/21/25 1525  insulin aspart U-100 pen 0-10 Units         -- SubQ Before meals, nightly and at 0200 PRN 05/21/25 1426            ASSESSMENT and PLAN    Cardiac/Vascular  Hypertension associated with diabetes  On an ACE-I per ADA guidelines.   Uncontrolled HTN can worsen insulin resistance.       Endocrine  Obesity  Body mass index is 34.08 kg/m². Morbid obesity complicates all aspects of disease management from diagnostic modalities to treatment.     Uncontrolled type 2 diabetes mellitus with hyperglycemia, without long-term current use of insulin  BG goal: 140-180     - Lantus 18 units nightly. (20% reduction due to fasting blood glucose below goal.)  - -Continue Novolog Moderate dose correction with ISF 25 starting at 150    - POCT " Glucose before meals, at bedtime and at 2 am  - Hypoglycemia protocol in place      ** Please notify Endocrine for any change and/or advance in diet**  ** Please call Endocrine for any BG related issues **     Discharge Planning:   TBD. Please notify endocrinology prior to discharge.             Mike Cooper, DNP, FNP  Endocrinology  [facility] - Transplant Stepdown

## 2025-05-22 NOTE — PLAN OF CARE
PT Evaluation completed 2025   PT goals and POC established.   No physical therapy recommendation post-acutely.    Problem: Physical Therapy  Goal: Physical Therapy Goal  Description: Goals to be met by: 25     Patient will increase functional independence with mobility by performin. Supine to sit with Modified [place]  2. Sit to supine with Modified [place]  3. Sit to stand transfer with Modified [place]  4. Bed to chair transfer with Modified [place] using LRAD  5. Gait  x 250 feet with Modified [place] using LRAD.   6. Ascend/Descend 4 inch curb step with Modified [place] using LRAD.  7. Lower extremity exercise program x15 reps per handout, with assistance as needed    Outcome: Progressing

## 2025-05-22 NOTE — SUBJECTIVE & OBJECTIVE
"Interval HPI:   Overnight events: No acute events overnight. Patient in room 78799/06058 A. Blood glucose stable. BG at goal on current insulin regimen (SSI + basal insulin). Steroid use- None. 1 Day Post-Op  Renal function- Normal   Vasopressors-  None       Endocrine will continue to follow and manage insulin orders inpatient.         Diet Clear Liquid (No Sugar)     Eatin%  Nausea: No  Hypoglycemia and intervention: No  Fever: No  TPN and/or TF: No      BP (!) 152/80   Pulse 75   Temp 98.1 °F (36.7 °C)   Resp 18   Ht 5' 5" (1.651 m)   Wt 92.9 kg (204 lb 12.9 oz)   SpO2 98%   Breastfeeding No   BMI 34.08 kg/m²     Labs Reviewed and Include    Recent Labs   Lab 25  0552   GLU 98   CALCIUM 8.3*   ALBUMIN 2.7*   PROT 5.6*      K 4.0   CO2 20*      BUN 15   CREATININE 0.7   ALKPHOS 74   ALT 28   AST 42   BILITOT 0.4     Lab Results   Component Value Date    WBC 8.11 2025    HGB 11.2 (L) 2025    HCT 36.1 (L) 2025     (H) 2025     2025     No results for input(s): "TSH", "FREET4" in the last 168 hours.  Lab Results   Component Value Date    HGBA1C 6.5 (H) 2024       Nutritional status:   Body mass index is 34.08 kg/m².  Lab Results   Component Value Date    ALBUMIN 2.7 (L) 2025    ALBUMIN 2.8 (L) 2025    ALBUMIN 3.3 (L) 2025     No results found for: "PREALBUMIN"    Estimated Creatinine Clearance: 97.5 mL/min (based on SCr of 0.7 mg/dL).    Accu-Checks  Recent Labs     25  0728 25  1226 25  2039 25  0228   POCTGLUCOSE 218* 283* 134* 98       Current Medications and/or Treatments Impacting Glycemic Control  Immunotherapy:    Immunosuppressants       None          Steroids:   Hormones (From admission, onward)      Start     Stop Route Frequency Ordered    25 1314  melatonin tablet 6 mg         -- Oral Nightly PRN 25 1216          Pressors:    Autonomic Drugs (From admission, onward)      " None          Hyperglycemia/Diabetes Medications:   Antihyperglycemics (From admission, onward)      Start     Stop Route Frequency Ordered    05/22/25 2100  insulin glargine U-100 (Lantus) pen 18 Units         -- SubQ Nightly 05/22/25 1033    05/21/25 1525  insulin aspart U-100 pen 0-10 Units         -- SubQ Before meals, nightly and at 0200 PRN 05/21/25 1426

## 2025-05-22 NOTE — HPI
Ms. Lowery is a 59 y.o. female with synchronous colorectal liver metastases. She underwent upfront right hemicolectomy and ablation of a liver metastasis followed by 7 cycles of FOLFOX. We reviewed her imaging at [facility], and there was clear progression in the liver with no evidence of extrahepatic disease. She presents for a preoperative visit prior to planned hepatic artery infusion pump placement. She has met with Dr. Stanford to discuss BOB therapy.   Ms. Lowery is a 58 y/o female here for a BOB pump placement and cholecystectomy on 5/21/2025.

## 2025-05-22 NOTE — PLAN OF CARE
I have removed rutledge catheter this am. Pt has voided since then. She is not passing gas or had bm yet. Pain is under control with prn oxycodone. Pt has call bell in reach, non slip socks on, and bedrails up x2. Pt encouraged to wash hands. Pt is ac/hs and daily labs.

## 2025-05-23 ENCOUNTER — PATIENT MESSAGE (OUTPATIENT)
Facility: CLINIC | Age: 60
End: 2025-05-23
Payer: COMMERCIAL

## 2025-05-23 LAB
ABSOLUTE EOSINOPHIL (OHS): 0.41 K/UL
ABSOLUTE MONOCYTE (OHS): 0.82 K/UL (ref 0.3–1)
ABSOLUTE NEUTROPHIL COUNT (OHS): 4.83 K/UL (ref 1.8–7.7)
ALBUMIN SERPL BCP-MCNC: 2.6 G/DL (ref 3.5–5.2)
ALP SERPL-CCNC: 89 UNIT/L (ref 40–150)
ALT SERPL W/O P-5'-P-CCNC: 25 UNIT/L (ref 10–44)
ANION GAP (OHS): 7 MMOL/L (ref 8–16)
AST SERPL-CCNC: 39 UNIT/L (ref 11–45)
BASOPHILS # BLD AUTO: 0.03 K/UL
BASOPHILS NFR BLD AUTO: 0.4 %
BILIRUB SERPL-MCNC: 0.6 MG/DL (ref 0.1–1)
BUN SERPL-MCNC: 12 MG/DL (ref 6–20)
CALCIUM SERPL-MCNC: 8.2 MG/DL (ref 8.7–10.5)
CHLORIDE SERPL-SCNC: 104 MMOL/L (ref 95–110)
CO2 SERPL-SCNC: 24 MMOL/L (ref 23–29)
CREAT SERPL-MCNC: 0.7 MG/DL (ref 0.5–1.4)
ERYTHROCYTE [DISTWIDTH] IN BLOOD BY AUTOMATED COUNT: 17.5 % (ref 11.5–14.5)
GFR SERPLBLD CREATININE-BSD FMLA CKD-EPI: >60 ML/MIN/1.73/M2
GLUCOSE SERPL-MCNC: 95 MG/DL (ref 70–110)
HCT VFR BLD AUTO: 32.1 % (ref 37–48.5)
HGB BLD-MCNC: 10.4 GM/DL (ref 12–16)
IMM GRANULOCYTES # BLD AUTO: 0.05 K/UL (ref 0–0.04)
IMM GRANULOCYTES NFR BLD AUTO: 0.6 % (ref 0–0.5)
LYMPHOCYTES # BLD AUTO: 1.61 K/UL (ref 1–4.8)
MAGNESIUM SERPL-MCNC: 1.8 MG/DL (ref 1.6–2.6)
MCH RBC QN AUTO: 31.7 PG (ref 27–31)
MCHC RBC AUTO-ENTMCNC: 32.4 G/DL (ref 32–36)
MCV RBC AUTO: 98 FL (ref 82–98)
NUCLEATED RBC (/100WBC) (OHS): 0 /100 WBC
PHOSPHATE SERPL-MCNC: 4.4 MG/DL (ref 2.7–4.5)
PLATELET # BLD AUTO: 143 K/UL (ref 150–450)
PMV BLD AUTO: 9.4 FL (ref 9.2–12.9)
POCT GLUCOSE: 101 MG/DL (ref 70–110)
POCT GLUCOSE: 117 MG/DL (ref 70–110)
POCT GLUCOSE: 149 MG/DL (ref 70–110)
POCT GLUCOSE: 155 MG/DL (ref 70–110)
POCT GLUCOSE: 155 MG/DL (ref 70–110)
POTASSIUM SERPL-SCNC: 3.9 MMOL/L (ref 3.5–5.1)
PROT SERPL-MCNC: 5.6 GM/DL (ref 6–8.4)
RBC # BLD AUTO: 3.28 M/UL (ref 4–5.4)
RELATIVE EOSINOPHIL (OHS): 5.3 %
RELATIVE LYMPHOCYTE (OHS): 20.8 % (ref 18–48)
RELATIVE MONOCYTE (OHS): 10.6 % (ref 4–15)
RELATIVE NEUTROPHIL (OHS): 62.3 % (ref 38–73)
SODIUM SERPL-SCNC: 135 MMOL/L (ref 136–145)
WBC # BLD AUTO: 7.75 K/UL (ref 3.9–12.7)

## 2025-05-23 PROCEDURE — 36415 COLL VENOUS BLD VENIPUNCTURE: CPT | Performed by: STUDENT IN AN ORGANIZED HEALTH CARE EDUCATION/TRAINING PROGRAM

## 2025-05-23 PROCEDURE — A9540 TC99M MAA: HCPCS | Performed by: SURGERY

## 2025-05-23 PROCEDURE — 25000003 PHARM REV CODE 250: Performed by: STUDENT IN AN ORGANIZED HEALTH CARE EDUCATION/TRAINING PROGRAM

## 2025-05-23 PROCEDURE — 94640 AIRWAY INHALATION TREATMENT: CPT

## 2025-05-23 PROCEDURE — 63600175 PHARM REV CODE 636 W HCPCS: Performed by: PHYSICIAN ASSISTANT

## 2025-05-23 PROCEDURE — 80053 COMPREHEN METABOLIC PANEL: CPT | Performed by: STUDENT IN AN ORGANIZED HEALTH CARE EDUCATION/TRAINING PROGRAM

## 2025-05-23 PROCEDURE — 25000003 PHARM REV CODE 250: Performed by: SURGERY

## 2025-05-23 PROCEDURE — 20600001 HC STEP DOWN PRIVATE ROOM

## 2025-05-23 PROCEDURE — 94664 DEMO&/EVAL PT USE INHALER: CPT

## 2025-05-23 PROCEDURE — 84100 ASSAY OF PHOSPHORUS: CPT | Performed by: STUDENT IN AN ORGANIZED HEALTH CARE EDUCATION/TRAINING PROGRAM

## 2025-05-23 PROCEDURE — 83735 ASSAY OF MAGNESIUM: CPT | Performed by: STUDENT IN AN ORGANIZED HEALTH CARE EDUCATION/TRAINING PROGRAM

## 2025-05-23 PROCEDURE — 99232 SBSQ HOSP IP/OBS MODERATE 35: CPT | Mod: ,,, | Performed by: PHYSICIAN ASSISTANT

## 2025-05-23 PROCEDURE — 97530 THERAPEUTIC ACTIVITIES: CPT

## 2025-05-23 PROCEDURE — 25000003 PHARM REV CODE 250

## 2025-05-23 PROCEDURE — 25000003 PHARM REV CODE 250: Performed by: NURSE PRACTITIONER

## 2025-05-23 PROCEDURE — 99900035 HC TECH TIME PER 15 MIN (STAT)

## 2025-05-23 PROCEDURE — 85025 COMPLETE CBC W/AUTO DIFF WBC: CPT | Performed by: STUDENT IN AN ORGANIZED HEALTH CARE EDUCATION/TRAINING PROGRAM

## 2025-05-23 PROCEDURE — 25000242 PHARM REV CODE 250 ALT 637 W/ HCPCS: Performed by: NURSE PRACTITIONER

## 2025-05-23 PROCEDURE — 63600175 PHARM REV CODE 636 W HCPCS: Performed by: STUDENT IN AN ORGANIZED HEALTH CARE EDUCATION/TRAINING PROGRAM

## 2025-05-23 PROCEDURE — 94761 N-INVAS EAR/PLS OXIMETRY MLT: CPT

## 2025-05-23 PROCEDURE — 63600175 PHARM REV CODE 636 W HCPCS

## 2025-05-23 RX ORDER — LANOLIN ALCOHOL/MO/W.PET/CERES
800 CREAM (GRAM) TOPICAL ONCE
Status: COMPLETED | OUTPATIENT
Start: 2025-05-23 | End: 2025-05-23

## 2025-05-23 RX ORDER — OXYCODONE HYDROCHLORIDE 5 MG/1
5 TABLET ORAL EVERY 6 HOURS PRN
Qty: 16 TABLET | Refills: 0 | Status: SHIPPED | OUTPATIENT
Start: 2025-05-23 | End: 2025-05-29 | Stop reason: SDUPTHER

## 2025-05-23 RX ORDER — POLYETHYLENE GLYCOL 3350 17 G/17G
17 POWDER, FOR SOLUTION ORAL DAILY
Status: DISCONTINUED | OUTPATIENT
Start: 2025-05-23 | End: 2025-05-24 | Stop reason: HOSPADM

## 2025-05-23 RX ORDER — POTASSIUM CHLORIDE 750 MG/1
10 CAPSULE, EXTENDED RELEASE ORAL ONCE
Status: COMPLETED | OUTPATIENT
Start: 2025-05-23 | End: 2025-05-23

## 2025-05-23 RX ORDER — AMOXICILLIN 250 MG
1 CAPSULE ORAL NIGHTLY
Status: DISCONTINUED | OUTPATIENT
Start: 2025-05-23 | End: 2025-05-24 | Stop reason: HOSPADM

## 2025-05-23 RX ORDER — HYDROMORPHONE HYDROCHLORIDE 2 MG/ML
0.5 INJECTION, SOLUTION INTRAMUSCULAR; INTRAVENOUS; SUBCUTANEOUS ONCE
Status: COMPLETED | OUTPATIENT
Start: 2025-05-23 | End: 2025-05-23

## 2025-05-23 RX ADMIN — INSULIN GLARGINE 18 UNITS: 100 INJECTION, SOLUTION SUBCUTANEOUS at 08:05

## 2025-05-23 RX ADMIN — CELECOXIB 200 MG: 100 CAPSULE ORAL at 08:05

## 2025-05-23 RX ADMIN — GABAPENTIN 800 MG: 400 CAPSULE ORAL at 08:05

## 2025-05-23 RX ADMIN — KIT FOR THE PREPARATION OF TECHNETIUM TC 99M ALBUMIN AGGREGATED 5.05 MILLICURIE: 2 INJECTION, POWDER, LYOPHILIZED, FOR SUSPENSION INTRAPERITONEAL; INTRAVENOUS at 03:05

## 2025-05-23 RX ADMIN — GABAPENTIN 800 MG: 400 CAPSULE ORAL at 02:05

## 2025-05-23 RX ADMIN — ACETAMINOPHEN 650 MG: 325 TABLET ORAL at 12:05

## 2025-05-23 RX ADMIN — LAMOTRIGINE 150 MG: 25 TABLET ORAL at 08:05

## 2025-05-23 RX ADMIN — OXYCODONE HYDROCHLORIDE 10 MG: 10 TABLET ORAL at 12:05

## 2025-05-23 RX ADMIN — Medication 800 MG: at 12:05

## 2025-05-23 RX ADMIN — POTASSIUM CHLORIDE 10 MEQ: 10 CAPSULE, COATED, EXTENDED RELEASE ORAL at 12:05

## 2025-05-23 RX ADMIN — LEVALBUTEROL HYDROCHLORIDE 0.63 MG: 0.63 SOLUTION RESPIRATORY (INHALATION) at 02:05

## 2025-05-23 RX ADMIN — POLYETHYLENE GLYCOL 3350 17 G: 17 POWDER, FOR SOLUTION ORAL at 08:05

## 2025-05-23 RX ADMIN — ATORVASTATIN CALCIUM 20 MG: 20 TABLET, FILM COATED ORAL at 08:05

## 2025-05-23 RX ADMIN — HYDROMORPHONE HYDROCHLORIDE 0.5 MG: 2 INJECTION, SOLUTION INTRAMUSCULAR; INTRAVENOUS; SUBCUTANEOUS at 01:05

## 2025-05-23 RX ADMIN — SERTRALINE HYDROCHLORIDE 100 MG: 50 TABLET ORAL at 08:05

## 2025-05-23 RX ADMIN — SENNOSIDES AND DOCUSATE SODIUM 1 TABLET: 50; 8.6 TABLET ORAL at 08:05

## 2025-05-23 RX ADMIN — ENOXAPARIN SODIUM 40 MG: 40 INJECTION SUBCUTANEOUS at 04:05

## 2025-05-23 RX ADMIN — DULOXETINE HYDROCHLORIDE 40 MG: 20 CAPSULE, DELAYED RELEASE ORAL at 08:05

## 2025-05-23 RX ADMIN — INSULIN ASPART 2 UNITS: 100 INJECTION, SOLUTION INTRAVENOUS; SUBCUTANEOUS at 12:05

## 2025-05-23 RX ADMIN — ACETAMINOPHEN 650 MG: 325 TABLET ORAL at 08:05

## 2025-05-23 RX ADMIN — LEVALBUTEROL HYDROCHLORIDE 0.63 MG: 0.63 SOLUTION RESPIRATORY (INHALATION) at 09:05

## 2025-05-23 RX ADMIN — ACETAMINOPHEN 650 MG: 325 TABLET ORAL at 04:05

## 2025-05-23 RX ADMIN — PANTOPRAZOLE SODIUM 40 MG: 40 TABLET, DELAYED RELEASE ORAL at 08:05

## 2025-05-23 RX ADMIN — OXYCODONE HYDROCHLORIDE 10 MG: 10 TABLET ORAL at 05:05

## 2025-05-23 NOTE — PLAN OF CARE
Pt AAO x4. VSS. AC/HS + 0200 accuchecks completed--no coverage needed. Midline incision with dermadond intact. Small area of incision was leaking SS drainage overnight. Surg on call came to bedside to assess and no issues with dermabond seen. Gauze and tape applied. Pt had no BM bur has active bowel sounds and reports passing flatus. Pt up independently in room. X1 order of 0.5 mg dilaudid given which pt stated helped pain tremendously. Bed locked and in lowest setting. Call bell in reach. Reminded to call for assistance.

## 2025-05-23 NOTE — SUBJECTIVE & OBJECTIVE
Interval History:   NAEO. Afebrile. VSS.   Some pain overnight requiring PRN dilaudid  Small amount of serous drainage from midline incision  Tolerating diet without n/v    Medications:  Continuous Infusions:  Scheduled Meds:   acetaminophen  650 mg Oral Q6H    atorvastatin  20 mg Oral Daily    celecoxib  200 mg Oral BID    DULoxetine  40 mg Oral Daily    enoxparin  40 mg Subcutaneous Q24H (prophylaxis, 1700)    gabapentin  800 mg Oral TID    insulin glargine U-100  18 Units Subcutaneous QHS    lamoTRIgine  150 mg Oral Daily    levalbuterol  0.63 mg Nebulization Q6H WAKE    [COMPLETED] magnesium oxide  800 mg Oral Once    pantoprazole  40 mg Oral Daily    polyethylene glycol  17 g Oral Daily    [COMPLETED] potassium chloride  10 mEq Oral Once    sertraline  100 mg Oral Daily     PRN Meds:  Current Facility-Administered Medications:     acetaminophen, 650 mg, Oral, Q8H PRN    dextrose 50%, 12.5 g, Intravenous, PRN    dextrose 50%, 25 g, Intravenous, PRN    glucagon (human recombinant), 1 mg, Intramuscular, PRN    glucose, 16 g, Oral, PRN    glucose, 24 g, Oral, PRN    insulin aspart U-100, 0-10 Units, Subcutaneous, AC + HS + 0200 PRN    LIDOcaine (PF) 10 mg/ml (1%), 1 mL, Intradermal, Once PRN    LIDOcaine-prilocaine, , Topical (Top), PRN    melatonin, 6 mg, Oral, Nightly PRN    ondansetron, 8 mg, Oral, Q8H PRN    oxyCODONE, 5 mg, Oral, Q6H PRN    oxyCODONE, 10 mg, Oral, Q6H PRN    sodium chloride 0.9%, 10 mL, Intravenous, PRN     Review of patient's allergies indicates:   Allergen Reactions    Robaxin [methocarbamol] Nausea Only     Objective:     Vital Signs (Most Recent):  Temp: 98.2 °F (36.8 °C) (05/23/25 1219)  Pulse: 81 (05/23/25 1219)  Resp: 18 (05/23/25 1219)  BP: (!) 148/82 (05/23/25 1219)  SpO2: (!) 92 % (05/23/25 1219) Vital Signs (24h Range):  Temp:  [97.9 °F (36.6 °C)-98.3 °F (36.8 °C)] 98.2 °F (36.8 °C)  Pulse:  [76-85] 81  Resp:  [16-20] 18  SpO2:  [92 %-97 %] 92 %  BP: (131-164)/(74-96) 148/82      Weight: 92.9 kg (204 lb 12.9 oz)  Body mass index is 34.08 kg/m².    Intake/Output - Last 3 Shifts         05/21 0700 05/22 0659 05/22 0700 05/23 0659 05/23 0700 05/24 0659    P.O. 160  280    I.V. (mL/kg) 1992.5 (21.4)  0 (0)    Other   0    IV Piggyback 1078.7      Total Intake(mL/kg) 3231.2 (34.8)  280 (3)    Urine (mL/kg/hr) 660 (0.3) 230 (0.1) 150 (0.3)    Stool   0    Total Output 660 230 150    Net +2571.2 -230 +130           Unmeasured Urine Occurrence   0 x    Unmeasured Stool Occurrence   0 x             Physical Exam  Vitals and nursing note reviewed.   Constitutional:       Appearance: She is obese.   HENT:      Mouth/Throat:      Mouth: Mucous membranes are moist.   Eyes:      Pupils: Pupils are equal, round, and reactive to light.   Cardiovascular:      Rate and Rhythm: Normal rate and regular rhythm.   Pulmonary:      Effort: Pulmonary effort is normal.      Breath sounds: Normal breath sounds.   Abdominal:      Palpations: Abdomen is soft.      Tenderness: There is abdominal tenderness.      Comments: Abdominal incisions c/d/I with derma bond  Small amount of serous drainage able to be expressed from midline incision   Musculoskeletal:         General: Normal range of motion.      Cervical back: Normal range of motion and neck supple.   Skin:     General: Skin is warm and dry.   Neurological:      Mental Status: She is alert and oriented to person, place, and time.   Psychiatric:         Mood and Affect: Mood normal.          Significant Labs:  I have reviewed all pertinent lab results within the past 24 hours.  CBC:   Recent Labs   Lab 05/23/25  0604   WBC 7.75   RBC 3.28*   HGB 10.4*   HCT 32.1*   *   MCV 98   MCH 31.7*   MCHC 32.4     CMP:   Recent Labs   Lab 05/23/25  0604   GLU 95   CALCIUM 8.2*   ALBUMIN 2.6*   PROT 5.6*   *   K 3.9   CO2 24      BUN 12   CREATININE 0.7   ALKPHOS 89   ALT 25   AST 39   BILITOT 0.6       Significant Diagnostics:  I have reviewed all  pertinent imaging results/findings within the past 24 hours.

## 2025-05-23 NOTE — PROGRESS NOTES
[facility]  General Surgery  Progress Note    Subjective:     History of Present Illness:  Ms. Lowery is a 59 y.o. female with synchronous colorectal liver metastases. She underwent upfront right hemicolectomy and ablation of a liver metastasis followed by 7 cycles of FOLFOX. We reviewed her imaging at [facility], and there was clear progression in the liver with no evidence of extrahepatic disease. She presents for a preoperative visit prior to planned hepatic artery infusion pump placement. She has met with Dr. Stanford to discuss BOB therapy.   Ms. Lowery is a 60 y/o female here for a BOB pump placement and cholecystectomy on 5/21/2025.     Post-Op Info:  Procedure(s) (LRB):  LAPAROTOMY, EXPLORATORY (N/A)  INSERTION, INFUSION PUMP, TOTALLY IMPLANTABLE; BOB PUMP (N/A)  CHOLECYSTECTOMY (N/A)   2 Days Post-Op     Interval History:   NAEO. Afebrile. VSS.   Some pain overnight requiring PRN dilaudid  Small amount of serous drainage from midline incision  Tolerating diet without n/v    Medications:  Continuous Infusions:  Scheduled Meds:   acetaminophen  650 mg Oral Q6H    atorvastatin  20 mg Oral Daily    celecoxib  200 mg Oral BID    DULoxetine  40 mg Oral Daily    enoxparin  40 mg Subcutaneous Q24H (prophylaxis, 1700)    gabapentin  800 mg Oral TID    insulin glargine U-100  18 Units Subcutaneous QHS    lamoTRIgine  150 mg Oral Daily    levalbuterol  0.63 mg Nebulization Q6H WAKE    [COMPLETED] magnesium oxide  800 mg Oral Once    pantoprazole  40 mg Oral Daily    polyethylene glycol  17 g Oral Daily    [COMPLETED] potassium chloride  10 mEq Oral Once    sertraline  100 mg Oral Daily     PRN Meds:  Current Facility-Administered Medications:     acetaminophen, 650 mg, Oral, Q8H PRN    dextrose 50%, 12.5 g, Intravenous, PRN    dextrose 50%, 25 g, Intravenous, PRN    glucagon (human recombinant), 1 mg, Intramuscular, PRN    glucose, 16 g, Oral, PRN    glucose, 24 g, Oral, PRN    insulin aspart  U-100, 0-10 Units, Subcutaneous, AC + HS + 0200 PRN    LIDOcaine (PF) 10 mg/ml (1%), 1 mL, Intradermal, Once PRN    LIDOcaine-prilocaine, , Topical (Top), PRN    melatonin, 6 mg, Oral, Nightly PRN    ondansetron, 8 mg, Oral, Q8H PRN    oxyCODONE, 5 mg, Oral, Q6H PRN    oxyCODONE, 10 mg, Oral, Q6H PRN    sodium chloride 0.9%, 10 mL, Intravenous, PRN     Review of patient's allergies indicates:   Allergen Reactions    Robaxin [methocarbamol] Nausea Only     Objective:     Vital Signs (Most Recent):  Temp: 98.2 °F (36.8 °C) (05/23/25 1219)  Pulse: 81 (05/23/25 1219)  Resp: 18 (05/23/25 1219)  BP: (!) 148/82 (05/23/25 1219)  SpO2: (!) 92 % (05/23/25 1219) Vital Signs (24h Range):  Temp:  [97.9 °F (36.6 °C)-98.3 °F (36.8 °C)] 98.2 °F (36.8 °C)  Pulse:  [76-85] 81  Resp:  [16-20] 18  SpO2:  [92 %-97 %] 92 %  BP: (131-164)/(74-96) 148/82     Weight: 92.9 kg (204 lb 12.9 oz)  Body mass index is 34.08 kg/m².    Intake/Output - Last 3 Shifts         05/21 0700 05/22 0659 05/22 0700 05/23 0659 05/23 0700 05/24 0659    P.O. 160  280    I.V. (mL/kg) 1992.5 (21.4)  0 (0)    Other   0    IV Piggyback 1078.7      Total Intake(mL/kg) 3231.2 (34.8)  280 (3)    Urine (mL/kg/hr) 660 (0.3) 230 (0.1) 150 (0.3)    Stool   0    Total Output 660 230 150    Net +2571.2 -230 +130           Unmeasured Urine Occurrence   0 x    Unmeasured Stool Occurrence   0 x             Physical Exam  Vitals and nursing note reviewed.   Constitutional:       Appearance: She is obese.   HENT:      Mouth/Throat:      Mouth: Mucous membranes are moist.   Eyes:      Pupils: Pupils are equal, round, and reactive to light.   Cardiovascular:      Rate and Rhythm: Normal rate and regular rhythm.   Pulmonary:      Effort: Pulmonary effort is normal.      Breath sounds: Normal breath sounds.   Abdominal:      Palpations: Abdomen is soft.      Tenderness: There is abdominal tenderness.      Comments: Abdominal incisions c/d/I with derma bond  Small amount of serous  drainage able to be expressed from midline incision   Musculoskeletal:         General: Normal range of motion.      Cervical back: Normal range of motion and neck supple.   Skin:     General: Skin is warm and dry.   Neurological:      Mental Status: She is alert and oriented to person, place, and time.   Psychiatric:         Mood and Affect: Mood normal.          Significant Labs:  I have reviewed all pertinent lab results within the past 24 hours.  CBC:   Recent Labs   Lab 05/23/25  0604   WBC 7.75   RBC 3.28*   HGB 10.4*   HCT 32.1*   *   MCV 98   MCH 31.7*   MCHC 32.4     CMP:   Recent Labs   Lab 05/23/25  0604   GLU 95   CALCIUM 8.2*   ALBUMIN 2.6*   PROT 5.6*   *   K 3.9   CO2 24      BUN 12   CREATININE 0.7   ALKPHOS 89   ALT 25   AST 39   BILITOT 0.6       Significant Diagnostics:  I have reviewed all pertinent imaging results/findings within the past 24 hours.  Assessment/Plan:     * Metastasis to liver  Ms. Lowery is a 58 y/o female s/p Exploratory laparotomy, Hepatic artery infusion pump placement and Cholecystectomy on 5/21/2025.    - NM fusion spect scan scheduled for 14:00 today  - Please apply lidocaine cream to expected area of injection prior to study  - Advance to regular diet  - DVT/GI prophylaxis  - PT/OT, OOB chair, ambulate TID   - Endocrine recommendations for Hx of DM2. Appreciate recommendations.  - Oxycodone, MM for pain control  - Resp tx, IS, acapella  - D/c rutledge  - Home medications    Dispo: continue inpatient care        Hyperlipidemia associated with type 2 diabetes mellitus  Continue home medications.     Obesity  Body mass index is 34.08 kg/m². Morbid obesity complicates all aspects of disease management from diagnostic modalities to treatment. Weight loss encouraged and health benefits explained to patient.      Gastroesophageal reflux disease without esophagitis  Continue home medications.     Anxiety and depression  Continue home medications.     Hypertension  associated with diabetes  Continue home medications.     Uncontrolled type 2 diabetes mellitus with hyperglycemia, without long-term current use of insulin  - SSI  - Endocrine recommendations        Finn Tipton MD  General Surgery  [facility]

## 2025-05-23 NOTE — ASSESSMENT & PLAN NOTE
- SSI  - Endocrine recommendations  
BG goal: 140-180     - Lantus 18 units nightly. (20% reduction due to fasting blood glucose below goal.)  - -Continue Novolog Moderate dose correction with ISF 25 starting at 150    - POCT Glucose before meals, at bedtime and at 2 am  - Hypoglycemia protocol in place      ** Please notify Endocrine for any change and/or advance in diet**  ** Please call Endocrine for any BG related issues **     Discharge Planning:   TBD. Please notify endocrinology prior to discharge.      
BG goal: 140-180   T2DM.  Colon cancer metastatic to liver.  Hepatic artery infusion pump placement.  Blood sugars at and below goal on basal insulin.  Diet advanced last night.  Monitor and consider further reduction of basal and addition of mealtime insulin if necessary3    - Lantus 18 units nightly.  - Continue Novolog Moderate dose correction with ISF 25 starting at 150    - POCT Glucose before meals, at bedtime and at 2 am  - Hypoglycemia protocol in place      ** Please notify Endocrine for any change and/or advance in diet**  ** Please call Endocrine for any BG related issues **     Discharge Planning:   TBD. Please notify endocrinology prior to discharge.      
BG goal: 140-180  T2DM.  Colon cancer metastatic to liver.  Hepatic artery infusion pump placement.  On sugar free clear liquids.      - start Lantus 23 units nightly.  - -Continue Novolog Moderate dose correction with ISF 25 starting at 150    - POCT Glucose before meals, at bedtime and at 2 am  - Hypoglycemia protocol in place      ** Please notify Endocrine for any change and/or advance in diet**  ** Please call Endocrine for any BG related issues **     Discharge Planning:   TBD. Please notify endocrinology prior to discharge.      
Body mass index is 33.53 kg/m². Morbid obesity complicates all aspects of disease management from diagnostic modalities to treatment.   
Body mass index is 34.08 kg/m². Morbid obesity complicates all aspects of disease management from diagnostic modalities to treatment.   
Body mass index is 34.08 kg/m². Morbid obesity complicates all aspects of disease management from diagnostic modalities to treatment. Weight loss encouraged and health benefits explained to patient.    
Continue home medications.   
Managed by primary team  Optimize BG control    
Ms. Lowery is a 58 y/o female s/p Exploratory laparotomy, Hepatic artery infusion pump placement and Cholecystectomy on 5/21/2025.    - NM fusion spect scan scheduled for 14:00 today  - Please apply lidocaine cream to expected area of injection prior to study  - Advance to regular diet  - DVT/GI prophylaxis  - PT/OT, OOB chair, ambulate TID   - Endocrine recommendations for Hx of DM2. Appreciate recommendations.  - Oxycodone, MM for pain control  - Resp tx, IS, acapella  - D/c rutledge  - Home medications    Dispo: continue inpatient care      
Ms. Lowery is a 60 y/o female s/p Exploratory laparotomy, Hepatic artery infusion pump placement and Cholecystectomy on 5/21/2025.    CL sugar free diet  DVT/GI prophylaxis  PT/OT, OOB chair, ambulate TID   Endocrine recommendations for Hx of DM2. Appreciate recommendations.  Oxycodone, MM for pain control  Resp tx, IS, acapella  D/c rutledge  Home medications    Dispo: continue inpatient care      
On an ACE-I per ADA guidelines.   Uncontrolled HTN can worsen insulin resistance.     
On an ACE-I per ADA guidelines.   Uncontrolled HTN can worsen insulin resistance.     
The patient is a 53y Female complaining of back pain general.

## 2025-05-23 NOTE — SUBJECTIVE & OBJECTIVE
"Interval HPI:   No acute events overnight. Patient in room 99096/99014 A. Blood glucose stable. BG at goal on current insulin regimen (SSI and basal). Steroid use- None .   2 Days Post-Op  Renal function- Normal   Vasopressors-  None     Diet Adult Regular     Eatin%  Nausea: No  Hypoglycemia and intervention: No  Fever: No  TPN and/or TF: No      /75 (BP Location: Right arm, Patient Position: Lying)   Pulse 79   Temp 98.1 °F (36.7 °C) (Oral)   Resp 18   Ht 5' 5" (1.651 m)   Wt 92.9 kg (204 lb 12.9 oz)   SpO2 95%   Breastfeeding No   BMI 34.08 kg/m²     Labs Reviewed and Include    Recent Labs   Lab 25  0604   GLU 95   CALCIUM 8.2*   ALBUMIN 2.6*   PROT 5.6*   *   K 3.9   CO2 24      BUN 12   CREATININE 0.7   ALKPHOS 89   ALT 25   AST 39   BILITOT 0.6     Lab Results   Component Value Date    WBC 7.75 2025    HGB 10.4 (L) 2025    HCT 32.1 (L) 2025    MCV 98 2025     (L) 2025     No results for input(s): "TSH", "FREET4" in the last 168 hours.  Lab Results   Component Value Date    HGBA1C 6.5 (H) 2024       Nutritional status:   Body mass index is 34.08 kg/m².  Lab Results   Component Value Date    ALBUMIN 2.6 (L) 2025    ALBUMIN 2.7 (L) 2025    ALBUMIN 2.8 (L) 2025     No results found for: "PREALBUMIN"    Estimated Creatinine Clearance: 97.5 mL/min (based on SCr of 0.7 mg/dL).    Accu-Checks  Recent Labs     25  0728 25  1226 25  2039 25  0228 25  0839 25  1235 25  1730 25  2138 25  0132 25  0819   POCTGLUCOSE 218* 283* 134* 98 121* 87 97 114* 117* 101       Current Medications and/or Treatments Impacting Glycemic Control  Immunotherapy:    Immunosuppressants       None          Steroids:   Hormones (From admission, onward)      Start     Stop Route Frequency Ordered    25 1314  melatonin tablet 6 mg         -- Oral Nightly PRN 25 1216      "     Pressors:    Autonomic Drugs (From admission, onward)      None          Hyperglycemia/Diabetes Medications:   Antihyperglycemics (From admission, onward)      Start     Stop Route Frequency Ordered    05/22/25 2100  insulin glargine U-100 (Lantus) pen 18 Units         -- SubQ Nightly 05/22/25 1033    05/21/25 1525  insulin aspart U-100 pen 0-10 Units         -- SubQ Before meals, nightly and at 0200 PRN 05/21/25 1426

## 2025-05-23 NOTE — PROGRESS NOTES
"[facility]  Endocrinology  Progress Note    Admit Date: 2025     Reason for Consult: Management of T2DM, Hyperglycemia     Surgical Procedure and Date: Hepatic artery infusion pump placement     Diabetes diagnosis year:  Over 5 years ago    Home Diabetes Medications:  Toujeo 30u, metformin 1000 mg b.i.d.    How often checking glucose at home? 1-3 x day   BG readings on regimen:  200 to 300s  Hypoglycemia on the regimen?  No  Missed doses on regimen?  No    Diabetes Complications include:     Hyperglycemia    Complicating diabetes co morbidities:   Active Cancer      HPI:   Patient is a 59 y.o. female with type 2 DM with colon cancer metastatic to the liver. She underwent upfront right hemicolectomy and ablation of a known segment 8 liver metastasis. She received 7 cycles of FOLFOX with progression of disease in the liver.  Status post Hepatic artery infusion pump placement .  Endocrine consulted for BG management       Interval HPI:   No acute events overnight. Patient in room 09335/95403 A. Blood glucose stable. BG at goal on current insulin regimen (SSI and basal). Steroid use- None .   2 Days Post-Op  Renal function- Normal   Vasopressors-  None     Diet Adult Regular     Eatin%  Nausea: No  Hypoglycemia and intervention: No  Fever: No  TPN and/or TF: No      /75 (BP Location: Right arm, Patient Position: Lying)   Pulse 79   Temp 98.1 °F (36.7 °C) (Oral)   Resp 18   Ht 5' 5" (1.651 m)   Wt 92.9 kg (204 lb 12.9 oz)   SpO2 95%   Breastfeeding No   BMI 34.08 kg/m²     Labs Reviewed and Include    Recent Labs   Lab 25  0604   GLU 95   CALCIUM 8.2*   ALBUMIN 2.6*   PROT 5.6*   *   K 3.9   CO2 24      BUN 12   CREATININE 0.7   ALKPHOS 89   ALT 25   AST 39   BILITOT 0.6     Lab Results   Component Value Date    WBC 7.75 2025    HGB 10.4 (L) 2025    HCT 32.1 (L) 2025    MCV 98 2025     (L) 2025     No results for " "input(s): "TSH", "FREET4" in the last 168 hours.  Lab Results   Component Value Date    HGBA1C 6.5 (H) 11/07/2024       Nutritional status:   Body mass index is 34.08 kg/m².  Lab Results   Component Value Date    ALBUMIN 2.6 (L) 05/23/2025    ALBUMIN 2.7 (L) 05/22/2025    ALBUMIN 2.8 (L) 05/21/2025     No results found for: "PREALBUMIN"    Estimated Creatinine Clearance: 97.5 mL/min (based on SCr of 0.7 mg/dL).    Accu-Checks  Recent Labs     05/21/25  0728 05/21/25  1226 05/21/25  2039 05/22/25  0228 05/22/25  0839 05/22/25  1235 05/22/25  1730 05/22/25  2138 05/23/25  0132 05/23/25  0819   POCTGLUCOSE 218* 283* 134* 98 121* 87 97 114* 117* 101       Current Medications and/or Treatments Impacting Glycemic Control  Immunotherapy:    Immunosuppressants       None          Steroids:   Hormones (From admission, onward)      Start     Stop Route Frequency Ordered    05/21/25 1314  melatonin tablet 6 mg         -- Oral Nightly PRN 05/21/25 1216          Pressors:    Autonomic Drugs (From admission, onward)      None          Hyperglycemia/Diabetes Medications:   Antihyperglycemics (From admission, onward)      Start     Stop Route Frequency Ordered    05/22/25 2100  insulin glargine U-100 (Lantus) pen 18 Units         -- SubQ Nightly 05/22/25 1033    05/21/25 1525  insulin aspart U-100 pen 0-10 Units         -- SubQ Before meals, nightly and at 0200 PRN 05/21/25 1426            ASSESSMENT and PLAN    Cardiac/Vascular  Hypertension associated with diabetes  On an ACE-I per ADA guidelines.   Uncontrolled HTN can worsen insulin resistance.       Oncology  * Metastasis to liver  Managed by primary team  Optimize BG control      Endocrine  Uncontrolled type 2 diabetes mellitus with hyperglycemia, without long-term current use of insulin  BG goal: 140-180   T2DM.  Colon cancer metastatic to liver.  Hepatic artery infusion pump placement.  Blood sugars at and below goal on basal insulin.  Diet advanced last night.  Monitor and " consider further reduction of basal and addition of mealtime insulin if necessary3    - Lantus 18 units nightly.  - Continue Novolog Moderate dose correction with ISF 25 starting at 150    - POCT Glucose before meals, at bedtime and at 2 am  - Hypoglycemia protocol in place      ** Please notify Endocrine for any change and/or advance in diet**  ** Please call Endocrine for any BG related issues **     Discharge Planning:   TBD. Please notify endocrinology prior to discharge.            Oseas Rachel PA-C  Endocrinology  [facility]

## 2025-05-23 NOTE — PLAN OF CARE
Patient AAOx4, afebrile, and vital signs stable. Accucheck AC/HS & 0200 completed--no SSI needed. Midline incision with dermabond remains CDI- small area mid incisional line leaking SS drainage. No evidence of hematoma- dressing changed with gauze and tape. Patient does not report BM- has active bowel sounds and passing flatus. Bowel regimen taken this AM. Magnesium and potassium PO tablets ordered. Pt up independently in room with steady gait. Plan for nuclear medicine injection on 2nd floor @1400. Lidocaine cream to be applied to port site on abdomen 30 minutes prior. Patient does not report any c/o pain at this time. Bed remains in locked and lowest position with call light and personal items within reach. Plan of care discussed and questions encouraged.

## 2025-05-23 NOTE — PT/OT/SLP PROGRESS
Occupational Therapy   Treatment and Discharge    Name: Nik Lowery  MRN: [id number]  Admitting Diagnosis:  Metastasis to liver  2 Days Post-Op    Recommendations:     Discharge Recommendations: No Therapy Indicated  Discharge Equipment Recommendations:  walker, rolling  Barriers to discharge:  None     Pt educated on AD for lower body dsg tasks and demonstrates understanding by practicing or describing situations where AD would be used. Pt had no further questions. DC pt from acute OT as pt is functioning at baseline for standing ADLs and transfers.        Time Tracking:     OT Date of Treatment: 05/23/25  OT Start Time: 1353  OT Stop Time: 1403  OT Total Time (min): 10 min    Billable Minutes:Therapeutic Activity 8    OT/ANTHONY: OT          5/23/2025

## 2025-05-24 VITALS
WEIGHT: 204.81 LBS | HEART RATE: 90 BPM | BODY MASS INDEX: 34.12 KG/M2 | OXYGEN SATURATION: 95 % | SYSTOLIC BLOOD PRESSURE: 145 MMHG | RESPIRATION RATE: 18 BRPM | TEMPERATURE: 99 F | HEIGHT: 65 IN | DIASTOLIC BLOOD PRESSURE: 82 MMHG

## 2025-05-24 LAB
ABSOLUTE EOSINOPHIL (OHS): 0.52 K/UL
ABSOLUTE MONOCYTE (OHS): 0.68 K/UL (ref 0.3–1)
ABSOLUTE NEUTROPHIL COUNT (OHS): 4.84 K/UL (ref 1.8–7.7)
ALBUMIN SERPL BCP-MCNC: 2.5 G/DL (ref 3.5–5.2)
ALP SERPL-CCNC: 159 UNIT/L (ref 40–150)
ALT SERPL W/O P-5'-P-CCNC: 35 UNIT/L (ref 10–44)
ANION GAP (OHS): 8 MMOL/L (ref 8–16)
AST SERPL-CCNC: 58 UNIT/L (ref 11–45)
BASOPHILS # BLD AUTO: 0.05 K/UL
BASOPHILS NFR BLD AUTO: 0.7 %
BILIRUB SERPL-MCNC: 0.7 MG/DL (ref 0.1–1)
BUN SERPL-MCNC: 12 MG/DL (ref 6–20)
CALCIUM SERPL-MCNC: 8.4 MG/DL (ref 8.7–10.5)
CHLORIDE SERPL-SCNC: 107 MMOL/L (ref 95–110)
CO2 SERPL-SCNC: 24 MMOL/L (ref 23–29)
CREAT SERPL-MCNC: 0.7 MG/DL (ref 0.5–1.4)
ERYTHROCYTE [DISTWIDTH] IN BLOOD BY AUTOMATED COUNT: 17 % (ref 11.5–14.5)
GFR SERPLBLD CREATININE-BSD FMLA CKD-EPI: >60 ML/MIN/1.73/M2
GLUCOSE SERPL-MCNC: 177 MG/DL (ref 70–110)
HCT VFR BLD AUTO: 31.6 % (ref 37–48.5)
HGB BLD-MCNC: 10.2 GM/DL (ref 12–16)
IMM GRANULOCYTES # BLD AUTO: 0.07 K/UL (ref 0–0.04)
IMM GRANULOCYTES NFR BLD AUTO: 1 % (ref 0–0.5)
LYMPHOCYTES # BLD AUTO: 1.13 K/UL (ref 1–4.8)
MAGNESIUM SERPL-MCNC: 1.7 MG/DL (ref 1.6–2.6)
MCH RBC QN AUTO: 31.5 PG (ref 27–31)
MCHC RBC AUTO-ENTMCNC: 32.3 G/DL (ref 32–36)
MCV RBC AUTO: 98 FL (ref 82–98)
NUCLEATED RBC (/100WBC) (OHS): 0 /100 WBC
PHOSPHATE SERPL-MCNC: 3.4 MG/DL (ref 2.7–4.5)
PLATELET # BLD AUTO: 153 K/UL (ref 150–450)
PMV BLD AUTO: 9.9 FL (ref 9.2–12.9)
POCT GLUCOSE: 142 MG/DL (ref 70–110)
POTASSIUM SERPL-SCNC: 4.1 MMOL/L (ref 3.5–5.1)
PROT SERPL-MCNC: 5.8 GM/DL (ref 6–8.4)
RBC # BLD AUTO: 3.24 M/UL (ref 4–5.4)
RELATIVE EOSINOPHIL (OHS): 7.1 %
RELATIVE LYMPHOCYTE (OHS): 15.5 % (ref 18–48)
RELATIVE MONOCYTE (OHS): 9.3 % (ref 4–15)
RELATIVE NEUTROPHIL (OHS): 66.4 % (ref 38–73)
SODIUM SERPL-SCNC: 139 MMOL/L (ref 136–145)
WBC # BLD AUTO: 7.29 K/UL (ref 3.9–12.7)

## 2025-05-24 PROCEDURE — 36415 COLL VENOUS BLD VENIPUNCTURE: CPT | Performed by: STUDENT IN AN ORGANIZED HEALTH CARE EDUCATION/TRAINING PROGRAM

## 2025-05-24 PROCEDURE — 80053 COMPREHEN METABOLIC PANEL: CPT | Performed by: STUDENT IN AN ORGANIZED HEALTH CARE EDUCATION/TRAINING PROGRAM

## 2025-05-24 PROCEDURE — 84100 ASSAY OF PHOSPHORUS: CPT | Performed by: STUDENT IN AN ORGANIZED HEALTH CARE EDUCATION/TRAINING PROGRAM

## 2025-05-24 PROCEDURE — 25000003 PHARM REV CODE 250: Performed by: STUDENT IN AN ORGANIZED HEALTH CARE EDUCATION/TRAINING PROGRAM

## 2025-05-24 PROCEDURE — 83735 ASSAY OF MAGNESIUM: CPT | Performed by: STUDENT IN AN ORGANIZED HEALTH CARE EDUCATION/TRAINING PROGRAM

## 2025-05-24 PROCEDURE — 85025 COMPLETE CBC W/AUTO DIFF WBC: CPT | Performed by: STUDENT IN AN ORGANIZED HEALTH CARE EDUCATION/TRAINING PROGRAM

## 2025-05-24 PROCEDURE — 25000003 PHARM REV CODE 250: Performed by: SURGERY

## 2025-05-24 RX ORDER — POLYETHYLENE GLYCOL 3350 17 G/17G
17 POWDER, FOR SOLUTION ORAL 2 TIMES DAILY
Qty: 1020 G | Refills: 0 | Status: SHIPPED | OUTPATIENT
Start: 2025-05-24

## 2025-05-24 RX ORDER — GLYCERIN 1 G/1
1 SUPPOSITORY RECTAL ONCE
Status: DISCONTINUED | OUTPATIENT
Start: 2025-05-24 | End: 2025-05-24 | Stop reason: HOSPADM

## 2025-05-24 RX ORDER — LISINOPRIL 5 MG/1
20 TABLET ORAL DAILY
Status: DISCONTINUED | OUTPATIENT
Start: 2025-05-24 | End: 2025-05-24 | Stop reason: HOSPADM

## 2025-05-24 RX ORDER — ACETAMINOPHEN 500 MG
500 TABLET ORAL EVERY 6 HOURS
COMMUNITY
Start: 2025-05-24 | End: 2025-06-08

## 2025-05-24 RX ORDER — CELECOXIB 100 MG/1
100 CAPSULE ORAL 2 TIMES DAILY
Qty: 28 CAPSULE | Refills: 0 | Status: SHIPPED | OUTPATIENT
Start: 2025-05-24 | End: 2025-06-07

## 2025-05-24 RX ADMIN — LAMOTRIGINE 150 MG: 25 TABLET ORAL at 07:05

## 2025-05-24 RX ADMIN — SERTRALINE HYDROCHLORIDE 100 MG: 50 TABLET ORAL at 07:05

## 2025-05-24 RX ADMIN — OXYCODONE HYDROCHLORIDE 10 MG: 10 TABLET ORAL at 02:05

## 2025-05-24 RX ADMIN — ATORVASTATIN CALCIUM 20 MG: 20 TABLET, FILM COATED ORAL at 07:05

## 2025-05-24 RX ADMIN — POLYETHYLENE GLYCOL 3350 17 G: 17 POWDER, FOR SOLUTION ORAL at 07:05

## 2025-05-24 RX ADMIN — ACETAMINOPHEN 650 MG: 325 TABLET ORAL at 05:05

## 2025-05-24 RX ADMIN — OXYCODONE HYDROCHLORIDE 10 MG: 10 TABLET ORAL at 11:05

## 2025-05-24 RX ADMIN — CELECOXIB 200 MG: 100 CAPSULE ORAL at 09:05

## 2025-05-24 RX ADMIN — PANTOPRAZOLE SODIUM 40 MG: 40 TABLET, DELAYED RELEASE ORAL at 07:05

## 2025-05-24 RX ADMIN — DULOXETINE HYDROCHLORIDE 40 MG: 20 CAPSULE, DELAYED RELEASE ORAL at 07:05

## 2025-05-24 RX ADMIN — GABAPENTIN 800 MG: 400 CAPSULE ORAL at 07:05

## 2025-05-24 NOTE — CARE UPDATE
BG goal 140-180    -A1C     Lab Results   Component Value Date    HGBA1C 6.5 (H) 11/07/2024       -HOME REGIMEN: Toujeo 30u, metformin 1000 mg b.i.d.     -INPATIENT REGIMEN: see below    -GLUCOSE TREND FOR THE PAST 24HRS: 142-155    -NO HYPOGYCEMIAS NOTED     -TOLERATING 50% OF PO DIET     -Diet: Diet Adult Regular    -Steroids - n/a    -Tube Feeds -  n/a      Remains in TSU. T2DM. Colon cancer metastatic to liver.  Hepatic artery infusion pump placement.  POD 3.  Blood sugars at goal on basal insulin.  Monitor and consider further raddition of mealtime insulin if necessary.       Plan:  -Continue Lantus 18 units q HS  -Continue Moderate Dose Correction Scale  -BG monitoring ac/hs    Discharge planning: TBD    Endocrine to continue to follow    ** Please call Endocrine for any BG related issues **

## 2025-05-24 NOTE — HOSPITAL COURSE
Nik Lowery presented to [facility] on the morning of 5/21/2025 for above procedure.   The procedure was performed without complication and the patient was transferred to the floor for further post op care and monitoring. Their postoperative course was uneventful and they progressed according to plan. Pain was controlled with a combination of IV and PO narcotic pain medications. Diet has been advanced throughout hospital stay. They are now ambulating without assistance, tolerating a regular diet, pain is well controlled with PO pain medication, and is voiding appropriately. They now meet all criteria for discharge. The patient will follow up in surgery clinic in 2 weeks. Discussed POC and ED precautions with patient. Patient verbalized understanding and is agreeable to plan. All questions answered.

## 2025-05-24 NOTE — PLAN OF CARE
[facility] - Trans[facility]  Discharge Final Note    Primary Care Provider: Osmin Newsome MD    Expected Discharge Date: 5/24/2025    Patient to be discharged home. The patient does not have any home needs. Family to provide transportation home.    Future Appointments   Date Time Provider Department Center   5/27/2025  1:20 PM Luana Mejia PA-C [facility] [place]   6/4/2025 11:00 AM [facility] 3 [facility]   6/23/2025  2:30 PM Loly Vinson PsyD [facility]   7/7/2025  1:40 PM Luana Mejia PA-C [facility] [place]   10/14/2025  4:40 PM Osmin Newsome MD [facility] [place]         Final Discharge Note (most recent)       Final Note - 05/24/25 1014          Final Note    Assessment Type Final Discharge Note     Anticipated Discharge Disposition Home or Self Care        Post-Acute Status    Post-Acute Authorization Other     Other Status No Post-Acute Service Needs     Discharge Delays None known at this time                     Important Message from Medicare

## 2025-05-24 NOTE — PROGRESS NOTES
Discharge instructions and AVS provided to and reviewed with patient. PIV removed and telemetry returned to . Patient medications delivered to bedside. Family member wheeling patient out to car to be discharged under own care. All education completed on incisional care and supplies given. Patient ID card and pamphlet given to patient for hepatic artery infusion pump. Vital signs remain stable.

## 2025-05-24 NOTE — PLAN OF CARE
Pt aaox4 vswnl and c/o nerve pain with gabapentin scheduled given. Oxy given for back pain with mild to moderate relief. Pt ambulates independently. Midline incision mare leaking some with drsg intact and dermabond. Left horozontal incision mare with dermabond intact. Urine is now yellow pt states as she refused to use toilet hat. Accu ck at [id number]. Pt states she takes Lisinopril at home but not ordered in hospital.

## 2025-05-24 NOTE — DISCHARGE SUMMARY
[facility] - [facility]  General Surgery  Discharge Summary      Patient Name: Nik Lowery  MRN: [id number]  Admission Date: 5/21/2025  Hospital Length of Stay: 3 days  Discharge Date and Time: 05/24/2025 7:48 AM  Attending Physician: Mike Henry MD   Discharging Provider: Finn Tipton MD  Primary Care Provider: Osmin Newsome MD    HPI:   Ms. Lowery is a 59 y.o. female with synchronous colorectal liver metastases. She underwent upfront right hemicolectomy and ablation of a liver metastasis followed by 7 cycles of FOLFOX. We reviewed her imaging at [facility], and there was clear progression in the liver with no evidence of extrahepatic disease. She presents for a preoperative visit prior to planned hepatic artery infusion pump placement. She has met with Dr. Stanford to discuss BOB therapy.   Ms. Lowery is a 58 y/o female here for a BOB pump placement and cholecystectomy on 5/21/2025.     Procedure(s) (LRB):  LAPAROTOMY, EXPLORATORY (N/A)  INSERTION, INFUSION PUMP, TOTALLY IMPLANTABLE; BOB PUMP (N/A)  CHOLECYSTECTOMY (N/A)      Indwelling Lines/Drains at time of discharge:   Lines/Drains/Airways       Central Venous Catheter Line  Duration                  PowerPort A Cath Single Lumen 12/20/24 Internal Jugular Right 155 days              Line  Duration                  Subcutaneous Infusion Pump 05/21/25 Abdomen (Comment) 2 days                  Hospital Course: Nik Lowery presented to [facility] on the morning of 5/21/2025 for above procedure.   The procedure was performed without complication and the patient was transferred to the floor for further post op care and monitoring. Their postoperative course was uneventful and they progressed according to plan. Pain was controlled with a combination of IV and PO narcotic pain medications. Diet has been advanced throughout hospital stay. They are now ambulating without assistance, tolerating a regular diet, pain is well controlled with PO  pain medication, and is voiding appropriately. They now meet all criteria for discharge. The patient will follow up in surgery clinic in 2 weeks. Discussed POC and ED precautions with patient. Patient verbalized understanding and is agreeable to plan. All questions answered.       Physical Exam  Vitals and nursing note reviewed.   Constitutional:       Appearance: She is obese.   HENT:      Mouth/Throat:      Mouth: Mucous membranes are moist.   Eyes:      Pupils: Pupils are equal, round, and reactive to light.   Cardiovascular:      Rate and Rhythm: Normal rate and regular rhythm.   Pulmonary:      Effort: Pulmonary effort is normal.      Breath sounds: Normal breath sounds.   Abdominal:      Palpations: Abdomen is soft.      Tenderness: There is abdominal tenderness.      Comments: Abdominal incisions c/d/I with derma bond  Small amount of serous drainage able to be expressed from midline incision   Musculoskeletal:         General: Normal range of motion.      Cervical back: Normal range of motion and neck supple.   Skin:     General: Skin is warm and dry.   Neurological:      Mental Status: She is alert and oriented to person, place, and time.   Psychiatric:         Mood and Affect: Mood normal.      Goals of Care Treatment Preferences:  Code Status: Full Code    Living Will: Yes              Consults:   Consults (From admission, onward)          Status Ordering Provider     Inpatient consult to Endocrinology  Once        Provider:  (Not yet assigned)    Completed NAZANIN GONZALEZ            Significant Diagnostic Studies: N/A    Pending Diagnostic Studies:       Procedure Component Value Units Date/Time    Hemoglobin A1c if not done in past 3 months [[id number]]     Order Status: Sent Lab Status: No result     Specimen: Blood           Final Active Diagnoses:    Diagnosis Date Noted POA    PRINCIPAL PROBLEM:  Metastasis to liver [C78.7] 12/18/2024 Yes    Hyperlipidemia associated with type 2 diabetes mellitus  [E11.69, E78.5] 09/24/2018 Yes    Obesity [E66.9] 08/08/2018 Yes    Gastroesophageal reflux disease without esophagitis [K21.9] 08/08/2018 Yes    Uncontrolled type 2 diabetes mellitus with hyperglycemia, without long-term current use of insulin [E11.65] 07/11/2017 Yes    Hypertension associated with diabetes [E11.59, I15.2] 07/11/2017 Yes    Anxiety and depression [F41.9, F32.A] 07/11/2017 Yes      Problems Resolved During this Admission:      Discharged Condition: good    Disposition: Home or Self Care    Follow Up:    Patient Instructions:   No discharge procedures on file.  Medications:  Reconciled Home Medications:      Medication List        START taking these medications      acetaminophen 500 MG tablet  Commonly known as: TYLENOL  Take 1 tablet (500 mg total) by mouth every 6 (six) hours. for 15 days     apixaban 2.5 mg Tab  Commonly known as: ELIQUIS  Take 1 tablet (2.5 mg total) by mouth 2 (two) times daily.     celecoxib 100 MG capsule  Commonly known as: CeleBREX  Take 1 capsule (100 mg total) by mouth 2 (two) times daily. for 14 days     oxyCODONE 5 MG immediate release tablet  Commonly known as: ROXICODONE  Take 1 tablet (5 mg total) by mouth every 6 (six) hours as needed for Pain.            CHANGE how you take these medications      polyethylene glycol 17 gram/dose powder  Commonly known as: GLYCOLAX  Take 17 g by mouth 2 (two) times daily.  What changed: when to take this            CONTINUE taking these medications      atorvastatin 20 MG tablet  Commonly known as: LIPITOR  TAKE 1 TABLET BY MOUTH EVERY DAY     blood-glucose meter kit  To check BG 3 times daily, to use with insurance preferred meter     dexAMETHasone 4 MG Tab  Commonly known as: DECADRON  Take 2 tablets (8 mg total) by mouth once daily. On days 2 through 4 of each cycle of chemo     DULoxetine 20 MG capsule  Commonly known as: CYMBALTA  Take 2 capsules (40 mg total) by mouth once daily.     fexofenadine 180 MG tablet  Commonly known  "as: ALLEGRA  Take 180 mg by mouth nightly.     fluticasone propionate 50 mcg/actuation nasal spray  Commonly known as: FLONASE  SPRAY 2 SPRAYS BY EACH NOSTRIL ROUTE ONCE DAILY.     FREESTYLE JOCELIN 14 DAY SENSOR Kit  Generic drug: flash glucose sensor  1 [place] [facility]-DRUG [place]BO ROUTE) ROUTE DAILY AS NEEDED.     gabapentin 800 MG tablet  Commonly known as: NEURONTIN  Take 800 mg by mouth 3 (three) times daily.     insulin glargine (TOUJEO) 300 unit/mL (1.5 mL) Inpn pen  Commonly known as: TOUJEO SOLOSTAR U-300 INSULIN  Inject 30 Units into the skin once daily.     lamoTRIgine 150 MG Tab  Commonly known as: LAMICTAL  TAKE 1 TABLET BY MOUTH EVERY DAY     lancets Misc  To check BG 3 times daily, to use with insurance preferred meter     LIDOcaine-prilocaine cream  Commonly known as: EMLA  Apply topically as needed (Apply to port site 30 minutes before access as needed).     lisinopriL 20 MG tablet  Commonly known as: PRINIVIL,ZESTRIL  TAKE 1 TABLET BY MOUTH EVERY DAY     magnesium oxide 400 mg (241.3 mg magnesium) tablet  Commonly known as: MAG-OX  Take 1 tablet (400 mg total) by mouth 2 (two) times daily.     metFORMIN 500 MG ER 24hr tablet  Commonly known as: GLUCOPHAGE-XR  TAKE 2 TABLETS BY MOUTH TWICE A DAY WITH MEALS     MUCINEX 1,200 mg Ta12  Generic drug: guaiFENesin  Take 1 tablet by mouth nightly.     multivitamin per tablet  Commonly known as: THERAGRAN  Take 1 tablet by mouth once daily.     omeprazole 40 MG capsule  Commonly known as: PRILOSEC  Take 1 capsule (40 mg total) by mouth once daily.     ondansetron 8 MG tablet  Commonly known as: ZOFRAN  Take 1 tablet (8 mg total) by mouth every 8 (eight) hours as needed.     ONETOUCH ULTRA BLUE TEST STRIP Strp  Generic drug: blood sugar diagnostic  USE TO CHECK BLOOD SUGAR 3 TIMES A DAY     pen needle, diabetic 31 gauge x 5/16" Ndle  Commonly known as: PEN NEEDLE  1 Application by Misc.(Non-Drug; Combo Route) route once daily.     prochlorperazine 10 " MG tablet  Commonly known as: COMPAZINE  Take 1 tablet (10 mg total) by mouth every 6 (six) hours as needed.     sertraline 100 MG tablet  Commonly known as: ZOLOFT  TAKE 1 TABLET BY MOUTH EVERY DAY     valACYclovir 500 MG tablet  Commonly known as: VALTREX  TAKE 1 TABLET BY MOUTH TWICE A DAY            ASK your doctor about these medications      docusate sodium 100 MG capsule  Commonly known as: COLACE  Take 1 capsule (100 mg total) by mouth 2 (two) times daily.            Time spent on the discharge of patient: 25 minutes    Finn Tipton MD  General Surgery  [facility]

## 2025-05-24 NOTE — DISCHARGE INSTRUCTIONS
Postoperative General Instructions    What to Expect:  It is normal to experience pain and swelling at the surgical site.  The pain usually decreases significantly after the first week but may last for many weeks.  Each day, the pain should be similar or better to the previous day. If it worsens, call the doctor's office.     Activity:  You should walk beginning on the day of surgery and increase activity slowly over the next two to four weeks.  Do not drive while taking prescription pain medication.  You may return to work when you feel ready.  Do not lift anything heavier than 10 lbs for 4 weeks     Wound Care:  You may shower. Let soap and water run over the incisions and pat dry. Do not submerge in a bathtub or pool.  If you have an open wound, you should change the dressing twice daily with moist gauze.     Diet:  You may resume your normal diet postoperatively.  Take a stool softener or laxative to avoid constipation.     Medication:  Pain Control  Take acetaminophen (Tylenol) 500 mg every 6 hours as needed for pain. You may alternate between this and ibuprofen every 3 hours.   Take Celebrex 100 mg twice daily as prescribed  Take the prescribed oxycodone as needed if you have pain that is not controlled by acetaminophen and ibuprofen.  Bowel Regularity  Take Miralax twice daily as prescribed  Previous Home Medication  Restart your previous home medication unless otherwise instructed.    Call Your Doctor's Office If You Experience:  Fevers greater than 101.3°F.  Vomiting.  Spreading redness or drainage from the incisions.  Opening of the incisions.  Worsening pain not controlled by medication.  Chest pain or shortness of breath.    Follow-Up:  Follow-up with your surgeon as scheduled in 2 weeks on 6/4/2025.  If no follow-up appointment has been scheduled, call to schedule an appointment within 1-2 weeks of discharge.     Contact Information:  During normal business hours call the clinic with any questions or  concerns. On weekends and evenings call [phone] to have the operators page the surgery resident on call after hours with questions or concerns.

## 2025-05-26 ENCOUNTER — TELEPHONE (OUTPATIENT)
Dept: CARDIOTHORACIC SURGERY | Facility: CLINIC | Age: 60
End: 2025-05-26
Payer: COMMERCIAL

## 2025-05-26 ENCOUNTER — PATIENT OUTREACH (OUTPATIENT)
Dept: ADMINISTRATIVE | Facility: CLINIC | Age: 60
End: 2025-05-26
Payer: COMMERCIAL

## 2025-05-26 NOTE — TELEPHONE ENCOUNTER
Pt called reporting abdominal pain 8/10 using pain scale since hospital discharge. She is requesting an increase in dosage for Oxycodone. She is currently prescribed 5 mg q 6 hrs prn. She stated she took 10 mg total last night prior to bed due to pain. She has not begun taking her Celebrex at this time. Pt was advised to begin Celebrex BID today along with her Oxy 5 mg prn and see if this alleviates pain. Pt has no other complaints at this time. Pt will update tomorrow am on pain.  Will notify Dr. Henry in the interim.

## 2025-05-29 ENCOUNTER — TELEPHONE (OUTPATIENT)
Dept: CARDIOTHORACIC SURGERY | Facility: CLINIC | Age: 60
End: 2025-05-29
Payer: COMMERCIAL

## 2025-05-29 DIAGNOSIS — E11.65 UNCONTROLLED TYPE 2 DIABETES MELLITUS WITH HYPERGLYCEMIA, WITHOUT LONG-TERM CURRENT USE OF INSULIN: ICD-10-CM

## 2025-05-29 RX ORDER — OXYCODONE HYDROCHLORIDE 5 MG/1
5 TABLET ORAL EVERY 6 HOURS PRN
Qty: 20 TABLET | Refills: 0 | Status: SHIPPED | OUTPATIENT
Start: 2025-05-29

## 2025-05-29 NOTE — TELEPHONE ENCOUNTER
Pt requesting refill on her Oxycodone 5 mg. She stated she is taking Celebrex and it helps with pain control but does not have any more Oxy. She states without the Oxycodone her pain level is a 6/10 despite taking the Celebrex. No other complaints at this time. Will forward message to NP and Dr. Henry.

## 2025-05-30 ENCOUNTER — PATIENT MESSAGE (OUTPATIENT)
Facility: CLINIC | Age: 60
End: 2025-05-30
Payer: COMMERCIAL

## 2025-06-03 ENCOUNTER — LAB VISIT (OUTPATIENT)
Dept: LAB | Facility: HOSPITAL | Age: 60
End: 2025-06-03
Attending: SURGERY
Payer: COMMERCIAL

## 2025-06-03 DIAGNOSIS — C78.7 METASTASIS TO LIVER: ICD-10-CM

## 2025-06-03 DIAGNOSIS — T45.1X5A CHEMOTHERAPY-INDUCED NEUROPATHY: ICD-10-CM

## 2025-06-03 DIAGNOSIS — C18.9 COLON ADENOCARCINOMA: ICD-10-CM

## 2025-06-03 DIAGNOSIS — G62.0 CHEMOTHERAPY-INDUCED NEUROPATHY: ICD-10-CM

## 2025-06-03 DIAGNOSIS — E08.00 DIABETES MELLITUS DUE TO UNDERLYING CONDITION WITH HYPEROSMOLARITY WITHOUT COMA, UNSPECIFIED WHETHER LONG TERM INSULIN USE: ICD-10-CM

## 2025-06-03 DIAGNOSIS — C18.2 MALIGNANT NEOPLASM OF ASCENDING COLON: ICD-10-CM

## 2025-06-03 LAB
ABSOLUTE EOSINOPHIL (SMH): 0.63 K/UL
ABSOLUTE MONOCYTE (SMH): 0.43 K/UL (ref 0.3–1)
ABSOLUTE NEUTROPHIL COUNT (SMH): 4.8 K/UL (ref 1.8–7.7)
ALBUMIN SERPL-MCNC: 3.8 G/DL (ref 3.5–5.2)
ALBUMIN SERPL-MCNC: 3.8 G/DL (ref 3.5–5.2)
ALP SERPL-CCNC: 186 UNIT/L (ref 40–150)
ALP SERPL-CCNC: 188 UNIT/L (ref 40–150)
ALT SERPL-CCNC: 17 UNIT/L (ref 10–44)
ALT SERPL-CCNC: 17 UNIT/L (ref 10–44)
ANION GAP (SMH): 12 MMOL/L (ref 8–16)
ANION GAP (SMH): 12 MMOL/L (ref 8–16)
APTT PPP: 27.8 SECONDS (ref 21–32)
AST SERPL-CCNC: 50 UNIT/L (ref 11–45)
AST SERPL-CCNC: 53 UNIT/L (ref 11–45)
BASOPHILS # BLD AUTO: 0.04 K/UL
BASOPHILS NFR BLD AUTO: 0.5 %
BILIRUB SERPL-MCNC: 0.5 MG/DL (ref 0.1–1)
BILIRUB SERPL-MCNC: 0.5 MG/DL (ref 0.1–1)
BUN SERPL-MCNC: 16 MG/DL (ref 6–20)
BUN SERPL-MCNC: 16 MG/DL (ref 6–20)
CALCIUM SERPL-MCNC: 9.8 MG/DL (ref 8.7–10.5)
CALCIUM SERPL-MCNC: 9.8 MG/DL (ref 8.7–10.5)
CHLORIDE SERPL-SCNC: 105 MMOL/L (ref 95–110)
CHLORIDE SERPL-SCNC: 105 MMOL/L (ref 95–110)
CO2 SERPL-SCNC: 23 MMOL/L (ref 23–29)
CO2 SERPL-SCNC: 23 MMOL/L (ref 23–29)
CREAT SERPL-MCNC: 0.7 MG/DL (ref 0.5–1.4)
CREAT SERPL-MCNC: 0.7 MG/DL (ref 0.5–1.4)
ERYTHROCYTE [DISTWIDTH] IN BLOOD BY AUTOMATED COUNT: 15 % (ref 11.5–14.5)
GFR SERPLBLD CREATININE-BSD FMLA CKD-EPI: >60 ML/MIN/1.73/M2
GFR SERPLBLD CREATININE-BSD FMLA CKD-EPI: >60 ML/MIN/1.73/M2
GLUCOSE SERPL-MCNC: 126 MG/DL (ref 70–110)
GLUCOSE SERPL-MCNC: 127 MG/DL (ref 70–110)
HCT VFR BLD AUTO: 35 % (ref 37–48.5)
HGB BLD-MCNC: 11 GM/DL (ref 12–16)
IMM GRANULOCYTES # BLD AUTO: 0.05 K/UL (ref 0–0.04)
IMM GRANULOCYTES NFR BLD AUTO: 0.6 % (ref 0–0.5)
INR PPP: 1 (ref 0.8–1.2)
LYMPHOCYTES # BLD AUTO: 2.21 K/UL (ref 1–4.8)
MAGNESIUM SERPL-MCNC: 1.9 MG/DL (ref 1.6–2.6)
MCH RBC QN AUTO: 31.1 PG (ref 27–31)
MCHC RBC AUTO-ENTMCNC: 31.4 G/DL (ref 32–36)
MCV RBC AUTO: 99 FL (ref 82–98)
NUCLEATED RBC (/100WBC) (SMH): 0 /100 WBC
PLATELET # BLD AUTO: 473 K/UL (ref 150–450)
PMV BLD AUTO: 8.8 FL (ref 9.2–12.9)
POTASSIUM SERPL-SCNC: 4.4 MMOL/L (ref 3.5–5.1)
POTASSIUM SERPL-SCNC: 4.4 MMOL/L (ref 3.5–5.1)
PROT SERPL-MCNC: 7.9 GM/DL (ref 6–8.4)
PROT SERPL-MCNC: 8 GM/DL (ref 6–8.4)
PROTHROMBIN TIME: 11.4 SECONDS (ref 9–12.5)
RBC # BLD AUTO: 3.54 M/UL (ref 4–5.4)
RELATIVE EOSINOPHIL (SMH): 7.7 % (ref 0–8)
RELATIVE LYMPHOCYTE (SMH): 27 % (ref 18–48)
RELATIVE MONOCYTE (SMH): 5.3 % (ref 4–15)
RELATIVE NEUTROPHIL (SMH): 58.9 % (ref 38–73)
SODIUM SERPL-SCNC: 140 MMOL/L (ref 136–145)
SODIUM SERPL-SCNC: 140 MMOL/L (ref 136–145)
WBC # BLD AUTO: 8.19 K/UL (ref 3.9–12.7)

## 2025-06-03 PROCEDURE — 85610 PROTHROMBIN TIME: CPT

## 2025-06-03 PROCEDURE — 85025 COMPLETE CBC W/AUTO DIFF WBC: CPT

## 2025-06-03 PROCEDURE — 82040 ASSAY OF SERUM ALBUMIN: CPT

## 2025-06-03 PROCEDURE — 83735 ASSAY OF MAGNESIUM: CPT

## 2025-06-03 PROCEDURE — 85730 THROMBOPLASTIN TIME PARTIAL: CPT

## 2025-06-03 PROCEDURE — 36415 COLL VENOUS BLD VENIPUNCTURE: CPT

## 2025-06-03 PROCEDURE — 82247 BILIRUBIN TOTAL: CPT

## 2025-06-04 ENCOUNTER — CLINICAL SUPPORT (OUTPATIENT)
Dept: HEMATOLOGY/ONCOLOGY | Facility: CLINIC | Age: 60
End: 2025-06-04
Payer: COMMERCIAL

## 2025-06-04 ENCOUNTER — OFFICE VISIT (OUTPATIENT)
Dept: SURGERY | Facility: CLINIC | Age: 60
End: 2025-06-04
Payer: COMMERCIAL

## 2025-06-04 ENCOUNTER — INFUSION (OUTPATIENT)
Dept: INFUSION THERAPY | Facility: HOSPITAL | Age: 60
End: 2025-06-04
Payer: COMMERCIAL

## 2025-06-04 VITALS
HEART RATE: 88 BPM | TEMPERATURE: 98 F | DIASTOLIC BLOOD PRESSURE: 81 MMHG | OXYGEN SATURATION: 98 % | RESPIRATION RATE: 18 BRPM | SYSTOLIC BLOOD PRESSURE: 138 MMHG

## 2025-06-04 VITALS
OXYGEN SATURATION: 97 % | WEIGHT: 198 LBS | HEART RATE: 96 BPM | SYSTOLIC BLOOD PRESSURE: 132 MMHG | BODY MASS INDEX: 32.95 KG/M2 | DIASTOLIC BLOOD PRESSURE: 88 MMHG

## 2025-06-04 VITALS
WEIGHT: 198.88 LBS | TEMPERATURE: 98 F | SYSTOLIC BLOOD PRESSURE: 132 MMHG | BODY MASS INDEX: 33.13 KG/M2 | DIASTOLIC BLOOD PRESSURE: 88 MMHG | RESPIRATION RATE: 20 BRPM | OXYGEN SATURATION: 97 % | HEIGHT: 65 IN | HEART RATE: 96 BPM

## 2025-06-04 DIAGNOSIS — C78.7 METASTATIC COLON CANCER TO LIVER: ICD-10-CM

## 2025-06-04 DIAGNOSIS — D84.821 IMMUNODEFICIENCY SECONDARY TO CHEMOTHERAPY: ICD-10-CM

## 2025-06-04 DIAGNOSIS — T45.1X5A IMMUNODEFICIENCY SECONDARY TO CHEMOTHERAPY: ICD-10-CM

## 2025-06-04 DIAGNOSIS — Z79.69 IMMUNODEFICIENCY SECONDARY TO CHEMOTHERAPY: ICD-10-CM

## 2025-06-04 DIAGNOSIS — C18.2 MALIGNANT NEOPLASM OF ASCENDING COLON: Primary | ICD-10-CM

## 2025-06-04 DIAGNOSIS — D84.81 IMMUNODEFICIENCY SECONDARY TO NEOPLASM: ICD-10-CM

## 2025-06-04 DIAGNOSIS — C78.7 METASTASIS TO LIVER: ICD-10-CM

## 2025-06-04 DIAGNOSIS — T45.1X5A CHEMOTHERAPY-INDUCED NEUROPATHY: ICD-10-CM

## 2025-06-04 DIAGNOSIS — C78.7 METASTATIC COLON CANCER TO LIVER: Primary | ICD-10-CM

## 2025-06-04 DIAGNOSIS — C18.9 METASTATIC COLON CANCER TO LIVER: ICD-10-CM

## 2025-06-04 DIAGNOSIS — G62.0 CHEMOTHERAPY-INDUCED NEUROPATHY: ICD-10-CM

## 2025-06-04 DIAGNOSIS — K21.9 GASTROESOPHAGEAL REFLUX DISEASE, UNSPECIFIED WHETHER ESOPHAGITIS PRESENT: ICD-10-CM

## 2025-06-04 DIAGNOSIS — D49.9 IMMUNODEFICIENCY SECONDARY TO NEOPLASM: ICD-10-CM

## 2025-06-04 DIAGNOSIS — E08.00 DIABETES MELLITUS DUE TO UNDERLYING CONDITION WITH HYPEROSMOLARITY WITHOUT COMA, UNSPECIFIED WHETHER LONG TERM INSULIN USE: ICD-10-CM

## 2025-06-04 DIAGNOSIS — C18.9 METASTATIC COLON CANCER TO LIVER: Primary | ICD-10-CM

## 2025-06-04 PROCEDURE — 96425 CHEMOTHERAPY INFUSION METHOD: CPT

## 2025-06-04 PROCEDURE — 3075F SYST BP GE 130 - 139MM HG: CPT | Mod: CPTII,S$GLB,, | Performed by: SURGERY

## 2025-06-04 PROCEDURE — 1159F MED LIST DOCD IN RCRD: CPT | Mod: CPTII,S$GLB,, | Performed by: SURGERY

## 2025-06-04 PROCEDURE — 99999 PR PBB SHADOW E&M-EST. PATIENT-LVL III: CPT | Mod: PBBFAC,,, | Performed by: REGISTERED NURSE

## 2025-06-04 PROCEDURE — 3079F DIAST BP 80-89 MM HG: CPT | Mod: CPTII,S$GLB,, | Performed by: SURGERY

## 2025-06-04 PROCEDURE — 63600175 PHARM REV CODE 636 W HCPCS: Performed by: INTERNAL MEDICINE

## 2025-06-04 PROCEDURE — 99999 PR PBB SHADOW E&M-EST. PATIENT-LVL IV: CPT | Mod: PBBFAC,,, | Performed by: SURGERY

## 2025-06-04 PROCEDURE — 25000003 PHARM REV CODE 250: Performed by: INTERNAL MEDICINE

## 2025-06-04 PROCEDURE — 4010F ACE/ARB THERAPY RXD/TAKEN: CPT | Mod: CPTII,S$GLB,, | Performed by: SURGERY

## 2025-06-04 PROCEDURE — 99024 POSTOP FOLLOW-UP VISIT: CPT | Mod: S$GLB,,, | Performed by: SURGERY

## 2025-06-04 RX ORDER — PROCHLORPERAZINE EDISYLATE 5 MG/ML
10 INJECTION INTRAMUSCULAR; INTRAVENOUS ONCE AS NEEDED
Status: CANCELLED | OUTPATIENT
Start: 2025-06-18

## 2025-06-04 RX ORDER — SODIUM CHLORIDE 0.9 % (FLUSH) 0.9 %
10 SYRINGE (ML) INJECTION
OUTPATIENT
Start: 2025-06-20

## 2025-06-04 RX ORDER — EPINEPHRINE 0.3 MG/.3ML
0.3 INJECTION SUBCUTANEOUS ONCE AS NEEDED
OUTPATIENT
Start: 2025-06-18

## 2025-06-04 RX ORDER — DIPHENHYDRAMINE HYDROCHLORIDE 50 MG/ML
50 INJECTION, SOLUTION INTRAMUSCULAR; INTRAVENOUS ONCE AS NEEDED
OUTPATIENT
Start: 2025-06-18

## 2025-06-04 RX ORDER — HEPARIN 100 UNIT/ML
500 SYRINGE INTRAVENOUS
OUTPATIENT
Start: 2025-06-18

## 2025-06-04 RX ORDER — PROCHLORPERAZINE EDISYLATE 5 MG/ML
10 INJECTION INTRAMUSCULAR; INTRAVENOUS ONCE AS NEEDED
OUTPATIENT
Start: 2025-06-20

## 2025-06-04 RX ORDER — DIPHENHYDRAMINE HYDROCHLORIDE 50 MG/ML
50 INJECTION, SOLUTION INTRAMUSCULAR; INTRAVENOUS ONCE AS NEEDED
Status: DISCONTINUED | OUTPATIENT
Start: 2025-06-04 | End: 2025-06-04 | Stop reason: HOSPADM

## 2025-06-04 RX ORDER — SODIUM CHLORIDE 0.9 % (FLUSH) 0.9 %
10 SYRINGE (ML) INJECTION
OUTPATIENT
Start: 2025-06-18

## 2025-06-04 RX ORDER — HEPARIN 100 UNIT/ML
500 SYRINGE INTRAVENOUS
OUTPATIENT
Start: 2025-06-20

## 2025-06-04 RX ORDER — EPINEPHRINE 0.3 MG/.3ML
0.3 INJECTION SUBCUTANEOUS ONCE AS NEEDED
Status: DISCONTINUED | OUTPATIENT
Start: 2025-06-04 | End: 2025-06-04 | Stop reason: HOSPADM

## 2025-06-04 RX ADMIN — FLOXURIDINE 204 MG: 500 INJECTION, POWDER, LYOPHILIZED, FOR SOLUTION INTRA-ARTERIAL at 12:06

## 2025-06-13 DIAGNOSIS — T45.1X5A CHEMOTHERAPY-INDUCED NAUSEA: ICD-10-CM

## 2025-06-13 DIAGNOSIS — T45.1X5A CHEMOTHERAPY-INDUCED NEUROPATHY: ICD-10-CM

## 2025-06-13 DIAGNOSIS — R11.0 CHEMOTHERAPY-INDUCED NAUSEA: ICD-10-CM

## 2025-06-13 DIAGNOSIS — G62.0 CHEMOTHERAPY-INDUCED NEUROPATHY: ICD-10-CM

## 2025-06-13 DIAGNOSIS — C18.2 MALIGNANT NEOPLASM OF ASCENDING COLON: ICD-10-CM

## 2025-06-13 RX ORDER — DULOXETIN HYDROCHLORIDE 20 MG/1
20 CAPSULE, DELAYED RELEASE ORAL
Qty: 90 CAPSULE | Refills: 3 | Status: SHIPPED | OUTPATIENT
Start: 2025-06-13

## 2025-06-16 ENCOUNTER — OFFICE VISIT (OUTPATIENT)
Dept: HEMATOLOGY/ONCOLOGY | Facility: CLINIC | Age: 60
End: 2025-06-16
Payer: COMMERCIAL

## 2025-06-16 ENCOUNTER — LAB VISIT (OUTPATIENT)
Dept: LAB | Facility: HOSPITAL | Age: 60
End: 2025-06-16
Attending: REGISTERED NURSE
Payer: COMMERCIAL

## 2025-06-16 DIAGNOSIS — T45.1X5A CHEMOTHERAPY-INDUCED NEUROPATHY: ICD-10-CM

## 2025-06-16 DIAGNOSIS — R11.0 CHEMOTHERAPY-INDUCED NAUSEA: ICD-10-CM

## 2025-06-16 DIAGNOSIS — C18.9 METASTATIC COLON CANCER TO LIVER: ICD-10-CM

## 2025-06-16 DIAGNOSIS — C18.2 MALIGNANT NEOPLASM OF ASCENDING COLON: Primary | ICD-10-CM

## 2025-06-16 DIAGNOSIS — E08.00 DIABETES MELLITUS DUE TO UNDERLYING CONDITION WITH HYPEROSMOLARITY WITHOUT COMA, UNSPECIFIED WHETHER LONG TERM INSULIN USE: ICD-10-CM

## 2025-06-16 DIAGNOSIS — D49.9 IMMUNODEFICIENCY SECONDARY TO NEOPLASM: ICD-10-CM

## 2025-06-16 DIAGNOSIS — Z79.69 IMMUNODEFICIENCY SECONDARY TO CHEMOTHERAPY: ICD-10-CM

## 2025-06-16 DIAGNOSIS — D84.81 IMMUNODEFICIENCY SECONDARY TO NEOPLASM: ICD-10-CM

## 2025-06-16 DIAGNOSIS — T45.1X5A IMMUNODEFICIENCY SECONDARY TO CHEMOTHERAPY: ICD-10-CM

## 2025-06-16 DIAGNOSIS — T45.1X5A CHEMOTHERAPY-INDUCED NAUSEA: ICD-10-CM

## 2025-06-16 DIAGNOSIS — K21.9 GASTROESOPHAGEAL REFLUX DISEASE, UNSPECIFIED WHETHER ESOPHAGITIS PRESENT: ICD-10-CM

## 2025-06-16 DIAGNOSIS — C78.7 METASTATIC COLON CANCER TO LIVER: ICD-10-CM

## 2025-06-16 DIAGNOSIS — C78.7 METASTASIS TO LIVER: ICD-10-CM

## 2025-06-16 DIAGNOSIS — D84.821 IMMUNODEFICIENCY SECONDARY TO CHEMOTHERAPY: ICD-10-CM

## 2025-06-16 DIAGNOSIS — C18.2 MALIGNANT NEOPLASM OF ASCENDING COLON: ICD-10-CM

## 2025-06-16 DIAGNOSIS — G62.0 CHEMOTHERAPY-INDUCED NEUROPATHY: ICD-10-CM

## 2025-06-16 LAB
ABSOLUTE NEUTROPHIL COUNT (SMH): 3.9 K/UL (ref 1.8–7.7)
ALBUMIN SERPL-MCNC: 4.3 G/DL (ref 3.5–5.2)
ALP SERPL-CCNC: 179 UNIT/L (ref 40–150)
ALT SERPL-CCNC: 77 UNIT/L (ref 10–44)
ANION GAP (SMH): 12 MMOL/L (ref 8–16)
AST SERPL-CCNC: 81 UNIT/L (ref 11–45)
BILIRUB SERPL-MCNC: 0.5 MG/DL (ref 0.1–1)
BUN SERPL-MCNC: 20 MG/DL (ref 6–20)
CALCIUM SERPL-MCNC: 10 MG/DL (ref 8.7–10.5)
CARCINOEMBRYONIC ANTIGEN (SMH): 4.5 NG/ML
CHLORIDE SERPL-SCNC: 102 MMOL/L (ref 95–110)
CO2 SERPL-SCNC: 26 MMOL/L (ref 23–29)
CREAT SERPL-MCNC: 0.8 MG/DL (ref 0.5–1.4)
ERYTHROCYTE [DISTWIDTH] IN BLOOD BY AUTOMATED COUNT: 13.2 % (ref 11.5–14.5)
GFR SERPLBLD CREATININE-BSD FMLA CKD-EPI: >60 ML/MIN/1.73/M2
GLUCOSE SERPL-MCNC: 118 MG/DL (ref 70–110)
HCT VFR BLD AUTO: 40 % (ref 37–48.5)
HGB BLD-MCNC: 13 GM/DL (ref 12–16)
IMM GRANULOCYTES # BLD AUTO: 0.05 K/UL (ref 0–0.04)
MCH RBC QN AUTO: 32 PG (ref 27–31)
MCHC RBC AUTO-ENTMCNC: 32.5 G/DL (ref 32–36)
MCV RBC AUTO: 99 FL (ref 82–98)
PLATELET # BLD AUTO: 269 K/UL (ref 150–450)
PMV BLD AUTO: 9.5 FL (ref 9.2–12.9)
POTASSIUM SERPL-SCNC: 4.5 MMOL/L (ref 3.5–5.1)
PROT SERPL-MCNC: 8.3 GM/DL (ref 6–8.4)
RBC # BLD AUTO: 4.06 M/UL (ref 4–5.4)
SODIUM SERPL-SCNC: 140 MMOL/L (ref 136–145)
WBC # BLD AUTO: 7.11 K/UL (ref 3.9–12.7)

## 2025-06-16 PROCEDURE — 82378 CARCINOEMBRYONIC ANTIGEN: CPT

## 2025-06-16 PROCEDURE — 85027 COMPLETE CBC AUTOMATED: CPT

## 2025-06-16 PROCEDURE — 36415 COLL VENOUS BLD VENIPUNCTURE: CPT

## 2025-06-16 PROCEDURE — 80053 COMPREHEN METABOLIC PANEL: CPT

## 2025-06-16 RX ORDER — DULOXETINE 40 MG/1
20 CAPSULE, DELAYED RELEASE ORAL DAILY
Qty: 90 CAPSULE | Refills: 3 | Status: SHIPPED | OUTPATIENT
Start: 2025-06-16 | End: 2025-06-17

## 2025-06-16 RX ORDER — PROCHLORPERAZINE EDISYLATE 5 MG/ML
10 INJECTION INTRAMUSCULAR; INTRAVENOUS ONCE AS NEEDED
Status: CANCELLED | OUTPATIENT
Start: 2025-06-18

## 2025-06-16 NOTE — PROGRESS NOTES
The patient location is: [place]  The chief complaint leading to consultation is: chemo follow up, lab review, treatment clearance    Visit type: audiovisual    Each patient to whom he or she provides medical services by telemedicine is:  (1) informed of the relationship between the physician and patient and the respective role of any other health care provider with respect to management of the patient; and (2) notified that he or she may decline to receive medical services by telemedicine and may withdraw from such care at any time.    Notes:     GI MEDICAL ONCOLOGY    Reason for visit: Colon cancer    Best Contact Phone Number(s): There are no phone numbers on file.     Cancer/Stage/TNM:    Cancer Staging   Malignant neoplasm of ascending colon  Staging form: Colon and Rectum, AJCC 8th Edition  - Clinical: Stage BIBIANA (cT3, cN1a, cM1a) - Signed by Amarjit Crawford MD on 12/18/2024       Oncology History   Malignant neoplasm of ascending colon   12/12/2024 Initial Diagnosis    Malignant neoplasm of ascending colon     12/18/2024 Cancer Staged    Staging form: Colon and Rectum, AJCC 8th Edition  - Clinical: Stage BIBIANA (cT3, cN1a, cM1a)     2/4/2025 -  Chemotherapy    Treatment Summary   Plan Name: OP GI [id number] (oxaliplatin leucovorin fluorouracil) Q2W  Treatment Goal: Control  Status: Active  Start Date: 2/4/2025  End Date: 8/15/2025 (Planned)  Provider: Amarjit Crawford MD  Chemotherapy: fluorouraciL injection 830 mg, 400 mg/m2 = 830 mg, Intravenous, Clinic/HOD 1 time, 7 of 7 cycles  Administration: 830 mg (2/4/2025), 830 mg (2/18/2025), 830 mg (3/5/2025), 830 mg (3/19/2025), 830 mg (4/2/2025), 830 mg (4/15/2025), 830 mg (4/29/2025)  oxaliplatin (ELOXATIN) 85 mg/m2 = 177 mg in D5W 600.4 mL chemo infusion, 85 mg/m2 = 177 mg, Intravenous, Clinic/HOD 1 time, 7 of 11 cycles  Administration: 177 mg (2/4/2025), 177 mg (2/18/2025), 177 mg (3/5/2025), 177 mg (3/19/2025), 177 mg (4/2/2025), 177 mg (4/15/2025), 177 mg  (4/29/2025)  fluorouracil (Adrucil) 2,400 mg/m2 = 4,990 mg in 0.9% NaCl 250 mL chemo infusion, 2,400 mg/m2 = 4,990 mg, Intravenous, Over 46 hours, 7 of 12 cycles  Dose modification: 2,000 mg/m2 (original dose 2,400 mg/m2, Cycle 8)  Administration: 4,990 mg (2/4/2025), 4,990 mg (2/18/2025), 4,990 mg (3/5/2025), 4,990 mg (3/19/2025), 4,990 mg (4/2/2025), 4,990 mg (4/15/2025), 4,990 mg (4/29/2025)          INTERVAL HISTORY:  Mrs. Lowery presents today prior to restarting 5FU maintenance + BOB flush. Feeling well overall. Eating okay and trying to stay hydrated. Notes the last few days, she has had mild nausea, diarrhea and constipation that are all well managed. Neuropathy persists, stable from prior. No falls. No further drainage from incision site.     Patient presents to virtual visit alone today. ECOG PS is 0.    ROS:   Review of Systems   Constitutional:  Positive for malaise/fatigue. Negative for chills, fever and weight loss.   Respiratory:  Negative for shortness of breath.    Cardiovascular:  Negative for chest pain and palpitations.   Gastrointestinal:  Negative for abdominal pain, constipation, diarrhea, nausea and vomiting.   Genitourinary:  Negative for hematuria.   Musculoskeletal:  Negative for falls.   Neurological:  Positive for tingling. Negative for dizziness, weakness and headaches.   Psychiatric/Behavioral:  The patient is not nervous/anxious.        Past Medical History:   Past Medical History:   Diagnosis Date    Colon cancer     Diabetes mellitus     Diabetes mellitus, type 2     Encounter for blood transfusion     Herpes     Hypertension     Sleep apnea         Past Surgical History:   Past Surgical History:   Procedure Laterality Date    CHOLECYSTECTOMY N/A 5/21/2025    Procedure: CHOLECYSTECTOMY;  Surgeon: Mike Henry MD;  Location: [facility] OR [address] [place] [address];  Service: General;  Laterality: N/A;    COLONOSCOPY N/A 11/8/2024    Procedure: COLONOSCOPY;  Surgeon: Saw Wick MD;  Location:  [facility] ENDO;  Service: Endoscopy;  Laterality: N/A;    ESOPHAGOGASTRODUODENOSCOPY N/A 11/8/2024    Procedure: EGD (ESOPHAGOGASTRODUODENOSCOPY);  Surgeon: Saw Wick MD;  Location: [facility];  Service: Endoscopy;  Laterality: N/A;    EYE SURGERY  2002    lasix Bilateral    HYSTERECTOMY  2012    INSERTION OF TOTALLY IMPLANTABLE INFUSION PUMP N/A 5/21/2025    Procedure: INSERTION, INFUSION PUMP, TOTALLY IMPLANTABLE; BOB PUMP;  Surgeon: Mike Henry MD;  Location: [facility] OR [address] [place] [address];  Service: General;  Laterality: N/A;    INSERTION OF TUNNELED CENTRAL VENOUS CATHETER (CVC) WITH SUBCUTANEOUS PORT Right 12/20/2024    Procedure: INSERTION, PORT-A-CATH;  Surgeon: Logan Juarez MD;  Location: [facility];  Service: General;  Laterality: Right;    LAPAROTOMY, EXPLORATORY N/A 5/21/2025    Procedure: LAPAROTOMY, EXPLORATORY;  Surgeon: Mike Henry MD;  Location: [facility] OR [address] [place] [address];  Service: General;  Laterality: N/A;    LUMBAR DISCECTOMY      ROBOT-ASSISTED COLECTOMY Right 1/7/2025    Procedure: ROBOTIC COLECTOMY WITH ILEOCOLIC ANASTOMOSIS;  Surgeon: Logan Juarez MD;  Location: [facility];  Service: General;  Laterality: Right;    WISDOM TOOTH EXTRACTION          Family History:   Family History   Problem Relation Name Age of Onset    Heart disease Mother      Hypertension Mother      Cataracts Mother      Pancreatic cancer Father      Cancer Father      Arthritis Sister 2     Diabetes Brother 1     No Known Problems Paternal Aunt 1     Heart disease Paternal Uncle 2     Heart disease Maternal Grandfather      Alzheimer's disease Paternal Grandmother      Glaucoma Neg Hx          Social History:   Social History     Tobacco Use    Smoking status: Never     Passive exposure: Past    Smokeless tobacco: Never   Substance Use Topics    Alcohol use: No        I have reviewed and updated the patient's past medical, surgical, family and social histories.    Allergies:   Review of patient's allergies indicates:   Allergen  Reactions    Robaxin [methocarbamol] Nausea Only        Medications:   Current Medications[1]     Physical Exam:   There were no vitals taken for this visit.           Physical Exam  Constitutional:       General: She is not in acute distress.     Appearance: Normal appearance. She is not ill-appearing.   HENT:      Head: Normocephalic and atraumatic.   Pulmonary:      Effort: Pulmonary effort is normal. No respiratory distress.   Chest:      Comments: RCW port  Abdominal:      Comments: LLQ BOB pump   Neurological:      Mental Status: She is alert and oriented to person, place, and time.   Psychiatric:         Mood and Affect: Mood normal.         Behavior: Behavior normal.         Labs:   Recent Results (from the past 48 hours)   CEA    Collection Time: 06/16/25 11:05 AM   Result Value Ref Range    Carcinoembryonic Antigen 4.5 <=5.0 ng/mL   Comprehensive Metabolic Panel    Collection Time: 06/16/25 11:05 AM   Result Value Ref Range    Sodium 140 136 - 145 mmol/L    Potassium 4.5 3.5 - 5.1 mmol/L    Chloride 102 95 - 110 mmol/L    CO2 26 23 - 29 mmol/L    Glucose 118 (H) 70 - 110 mg/dL    BUN 20 6 - 20 mg/dL    Creatinine 0.8 0.5 - 1.4 mg/dL    Calcium 10.0 8.7 - 10.5 mg/dL    Protein Total 8.3 6.0 - 8.4 gm/dL    Albumin 4.3 3.5 - 5.2 g/dL    Bilirubin Total 0.5 0.1 - 1.0 mg/dL     (H) 40 - 150 unit/L    AST 81 (H) 11 - 45 unit/L    ALT 77 (H) 10 - 44 unit/L    Anion Gap 12 8 - 16 mmol/L    eGFR >60 >60 mL/min/1.73/m2   CBC Oncology    Collection Time: 06/16/25 11:05 AM   Result Value Ref Range    WBC 7.11 3.90 - 12.70 K/uL    RBC 4.06 4.00 - 5.40 M/uL    Hgb 13.0 12.0 - 16.0 gm/dL    Hct 40.0 37.0 - 48.5 %    MCV 99 (H) 82 - 98 fL    MCH 32.0 (H) 27.0 - 31.0 pg    MCHC 32.5 32.0 - 36.0 g/dL    RDW 13.2 11.5 - 14.5 %    Platelet Count 269 150 - 450 K/uL    MPV 9.5 9.2 - 12.9 fL    Neut # 3.9 1.8 - 7.7 K/uL    Imm Grans # 0.05 (H) 0.00 - 0.04 K/uL        Imaging:       MRI - 4/28/25:  Impression:     Findings  consistent with changes of ablation with further decrease of the ablation cavity and no suspicious nodular enhancement.  Innumerable small subcentimeter suggesting metastatic disease some appearing more apparent but not thought overall significantly changed compared to the prior study MRI 02/28/2025.    Path:   COLON:     PROCEDURE: Right hemicolectomy   MACROSCOPIC EVALUATION OF MESORECTUM: Not applicable   TUMOR SITE: Cecum   RECTAL TUMOR LOCATION: Not applicable   HISTOLOGIC TYPE: Adenocarcinoma   HISTOLOGIC GRADE: Moderately differentiated   TUMOR SIZE: 48 mm and 4 mm   MULTIPLE PRIMARY SITES: Two separate foci, both in cecum/ascending colon    region   TUMOR EXTENT: Tumor invades kristopher-intestinal adipose tissue   SUB-MUCOSAL INVASION: Not applicable   MACROSCOPIC TUMOR PERFORATION: Negative   LYMPHATIC AND/OR VASCULAR INVASION: Positive   PERINEURAL INVASION: Negative   TUMOR BUDDING SCORE: Moderate   TREATMENT EFFECT: Not applicable   MARGIN STATUS FOR INVASIVE CARCINOMA: Negative   DISTANCE OF INVASIVE CARCINOMA TO RADIAL MARGIN: 35 mm   DISTANCE OF INVASIVE CARCINOMA TO CLOSEST MARGIN: 35 mm to radial margin   MARGINS INVOLVED BY INVASIVE CARCINOMA: Not applicable   MARGIN STATUS FOR NON-INVASIVE TUMOR: Negative   DNA MISMATCH REPAIR STATUS (MMR): Previously performed ([id number]). No    loss of nuclear expression of MMR proteins: Low probability of MSI-H.   REGIONAL LYMPH NODE STATUS:   NUMBER OF LYMPH NODES WITH TUMOR: 3   NUMBER OF LYMPH NODES EXAMINED: 16   TUMOR DEPOSITS: Negative   DISTANT SITES INVOLVED: Liver ([facility]; [id number]).   PATHOLOGIC STAGE CLASSIFICATION: pT3 pN1b pM1a     Assessment:       1. Malignant neoplasm of ascending colon    2. Metastatic colon cancer to liver    3. Metastasis to liver    4. Immunodeficiency secondary to neoplasm    5. Chemotherapy-induced nausea    6. Immunodeficiency secondary to chemotherapy    7. Chemotherapy-induced neuropathy    8. Gastroesophageal reflux  disease, unspecified whether esophagitis present    9. Diabetes mellitus due to underlying condition with hyperosmolarity without coma, unspecified whether long term insulin use       Plan:        # Cecal adenocarcinoma  Diagnosed on colonoscopy 11/8/24.  Had synchronous liver metastases, small but diffuse and bilobar.  Biopsy proven, MWA to segment 8 metastasis done 1/29/25.  Underwent upfront R hemicolectomy 1/7/25 due to recurrent bleeding with Dr. Juarez.  Path showed pT3 N1b M1a disease, pMMR.  Started on FOLFOX on 2/4/25 with Dr. Crawford.  Repeat imaging after cycle 6 shows stable unresectable bilobar small liver metastases.  No clear evidence of extrahepatic disease.  She received cycle 7 of FOLFOX on 4/29/25.    At our initial visit, we discussed options of continuing FOLFOX with addition of bevacizumab, understanding she would have to come off oxaliplatin shortly due to neuropathy and proceed with maintenance chemovs switching to FOLFIRI + antoine second line vs BOB pump.  Also could consider future liver transplantation.  Will discuss her at [facility] tumor board with recommendation to proceed with BOB pump placement.    After extensive discussion of options and potential risks/toxicities of BOB, she wants to proceed with BOB pump.      Underwent pump placement 5/21/25.  Cycle 1 FUDR administered 6/4/25.  Using ideal average weight for dosage calculations.   Starting dose 203.8 mg.     Labs reviewed, adequate for treatment.   Proceed with cycle 1 BOB flush + 5FU maintenance this week as scheduled.   Can always add irinotecan in future to 5-FU if there are signs of intrahepatic or extrahepatic progression.    Because of increased risk of gastritis/duodenitis with BOB pump, increased omeprazole to 40 mg daily.      Tempus xT: INDIA, AMER1, APC, KRAS G12V, SMAD3, TMB  10.5  PGX: DPYD nml, UGT1A1 nml    # CIPN  Will recommend she hold further oxaliplatin.  Continue Cymbalta, gabapentin. Refilled cymbalta today.     #  T2DM  Continue medical therapy.    Follow up: 2 weeks.    Patient is in agreement with the proposed treatment plan. All questions were answered to the patient's satisfaction. Pt knows to call clinic if anything is needed before the next clinic visit.    Patient discussed with collaborating physician, Dr. Stanford.    At least 40 minutes were spent today on this encounter including face to face time with the patient, data gathering/interpretation and documentation.       Saige Bender, MSN, APRN, ACCNS-AG  Hematology and Medical Oncology  Clinical Nurse Specialist to Dr. Stanford, Dr. Blount & Dr. Vuong         code applied: patient requires or will require a continuous, longitudinal, and active collaborative plan of care related to this patient's health condition, colon cancer --the management of which requires the direction of a practitioner with specialized clinical knowledge, skill, and expertise.     Route Chart for Scheduling    Med Onc Chart Routing      Follow up with physician 6 weeks. with labs to see Dr. Stanford for chemo. keep current appointments as scheduled.   Follow up with [facility]    Infusion scheduling note   chemo and BOB infusion every 2 weeks (at [facility]), pump d/c on day 3 (can be at [facility])   Injection scheduling note    Labs CBC, CMP and CEA   Scheduling:  Preferred lab:  Lab interval: every 2 weeks     Imaging    Pharmacy appointment    Other referrals            Treatment Plan Information   OP GI mFOLFOX6 (oxaliplatin leucovorin fluorouracil) Q2W Amarjit Crawford MD   Associated diagnosis: Malignant neoplasm of ascending colon Stage BIBIANA cT3, cN1a, cM1a noted on 12/12/2024   Line of treatment: Neoadjuvant  Treatment Goal: Control     Upcoming Treatment Dates - OP GI mFOLFOX6 (oxaliplatin leucovorin fluorouracil) Q2W    6/18/2025       Chemotherapy       fluorouracil (Adrucil) 2,000 mg/m2 = 4,160 mg in 0.9% NaCl 100 mL chemo infusion       Antiemetics       palonosetron (ALOXI) 0.25 mg with  Dexamethasone (DECADRON) 12 mg in NS 50 mL IVPB  7/2/2025       Chemotherapy       oxaliplatin (ELOXATIN) 85 mg/m2 = 177 mg in D5W 535.4 mL chemo infusion       fluorouracil (Adrucil) 2,000 mg/m2 = 4,160 mg in 0.9% NaCl 100 mL chemo infusion       Antiemetics       palonosetron (ALOXI) 0.25 mg with Dexamethasone (DECADRON) 12 mg in NS 50 mL IVPB  7/16/2025       Chemotherapy       oxaliplatin (ELOXATIN) 85 mg/m2 = 177 mg in D5W 535.4 mL chemo infusion       fluorouracil (Adrucil) 2,000 mg/m2 = 4,160 mg in 0.9% NaCl 100 mL chemo infusion       Antiemetics       palonosetron (ALOXI) 0.25 mg with Dexamethasone (DECADRON) 12 mg in NS 50 mL IVPB  7/30/2025       Chemotherapy       oxaliplatin (ELOXATIN) 85 mg/m2 = 177 mg in D5W 535.4 mL chemo infusion       fluorouracil (Adrucil) 2,000 mg/m2 = 4,160 mg in 0.9% NaCl 100 mL chemo infusion       Antiemetics       palonosetron (ALOXI) 0.25 mg with Dexamethasone (DECADRON) 12 mg in NS 50 mL IVPB    Supportive Plan Information  OP Intera 3000 Floxuridine Hepatic Artery Infusion (BOB) Pump Thom Stanford MD   Associated Diagnosis: Malignant neoplasm of ascending colon Stage BIBIANA cT3, cN1a, cM1a noted on 12/12/2024  Associated Diagnosis: Metastasis to liver   noted on 12/18/2024   Line of treatment: Consolidation   Treatment goal: Curative     Upcoming Treatment Dates - OP Intera 3000 Floxuridine Hepatic Artery Infusion (BOB) Pump    6/18/2025       Medications       heparin 30,000 units in NS 30 mL INTRA-ARTERIAL infusion syringe  6/19/2025       Chemotherapy       floxuridine 203.8 mg, heparin (porcine) 30,000 Units, dexAMETHasone 23 mg in 0.9% NaCl 30 mL INTRA-ARTERIAL infusion syringe  7/3/2025       Medications       heparin 30,000 units in NS 30 mL INTRA-ARTERIAL infusion syringe  7/4/2025       Chemotherapy       floxuridine 203.8 mg, heparin (porcine) 30,000 Units, dexAMETHasone 23 mg in 0.9% NaCl 30 mL INTRA-ARTERIAL infusion syringe    Therapy Plan  Information  FERRLECIT (FERRIC GLUCONATE) QW for Iron deficiency anemia, noted on 11/7/2024  Anaphylaxis/Hypersensitivity  EPINEPHrine (EPIPEN) 0.3 mg/0.3 mL pen injection 0.3 mg  0.3 mg, Intramuscular, PRN  diphenhydrAMINE injection 50 mg  50 mg, Intravenous, PRN  hydrocortisone sodium succinate injection 100 mg  100 mg, Intravenous, PRN  Flushes  heparin, porcine (PF) 100 unit/mL injection flush 500 Units  500 Units, Intravenous, PRN  sodium chloride 0.9% flush 10 mL  10 mL, Intravenous, 1 time a week  0.9% NaCl 100 mL flush bag  Intravenous, 1 time a week    PORT FLUSH for Colon adenocarcinoma, noted on 12/20/2024  PORT FLUSH for Malignant neoplasm of ascending colon, noted on 12/12/2024  Flushes  heparin, porcine (PF) 100 unit/mL injection flush 500 Units  500 Units, Intravenous, Every visit  sodium chloride 0.9% flush 10 mL  10 mL, Intravenous, Every visit    INF FLUIDS for Colon adenocarcinoma, noted on 12/20/2024  IV Fluids  sodium chloride 0.9% bolus 1,000 mL 1,000 mL  1,000 mL, Intravenous, Every visit  Flushes  sodium chloride 0.9% flush 10 mL  10 mL, Intravenous, PRN  heparin, porcine (PF) 100 unit/mL injection flush 500 Units  500 Units, Intravenous, PRN         [1]   Current Outpatient Medications   Medication Sig Dispense Refill    apixaban (ELIQUIS) 2.5 mg Tab Take 1 tablet (2.5 mg total) by mouth 2 (two) times daily. 56 tablet 0    atorvastatin (LIPITOR) 20 MG tablet TAKE 1 TABLET BY MOUTH EVERY DAY 90 tablet 3    blood-glucose meter kit To check BG 3 times daily, to use with insurance preferred meter 1 each 0    dexAMETHasone (DECADRON) 4 MG Tab Take 2 tablets (8 mg total) by mouth once daily. On days 2 through 4 of each cycle of chemo (Patient not taking: Reported on 6/4/2025) 120 tablet 0    docusate sodium (COLACE) 100 MG capsule Take 1 capsule (100 mg total) by mouth 2 (two) times daily. 14 capsule 0    DULoxetine (CYMBALTA) 20 MG capsule TAKE 1 CAPSULE BY MOUTH EVERY DAY 90 capsule 3     "fexofenadine (ALLEGRA) 180 MG tablet Take 180 mg by mouth nightly.      fluticasone propionate (FLONASE) 50 mcg/actuation nasal spray SPRAY 2 SPRAYS BY EACH NOSTRIL ROUTE ONCE DAILY. 48 mL 3    FREESTYLE JOCELIN 14 DAY SENSOR Kit 1 APPLICATION BY MISC.(NON-DRUG COMBO ROUTE) ROUTE DAILY AS NEEDED. (Patient not taking: Reported on 6/4/2025) 1 kit 24    gabapentin (NEURONTIN) 800 MG tablet Take 800 mg by mouth 3 (three) times daily.      guaiFENesin (MUCINEX) 1,200 mg Ta12 Take 1 tablet by mouth nightly.      insulin glargine, TOUJEO, (TOUJEO SOLOSTAR U-300 INSULIN) 300 unit/mL (1.5 mL) InPn pen Inject 30 Units into the skin once daily. 3 mL 11    lamoTRIgine (LAMICTAL) 150 MG Tab TAKE 1 TABLET BY MOUTH EVERY DAY 90 tablet 3    lancets Misc To check BG 3 times daily, to use with insurance preferred meter 200 each 1    LIDOcaine-prilocaine (EMLA) cream Apply topically as needed (Apply to port site 30 minutes before access as needed). 30 g 0    lisinopriL (PRINIVIL,ZESTRIL) 20 MG tablet TAKE 1 TABLET BY MOUTH EVERY DAY 90 tablet 3    magnesium oxide (MAG-OX) 400 mg (241.3 mg magnesium) tablet Take 1 tablet (400 mg total) by mouth 2 (two) times daily. 60 tablet 3    metFORMIN (GLUCOPHAGE-XR) 500 MG ER 24hr tablet TAKE 2 TABLETS BY MOUTH TWICE A DAY WITH MEALS 360 tablet 3    multivitamin (THERAGRAN) per tablet Take 1 tablet by mouth once daily.      omeprazole (PRILOSEC) 40 MG capsule Take 1 capsule (40 mg total) by mouth once daily. 90 capsule 3    ondansetron (ZOFRAN) 8 MG tablet Take 1 tablet (8 mg total) by mouth every 8 (eight) hours as needed. 30 tablet 2    ONETOUCH ULTRA BLUE TEST STRIP Strp USE TO CHECK BLOOD SUGAR 3 TIMES A  strip 0    oxyCODONE (ROXICODONE) 5 MG immediate release tablet Take 1 tablet (5 mg total) by mouth every 6 (six) hours as needed for Pain. 20 tablet 0    pen needle, diabetic (PEN NEEDLE) 31 gauge x 5/16" Ndle 1 Application by Misc.(Non-Drug; Combo Route) route once daily. 30 each 5    " polyethylene glycol (GLYCOLAX) 17 gram/dose powder Use to cap to measure 17g, mix with liquid, and take by mouth 2 (two) times daily. 1020 g 0    prochlorperazine (COMPAZINE) 10 MG tablet Take 1 tablet (10 mg total) by mouth every 6 (six) hours as needed. 30 tablet 1    sertraline (ZOLOFT) 100 MG tablet TAKE 1 TABLET BY MOUTH EVERY DAY 90 tablet 3    valACYclovir (VALTREX) 500 MG tablet TAKE 1 TABLET BY MOUTH TWICE A  tablet 3     Current Facility-Administered Medications   Medication Dose Route Frequency Provider Last Rate Last Admin    diphenhydrAMINE capsule 25 mg  25 mg Oral PRN Amarjit Crawford MD        sodium chloride 0.9% flush 10 mL  10 mL Intravenous PRN Amarjit Crawford MD

## 2025-06-17 ENCOUNTER — PATIENT MESSAGE (OUTPATIENT)
Dept: HEMATOLOGY/ONCOLOGY | Facility: CLINIC | Age: 60
End: 2025-06-17
Payer: COMMERCIAL

## 2025-06-17 DIAGNOSIS — T45.1X5A CHEMOTHERAPY-INDUCED NAUSEA: ICD-10-CM

## 2025-06-17 DIAGNOSIS — T45.1X5A CHEMOTHERAPY-INDUCED NEUROPATHY: ICD-10-CM

## 2025-06-17 DIAGNOSIS — G62.0 CHEMOTHERAPY-INDUCED NEUROPATHY: ICD-10-CM

## 2025-06-17 DIAGNOSIS — R11.0 CHEMOTHERAPY-INDUCED NAUSEA: ICD-10-CM

## 2025-06-17 DIAGNOSIS — C18.2 MALIGNANT NEOPLASM OF ASCENDING COLON: ICD-10-CM

## 2025-06-17 RX ORDER — DULOXETINE 40 MG/1
40 CAPSULE, DELAYED RELEASE ORAL DAILY
Qty: 90 CAPSULE | Refills: 3 | Status: SHIPPED | OUTPATIENT
Start: 2025-06-17

## 2025-06-18 ENCOUNTER — PATIENT MESSAGE (OUTPATIENT)
Dept: HEMATOLOGY/ONCOLOGY | Facility: CLINIC | Age: 60
End: 2025-06-18
Payer: COMMERCIAL

## 2025-06-18 ENCOUNTER — INFUSION (OUTPATIENT)
Dept: INFUSION THERAPY | Facility: HOSPITAL | Age: 60
End: 2025-06-18
Payer: COMMERCIAL

## 2025-06-18 VITALS
WEIGHT: 198 LBS | SYSTOLIC BLOOD PRESSURE: 141 MMHG | DIASTOLIC BLOOD PRESSURE: 83 MMHG | HEART RATE: 90 BPM | TEMPERATURE: 99 F | RESPIRATION RATE: 18 BRPM | BODY MASS INDEX: 32.99 KG/M2 | HEIGHT: 65 IN | OXYGEN SATURATION: 96 %

## 2025-06-18 DIAGNOSIS — C18.2 MALIGNANT NEOPLASM OF ASCENDING COLON: Primary | ICD-10-CM

## 2025-06-18 DIAGNOSIS — C78.7 METASTASIS TO LIVER: ICD-10-CM

## 2025-06-18 PROCEDURE — 25000003 PHARM REV CODE 250: Performed by: REGISTERED NURSE

## 2025-06-18 PROCEDURE — 96416 CHEMO PROLONG INFUSE W/PUMP: CPT

## 2025-06-18 PROCEDURE — 25000003 PHARM REV CODE 250: Performed by: INTERNAL MEDICINE

## 2025-06-18 PROCEDURE — 63600175 PHARM REV CODE 636 W HCPCS: Performed by: INTERNAL MEDICINE

## 2025-06-18 PROCEDURE — 63600175 PHARM REV CODE 636 W HCPCS: Performed by: REGISTERED NURSE

## 2025-06-18 PROCEDURE — 96522 REFILL/MAINT PUMP/RESVR SYST: CPT

## 2025-06-18 PROCEDURE — 96365 THER/PROPH/DIAG IV INF INIT: CPT

## 2025-06-18 RX ORDER — DIPHENHYDRAMINE HYDROCHLORIDE 50 MG/ML
50 INJECTION, SOLUTION INTRAMUSCULAR; INTRAVENOUS ONCE AS NEEDED
Status: DISCONTINUED | OUTPATIENT
Start: 2025-06-18 | End: 2025-06-18 | Stop reason: HOSPADM

## 2025-06-18 RX ORDER — EPINEPHRINE 0.3 MG/.3ML
0.3 INJECTION SUBCUTANEOUS ONCE AS NEEDED
Status: DISCONTINUED | OUTPATIENT
Start: 2025-06-18 | End: 2025-06-18 | Stop reason: HOSPADM

## 2025-06-18 RX ORDER — SODIUM CHLORIDE 0.9 % (FLUSH) 0.9 %
10 SYRINGE (ML) INJECTION
Status: DISCONTINUED | OUTPATIENT
Start: 2025-06-18 | End: 2025-06-18 | Stop reason: HOSPADM

## 2025-06-18 RX ORDER — PROCHLORPERAZINE EDISYLATE 5 MG/ML
10 INJECTION INTRAMUSCULAR; INTRAVENOUS ONCE AS NEEDED
Status: DISCONTINUED | OUTPATIENT
Start: 2025-06-18 | End: 2025-06-18 | Stop reason: HOSPADM

## 2025-06-18 RX ORDER — HEPARIN 100 UNIT/ML
500 SYRINGE INTRAVENOUS
Status: DISCONTINUED | OUTPATIENT
Start: 2025-06-18 | End: 2025-06-18 | Stop reason: HOSPADM

## 2025-06-18 RX ADMIN — HEPARIN SODIUM 30000 UNITS: 5000 INJECTION, SOLUTION INTRAVENOUS; SUBCUTANEOUS at 02:06

## 2025-06-18 RX ADMIN — DEXAMETHASONE SODIUM PHOSPHATE 0.25 MG: 4 INJECTION, SOLUTION INTRA-ARTICULAR; INTRALESIONAL; INTRAMUSCULAR; INTRAVENOUS; SOFT TISSUE at 01:06

## 2025-06-18 RX ADMIN — FLUOROURACIL 4000 MG: 50 INJECTION, SOLUTION INTRAVENOUS at 02:06

## 2025-06-18 RX ADMIN — SODIUM CHLORIDE: 9 INJECTION, SOLUTION INTRAVENOUS at 01:06

## 2025-06-18 NOTE — PLAN OF CARE
Pt lying supine in bed. VSS, assessment complete. Port accessed flushed with blood return infusing NS @ 25 cc/hr while waiting for 5FU+ BOB hep flush. WCTM for safety.

## 2025-06-18 NOTE — PLAN OF CARE
CADD pump connected to port with 5FU 100 ml @ 2.2 cc/hr x 46 hours. RTC on 6/20/25 at 1300 in [place]. BOB pump with placement verified every 3-5 ml per S. DAVID Titus and 13 ml residual noted (30 ml - 13ml= 17 ml), rate is 1.21/day. Heparin flush tolerated with no issues. Pt ambulated accompanied by friend off unit. NAD

## 2025-06-20 ENCOUNTER — INFUSION (OUTPATIENT)
Dept: INFUSION THERAPY | Facility: HOSPITAL | Age: 60
End: 2025-06-20
Attending: INTERNAL MEDICINE
Payer: COMMERCIAL

## 2025-06-20 VITALS
HEIGHT: 65 IN | TEMPERATURE: 99 F | HEART RATE: 89 BPM | SYSTOLIC BLOOD PRESSURE: 121 MMHG | DIASTOLIC BLOOD PRESSURE: 82 MMHG | BODY MASS INDEX: 32.87 KG/M2 | WEIGHT: 197.31 LBS | RESPIRATION RATE: 18 BRPM

## 2025-06-20 DIAGNOSIS — C18.2 MALIGNANT NEOPLASM OF ASCENDING COLON: Primary | ICD-10-CM

## 2025-06-20 PROCEDURE — 63600175 PHARM REV CODE 636 W HCPCS: Performed by: INTERNAL MEDICINE

## 2025-06-20 PROCEDURE — A4216 STERILE WATER/SALINE, 10 ML: HCPCS | Performed by: INTERNAL MEDICINE

## 2025-06-20 PROCEDURE — 96523 IRRIG DRUG DELIVERY DEVICE: CPT

## 2025-06-20 PROCEDURE — 25000003 PHARM REV CODE 250: Performed by: INTERNAL MEDICINE

## 2025-06-20 RX ORDER — HEPARIN 100 UNIT/ML
500 SYRINGE INTRAVENOUS
Status: DISCONTINUED | OUTPATIENT
Start: 2025-06-20 | End: 2025-06-20 | Stop reason: HOSPADM

## 2025-06-20 RX ORDER — PROCHLORPERAZINE EDISYLATE 5 MG/ML
10 INJECTION INTRAMUSCULAR; INTRAVENOUS ONCE AS NEEDED
Status: DISCONTINUED | OUTPATIENT
Start: 2025-06-20 | End: 2025-06-20 | Stop reason: HOSPADM

## 2025-06-20 RX ORDER — SODIUM CHLORIDE 0.9 % (FLUSH) 0.9 %
10 SYRINGE (ML) INJECTION
Status: DISCONTINUED | OUTPATIENT
Start: 2025-06-20 | End: 2025-06-20 | Stop reason: HOSPADM

## 2025-06-20 RX ADMIN — HEPARIN 500 UNITS: 100 SYRINGE at 01:06

## 2025-06-20 RX ADMIN — SODIUM CHLORIDE, PRESERVATIVE FREE 10 ML: 5 INJECTION INTRAVENOUS at 01:06

## 2025-06-20 NOTE — PLAN OF CARE
Problem: Fatigue  Goal: Improved Activity Tolerance  6/20/2025 1251 by Abeba Disla, RN  Outcome: Met  6/20/2025 1251 by Abeba Disla, RN  Outcome: Progressing

## 2025-06-25 ENCOUNTER — PATIENT MESSAGE (OUTPATIENT)
Dept: SURGERY | Facility: CLINIC | Age: 60
End: 2025-06-25
Payer: COMMERCIAL

## 2025-06-25 ENCOUNTER — PATIENT MESSAGE (OUTPATIENT)
Dept: HEMATOLOGY/ONCOLOGY | Facility: CLINIC | Age: 60
End: 2025-06-25
Payer: COMMERCIAL

## 2025-06-26 ENCOUNTER — OFFICE VISIT (OUTPATIENT)
Dept: FAMILY MEDICINE | Facility: CLINIC | Age: 60
End: 2025-06-26
Payer: COMMERCIAL

## 2025-06-26 VITALS
DIASTOLIC BLOOD PRESSURE: 88 MMHG | HEART RATE: 98 BPM | SYSTOLIC BLOOD PRESSURE: 130 MMHG | WEIGHT: 198.19 LBS | RESPIRATION RATE: 18 BRPM | BODY MASS INDEX: 33.02 KG/M2 | OXYGEN SATURATION: 97 % | HEIGHT: 65 IN

## 2025-06-26 DIAGNOSIS — E11.59 HYPERTENSION ASSOCIATED WITH DIABETES: ICD-10-CM

## 2025-06-26 DIAGNOSIS — I15.2 HYPERTENSION ASSOCIATED WITH DIABETES: ICD-10-CM

## 2025-06-26 DIAGNOSIS — G62.9 NEUROPATHY: ICD-10-CM

## 2025-06-26 DIAGNOSIS — E11.65 UNCONTROLLED TYPE 2 DIABETES MELLITUS WITH HYPERGLYCEMIA, WITHOUT LONG-TERM CURRENT USE OF INSULIN: Primary | ICD-10-CM

## 2025-06-26 DIAGNOSIS — C18.2 MALIGNANT NEOPLASM OF ASCENDING COLON: ICD-10-CM

## 2025-06-26 DIAGNOSIS — B07.0 PLANTAR WART OF LEFT FOOT: ICD-10-CM

## 2025-06-26 DIAGNOSIS — E11.69 HYPERLIPIDEMIA ASSOCIATED WITH TYPE 2 DIABETES MELLITUS: ICD-10-CM

## 2025-06-26 DIAGNOSIS — E78.5 HYPERLIPIDEMIA ASSOCIATED WITH TYPE 2 DIABETES MELLITUS: ICD-10-CM

## 2025-06-26 DIAGNOSIS — C78.7 METASTASIS TO LIVER: ICD-10-CM

## 2025-06-26 PROCEDURE — 4010F ACE/ARB THERAPY RXD/TAKEN: CPT | Mod: CPTII,S$GLB,,

## 2025-06-26 PROCEDURE — 99999 PR PBB SHADOW E&M-EST. PATIENT-LVL IV: CPT | Mod: PBBFAC,,,

## 2025-06-26 PROCEDURE — 3008F BODY MASS INDEX DOCD: CPT | Mod: CPTII,S$GLB,,

## 2025-06-26 PROCEDURE — 3079F DIAST BP 80-89 MM HG: CPT | Mod: CPTII,S$GLB,,

## 2025-06-26 PROCEDURE — 99214 OFFICE O/P EST MOD 30 MIN: CPT | Mod: S$GLB,,,

## 2025-06-26 PROCEDURE — 3075F SYST BP GE 130 - 139MM HG: CPT | Mod: CPTII,S$GLB,,

## 2025-06-26 PROCEDURE — 1159F MED LIST DOCD IN RCRD: CPT | Mod: CPTII,S$GLB,,

## 2025-06-26 PROCEDURE — 1160F RVW MEDS BY RX/DR IN RCRD: CPT | Mod: CPTII,S$GLB,,

## 2025-06-26 NOTE — PROGRESS NOTES
Subjective:       Patient ID: Nik Lowery is a 59 y.o. female.    Chief Complaint: Follow-up (5/7 - hyperglycemia )      60 yo F with hx of T2DM, HTN, and colon cancer with mets to the liver (follows with GI oncology) presents for f/u.      Pt recently seen in ER (05/2025) and Tx'd for hyperglycemia without DKA. Toujeo was increased to 30 U daily. Pt continues with Metformin 1000 mg BID. Reports blood sugars ranging 89-200s. Also reports watching diet. No low blood sugars. AM readings 89-low 100s. Upper 200s typically seen postprandial.     Pt on Cymbalta and Gabapentin for neuropathy which she has been experiencing as a side effect of the previous chemo regimen (oxaliplatin). Pt continues to experience neuropathy despite discontinuing Oxalipatin; has not yet noticed significant resolution of neuropathy since starting Cymbalta. Follows with GI oncology monthly.         Review of Systems   Respiratory:  Negative for shortness of breath.    Cardiovascular:  Negative for chest pain and leg swelling.   Gastrointestinal:  Negative for abdominal pain.   Endocrine: Negative for polydipsia and polyuria.   Musculoskeletal:         + neuropathy in fingers, toes, mouth, and tongue   Neurological:  Positive for numbness. Negative for dizziness and light-headedness.         Past Medical History:   Diagnosis Date    Colon cancer     Diabetes mellitus     Diabetes mellitus, type 2     Encounter for blood transfusion     Herpes     Hypertension     Sleep apnea        Review of patient's allergies indicates:   Allergen Reactions    Robaxin [methocarbamol] Nausea Only       Current Medications[1]    Objective:        Physical Exam  Constitutional:       Appearance: Normal appearance.   HENT:      Head: Normocephalic and atraumatic.   Eyes:      Conjunctiva/sclera: Conjunctivae normal.   Cardiovascular:      Rate and Rhythm: Normal rate and regular rhythm.      Pulses:           Posterior tibial pulses are 2+ on the right side and  "2+ on the left side.      Heart sounds: Normal heart sounds.   Pulmonary:      Effort: Pulmonary effort is normal.   Musculoskeletal:        Feet:    Feet:      Right foot:      Protective Sensation: 8 sites tested.  8 sites sensed.      Skin integrity: Dry skin present.      Toenail Condition: Right toenails are normal.      Left foot:      Protective Sensation: 8 sites tested.  8 sites sensed.      Skin integrity: Dry skin present.      Toenail Condition: Left toenails are normal.   Skin:     Capillary Refill: Capillary refill takes less than 2 seconds.   Neurological:      General: No focal deficit present.   Psychiatric:         Mood and Affect: Mood normal.           Visit Vitals  /88   Pulse 98   Resp 18   Ht 5' 5" (1.651 m)   Wt 89.9 kg (198 lb 3.1 oz)   SpO2 97%   BMI 32.98 kg/m²      Assessment:         1. Uncontrolled type 2 diabetes mellitus with hyperglycemia, without long-term current use of insulin    2. Malignant neoplasm of ascending colon    3. Metastasis to liver    4. Hyperlipidemia associated with type 2 diabetes mellitus    5. Hypertension associated with diabetes    6. Neuropathy    7. Plantar wart of left foot        Plan:         Nik was seen today for follow-up.    Diagnoses and all orders for this visit:    Uncontrolled type 2 diabetes mellitus with hyperglycemia, without long-term current use of insulin  -     HEMOGLOBIN A1C; Future  -     Continue Metformin and Toujeo at current doses. Continue to monitor AM blood sugar.     Malignant neoplasm of ascending colon         -    Currently undergoing chemotherapy. Following with oncology.    Metastasis to liver          -    Currently undergoing chemotherapy. Following with oncology.    Hyperlipidemia associated with type 2 diabetes mellitus  -     Lipid Panel; Future    Hypertension associated with diabetes        -    Repeat /88. Continue to monitor.    Neuropathy  -     Vitamin B12; Future  -      Likely 2/2 chemotherapy. Will " check B12 as well. Continue Gabapentin and Cymbalta as prescribed.    Plantar wart of left foot  -     Ambulatory referral/consult to Dermatology; Future     Follow up as scheduled with PCP or sooner if needed.      Patient evaluated with SAMMIE Vernon. I agree with physical exam findings and documentation of NABEEL          Family Medicine Physician Assistant     Future Appointments       Date Provider Specialty Appt Notes    7/1/2025 Saige Bender, CNS Hematology and Oncology RTC/Follow up    7/1/2025  Chemotherapy C2D1 BOB FUDR + C9D1 5FU    7/15/2025 Saige Bender, CNS Hematology and Oncology RTC/Follow up    7/15/2025  Chemotherapy C2D15 BOB pump flush + C10D1 5FU    10/14/2025 Osmin Newsome MD Family Medicine 6 mo f/u             Tests to Keep You Healthy    Mammogram: Met on 12/6/2024  Eye Exam: DUE  Colon Cancer Screening: Met on 11/8/2024  Last Blood Pressure <= 139/89 (6/26/2025): Yes  Last HbA1c < 8 (11/07/2024): Yes       I spent a total of 20 minutes on the day of the visit.This includes face to face time and non-face to face time preparing to see the patient (eg, review of tests), obtaining and/or reviewing separately obtained history, documenting clinical information in the electronic or other health record, independently interpreting results and communicating results to the patient/family/caregiver, or care coordinator.          [1]   Current Outpatient Medications:     atorvastatin (LIPITOR) 20 MG tablet, TAKE 1 TABLET BY MOUTH EVERY DAY, Disp: 90 tablet, Rfl: 3    blood-glucose meter kit, To check BG 3 times daily, to use with insurance preferred meter, Disp: 1 each, Rfl: 0    dexAMETHasone (DECADRON) 4 MG Tab, Take 2 tablets (8 mg total) by mouth once daily. On days 2 through 4 of each cycle of chemo, Disp: 120 tablet, Rfl: 0    docusate sodium (COLACE) 100 MG capsule, Take 1 capsule (100 mg total) by mouth 2 (two) times daily., Disp: 14 capsule, Rfl: 0    DULoxetine 40 mg CpDR,  Take 40 mg by mouth once daily., Disp: 90 capsule, Rfl: 3    fexofenadine (ALLEGRA) 180 MG tablet, Take 180 mg by mouth nightly., Disp: , Rfl:     fluticasone propionate (FLONASE) 50 mcg/actuation nasal spray, SPRAY 2 SPRAYS BY EACH NOSTRIL ROUTE ONCE DAILY., Disp: 48 mL, Rfl: 3    FREESTYLE JOCELIN 14 DAY SENSOR Kit, 1 APPLICATION BY [facility] [place] [facility].(NON-DRUG COMBO ROUTE) ROUTE DAILY AS NEEDED., Disp: 1 kit, Rfl: 24    gabapentin (NEURONTIN) 800 MG tablet, Take 800 mg by mouth 3 (three) times daily., Disp: , Rfl:     guaiFENesin (MUCINEX) 1,200 mg Ta12, Take 1 tablet by mouth nightly., Disp: , Rfl:     insulin glargine, TOUJEO, (TOUJEO SOLOSTAR U-300 INSULIN) 300 unit/mL (1.5 mL) InPn pen, Inject 30 Units into the skin once daily., Disp: 3 mL, Rfl: 11    lamoTRIgine (LAMICTAL) 150 MG Tab, TAKE 1 TABLET BY MOUTH EVERY DAY, Disp: 90 tablet, Rfl: 3    lancets Misc, To check BG 3 times daily, to use with insurance preferred meter, Disp: 200 each, Rfl: 1    LIDOcaine-prilocaine (EMLA) cream, Apply topically as needed (Apply to port site 30 minutes before access as needed)., Disp: 30 g, Rfl: 0    lisinopriL (PRINIVIL,ZESTRIL) 20 MG tablet, TAKE 1 TABLET BY MOUTH EVERY DAY, Disp: 90 tablet, Rfl: 3    magnesium oxide (MAG-OX) 400 mg (241.3 mg magnesium) tablet, Take 1 tablet (400 mg total) by mouth 2 (two) times daily., Disp: 60 tablet, Rfl: 3    metFORMIN (GLUCOPHAGE-XR) 500 MG ER 24hr tablet, TAKE 2 TABLETS BY MOUTH TWICE A DAY WITH MEALS, Disp: 360 tablet, Rfl: 3    multivitamin (THERAGRAN) per tablet, Take 1 tablet by mouth once daily., Disp: , Rfl:     omeprazole (PRILOSEC) 40 MG capsule, Take 1 capsule (40 mg total) by mouth once daily., Disp: 90 capsule, Rfl: 3    ondansetron (ZOFRAN) 8 MG tablet, Take 1 tablet (8 mg total) by mouth every 8 (eight) hours as needed., Disp: 30 tablet, Rfl: 2    ONETOUCH ULTRA BLUE TEST STRIP Strp, USE TO CHECK BLOOD SUGAR 3 TIMES A DAY, Disp: 200 strip, Rfl: 0    oxyCODONE (ROXICODONE) 5 MG  "immediate release tablet, Take 1 tablet (5 mg total) by mouth every 6 (six) hours as needed for Pain., Disp: 20 tablet, Rfl: 0    pen needle, diabetic (PEN NEEDLE) 31 gauge x 5/16" Ndle, 1 Application by Misc.(Non-Drug; Combo Route) route once daily., Disp: 30 each, Rfl: 5    polyethylene glycol (GLYCOLAX) 17 gram/dose powder, Use to cap to measure 17g, mix with liquid, and take by mouth 2 (two) times daily., Disp: 1020 g, Rfl: 0    prochlorperazine (COMPAZINE) 10 MG tablet, Take 1 tablet (10 mg total) by mouth every 6 (six) hours as needed., Disp: 30 tablet, Rfl: 1    sertraline (ZOLOFT) 100 MG tablet, TAKE 1 TABLET BY MOUTH EVERY DAY, Disp: 90 tablet, Rfl: 3    valACYclovir (VALTREX) 500 MG tablet, TAKE 1 TABLET BY MOUTH TWICE A DAY, Disp: 180 tablet, Rfl: 3    Current Facility-Administered Medications:     diphenhydrAMINE capsule 25 mg, 25 mg, Oral, PRN, Amarjit Crawford MD    sodium chloride 0.9% flush 10 mL, 10 mL, Intravenous, PRN, Amarjit Crawford MD    "

## 2025-06-30 ENCOUNTER — RESULTS FOLLOW-UP (OUTPATIENT)
Dept: FAMILY MEDICINE | Facility: CLINIC | Age: 60
End: 2025-06-30

## 2025-06-30 ENCOUNTER — LAB VISIT (OUTPATIENT)
Dept: LAB | Facility: HOSPITAL | Age: 60
End: 2025-06-30
Payer: COMMERCIAL

## 2025-06-30 DIAGNOSIS — C18.2 MALIGNANT NEOPLASM OF ASCENDING COLON: ICD-10-CM

## 2025-06-30 DIAGNOSIS — E11.65 UNCONTROLLED TYPE 2 DIABETES MELLITUS WITH HYPERGLYCEMIA, WITHOUT LONG-TERM CURRENT USE OF INSULIN: ICD-10-CM

## 2025-06-30 DIAGNOSIS — E78.5 HYPERLIPIDEMIA ASSOCIATED WITH TYPE 2 DIABETES MELLITUS: ICD-10-CM

## 2025-06-30 DIAGNOSIS — E11.69 HYPERLIPIDEMIA ASSOCIATED WITH TYPE 2 DIABETES MELLITUS: ICD-10-CM

## 2025-06-30 DIAGNOSIS — G62.9 NEUROPATHY: ICD-10-CM

## 2025-06-30 LAB
ABSOLUTE NEUTROPHIL COUNT (SMH): 3.7 K/UL (ref 1.8–7.7)
ALBUMIN SERPL-MCNC: 4.1 G/DL (ref 3.5–5.2)
ALP SERPL-CCNC: 206 UNIT/L (ref 40–150)
ALT SERPL-CCNC: 71 UNIT/L (ref 10–44)
ANION GAP (SMH): 11 MMOL/L (ref 8–16)
AST SERPL-CCNC: 52 UNIT/L (ref 11–45)
BILIRUB SERPL-MCNC: 0.3 MG/DL (ref 0.1–1)
BUN SERPL-MCNC: 16 MG/DL (ref 6–20)
CALCIUM SERPL-MCNC: 9.6 MG/DL (ref 8.7–10.5)
CARCINOEMBRYONIC ANTIGEN (SMH): 4.2 NG/ML
CHLORIDE SERPL-SCNC: 106 MMOL/L (ref 95–110)
CHOLEST SERPL-MCNC: 192 MG/DL (ref 120–199)
CHOLEST/HDLC SERPL: 3.6 {RATIO} (ref 2–5)
CO2 SERPL-SCNC: 24 MMOL/L (ref 23–29)
CREAT SERPL-MCNC: 0.8 MG/DL (ref 0.5–1.4)
EAG (SMH): 157 MG/DL (ref 68–131)
ERYTHROCYTE [DISTWIDTH] IN BLOOD BY AUTOMATED COUNT: 13 % (ref 11.5–14.5)
GFR SERPLBLD CREATININE-BSD FMLA CKD-EPI: >60 ML/MIN/1.73/M2
GLUCOSE SERPL-MCNC: 135 MG/DL (ref 70–110)
HBA1C MFR BLD: 7.1 % (ref 4–5.6)
HCT VFR BLD AUTO: 39 % (ref 37–48.5)
HDLC SERPL-MCNC: 54 MG/DL (ref 40–75)
HDLC SERPL: 28.1 % (ref 20–50)
HGB BLD-MCNC: 12.7 GM/DL (ref 12–16)
IMM GRANULOCYTES # BLD AUTO: 0.02 K/UL (ref 0–0.04)
LDLC SERPL CALC-MCNC: 78.8 MG/DL (ref 63–159)
MCH RBC QN AUTO: 32 PG (ref 27–31)
MCHC RBC AUTO-ENTMCNC: 32.6 G/DL (ref 32–36)
MCV RBC AUTO: 98 FL (ref 82–98)
NONHDLC SERPL-MCNC: 138 MG/DL
PLATELET # BLD AUTO: 279 K/UL (ref 150–450)
PMV BLD AUTO: 9 FL (ref 9.2–12.9)
POTASSIUM SERPL-SCNC: 4.4 MMOL/L (ref 3.5–5.1)
PROT SERPL-MCNC: 7.8 GM/DL (ref 6–8.4)
RBC # BLD AUTO: 3.97 M/UL (ref 4–5.4)
SODIUM SERPL-SCNC: 141 MMOL/L (ref 136–145)
TRIGL SERPL-MCNC: 296 MG/DL (ref 30–150)
VIT B12 SERPL-MCNC: 495 PG/ML (ref 210–950)
WBC # BLD AUTO: 7.17 K/UL (ref 3.9–12.7)

## 2025-06-30 PROCEDURE — 36415 COLL VENOUS BLD VENIPUNCTURE: CPT

## 2025-06-30 PROCEDURE — 83036 HEMOGLOBIN GLYCOSYLATED A1C: CPT

## 2025-06-30 PROCEDURE — 82607 VITAMIN B-12: CPT

## 2025-06-30 PROCEDURE — 80061 LIPID PANEL: CPT

## 2025-06-30 PROCEDURE — 85027 COMPLETE CBC AUTOMATED: CPT

## 2025-06-30 PROCEDURE — 80053 COMPREHEN METABOLIC PANEL: CPT

## 2025-06-30 PROCEDURE — 82378 CARCINOEMBRYONIC ANTIGEN: CPT

## 2025-07-01 ENCOUNTER — PATIENT MESSAGE (OUTPATIENT)
Dept: HEMATOLOGY/ONCOLOGY | Facility: CLINIC | Age: 60
End: 2025-07-01

## 2025-07-01 ENCOUNTER — INFUSION (OUTPATIENT)
Dept: INFUSION THERAPY | Facility: HOSPITAL | Age: 60
End: 2025-07-01
Payer: COMMERCIAL

## 2025-07-01 ENCOUNTER — OFFICE VISIT (OUTPATIENT)
Dept: HEMATOLOGY/ONCOLOGY | Facility: CLINIC | Age: 60
End: 2025-07-01
Payer: COMMERCIAL

## 2025-07-01 VITALS
HEIGHT: 65 IN | TEMPERATURE: 97 F | OXYGEN SATURATION: 98 % | HEART RATE: 95 BPM | WEIGHT: 197.19 LBS | BODY MASS INDEX: 32.85 KG/M2 | DIASTOLIC BLOOD PRESSURE: 88 MMHG | SYSTOLIC BLOOD PRESSURE: 125 MMHG

## 2025-07-01 VITALS
SYSTOLIC BLOOD PRESSURE: 125 MMHG | HEART RATE: 81 BPM | RESPIRATION RATE: 18 BRPM | DIASTOLIC BLOOD PRESSURE: 86 MMHG | TEMPERATURE: 98 F | OXYGEN SATURATION: 97 %

## 2025-07-01 DIAGNOSIS — C18.9 METASTATIC COLON CANCER TO LIVER: ICD-10-CM

## 2025-07-01 DIAGNOSIS — K21.9 GASTROESOPHAGEAL REFLUX DISEASE, UNSPECIFIED WHETHER ESOPHAGITIS PRESENT: ICD-10-CM

## 2025-07-01 DIAGNOSIS — C78.7 METASTATIC COLON CANCER TO LIVER: ICD-10-CM

## 2025-07-01 DIAGNOSIS — C18.2 MALIGNANT NEOPLASM OF ASCENDING COLON: Primary | ICD-10-CM

## 2025-07-01 DIAGNOSIS — G62.0 CHEMOTHERAPY-INDUCED NEUROPATHY: ICD-10-CM

## 2025-07-01 DIAGNOSIS — C78.7 METASTASIS TO LIVER: ICD-10-CM

## 2025-07-01 DIAGNOSIS — T45.1X5A CHEMOTHERAPY-INDUCED NAUSEA: ICD-10-CM

## 2025-07-01 DIAGNOSIS — M54.59 OTHER LOW BACK PAIN: ICD-10-CM

## 2025-07-01 DIAGNOSIS — D49.9 IMMUNODEFICIENCY SECONDARY TO NEOPLASM: ICD-10-CM

## 2025-07-01 DIAGNOSIS — D84.821 IMMUNODEFICIENCY SECONDARY TO CHEMOTHERAPY: ICD-10-CM

## 2025-07-01 DIAGNOSIS — E08.00 DIABETES MELLITUS DUE TO UNDERLYING CONDITION WITH HYPEROSMOLARITY WITHOUT COMA, UNSPECIFIED WHETHER LONG TERM INSULIN USE: ICD-10-CM

## 2025-07-01 DIAGNOSIS — R11.0 CHEMOTHERAPY-INDUCED NAUSEA: ICD-10-CM

## 2025-07-01 DIAGNOSIS — Z79.69 IMMUNODEFICIENCY SECONDARY TO CHEMOTHERAPY: ICD-10-CM

## 2025-07-01 DIAGNOSIS — T45.1X5A CHEMOTHERAPY-INDUCED NEUROPATHY: ICD-10-CM

## 2025-07-01 DIAGNOSIS — T45.1X5A IMMUNODEFICIENCY SECONDARY TO CHEMOTHERAPY: ICD-10-CM

## 2025-07-01 DIAGNOSIS — D84.81 IMMUNODEFICIENCY SECONDARY TO NEOPLASM: ICD-10-CM

## 2025-07-01 PROCEDURE — 25000003 PHARM REV CODE 250: Performed by: REGISTERED NURSE

## 2025-07-01 PROCEDURE — 96425 CHEMOTHERAPY INFUSION METHOD: CPT

## 2025-07-01 PROCEDURE — 96416 CHEMO PROLONG INFUSE W/PUMP: CPT

## 2025-07-01 PROCEDURE — 99999 PR PBB SHADOW E&M-EST. PATIENT-LVL V: CPT | Mod: PBBFAC,,, | Performed by: REGISTERED NURSE

## 2025-07-01 PROCEDURE — 96365 THER/PROPH/DIAG IV INF INIT: CPT

## 2025-07-01 PROCEDURE — 63600175 PHARM REV CODE 636 W HCPCS: Mod: JZ,TB | Performed by: REGISTERED NURSE

## 2025-07-01 RX ORDER — PROCHLORPERAZINE EDISYLATE 5 MG/ML
10 INJECTION INTRAMUSCULAR; INTRAVENOUS ONCE AS NEEDED
Status: CANCELLED | OUTPATIENT
Start: 2025-07-02

## 2025-07-01 RX ORDER — EPINEPHRINE 0.3 MG/.3ML
0.3 INJECTION SUBCUTANEOUS ONCE AS NEEDED
Status: CANCELLED | OUTPATIENT
Start: 2025-07-02

## 2025-07-01 RX ORDER — HEPARIN 100 UNIT/ML
500 SYRINGE INTRAVENOUS
Status: CANCELLED | OUTPATIENT
Start: 2025-07-04

## 2025-07-01 RX ORDER — SODIUM CHLORIDE 0.9 % (FLUSH) 0.9 %
10 SYRINGE (ML) INJECTION
Status: DISCONTINUED | OUTPATIENT
Start: 2025-07-01 | End: 2025-07-01 | Stop reason: HOSPADM

## 2025-07-01 RX ORDER — EPINEPHRINE 0.3 MG/.3ML
0.3 INJECTION SUBCUTANEOUS ONCE AS NEEDED
Status: CANCELLED | OUTPATIENT
Start: 2025-07-01

## 2025-07-01 RX ORDER — DIPHENHYDRAMINE HYDROCHLORIDE 50 MG/ML
50 INJECTION, SOLUTION INTRAMUSCULAR; INTRAVENOUS ONCE AS NEEDED
OUTPATIENT
Start: 2025-07-15

## 2025-07-01 RX ORDER — PROCHLORPERAZINE EDISYLATE 5 MG/ML
10 INJECTION INTRAMUSCULAR; INTRAVENOUS ONCE AS NEEDED
Status: CANCELLED | OUTPATIENT
Start: 2025-07-04

## 2025-07-01 RX ORDER — EPINEPHRINE 0.3 MG/.3ML
0.3 INJECTION SUBCUTANEOUS ONCE AS NEEDED
Status: DISCONTINUED | OUTPATIENT
Start: 2025-07-01 | End: 2025-07-01 | Stop reason: HOSPADM

## 2025-07-01 RX ORDER — DIPHENHYDRAMINE HYDROCHLORIDE 50 MG/ML
50 INJECTION, SOLUTION INTRAMUSCULAR; INTRAVENOUS ONCE AS NEEDED
Status: DISCONTINUED | OUTPATIENT
Start: 2025-07-01 | End: 2025-07-01 | Stop reason: HOSPADM

## 2025-07-01 RX ORDER — HEPARIN 100 UNIT/ML
500 SYRINGE INTRAVENOUS
Status: CANCELLED | OUTPATIENT
Start: 2025-07-02

## 2025-07-01 RX ORDER — SODIUM CHLORIDE 0.9 % (FLUSH) 0.9 %
10 SYRINGE (ML) INJECTION
Status: CANCELLED | OUTPATIENT
Start: 2025-07-04

## 2025-07-01 RX ORDER — DIPHENHYDRAMINE HYDROCHLORIDE 50 MG/ML
50 INJECTION, SOLUTION INTRAMUSCULAR; INTRAVENOUS ONCE AS NEEDED
Status: CANCELLED | OUTPATIENT
Start: 2025-07-01

## 2025-07-01 RX ORDER — HEPARIN 100 UNIT/ML
500 SYRINGE INTRAVENOUS
Status: DISCONTINUED | OUTPATIENT
Start: 2025-07-01 | End: 2025-07-01 | Stop reason: HOSPADM

## 2025-07-01 RX ORDER — DIPHENHYDRAMINE HYDROCHLORIDE 50 MG/ML
50 INJECTION, SOLUTION INTRAMUSCULAR; INTRAVENOUS ONCE AS NEEDED
Status: CANCELLED | OUTPATIENT
Start: 2025-07-02

## 2025-07-01 RX ORDER — SODIUM CHLORIDE 0.9 % (FLUSH) 0.9 %
10 SYRINGE (ML) INJECTION
Status: CANCELLED | OUTPATIENT
Start: 2025-07-02

## 2025-07-01 RX ORDER — EPINEPHRINE 0.3 MG/.3ML
0.3 INJECTION SUBCUTANEOUS ONCE AS NEEDED
OUTPATIENT
Start: 2025-07-15

## 2025-07-01 RX ORDER — PROCHLORPERAZINE EDISYLATE 5 MG/ML
10 INJECTION INTRAMUSCULAR; INTRAVENOUS ONCE AS NEEDED
Status: DISCONTINUED | OUTPATIENT
Start: 2025-07-01 | End: 2025-07-01 | Stop reason: HOSPADM

## 2025-07-01 RX ADMIN — SODIUM CHLORIDE: 0.9 INJECTION, SOLUTION INTRAVENOUS at 08:07

## 2025-07-01 RX ADMIN — FLUOROURACIL 4000 MG: 50 INJECTION, SOLUTION INTRAVENOUS at 08:07

## 2025-07-01 RX ADMIN — FLOXURIDINE 204 MG: 500 INJECTION, POWDER, LYOPHILIZED, FOR SOLUTION INTRA-ARTERIAL at 09:07

## 2025-07-01 RX ADMIN — DEXAMETHASONE SODIUM PHOSPHATE 0.25 MG: 4 INJECTION, SOLUTION INTRA-ARTICULAR; INTRALESIONAL; INTRAMUSCULAR; INTRAVENOUS; SOFT TISSUE at 08:07

## 2025-07-01 NOTE — PLAN OF CARE
0945- pt tolerated FUDR per DAVID Pagan with placement verified every 3-5 ml and 14 ml residual noted (30 ml - 14ml= 16 ml), rate is 1.14/day, no complications or side effects, POC and meds discussed with pt, 5FU CADD connected to pt, site secured, infusing w/out issue; pt instructed to remain well hydration;pt to report for pump d/c on Thurs 7/3/25 @ 7am, pt aware of upcoming appts, pt knows to call MD with any questions or concerns. Pt ambulated off unit, no distress noted.

## 2025-07-01 NOTE — PROGRESS NOTES
GI MEDICAL ONCOLOGY    Reason for visit: Colon cancer    Best Contact Phone Number(s): There are no phone numbers on file.     Cancer/Stage/TNM:    Cancer Staging   Malignant neoplasm of ascending colon  Staging form: Colon and Rectum, AJCC 8th Edition  - Clinical: Stage BIBIANA (cT3, cN1a, cM1a) - Signed by Amarjit Crawford MD on 12/18/2024       Oncology History   Malignant neoplasm of ascending colon   12/12/2024 Initial Diagnosis    Malignant neoplasm of ascending colon     12/18/2024 Cancer Staged    Staging form: Colon and Rectum, AJCC 8th Edition  - Clinical: Stage BIBIANA (cT3, cN1a, cM1a)     2/4/2025 -  Chemotherapy    Treatment Summary   Plan Name: OP GI [id number] (oxaliplatin leucovorin fluorouracil) Q2W  Treatment Goal: Control  Status: Active  Start Date: 2/4/2025  End Date: 8/15/2025 (Planned)  Provider: Amarjit Crawford MD  Chemotherapy: fluorouraciL injection 830 mg, 400 mg/m2 = 830 mg, Intravenous, Clinic/HOD 1 time, 7 of 7 cycles  Administration: 830 mg (2/4/2025), 830 mg (2/18/2025), 830 mg (3/5/2025), 830 mg (3/19/2025), 830 mg (4/2/2025), 830 mg (4/15/2025), 830 mg (4/29/2025)  oxaliplatin (ELOXATIN) 85 mg/m2 = 177 mg in D5W 600.4 mL chemo infusion, 85 mg/m2 = 177 mg, Intravenous, Clinic/HOD 1 time, 7 of 11 cycles  Administration: 177 mg (2/4/2025), 177 mg (2/18/2025), 177 mg (3/5/2025), 177 mg (3/19/2025), 177 mg (4/2/2025), 177 mg (4/15/2025), 177 mg (4/29/2025)  fluorouracil (Adrucil) 2,400 mg/m2 = 4,990 mg in 0.9% NaCl 250 mL chemo infusion, 2,400 mg/m2 = 4,990 mg, Intravenous, Over 46 hours, 8 of 12 cycles  Dose modification: 2,000 mg/m2 (original dose 2,400 mg/m2, Cycle 8)  Administration: 4,990 mg (2/4/2025), 4,990 mg (2/18/2025), 4,990 mg (3/5/2025), 4,990 mg (3/19/2025), 4,990 mg (4/2/2025), 4,990 mg (4/15/2025), 4,990 mg (4/29/2025), 4,000 mg (6/18/2025)          INTERVAL HISTORY:  Mrs. Lowery presents today prior to cycle 2 FUDR and 5-FU maintenance. Doing okay overall. Does have some fatigue but  tries to remain active. She reports blurred vision to L eye. Has not seen eye doctor. Neuropathy is generally unchanged, taking cymbalta and gabapentin. She does feel unstable at times but denies falls. Generally has diarrhea/soft stools ~2x daily, no overt bleeding or constipation. No other new concerns.     Patient presents with caregiver today. ECOG PS is 0.    ROS:   Review of Systems   Constitutional:  Positive for malaise/fatigue. Negative for chills, fever and weight loss.   Respiratory:  Negative for shortness of breath.    Cardiovascular:  Negative for chest pain and palpitations.   Gastrointestinal:  Negative for abdominal pain, constipation, diarrhea, nausea and vomiting.   Genitourinary:  Negative for hematuria.   Musculoskeletal:  Negative for falls.   Neurological:  Positive for tingling. Negative for dizziness, weakness and headaches.   Psychiatric/Behavioral:  The patient is not nervous/anxious.        Past Medical History:   Past Medical History:   Diagnosis Date    Colon cancer     Diabetes mellitus     Diabetes mellitus, type 2     Encounter for blood transfusion     Herpes     Hypertension     Sleep apnea         Past Surgical History:   Past Surgical History:   Procedure Laterality Date    CHOLECYSTECTOMY N/A 5/21/2025    Procedure: CHOLECYSTECTOMY;  Surgeon: Mike Henry MD;  Location: [address];  Service: General;  Laterality: N/A;    COLONOSCOPY N/A 11/8/2024    Procedure: COLONOSCOPY;  Surgeon: Saw Wick MD;  Location: [facility];  Service: Endoscopy;  Laterality: N/A;    ESOPHAGOGASTRODUODENOSCOPY N/A 11/8/2024    Procedure: EGD (ESOPHAGOGASTRODUODENOSCOPY);  Surgeon: Saw Wick MD;  Location: [facility];  Service: Endoscopy;  Laterality: N/A;    EYE SURGERY  2002    lasix Bilateral    HYSTERECTOMY  2012    INSERTION OF TOTALLY IMPLANTABLE INFUSION PUMP N/A 5/21/2025    Procedure: INSERTION, INFUSION PUMP, TOTALLY IMPLANTABLE; BOB PUMP;  Surgeon: Mike Henry  "MD JOHANA;  Location: [facility] OR 2ND FLR;  Service: General;  Laterality: N/A;    INSERTION OF TUNNELED CENTRAL VENOUS CATHETER (CVC) WITH SUBCUTANEOUS PORT Right 12/20/2024    Procedure: INSERTION, PORT-A-CATH;  Surgeon: Logan Juarez MD;  Location: [facility] OR;  Service: General;  Laterality: Right;    LAPAROTOMY, EXPLORATORY N/A 5/21/2025    Procedure: LAPAROTOMY, EXPLORATORY;  Surgeon: Mike Henry MD;  Location: [facility] OR 2ND FLR;  Service: General;  Laterality: N/A;    LUMBAR DISCECTOMY      ROBOT-ASSISTED COLECTOMY Right 1/7/2025    Procedure: ROBOTIC COLECTOMY WITH ILEOCOLIC ANASTOMOSIS;  Surgeon: Logan Juarez MD;  Location: [facility];  Service: General;  Laterality: Right;    WISDOM TOOTH EXTRACTION          Family History:   Family History   Problem Relation Name Age of Onset    Heart disease Mother      Hypertension Mother      Cataracts Mother      Pancreatic cancer Father      Cancer Father      Arthritis Sister 2     Diabetes Brother 1     No Known Problems Paternal Aunt 1     Heart disease Paternal Uncle 2     Heart disease Maternal Grandfather      Alzheimer's disease Paternal Grandmother      Glaucoma Neg Hx          Social History:   Social History     Tobacco Use    Smoking status: Never     Passive exposure: Past    Smokeless tobacco: Never   Substance Use Topics    Alcohol use: No        I have reviewed and updated the patient's past medical, surgical, family and social histories.    Allergies:   Review of patient's allergies indicates:   Allergen Reactions    Robaxin [methocarbamol] Nausea Only        Medications:   Current Medications[1]     Physical Exam:   /88 (Patient Position: Sitting)   Pulse 95   Temp 96.8 °F (36 °C) (Temporal)   Ht 5' 5" (1.651 m)   Wt 89.4 kg (197 lb 3.2 oz)   SpO2 98%   BMI 32.82 kg/m²            Physical Exam  Vitals reviewed.   Constitutional:       General: She is not in acute distress.     Appearance: Normal appearance. She is not ill-appearing, " toxic-appearing or diaphoretic.   HENT:      Head: Normocephalic and atraumatic.   Eyes:      General: No scleral icterus.  Cardiovascular:      Rate and Rhythm: Normal rate.   Pulmonary:      Effort: Pulmonary effort is normal. No respiratory distress.   Chest:      Comments: RCW port  Abdominal:      Comments: LLQ BOB pump   Skin:     General: Skin is warm and dry.   Neurological:      Mental Status: She is alert and oriented to person, place, and time.      Motor: No weakness.      Gait: Gait normal.   Psychiatric:         Mood and Affect: Mood normal.         Behavior: Behavior normal.         Labs:   Recent Results (from the past 48 hours)   CEA    Collection Time: 06/30/25  9:27 AM   Result Value Ref Range    Carcinoembryonic Antigen 4.2 <=5.0 ng/mL   Comprehensive Metabolic Panel    Collection Time: 06/30/25  9:27 AM   Result Value Ref Range    Sodium 141 136 - 145 mmol/L    Potassium 4.4 3.5 - 5.1 mmol/L    Chloride 106 95 - 110 mmol/L    CO2 24 23 - 29 mmol/L    Glucose 135 (H) 70 - 110 mg/dL    BUN 16 6 - 20 mg/dL    Creatinine 0.8 0.5 - 1.4 mg/dL    Calcium 9.6 8.7 - 10.5 mg/dL    Protein Total 7.8 6.0 - 8.4 gm/dL    Albumin 4.1 3.5 - 5.2 g/dL    Bilirubin Total 0.3 0.1 - 1.0 mg/dL     (H) 40 - 150 unit/L    AST 52 (H) 11 - 45 unit/L    ALT 71 (H) 10 - 44 unit/L    Anion Gap 11 8 - 16 mmol/L    eGFR >60 >60 mL/min/1.73/m2   CBC Oncology    Collection Time: 06/30/25  9:27 AM   Result Value Ref Range    WBC 7.17 3.90 - 12.70 K/uL    RBC 3.97 (L) 4.00 - 5.40 M/uL    Hgb 12.7 12.0 - 16.0 gm/dL    Hct 39.0 37.0 - 48.5 %    MCV 98 82 - 98 fL    MCH 32.0 (H) 27.0 - 31.0 pg    MCHC 32.6 32.0 - 36.0 g/dL    RDW 13.0 11.5 - 14.5 %    Platelet Count 279 150 - 450 K/uL    MPV 9.0 (L) 9.2 - 12.9 fL    Neut # 3.7 1.8 - 7.7 K/uL    Imm Grans # 0.02 0.00 - 0.04 K/uL   HEMOGLOBIN A1C    Collection Time: 06/30/25  9:27 AM   Result Value Ref Range    Hemoglobin A1c 7.1 (H) 4.0 - 5.6 %    Estimated Average Glucose 157  (H) 68 - 131 mg/dL   Lipid Panel    Collection Time: 06/30/25  9:27 AM   Result Value Ref Range    Cholesterol Total 192 120 - 199 mg/dL    Triglyceride 296 (H) 30 - 150 mg/dL    HDL Cholesterol 54 40 - 75 mg/dL    LDL Cholesterol 78.8 63.0 - 159.0 mg/dL    HDL/Cholesterol Ratio 28.1 20.0 - 50.0 %    Cholesterol/HDL Ratio 3.6 2.0 - 5.0    Non HDL Cholesterol 138 mg/dL   Vitamin B12    Collection Time: 06/30/25  9:27 AM   Result Value Ref Range    Vitamin B12 495 210 - 950 pg/mL        Imaging:       MRI - 4/28/25:  Impression:     Findings consistent with changes of ablation with further decrease of the ablation cavity and no suspicious nodular enhancement.  Innumerable small subcentimeter suggesting metastatic disease some appearing more apparent but not thought overall significantly changed compared to the prior study MRI 02/28/2025.    Path:   COLON:     PROCEDURE: Right hemicolectomy   MACROSCOPIC EVALUATION OF MESORECTUM: Not applicable   TUMOR SITE: Cecum   RECTAL TUMOR LOCATION: Not applicable   HISTOLOGIC TYPE: Adenocarcinoma   HISTOLOGIC GRADE: Moderately differentiated   TUMOR SIZE: 48 mm and 4 mm   MULTIPLE PRIMARY SITES: Two separate foci, both in cecum/ascending colon    region   TUMOR EXTENT: Tumor invades kristopher-intestinal adipose tissue   SUB-MUCOSAL INVASION: Not applicable   MACROSCOPIC TUMOR PERFORATION: Negative   LYMPHATIC AND/OR VASCULAR INVASION: Positive   PERINEURAL INVASION: Negative   TUMOR BUDDING SCORE: Moderate   TREATMENT EFFECT: Not applicable   MARGIN STATUS FOR INVASIVE CARCINOMA: Negative   DISTANCE OF INVASIVE CARCINOMA TO RADIAL MARGIN: 35 mm   DISTANCE OF INVASIVE CARCINOMA TO CLOSEST MARGIN: 35 mm to radial margin   MARGINS INVOLVED BY INVASIVE CARCINOMA: Not applicable   MARGIN STATUS FOR NON-INVASIVE TUMOR: Negative   DNA MISMATCH REPAIR STATUS (MMR): Previously performed ([id number]). No    loss of nuclear expression of MMR proteins: Low probability of MSI-H.   REGIONAL LYMPH  NODE STATUS:   NUMBER OF LYMPH NODES WITH TUMOR: 3   NUMBER OF LYMPH NODES EXAMINED: 16   TUMOR DEPOSITS: Negative   DISTANT SITES INVOLVED: Liver ([facility]; [id number]).   PATHOLOGIC STAGE CLASSIFICATION: pT3 pN1b pM1a     Assessment:       1. Malignant neoplasm of ascending colon    2. Other low back pain    3. Chemotherapy-induced nausea    4. Metastatic colon cancer to liver    5. Metastasis to liver    6. Immunodeficiency secondary to neoplasm    7. Immunodeficiency secondary to chemotherapy    8. Chemotherapy-induced neuropathy    9. Gastroesophageal reflux disease, unspecified whether esophagitis present    10. Diabetes mellitus due to underlying condition with hyperosmolarity without coma, unspecified whether long term insulin use       Plan:        # Cecal adenocarcinoma  Diagnosed on colonoscopy 11/8/24.  Had synchronous liver metastases, small but diffuse and bilobar.  Biopsy proven, MWA to segment 8 metastasis done 1/29/25.  Underwent upfront R hemicolectomy 1/7/25 due to recurrent bleeding with Dr. Juarez.  Path showed pT3 N1b M1a disease, pMMR.  Started on FOLFOX on 2/4/25 with Dr. Crawford.  Repeat imaging after cycle 6 shows stable unresectable bilobar small liver metastases.  No clear evidence of extrahepatic disease.  She received cycle 7 of FOLFOX on 4/29/25.    At our initial visit, we discussed options of continuing FOLFOX with addition of bevacizumab, understanding she would have to come off oxaliplatin shortly due to neuropathy and proceed with maintenance chemovs switching to FOLFIRI + antoine second line vs BOB pump.  Also could consider future liver transplantation.  Will discuss her at [facility] tumor board with recommendation to proceed with BOB pump placement.    After extensive discussion of options and potential risks/toxicities of BOB, she wants to proceed with BOB pump.      Underwent pump placement 5/21/25.  Cycle 1 FUDR administered 6/4/25.  Using ideal average weight for dosage calculations.    Starting dose 203.8 mg.     Labs reviewed, adequate for treatment.   Proceed with cycle 2 BOB FUDR + 5FU maintenance.   RTC in 2 weeks for next cycle.     Can always add irinotecan in future to 5-FU if there are signs of intrahepatic or extrahepatic progression.    Because of increased risk of gastritis/duodenitis with BOB pump, increased omeprazole to 40 mg daily.      Tempus xT: INDIA, AMER1, APC, KRAS G12V, SMAD3, TMB  10.5  PGX: DPYD nml, UGT1A1 nml    # CIPN  Will recommend she hold further oxaliplatin.  Continue Cymbalta, gabapentin.   Refer to acupuncture as well.     # T2DM  Continue medical therapy.    Follow up: 2 weeks.    Patient is in agreement with the proposed treatment plan. All questions were answered to the patient's satisfaction. Pt knows to call clinic if anything is needed before the next clinic visit.    Patient discussed with collaborating physician, Dr. Stanford.    At least 40 minutes were spent today on this encounter including face to face time with the patient, data gathering/interpretation and documentation.       Saige Bender, MSN, APRN, [facility]  Hematology and Medical Oncology  Clinical Nurse Specialist to Dr. Stanford, Dr. Blount & Dr. Vuong         code applied: patient requires or will require a continuous, longitudinal, and active collaborative plan of care related to this patient's health condition, colon cancer --the management of which requires the direction of a practitioner with specialized clinical knowledge, skill, and expertise.     Route Chart for Scheduling    Med Onc Chart Routing      Follow up with physician 4 weeks. with labs and scans to see Dr. Stanford for scan results and chemo   Follow up with [facility] 2 weeks and 6 weeks. with labs for chemo   Infusion scheduling note   chemo and BOB infusion every 2 weeks (at [facility]), pump d/c on day 3 (can be at [place])   Injection scheduling note    Labs CBC, CMP and CEA   Scheduling:  Preferred lab:  Lab interval: every 2  weeks     Imaging CT chest abdomen pelvis   prior to MD visit in 4 weeks please   Pharmacy appointment    Other referrals            Treatment Plan Information   OP GI mFOLFOX6 (oxaliplatin leucovorin fluorouracil) Q2W Amarjit Crawford MD   Associated diagnosis: Malignant neoplasm of ascending colon Stage BIBIANA cT3, cN1a, cM1a noted on 12/12/2024   Line of treatment: Neoadjuvant  Treatment Goal: Control     Upcoming Treatment Dates - OP GI mFOLFOX6 (oxaliplatin leucovorin fluorouracil) Q2W    7/2/2025       Chemotherapy       oxaliplatin (ELOXATIN) 85 mg/m2 = 177 mg in D5W 535.4 mL chemo infusion       fluorouracil (Adrucil) 2,000 mg/m2 = 4,160 mg in 0.9% NaCl 100 mL chemo infusion       Antiemetics       palonosetron 0.25mg/dexAMETHasone 12mg in NS IVPB 0.25 mg 50 mL  7/16/2025       Chemotherapy       oxaliplatin (ELOXATIN) 85 mg/m2 = 177 mg in D5W 535.4 mL chemo infusion       fluorouracil (Adrucil) 2,000 mg/m2 = 4,160 mg in 0.9% NaCl 100 mL chemo infusion       Antiemetics       palonosetron 0.25mg/dexAMETHasone 12mg in NS IVPB 0.25 mg 50 mL  7/30/2025       Chemotherapy       oxaliplatin (ELOXATIN) 85 mg/m2 = 177 mg in D5W 535.4 mL chemo infusion       fluorouracil (Adrucil) 2,000 mg/m2 = 4,160 mg in 0.9% NaCl 100 mL chemo infusion       Antiemetics       palonosetron 0.25mg/dexAMETHasone 12mg in NS IVPB 0.25 mg 50 mL  8/13/2025       Chemotherapy       oxaliplatin (ELOXATIN) 85 mg/m2 = 177 mg in D5W 535.4 mL chemo infusion       fluorouracil (Adrucil) 2,000 mg/m2 = 4,160 mg in 0.9% NaCl 100 mL chemo infusion       Antiemetics       palonosetron 0.25mg/dexAMETHasone 12mg in NS IVPB 0.25 mg 50 mL    Supportive Plan Information  OP Intera 3000 Floxuridine Hepatic Artery Infusion (BOB) Pump Thom Stanford MD   Associated Diagnosis: Malignant neoplasm of ascending colon Stage BIBIANA cT3, cN1a, cM1a noted on 12/12/2024  Associated Diagnosis: Metastasis to liver   noted on 12/18/2024   Line of treatment: Consolidation    Treatment goal: Curative     Upcoming Treatment Dates - OP Intera 3000 Floxuridine Hepatic Artery Infusion (BOB) Pump    7/1/2025       Chemotherapy       floxuridine 203.8 mg, heparin (porcine) 30,000 Units, dexAMETHasone 23 mg in 0.9% NaCl 30 mL INTRA-ARTERIAL infusion syringe  7/15/2025       Medications       heparin 30,000 units in NS 30 mL INTRA-ARTERIAL infusion syringe  7/29/2025       Chemotherapy       floxuridine 203.8 mg, heparin (porcine) 30,000 Units, dexAMETHasone 23 mg in 0.9% NaCl 30 mL INTRA-ARTERIAL infusion syringe  8/12/2025       Medications       heparin 30,000 units in NS 30 mL INTRA-ARTERIAL infusion syringe    Therapy Plan Information  FERRLECIT (FERRIC GLUCONATE) QW for Iron deficiency anemia, noted on 11/7/2024  Anaphylaxis/Hypersensitivity  EPINEPHrine (EPIPEN) 0.3 mg/0.3 mL pen injection 0.3 mg  0.3 mg, Intramuscular, PRN  diphenhydrAMINE injection 50 mg  50 mg, Intravenous, PRN  hydrocortisone sodium succinate injection 100 mg  100 mg, Intravenous, PRN  Flushes  heparin, porcine (PF) 100 unit/mL injection flush 500 Units  500 Units, Intravenous, PRN  sodium chloride 0.9% flush 10 mL  10 mL, Intravenous, 1 time a week  0.9% NaCl 100 mL flush bag  Intravenous, 1 time a week    PORT FLUSH for Colon adenocarcinoma, noted on 12/20/2024  PORT FLUSH for Malignant neoplasm of ascending colon, noted on 12/12/2024  Flushes  heparin, porcine (PF) 100 unit/mL injection flush 500 Units  500 Units, Intravenous, Every visit  sodium chloride 0.9% flush 10 mL  10 mL, Intravenous, Every visit    INF FLUIDS for Colon adenocarcinoma, noted on 12/20/2024  IV Fluids  sodium chloride 0.9% bolus 1,000 mL 1,000 mL  1,000 mL, Intravenous, Every visit  Flushes  sodium chloride 0.9% flush 10 mL  10 mL, Intravenous, PRN  heparin, porcine (PF) 100 unit/mL injection flush 500 Units  500 Units, Intravenous, PRN         [1]   Current Outpatient Medications   Medication Sig Dispense Refill    atorvastatin (LIPITOR)  20 MG tablet TAKE 1 TABLET BY MOUTH EVERY DAY 90 tablet 3    dexAMETHasone (DECADRON) 4 MG Tab Take 2 tablets (8 mg total) by mouth once daily. On days 2 through 4 of each cycle of chemo 120 tablet 0    docusate sodium (COLACE) 100 MG capsule Take 1 capsule (100 mg total) by mouth 2 (two) times daily. 14 capsule 0    DULoxetine 40 mg CpDR Take 40 mg by mouth once daily. 90 capsule 3    fexofenadine (ALLEGRA) 180 MG tablet Take 180 mg by mouth nightly.      fluticasone propionate (FLONASE) 50 mcg/actuation nasal spray SPRAY 2 SPRAYS BY EACH NOSTRIL ROUTE ONCE DAILY. 48 mL 3    FREESTYLE JOCELIN 14 DAY SENSOR Kit 1 APPLICATION BY MISC.(NON-DRUG COMBO ROUTE) ROUTE DAILY AS NEEDED. 1 kit 24    gabapentin (NEURONTIN) 800 MG tablet Take 800 mg by mouth 3 (three) times daily.      guaiFENesin (MUCINEX) 1,200 mg Ta12 Take 1 tablet by mouth nightly.      insulin glargine, TOUJEO, (TOUJEO SOLOSTAR U-300 INSULIN) 300 unit/mL (1.5 mL) InPn pen Inject 30 Units into the skin once daily. 3 mL 11    lamoTRIgine (LAMICTAL) 150 MG Tab TAKE 1 TABLET BY MOUTH EVERY DAY 90 tablet 3    lancets Misc To check BG 3 times daily, to use with insurance preferred meter 200 each 1    LIDOcaine-prilocaine (EMLA) cream Apply topically as needed (Apply to port site 30 minutes before access as needed). 30 g 0    lisinopriL (PRINIVIL,ZESTRIL) 20 MG tablet TAKE 1 TABLET BY MOUTH EVERY DAY 90 tablet 3    magnesium oxide (MAG-OX) 400 mg (241.3 mg magnesium) tablet Take 1 tablet (400 mg total) by mouth 2 (two) times daily. 60 tablet 3    metFORMIN (GLUCOPHAGE-XR) 500 MG ER 24hr tablet TAKE 2 TABLETS BY MOUTH TWICE A DAY WITH MEALS 360 tablet 3    multivitamin (THERAGRAN) per tablet Take 1 tablet by mouth once daily.      omeprazole (PRILOSEC) 40 MG capsule Take 1 capsule (40 mg total) by mouth once daily. 90 capsule 3    ondansetron (ZOFRAN) 8 MG tablet Take 1 tablet (8 mg total) by mouth every 8 (eight) hours as needed. 30 tablet 2    ONETOUCH ULTRA BLUE  "TEST STRIP Strp USE TO CHECK BLOOD SUGAR 3 TIMES A  strip 0    oxyCODONE (ROXICODONE) 5 MG immediate release tablet Take 1 tablet (5 mg total) by mouth every 6 (six) hours as needed for Pain. 20 tablet 0    pen needle, diabetic (PEN NEEDLE) 31 gauge x 5/16" Ndle 1 Application by Misc.(Non-Drug; Combo Route) route once daily. 30 each 5    polyethylene glycol (GLYCOLAX) 17 gram/dose powder Use to cap to measure 17g, mix with liquid, and take by mouth 2 (two) times daily. 1020 g 0    prochlorperazine (COMPAZINE) 10 MG tablet Take 1 tablet (10 mg total) by mouth every 6 (six) hours as needed. 30 tablet 1    sertraline (ZOLOFT) 100 MG tablet TAKE 1 TABLET BY MOUTH EVERY DAY 90 tablet 3    valACYclovir (VALTREX) 500 MG tablet TAKE 1 TABLET BY MOUTH TWICE A  tablet 3    blood-glucose meter kit To check BG 3 times daily, to use with insurance preferred meter 1 each 0     Current Facility-Administered Medications   Medication Dose Route Frequency Provider Last Rate Last Admin    diphenhydrAMINE capsule 25 mg  25 mg Oral PRN Amarjit Crawford MD        sodium chloride 0.9% flush 10 mL  10 mL Intravenous PRN Amarjit Crawford MD         "

## 2025-07-03 ENCOUNTER — INFUSION (OUTPATIENT)
Dept: INFUSION THERAPY | Facility: HOSPITAL | Age: 60
End: 2025-07-03
Attending: INTERNAL MEDICINE
Payer: COMMERCIAL

## 2025-07-03 VITALS
RESPIRATION RATE: 18 BRPM | WEIGHT: 199 LBS | BODY MASS INDEX: 33.15 KG/M2 | SYSTOLIC BLOOD PRESSURE: 139 MMHG | DIASTOLIC BLOOD PRESSURE: 94 MMHG | TEMPERATURE: 98 F | HEIGHT: 65 IN | HEART RATE: 78 BPM | OXYGEN SATURATION: 99 %

## 2025-07-03 DIAGNOSIS — C18.2 MALIGNANT NEOPLASM OF ASCENDING COLON: Primary | ICD-10-CM

## 2025-07-03 PROCEDURE — A4216 STERILE WATER/SALINE, 10 ML: HCPCS | Performed by: REGISTERED NURSE

## 2025-07-03 PROCEDURE — 96523 IRRIG DRUG DELIVERY DEVICE: CPT

## 2025-07-03 PROCEDURE — 63600175 PHARM REV CODE 636 W HCPCS: Performed by: REGISTERED NURSE

## 2025-07-03 PROCEDURE — 25000003 PHARM REV CODE 250: Performed by: REGISTERED NURSE

## 2025-07-03 RX ORDER — HEPARIN 100 UNIT/ML
500 SYRINGE INTRAVENOUS
Status: DISCONTINUED | OUTPATIENT
Start: 2025-07-03 | End: 2025-07-03 | Stop reason: HOSPADM

## 2025-07-03 RX ORDER — SODIUM CHLORIDE 0.9 % (FLUSH) 0.9 %
10 SYRINGE (ML) INJECTION
Status: DISCONTINUED | OUTPATIENT
Start: 2025-07-03 | End: 2025-07-03 | Stop reason: HOSPADM

## 2025-07-03 RX ADMIN — HEPARIN 500 UNITS: 100 SYRINGE at 07:07

## 2025-07-03 RX ADMIN — Medication 10 ML: at 07:07

## 2025-07-14 ENCOUNTER — LAB VISIT (OUTPATIENT)
Dept: LAB | Facility: HOSPITAL | Age: 60
End: 2025-07-14
Attending: REGISTERED NURSE
Payer: COMMERCIAL

## 2025-07-14 ENCOUNTER — PATIENT MESSAGE (OUTPATIENT)
Dept: HEMATOLOGY/ONCOLOGY | Facility: CLINIC | Age: 60
End: 2025-07-14
Payer: COMMERCIAL

## 2025-07-14 DIAGNOSIS — C18.2 MALIGNANT NEOPLASM OF ASCENDING COLON: ICD-10-CM

## 2025-07-14 LAB
ABSOLUTE NEUTROPHIL COUNT (SMH): 4.3 K/UL (ref 1.8–7.7)
ALBUMIN SERPL-MCNC: 4.2 G/DL (ref 3.5–5.2)
ALP SERPL-CCNC: 361 UNIT/L (ref 40–150)
ALT SERPL-CCNC: 257 UNIT/L (ref 10–44)
ANION GAP (SMH): 10 MMOL/L (ref 8–16)
AST SERPL-CCNC: 204 UNIT/L (ref 11–45)
BILIRUB SERPL-MCNC: 0.3 MG/DL (ref 0.1–1)
BUN SERPL-MCNC: 16 MG/DL (ref 6–20)
CALCIUM SERPL-MCNC: 9.4 MG/DL (ref 8.7–10.5)
CARCINOEMBRYONIC ANTIGEN (SMH): 3.6 NG/ML
CHLORIDE SERPL-SCNC: 105 MMOL/L (ref 95–110)
CO2 SERPL-SCNC: 23 MMOL/L (ref 23–29)
CREAT SERPL-MCNC: 0.9 MG/DL (ref 0.5–1.4)
ERYTHROCYTE [DISTWIDTH] IN BLOOD BY AUTOMATED COUNT: 13.2 % (ref 11.5–14.5)
GFR SERPLBLD CREATININE-BSD FMLA CKD-EPI: >60 ML/MIN/1.73/M2
GLUCOSE SERPL-MCNC: 183 MG/DL (ref 70–110)
HCT VFR BLD AUTO: 40.8 % (ref 37–48.5)
HGB BLD-MCNC: 13 GM/DL (ref 12–16)
IMM GRANULOCYTES # BLD AUTO: 0.01 K/UL (ref 0–0.04)
MCH RBC QN AUTO: 31.3 PG (ref 27–31)
MCHC RBC AUTO-ENTMCNC: 31.9 G/DL (ref 32–36)
MCV RBC AUTO: 98 FL (ref 82–98)
PLATELET # BLD AUTO: 236 K/UL (ref 150–450)
PMV BLD AUTO: 9.2 FL (ref 9.2–12.9)
POTASSIUM SERPL-SCNC: 4.1 MMOL/L (ref 3.5–5.1)
PROT SERPL-MCNC: 8 GM/DL (ref 6–8.4)
RBC # BLD AUTO: 4.15 M/UL (ref 4–5.4)
SODIUM SERPL-SCNC: 138 MMOL/L (ref 136–145)
WBC # BLD AUTO: 7.26 K/UL (ref 3.9–12.7)

## 2025-07-14 PROCEDURE — 82040 ASSAY OF SERUM ALBUMIN: CPT

## 2025-07-14 PROCEDURE — 85027 COMPLETE CBC AUTOMATED: CPT

## 2025-07-14 PROCEDURE — 36415 COLL VENOUS BLD VENIPUNCTURE: CPT

## 2025-07-14 PROCEDURE — 82378 CARCINOEMBRYONIC ANTIGEN: CPT

## 2025-07-15 ENCOUNTER — OFFICE VISIT (OUTPATIENT)
Dept: HEMATOLOGY/ONCOLOGY | Facility: CLINIC | Age: 60
End: 2025-07-15
Payer: COMMERCIAL

## 2025-07-15 ENCOUNTER — INFUSION (OUTPATIENT)
Dept: INFUSION THERAPY | Facility: HOSPITAL | Age: 60
End: 2025-07-15
Payer: COMMERCIAL

## 2025-07-15 VITALS
HEART RATE: 87 BPM | RESPIRATION RATE: 18 BRPM | SYSTOLIC BLOOD PRESSURE: 148 MMHG | BODY MASS INDEX: 33.15 KG/M2 | TEMPERATURE: 98 F | DIASTOLIC BLOOD PRESSURE: 87 MMHG | HEIGHT: 65 IN | WEIGHT: 198.94 LBS | OXYGEN SATURATION: 97 %

## 2025-07-15 VITALS
BODY MASS INDEX: 33.15 KG/M2 | HEART RATE: 90 BPM | WEIGHT: 198.94 LBS | OXYGEN SATURATION: 97 % | DIASTOLIC BLOOD PRESSURE: 88 MMHG | TEMPERATURE: 98 F | RESPIRATION RATE: 18 BRPM | HEIGHT: 65 IN | SYSTOLIC BLOOD PRESSURE: 131 MMHG

## 2025-07-15 DIAGNOSIS — C78.7 METASTASIS TO LIVER: ICD-10-CM

## 2025-07-15 DIAGNOSIS — D84.821 IMMUNODEFICIENCY SECONDARY TO CHEMOTHERAPY: ICD-10-CM

## 2025-07-15 DIAGNOSIS — E08.00 DIABETES MELLITUS DUE TO UNDERLYING CONDITION WITH HYPEROSMOLARITY WITHOUT COMA, UNSPECIFIED WHETHER LONG TERM INSULIN USE: ICD-10-CM

## 2025-07-15 DIAGNOSIS — C18.2 MALIGNANT NEOPLASM OF ASCENDING COLON: Primary | ICD-10-CM

## 2025-07-15 DIAGNOSIS — K21.9 GASTROESOPHAGEAL REFLUX DISEASE, UNSPECIFIED WHETHER ESOPHAGITIS PRESENT: ICD-10-CM

## 2025-07-15 DIAGNOSIS — C18.9 METASTATIC COLON CANCER TO LIVER: ICD-10-CM

## 2025-07-15 DIAGNOSIS — C78.7 METASTATIC COLON CANCER TO LIVER: ICD-10-CM

## 2025-07-15 DIAGNOSIS — D49.9 IMMUNODEFICIENCY SECONDARY TO NEOPLASM: ICD-10-CM

## 2025-07-15 DIAGNOSIS — T45.1X5A CHEMOTHERAPY-INDUCED NEUROPATHY: ICD-10-CM

## 2025-07-15 DIAGNOSIS — D84.81 IMMUNODEFICIENCY SECONDARY TO NEOPLASM: ICD-10-CM

## 2025-07-15 DIAGNOSIS — T45.1X5A CHEMOTHERAPY-INDUCED NAUSEA: ICD-10-CM

## 2025-07-15 DIAGNOSIS — T45.1X5A IMMUNODEFICIENCY SECONDARY TO CHEMOTHERAPY: ICD-10-CM

## 2025-07-15 DIAGNOSIS — G62.0 CHEMOTHERAPY-INDUCED NEUROPATHY: ICD-10-CM

## 2025-07-15 DIAGNOSIS — R11.0 CHEMOTHERAPY-INDUCED NAUSEA: ICD-10-CM

## 2025-07-15 DIAGNOSIS — Z79.69 IMMUNODEFICIENCY SECONDARY TO CHEMOTHERAPY: ICD-10-CM

## 2025-07-15 PROCEDURE — 1160F RVW MEDS BY RX/DR IN RCRD: CPT | Mod: CPTII,S$GLB,, | Performed by: REGISTERED NURSE

## 2025-07-15 PROCEDURE — 3079F DIAST BP 80-89 MM HG: CPT | Mod: CPTII,S$GLB,, | Performed by: REGISTERED NURSE

## 2025-07-15 PROCEDURE — 63600175 PHARM REV CODE 636 W HCPCS: Performed by: REGISTERED NURSE

## 2025-07-15 PROCEDURE — 96416 CHEMO PROLONG INFUSE W/PUMP: CPT

## 2025-07-15 PROCEDURE — G2211 COMPLEX E/M VISIT ADD ON: HCPCS | Mod: S$GLB,,, | Performed by: REGISTERED NURSE

## 2025-07-15 PROCEDURE — 3008F BODY MASS INDEX DOCD: CPT | Mod: CPTII,S$GLB,, | Performed by: REGISTERED NURSE

## 2025-07-15 PROCEDURE — 3075F SYST BP GE 130 - 139MM HG: CPT | Mod: CPTII,S$GLB,, | Performed by: REGISTERED NURSE

## 2025-07-15 PROCEDURE — 1159F MED LIST DOCD IN RCRD: CPT | Mod: CPTII,S$GLB,, | Performed by: REGISTERED NURSE

## 2025-07-15 PROCEDURE — 96365 THER/PROPH/DIAG IV INF INIT: CPT

## 2025-07-15 PROCEDURE — 4010F ACE/ARB THERAPY RXD/TAKEN: CPT | Mod: CPTII,S$GLB,, | Performed by: REGISTERED NURSE

## 2025-07-15 PROCEDURE — 99999 PR PBB SHADOW E&M-EST. PATIENT-LVL III: CPT | Mod: PBBFAC,,, | Performed by: REGISTERED NURSE

## 2025-07-15 PROCEDURE — 96522 REFILL/MAINT PUMP/RESVR SYST: CPT

## 2025-07-15 PROCEDURE — 99215 OFFICE O/P EST HI 40 MIN: CPT | Mod: S$GLB,,, | Performed by: REGISTERED NURSE

## 2025-07-15 PROCEDURE — 3051F HG A1C>EQUAL 7.0%<8.0%: CPT | Mod: CPTII,S$GLB,, | Performed by: REGISTERED NURSE

## 2025-07-15 PROCEDURE — 25000003 PHARM REV CODE 250: Performed by: REGISTERED NURSE

## 2025-07-15 RX ORDER — SODIUM CHLORIDE 0.9 % (FLUSH) 0.9 %
10 SYRINGE (ML) INJECTION
Status: DISCONTINUED | OUTPATIENT
Start: 2025-07-15 | End: 2025-07-15 | Stop reason: HOSPADM

## 2025-07-15 RX ORDER — DIPHENHYDRAMINE HYDROCHLORIDE 50 MG/ML
50 INJECTION, SOLUTION INTRAMUSCULAR; INTRAVENOUS ONCE AS NEEDED
Status: CANCELLED | OUTPATIENT
Start: 2025-07-16

## 2025-07-15 RX ORDER — SODIUM CHLORIDE 0.9 % (FLUSH) 0.9 %
10 SYRINGE (ML) INJECTION
Status: CANCELLED | OUTPATIENT
Start: 2025-07-16

## 2025-07-15 RX ORDER — SODIUM CHLORIDE 0.9 % (FLUSH) 0.9 %
10 SYRINGE (ML) INJECTION
Status: CANCELLED | OUTPATIENT
Start: 2025-07-18

## 2025-07-15 RX ORDER — PROCHLORPERAZINE EDISYLATE 5 MG/ML
10 INJECTION INTRAMUSCULAR; INTRAVENOUS ONCE AS NEEDED
Status: CANCELLED | OUTPATIENT
Start: 2025-07-16

## 2025-07-15 RX ORDER — EPINEPHRINE 0.3 MG/.3ML
0.3 INJECTION SUBCUTANEOUS ONCE AS NEEDED
Status: DISCONTINUED | OUTPATIENT
Start: 2025-07-15 | End: 2025-07-15 | Stop reason: HOSPADM

## 2025-07-15 RX ORDER — PROCHLORPERAZINE EDISYLATE 5 MG/ML
10 INJECTION INTRAMUSCULAR; INTRAVENOUS ONCE AS NEEDED
Status: DISCONTINUED | OUTPATIENT
Start: 2025-07-15 | End: 2025-07-15 | Stop reason: HOSPADM

## 2025-07-15 RX ORDER — PROCHLORPERAZINE EDISYLATE 5 MG/ML
10 INJECTION INTRAMUSCULAR; INTRAVENOUS ONCE AS NEEDED
Status: CANCELLED | OUTPATIENT
Start: 2025-07-18

## 2025-07-15 RX ORDER — HEPARIN 100 UNIT/ML
500 SYRINGE INTRAVENOUS
Status: CANCELLED | OUTPATIENT
Start: 2025-07-16

## 2025-07-15 RX ORDER — EPINEPHRINE 0.3 MG/.3ML
0.3 INJECTION SUBCUTANEOUS ONCE AS NEEDED
Status: CANCELLED | OUTPATIENT
Start: 2025-07-16

## 2025-07-15 RX ORDER — DIPHENHYDRAMINE HYDROCHLORIDE 50 MG/ML
50 INJECTION, SOLUTION INTRAMUSCULAR; INTRAVENOUS ONCE AS NEEDED
Status: DISCONTINUED | OUTPATIENT
Start: 2025-07-15 | End: 2025-07-15 | Stop reason: HOSPADM

## 2025-07-15 RX ORDER — HEPARIN 100 UNIT/ML
500 SYRINGE INTRAVENOUS
Status: CANCELLED | OUTPATIENT
Start: 2025-07-18

## 2025-07-15 RX ADMIN — DEXAMETHASONE SODIUM PHOSPHATE 0.25 MG: 4 INJECTION, SOLUTION INTRA-ARTICULAR; INTRALESIONAL; INTRAMUSCULAR; INTRAVENOUS; SOFT TISSUE at 09:07

## 2025-07-15 RX ADMIN — HEPARIN SODIUM 30000 UNITS: 5000 INJECTION, SOLUTION INTRAVENOUS; SUBCUTANEOUS at 10:07

## 2025-07-15 RX ADMIN — FLUOROURACIL 4000 MG: 50 INJECTION, SOLUTION INTRAVENOUS at 10:07

## 2025-07-15 NOTE — PLAN OF CARE
"BP (!) 148/87 (Patient Position: Sitting)   Pulse 87   Temp 97.6 °F (36.4 °C)   Resp 18   Ht 5' 5" (1.651 m)   Wt 90.2 kg (198 lb 15.4 oz)   SpO2 97%   BMI 33.11 kg/m² Pleasant, alert and oriented patient to Chemo Infusion per self with family for C10D1 5fu with C2D15 BOB pump refill with Heparin  - VSS and RCW port accessed with blood return observed, flushed with NS, dressing with Bio-Patch applied and patient tolerated procedure well - pt tolerated Heparin 30,000 unit per DAVID Pagan with placement verified every 3-5 ml and 12.5 ml residual noted (30 ml - 12.5 ml= 17.5 ml), rate is 1.25/day, no complications or side effects - port flushed with NS, blood return observed, pump attached and infusing, clamp checked and open, connection secured and patient discharged to home with no concerns - RTC [facility] at 0845 for pump d/c    "

## 2025-07-15 NOTE — PROGRESS NOTES
GI MEDICAL ONCOLOGY    Reason for visit: Colon cancer    Best Contact Phone Number(s): There are no phone numbers on file.     Cancer/Stage/TNM:    Cancer Staging   Malignant neoplasm of ascending colon  Staging form: Colon and Rectum, AJCC 8th Edition  - Clinical: Stage BIBIANA (cT3, cN1a, cM1a) - Signed by Amarjit Crawford MD on 12/18/2024       Oncology History   Malignant neoplasm of ascending colon   12/12/2024 Initial Diagnosis    Malignant neoplasm of ascending colon     12/18/2024 Cancer Staged    Staging form: Colon and Rectum, AJCC 8th Edition  - Clinical: Stage BIBIANA (cT3, cN1a, cM1a)     2/4/2025 -  Chemotherapy    Treatment Summary   Plan Name: OP GI [id number] (oxaliplatin leucovorin fluorouracil) Q2W  Treatment Goal: Control  Status: Active  Start Date: 2/4/2025  End Date: 8/15/2025 (Planned)  Provider: Amarjit Crawford MD  Chemotherapy: fluorouraciL injection 830 mg, 400 mg/m2 = 830 mg, Intravenous, Clinic/HOD 1 time, 7 of 7 cycles  Administration: 830 mg (2/4/2025), 830 mg (2/18/2025), 830 mg (3/5/2025), 830 mg (3/19/2025), 830 mg (4/2/2025), 830 mg (4/15/2025), 830 mg (4/29/2025)  oxaliplatin (ELOXATIN) 85 mg/m2 = 177 mg in D5W 600.4 mL chemo infusion, 85 mg/m2 = 177 mg, Intravenous, Clinic/HOD 1 time, 7 of 7 cycles  Administration: 177 mg (2/4/2025), 177 mg (2/18/2025), 177 mg (3/5/2025), 177 mg (3/19/2025), 177 mg (4/2/2025), 177 mg (4/15/2025), 177 mg (4/29/2025)  fluorouracil (Adrucil) 2,400 mg/m2 = 4,990 mg in 0.9% NaCl 250 mL chemo infusion, 2,400 mg/m2 = 4,990 mg, Intravenous, Over 46 hours, 9 of 12 cycles  Dose modification: 2,000 mg/m2 (original dose 2,400 mg/m2, Cycle 8)  Administration: 4,990 mg (2/4/2025), 4,990 mg (2/18/2025), 4,990 mg (3/5/2025), 4,990 mg (3/19/2025), 4,990 mg (4/2/2025), 4,990 mg (4/15/2025), 4,990 mg (4/29/2025), 4,000 mg (6/18/2025), 4,000 mg (7/1/2025)          INTERVAL HISTORY:  Mrs. Lowery presents today prior to cycle 2 BOB flush and 5-FU maintenance. Doing well overall. Has  some fatigue, but generally manageable. No changes to blurred vision, has not seen eye doctor yet. Neuropathy generally stable with cymbalta and gabaptenin. No falls. Continues with diarrhea/soft stools ~2x daily, unchanged from prior and no overt bleeding. No other new concerns.     Patient presents with caregiver today. ECOG PS is 0.    ROS:   Review of Systems   Constitutional:  Positive for malaise/fatigue. Negative for chills, fever and weight loss.   Respiratory:  Negative for shortness of breath.    Cardiovascular:  Negative for chest pain and palpitations.   Gastrointestinal:  Negative for abdominal pain, constipation, diarrhea, nausea and vomiting.   Genitourinary:  Negative for hematuria.   Musculoskeletal:  Negative for falls.   Neurological:  Positive for tingling. Negative for dizziness, weakness and headaches.   Psychiatric/Behavioral:  The patient is not nervous/anxious.        Past Medical History:   Past Medical History:   Diagnosis Date    Colon cancer     Diabetes mellitus     Diabetes mellitus, type 2     Encounter for blood transfusion     Herpes     Hypertension     Sleep apnea         Past Surgical History:   Past Surgical History:   Procedure Laterality Date    CHOLECYSTECTOMY N/A 5/21/2025    Procedure: CHOLECYSTECTOMY;  Surgeon: Mike Henry MD;  Location: [address];  Service: General;  Laterality: N/A;    COLONOSCOPY N/A 11/8/2024    Procedure: COLONOSCOPY;  Surgeon: Saw Wick MD;  Location: [facility];  Service: Endoscopy;  Laterality: N/A;    ESOPHAGOGASTRODUODENOSCOPY N/A 11/8/2024    Procedure: EGD (ESOPHAGOGASTRODUODENOSCOPY);  Surgeon: Saw Wick MD;  Location: [facility];  Service: Endoscopy;  Laterality: N/A;    EYE SURGERY  2002    lasix Bilateral    HYSTERECTOMY  2012    INSERTION OF TOTALLY IMPLANTABLE INFUSION PUMP N/A 5/21/2025    Procedure: INSERTION, INFUSION PUMP, TOTALLY IMPLANTABLE; BOB PUMP;  Surgeon: Mike Henry MD;  Location: [facility] OR  "2ND FLR;  Service: General;  Laterality: N/A;    INSERTION OF TUNNELED CENTRAL VENOUS CATHETER (CVC) WITH SUBCUTANEOUS PORT Right 12/20/2024    Procedure: INSERTION, PORT-A-CATH;  Surgeon: Logan Juarez MD;  Location: [facility] OR;  Service: General;  Laterality: Right;    LAPAROTOMY, EXPLORATORY N/A 5/21/2025    Procedure: LAPAROTOMY, EXPLORATORY;  Surgeon: Mike Henry MD;  Location: [facility] OR 2ND FLR;  Service: General;  Laterality: N/A;    LUMBAR DISCECTOMY      ROBOT-ASSISTED COLECTOMY Right 1/7/2025    Procedure: ROBOTIC COLECTOMY WITH ILEOCOLIC ANASTOMOSIS;  Surgeon: Logan Juarez MD;  Location: [facility];  Service: General;  Laterality: Right;    WISDOM TOOTH EXTRACTION          Family History:   Family History   Problem Relation Name Age of Onset    Heart disease Mother      Hypertension Mother      Cataracts Mother      Pancreatic cancer Father      Cancer Father      Arthritis Sister 2     Diabetes Brother 1     No Known Problems Paternal Aunt 1     Heart disease Paternal Uncle 2     Heart disease Maternal Grandfather      Alzheimer's disease Paternal Grandmother      Glaucoma Neg Hx          Social History:   Social History     Tobacco Use    Smoking status: Never     Passive exposure: Past    Smokeless tobacco: Never   Substance Use Topics    Alcohol use: No        I have reviewed and updated the patient's past medical, surgical, family and social histories.    Allergies:   Review of patient's allergies indicates:   Allergen Reactions    Robaxin [methocarbamol] Nausea Only        Medications:   Current Medications[1]     Physical Exam:   /88 (BP Location: Left arm, Patient Position: Sitting)   Pulse 90   Temp 97.6 °F (36.4 °C) (Temporal)   Resp 18   Ht 5' 5" (1.651 m)   Wt 90.2 kg (198 lb 15.4 oz)   SpO2 97%   BMI 33.11 kg/m²            Physical Exam  Vitals reviewed.   Constitutional:       General: She is not in acute distress.     Appearance: Normal appearance. She is not " ill-appearing, toxic-appearing or diaphoretic.   HENT:      Head: Normocephalic and atraumatic.   Eyes:      General: No scleral icterus.  Cardiovascular:      Rate and Rhythm: Normal rate.   Pulmonary:      Effort: Pulmonary effort is normal. No respiratory distress.   Chest:      Comments: RCW port  Abdominal:      Comments: LLQ BOB pump   Skin:     General: Skin is warm and dry.   Neurological:      Mental Status: She is alert and oriented to person, place, and time.      Motor: No weakness.      Gait: Gait normal.   Psychiatric:         Mood and Affect: Mood normal.         Behavior: Behavior normal.         Labs:   Recent Results (from the past 48 hours)   CEA    Collection Time: 07/14/25 10:29 AM   Result Value Ref Range    Carcinoembryonic Antigen 3.6 <=5.0 ng/mL   Comprehensive Metabolic Panel    Collection Time: 07/14/25 10:29 AM   Result Value Ref Range    Sodium 138 136 - 145 mmol/L    Potassium 4.1 3.5 - 5.1 mmol/L    Chloride 105 95 - 110 mmol/L    CO2 23 23 - 29 mmol/L    Glucose 183 (H) 70 - 110 mg/dL    BUN 16 6 - 20 mg/dL    Creatinine 0.9 0.5 - 1.4 mg/dL    Calcium 9.4 8.7 - 10.5 mg/dL    Protein Total 8.0 6.0 - 8.4 gm/dL    Albumin 4.2 3.5 - 5.2 g/dL    Bilirubin Total 0.3 0.1 - 1.0 mg/dL     (H) 40 - 150 unit/L     (H) 11 - 45 unit/L     (H) 10 - 44 unit/L    Anion Gap 10 8 - 16 mmol/L    eGFR >60 >60 mL/min/1.73/m2   CBC Oncology    Collection Time: 07/14/25 10:29 AM   Result Value Ref Range    WBC 7.26 3.90 - 12.70 K/uL    RBC 4.15 4.00 - 5.40 M/uL    Hgb 13.0 12.0 - 16.0 gm/dL    Hct 40.8 37.0 - 48.5 %    MCV 98 82 - 98 fL    MCH 31.3 (H) 27.0 - 31.0 pg    MCHC 31.9 (L) 32.0 - 36.0 g/dL    RDW 13.2 11.5 - 14.5 %    Platelet Count 236 150 - 450 K/uL    MPV 9.2 9.2 - 12.9 fL    Neut # 4.3 1.8 - 7.7 K/uL    Imm Grans # 0.01 0.00 - 0.04 K/uL        Imaging:       MRI - 4/28/25:  Impression:     Findings consistent with changes of ablation with further decrease of the ablation  cavity and no suspicious nodular enhancement.  Innumerable small subcentimeter suggesting metastatic disease some appearing more apparent but not thought overall significantly changed compared to the prior study MRI 02/28/2025.    Path:   COLON:     PROCEDURE: Right hemicolectomy   MACROSCOPIC EVALUATION OF MESORECTUM: Not applicable   TUMOR SITE: Cecum   RECTAL TUMOR LOCATION: Not applicable   HISTOLOGIC TYPE: Adenocarcinoma   HISTOLOGIC GRADE: Moderately differentiated   TUMOR SIZE: 48 mm and 4 mm   MULTIPLE PRIMARY SITES: Two separate foci, both in cecum/ascending colon    region   TUMOR EXTENT: Tumor invades kristopher-intestinal adipose tissue   SUB-MUCOSAL INVASION: Not applicable   MACROSCOPIC TUMOR PERFORATION: Negative   LYMPHATIC AND/OR VASCULAR INVASION: Positive   PERINEURAL INVASION: Negative   TUMOR BUDDING SCORE: Moderate   TREATMENT EFFECT: Not applicable   MARGIN STATUS FOR INVASIVE CARCINOMA: Negative   DISTANCE OF INVASIVE CARCINOMA TO RADIAL MARGIN: 35 mm   DISTANCE OF INVASIVE CARCINOMA TO CLOSEST MARGIN: 35 mm to radial margin   MARGINS INVOLVED BY INVASIVE CARCINOMA: Not applicable   MARGIN STATUS FOR NON-INVASIVE TUMOR: Negative   DNA MISMATCH REPAIR STATUS (MMR): Previously performed ([id number]). No    loss of nuclear expression of MMR proteins: Low probability of MSI-H.   REGIONAL LYMPH NODE STATUS:   NUMBER OF LYMPH NODES WITH TUMOR: 3   NUMBER OF LYMPH NODES EXAMINED: 16   TUMOR DEPOSITS: Negative   DISTANT SITES INVOLVED: Liver ([facility]; [id number]).   PATHOLOGIC STAGE CLASSIFICATION: pT3 pN1b pM1a     Assessment:       1. Malignant neoplasm of ascending colon    2. Chemotherapy-induced nausea    3. Metastatic colon cancer to liver    4. Metastasis to liver    5. Immunodeficiency secondary to neoplasm    6. Immunodeficiency secondary to chemotherapy    7. Chemotherapy-induced neuropathy    8. Gastroesophageal reflux disease, unspecified whether esophagitis present    9. Diabetes mellitus  due to underlying condition with hyperosmolarity without coma, unspecified whether long term insulin use       Plan:        # Cecal adenocarcinoma  Diagnosed on colonoscopy 11/8/24.  Had synchronous liver metastases, small but diffuse and bilobar.  Biopsy proven, MWA to segment 8 metastasis done 1/29/25.  Underwent upfront R hemicolectomy 1/7/25 due to recurrent bleeding with Dr. Juarez.  Path showed pT3 N1b M1a disease, pMMR.  Started on FOLFOX on 2/4/25 with Dr. Crawford.  Repeat imaging after cycle 6 shows stable unresectable bilobar small liver metastases.  No clear evidence of extrahepatic disease.  She received cycle 7 of FOLFOX on 4/29/25.    At our initial visit, we discussed options of continuing FOLFOX with addition of bevacizumab, understanding she would have to come off oxaliplatin shortly due to neuropathy and proceed with maintenance chemovs switching to FOLFIRI + antoine second line vs BOB pump.  Also could consider future liver transplantation.  Will discuss her at [facility] tumor board with recommendation to proceed with BOB pump placement.    After extensive discussion of options and potential risks/toxicities of BOB, she wants to proceed with BOB pump.      Underwent pump placement 5/21/25.  Cycle 1 FUDR administered 6/4/25.  Using ideal average weight for dosage calculations.   Starting dose 203.8 mg.     Labs reviewed, adequate for treatment.   Proceed with cycle 2 BOB flush + 5FU maintenance.   RTC in 2 weeks for next cycle with scans prior.     Can always add irinotecan in future to 5-FU if there are signs of intrahepatic or extrahepatic progression.    Because of increased risk of gastritis/duodenitis with BOB pump, increased omeprazole to 40 mg daily.      Tempus xT: INDIA, AMER1, APC, KRAS G12V, SMAD3, TMB  10.5  PGX: DPYD nml, UGT1A1 nml    # CIPN  Will recommend she hold further oxaliplatin.  Continue Cymbalta, gabapentin.   Refer to acupuncture as well.     # T2DM  Continue medical therapy.    Follow  up: 2 weeks.    Patient is in agreement with the proposed treatment plan. All questions were answered to the patient's satisfaction. Pt knows to call clinic if anything is needed before the next clinic visit.    Patient discussed with collaborating physician, Dr. Stanford.    At least 40 minutes were spent today on this encounter including face to face time with the patient, data gathering/interpretation and documentation.       Saige Bender, MSN, APRN, [facility]  Hematology and Medical Oncology  Clinical Nurse Specialist to Dr. Stanford, Dr. Blount & Dr. Vuong         code applied: patient requires or will require a continuous, longitudinal, and active collaborative plan of care related to this patient's health condition, colon cancer --the management of which requires the direction of a practitioner with specialized clinical knowledge, skill, and expertise.     Route Chart for Scheduling    Med Onc Chart Routing      Follow up with physician . Every 2 weeks as scheduled   Follow up with [facility]    Infusion scheduling note   chemo + BOB infusion every 2 weeks, pump d/c on day 3   Injection scheduling note    Labs CBC, CMP and CEA   Scheduling:  Preferred lab:  Lab interval: every 2 weeks     Imaging    Pharmacy appointment    Other referrals            Treatment Plan Information   OP GI mFOLFOX6 (oxaliplatin leucovorin fluorouracil) Q2W Amarjit Crawford MD   Associated diagnosis: Malignant neoplasm of ascending colon Stage BIBIANA cT3, cN1a, cM1a noted on 12/12/2024   Line of treatment: Neoadjuvant  Treatment Goal: Control     Upcoming Treatment Dates - OP GI mFOLFOX6 (oxaliplatin leucovorin fluorouracil) Q2W    7/16/2025       Chemotherapy       fluorouracil (Adrucil) 2,000 mg/m2 = 4,160 mg in 0.9% NaCl 100 mL chemo infusion       Antiemetics       palonosetron 0.25mg/dexAMETHasone 12mg in NS IVPB 0.25 mg 50 mL  7/30/2025       Chemotherapy       fluorouracil (Adrucil) 2,000 mg/m2 = 4,160 mg in 0.9% NaCl 100 mL chemo  infusion       Antiemetics       palonosetron 0.25mg/dexAMETHasone 12mg in NS IVPB 0.25 mg 50 mL  8/13/2025       Chemotherapy       fluorouracil (Adrucil) 2,000 mg/m2 = 4,160 mg in 0.9% NaCl 100 mL chemo infusion       Antiemetics       palonosetron 0.25mg/dexAMETHasone 12mg in NS IVPB 0.25 mg 50 mL    Supportive Plan Information  OP Intera 3000 Floxuridine Hepatic Artery Infusion (BOB) Pump Thom Stanford MD   Associated Diagnosis: Malignant neoplasm of ascending colon Stage BIBIANA cT3, cN1a, cM1a noted on 12/12/2024  Associated Diagnosis: Metastasis to liver   noted on 12/18/2024   Line of treatment: Consolidation   Treatment goal: Curative     Upcoming Treatment Dates - OP Intera 3000 Floxuridine Hepatic Artery Infusion (BOB) Pump    7/15/2025       Medications       heparin 30,000 units in NS 30 mL INTRA-ARTERIAL infusion syringe  7/29/2025       Chemotherapy       floxuridine 203.8 mg, heparin (porcine) 30,000 Units, dexAMETHasone 23 mg in 0.9% NaCl 30 mL INTRA-ARTERIAL infusion syringe  8/12/2025       Medications       heparin 30,000 units in NS 30 mL INTRA-ARTERIAL infusion syringe  8/26/2025       Chemotherapy       floxuridine 203.8 mg, heparin (porcine) 30,000 Units, dexAMETHasone 23 mg in 0.9% NaCl 30 mL INTRA-ARTERIAL infusion syringe    Therapy Plan Information  FERRLECIT (FERRIC GLUCONATE) QW for Iron deficiency anemia, noted on 11/7/2024  Anaphylaxis/Hypersensitivity  EPINEPHrine (EPIPEN) 0.3 mg/0.3 mL pen injection 0.3 mg  0.3 mg, Intramuscular, PRN  diphenhydrAMINE injection 50 mg  50 mg, Intravenous, PRN  hydrocortisone sodium succinate injection 100 mg  100 mg, Intravenous, PRN  Flushes  heparin, porcine (PF) 100 unit/mL injection flush 500 Units  500 Units, Intravenous, PRN  sodium chloride 0.9% flush 10 mL  10 mL, Intravenous, 1 time a week  0.9% NaCl 100 mL flush bag  Intravenous, 1 time a week    PORT FLUSH for Colon adenocarcinoma, noted on 12/20/2024  PORT FLUSH for Malignant neoplasm  of ascending colon, noted on 12/12/2024  Flushes  heparin, porcine (PF) 100 unit/mL injection flush 500 Units  500 Units, Intravenous, Every visit  sodium chloride 0.9% flush 10 mL  10 mL, Intravenous, Every visit    INF FLUIDS for Colon adenocarcinoma, noted on 12/20/2024  IV Fluids  sodium chloride 0.9% bolus 1,000 mL 1,000 mL  1,000 mL, Intravenous, Every visit  Flushes  sodium chloride 0.9% flush 10 mL  10 mL, Intravenous, PRN  heparin, porcine (PF) 100 unit/mL injection flush 500 Units  500 Units, Intravenous, PRN         [1]   Current Outpatient Medications   Medication Sig Dispense Refill    atorvastatin (LIPITOR) 20 MG tablet TAKE 1 TABLET BY MOUTH EVERY DAY 90 tablet 3    dexAMETHasone (DECADRON) 4 MG Tab Take 2 tablets (8 mg total) by mouth once daily. On days 2 through 4 of each cycle of chemo 120 tablet 0    docusate sodium (COLACE) 100 MG capsule Take 1 capsule (100 mg total) by mouth 2 (two) times daily. 14 capsule 0    DULoxetine 40 mg CpDR Take 40 mg by mouth once daily. 90 capsule 3    fexofenadine (ALLEGRA) 180 MG tablet Take 180 mg by mouth nightly.      fluticasone propionate (FLONASE) 50 mcg/actuation nasal spray SPRAY 2 SPRAYS BY EACH NOSTRIL ROUTE ONCE DAILY. 48 mL 3    FREESTYLE JOCELIN 14 DAY SENSOR Kit 1 APPLICATION BY MISC.(NON-DRUG COMBO ROUTE) ROUTE DAILY AS NEEDED. 1 kit 24    gabapentin (NEURONTIN) 800 MG tablet Take 800 mg by mouth 3 (three) times daily.      guaiFENesin (MUCINEX) 1,200 mg Ta12 Take 1 tablet by mouth nightly.      insulin glargine, TOUJEO, (TOUJEO SOLOSTAR U-300 INSULIN) 300 unit/mL (1.5 mL) InPn pen Inject 30 Units into the skin once daily. 3 mL 11    lamoTRIgine (LAMICTAL) 150 MG Tab TAKE 1 TABLET BY MOUTH EVERY DAY 90 tablet 3    lancets Misc To check BG 3 times daily, to use with insurance preferred meter 200 each 1    LIDOcaine-prilocaine (EMLA) cream Apply topically as needed (Apply to port site 30 minutes before access as needed). 30 g 0    lisinopriL  "(PRINIVIL,ZESTRIL) 20 MG tablet TAKE 1 TABLET BY MOUTH EVERY DAY 90 tablet 3    magnesium oxide (MAG-OX) 400 mg (241.3 mg magnesium) tablet Take 1 tablet (400 mg total) by mouth 2 (two) times daily. 60 tablet 3    metFORMIN (GLUCOPHAGE-XR) 500 MG ER 24hr tablet TAKE 2 TABLETS BY MOUTH TWICE A DAY WITH MEALS 360 tablet 3    multivitamin (THERAGRAN) per tablet Take 1 tablet by mouth once daily.      omeprazole (PRILOSEC) 40 MG capsule Take 1 capsule (40 mg total) by mouth once daily. 90 capsule 3    ondansetron (ZOFRAN) 8 MG tablet Take 1 tablet (8 mg total) by mouth every 8 (eight) hours as needed. 30 tablet 2    ONETOUCH ULTRA BLUE TEST STRIP Strp USE TO CHECK BLOOD SUGAR 3 TIMES A  strip 0    oxyCODONE (ROXICODONE) 5 MG immediate release tablet Take 1 tablet (5 mg total) by mouth every 6 (six) hours as needed for Pain. 20 tablet 0    pen needle, diabetic (PEN NEEDLE) 31 gauge x 5/16" Ndle 1 Application by Misc.(Non-Drug; Combo Route) route once daily. 30 each 5    polyethylene glycol (GLYCOLAX) 17 gram/dose powder Use to cap to measure 17g, mix with liquid, and take by mouth 2 (two) times daily. 1020 g 0    prochlorperazine (COMPAZINE) 10 MG tablet Take 1 tablet (10 mg total) by mouth every 6 (six) hours as needed. 30 tablet 1    sertraline (ZOLOFT) 100 MG tablet TAKE 1 TABLET BY MOUTH EVERY DAY 90 tablet 3    valACYclovir (VALTREX) 500 MG tablet TAKE 1 TABLET BY MOUTH TWICE A  tablet 3    blood-glucose meter kit To check BG 3 times daily, to use with insurance preferred meter 1 each 0     Current Facility-Administered Medications   Medication Dose Route Frequency Provider Last Rate Last Admin    diphenhydrAMINE capsule 25 mg  25 mg Oral PRN Amarjit Crawford MD        sodium chloride 0.9% flush 10 mL  10 mL Intravenous PRAmarjit Light MD         "

## 2025-07-16 ENCOUNTER — PATIENT MESSAGE (OUTPATIENT)
Dept: HEMATOLOGY/ONCOLOGY | Facility: CLINIC | Age: 60
End: 2025-07-16
Payer: COMMERCIAL

## 2025-07-17 ENCOUNTER — INFUSION (OUTPATIENT)
Dept: INFUSION THERAPY | Facility: HOSPITAL | Age: 60
End: 2025-07-17
Attending: INTERNAL MEDICINE
Payer: COMMERCIAL

## 2025-07-17 VITALS
HEIGHT: 65 IN | TEMPERATURE: 97 F | RESPIRATION RATE: 18 BRPM | WEIGHT: 199.69 LBS | DIASTOLIC BLOOD PRESSURE: 97 MMHG | HEART RATE: 82 BPM | OXYGEN SATURATION: 98 % | BODY MASS INDEX: 33.27 KG/M2 | SYSTOLIC BLOOD PRESSURE: 142 MMHG

## 2025-07-17 DIAGNOSIS — C18.2 MALIGNANT NEOPLASM OF ASCENDING COLON: Primary | ICD-10-CM

## 2025-07-17 PROCEDURE — 63600175 PHARM REV CODE 636 W HCPCS: Performed by: REGISTERED NURSE

## 2025-07-17 PROCEDURE — 96523 IRRIG DRUG DELIVERY DEVICE: CPT

## 2025-07-17 RX ORDER — HEPARIN 100 UNIT/ML
500 SYRINGE INTRAVENOUS
Status: DISCONTINUED | OUTPATIENT
Start: 2025-07-17 | End: 2025-07-17 | Stop reason: HOSPADM

## 2025-07-17 RX ORDER — SODIUM CHLORIDE 0.9 % (FLUSH) 0.9 %
10 SYRINGE (ML) INJECTION
Status: DISCONTINUED | OUTPATIENT
Start: 2025-07-17 | End: 2025-07-17 | Stop reason: HOSPADM

## 2025-07-17 RX ADMIN — HEPARIN 500 UNITS: 100 SYRINGE at 08:07

## 2025-07-17 NOTE — PLAN OF CARE
Problem: Infection  Goal: Absence of Infection Signs and Symptoms  Outcome: Progressing  Intervention: Prevent or Manage Infection  Flowsheets (Taken 7/17/2025 3048)  Fever Reduction/Comfort Measures: fluid intake increased  Infection Management: aseptic technique maintained  Isolation Precautions: precautions initiated

## 2025-07-24 ENCOUNTER — PATIENT MESSAGE (OUTPATIENT)
Dept: HEMATOLOGY/ONCOLOGY | Facility: CLINIC | Age: 60
End: 2025-07-24
Payer: COMMERCIAL

## 2025-07-24 ENCOUNTER — PATIENT OUTREACH (OUTPATIENT)
Dept: ADMINISTRATIVE | Facility: HOSPITAL | Age: 60
End: 2025-07-24
Payer: COMMERCIAL

## 2025-07-24 DIAGNOSIS — E11.9 TYPE 2 DIABETES MELLITUS WITHOUT COMPLICATION, UNSPECIFIED WHETHER LONG TERM INSULIN USE: ICD-10-CM

## 2025-07-24 NOTE — PROGRESS NOTES
Population Health Chart Review & Patient Outreach Details      Additional [facility] Health Notes:               Updates Requested / Reviewed:      Care Everywhere and          Health Maintenance Topics Overdue:      VBHM Score: 0     Uncontrolled BP  Patient is not due for any topics at this time.                       Health Maintenance Topic(s) Outreach Outcomes & Actions Taken:    Eye Exam - Outreach Outcomes & Actions Taken  : Eye Camera Scheduled or Optometry/Ophthalmology Referral Placed/Appt Scheduled

## 2025-07-25 ENCOUNTER — HOSPITAL ENCOUNTER (OUTPATIENT)
Dept: RADIOLOGY | Facility: HOSPITAL | Age: 60
Discharge: HOME | End: 2025-07-25
Attending: REGISTERED NURSE
Payer: COMMERCIAL

## 2025-07-25 DIAGNOSIS — C18.2 MALIGNANT NEOPLASM OF ASCENDING COLON: ICD-10-CM

## 2025-07-25 DIAGNOSIS — C78.7 METASTATIC COLON CANCER TO LIVER: ICD-10-CM

## 2025-07-25 DIAGNOSIS — C18.9 METASTATIC COLON CANCER TO LIVER: ICD-10-CM

## 2025-07-25 PROCEDURE — 25500020 PHARM REV CODE 255

## 2025-07-25 PROCEDURE — A9698 NON-RAD CONTRAST MATERIALNOC: HCPCS

## 2025-07-25 PROCEDURE — 74177 CT ABD & PELVIS W/CONTRAST: CPT | Mod: TC

## 2025-07-25 PROCEDURE — 71260 CT THORAX DX C+: CPT | Mod: 26,,, | Performed by: RADIOLOGY

## 2025-07-25 PROCEDURE — 74177 CT ABD & PELVIS W/CONTRAST: CPT | Mod: 26,,, | Performed by: RADIOLOGY

## 2025-07-25 RX ADMIN — IOHEXOL 1000 ML: 12 SOLUTION ORAL at 12:07

## 2025-07-25 RX ADMIN — IOHEXOL 100 ML: 350 INJECTION, SOLUTION INTRAVENOUS at 12:07

## 2025-07-28 ENCOUNTER — CLINICAL SUPPORT (OUTPATIENT)
Facility: HOSPITAL | Age: 60
End: 2025-07-28
Payer: COMMERCIAL

## 2025-07-28 DIAGNOSIS — C18.2 MALIGNANT NEOPLASM OF ASCENDING COLON: Primary | ICD-10-CM

## 2025-07-28 DIAGNOSIS — T45.1X5A CHEMOTHERAPY-INDUCED NEUROPATHY: ICD-10-CM

## 2025-07-28 DIAGNOSIS — M54.59 OTHER LOW BACK PAIN: ICD-10-CM

## 2025-07-28 DIAGNOSIS — G62.0 CHEMOTHERAPY-INDUCED NEUROPATHY: ICD-10-CM

## 2025-07-28 PROCEDURE — 97811 ACUP 1/> W/O ESTIM EA ADD 15: CPT | Mod: PO

## 2025-07-28 PROCEDURE — 97810 ACUP 1/> WO ESTIM 1ST 15 MIN: CPT | Mod: PO

## 2025-07-29 ENCOUNTER — OFFICE VISIT (OUTPATIENT)
Dept: HEMATOLOGY/ONCOLOGY | Facility: CLINIC | Age: 60
End: 2025-07-29
Payer: COMMERCIAL

## 2025-07-29 ENCOUNTER — OFFICE VISIT (OUTPATIENT)
Dept: SURGERY | Facility: CLINIC | Age: 60
End: 2025-07-29
Payer: COMMERCIAL

## 2025-07-29 ENCOUNTER — INFUSION (OUTPATIENT)
Dept: INFUSION THERAPY | Facility: HOSPITAL | Age: 60
End: 2025-07-29
Payer: COMMERCIAL

## 2025-07-29 VITALS
WEIGHT: 200 LBS | HEART RATE: 95 BPM | DIASTOLIC BLOOD PRESSURE: 89 MMHG | BODY MASS INDEX: 33.28 KG/M2 | SYSTOLIC BLOOD PRESSURE: 124 MMHG | OXYGEN SATURATION: 95 %

## 2025-07-29 VITALS
RESPIRATION RATE: 18 BRPM | WEIGHT: 200.38 LBS | HEART RATE: 95 BPM | SYSTOLIC BLOOD PRESSURE: 124 MMHG | OXYGEN SATURATION: 95 % | BODY MASS INDEX: 33.38 KG/M2 | DIASTOLIC BLOOD PRESSURE: 89 MMHG | TEMPERATURE: 98 F | HEIGHT: 65 IN

## 2025-07-29 DIAGNOSIS — K21.9 GASTROESOPHAGEAL REFLUX DISEASE, UNSPECIFIED WHETHER ESOPHAGITIS PRESENT: ICD-10-CM

## 2025-07-29 DIAGNOSIS — C78.7 METASTASIS TO LIVER: ICD-10-CM

## 2025-07-29 DIAGNOSIS — D84.821 IMMUNODEFICIENCY SECONDARY TO CHEMOTHERAPY: ICD-10-CM

## 2025-07-29 DIAGNOSIS — C18.2 MALIGNANT NEOPLASM OF ASCENDING COLON: Primary | ICD-10-CM

## 2025-07-29 DIAGNOSIS — K12.30 MUCOSITIS: ICD-10-CM

## 2025-07-29 DIAGNOSIS — T45.1X5A CHEMOTHERAPY-INDUCED NAUSEA: ICD-10-CM

## 2025-07-29 DIAGNOSIS — C18.9 METASTATIC COLON CANCER TO LIVER: ICD-10-CM

## 2025-07-29 DIAGNOSIS — E08.00 DIABETES MELLITUS DUE TO UNDERLYING CONDITION WITH HYPEROSMOLARITY WITHOUT COMA, UNSPECIFIED WHETHER LONG TERM INSULIN USE: ICD-10-CM

## 2025-07-29 DIAGNOSIS — D49.9 IMMUNODEFICIENCY SECONDARY TO NEOPLASM: ICD-10-CM

## 2025-07-29 DIAGNOSIS — F41.9 ANXIETY AND DEPRESSION: ICD-10-CM

## 2025-07-29 DIAGNOSIS — G62.0 CHEMOTHERAPY-INDUCED NEUROPATHY: ICD-10-CM

## 2025-07-29 DIAGNOSIS — F32.A ANXIETY AND DEPRESSION: ICD-10-CM

## 2025-07-29 DIAGNOSIS — T45.1X5A IMMUNODEFICIENCY SECONDARY TO CHEMOTHERAPY: ICD-10-CM

## 2025-07-29 DIAGNOSIS — Z79.69 IMMUNODEFICIENCY SECONDARY TO CHEMOTHERAPY: ICD-10-CM

## 2025-07-29 DIAGNOSIS — R11.0 CHEMOTHERAPY-INDUCED NAUSEA: ICD-10-CM

## 2025-07-29 DIAGNOSIS — C18.9 METASTATIC COLON CANCER TO LIVER: Primary | ICD-10-CM

## 2025-07-29 DIAGNOSIS — R79.89 ELEVATED LFTS: ICD-10-CM

## 2025-07-29 DIAGNOSIS — D84.81 IMMUNODEFICIENCY SECONDARY TO NEOPLASM: ICD-10-CM

## 2025-07-29 DIAGNOSIS — T45.1X5A CHEMOTHERAPY-INDUCED NEUROPATHY: ICD-10-CM

## 2025-07-29 DIAGNOSIS — M54.59 OTHER LOW BACK PAIN: ICD-10-CM

## 2025-07-29 DIAGNOSIS — C78.7 METASTATIC COLON CANCER TO LIVER: ICD-10-CM

## 2025-07-29 DIAGNOSIS — C78.7 METASTATIC COLON CANCER TO LIVER: Primary | ICD-10-CM

## 2025-07-29 PROCEDURE — 3079F DIAST BP 80-89 MM HG: CPT | Mod: CPTII,S$GLB,, | Performed by: SURGERY

## 2025-07-29 PROCEDURE — 3051F HG A1C>EQUAL 7.0%<8.0%: CPT | Mod: CPTII,S$GLB,, | Performed by: INTERNAL MEDICINE

## 2025-07-29 PROCEDURE — 1159F MED LIST DOCD IN RCRD: CPT | Mod: CPTII,S$GLB,, | Performed by: INTERNAL MEDICINE

## 2025-07-29 PROCEDURE — 63600175 PHARM REV CODE 636 W HCPCS: Performed by: INTERNAL MEDICINE

## 2025-07-29 PROCEDURE — 99999 PR PBB SHADOW E&M-EST. PATIENT-LVL II: CPT | Mod: PBBFAC,,, | Performed by: SURGERY

## 2025-07-29 PROCEDURE — 96365 THER/PROPH/DIAG IV INF INIT: CPT

## 2025-07-29 PROCEDURE — 25000003 PHARM REV CODE 250: Performed by: INTERNAL MEDICINE

## 2025-07-29 PROCEDURE — 99999 PR PBB SHADOW E&M-EST. PATIENT-LVL III: CPT | Mod: PBBFAC,,, | Performed by: INTERNAL MEDICINE

## 2025-07-29 PROCEDURE — 3051F HG A1C>EQUAL 7.0%<8.0%: CPT | Mod: CPTII,S$GLB,, | Performed by: SURGERY

## 2025-07-29 PROCEDURE — G2211 COMPLEX E/M VISIT ADD ON: HCPCS | Mod: S$GLB,,, | Performed by: INTERNAL MEDICINE

## 2025-07-29 PROCEDURE — 4010F ACE/ARB THERAPY RXD/TAKEN: CPT | Mod: CPTII,S$GLB,, | Performed by: INTERNAL MEDICINE

## 2025-07-29 PROCEDURE — 99214 OFFICE O/P EST MOD 30 MIN: CPT | Mod: S$GLB,,, | Performed by: SURGERY

## 2025-07-29 PROCEDURE — 96522 REFILL/MAINT PUMP/RESVR SYST: CPT

## 2025-07-29 PROCEDURE — 3008F BODY MASS INDEX DOCD: CPT | Mod: CPTII,S$GLB,, | Performed by: SURGERY

## 2025-07-29 PROCEDURE — 3008F BODY MASS INDEX DOCD: CPT | Mod: CPTII,S$GLB,, | Performed by: INTERNAL MEDICINE

## 2025-07-29 PROCEDURE — 3074F SYST BP LT 130 MM HG: CPT | Mod: CPTII,S$GLB,, | Performed by: SURGERY

## 2025-07-29 PROCEDURE — 4010F ACE/ARB THERAPY RXD/TAKEN: CPT | Mod: CPTII,S$GLB,, | Performed by: SURGERY

## 2025-07-29 PROCEDURE — 3079F DIAST BP 80-89 MM HG: CPT | Mod: CPTII,S$GLB,, | Performed by: INTERNAL MEDICINE

## 2025-07-29 PROCEDURE — 3074F SYST BP LT 130 MM HG: CPT | Mod: CPTII,S$GLB,, | Performed by: INTERNAL MEDICINE

## 2025-07-29 PROCEDURE — 96416 CHEMO PROLONG INFUSE W/PUMP: CPT

## 2025-07-29 PROCEDURE — 99215 OFFICE O/P EST HI 40 MIN: CPT | Mod: S$GLB,,, | Performed by: INTERNAL MEDICINE

## 2025-07-29 RX ORDER — SODIUM CHLORIDE 0.9 % (FLUSH) 0.9 %
10 SYRINGE (ML) INJECTION
Status: CANCELLED | OUTPATIENT
Start: 2025-08-01

## 2025-07-29 RX ORDER — PROCHLORPERAZINE EDISYLATE 5 MG/ML
10 INJECTION INTRAMUSCULAR; INTRAVENOUS ONCE AS NEEDED
Status: CANCELLED | OUTPATIENT
Start: 2025-08-01

## 2025-07-29 RX ORDER — DIPHENHYDRAMINE HYDROCHLORIDE 50 MG/ML
50 INJECTION, SOLUTION INTRAMUSCULAR; INTRAVENOUS ONCE AS NEEDED
Status: DISCONTINUED | OUTPATIENT
Start: 2025-07-29 | End: 2025-07-29 | Stop reason: HOSPADM

## 2025-07-29 RX ORDER — EPINEPHRINE 0.3 MG/.3ML
0.3 INJECTION SUBCUTANEOUS ONCE AS NEEDED
Status: DISCONTINUED | OUTPATIENT
Start: 2025-07-29 | End: 2025-07-29 | Stop reason: HOSPADM

## 2025-07-29 RX ORDER — PROCHLORPERAZINE EDISYLATE 5 MG/ML
10 INJECTION INTRAMUSCULAR; INTRAVENOUS ONCE AS NEEDED
Status: CANCELLED | OUTPATIENT
Start: 2025-07-29

## 2025-07-29 RX ORDER — DULOXETIN HYDROCHLORIDE 30 MG/1
30 CAPSULE, DELAYED RELEASE ORAL 2 TIMES DAILY
Qty: 60 CAPSULE | Refills: 2 | Status: SHIPPED | OUTPATIENT
Start: 2025-07-29 | End: 2026-07-29

## 2025-07-29 RX ORDER — HEPARIN 100 UNIT/ML
500 SYRINGE INTRAVENOUS
Status: CANCELLED | OUTPATIENT
Start: 2025-07-29

## 2025-07-29 RX ORDER — DIPHENHYDRAMINE HYDROCHLORIDE 50 MG/ML
50 INJECTION, SOLUTION INTRAMUSCULAR; INTRAVENOUS ONCE AS NEEDED
Status: CANCELLED | OUTPATIENT
Start: 2025-07-29

## 2025-07-29 RX ORDER — SODIUM CHLORIDE 0.9 % (FLUSH) 0.9 %
10 SYRINGE (ML) INJECTION
Status: DISCONTINUED | OUTPATIENT
Start: 2025-07-29 | End: 2025-07-29 | Stop reason: HOSPADM

## 2025-07-29 RX ORDER — DULOXETINE 40 MG/1
60 CAPSULE, DELAYED RELEASE ORAL DAILY
Qty: 90 CAPSULE | Refills: 3 | Status: CANCELLED | OUTPATIENT
Start: 2025-07-29

## 2025-07-29 RX ORDER — PROCHLORPERAZINE EDISYLATE 5 MG/ML
10 INJECTION INTRAMUSCULAR; INTRAVENOUS ONCE AS NEEDED
Status: DISCONTINUED | OUTPATIENT
Start: 2025-07-29 | End: 2025-07-29 | Stop reason: HOSPADM

## 2025-07-29 RX ORDER — SODIUM CHLORIDE 0.9 % (FLUSH) 0.9 %
10 SYRINGE (ML) INJECTION
Status: CANCELLED | OUTPATIENT
Start: 2025-07-29

## 2025-07-29 RX ORDER — HEPARIN 100 UNIT/ML
500 SYRINGE INTRAVENOUS
Status: DISCONTINUED | OUTPATIENT
Start: 2025-07-29 | End: 2025-07-29 | Stop reason: HOSPADM

## 2025-07-29 RX ORDER — HEPARIN 100 UNIT/ML
500 SYRINGE INTRAVENOUS
Status: CANCELLED | OUTPATIENT
Start: 2025-08-01

## 2025-07-29 RX ORDER — EPINEPHRINE 0.3 MG/.3ML
0.3 INJECTION SUBCUTANEOUS ONCE AS NEEDED
Status: CANCELLED | OUTPATIENT
Start: 2025-07-29

## 2025-07-29 RX ADMIN — DEXTROSE MONOHYDRATE: 5 INJECTION, SOLUTION INTRAVENOUS at 09:07

## 2025-07-29 RX ADMIN — HEPARIN SODIUM 30000 UNITS: 5000 INJECTION, SOLUTION INTRAVENOUS; SUBCUTANEOUS at 10:07

## 2025-07-29 RX ADMIN — PALONOSETRON HYDROCHLORIDE 0.25 MG: 0.25 INJECTION, SOLUTION INTRAVENOUS at 09:07

## 2025-07-29 RX ADMIN — FLUOROURACIL 4000 MG: 50 INJECTION, SOLUTION INTRAVENOUS at 10:07

## 2025-07-29 NOTE — PROGRESS NOTES
BOB Pump Pharmacy Dosing Note     Reference labs (6/30/25):  T. Bili: 0.3 AST: 52 Alk Phos: 206   Change from baseline:   7/14/25: Alk Phos 361 (1.75x reference)   7/14/25:  (3.92x reference)   7/25/25: Alk Phos 335 (1.62x reference)                          Floxuridine dose adjustments per LFTs     Reference Value  FUDR Dose    AST* 0 to <2 x ref  100%    2 to <3 x ref  80%    3 to < 4 x ref  50%    >=4 x ref  HOLD   If held, restart when <3 x ref  50% of last dose    Alk Phos* 0 to <1.2 x ref  100%    1.2 to <1.5 x ref  50%    >=1.5 x ref  HOLD   If held, restart when < 1.2 x ref  25% of last dose    T. Bili*                       0 to <1.2 x ref  100%    1.2 to <1.5 x ref  50%    >=1.5 x ref  HOLD   If held, restart when <1.2 x ref 25% of last dose    *at pump emptying or day  of planned retreatment, whichever is higher     Volume removed (previous FUDR dose): 17.5 mL  Pump Flow Rate (calculated previous FUDR dose): 1.25 mL/day  Dexamethasone dose (30 mg/pump flow rate): Dexamethasone 23mg (pump rate 1.3mL/day)     FUDR dose calculation: HOLD FUDR until Alk Phos < 1.2x reference      GI prophylaxis: omeprazole 40mg daily      Pgx: DPYD NM, UGT1A1 NM      Ban Calzada, PharmD, BCOP  Clinical Pharmacy Specialist, Medical Oncology   Ext. [phone]

## 2025-07-29 NOTE — PROGRESS NOTES
Surgical Oncology Restaging Visit    Encounter Date:  2025    Patient ID: Nik Lowery  Age:  59 y.o. :  1965    Chief Complaint:  Colorectal Liver Metastases      History:    Ms. Lowery is a 59 y.o. female with synchronous colorectal liver metastases. She underwent upfront right hemicolectomy and ablation of a liver metastasis followed by 7 cycles of FOLFOX. She had disease progression in the liver and underwent BOB pump placement on 25. She has now received 2 months of FUDR and 5-FU. She is tolerating therapy well and feels much better than she did when on FOLFOX.        Past Medical History:   Diagnosis Date    Colon cancer     Diabetes mellitus     Diabetes mellitus, type 2     Encounter for blood transfusion     Herpes     Hypertension     Sleep apnea      Past Surgical History:   Procedure Laterality Date    CHOLECYSTECTOMY N/A 2025    Procedure: CHOLECYSTECTOMY;  Surgeon: Mike Henry MD;  Location: [address];  Service: General;  Laterality: N/A;    COLONOSCOPY N/A 2024    Procedure: COLONOSCOPY;  Surgeon: Saw Wick MD;  Location: [facility];  Service: Endoscopy;  Laterality: N/A;    ESOPHAGOGASTRODUODENOSCOPY N/A 2024    Procedure: EGD (ESOPHAGOGASTRODUODENOSCOPY);  Surgeon: Saw Wick MD;  Location: [facility];  Service: Endoscopy;  Laterality: N/A;    EYE SURGERY      lasix Bilateral    HYSTERECTOMY      INSERTION OF TOTALLY IMPLANTABLE INFUSION PUMP N/A 2025    Procedure: INSERTION, INFUSION PUMP, TOTALLY IMPLANTABLE; BOB PUMP;  Surgeon: Mike Henry MD;  Location: [address];  Service: General;  Laterality: N/A;    INSERTION OF TUNNELED CENTRAL VENOUS CATHETER (CVC) WITH SUBCUTANEOUS PORT Right 2024    Procedure: INSERTION, PORT-A-CATH;  Surgeon: Logan Juarez MD;  Location: [facility];  Service: General;  Laterality: Right;    LAPAROTOMY, EXPLORATORY N/A 2025    Procedure: LAPAROTOMY, EXPLORATORY;   Surgeon: Mike Henry MD;  Location: [address];  Service: General;  Laterality: N/A;    LUMBAR DISCECTOMY      ROBOT-ASSISTED COLECTOMY Right 1/7/2025    Procedure: ROBOTIC COLECTOMY WITH ILEOCOLIC ANASTOMOSIS;  Surgeon: Logan Juarez MD;  Location: [facility];  Service: General;  Laterality: Right;    WISDOM TOOTH EXTRACTION       Medications Ordered Prior to Encounter[1]  Review of patient's allergies indicates:   Allergen Reactions    Robaxin [methocarbamol] Nausea Only       Family History:  Her family history includes Alzheimer's disease in her paternal grandmother; Arthritis in her sister; Cancer in her father; Cataracts in her mother; Diabetes in her brother; Heart disease in her maternal grandfather, mother, and paternal uncle; Hypertension in her mother; No Known Problems in her paternal aunt; Pancreatic cancer in her father.     Social History:  She reports that she has never smoked. She has been exposed to tobacco smoke. She has never used smokeless tobacco. She reports that she does not drink alcohol and does not use drugs.     ROS:     Review of Systems  Pertinent positive/negatives detailed in HPI, all other systems negative.     Physical Exam:  /89 (BP Location: Left arm, Patient Position: Sitting)   Pulse 95   Wt 90.7 kg (200 lb)   SpO2 95%   BMI 33.28 kg/m²     Physical Exam    Constitutional:  Non-toxic, no acute distress.  Performance status: ECOG 0  Eyes:  Sclerae anicteric, gaze symmetrical  Neck:  Trachea midline, thyroid, non enlarged without palpable nodules,  FROM  Resp:  Easy work of breathing, no wheezes  CV:  Regular pulse, no JVD  Abd:  Soft, non-tender, no masses, no hepatosplenomegaly, no ascites, no superficial varices  Lymphatics:  No cervical, supraclavicular, axillary, or inguinal lymphadenopathy  Musculoskeletal:  Ambulatory, normal gait, no muscle wasting  Neuro:  No gross deficits  Psych:  Awake, alert, oriented.  Answers and asks questions  appropriately    Data:     Radiology:  I personally reviewed these images: I reviewed her CT. There is stable disease in the liver with diffuse small metastases in all segments. There is a new second enlarged mesenteric lymph node in the right colon mesentery.     Labs:  CEA 3.6    Pathology:    Collected: 12/13/24 1145   Result status: Final   Resulting lab: [facility]   Value: RIGHT HEPATIC LOBE LESION, CT-GUIDED BIOPSY WITH PATHOLOGIST ASSISTED ADEQUACY (CYTOLOGY AND CELL BLOCK):  Metastatic carcinoma, consistent with colonic origin.    Immunohistochemistry (IHC) Testing for Mismatch Repair (MMR) Proteins:    MLH1 - Intact nuclear expression  MSH2 - Intact nuclear expression  MSH6 - Intact nuclear expression  PMS2 - Intact nuclear expression    Background nonneoplastic tissue/internal control with intact nuclear expression    IHC Interpretation  No loss of nuclear expression of MMR proteins: low probability of microsatellite instability    There are exceptions to the above IHC interpretations. These results should not be considered in isolation, and clinical correlation with genetic counseling is recommended to assess the need for germline testing.   Comment: Interp By Radha Garcia M.D., Signed on 12/17/2024 at 12:51       Assessment: Ms. Lowery is doing well with stable disease in the liver. There is a second enlarged mesenteric lymph node. I do not see a strategy for complete resection/ablation of all liver metastases at this point. I recommend continuing BOB given the stability of the disease in the liver.     Plan:  - Return to clinic after next restaging scans.     Mike Henry MD  Surgical Oncology  [facility] 1965               [1]   Current Outpatient Medications on File Prior to Visit   Medication Sig Dispense Refill    atorvastatin (LIPITOR) 20 MG tablet TAKE 1 TABLET BY MOUTH EVERY DAY 90 tablet 3    dexAMETHasone  (DECADRON) 4 MG Tab Take 2 tablets (8 mg total) by mouth once daily. On days 2 through 4 of each cycle of chemo 120 tablet 0    docusate sodium (COLACE) 100 MG capsule Take 1 capsule (100 mg total) by mouth 2 (two) times daily. 14 capsule 0    fexofenadine (ALLEGRA) 180 MG tablet Take 180 mg by mouth nightly.      fluticasone propionate (FLONASE) 50 mcg/actuation nasal spray SPRAY 2 SPRAYS BY EACH NOSTRIL ROUTE ONCE DAILY. 48 mL 3    FREESTYLE JOCELIN 14 DAY SENSOR Kit 1 APPLICATION BY MISC.(NON-DRUG COMBO ROUTE) ROUTE DAILY AS NEEDED. 1 kit 24    gabapentin (NEURONTIN) 800 MG tablet Take 800 mg by mouth 3 (three) times daily.      guaiFENesin (MUCINEX) 1,200 mg Ta12 Take 1 tablet by mouth nightly.      insulin glargine, TOUJEO, (TOUJEO SOLOSTAR U-300 INSULIN) 300 unit/mL (1.5 mL) InPn pen Inject 30 Units into the skin once daily. 3 mL 11    lamoTRIgine (LAMICTAL) 150 MG Tab TAKE 1 TABLET BY MOUTH EVERY DAY 90 tablet 3    lancets Misc To check BG 3 times daily, to use with insurance preferred meter 200 each 1    LIDOcaine-prilocaine (EMLA) cream Apply topically as needed (Apply to port site 30 minutes before access as needed). 30 g 0    lisinopriL (PRINIVIL,ZESTRIL) 20 MG tablet TAKE 1 TABLET BY MOUTH EVERY DAY 90 tablet 3    magnesium oxide (MAG-OX) 400 mg (241.3 mg magnesium) tablet Take 1 tablet (400 mg total) by mouth 2 (two) times daily. 60 tablet 3    metFORMIN (GLUCOPHAGE-XR) 500 MG ER 24hr tablet TAKE 2 TABLETS BY MOUTH TWICE A DAY WITH MEALS 360 tablet 3    multivitamin (THERAGRAN) per tablet Take 1 tablet by mouth once daily.      omeprazole (PRILOSEC) 40 MG capsule Take 1 capsule (40 mg total) by mouth once daily. 90 capsule 3    ondansetron (ZOFRAN) 8 MG tablet Take 1 tablet (8 mg total) by mouth every 8 (eight) hours as needed. 30 tablet 2    ONETOUCH ULTRA BLUE TEST STRIP Strp USE TO CHECK BLOOD SUGAR 3 TIMES A  strip 0    oxyCODONE (ROXICODONE) 5 MG immediate release tablet Take 1 tablet (5 mg  "total) by mouth every 6 (six) hours as needed for Pain. 20 tablet 0    pen needle, diabetic (PEN NEEDLE) 31 gauge x 5/16" Ndle 1 Application by Misc.(Non-Drug; Combo Route) route once daily. 30 each 5    polyethylene glycol (GLYCOLAX) 17 gram/dose powder Use to cap to measure 17g, mix with liquid, and take by mouth 2 (two) times daily. 1020 g 0    prochlorperazine (COMPAZINE) 10 MG tablet Take 1 tablet (10 mg total) by mouth every 6 (six) hours as needed. 30 tablet 1    sertraline (ZOLOFT) 100 MG tablet TAKE 1 TABLET BY MOUTH EVERY DAY 90 tablet 3    valACYclovir (VALTREX) 500 MG tablet TAKE 1 TABLET BY MOUTH TWICE A  tablet 3    [DISCONTINUED] DULoxetine 40 mg CpDR Take 40 mg by mouth once daily. 90 capsule 3    blood-glucose meter kit To check BG 3 times daily, to use with insurance preferred meter 1 each 0    DULoxetine (CYMBALTA) 30 MG capsule Take 1 capsule (30 mg total) by mouth 2 (two) times daily. 60 capsule 2     Current Facility-Administered Medications on File Prior to Visit   Medication Dose Route Frequency Provider Last Rate Last Admin    [COMPLETED] D5W 250 mL flush bag   Intravenous 1 time in Clinic/HOD Thom Stanford MD   Stopped at 07/29/25 1035    diphenhydrAMINE capsule 25 mg  25 mg Oral PRN Amarjit Crawford MD        [COMPLETED] palonosetron 0.25mg/dexAMETHasone 12mg in NS IVPB 0.25 mg 50 mL  0.25 mg Intravenous 1 time in Clinic/HOD Thom Stanford MD   Stopped at 07/29/25 0946    sodium chloride 0.9% flush 10 mL  10 mL Intravenous PRN Amarjit Crawford MD        [DISCONTINUED] 0.9% NaCl 250 mL flush bag   Intravenous 1 time in Clinic/HOD Thom Stanford MD        [DISCONTINUED] alteplase injection 2 mg  2 mg Intra-Catheter PRN hTom Stanford MD        [DISCONTINUED] diphenhydrAMINE injection 50 mg  50 mg Intravenous Once PRN Thom Stanford MD        [DISCONTINUED] diphenhydrAMINE injection 50 mg  50 mg Intravenous Once PRN Thom Stanford D., MD        " [DISCONTINUED] EPINEPHrine (EPIPEN) 0.3 mg/0.3 mL pen injection 0.3 mg  0.3 mg Intramuscular Once PRN Thom Stanford MD        [DISCONTINUED] EPINEPHrine (EPIPEN) 0.3 mg/0.3 mL pen injection 0.3 mg  0.3 mg Intramuscular Once PRN Thom Stanford MD        [DISCONTINUED] fluorouracil (Adrucil) 4,000 mg in 0.9% NaCl 100 mL chemo infusion  4,000 mg Intravenous over 46 hr Thom Stanford MD   4,000 mg at 07/29/25 1035    [DISCONTINUED] heparin 30,000 units in NS 30 mL INTRA-ARTERIAL infusion syringe  30,000 Units Intra-arterial over 336 hours Thom Stanford MD   30,000 Units at 07/29/25 1035    [DISCONTINUED] heparin, porcine (PF) 100 unit/mL injection flush 500 Units  500 Units Intravenous PRN Thom Stanford MD        [DISCONTINUED] hydrocortisone sodium succinate injection 100 mg  100 mg Intravenous Once PRN Thom Stanford MD        [DISCONTINUED] hydrocortisone sodium succinate injection 100 mg  100 mg Intravenous Once PRN Thom Stanford MD        [DISCONTINUED] prochlorperazine injection Soln 10 mg  10 mg Intravenous Once PRN Thom Stanford MD        [DISCONTINUED] sodium chloride 0.9% flush 10 mL  10 mL Intravenous PRN Thom Stanford MD

## 2025-07-29 NOTE — PROGRESS NOTES
GI MEDICAL ONCOLOGY    Reason for visit: Colon cancer    Best Contact Phone Number(s): There are no phone numbers on file.     Cancer/Stage/TNM:    Cancer Staging   Malignant neoplasm of ascending colon  Staging form: Colon and Rectum, AJCC 8th Edition  - Clinical: Stage BIBIANA (cT3, cN1a, cM1a) - Signed by Amarjit Crawford MD on 12/18/2024       Oncology History   Malignant neoplasm of ascending colon   12/12/2024 Initial Diagnosis    Malignant neoplasm of ascending colon     12/18/2024 Cancer Staged    Staging form: Colon and Rectum, AJCC 8th Edition  - Clinical: Stage BIBIANA (cT3, cN1a, cM1a)     2/4/2025 -  Chemotherapy    Treatment Summary   Plan Name: OP GI [id number] (oxaliplatin leucovorin fluorouracil) Q2W  Treatment Goal: Control  Status: Active  Start Date: 2/4/2025  End Date: 8/15/2025 (Planned)  Provider: Amarjit Crawford MD  Chemotherapy: fluorouraciL injection 830 mg, 400 mg/m2 = 830 mg, Intravenous, Clinic/HOD 1 time, 7 of 7 cycles  Administration: 830 mg (2/4/2025), 830 mg (2/18/2025), 830 mg (3/5/2025), 830 mg (3/19/2025), 830 mg (4/2/2025), 830 mg (4/15/2025), 830 mg (4/29/2025)  oxaliplatin (ELOXATIN) 85 mg/m2 = 177 mg in D5W 600.4 mL chemo infusion, 85 mg/m2 = 177 mg, Intravenous, Clinic/HOD 1 time, 7 of 7 cycles  Administration: 177 mg (2/4/2025), 177 mg (2/18/2025), 177 mg (3/5/2025), 177 mg (3/19/2025), 177 mg (4/2/2025), 177 mg (4/15/2025), 177 mg (4/29/2025)  fluorouracil (Adrucil) 2,400 mg/m2 = 4,990 mg in 0.9% NaCl 250 mL chemo infusion, 2,400 mg/m2 = 4,990 mg, Intravenous, Over 46 hours, 10 of 12 cycles  Dose modification: 2,000 mg/m2 (original dose 2,400 mg/m2, Cycle 8)  Administration: 4,990 mg (2/4/2025), 4,990 mg (2/18/2025), 4,990 mg (3/5/2025), 4,990 mg (3/19/2025), 4,990 mg (4/2/2025), 4,990 mg (4/15/2025), 4,990 mg (4/29/2025), 4,000 mg (6/18/2025), 4,000 mg (7/1/2025), 4,000 mg (7/15/2025)          INTERVAL HISTORY:  Mrs. Lowery presents today prior to cycle 3 BOB flush and 5-FU maintenance.  "Doing well overall. Has some fatigue, but generally manageable. Denies blurred vision. Neuropathy generally stable and best managed with cymbalta. Requests weaning off of gabaptenin due to "brain fog". No falls. Continues with diarrhea/soft stools ~2x daily, unchanged from prior and no overt bleeding. No other new concerns.     Patient presents with caregiver today. ECOG PS is 0.    ROS:   Review of Systems   Constitutional:  Positive for malaise/fatigue. Negative for chills, fever and weight loss.   Respiratory:  Negative for shortness of breath.    Cardiovascular:  Negative for chest pain and palpitations.   Gastrointestinal:  Negative for abdominal pain, constipation, diarrhea, nausea and vomiting.   Genitourinary:  Negative for hematuria.   Musculoskeletal:  Negative for falls.   Neurological:  Positive for tingling. Negative for dizziness, weakness and headaches.   Psychiatric/Behavioral:  The patient is not nervous/anxious.        Past Medical History:   Past Medical History:   Diagnosis Date    Colon cancer     Diabetes mellitus     Diabetes mellitus, type 2     Encounter for blood transfusion     Herpes     Hypertension     Sleep apnea         Past Surgical History:   Past Surgical History:   Procedure Laterality Date    CHOLECYSTECTOMY N/A 5/21/2025    Procedure: CHOLECYSTECTOMY;  Surgeon: Mike Henry MD;  Location: [facility] OR [address] [place] [address];  Service: General;  Laterality: N/A;    COLONOSCOPY N/A 11/8/2024    Procedure: COLONOSCOPY;  Surgeon: Saw Wick MD;  Location: [facility];  Service: Endoscopy;  Laterality: N/A;    ESOPHAGOGASTRODUODENOSCOPY N/A 11/8/2024    Procedure: EGD (ESOPHAGOGASTRODUODENOSCOPY);  Surgeon: Saw Wick MD;  Location: [facility];  Service: Endoscopy;  Laterality: N/A;    EYE SURGERY  2002    lasix Bilateral    HYSTERECTOMY  2012    INSERTION OF TOTALLY IMPLANTABLE INFUSION PUMP N/A 5/21/2025    Procedure: INSERTION, INFUSION PUMP, TOTALLY IMPLANTABLE; BOB PUMP;  " "Surgeon: Mike Henry MD;  Location: [facility] OR 2ND FLR;  Service: General;  Laterality: N/A;    INSERTION OF TUNNELED CENTRAL VENOUS CATHETER (CVC) WITH SUBCUTANEOUS PORT Right 12/20/2024    Procedure: INSERTION, PORT-A-CATH;  Surgeon: Logan Juarez MD;  Location: [facility] OR;  Service: General;  Laterality: Right;    LAPAROTOMY, EXPLORATORY N/A 5/21/2025    Procedure: LAPAROTOMY, EXPLORATORY;  Surgeon: Mike Henry MD;  Location: [facility] OR 2ND FLR;  Service: General;  Laterality: N/A;    LUMBAR DISCECTOMY      ROBOT-ASSISTED COLECTOMY Right 1/7/2025    Procedure: ROBOTIC COLECTOMY WITH ILEOCOLIC ANASTOMOSIS;  Surgeon: Logan Juarez MD;  Location: [facility] OR;  Service: General;  Laterality: Right;    WISDOM TOOTH EXTRACTION          Family History:   Family History   Problem Relation Name Age of Onset    Heart disease Mother      Hypertension Mother      Cataracts Mother      Pancreatic cancer Father      Cancer Father      Arthritis Sister 2     Diabetes Brother 1     No Known Problems Paternal Aunt 1     Heart disease Paternal Uncle 2     Heart disease Maternal Grandfather      Alzheimer's disease Paternal Grandmother      Glaucoma Neg Hx          Social History:   Social History     Tobacco Use    Smoking status: Never     Passive exposure: Past    Smokeless tobacco: Never   Substance Use Topics    Alcohol use: No        I have reviewed and updated the patient's past medical, surgical, family and social histories.    Allergies:   Review of patient's allergies indicates:   Allergen Reactions    Robaxin [methocarbamol] Nausea Only        Medications:   Current Medications[1]     Physical Exam:   /89 (BP Location: Right arm, Patient Position: Sitting)   Pulse 95   Temp 97.5 °F (36.4 °C) (Temporal)   Resp 18   Ht 5' 5" (1.651 m)   Wt 90.9 kg (200 lb 6.4 oz)   SpO2 95%   BMI 33.35 kg/m²            Physical Exam  Vitals reviewed.   Constitutional:       General: She is not in acute distress.     " Appearance: Normal appearance. She is not ill-appearing, toxic-appearing or diaphoretic.   HENT:      Head: Normocephalic and atraumatic.   Eyes:      General: No scleral icterus.  Cardiovascular:      Rate and Rhythm: Normal rate.   Pulmonary:      Effort: Pulmonary effort is normal. No respiratory distress.   Chest:      Comments: RCW port  Abdominal:      Comments: LLQ BOB pump   Skin:     General: Skin is warm and dry.   Neurological:      Mental Status: She is alert and oriented to person, place, and time.      Motor: No weakness.      Gait: Gait normal.   Psychiatric:         Mood and Affect: Mood normal.         Behavior: Behavior normal.         Labs:   No results found for this or any previous visit (from the past 48 hours).       Imaging:       MRI - 4/28/25:  Impression:     Findings consistent with changes of ablation with further decrease of the ablation cavity and no suspicious nodular enhancement.  Innumerable small subcentimeter suggesting metastatic disease some appearing more apparent but not thought overall significantly changed compared to the prior study MRI 02/28/2025.    CT C/A/P - 7/25/25:  Impression:     3.4 cm right hepatic lobe mass, decreased from 3.8 cm.  Otherwise unchanged subcentimeter hepatic lesions.  Continued attention on follow-up recommended.     Mild right lower quadrant adenopathy, with development of a 2nd enlarged node.  These findings are concerning for lila spread of disease.     Mild splenomegaly.     Partial right hemicolectomy.    Path:   COLON:     PROCEDURE: Right hemicolectomy   MACROSCOPIC EVALUATION OF MESORECTUM: Not applicable   TUMOR SITE: Cecum   RECTAL TUMOR LOCATION: Not applicable   HISTOLOGIC TYPE: Adenocarcinoma   HISTOLOGIC GRADE: Moderately differentiated   TUMOR SIZE: 48 mm and 4 mm   MULTIPLE PRIMARY SITES: Two separate foci, both in cecum/ascending colon    region   TUMOR EXTENT: Tumor invades kristopher-intestinal adipose tissue   SUB-MUCOSAL INVASION:  Not applicable   MACROSCOPIC TUMOR PERFORATION: Negative   LYMPHATIC AND/OR VASCULAR INVASION: Positive   PERINEURAL INVASION: Negative   TUMOR BUDDING SCORE: Moderate   TREATMENT EFFECT: Not applicable   MARGIN STATUS FOR INVASIVE CARCINOMA: Negative   DISTANCE OF INVASIVE CARCINOMA TO RADIAL MARGIN: 35 mm   DISTANCE OF INVASIVE CARCINOMA TO CLOSEST MARGIN: 35 mm to radial margin   MARGINS INVOLVED BY INVASIVE CARCINOMA: Not applicable   MARGIN STATUS FOR NON-INVASIVE TUMOR: Negative   DNA MISMATCH REPAIR STATUS (MMR): Previously performed ([id number]). No    loss of nuclear expression of MMR proteins: Low probability of MSI-H.   REGIONAL LYMPH NODE STATUS:   NUMBER OF LYMPH NODES WITH TUMOR: 3   NUMBER OF LYMPH NODES EXAMINED: 16   TUMOR DEPOSITS: Negative   DISTANT SITES INVOLVED: Liver ([facility]; [id number]).   PATHOLOGIC STAGE CLASSIFICATION: pT3 pN1b pM1a     Assessment:       1. Malignant neoplasm of ascending colon    2. Metastasis to liver    3. Metastatic colon cancer to liver    4. Immunodeficiency secondary to neoplasm    5. Immunodeficiency secondary to chemotherapy    6. Chemotherapy-induced neuropathy    7. Chemotherapy-induced nausea    8. Gastroesophageal reflux disease, unspecified whether esophagitis present    9. Other low back pain    10. Mucositis    11. Diabetes mellitus due to underlying condition with hyperosmolarity without coma, unspecified whether long term insulin use    12. Anxiety and depression    13. Elevated LFTs       Plan:        # Cecal adenocarcinoma  Diagnosed on colonoscopy 11/8/24.  Had synchronous liver metastases, small but diffuse and bilobar.  Biopsy proven, MWA to segment 8 metastasis done 1/29/25.  Underwent upfront R hemicolectomy 1/7/25 due to recurrent bleeding with Dr. Juarez.  Path showed pT3 N1b M1a disease, pMMR.  Started on FOLFOX on 2/4/25 with Dr. Crawford.  Repeat imaging after cycle 6 shows stable unresectable bilobar small liver metastases.  No clear evidence  "of extrahepatic disease.  She received cycle 7 of FOLFOX on 4/29/25.    At our initial visit, we discussed options of continuing FOLFOX with addition of bevacizumab, understanding she would have to come off oxaliplatin shortly due to neuropathy and proceed with maintenance chemovs switching to FOLFIRI + antoine second line vs BOB pump.  Also could consider future liver transplantation.  Will discuss her at [facility] tumor board with recommendation to proceed with BOB pump placement.    After extensive discussion of options and potential risks/toxicities of BOB, she wants to proceed with BOB pump.      Underwent pump placement 5/21/25.  Cycle 1 FUDR administered 6/4/25.  Using ideal average weight for dosage calculations.   Starting dose 203.8 mg.     Cycle 2 FUDR + 5FU maintenance on 7/1/25.   RTC in 2 weeks for next cycle with scans prior.     C3 FUDR on hold due to elevated ALP. Continue with maintenance 5-FU.     Can always add irinotecan in future to 5-FU if there are signs of intrahepatic or extrahepatic progression.    Because of increased risk of gastritis/duodenitis with BOB pump, increased omeprazole to 40 mg daily.      Tempus xT: INDIA, AMER1, APC, KRAS G12V, SMAD3, TMB  10.5  PGX: DPYD nml, UGT1A1 nml    # CIPN  Will recommend she hold further oxaliplatin.  Continue gabapentin, she will discuss with pain management to wean due to "brain fog"   Increase Duloxetine to 30mg bid    Underwent acupuncture session on 7/28/25.     # T2DM  Continue medical therapy.    # Elevated LFT   - Hold BOB (floxuridine) on 7/29/25 due to elevated ALP   - Recheck LFT in 2 weeks     Follow up: 2 weeks.    Patient is in agreement with the proposed treatment plan. All questions were answered to the patient's satisfaction. Pt knows to call clinic if anything is needed before the next clinic visit.    Patient discussed with collaborating physician, Dr. Stanford.    At least 40 minutes were spent today on this encounter including face to face " time with the patient, data gathering/interpretation and documentation.     Micheal Forbes MD   Hematology and Medical Oncology Fellow   [facility]        code applied: patient requires or will require a continuous, longitudinal, and active collaborative plan of care related to this patient's health condition, colon cancer --the management of which requires the direction of a practitioner with specialized clinical knowledge, skill, and expertise.     Route Chart for Scheduling    Med Onc Chart Routing      Follow up with physician 2 weeks. RTC in 2 weeks with labs for 5-FU and possibly FUDR   Follow up with [facility]    Infusion scheduling note    Injection scheduling note 5-FU and possibly FUDR in 2 weeks   Labs CBC, CMP and CEA   Scheduling:  Preferred lab:  Lab interval:  labs prior to clinic visit   Imaging    Pharmacy appointment    Other referrals            Treatment Plan Information   OP GI mFOLFOX6 (oxaliplatin leucovorin fluorouracil) Q2W Amarjit Crawford MD   Associated diagnosis: Malignant neoplasm of ascending colon Stage BIBIANA cT3, cN1a, cM1a noted on 12/12/2024   Line of treatment: Neoadjuvant  Treatment Goal: Control     Upcoming Treatment Dates - OP GI mFOLFOX6 (oxaliplatin leucovorin fluorouracil) Q2W    7/29/2025       Chemotherapy       fluorouracil (Adrucil) 2,000 mg/m2 = 4,160 mg in 0.9% NaCl 100 mL chemo infusion       Antiemetics       palonosetron (ALOXI) 0.25 mg with Dexamethasone (DECADRON) 12 mg in NS 50 mL IVPB  8/13/2025       Chemotherapy       fluorouracil (Adrucil) 2,000 mg/m2 = 4,160 mg in 0.9% NaCl 100 mL chemo infusion       Antiemetics       palonosetron (ALOXI) 0.25 mg with Dexamethasone (DECADRON) 12 mg in NS 50 mL IVPB    Supportive Plan Information  OP Intera 3000 Floxuridine Hepatic Artery Infusion (BOB) Pump Thom Stanford MD   Associated Diagnosis: Malignant neoplasm of ascending colon Stage BIBIANA cT3, cN1a, cM1a noted on 12/12/2024  Associated Diagnosis:  Metastasis to liver   noted on 12/18/2024   Line of treatment: Consolidation   Treatment goal: Curative     Upcoming Treatment Dates - OP Intera 3000 Floxuridine Hepatic Artery Infusion (BOB) Pump    7/29/2025       Medications       heparin 30,000 units in NS 30 mL INTRA-ARTERIAL infusion syringe  8/12/2025       Chemotherapy       floxuridine 50.95 mg, heparin (porcine) 30,000 Units, dexAMETHasone 23 mg in 0.9% NaCl 30 mL INTRA-ARTERIAL infusion syringe  8/13/2025       Medications       heparin 30,000 units in NS 30 mL INTRA-ARTERIAL infusion syringe  8/27/2025       Chemotherapy       floxuridine 50.95 mg, heparin (porcine) 30,000 Units, dexAMETHasone 23 mg in 0.9% NaCl 30 mL INTRA-ARTERIAL infusion syringe    Therapy Plan Information  FERRLECIT (FERRIC GLUCONATE) QW for Iron deficiency anemia, noted on 11/7/2024  Anaphylaxis/Hypersensitivity  EPINEPHrine (EPIPEN) 0.3 mg/0.3 mL pen injection 0.3 mg  0.3 mg, Intramuscular, PRN  diphenhydrAMINE injection 50 mg  50 mg, Intravenous, PRN  hydrocortisone sodium succinate injection 100 mg  100 mg, Intravenous, PRN  Flushes  heparin, porcine (PF) 100 unit/mL injection flush 500 Units  500 Units, Intravenous, PRN  sodium chloride 0.9% flush 10 mL  10 mL, Intravenous, 1 time a week  0.9% NaCl 100 mL flush bag  Intravenous, 1 time a week    PORT FLUSH for Colon adenocarcinoma, noted on 12/20/2024  PORT FLUSH for Malignant neoplasm of ascending colon, noted on 12/12/2024  Flushes  heparin, porcine (PF) 100 unit/mL injection flush 500 Units  500 Units, Intravenous, Every visit  sodium chloride 0.9% flush 10 mL  10 mL, Intravenous, Every visit    INF FLUIDS for Colon adenocarcinoma, noted on 12/20/2024  IV Fluids  sodium chloride 0.9% bolus 1,000 mL 1,000 mL  1,000 mL, Intravenous, Every visit  Flushes  sodium chloride 0.9% flush 10 mL  10 mL, Intravenous, PRN  heparin, porcine (PF) 100 unit/mL injection flush 500 Units  500 Units, Intravenous, PRN         [1]   Current  Outpatient Medications   Medication Sig Dispense Refill    atorvastatin (LIPITOR) 20 MG tablet TAKE 1 TABLET BY MOUTH EVERY DAY 90 tablet 3    dexAMETHasone (DECADRON) 4 MG Tab Take 2 tablets (8 mg total) by mouth once daily. On days 2 through 4 of each cycle of chemo 120 tablet 0    docusate sodium (COLACE) 100 MG capsule Take 1 capsule (100 mg total) by mouth 2 (two) times daily. 14 capsule 0    fexofenadine (ALLEGRA) 180 MG tablet Take 180 mg by mouth nightly.      fluticasone propionate (FLONASE) 50 mcg/actuation nasal spray SPRAY 2 SPRAYS BY EACH NOSTRIL ROUTE ONCE DAILY. 48 mL 3    FREESTYLE JOCELIN 14 DAY SENSOR Kit 1 APPLICATION BY MISC.(NON-DRUG COMBO ROUTE) ROUTE DAILY AS NEEDED. 1 kit 24    gabapentin (NEURONTIN) 800 MG tablet Take 800 mg by mouth 3 (three) times daily.      guaiFENesin (MUCINEX) 1,200 mg Ta12 Take 1 tablet by mouth nightly.      insulin glargine, TOUJEO, (TOUJEO SOLOSTAR U-300 INSULIN) 300 unit/mL (1.5 mL) InPn pen Inject 30 Units into the skin once daily. 3 mL 11    lamoTRIgine (LAMICTAL) 150 MG Tab TAKE 1 TABLET BY MOUTH EVERY DAY 90 tablet 3    lancets Misc To check BG 3 times daily, to use with insurance preferred meter 200 each 1    LIDOcaine-prilocaine (EMLA) cream Apply topically as needed (Apply to port site 30 minutes before access as needed). 30 g 0    lisinopriL (PRINIVIL,ZESTRIL) 20 MG tablet TAKE 1 TABLET BY MOUTH EVERY DAY 90 tablet 3    magnesium oxide (MAG-OX) 400 mg (241.3 mg magnesium) tablet Take 1 tablet (400 mg total) by mouth 2 (two) times daily. 60 tablet 3    metFORMIN (GLUCOPHAGE-XR) 500 MG ER 24hr tablet TAKE 2 TABLETS BY MOUTH TWICE A DAY WITH MEALS 360 tablet 3    multivitamin (THERAGRAN) per tablet Take 1 tablet by mouth once daily.      omeprazole (PRILOSEC) 40 MG capsule Take 1 capsule (40 mg total) by mouth once daily. 90 capsule 3    ondansetron (ZOFRAN) 8 MG tablet Take 1 tablet (8 mg total) by mouth every 8 (eight) hours as needed. 30 tablet 2    ONETOUCH  "ULTRA BLUE TEST STRIP Strp USE TO CHECK BLOOD SUGAR 3 TIMES A  strip 0    oxyCODONE (ROXICODONE) 5 MG immediate release tablet Take 1 tablet (5 mg total) by mouth every 6 (six) hours as needed for Pain. 20 tablet 0    pen needle, diabetic (PEN NEEDLE) 31 gauge x 5/16" Ndle 1 Application by Misc.(Non-Drug; Combo Route) route once daily. 30 each 5    polyethylene glycol (GLYCOLAX) 17 gram/dose powder Use to cap to measure 17g, mix with liquid, and take by mouth 2 (two) times daily. 1020 g 0    prochlorperazine (COMPAZINE) 10 MG tablet Take 1 tablet (10 mg total) by mouth every 6 (six) hours as needed. 30 tablet 1    sertraline (ZOLOFT) 100 MG tablet TAKE 1 TABLET BY MOUTH EVERY DAY 90 tablet 3    valACYclovir (VALTREX) 500 MG tablet TAKE 1 TABLET BY MOUTH TWICE A  tablet 3    blood-glucose meter kit To check BG 3 times daily, to use with insurance preferred meter 1 each 0    DULoxetine (CYMBALTA) 30 MG capsule Take 1 capsule (30 mg total) by mouth 2 (two) times daily. 60 capsule 2     Current Facility-Administered Medications   Medication Dose Route Frequency Provider Last Rate Last Admin    diphenhydrAMINE capsule 25 mg  25 mg Oral PRN Amarjit Crawford MD        sodium chloride 0.9% flush 10 mL  10 mL Intravenous PRN Amarjit Crawford MD         "

## 2025-07-29 NOTE — PLAN OF CARE
5fu pump cconnected to port and infusing without issues. RTC Thursday, 7/31/25 at 830 m for pump dc. Patient tolerated HAIP heparin flush well. Placement verified every 2-3 mL with 15 mL residual noted (30 mL- 13mL/14 days = 1.21 ml/day. No complaints at discharge.

## 2025-07-30 ENCOUNTER — PATIENT MESSAGE (OUTPATIENT)
Dept: HEMATOLOGY/ONCOLOGY | Facility: CLINIC | Age: 60
End: 2025-07-30
Payer: COMMERCIAL

## 2025-07-31 ENCOUNTER — INFUSION (OUTPATIENT)
Dept: INFUSION THERAPY | Facility: HOSPITAL | Age: 60
End: 2025-07-31
Attending: INTERNAL MEDICINE
Payer: COMMERCIAL

## 2025-07-31 VITALS
BODY MASS INDEX: 33.62 KG/M2 | RESPIRATION RATE: 17 BRPM | HEART RATE: 94 BPM | TEMPERATURE: 97 F | WEIGHT: 201.81 LBS | HEIGHT: 65 IN | DIASTOLIC BLOOD PRESSURE: 86 MMHG | SYSTOLIC BLOOD PRESSURE: 121 MMHG | OXYGEN SATURATION: 99 %

## 2025-07-31 DIAGNOSIS — C18.2 MALIGNANT NEOPLASM OF ASCENDING COLON: Primary | ICD-10-CM

## 2025-07-31 PROCEDURE — A4216 STERILE WATER/SALINE, 10 ML: HCPCS | Performed by: INTERNAL MEDICINE

## 2025-07-31 PROCEDURE — 63600175 PHARM REV CODE 636 W HCPCS: Performed by: INTERNAL MEDICINE

## 2025-07-31 PROCEDURE — 25000003 PHARM REV CODE 250: Performed by: INTERNAL MEDICINE

## 2025-07-31 PROCEDURE — 96523 IRRIG DRUG DELIVERY DEVICE: CPT

## 2025-07-31 RX ORDER — HEPARIN 100 UNIT/ML
500 SYRINGE INTRAVENOUS
Status: DISCONTINUED | OUTPATIENT
Start: 2025-07-31 | End: 2025-07-31 | Stop reason: HOSPADM

## 2025-07-31 RX ORDER — SODIUM CHLORIDE 0.9 % (FLUSH) 0.9 %
10 SYRINGE (ML) INJECTION
Status: DISCONTINUED | OUTPATIENT
Start: 2025-07-31 | End: 2025-07-31 | Stop reason: HOSPADM

## 2025-07-31 RX ADMIN — SODIUM CHLORIDE, PRESERVATIVE FREE 10 ML: 5 INJECTION INTRAVENOUS at 08:07

## 2025-07-31 RX ADMIN — HEPARIN 500 UNITS: 100 SYRINGE at 08:07

## 2025-07-31 NOTE — PROGRESS NOTES
Acupuncture Evaluation Note     Name: Nik Lowery  Clinic Number: [id number]    Traditional Chinese Medicine (TCM) Diagnosis: Qi Stagnation  Medical Diagnosis:   Encounter Diagnoses   Name Primary?    Malignant neoplasm of ascending colon Yes    Chemotherapy-induced neuropathy     Other low back pain         Evaluation Date: 7/28/25    Visit #/Visits authorized: 1/12-?    Precautions: Standard    Subjective     Chief Concern: support for oncology; pain and neuropathy    Medical necessity is demonstrated by the following IMPAIRMENTS: Medical Necessity: Cancer related pain and Decreased mobility limits day to day activities, social, and emergent situations              Aggravating Factors:  movement and pressure   Relieving Factors:  rest    Symptom Description:     Quality:  Aching  Severity:  5  Frequency:  continuously    Previous Treatments Tried:  Medication    HEENT:  not noted    Chest:  not noted    Digestion:     Diet: normal   Fluids: normal  Taste/Appetite: normal   Symptoms: no blood in stool    Sleep: could improve    Energy Levels:  could improve    Psychological Symptoms:   not noted    Other Symptoms: not noted    GYN Symptoms: not noted    Objective     Observation: clean, calm    Tongue:      Body:  normal   Color:  pink   Coating:  thin,     Pulse:        wiry       New Findings:  none    Treatment     Treatment Principles:  move Qi    Acupuncture points used:  K3 UB 62 SI3/4, TB 5, GB 20   Needles In: 10  Needles Out: 10  Needles W/O STIM placed: 9am  Needles W/O STIM removed: 9:30am      Other Traditional Chinese Medicine Modalities - none    Assessment     After treatment, patient felt more relaxed and less strain     Patient prognosis is Good.     Patient will continue to benefit from acupuncture treatment to address the deficits listed in the problem list box on initial evaluation, provide patient family education and to maximize pt's level of independence in the home and community  environment.     Patient's spiritual, cultural and educational needs considered and pt agreeable to plan of care and goals.     Anticipated barriers to treatment: none    Plan     Recommend every week or two for 12 sessions with midway re-assess.      Education:  Patient is aware of cumulative benefit of acupuncture

## 2025-08-11 ENCOUNTER — OFFICE VISIT (OUTPATIENT)
Dept: FAMILY MEDICINE | Facility: CLINIC | Age: 60
End: 2025-08-11
Payer: COMMERCIAL

## 2025-08-11 ENCOUNTER — PATIENT MESSAGE (OUTPATIENT)
Dept: HEMATOLOGY/ONCOLOGY | Facility: CLINIC | Age: 60
End: 2025-08-11
Payer: COMMERCIAL

## 2025-08-11 ENCOUNTER — TELEPHONE (OUTPATIENT)
Dept: FAMILY MEDICINE | Facility: CLINIC | Age: 60
End: 2025-08-11

## 2025-08-11 ENCOUNTER — PATIENT MESSAGE (OUTPATIENT)
Dept: SURGERY | Facility: CLINIC | Age: 60
End: 2025-08-11
Payer: COMMERCIAL

## 2025-08-11 DIAGNOSIS — E11.69 HYPERLIPIDEMIA ASSOCIATED WITH TYPE 2 DIABETES MELLITUS: ICD-10-CM

## 2025-08-11 DIAGNOSIS — E78.5 HYPERLIPIDEMIA ASSOCIATED WITH TYPE 2 DIABETES MELLITUS: ICD-10-CM

## 2025-08-11 DIAGNOSIS — C78.7 METASTASIS TO LIVER: ICD-10-CM

## 2025-08-11 DIAGNOSIS — R10.11 RIGHT UPPER QUADRANT ABDOMINAL PAIN: Primary | ICD-10-CM

## 2025-08-11 DIAGNOSIS — E11.9 TYPE 2 DIABETES MELLITUS WITHOUT COMPLICATION, WITHOUT LONG-TERM CURRENT USE OF INSULIN: ICD-10-CM

## 2025-08-11 DIAGNOSIS — C18.2 MALIGNANT NEOPLASM OF ASCENDING COLON: ICD-10-CM

## 2025-08-11 PROCEDURE — 98014 SYNCH AUDIO-ONLY EST MOD 30: CPT | Mod: 95,,,

## 2025-08-11 PROCEDURE — 3051F HG A1C>EQUAL 7.0%<8.0%: CPT | Mod: CPTII,95,,

## 2025-08-11 PROCEDURE — 1160F RVW MEDS BY RX/DR IN RCRD: CPT | Mod: CPTII,95,,

## 2025-08-11 PROCEDURE — 1159F MED LIST DOCD IN RCRD: CPT | Mod: CPTII,95,,

## 2025-08-11 PROCEDURE — 4010F ACE/ARB THERAPY RXD/TAKEN: CPT | Mod: CPTII,95,,

## 2025-08-11 RX ORDER — ONDANSETRON 8 MG/1
8 TABLET, FILM COATED ORAL EVERY 8 HOURS PRN
Qty: 30 TABLET | Refills: 2 | Status: SHIPPED | OUTPATIENT
Start: 2025-08-11 | End: 2026-08-11

## 2025-08-12 ENCOUNTER — HOSPITAL ENCOUNTER (INPATIENT)
Facility: HOSPITAL | Age: 60
LOS: 1 days | Discharge: HOME | DRG: 947 | End: 2025-08-13
Attending: STUDENT IN AN ORGANIZED HEALTH CARE EDUCATION/TRAINING PROGRAM | Admitting: STUDENT IN AN ORGANIZED HEALTH CARE EDUCATION/TRAINING PROGRAM
Payer: COMMERCIAL

## 2025-08-12 ENCOUNTER — OFFICE VISIT (OUTPATIENT)
Dept: HEMATOLOGY/ONCOLOGY | Facility: CLINIC | Age: 60
End: 2025-08-12
Payer: COMMERCIAL

## 2025-08-12 ENCOUNTER — INFUSION (OUTPATIENT)
Dept: INFUSION THERAPY | Facility: HOSPITAL | Age: 60
End: 2025-08-12
Payer: COMMERCIAL

## 2025-08-12 VITALS
RESPIRATION RATE: 16 BRPM | SYSTOLIC BLOOD PRESSURE: 132 MMHG | BODY MASS INDEX: 33.43 KG/M2 | OXYGEN SATURATION: 99 % | HEART RATE: 94 BPM | DIASTOLIC BLOOD PRESSURE: 88 MMHG | HEIGHT: 65 IN | WEIGHT: 200.63 LBS

## 2025-08-12 VITALS
TEMPERATURE: 98 F | BODY MASS INDEX: 33.43 KG/M2 | WEIGHT: 200.63 LBS | HEART RATE: 101 BPM | RESPIRATION RATE: 18 BRPM | HEIGHT: 65 IN | OXYGEN SATURATION: 99 % | SYSTOLIC BLOOD PRESSURE: 121 MMHG | DIASTOLIC BLOOD PRESSURE: 65 MMHG

## 2025-08-12 DIAGNOSIS — D64.9 SYMPTOMATIC ANEMIA: ICD-10-CM

## 2025-08-12 DIAGNOSIS — T45.1X5A CHEMOTHERAPY-INDUCED NAUSEA: ICD-10-CM

## 2025-08-12 DIAGNOSIS — C78.7 METASTATIC COLON CANCER TO LIVER: ICD-10-CM

## 2025-08-12 DIAGNOSIS — C79.9 METASTATIC MALIGNANT NEOPLASM, UNSPECIFIED SITE: ICD-10-CM

## 2025-08-12 DIAGNOSIS — D84.81 IMMUNODEFICIENCY SECONDARY TO NEOPLASM: ICD-10-CM

## 2025-08-12 DIAGNOSIS — R79.89 ELEVATED LFTS: ICD-10-CM

## 2025-08-12 DIAGNOSIS — R06.02 SHORTNESS OF BREATH: ICD-10-CM

## 2025-08-12 DIAGNOSIS — C18.9 METASTATIC COLON CANCER TO LIVER: ICD-10-CM

## 2025-08-12 DIAGNOSIS — C18.2 MALIGNANT NEOPLASM OF ASCENDING COLON: Primary | ICD-10-CM

## 2025-08-12 DIAGNOSIS — R10.84 GENERALIZED ABDOMINAL PAIN: Primary | ICD-10-CM

## 2025-08-12 DIAGNOSIS — K21.9 GASTROESOPHAGEAL REFLUX DISEASE, UNSPECIFIED WHETHER ESOPHAGITIS PRESENT: ICD-10-CM

## 2025-08-12 DIAGNOSIS — Z79.69 IMMUNODEFICIENCY SECONDARY TO CHEMOTHERAPY: ICD-10-CM

## 2025-08-12 DIAGNOSIS — K63.89 COLONIC MASS: ICD-10-CM

## 2025-08-12 DIAGNOSIS — C78.7 METASTASIS TO LIVER: ICD-10-CM

## 2025-08-12 DIAGNOSIS — D84.821 IMMUNODEFICIENCY SECONDARY TO CHEMOTHERAPY: ICD-10-CM

## 2025-08-12 DIAGNOSIS — G62.0 CHEMOTHERAPY-INDUCED NEUROPATHY: ICD-10-CM

## 2025-08-12 DIAGNOSIS — T45.1X5A CHEMOTHERAPY-INDUCED NEUROPATHY: ICD-10-CM

## 2025-08-12 DIAGNOSIS — T45.1X5A IMMUNODEFICIENCY SECONDARY TO CHEMOTHERAPY: ICD-10-CM

## 2025-08-12 DIAGNOSIS — R07.9 CHEST PAIN: ICD-10-CM

## 2025-08-12 DIAGNOSIS — R11.0 CHEMOTHERAPY-INDUCED NAUSEA: ICD-10-CM

## 2025-08-12 DIAGNOSIS — E11.65 UNCONTROLLED TYPE 2 DIABETES MELLITUS WITH HYPERGLYCEMIA, WITHOUT LONG-TERM CURRENT USE OF INSULIN: ICD-10-CM

## 2025-08-12 DIAGNOSIS — D49.9 IMMUNODEFICIENCY SECONDARY TO NEOPLASM: ICD-10-CM

## 2025-08-12 DIAGNOSIS — E08.00 DIABETES MELLITUS DUE TO UNDERLYING CONDITION WITH HYPEROSMOLARITY WITHOUT COMA, UNSPECIFIED WHETHER LONG TERM INSULIN USE: ICD-10-CM

## 2025-08-12 DIAGNOSIS — R11.2 NAUSEA AND VOMITING, UNSPECIFIED VOMITING TYPE: ICD-10-CM

## 2025-08-12 DIAGNOSIS — J98.4 PNEUMONITIS: ICD-10-CM

## 2025-08-12 DIAGNOSIS — R10.9 ABDOMINAL CRAMPING: ICD-10-CM

## 2025-08-12 PROCEDURE — 93010 ELECTROCARDIOGRAM REPORT: CPT | Mod: ,,, | Performed by: GENERAL PRACTICE

## 2025-08-12 PROCEDURE — 63600175 PHARM REV CODE 636 W HCPCS: Performed by: STUDENT IN AN ORGANIZED HEALTH CARE EDUCATION/TRAINING PROGRAM

## 2025-08-12 PROCEDURE — 96522 REFILL/MAINT PUMP/RESVR SYST: CPT

## 2025-08-12 PROCEDURE — G2211 COMPLEX E/M VISIT ADD ON: HCPCS | Mod: S$GLB,,, | Performed by: REGISTERED NURSE

## 2025-08-12 PROCEDURE — 99999 PR PBB SHADOW E&M-EST. PATIENT-LVL III: CPT | Mod: PBBFAC,,, | Performed by: REGISTERED NURSE

## 2025-08-12 PROCEDURE — 93005 ELECTROCARDIOGRAM TRACING: CPT

## 2025-08-12 PROCEDURE — 63600175 PHARM REV CODE 636 W HCPCS

## 2025-08-12 PROCEDURE — 96374 THER/PROPH/DIAG INJ IV PUSH: CPT

## 2025-08-12 PROCEDURE — 96416 CHEMO PROLONG INFUSE W/PUMP: CPT

## 2025-08-12 PROCEDURE — 96375 TX/PRO/DX INJ NEW DRUG ADDON: CPT

## 2025-08-12 PROCEDURE — 3074F SYST BP LT 130 MM HG: CPT | Mod: CPTII,S$GLB,, | Performed by: REGISTERED NURSE

## 2025-08-12 PROCEDURE — 3051F HG A1C>EQUAL 7.0%<8.0%: CPT | Mod: CPTII,S$GLB,, | Performed by: REGISTERED NURSE

## 2025-08-12 PROCEDURE — 99215 OFFICE O/P EST HI 40 MIN: CPT | Mod: S$GLB,,, | Performed by: REGISTERED NURSE

## 2025-08-12 PROCEDURE — 3078F DIAST BP <80 MM HG: CPT | Mod: CPTII,S$GLB,, | Performed by: REGISTERED NURSE

## 2025-08-12 PROCEDURE — 3008F BODY MASS INDEX DOCD: CPT | Mod: CPTII,S$GLB,, | Performed by: REGISTERED NURSE

## 2025-08-12 PROCEDURE — 99285 EMERGENCY DEPT VISIT HI MDM: CPT | Mod: 25

## 2025-08-12 PROCEDURE — 25000003 PHARM REV CODE 250: Performed by: REGISTERED NURSE

## 2025-08-12 PROCEDURE — 4010F ACE/ARB THERAPY RXD/TAKEN: CPT | Mod: CPTII,S$GLB,, | Performed by: REGISTERED NURSE

## 2025-08-12 PROCEDURE — 63600175 PHARM REV CODE 636 W HCPCS: Performed by: REGISTERED NURSE

## 2025-08-12 RX ORDER — SODIUM CHLORIDE 0.9 % (FLUSH) 0.9 %
10 SYRINGE (ML) INJECTION
Status: CANCELLED | OUTPATIENT
Start: 2025-08-15

## 2025-08-12 RX ORDER — HEPARIN 100 UNIT/ML
500 SYRINGE INTRAVENOUS
Status: CANCELLED | OUTPATIENT
Start: 2025-08-13

## 2025-08-12 RX ORDER — PROCHLORPERAZINE EDISYLATE 5 MG/ML
10 INJECTION INTRAMUSCULAR; INTRAVENOUS ONCE AS NEEDED
Status: CANCELLED | OUTPATIENT
Start: 2025-08-13

## 2025-08-12 RX ORDER — HEPARIN 100 UNIT/ML
500 SYRINGE INTRAVENOUS
Status: CANCELLED | OUTPATIENT
Start: 2025-08-15

## 2025-08-12 RX ORDER — SODIUM CHLORIDE 0.9 % (FLUSH) 0.9 %
10 SYRINGE (ML) INJECTION
Status: CANCELLED | OUTPATIENT
Start: 2025-08-13

## 2025-08-12 RX ORDER — HEPARIN 100 UNIT/ML
500 SYRINGE INTRAVENOUS
Status: DISCONTINUED | OUTPATIENT
Start: 2025-08-12 | End: 2025-08-12 | Stop reason: HOSPADM

## 2025-08-12 RX ORDER — PROCHLORPERAZINE EDISYLATE 5 MG/ML
10 INJECTION INTRAMUSCULAR; INTRAVENOUS ONCE AS NEEDED
Status: DISCONTINUED | OUTPATIENT
Start: 2025-08-12 | End: 2025-08-12 | Stop reason: HOSPADM

## 2025-08-12 RX ORDER — ONDANSETRON HYDROCHLORIDE 2 MG/ML
4 INJECTION, SOLUTION INTRAVENOUS
Status: COMPLETED | OUTPATIENT
Start: 2025-08-12 | End: 2025-08-12

## 2025-08-12 RX ORDER — ONDANSETRON HYDROCHLORIDE 2 MG/ML
8 INJECTION, SOLUTION INTRAVENOUS ONCE
Status: COMPLETED | OUTPATIENT
Start: 2025-08-12 | End: 2025-08-12

## 2025-08-12 RX ORDER — PROCHLORPERAZINE EDISYLATE 5 MG/ML
10 INJECTION INTRAMUSCULAR; INTRAVENOUS ONCE AS NEEDED
Status: CANCELLED | OUTPATIENT
Start: 2025-08-15

## 2025-08-12 RX ORDER — SODIUM CHLORIDE 0.9 % (FLUSH) 0.9 %
10 SYRINGE (ML) INJECTION
Status: DISCONTINUED | OUTPATIENT
Start: 2025-08-12 | End: 2025-08-12 | Stop reason: HOSPADM

## 2025-08-12 RX ORDER — EPINEPHRINE 0.3 MG/.3ML
0.3 INJECTION SUBCUTANEOUS ONCE AS NEEDED
Status: CANCELLED | OUTPATIENT
Start: 2025-08-13

## 2025-08-12 RX ORDER — DIPHENHYDRAMINE HYDROCHLORIDE 50 MG/ML
50 INJECTION, SOLUTION INTRAMUSCULAR; INTRAVENOUS ONCE AS NEEDED
Status: CANCELLED | OUTPATIENT
Start: 2025-08-12

## 2025-08-12 RX ORDER — ONDANSETRON HYDROCHLORIDE 2 MG/ML
INJECTION, SOLUTION INTRAVENOUS
Status: COMPLETED
Start: 2025-08-12 | End: 2025-08-12

## 2025-08-12 RX ORDER — EPINEPHRINE 0.3 MG/.3ML
0.3 INJECTION SUBCUTANEOUS ONCE AS NEEDED
Status: CANCELLED | OUTPATIENT
Start: 2025-08-12

## 2025-08-12 RX ORDER — EPINEPHRINE 0.3 MG/.3ML
0.3 INJECTION SUBCUTANEOUS ONCE AS NEEDED
Status: DISCONTINUED | OUTPATIENT
Start: 2025-08-12 | End: 2025-08-12 | Stop reason: HOSPADM

## 2025-08-12 RX ORDER — DICYCLOMINE HYDROCHLORIDE 20 MG/1
20 TABLET ORAL EVERY 6 HOURS PRN
Qty: 120 TABLET | Refills: 0 | Status: SHIPPED | OUTPATIENT
Start: 2025-08-12

## 2025-08-12 RX ORDER — DIPHENHYDRAMINE HYDROCHLORIDE 50 MG/ML
50 INJECTION, SOLUTION INTRAMUSCULAR; INTRAVENOUS ONCE AS NEEDED
Status: DISCONTINUED | OUTPATIENT
Start: 2025-08-12 | End: 2025-08-12 | Stop reason: HOSPADM

## 2025-08-12 RX ORDER — DIPHENHYDRAMINE HYDROCHLORIDE 50 MG/ML
50 INJECTION, SOLUTION INTRAMUSCULAR; INTRAVENOUS ONCE AS NEEDED
Status: CANCELLED | OUTPATIENT
Start: 2025-08-13

## 2025-08-12 RX ORDER — ONDANSETRON HYDROCHLORIDE 2 MG/ML
8 INJECTION, SOLUTION INTRAVENOUS ONCE
Status: CANCELLED
Start: 2025-08-13 | End: 2025-08-13

## 2025-08-12 RX ORDER — FAMOTIDINE 10 MG/ML
20 INJECTION, SOLUTION INTRAVENOUS
Status: COMPLETED | OUTPATIENT
Start: 2025-08-12 | End: 2025-08-12

## 2025-08-12 RX ORDER — MORPHINE SULFATE 4 MG/ML
4 INJECTION, SOLUTION INTRAMUSCULAR; INTRAVENOUS
Status: COMPLETED | OUTPATIENT
Start: 2025-08-12 | End: 2025-08-12

## 2025-08-12 RX ADMIN — FLUOROURACIL 4000 MG: 50 INJECTION, SOLUTION INTRAVENOUS at 10:08

## 2025-08-12 RX ADMIN — ONDANSETRON 8 MG: 2 INJECTION INTRAMUSCULAR; INTRAVENOUS at 09:08

## 2025-08-12 RX ADMIN — ONDANSETRON HYDROCHLORIDE 8 MG: 2 INJECTION, SOLUTION INTRAVENOUS at 09:08

## 2025-08-12 RX ADMIN — HEPARIN SODIUM 30000 UNITS: 5000 INJECTION, SOLUTION INTRAVENOUS; SUBCUTANEOUS at 09:08

## 2025-08-12 RX ADMIN — MORPHINE SULFATE 4 MG: 4 INJECTION, SOLUTION INTRAMUSCULAR; INTRAVENOUS at 11:08

## 2025-08-12 RX ADMIN — ONDANSETRON 4 MG: 2 INJECTION INTRAMUSCULAR; INTRAVENOUS at 11:08

## 2025-08-12 RX ADMIN — FAMOTIDINE 20 MG: 10 INJECTION, SOLUTION INTRAVENOUS at 11:08

## 2025-08-13 VITALS
TEMPERATURE: 99 F | RESPIRATION RATE: 18 BRPM | BODY MASS INDEX: 33.83 KG/M2 | SYSTOLIC BLOOD PRESSURE: 159 MMHG | HEART RATE: 84 BPM | OXYGEN SATURATION: 95 % | DIASTOLIC BLOOD PRESSURE: 85 MMHG | HEIGHT: 65 IN | WEIGHT: 203.06 LBS

## 2025-08-13 PROBLEM — R50.9 FEVER: Status: RESOLVED | Noted: 2025-01-09 | Resolved: 2025-08-13

## 2025-08-13 PROBLEM — J18.9 PNA (PNEUMONIA): Status: ACTIVE | Noted: 2025-08-13

## 2025-08-13 PROBLEM — E11.65 UNCONTROLLED TYPE 2 DIABETES MELLITUS WITH HYPERGLYCEMIA, WITHOUT LONG-TERM CURRENT USE OF INSULIN: Status: RESOLVED | Noted: 2017-07-11 | Resolved: 2025-08-13

## 2025-08-13 PROBLEM — E11.9 DIABETES: Status: ACTIVE | Noted: 2025-08-13

## 2025-08-13 PROBLEM — J18.9 PNA (PNEUMONIA): Status: RESOLVED | Noted: 2025-08-13 | Resolved: 2025-08-13

## 2025-08-13 PROBLEM — G89.3 CANCER ASSOCIATED PAIN: Status: ACTIVE | Noted: 2025-08-13

## 2025-08-13 PROBLEM — R10.9 ABDOMINAL PAIN: Status: ACTIVE | Noted: 2025-08-13

## 2025-08-13 LAB
ABSOLUTE EOSINOPHIL (SMH): 0.09 K/UL
ABSOLUTE MONOCYTE (SMH): 0.69 K/UL (ref 0.3–1)
ABSOLUTE NEUTROPHIL COUNT (SMH): 6.7 K/UL (ref 1.8–7.7)
ALBUMIN SERPL-MCNC: 4.1 G/DL (ref 3.5–5.2)
ALP SERPL-CCNC: 348 UNIT/L (ref 40–150)
ALT SERPL-CCNC: 137 UNIT/L (ref 10–44)
ANION GAP (SMH): 11 MMOL/L (ref 8–16)
AST SERPL-CCNC: 142 UNIT/L (ref 11–45)
BACTERIA #/AREA URNS AUTO: ABNORMAL /HPF
BASOPHILS # BLD AUTO: 0.04 K/UL
BASOPHILS NFR BLD AUTO: 0.4 %
BILIRUB SERPL-MCNC: 0.4 MG/DL (ref 0.1–1)
BILIRUB UR QL STRIP.AUTO: NEGATIVE
BUN SERPL-MCNC: 12 MG/DL (ref 6–20)
CALCIUM SERPL-MCNC: 9.6 MG/DL (ref 8.7–10.5)
CHLORIDE SERPL-SCNC: 100 MMOL/L (ref 95–110)
CLARITY UR: CLEAR
CO2 SERPL-SCNC: 27 MMOL/L (ref 23–29)
COLOR UR AUTO: COLORLESS
CREAT SERPL-MCNC: 1 MG/DL (ref 0.5–1.4)
ERYTHROCYTE [DISTWIDTH] IN BLOOD BY AUTOMATED COUNT: 13.1 % (ref 11.5–14.5)
GFR SERPLBLD CREATININE-BSD FMLA CKD-EPI: >60 ML/MIN/1.73/M2
GLUCOSE SERPL-MCNC: 211 MG/DL (ref 70–110)
GLUCOSE UR QL STRIP: ABNORMAL
HCT VFR BLD AUTO: 37.5 % (ref 37–48.5)
HGB BLD-MCNC: 12.6 GM/DL (ref 12–16)
HGB UR QL STRIP: NEGATIVE
IMM GRANULOCYTES # BLD AUTO: 0.14 K/UL (ref 0–0.04)
IMM GRANULOCYTES NFR BLD AUTO: 1.5 % (ref 0–0.5)
INFLUENZA A MOLECULAR (OHS): NEGATIVE
INFLUENZA B MOLECULAR (OHS): NEGATIVE
KETONES UR QL STRIP: NEGATIVE
LACTATE SERPL-SCNC: 1.5 MMOL/L (ref 0.5–2.2)
LEUKOCYTE ESTERASE UR QL STRIP: ABNORMAL
LIPASE SERPL-CCNC: 39 U/L (ref 4–60)
LYMPHOCYTES # BLD AUTO: 1.42 K/UL (ref 1–4.8)
MCH RBC QN AUTO: 30.6 PG (ref 27–31)
MCHC RBC AUTO-ENTMCNC: 33.6 G/DL (ref 32–36)
MCV RBC AUTO: 91 FL (ref 82–98)
MICROSCOPIC COMMENT: ABNORMAL
NITRITE UR QL STRIP: NEGATIVE
NT-PROBNP SERPL-MCNC: 111 PG/ML
NUCLEATED RBC (/100WBC) (SMH): 0 /100 WBC
OHS QRS DURATION: 86 MS
OHS QTC CALCULATION: 453 MS
PH UR STRIP: 7 [PH]
PLATELET # BLD AUTO: 219 K/UL (ref 150–450)
PMV BLD AUTO: 9.4 FL (ref 9.2–12.9)
POCT GLUCOSE: 196 MG/DL (ref 70–110)
POCT GLUCOSE: 242 MG/DL (ref 70–110)
POTASSIUM SERPL-SCNC: 4.2 MMOL/L (ref 3.5–5.1)
PROCALCITONIN SERPL-MCNC: 0.07 NG/ML
PROT SERPL-MCNC: 7.8 GM/DL (ref 6–8.4)
PROT UR QL STRIP: NEGATIVE
RBC # BLD AUTO: 4.12 M/UL (ref 4–5.4)
RBC #/AREA URNS AUTO: 1 /HPF
RELATIVE EOSINOPHIL (SMH): 1 % (ref 0–8)
RELATIVE LYMPHOCYTE (SMH): 15.6 % (ref 18–48)
RELATIVE MONOCYTE (SMH): 7.6 % (ref 4–15)
RELATIVE NEUTROPHIL (SMH): 73.9 % (ref 38–73)
SARS-COV-2 RDRP RESP QL NAA+PROBE: NEGATIVE
SODIUM SERPL-SCNC: 138 MMOL/L (ref 136–145)
SP GR UR STRIP: >=1.03
SQUAMOUS #/AREA URNS AUTO: 1 /HPF
TROPONIN I SERPL HS-MCNC: <3 NG/L
UROBILINOGEN UR STRIP-ACNC: NEGATIVE EU/DL
WBC # BLD AUTO: 9.11 K/UL (ref 3.9–12.7)
WBC #/AREA URNS AUTO: 12 /HPF

## 2025-08-13 PROCEDURE — 96376 TX/PRO/DX INJ SAME DRUG ADON: CPT

## 2025-08-13 PROCEDURE — 96366 THER/PROPH/DIAG IV INF ADDON: CPT

## 2025-08-13 PROCEDURE — 36415 COLL VENOUS BLD VENIPUNCTURE: CPT | Performed by: STUDENT IN AN ORGANIZED HEALTH CARE EDUCATION/TRAINING PROGRAM

## 2025-08-13 PROCEDURE — 82962 GLUCOSE BLOOD TEST: CPT

## 2025-08-13 PROCEDURE — 25000003 PHARM REV CODE 250: Performed by: STUDENT IN AN ORGANIZED HEALTH CARE EDUCATION/TRAINING PROGRAM

## 2025-08-13 PROCEDURE — 96375 TX/PRO/DX INJ NEW DRUG ADDON: CPT

## 2025-08-13 PROCEDURE — 83605 ASSAY OF LACTIC ACID: CPT | Performed by: STUDENT IN AN ORGANIZED HEALTH CARE EDUCATION/TRAINING PROGRAM

## 2025-08-13 PROCEDURE — 63600175 PHARM REV CODE 636 W HCPCS: Performed by: REGISTERED NURSE

## 2025-08-13 PROCEDURE — 96367 TX/PROPH/DG ADDL SEQ IV INF: CPT

## 2025-08-13 PROCEDURE — 83880 ASSAY OF NATRIURETIC PEPTIDE: CPT | Performed by: STUDENT IN AN ORGANIZED HEALTH CARE EDUCATION/TRAINING PROGRAM

## 2025-08-13 PROCEDURE — 25500020 PHARM REV CODE 255

## 2025-08-13 PROCEDURE — 84145 PROCALCITONIN (PCT): CPT | Performed by: STUDENT IN AN ORGANIZED HEALTH CARE EDUCATION/TRAINING PROGRAM

## 2025-08-13 PROCEDURE — 87502 INFLUENZA DNA AMP PROBE: CPT | Performed by: STUDENT IN AN ORGANIZED HEALTH CARE EDUCATION/TRAINING PROGRAM

## 2025-08-13 PROCEDURE — 11000001 HC ACUTE MED/SURG PRIVATE ROOM

## 2025-08-13 PROCEDURE — 81001 URINALYSIS AUTO W/SCOPE: CPT | Performed by: STUDENT IN AN ORGANIZED HEALTH CARE EDUCATION/TRAINING PROGRAM

## 2025-08-13 PROCEDURE — 83690 ASSAY OF LIPASE: CPT | Performed by: STUDENT IN AN ORGANIZED HEALTH CARE EDUCATION/TRAINING PROGRAM

## 2025-08-13 PROCEDURE — 80053 COMPREHEN METABOLIC PANEL: CPT | Performed by: STUDENT IN AN ORGANIZED HEALTH CARE EDUCATION/TRAINING PROGRAM

## 2025-08-13 PROCEDURE — 96365 THER/PROPH/DIAG IV INF INIT: CPT

## 2025-08-13 PROCEDURE — 84484 ASSAY OF TROPONIN QUANT: CPT | Performed by: STUDENT IN AN ORGANIZED HEALTH CARE EDUCATION/TRAINING PROGRAM

## 2025-08-13 PROCEDURE — 87086 URINE CULTURE/COLONY COUNT: CPT | Performed by: STUDENT IN AN ORGANIZED HEALTH CARE EDUCATION/TRAINING PROGRAM

## 2025-08-13 PROCEDURE — 85025 COMPLETE CBC W/AUTO DIFF WBC: CPT | Performed by: STUDENT IN AN ORGANIZED HEALTH CARE EDUCATION/TRAINING PROGRAM

## 2025-08-13 PROCEDURE — 87040 BLOOD CULTURE FOR BACTERIA: CPT | Performed by: STUDENT IN AN ORGANIZED HEALTH CARE EDUCATION/TRAINING PROGRAM

## 2025-08-13 PROCEDURE — 25000003 PHARM REV CODE 250: Performed by: REGISTERED NURSE

## 2025-08-13 PROCEDURE — U0002 COVID-19 LAB TEST NON-CDC: HCPCS | Performed by: STUDENT IN AN ORGANIZED HEALTH CARE EDUCATION/TRAINING PROGRAM

## 2025-08-13 PROCEDURE — 63600175 PHARM REV CODE 636 W HCPCS: Performed by: STUDENT IN AN ORGANIZED HEALTH CARE EDUCATION/TRAINING PROGRAM

## 2025-08-13 RX ORDER — SERTRALINE HYDROCHLORIDE 50 MG/1
100 TABLET, FILM COATED ORAL DAILY
Status: DISCONTINUED | OUTPATIENT
Start: 2025-08-13 | End: 2025-08-13 | Stop reason: HOSPADM

## 2025-08-13 RX ORDER — DULOXETIN HYDROCHLORIDE 30 MG/1
30 CAPSULE, DELAYED RELEASE ORAL 2 TIMES DAILY
Status: DISCONTINUED | OUTPATIENT
Start: 2025-08-13 | End: 2025-08-13 | Stop reason: HOSPADM

## 2025-08-13 RX ORDER — IBUPROFEN 200 MG
24 TABLET ORAL
Status: DISCONTINUED | OUTPATIENT
Start: 2025-08-13 | End: 2025-08-13 | Stop reason: HOSPADM

## 2025-08-13 RX ORDER — PROCHLORPERAZINE MALEATE 10 MG
10 TABLET ORAL 4 TIMES DAILY PRN
Qty: 30 TABLET | Refills: 1 | Status: SHIPPED | OUTPATIENT
Start: 2025-08-13 | End: 2025-08-28

## 2025-08-13 RX ORDER — PROCHLORPERAZINE EDISYLATE 5 MG/ML
5 INJECTION INTRAMUSCULAR; INTRAVENOUS EVERY 6 HOURS PRN
Status: DISCONTINUED | OUTPATIENT
Start: 2025-08-13 | End: 2025-08-13 | Stop reason: HOSPADM

## 2025-08-13 RX ORDER — AMOXICILLIN AND CLAVULANATE POTASSIUM 875; 125 MG/1; MG/1
1 TABLET, FILM COATED ORAL EVERY 12 HOURS
Qty: 10 TABLET | Refills: 0 | Status: SHIPPED | OUTPATIENT
Start: 2025-08-13 | End: 2025-08-18

## 2025-08-13 RX ORDER — METOCLOPRAMIDE HYDROCHLORIDE 5 MG/ML
5 INJECTION INTRAMUSCULAR; INTRAVENOUS
Status: COMPLETED | OUTPATIENT
Start: 2025-08-13 | End: 2025-08-13

## 2025-08-13 RX ORDER — LISINOPRIL 10 MG/1
20 TABLET ORAL DAILY
Status: DISCONTINUED | OUTPATIENT
Start: 2025-08-13 | End: 2025-08-13 | Stop reason: HOSPADM

## 2025-08-13 RX ORDER — SODIUM CHLORIDE 0.9 % (FLUSH) 0.9 %
10 SYRINGE (ML) INJECTION EVERY 12 HOURS PRN
Status: DISCONTINUED | OUTPATIENT
Start: 2025-08-13 | End: 2025-08-13 | Stop reason: HOSPADM

## 2025-08-13 RX ORDER — ATORVASTATIN CALCIUM 10 MG/1
20 TABLET, FILM COATED ORAL DAILY
Status: DISCONTINUED | OUTPATIENT
Start: 2025-08-13 | End: 2025-08-13

## 2025-08-13 RX ORDER — SIMETHICONE 80 MG
1 TABLET,CHEWABLE ORAL 3 TIMES DAILY PRN
Status: DISCONTINUED | OUTPATIENT
Start: 2025-08-13 | End: 2025-08-13 | Stop reason: HOSPADM

## 2025-08-13 RX ORDER — OXYCODONE HYDROCHLORIDE 5 MG/1
5 TABLET ORAL EVERY 6 HOURS PRN
Qty: 28 TABLET | Refills: 0 | Status: SHIPPED | OUTPATIENT
Start: 2025-08-13 | End: 2025-08-20

## 2025-08-13 RX ORDER — ENOXAPARIN SODIUM 100 MG/ML
40 INJECTION SUBCUTANEOUS EVERY 24 HOURS
Status: DISCONTINUED | OUTPATIENT
Start: 2025-08-13 | End: 2025-08-13 | Stop reason: HOSPADM

## 2025-08-13 RX ORDER — GLUCAGON 1 MG
1 KIT INJECTION
Status: DISCONTINUED | OUTPATIENT
Start: 2025-08-13 | End: 2025-08-13 | Stop reason: HOSPADM

## 2025-08-13 RX ORDER — INSULIN GLARGINE 100 [IU]/ML
30 INJECTION, SOLUTION SUBCUTANEOUS DAILY
Status: DISCONTINUED | OUTPATIENT
Start: 2025-08-13 | End: 2025-08-13

## 2025-08-13 RX ORDER — FLUTICASONE PROPIONATE 50 MCG
2 SPRAY, SUSPENSION (ML) NASAL 2 TIMES DAILY PRN
Status: DISCONTINUED | OUTPATIENT
Start: 2025-08-13 | End: 2025-08-13 | Stop reason: HOSPADM

## 2025-08-13 RX ORDER — ONDANSETRON HYDROCHLORIDE 2 MG/ML
4 INJECTION, SOLUTION INTRAVENOUS EVERY 8 HOURS PRN
Status: DISCONTINUED | OUTPATIENT
Start: 2025-08-13 | End: 2025-08-13 | Stop reason: HOSPADM

## 2025-08-13 RX ORDER — IBUPROFEN 200 MG
16 TABLET ORAL
Status: DISCONTINUED | OUTPATIENT
Start: 2025-08-13 | End: 2025-08-13 | Stop reason: HOSPADM

## 2025-08-13 RX ORDER — MORPHINE SULFATE 4 MG/ML
3 INJECTION, SOLUTION INTRAMUSCULAR; INTRAVENOUS EVERY 4 HOURS PRN
Status: DISCONTINUED | OUTPATIENT
Start: 2025-08-13 | End: 2025-08-13 | Stop reason: HOSPADM

## 2025-08-13 RX ORDER — INSULIN GLARGINE 100 [IU]/ML
30 INJECTION, SOLUTION SUBCUTANEOUS NIGHTLY
Status: DISCONTINUED | OUTPATIENT
Start: 2025-08-13 | End: 2025-08-13 | Stop reason: HOSPADM

## 2025-08-13 RX ORDER — DOXYCYCLINE 100 MG/10ML
100 INJECTION, POWDER, LYOPHILIZED, FOR SOLUTION INTRAVENOUS
Status: DISCONTINUED | OUTPATIENT
Start: 2025-08-13 | End: 2025-08-13 | Stop reason: SDUPTHER

## 2025-08-13 RX ORDER — OXYCODONE HYDROCHLORIDE 5 MG/1
5 TABLET ORAL EVERY 6 HOURS PRN
Status: DISCONTINUED | OUTPATIENT
Start: 2025-08-13 | End: 2025-08-13 | Stop reason: HOSPADM

## 2025-08-13 RX ORDER — MORPHINE SULFATE 4 MG/ML
4 INJECTION, SOLUTION INTRAMUSCULAR; INTRAVENOUS ONCE
Status: COMPLETED | OUTPATIENT
Start: 2025-08-13 | End: 2025-08-13

## 2025-08-13 RX ORDER — INSULIN ASPART 100 [IU]/ML
0-5 INJECTION, SOLUTION INTRAVENOUS; SUBCUTANEOUS
Status: DISCONTINUED | OUTPATIENT
Start: 2025-08-13 | End: 2025-08-13 | Stop reason: HOSPADM

## 2025-08-13 RX ORDER — DICYCLOMINE HYDROCHLORIDE 20 MG/1
20 TABLET ORAL EVERY 6 HOURS PRN
Status: DISCONTINUED | OUTPATIENT
Start: 2025-08-13 | End: 2025-08-13 | Stop reason: HOSPADM

## 2025-08-13 RX ORDER — NALOXONE HCL 0.4 MG/ML
0.02 VIAL (ML) INJECTION
Status: DISCONTINUED | OUTPATIENT
Start: 2025-08-13 | End: 2025-08-13 | Stop reason: HOSPADM

## 2025-08-13 RX ORDER — BISACODYL 5 MG
5 TABLET, DELAYED RELEASE (ENTERIC COATED) ORAL DAILY PRN
COMMUNITY

## 2025-08-13 RX ORDER — CETIRIZINE HYDROCHLORIDE 10 MG/1
10 TABLET ORAL DAILY
Status: DISCONTINUED | OUTPATIENT
Start: 2025-08-13 | End: 2025-08-13 | Stop reason: HOSPADM

## 2025-08-13 RX ORDER — ACETAMINOPHEN 325 MG/1
650 TABLET ORAL EVERY 6 HOURS PRN
Status: DISCONTINUED | OUTPATIENT
Start: 2025-08-13 | End: 2025-08-13 | Stop reason: HOSPADM

## 2025-08-13 RX ORDER — PANTOPRAZOLE SODIUM 40 MG/10ML
40 INJECTION, POWDER, LYOPHILIZED, FOR SOLUTION INTRAVENOUS ONCE
Status: COMPLETED | OUTPATIENT
Start: 2025-08-13 | End: 2025-08-13

## 2025-08-13 RX ORDER — POLYETHYLENE GLYCOL 3350 17 G/17G
17 POWDER, FOR SOLUTION ORAL DAILY PRN
Status: DISCONTINUED | OUTPATIENT
Start: 2025-08-13 | End: 2025-08-13 | Stop reason: HOSPADM

## 2025-08-13 RX ADMIN — SERTRALINE HYDROCHLORIDE 100 MG: 50 TABLET ORAL at 07:08

## 2025-08-13 RX ADMIN — DULOXETINE 30 MG: 30 CAPSULE, DELAYED RELEASE ORAL at 07:08

## 2025-08-13 RX ADMIN — PIPERACILLIN SODIUM AND TAZOBACTAM SODIUM 4.5 G: 4; .5 INJECTION, POWDER, LYOPHILIZED, FOR SOLUTION INTRAVENOUS at 02:08

## 2025-08-13 RX ADMIN — CETIRIZINE HYDROCHLORIDE 10 MG: 10 TABLET, FILM COATED ORAL at 07:08

## 2025-08-13 RX ADMIN — IOHEXOL 100 ML: 350 INJECTION, SOLUTION INTRAVENOUS at 12:08

## 2025-08-13 RX ADMIN — MORPHINE SULFATE 4 MG: 4 INJECTION, SOLUTION INTRAMUSCULAR; INTRAVENOUS at 07:08

## 2025-08-13 RX ADMIN — INSULIN ASPART 2 UNITS: 100 INJECTION, SOLUTION INTRAVENOUS; SUBCUTANEOUS at 11:08

## 2025-08-13 RX ADMIN — PIPERACILLIN SODIUM AND TAZOBACTAM SODIUM 4.5 G: 4; .5 INJECTION, POWDER, LYOPHILIZED, FOR SOLUTION INTRAVENOUS at 10:08

## 2025-08-13 RX ADMIN — GABAPENTIN 800 MG: 300 CAPSULE ORAL at 07:08

## 2025-08-13 RX ADMIN — PANTOPRAZOLE SODIUM 40 MG: 40 INJECTION, POWDER, FOR SOLUTION INTRAVENOUS at 07:08

## 2025-08-13 RX ADMIN — LISINOPRIL 20 MG: 10 TABLET ORAL at 07:08

## 2025-08-13 RX ADMIN — DOXYCYCLINE 100 MG: 100 INJECTION, POWDER, LYOPHILIZED, FOR SOLUTION INTRAVENOUS at 03:08

## 2025-08-13 RX ADMIN — METOCLOPRAMIDE 5 MG: 5 INJECTION, SOLUTION INTRAMUSCULAR; INTRAVENOUS at 02:08

## 2025-08-14 ENCOUNTER — PATIENT MESSAGE (OUTPATIENT)
Dept: INFUSION THERAPY | Facility: HOSPITAL | Age: 60
End: 2025-08-14
Payer: COMMERCIAL

## 2025-08-14 ENCOUNTER — INFUSION (OUTPATIENT)
Dept: INFUSION THERAPY | Facility: HOSPITAL | Age: 60
End: 2025-08-14
Attending: INTERNAL MEDICINE
Payer: COMMERCIAL

## 2025-08-14 ENCOUNTER — PATIENT OUTREACH (OUTPATIENT)
Dept: ADMINISTRATIVE | Facility: CLINIC | Age: 60
End: 2025-08-14
Payer: COMMERCIAL

## 2025-08-14 VITALS
HEIGHT: 65 IN | RESPIRATION RATE: 18 BRPM | TEMPERATURE: 97 F | OXYGEN SATURATION: 97 % | HEART RATE: 101 BPM | SYSTOLIC BLOOD PRESSURE: 134 MMHG | DIASTOLIC BLOOD PRESSURE: 91 MMHG | BODY MASS INDEX: 33.22 KG/M2 | WEIGHT: 199.38 LBS

## 2025-08-14 DIAGNOSIS — C18.2 MALIGNANT NEOPLASM OF ASCENDING COLON: Primary | ICD-10-CM

## 2025-08-14 PROCEDURE — A4216 STERILE WATER/SALINE, 10 ML: HCPCS | Performed by: REGISTERED NURSE

## 2025-08-14 PROCEDURE — 25000003 PHARM REV CODE 250: Performed by: REGISTERED NURSE

## 2025-08-14 PROCEDURE — 63600175 PHARM REV CODE 636 W HCPCS: Performed by: REGISTERED NURSE

## 2025-08-14 PROCEDURE — 96523 IRRIG DRUG DELIVERY DEVICE: CPT

## 2025-08-14 RX ORDER — SODIUM CHLORIDE 0.9 % (FLUSH) 0.9 %
10 SYRINGE (ML) INJECTION
Status: DISCONTINUED | OUTPATIENT
Start: 2025-08-14 | End: 2025-08-14 | Stop reason: HOSPADM

## 2025-08-14 RX ORDER — HEPARIN 100 UNIT/ML
500 SYRINGE INTRAVENOUS
Status: DISCONTINUED | OUTPATIENT
Start: 2025-08-14 | End: 2025-08-14 | Stop reason: HOSPADM

## 2025-08-14 RX ADMIN — SODIUM CHLORIDE, PRESERVATIVE FREE 10 ML: 5 INJECTION INTRAVENOUS at 08:08

## 2025-08-14 RX ADMIN — HEPARIN 500 UNITS: 100 SYRINGE at 08:08

## 2025-08-15 LAB — BACTERIA UR CULT: NORMAL

## 2025-08-16 LAB
BACTERIA BLD CULT: NORMAL
BACTERIA BLD CULT: NORMAL

## 2025-08-18 ENCOUNTER — OFFICE VISIT (OUTPATIENT)
Dept: FAMILY MEDICINE | Facility: CLINIC | Age: 60
End: 2025-08-18
Payer: COMMERCIAL

## 2025-08-18 ENCOUNTER — RESULTS FOLLOW-UP (OUTPATIENT)
Dept: FAMILY MEDICINE | Facility: CLINIC | Age: 60
End: 2025-08-18

## 2025-08-18 ENCOUNTER — CLINICAL SUPPORT (OUTPATIENT)
Facility: HOSPITAL | Age: 60
End: 2025-08-18
Payer: COMMERCIAL

## 2025-08-18 ENCOUNTER — LAB VISIT (OUTPATIENT)
Dept: LAB | Facility: HOSPITAL | Age: 60
End: 2025-08-18
Payer: COMMERCIAL

## 2025-08-18 VITALS
BODY MASS INDEX: 33.13 KG/M2 | DIASTOLIC BLOOD PRESSURE: 78 MMHG | TEMPERATURE: 98 F | HEIGHT: 65 IN | HEART RATE: 97 BPM | OXYGEN SATURATION: 97 % | WEIGHT: 198.88 LBS | SYSTOLIC BLOOD PRESSURE: 126 MMHG

## 2025-08-18 DIAGNOSIS — E78.5 HYPERLIPIDEMIA ASSOCIATED WITH TYPE 2 DIABETES MELLITUS: ICD-10-CM

## 2025-08-18 DIAGNOSIS — E11.59 HYPERTENSION ASSOCIATED WITH DIABETES: Primary | ICD-10-CM

## 2025-08-18 DIAGNOSIS — R74.01 TRANSAMINITIS: ICD-10-CM

## 2025-08-18 DIAGNOSIS — C18.2 MALIGNANT NEOPLASM OF ASCENDING COLON: Primary | ICD-10-CM

## 2025-08-18 DIAGNOSIS — E11.69 HYPERLIPIDEMIA ASSOCIATED WITH TYPE 2 DIABETES MELLITUS: ICD-10-CM

## 2025-08-18 DIAGNOSIS — I15.2 HYPERTENSION ASSOCIATED WITH DIABETES: Primary | ICD-10-CM

## 2025-08-18 DIAGNOSIS — I15.2 HYPERTENSION ASSOCIATED WITH DIABETES: ICD-10-CM

## 2025-08-18 DIAGNOSIS — C78.7 METASTASIS TO LIVER: ICD-10-CM

## 2025-08-18 DIAGNOSIS — E11.9 TYPE 2 DIABETES MELLITUS WITHOUT COMPLICATION, WITHOUT LONG-TERM CURRENT USE OF INSULIN: ICD-10-CM

## 2025-08-18 DIAGNOSIS — E11.59 HYPERTENSION ASSOCIATED WITH DIABETES: ICD-10-CM

## 2025-08-18 DIAGNOSIS — C18.2 MALIGNANT NEOPLASM OF ASCENDING COLON: ICD-10-CM

## 2025-08-18 LAB
ABSOLUTE EOSINOPHIL (OHS): 0.19 K/UL
ABSOLUTE MONOCYTE (OHS): 0.53 K/UL (ref 0.3–1)
ABSOLUTE NEUTROPHIL COUNT (OHS): 4.92 K/UL (ref 1.8–7.7)
ALBUMIN SERPL BCP-MCNC: 3.9 G/DL (ref 3.5–5.2)
ALP SERPL-CCNC: 383 UNIT/L (ref 40–150)
ALT SERPL W/O P-5'-P-CCNC: 135 UNIT/L (ref 0–55)
ANION GAP (OHS): 12 MMOL/L (ref 8–16)
AST SERPL-CCNC: 63 UNIT/L (ref 0–50)
BACTERIA BLD CULT: NORMAL
BACTERIA BLD CULT: NORMAL
BASOPHILS # BLD AUTO: 0.03 K/UL
BASOPHILS NFR BLD AUTO: 0.4 %
BILIRUB SERPL-MCNC: 0.5 MG/DL (ref 0.1–1)
BUN SERPL-MCNC: 19 MG/DL (ref 6–20)
CALCIUM SERPL-MCNC: 9.8 MG/DL (ref 8.7–10.5)
CHLORIDE SERPL-SCNC: 100 MMOL/L (ref 95–110)
CO2 SERPL-SCNC: 23 MMOL/L (ref 23–29)
CREAT SERPL-MCNC: 1.2 MG/DL (ref 0.5–1.4)
ERYTHROCYTE [DISTWIDTH] IN BLOOD BY AUTOMATED COUNT: 13.4 % (ref 11.5–14.5)
GFR SERPLBLD CREATININE-BSD FMLA CKD-EPI: 52 ML/MIN/1.73/M2
GLUCOSE SERPL-MCNC: 196 MG/DL (ref 70–110)
HCT VFR BLD AUTO: 39 % (ref 37–48.5)
HGB BLD-MCNC: 13 GM/DL (ref 12–16)
IMM GRANULOCYTES # BLD AUTO: 0.04 K/UL (ref 0–0.04)
IMM GRANULOCYTES NFR BLD AUTO: 0.5 % (ref 0–0.5)
LYMPHOCYTES # BLD AUTO: 2.14 K/UL (ref 1–4.8)
MCH RBC QN AUTO: 31.3 PG (ref 27–31)
MCHC RBC AUTO-ENTMCNC: 33.3 G/DL (ref 32–36)
MCV RBC AUTO: 94 FL (ref 82–98)
NUCLEATED RBC (/100WBC) (OHS): 0 /100 WBC
PLATELET # BLD AUTO: 297 K/UL (ref 150–450)
PMV BLD AUTO: 9.9 FL (ref 9.2–12.9)
POTASSIUM SERPL-SCNC: 4.3 MMOL/L (ref 3.5–5.1)
PROT SERPL-MCNC: 8.1 GM/DL (ref 6–8.4)
RBC # BLD AUTO: 4.16 M/UL (ref 4–5.4)
RELATIVE EOSINOPHIL (OHS): 2.4 %
RELATIVE LYMPHOCYTE (OHS): 27.3 % (ref 18–48)
RELATIVE MONOCYTE (OHS): 6.8 % (ref 4–15)
RELATIVE NEUTROPHIL (OHS): 62.6 % (ref 38–73)
SODIUM SERPL-SCNC: 135 MMOL/L (ref 136–145)
WBC # BLD AUTO: 7.85 K/UL (ref 3.9–12.7)

## 2025-08-18 PROCEDURE — 84450 TRANSFERASE (AST) (SGOT): CPT

## 2025-08-18 PROCEDURE — 97810 ACUP 1/> WO ESTIM 1ST 15 MIN: CPT | Mod: PO

## 2025-08-18 PROCEDURE — 3008F BODY MASS INDEX DOCD: CPT | Mod: CPTII,S$GLB,,

## 2025-08-18 PROCEDURE — 4010F ACE/ARB THERAPY RXD/TAKEN: CPT | Mod: CPTII,S$GLB,,

## 2025-08-18 PROCEDURE — 97811 ACUP 1/> W/O ESTIM EA ADD 15: CPT | Mod: PO

## 2025-08-18 PROCEDURE — 85025 COMPLETE CBC W/AUTO DIFF WBC: CPT

## 2025-08-18 PROCEDURE — 36415 COLL VENOUS BLD VENIPUNCTURE: CPT | Mod: PO

## 2025-08-18 PROCEDURE — 1159F MED LIST DOCD IN RCRD: CPT | Mod: CPTII,S$GLB,,

## 2025-08-18 PROCEDURE — 3078F DIAST BP <80 MM HG: CPT | Mod: CPTII,S$GLB,,

## 2025-08-18 PROCEDURE — 3051F HG A1C>EQUAL 7.0%<8.0%: CPT | Mod: CPTII,S$GLB,,

## 2025-08-18 PROCEDURE — 99999 PR PBB SHADOW E&M-EST. PATIENT-LVL V: CPT | Mod: PBBFAC,,,

## 2025-08-18 PROCEDURE — 1111F DSCHRG MED/CURRENT MED MERGE: CPT | Mod: CPTII,S$GLB,,

## 2025-08-18 PROCEDURE — G2211 COMPLEX E/M VISIT ADD ON: HCPCS | Mod: S$GLB,,,

## 2025-08-18 PROCEDURE — 99215 OFFICE O/P EST HI 40 MIN: CPT | Mod: S$GLB,,,

## 2025-08-18 PROCEDURE — 3074F SYST BP LT 130 MM HG: CPT | Mod: CPTII,S$GLB,,

## 2025-08-18 RX ORDER — ROSUVASTATIN CALCIUM 5 MG/1
5 TABLET, COATED ORAL DAILY
Qty: 90 TABLET | Refills: 3 | Status: SHIPPED | OUTPATIENT
Start: 2025-08-18 | End: 2026-08-18

## 2025-08-19 ENCOUNTER — TELEPHONE (OUTPATIENT)
Dept: FAMILY MEDICINE | Facility: CLINIC | Age: 60
End: 2025-08-19
Payer: COMMERCIAL

## 2025-08-21 ENCOUNTER — PATIENT MESSAGE (OUTPATIENT)
Dept: HEMATOLOGY/ONCOLOGY | Facility: CLINIC | Age: 60
End: 2025-08-21
Payer: COMMERCIAL

## 2025-08-26 ENCOUNTER — INFUSION (OUTPATIENT)
Dept: INFUSION THERAPY | Facility: HOSPITAL | Age: 60
End: 2025-08-26
Payer: COMMERCIAL

## 2025-08-26 ENCOUNTER — OFFICE VISIT (OUTPATIENT)
Dept: HEMATOLOGY/ONCOLOGY | Facility: CLINIC | Age: 60
End: 2025-08-26
Payer: COMMERCIAL

## 2025-08-26 ENCOUNTER — RESULTS FOLLOW-UP (OUTPATIENT)
Dept: FAMILY MEDICINE | Facility: CLINIC | Age: 60
End: 2025-08-26
Payer: COMMERCIAL

## 2025-08-26 ENCOUNTER — TELEPHONE (OUTPATIENT)
Dept: HEMATOLOGY/ONCOLOGY | Facility: CLINIC | Age: 60
End: 2025-08-26
Payer: COMMERCIAL

## 2025-08-26 ENCOUNTER — LAB VISIT (OUTPATIENT)
Dept: LAB | Facility: HOSPITAL | Age: 60
End: 2025-08-26
Attending: REGISTERED NURSE
Payer: COMMERCIAL

## 2025-08-26 VITALS
HEART RATE: 101 BPM | OXYGEN SATURATION: 98 % | BODY MASS INDEX: 33.16 KG/M2 | SYSTOLIC BLOOD PRESSURE: 106 MMHG | DIASTOLIC BLOOD PRESSURE: 58 MMHG | TEMPERATURE: 98 F | WEIGHT: 199.06 LBS | HEIGHT: 65 IN | RESPIRATION RATE: 18 BRPM

## 2025-08-26 VITALS
HEIGHT: 65 IN | RESPIRATION RATE: 18 BRPM | OXYGEN SATURATION: 98 % | SYSTOLIC BLOOD PRESSURE: 106 MMHG | WEIGHT: 199.06 LBS | BODY MASS INDEX: 33.16 KG/M2 | TEMPERATURE: 98 F | DIASTOLIC BLOOD PRESSURE: 58 MMHG | HEART RATE: 101 BPM

## 2025-08-26 DIAGNOSIS — K21.9 GASTROESOPHAGEAL REFLUX DISEASE, UNSPECIFIED WHETHER ESOPHAGITIS PRESENT: ICD-10-CM

## 2025-08-26 DIAGNOSIS — D84.81 IMMUNODEFICIENCY SECONDARY TO NEOPLASM: ICD-10-CM

## 2025-08-26 DIAGNOSIS — R11.0 CHEMOTHERAPY-INDUCED NAUSEA: ICD-10-CM

## 2025-08-26 DIAGNOSIS — T45.1X5A IMMUNODEFICIENCY SECONDARY TO CHEMOTHERAPY: ICD-10-CM

## 2025-08-26 DIAGNOSIS — C18.2 MALIGNANT NEOPLASM OF ASCENDING COLON: Primary | ICD-10-CM

## 2025-08-26 DIAGNOSIS — C18.9 METASTATIC COLON CANCER TO LIVER: Primary | ICD-10-CM

## 2025-08-26 DIAGNOSIS — E08.00 DIABETES MELLITUS DUE TO UNDERLYING CONDITION WITH HYPEROSMOLARITY WITHOUT COMA, UNSPECIFIED WHETHER LONG TERM INSULIN USE: ICD-10-CM

## 2025-08-26 DIAGNOSIS — C78.7 METASTATIC COLON CANCER TO LIVER: Primary | ICD-10-CM

## 2025-08-26 DIAGNOSIS — R10.9 ABDOMINAL CRAMPING: ICD-10-CM

## 2025-08-26 DIAGNOSIS — G62.0 CHEMOTHERAPY-INDUCED NEUROPATHY: ICD-10-CM

## 2025-08-26 DIAGNOSIS — Z79.69 IMMUNODEFICIENCY SECONDARY TO CHEMOTHERAPY: ICD-10-CM

## 2025-08-26 DIAGNOSIS — C78.7 METASTASIS TO LIVER: ICD-10-CM

## 2025-08-26 DIAGNOSIS — T45.1X5A CHEMOTHERAPY-INDUCED NAUSEA: ICD-10-CM

## 2025-08-26 DIAGNOSIS — D84.821 IMMUNODEFICIENCY SECONDARY TO CHEMOTHERAPY: ICD-10-CM

## 2025-08-26 DIAGNOSIS — T45.1X5A CHEMOTHERAPY-INDUCED NEUROPATHY: ICD-10-CM

## 2025-08-26 DIAGNOSIS — C18.2 MALIGNANT NEOPLASM OF ASCENDING COLON: ICD-10-CM

## 2025-08-26 DIAGNOSIS — D49.9 IMMUNODEFICIENCY SECONDARY TO NEOPLASM: ICD-10-CM

## 2025-08-26 DIAGNOSIS — E11.65 UNCONTROLLED TYPE 2 DIABETES MELLITUS WITH HYPERGLYCEMIA, WITHOUT LONG-TERM CURRENT USE OF INSULIN: ICD-10-CM

## 2025-08-26 DIAGNOSIS — R79.89 ELEVATED LFTS: ICD-10-CM

## 2025-08-26 PROCEDURE — 96416 CHEMO PROLONG INFUSE W/PUMP: CPT

## 2025-08-26 PROCEDURE — 63600175 PHARM REV CODE 636 W HCPCS: Performed by: REGISTERED NURSE

## 2025-08-26 PROCEDURE — 96374 THER/PROPH/DIAG INJ IV PUSH: CPT

## 2025-08-26 PROCEDURE — 99999 PR PBB SHADOW E&M-EST. PATIENT-LVL V: CPT | Mod: PBBFAC,,, | Performed by: REGISTERED NURSE

## 2025-08-26 PROCEDURE — 96522 REFILL/MAINT PUMP/RESVR SYST: CPT

## 2025-08-26 PROCEDURE — 25000003 PHARM REV CODE 250: Performed by: REGISTERED NURSE

## 2025-08-26 RX ORDER — ONDANSETRON 8 MG/1
8 TABLET, FILM COATED ORAL EVERY 8 HOURS PRN
Qty: 30 TABLET | Refills: 2 | Status: SHIPPED | OUTPATIENT
Start: 2025-08-26 | End: 2026-08-26

## 2025-08-26 RX ORDER — OXYCODONE HYDROCHLORIDE 5 MG/1
5 TABLET ORAL EVERY 6 HOURS PRN
Qty: 60 TABLET | Refills: 0 | Status: SHIPPED | OUTPATIENT
Start: 2025-08-26

## 2025-08-26 RX ORDER — SODIUM CHLORIDE 0.9 % (FLUSH) 0.9 %
10 SYRINGE (ML) INJECTION
Status: CANCELLED | OUTPATIENT
Start: 2025-08-26

## 2025-08-26 RX ORDER — DIPHENHYDRAMINE HYDROCHLORIDE 50 MG/ML
50 INJECTION, SOLUTION INTRAMUSCULAR; INTRAVENOUS ONCE AS NEEDED
Status: CANCELLED | OUTPATIENT
Start: 2025-08-26 | End: 2037-01-22

## 2025-08-26 RX ORDER — SODIUM CHLORIDE 0.9 % (FLUSH) 0.9 %
10 SYRINGE (ML) INJECTION
Status: DISCONTINUED | OUTPATIENT
Start: 2025-08-26 | End: 2025-08-26 | Stop reason: HOSPADM

## 2025-08-26 RX ORDER — HEPARIN 100 UNIT/ML
500 SYRINGE INTRAVENOUS
Status: CANCELLED | OUTPATIENT
Start: 2025-08-26

## 2025-08-26 RX ORDER — HEPARIN 100 UNIT/ML
500 SYRINGE INTRAVENOUS
Status: DISCONTINUED | OUTPATIENT
Start: 2025-08-26 | End: 2025-08-26 | Stop reason: HOSPADM

## 2025-08-26 RX ORDER — PROCHLORPERAZINE MALEATE 10 MG
10 TABLET ORAL EVERY 6 HOURS PRN
Qty: 30 TABLET | Refills: 1 | Status: SHIPPED | OUTPATIENT
Start: 2025-08-26 | End: 2026-08-26

## 2025-08-26 RX ORDER — PROCHLORPERAZINE EDISYLATE 5 MG/ML
10 INJECTION INTRAMUSCULAR; INTRAVENOUS ONCE AS NEEDED
Status: DISCONTINUED | OUTPATIENT
Start: 2025-08-26 | End: 2025-08-26 | Stop reason: HOSPADM

## 2025-08-26 RX ORDER — EPINEPHRINE 0.3 MG/.3ML
0.3 INJECTION SUBCUTANEOUS ONCE AS NEEDED
Status: CANCELLED | OUTPATIENT
Start: 2025-08-26 | End: 2037-01-22

## 2025-08-26 RX ORDER — PROCHLORPERAZINE EDISYLATE 5 MG/ML
10 INJECTION INTRAMUSCULAR; INTRAVENOUS ONCE AS NEEDED
Status: CANCELLED | OUTPATIENT
Start: 2025-08-26

## 2025-08-26 RX ORDER — URSODIOL 300 MG/1
300 CAPSULE ORAL 2 TIMES DAILY
Qty: 60 CAPSULE | Refills: 11 | Status: SHIPPED | OUTPATIENT
Start: 2025-08-26 | End: 2026-08-26

## 2025-08-26 RX ORDER — EPINEPHRINE 0.3 MG/.3ML
0.3 INJECTION SUBCUTANEOUS ONCE AS NEEDED
Status: DISCONTINUED | OUTPATIENT
Start: 2025-08-26 | End: 2025-08-26 | Stop reason: HOSPADM

## 2025-08-26 RX ORDER — DIPHENHYDRAMINE HYDROCHLORIDE 50 MG/ML
50 INJECTION, SOLUTION INTRAMUSCULAR; INTRAVENOUS ONCE AS NEEDED
Status: CANCELLED | OUTPATIENT
Start: 2025-08-26 | End: 2037-01-21

## 2025-08-26 RX ORDER — ONDANSETRON HYDROCHLORIDE 2 MG/ML
8 INJECTION, SOLUTION INTRAVENOUS ONCE
Status: COMPLETED | OUTPATIENT
Start: 2025-08-26 | End: 2025-08-26

## 2025-08-26 RX ORDER — DIPHENHYDRAMINE HYDROCHLORIDE 50 MG/ML
50 INJECTION, SOLUTION INTRAMUSCULAR; INTRAVENOUS ONCE AS NEEDED
Status: DISCONTINUED | OUTPATIENT
Start: 2025-08-26 | End: 2025-08-26 | Stop reason: HOSPADM

## 2025-08-26 RX ORDER — ONDANSETRON HYDROCHLORIDE 2 MG/ML
8 INJECTION, SOLUTION INTRAVENOUS ONCE
Status: CANCELLED | OUTPATIENT
Start: 2025-08-26 | End: 2025-08-26

## 2025-08-26 RX ORDER — EPINEPHRINE 0.3 MG/.3ML
0.3 INJECTION SUBCUTANEOUS ONCE AS NEEDED
Status: CANCELLED | OUTPATIENT
Start: 2025-08-26 | End: 2037-01-21

## 2025-08-26 RX ADMIN — DEXAMETHASONE SODIUM PHOSPHATE 30000 UNITS: 4 INJECTION, SOLUTION INTRA-ARTICULAR; INTRALESIONAL; INTRAMUSCULAR; INTRAVENOUS; SOFT TISSUE at 10:08

## 2025-08-26 RX ADMIN — FLUOROURACIL 4000 MG: 50 INJECTION, SOLUTION INTRAVENOUS at 10:08

## 2025-08-26 RX ADMIN — ONDANSETRON 8 MG: 2 INJECTION INTRAMUSCULAR; INTRAVENOUS at 10:08

## 2025-08-26 RX ADMIN — SODIUM CHLORIDE: 9 INJECTION, SOLUTION INTRAVENOUS at 10:08

## 2025-08-27 ENCOUNTER — TELEPHONE (OUTPATIENT)
Dept: FAMILY MEDICINE | Facility: CLINIC | Age: 60
End: 2025-08-27
Payer: COMMERCIAL

## 2025-08-28 ENCOUNTER — INFUSION (OUTPATIENT)
Dept: INFUSION THERAPY | Facility: HOSPITAL | Age: 60
End: 2025-08-28
Attending: INTERNAL MEDICINE
Payer: COMMERCIAL

## 2025-08-28 VITALS
HEIGHT: 65 IN | RESPIRATION RATE: 18 BRPM | TEMPERATURE: 98 F | DIASTOLIC BLOOD PRESSURE: 53 MMHG | SYSTOLIC BLOOD PRESSURE: 96 MMHG | OXYGEN SATURATION: 97 % | HEART RATE: 108 BPM | WEIGHT: 198.38 LBS | BODY MASS INDEX: 33.05 KG/M2

## 2025-08-28 DIAGNOSIS — C18.2 MALIGNANT NEOPLASM OF ASCENDING COLON: Primary | ICD-10-CM

## 2025-08-28 PROCEDURE — 63600175 PHARM REV CODE 636 W HCPCS: Performed by: REGISTERED NURSE

## 2025-08-28 PROCEDURE — A4216 STERILE WATER/SALINE, 10 ML: HCPCS | Performed by: REGISTERED NURSE

## 2025-08-28 PROCEDURE — 96523 IRRIG DRUG DELIVERY DEVICE: CPT

## 2025-08-28 PROCEDURE — 25000003 PHARM REV CODE 250: Performed by: REGISTERED NURSE

## 2025-08-28 RX ORDER — HEPARIN 100 UNIT/ML
500 SYRINGE INTRAVENOUS
Status: DISCONTINUED | OUTPATIENT
Start: 2025-08-28 | End: 2025-08-28 | Stop reason: HOSPADM

## 2025-08-28 RX ORDER — SODIUM CHLORIDE 0.9 % (FLUSH) 0.9 %
10 SYRINGE (ML) INJECTION
Status: DISCONTINUED | OUTPATIENT
Start: 2025-08-28 | End: 2025-08-28 | Stop reason: HOSPADM

## 2025-08-28 RX ADMIN — Medication 10 ML: at 08:08

## 2025-08-28 RX ADMIN — HEPARIN 500 UNITS: 100 SYRINGE at 08:08

## 2025-08-29 ENCOUNTER — TELEPHONE (OUTPATIENT)
Dept: FAMILY MEDICINE | Facility: CLINIC | Age: 60
End: 2025-08-29
Payer: COMMERCIAL

## 2025-09-04 ENCOUNTER — TELEPHONE (OUTPATIENT)
Dept: TRANSPLANT | Facility: CLINIC | Age: 60
End: 2025-09-04
Payer: COMMERCIAL

## (undated) DEVICE — SUT 2-0 12-18IN SILK

## (undated) DEVICE — PACK SIRUS BASIC V SURG STRL

## (undated) DEVICE — TOWEL OR DISP STRL BLUE 4/PK

## (undated) DEVICE — SUT VICRYL PLUS 3-0 SH 27IN

## (undated) DEVICE — DRAPE ARM DAVINCI XI

## (undated) DEVICE — ELECTRODE REM PLYHSV RETURN 9

## (undated) DEVICE — ELECTRODE RET DUAL PLATE CORD

## (undated) DEVICE — SUT VICRYL PLUS 3-0 SH 18IN

## (undated) DEVICE — STRAP OR TABLE 5IN X 72IN

## (undated) DEVICE — PAD K-THERMIA 24IN X 60IN

## (undated) DEVICE — SOL CLEARIFY VISUALIZATION LAP

## (undated) DEVICE — BAG INZII TISS RETRV 12/15MM

## (undated) DEVICE — GOWN SMART IMP BREATHABLE XXLG

## (undated) DEVICE — SYR ONLY LUER LOCK 20CC

## (undated) DEVICE — SOL ELECTROLUBE ANTI-STIC

## (undated) DEVICE — GOWN POLY REINF BRTH SLV XL

## (undated) DEVICE — ADHESIVE DERMABOND ADVANCED

## (undated) DEVICE — DRAPE COLUMN DAVINCI XI

## (undated) DEVICE — SYR 10CC LUER LOCK

## (undated) DEVICE — PENCIL ROCKER SWITCH 10FT CORD

## (undated) DEVICE — SCISSOR 5MMX35CM DIRECT DRIVE

## (undated) DEVICE — DRAPE T TRNSVRS LAP 102X78X121

## (undated) DEVICE — SUT 1 36IN PDS II

## (undated) DEVICE — SEALER MARYLAND 1 STEP 23CM

## (undated) DEVICE — SUT VICRYL BR 1 GEN 27 CT-1

## (undated) DEVICE — CARTRIDGE BABCOCK GRASPER 5X45

## (undated) DEVICE — Device

## (undated) DEVICE — CANNULA SEAL 12MM

## (undated) DEVICE — PACK CUSTOM UNIV BASIN SLI

## (undated) DEVICE — GLOVE SENSICARE PI GRN 7

## (undated) DEVICE — NDL HYPO STD REG BVL 22GX1.5IN

## (undated) DEVICE — HEMOSTAT SURGICEL FIBRLR 1X2IN

## (undated) DEVICE — SET TUBE PNEUMOCLEAR SE HI FLO

## (undated) DEVICE — LINER SUCTION 3000CC

## (undated) DEVICE — OBTURATOR BLADELESS 8MM XI CLR

## (undated) DEVICE — SUT SILK 0 STRANDS 30IN BLK

## (undated) DEVICE — SUT PROLENE 2-0 SH 36IN BLU

## (undated) DEVICE — DRAPE INCISE IOBAN 2 23X17IN

## (undated) DEVICE — SEAL UNIVERSAL 5MM-8MM XI

## (undated) DEVICE — SUT VICRYL+ 27 UR-6 VIOL

## (undated) DEVICE — BLADE SURG CARBON STEEL SZ11

## (undated) DEVICE — BAG TISS RETRV MONARCH 10MM

## (undated) DEVICE — DRESSING ABSRBNT ISLAND 3.6X8

## (undated) DEVICE — SEALER LIGASURE MARYLAND 23CM

## (undated) DEVICE — TIP YANKAUERS BULB NO VENT

## (undated) DEVICE — SUT SILK 3-0 SH 18IN BLACK

## (undated) DEVICE — TUBING SUCTION STERILE

## (undated) DEVICE — TRAY CATH 1-LYR URIMTR 16FR

## (undated) DEVICE — NDL SAFETY 21G X 1 1/2 ECLPSE

## (undated) DEVICE — APPLICATOR CHLORAPREP ORN 26ML

## (undated) DEVICE — SOL NACL IRR 1000ML BTL

## (undated) DEVICE — SYR 30CC LUER LOCK

## (undated) DEVICE — SUT V-LOC 90 3-0 VIOLET

## (undated) DEVICE — CONTAINER SPECIMEN OR STER 4OZ

## (undated) DEVICE — GLOVE SENSICARE PI ALOE 7.5

## (undated) DEVICE — GOWN POLY REINF BRTH SLV LG

## (undated) DEVICE — SUT VICRYL 3-0 27 SH

## (undated) DEVICE — COVER STERILE-Z BACK TABLE XL

## (undated) DEVICE — GLOVE SENSICARE PI ALOE 6

## (undated) DEVICE — SUT 4/0 27IN PDS II VIO MON

## (undated) DEVICE — TROCAR ENDO Z THREAD KII 5X100

## (undated) DEVICE — ELECTRODE BLADE INSULATED 1 IN

## (undated) DEVICE — GLOVE SENSICARE PI ALOE 6.5

## (undated) DEVICE — DRAPE C ARM 42 X 120 10/BX

## (undated) DEVICE — GLOVE SENSICARE PI GRN 6.5

## (undated) DEVICE — COVER TRNSDUC CIV-FLX 8.9X91.5

## (undated) DEVICE — GLOVE SENSICARE PI GRN 6

## (undated) DEVICE — COVER TIP CURVED SCISSORS XI

## (undated) DEVICE — SUT VLOC 90 3-0 V-20 NDL 9

## (undated) DEVICE — STAPLER SUREFORM 60 SPU

## (undated) DEVICE — BLADE SURG #15 CARBON STEEL

## (undated) DEVICE — SLEEVE SCD EXPRESS KNEE MEDIUM

## (undated) DEVICE — SUT SILK 2-0 SH 18IN BLACK

## (undated) DEVICE — PAD PINK TRENDELENBURG POS XL

## (undated) DEVICE — SUT SA85H SILK 2-0

## (undated) DEVICE — SUT 2-0 VICRYL / SH (J417)

## (undated) DEVICE — GOWN POLY REINF X-LONG XL

## (undated) DEVICE — TOWEL OR XRAY WHITE 17X26IN

## (undated) DEVICE — RELOAD SUREFORM 60 3.5 BLU 6R

## (undated) DEVICE — CANNULA REDUCER 12-8MM

## (undated) DEVICE — IRRIGATOR SUCTION W/TIP

## (undated) DEVICE — PENCIL SMK EVAC CONNECTOR 10FT

## (undated) DEVICE — SUT MCRYL PLUS 4-0 PS2 27IN

## (undated) DEVICE — SUT PDS II 1 TP-1 VIL

## (undated) DEVICE — ELECTRODE MEGADYNE RETURN DUAL

## (undated) DEVICE — SEALER LIGASURE MARYLAND 37CM

## (undated) DEVICE — GLOVE SENSICARE PI GRN 8

## (undated) DEVICE — TRAY GENERAL SURGERY OMC

## (undated) DEVICE — DRAPE MAJOR ABD 102X122X78IN

## (undated) DEVICE — SUT 3-0 12-18IN SILK

## (undated) DEVICE — DRAPE ABDOMINAL TIBURON 14X11

## (undated) DEVICE — SUT SILK 2-0 STRANDS 30IN

## (undated) DEVICE — SYR SALINE PREFILLED FLSH 10ML

## (undated) DEVICE — PACK SET UP 190 OMC-NS

## (undated) DEVICE — SUT EASE CROSSBOW CLSR SYS

## (undated) DEVICE — SEALER VESSEL EXTEND

## (undated) DEVICE — SUT SILK 3-0 STRANDS 30IN

## (undated) DEVICE — LOOP VESSEL BLUE MAXI

## (undated) DEVICE — SUT PDS II 0 CT-1 VIL MONO

## (undated) DEVICE — COVER SURG LIGHT HANDLE

## (undated) DEVICE — SUT 1 48IN PDS II VIO MONO